# Patient Record
Sex: MALE | Race: BLACK OR AFRICAN AMERICAN | NOT HISPANIC OR LATINO | Employment: OTHER | URBAN - METROPOLITAN AREA
[De-identification: names, ages, dates, MRNs, and addresses within clinical notes are randomized per-mention and may not be internally consistent; named-entity substitution may affect disease eponyms.]

---

## 2017-02-11 ENCOUNTER — HOSPITAL ENCOUNTER (INPATIENT)
Facility: HOSPITAL | Age: 42
LOS: 5 days | DRG: 698 | End: 2017-02-16
Attending: EMERGENCY MEDICINE | Admitting: HOSPITALIST
Payer: MEDICAID

## 2017-02-11 DIAGNOSIS — N39.0 SEPSIS DUE TO URINARY TRACT INFECTION: Primary | ICD-10-CM

## 2017-02-11 DIAGNOSIS — A41.9 SEPSIS, DUE TO UNSPECIFIED ORGANISM: ICD-10-CM

## 2017-02-11 DIAGNOSIS — A41.9 SEPSIS DUE TO URINARY TRACT INFECTION: Primary | ICD-10-CM

## 2017-02-11 DIAGNOSIS — N39.0 URINARY TRACT INFECTION WITHOUT HEMATURIA, SITE UNSPECIFIED: ICD-10-CM

## 2017-02-11 DIAGNOSIS — G82.20 PARAPLEGIA: ICD-10-CM

## 2017-02-11 PROBLEM — Z78.9 NURSING HOME RESIDENT: Chronic | Status: ACTIVE | Noted: 2017-02-11

## 2017-02-11 PROBLEM — Z89.511 HX OF RIGHT BKA: Chronic | Status: ACTIVE | Noted: 2017-02-11

## 2017-02-11 PROBLEM — I10 ESSENTIAL HYPERTENSION: Chronic | Status: ACTIVE | Noted: 2017-02-11

## 2017-02-11 PROBLEM — D72.829 LEUKOCYTOSIS: Status: ACTIVE | Noted: 2017-02-11

## 2017-02-11 PROBLEM — S81.801A WOUND OF RIGHT LOWER EXTREMITY: Status: ACTIVE | Noted: 2017-02-11

## 2017-02-11 PROBLEM — Z97.8 CHRONIC INDWELLING FOLEY CATHETER: Chronic | Status: ACTIVE | Noted: 2017-02-11

## 2017-02-11 PROBLEM — Z86.718 HISTORY OF DVT OF LOWER EXTREMITY: Status: ACTIVE | Noted: 2017-02-11

## 2017-02-11 PROBLEM — D50.9 IRON DEFICIENCY ANEMIA: Chronic | Status: ACTIVE | Noted: 2017-02-11

## 2017-02-11 PROBLEM — T83.511A URINARY TRACT INFECTION ASSOCIATED WITH INDWELLING URETHRAL CATHETER: Status: ACTIVE | Noted: 2017-02-11

## 2017-02-11 PROBLEM — F33.40 RECURRENT MAJOR DEPRESSIVE DISORDER, IN REMISSION: Chronic | Status: ACTIVE | Noted: 2017-02-11

## 2017-02-11 PROBLEM — N17.9 AKI (ACUTE KIDNEY INJURY): Status: ACTIVE | Noted: 2017-02-11

## 2017-02-11 PROBLEM — E55.9 VITAMIN D DEFICIENCY: Chronic | Status: ACTIVE | Noted: 2017-02-11

## 2017-02-11 LAB
ALBUMIN SERPL BCP-MCNC: 2.1 G/DL
ALP SERPL-CCNC: 123 U/L
ALT SERPL W/O P-5'-P-CCNC: 38 U/L
ANION GAP SERPL CALC-SCNC: 15 MMOL/L
APTT BLDCRRT: 46 SEC
AST SERPL-CCNC: 54 U/L
BACTERIA #/AREA URNS HPF: ABNORMAL /HPF
BASOPHILS # BLD AUTO: 0.01 K/UL
BASOPHILS NFR BLD: 0 %
BILIRUB SERPL-MCNC: 0.4 MG/DL
BILIRUB UR QL STRIP: NEGATIVE
BUN SERPL-MCNC: 36 MG/DL
CALCIUM SERPL-MCNC: 8.8 MG/DL
CHLORIDE SERPL-SCNC: 99 MMOL/L
CLARITY UR: ABNORMAL
CO2 SERPL-SCNC: 21 MMOL/L
COLOR UR: ABNORMAL
CREAT SERPL-MCNC: 2.7 MG/DL
DIFFERENTIAL METHOD: ABNORMAL
EOSINOPHIL # BLD AUTO: 0 K/UL
EOSINOPHIL NFR BLD: 0 %
ERYTHROCYTE [DISTWIDTH] IN BLOOD BY AUTOMATED COUNT: 16.3 %
EST. GFR  (AFRICAN AMERICAN): 33 ML/MIN/1.73 M^2
EST. GFR  (NON AFRICAN AMERICAN): 28 ML/MIN/1.73 M^2
FLUAV AG SPEC QL IA: NEGATIVE
FLUBV AG SPEC QL IA: NEGATIVE
GLUCOSE SERPL-MCNC: 141 MG/DL
GLUCOSE UR QL STRIP: NEGATIVE
GRAM STN SPEC: NORMAL
HCT VFR BLD AUTO: 27.6 %
HGB BLD-MCNC: 9.2 G/DL
HGB UR QL STRIP: ABNORMAL
HYALINE CASTS #/AREA URNS LPF: 0 /LPF
INR PPP: 1.7
KETONES UR QL STRIP: ABNORMAL
LACTATE SERPL-SCNC: 1.8 MMOL/L
LACTATE SERPL-SCNC: 1.8 MMOL/L
LEUKOCYTE ESTERASE UR QL STRIP: ABNORMAL
LYMPHOCYTES # BLD AUTO: 1.2 K/UL
LYMPHOCYTES NFR BLD: 5.8 %
MAGNESIUM SERPL-MCNC: 1.4 MG/DL
MCH RBC QN AUTO: 26.7 PG
MCHC RBC AUTO-ENTMCNC: 33.3 %
MCV RBC AUTO: 80 FL
MICROSCOPIC COMMENT: ABNORMAL
MONOCYTES # BLD AUTO: 1 K/UL
MONOCYTES NFR BLD: 4.9 %
NEUTROPHILS # BLD AUTO: 18.3 K/UL
NEUTROPHILS NFR BLD: 88.4 %
NITRITE UR QL STRIP: NEGATIVE
PH UR STRIP: 8 [PH] (ref 5–8)
PHOSPHATE SERPL-MCNC: 5.5 MG/DL
PLATELET # BLD AUTO: 436 K/UL
PMV BLD AUTO: 10.1 FL
POTASSIUM SERPL-SCNC: 4.8 MMOL/L
PROCALCITONIN SERPL IA-MCNC: 52.57 NG/ML
PROT SERPL-MCNC: 7.7 G/DL
PROT UR QL STRIP: ABNORMAL
PROTHROMBIN TIME: 17.5 SEC
RBC # BLD AUTO: 3.44 M/UL
RBC #/AREA URNS HPF: >100 /HPF (ref 0–4)
SODIUM SERPL-SCNC: 135 MMOL/L
SP GR UR STRIP: 1.02 (ref 1–1.03)
SPECIMEN SOURCE: NORMAL
SQUAMOUS #/AREA URNS HPF: 2 /HPF
TROPONIN I SERPL DL<=0.01 NG/ML-MCNC: 0.02 NG/ML
URN SPEC COLLECT METH UR: ABNORMAL
UROBILINOGEN UR STRIP-ACNC: 1 EU/DL
WBC # BLD AUTO: 20.73 K/UL
WBC #/AREA URNS HPF: >100 /HPF (ref 0–5)
WBC CLUMPS URNS QL MICRO: ABNORMAL

## 2017-02-11 PROCEDURE — 99285 EMERGENCY DEPT VISIT HI MDM: CPT | Mod: 25

## 2017-02-11 PROCEDURE — 84100 ASSAY OF PHOSPHORUS: CPT

## 2017-02-11 PROCEDURE — 87205 SMEAR GRAM STAIN: CPT

## 2017-02-11 PROCEDURE — 85025 COMPLETE CBC W/AUTO DIFF WBC: CPT

## 2017-02-11 PROCEDURE — 83605 ASSAY OF LACTIC ACID: CPT

## 2017-02-11 PROCEDURE — 96365 THER/PROPH/DIAG IV INF INIT: CPT

## 2017-02-11 PROCEDURE — 84145 PROCALCITONIN (PCT): CPT

## 2017-02-11 PROCEDURE — 81000 URINALYSIS NONAUTO W/SCOPE: CPT

## 2017-02-11 PROCEDURE — 84484 ASSAY OF TROPONIN QUANT: CPT

## 2017-02-11 PROCEDURE — 96360 HYDRATION IV INFUSION INIT: CPT | Mod: XS

## 2017-02-11 PROCEDURE — 87400 INFLUENZA A/B EACH AG IA: CPT | Mod: 59

## 2017-02-11 PROCEDURE — 87184 SC STD DISK METHOD PER PLATE: CPT

## 2017-02-11 PROCEDURE — 87088 URINE BACTERIA CULTURE: CPT

## 2017-02-11 PROCEDURE — 85610 PROTHROMBIN TIME: CPT

## 2017-02-11 PROCEDURE — 25000003 PHARM REV CODE 250: Performed by: EMERGENCY MEDICINE

## 2017-02-11 PROCEDURE — 63600175 PHARM REV CODE 636 W HCPCS: Performed by: EMERGENCY MEDICINE

## 2017-02-11 PROCEDURE — 83735 ASSAY OF MAGNESIUM: CPT

## 2017-02-11 PROCEDURE — 96367 TX/PROPH/DG ADDL SEQ IV INF: CPT

## 2017-02-11 PROCEDURE — 87040 BLOOD CULTURE FOR BACTERIA: CPT | Mod: 59

## 2017-02-11 PROCEDURE — 93005 ELECTROCARDIOGRAM TRACING: CPT

## 2017-02-11 PROCEDURE — 87086 URINE CULTURE/COLONY COUNT: CPT

## 2017-02-11 PROCEDURE — 87077 CULTURE AEROBIC IDENTIFY: CPT

## 2017-02-11 PROCEDURE — 11000001 HC ACUTE MED/SURG PRIVATE ROOM

## 2017-02-11 PROCEDURE — 80053 COMPREHEN METABOLIC PANEL: CPT

## 2017-02-11 PROCEDURE — 87186 SC STD MICRODIL/AGAR DIL: CPT | Mod: 59

## 2017-02-11 PROCEDURE — 93010 ELECTROCARDIOGRAM REPORT: CPT | Mod: ,,, | Performed by: INTERNAL MEDICINE

## 2017-02-11 PROCEDURE — 51702 INSERT TEMP BLADDER CATH: CPT

## 2017-02-11 PROCEDURE — 85730 THROMBOPLASTIN TIME PARTIAL: CPT

## 2017-02-11 RX ORDER — ONDANSETRON 8 MG/1
8 TABLET, ORALLY DISINTEGRATING ORAL EVERY 8 HOURS PRN
Status: DISCONTINUED | OUTPATIENT
Start: 2017-02-11 | End: 2017-02-17 | Stop reason: HOSPADM

## 2017-02-11 RX ORDER — FERROUS SULFATE 325(65) MG
325 TABLET, DELAYED RELEASE (ENTERIC COATED) ORAL 2 TIMES DAILY
Status: ON HOLD | COMMUNITY
End: 2019-01-04 | Stop reason: HOSPADM

## 2017-02-11 RX ORDER — BACITRACIN 500 [USP'U]/G
OINTMENT TOPICAL 2 TIMES DAILY
COMMUNITY
End: 2017-12-10

## 2017-02-11 RX ORDER — SODIUM CHLORIDE 9 MG/ML
1000 INJECTION, SOLUTION INTRAVENOUS
Status: COMPLETED | OUTPATIENT
Start: 2017-02-11 | End: 2017-02-11

## 2017-02-11 RX ORDER — CHOLECALCIFEROL (VITAMIN D3) 10 MCG
1200 TABLET ORAL DAILY
Status: DISCONTINUED | OUTPATIENT
Start: 2017-02-12 | End: 2017-02-17 | Stop reason: HOSPADM

## 2017-02-11 RX ORDER — CEFEPIME HYDROCHLORIDE 1 G/50ML
1 INJECTION, SOLUTION INTRAVENOUS
Status: DISCONTINUED | OUTPATIENT
Start: 2017-02-12 | End: 2017-02-12

## 2017-02-11 RX ORDER — ASPIRIN 325 MG
325 TABLET, DELAYED RELEASE (ENTERIC COATED) ORAL DAILY
Status: ON HOLD | COMMUNITY
End: 2019-01-04 | Stop reason: HOSPADM

## 2017-02-11 RX ORDER — IBUPROFEN 400 MG/1
800 TABLET ORAL
Status: COMPLETED | OUTPATIENT
Start: 2017-02-11 | End: 2017-02-11

## 2017-02-11 RX ORDER — OXYCODONE HYDROCHLORIDE 5 MG/1
10 TABLET ORAL EVERY 6 HOURS PRN
Status: DISCONTINUED | OUTPATIENT
Start: 2017-02-12 | End: 2017-02-17 | Stop reason: HOSPADM

## 2017-02-11 RX ORDER — ERGOCALCIFEROL 1.25 MG/1
50000 CAPSULE ORAL
COMMUNITY
End: 2017-02-11 | Stop reason: CLARIF

## 2017-02-11 RX ORDER — MIRTAZAPINE 30 MG/1
30 TABLET, FILM COATED ORAL NIGHTLY
COMMUNITY
End: 2017-12-10

## 2017-02-11 RX ORDER — OXYCODONE HYDROCHLORIDE 5 MG/1
5 TABLET ORAL EVERY 6 HOURS PRN
Status: DISCONTINUED | OUTPATIENT
Start: 2017-02-12 | End: 2017-02-17 | Stop reason: HOSPADM

## 2017-02-11 RX ORDER — OXYCODONE AND ACETAMINOPHEN 5; 325 MG/1; MG/1
1 TABLET ORAL EVERY 4 HOURS PRN
COMMUNITY
End: 2018-08-29

## 2017-02-11 RX ORDER — ASCORBIC ACID 500 MG
500 TABLET ORAL 2 TIMES DAILY
Status: ON HOLD | COMMUNITY
End: 2019-01-04 | Stop reason: HOSPADM

## 2017-02-11 RX ORDER — ASCORBIC ACID 500 MG
500 TABLET ORAL 2 TIMES DAILY
Status: DISCONTINUED | OUTPATIENT
Start: 2017-02-12 | End: 2017-02-17 | Stop reason: HOSPADM

## 2017-02-11 RX ORDER — SODIUM CHLORIDE 9 MG/ML
INJECTION, SOLUTION INTRAVENOUS CONTINUOUS
Status: DISCONTINUED | OUTPATIENT
Start: 2017-02-11 | End: 2017-02-13

## 2017-02-11 RX ORDER — POLYETHYLENE GLYCOL 3350 17 G/17G
17 POWDER, FOR SOLUTION ORAL 2 TIMES DAILY PRN
Status: DISCONTINUED | OUTPATIENT
Start: 2017-02-12 | End: 2017-02-17 | Stop reason: HOSPADM

## 2017-02-11 RX ORDER — CHOLECALCIFEROL (VITAMIN D3) 125 MCG
50000 CAPSULE ORAL
Status: ON HOLD | COMMUNITY
End: 2019-01-04 | Stop reason: HOSPADM

## 2017-02-11 RX ORDER — PROPRANOLOL HYDROCHLORIDE 10 MG/1
10 TABLET ORAL 3 TIMES DAILY
Status: DISCONTINUED | OUTPATIENT
Start: 2017-02-12 | End: 2017-02-17 | Stop reason: HOSPADM

## 2017-02-11 RX ORDER — AMOXICILLIN 250 MG
1 CAPSULE ORAL 2 TIMES DAILY
Status: ON HOLD | COMMUNITY
End: 2019-01-04 | Stop reason: HOSPADM

## 2017-02-11 RX ORDER — ASPIRIN 325 MG
325 TABLET, DELAYED RELEASE (ENTERIC COATED) ORAL DAILY
Status: DISCONTINUED | OUTPATIENT
Start: 2017-02-12 | End: 2017-02-17 | Stop reason: HOSPADM

## 2017-02-11 RX ORDER — AMOXICILLIN 250 MG
1 CAPSULE ORAL 2 TIMES DAILY
Status: DISCONTINUED | OUTPATIENT
Start: 2017-02-12 | End: 2017-02-17 | Stop reason: HOSPADM

## 2017-02-11 RX ORDER — CIPROFLOXACIN 2 MG/ML
400 INJECTION, SOLUTION INTRAVENOUS
Status: COMPLETED | OUTPATIENT
Start: 2017-02-11 | End: 2017-02-11

## 2017-02-11 RX ORDER — POLYETHYLENE GLYCOL 3350 17 G/17G
17 POWDER, FOR SOLUTION ORAL DAILY PRN
Status: ON HOLD | COMMUNITY
End: 2019-01-04 | Stop reason: HOSPADM

## 2017-02-11 RX ORDER — FERROUS SULFATE 325(65) MG
325 TABLET, DELAYED RELEASE (ENTERIC COATED) ORAL 2 TIMES DAILY
Status: DISCONTINUED | OUTPATIENT
Start: 2017-02-12 | End: 2017-02-17 | Stop reason: HOSPADM

## 2017-02-11 RX ORDER — ACETAMINOPHEN 325 MG/1
650 TABLET ORAL EVERY 6 HOURS PRN
Status: DISCONTINUED | OUTPATIENT
Start: 2017-02-12 | End: 2017-02-17 | Stop reason: HOSPADM

## 2017-02-11 RX ORDER — BACITRACIN 500 [USP'U]/G
OINTMENT TOPICAL 2 TIMES DAILY
Status: DISCONTINUED | OUTPATIENT
Start: 2017-02-12 | End: 2017-02-17 | Stop reason: HOSPADM

## 2017-02-11 RX ORDER — MIRTAZAPINE 15 MG/1
30 TABLET, FILM COATED ORAL NIGHTLY
Status: DISCONTINUED | OUTPATIENT
Start: 2017-02-12 | End: 2017-02-17 | Stop reason: HOSPADM

## 2017-02-11 RX ORDER — ACETAMINOPHEN 650 MG/20.3ML
650 LIQUID ORAL EVERY 4 HOURS PRN
COMMUNITY
End: 2018-08-29

## 2017-02-11 RX ORDER — PROPRANOLOL HYDROCHLORIDE 10 MG/1
10 TABLET ORAL 3 TIMES DAILY
COMMUNITY
End: 2017-12-10

## 2017-02-11 RX ADMIN — SODIUM CHLORIDE 1000 ML: 0.9 INJECTION, SOLUTION INTRAVENOUS at 09:02

## 2017-02-11 RX ADMIN — SODIUM CHLORIDE 1000 ML: 0.9 INJECTION, SOLUTION INTRAVENOUS at 08:02

## 2017-02-11 RX ADMIN — CEFEPIME HYDROCHLORIDE 2 G: 2 INJECTION, POWDER, FOR SOLUTION INTRAVENOUS at 09:02

## 2017-02-11 RX ADMIN — IBUPROFEN 800 MG: 400 TABLET, FILM COATED ORAL at 08:02

## 2017-02-11 RX ADMIN — CIPROFLOXACIN 400 MG: 2 INJECTION, SOLUTION INTRAVENOUS at 10:02

## 2017-02-11 NOTE — IP AVS SNAPSHOT
[facility]  [address]  [place] [address]  Phone: [phone]           Patient Discharge Instructions     Our goal is to set you up for success. This packet includes information on your condition, medications, and your home care. It will help you to care for yourself so you don't get sicker and need to go back to the hospital.     Please ask your nurse if you have any questions.        There are many details to remember when preparing to leave the hospital. Here is what you will need to do:    1. Take your medicine. If you are prescribed medications, review your Medication List in the following pages. You may have new medications to  at the pharmacy and others that you'll need to stop taking. Review the instructions for how and when to take your medications. Talk with your doctor or nurses if you are unsure of what to do.     2. Go to your follow-up appointments. Specific follow-up information is listed in the following pages. Your may be contacted by a transition nurse or clinical provider about future appointments. Be sure we have all of the phone numbers to reach you, if needed. Please contact your provider's office if you are unable to make an appointment.     3. Watch for warning signs. Your doctor or nurse will give you detailed warning signs to watch for and when to call for assistance. These instructions may also include educational information about your condition. If you experience any of warning signs to your health, call your doctor.               [facility]  Unless otherwise directed by your provider, please contact [facility], our nurse care line that is available for 24/7 assistance.     [phone] (toll-free)    Registered nurses in the [facility] provide clinical advisement, health education, appointment booking, and other advisory services.                    ** Verify the list of medication(s) below is accurate and up to date. Carry this  with you in case of emergency. If your medications have changed, please notify your healthcare provider.             Medication List      START taking these medications        Additional Info                      ERTAPENEM (INVANZ) 1 G/100 ML NS (READY TO MIX)   Refills:  0   Dose:  1 g   Indications:  Bacteremia, Urinary Tract Infection    Last time this was given:  1 g on 2/16/2017  9:16 AM   Instructions:  Inject 100 mLs (1 g total) into the vein once daily.     Begin Date    AM    Noon    PM    Bedtime         CONTINUE taking these medications        Additional Info                      acetaminophen 650 mg/20.3 mL Soln   Commonly known as:  TYLENOL   Refills:  0   Dose:  650 mg    Instructions:  Take 650 mg by mouth every 4 (four) hours as needed for Temperature greater than.     Begin Date    AM    Noon    PM    Bedtime       aspirin 325 MG EC tablet   Commonly known as:  ECOTRIN   Refills:  0   Dose:  325 mg    Last time this was given:  325 mg on 2/16/2017  9:17 AM   Instructions:  Take 325 mg by mouth once daily.     Begin Date    AM    Noon    PM    Bedtime       bacitracin 500 unit/gram Oint   Refills:  0    Last time this was given:  2/15/2017 10:00 PM   Instructions:  Apply topically 2 (two) times daily. To right stump     Begin Date    AM    Noon    PM    Bedtime       bacitracin 500 unit/gram ointment   Refills:  0    Last time this was given:  2/15/2017 10:00 PM   Instructions:  Apply topically 2 (two) times daily. Apply to right stump     Begin Date    AM    Noon    PM    Bedtime       cholecalciferol (vitamin D3) 5,000 unit capsule   Refills:  0   Dose:  5000 Units    Last time this was given:  1,200 Units on 2/16/2017  9:19 AM   Instructions:  Take 5,000 Units by mouth once daily.     Begin Date    AM    Noon    PM    Bedtime       collagenase ointment   Refills:  0    Last time this was given:  2/15/2017  9:56 AM   Instructions:  Apply topically once daily. Cleanse right posterior stump wound  with normal saline.  Pat Dry. Apply skin prep to periwound.  Apply Santyl to wound bed & cover with bordered gauze dressing everyday until resolved.     Begin Date    AM    Noon    PM    Bedtime       ferrous sulfate 325 (65 FE) MG EC tablet   Refills:  0   Dose:  325 mg    Last time this was given:  325 mg on 2/16/2017  9:14 AM   Instructions:  Take 325 mg by mouth 2 (two) times daily.     Begin Date    AM    Noon    PM    Bedtime       LUBRIDERM DAILY MOISTURE TOP   Refills:  0    Instructions:  Apply topically once daily. Apply to left leg and foot     Begin Date    AM    Noon    PM    Bedtime       mirtazapine 30 MG tablet   Commonly known as:  REMERON   Refills:  0   Dose:  30 mg    Last time this was given:  30 mg on 2/15/2017 10:00 PM   Instructions:  Take 30 mg by mouth every evening.     Begin Date    AM    Noon    PM    Bedtime       MISC. DEVICES MISC   Refills:  0    Instructions:  by Misc.(Non-Drug; Combo Route) route. Change sage catheter q. Month on the 20th     Begin Date    AM    Noon    PM    Bedtime       oxycodone-acetaminophen 5-325 mg per tablet   Commonly known as:  PERCOCET   Refills:  0   Dose:  1 tablet    Instructions:  Take 1 tablet by mouth every 4 (four) hours as needed for Pain.     Begin Date    AM    Noon    PM    Bedtime       polyethylene glycol 17 gram Pwpk   Commonly known as:  GLYCOLAX   Refills:  0   Dose:  17 g   Indications:  Constipation    Last time this was given:  17 g on 2/16/2017  1:35 PM   Instructions:  Take 17 g by mouth daily as needed.     Begin Date    AM    Noon    PM    Bedtime       propranolol 10 MG tablet   Commonly known as:  INDERAL   Refills:  0   Dose:  10 mg    Last time this was given:  10 mg on 2/16/2017  2:55 PM   Instructions:  Take 10 mg by mouth 3 (three) times daily.     Begin Date    AM    Noon    PM    Bedtime       protein supplement Liqd   Refills:  0   Dose:  30 mL    Instructions:  Take 30 mLs by mouth 2 (two) times daily.     Begin Date     AM    Noon    PM    Bedtime       senna-docusate 8.6-50 mg 8.6-50 mg per tablet   Commonly known as:  PERICOLACE   Refills:  0   Dose:  1 tablet    Last time this was given:  1 tablet on 2/16/2017  9:13 AM   Instructions:  Take 1 tablet by mouth 2 (two) times daily.     Begin Date    AM    Noon    PM    Bedtime       VITAMIN C 500 MG tablet   Refills:  0   Dose:  500 mg   Generic drug:  ascorbic acid (vitamin C)    Last time this was given:  500 mg on 2/16/2017  9:14 AM   Instructions:  Take 500 mg by mouth 2 (two) times daily.     Begin Date    AM    Noon    PM    Bedtime       XARELTO 20 mg Tab   Refills:  0   Dose:  20 mg   Generic drug:  rivaroxaban    Last time this was given:  20 mg on 2/15/2017  5:15 PM   Instructions:  Take 20 mg by mouth daily with dinner or evening meal.     Begin Date    AM    Noon    PM    Bedtime            Where to Get Your Medications      Information about where to get these medications is not yet available     ! Ask your nurse or doctor about these medications     ERTAPENEM (INVANZ) 1 G/100 ML NS (READY TO MIX)                  Please bring to all follow up appointments:    1. A copy of your discharge instructions.  2. All medicines you are currently taking in their original bottles.  3. Identification and insurance card.    Please arrive 15 minutes ahead of scheduled appointment time.    Please call 24 hours in advance if you must reschedule your appointment and/or time.        Follow-up Information     Follow up with Ramu Breen MD.    Specialty:  Family Medicine    Why:  As needed    Contact information:    [address] [place] [address] [phone]          Discharge Instructions     Future Orders    Activity as tolerated     AIR MATTRESS if Olman Scale less than 15     Diet Adult Regular     Questions:    Total calories:      Fat restriction, if any:      Protein restriction, if any:      Na restriction, if any:      Fluid restriction:      Additional restrictions:       "Full code     Intake and output per facility protocol     Skin assessment every shift      Up in chair/ wheel chair     Vital signs per facility protocol       Discharge References/Attachments     ERTAPENEM INJECTION (ENGLISH)    SEPSIS (ENGLISH)    URINARY TRACT INFECTIONS (UTIS), UNDERSTANDING (ENGLISH)    URINARY TRACT INFECTIONS IN MEN  (ENGLISH)    PICC LINE CARE (ENGLISH)    PICC, PERIPHERALLY INSERTED CENTRAL CATHETER (ENGLISH)    WOUND CHECK (INFECTION) (ENGLISH)        Primary Diagnosis     Your primary diagnosis was:  Sepsis Due To Urinary Tract Infection      Admission Information     Date & Time Provider Department CSN    2/11/2017  7:19 PM Maurilio Alfaro MD [facility] [id number]       Admisson Diagnosis: Paraplegia, Sepsis, due to unspecified organism, Urinary tract infection without hematuria, site unspecified      Care Providers     Provider Role Specialty Primary office phone    Maurilio Alfaro MD Attending Provider Hospitalist [phone]      Your Vitals Were     BP Pulse Temp Resp Height Weight    116/72 (BP Location: Left arm, Patient Position: Lying, BP Method: Automatic) 112 99 °F (37.2 °C) (Axillary) 20 5' 10" (1.778 m) 86.2 kg (190 lb)    SpO2 BMI             98% 27.26 kg/m2         Recent Lab Values     No lab values to display.      Pending Labs     Order Current Status    Blood culture Preliminary result    Blood culture Preliminary result    Blood culture Preliminary result      Allergies as of 2/16/2017     No Known Allergies      Advance Directives     An advance directive is a document which, in the event you are no longer able to make decisions for yourself, tells your healthcare team what kind of treatment you do or do not want to receive, or who you would like to make those decisions for you.  If you do not currently have an advance directive, [facility] encourages you to create one.  For more information call:  [phone] ([phone]), [phone] " ([phone]), or log on to [email or web address].        Smoking Cessation     If you would like to quit smoking:   You may be eligible for free services if you are a [place] resident and started smoking cigarettes before September 1, 1988.  Call the [facility] toll free at (765) 776-1874 or (490) 509-1968.      Call 1-800-QUIT-NOW if you do not meet the above criteria.        Translation Services Information     ATTENTION: Language assistance services are available, free of charge. Please call [phone].    ATENCIÓN: Si habla espmindi, tiene a villela disposición servicios gratuitos de asistencia lingüística. Llame al [phone].     CHÚ Ý: N?u b?n nói Ti?ng Vi?t, có các d?ch v? h? tr? ngôn ng? mi?n phí dành cho b?n. G?i s? [phone].        [facility] Information           [facility] Sign-Up     Activating your [facility] account is as easy as 1-2-3!     1) Visit [email or web address], select Sign Up Now, enter this activation code and your date of birth, then select Next.  [email or web address]  Expires: 4/2/2017  9:14 AM      2) Create a username and password to use when you visit [facility] in the future and select a security question in case you lose your password and select Next.    3) Enter your e-mail address and click Sign Up!    Additional Information  If you have questions, please e-mail [email or web address] or call [phone] to talk to our [facility] staff. Remember, [facility] is NOT to be used for urgent needs. For medical emergencies, dial 911.          [facility] complies with applicable Federal civil rights laws and does not discriminate on the basis of race, color, national origin, age, disability, or sex.

## 2017-02-11 NOTE — IP AVS SNAPSHOT
[facility]  [address]  [place] [address]  Phone: [phone]           Patient Discharge Instructions     Our goal is to set you up for success. This packet includes information on your condition, medications, and your home care. It will help you to care for yourself so you don't get sicker and need to go back to the hospital.     Please ask your nurse if you have any questions.        There are many details to remember when preparing to leave the hospital. Here is what you will need to do:    1. Take your medicine. If you are prescribed medications, review your Medication List in the following pages. You may have new medications to  at the pharmacy and others that you'll need to stop taking. Review the instructions for how and when to take your medications. Talk with your doctor or nurses if you are unsure of what to do.     2. Go to your follow-up appointments. Specific follow-up information is listed in the following pages. Your may be contacted by a transition nurse or clinical provider about future appointments. Be sure we have all of the phone numbers to reach you, if needed. Please contact your provider's office if you are unable to make an appointment.     3. Watch for warning signs. Your doctor or nurse will give you detailed warning signs to watch for and when to call for assistance. These instructions may also include educational information about your condition. If you experience any of warning signs to your health, call your doctor.               [facility]  Unless otherwise directed by your provider, please contact [facility], our nurse care line that is available for 24/7 assistance.     [phone] (toll-free)    Registered nurses in the [facility] provide clinical advisement, health education, appointment booking, and other advisory services.                    ** Verify the list of medication(s) below is accurate and up to date. Carry this  with you in case of emergency. If your medications have changed, please notify your healthcare provider.             Medication List      START taking these medications        Additional Info                      ERTAPENEM (INVANZ) 1 G/100 ML NS (READY TO MIX)   Refills:  0   Dose:  1 g   Indications:  Bacteremia, Urinary Tract Infection    Last time this was given:  1 g on 2/16/2017  9:16 AM   Instructions:  Inject 100 mLs (1 g total) into the vein once daily.     Begin Date    AM    Noon    PM    Bedtime         CONTINUE taking these medications        Additional Info                      acetaminophen 650 mg/20.3 mL Soln   Commonly known as:  TYLENOL   Refills:  0   Dose:  650 mg    Instructions:  Take 650 mg by mouth every 4 (four) hours as needed for Temperature greater than.     Begin Date    AM    Noon    PM    Bedtime       aspirin 325 MG EC tablet   Commonly known as:  ECOTRIN   Refills:  0   Dose:  325 mg    Last time this was given:  325 mg on 2/16/2017  9:17 AM   Instructions:  Take 325 mg by mouth once daily.     Begin Date    AM    Noon    PM    Bedtime       bacitracin 500 unit/gram Oint   Refills:  0    Last time this was given:  2/15/2017 10:00 PM   Instructions:  Apply topically 2 (two) times daily. To right stump     Begin Date    AM    Noon    PM    Bedtime       bacitracin 500 unit/gram ointment   Refills:  0    Last time this was given:  2/15/2017 10:00 PM   Instructions:  Apply topically 2 (two) times daily. Apply to right stump     Begin Date    AM    Noon    PM    Bedtime       cholecalciferol (vitamin D3) 5,000 unit capsule   Refills:  0   Dose:  5000 Units    Last time this was given:  1,200 Units on 2/16/2017  9:19 AM   Instructions:  Take 5,000 Units by mouth once daily.     Begin Date    AM    Noon    PM    Bedtime       collagenase ointment   Refills:  0    Last time this was given:  2/15/2017  9:56 AM   Instructions:  Apply topically once daily. Cleanse right posterior stump wound  with normal saline.  Pat Dry. Apply skin prep to periwound.  Apply Santyl to wound bed & cover with bordered gauze dressing everyday until resolved.     Begin Date    AM    Noon    PM    Bedtime       ferrous sulfate 325 (65 FE) MG EC tablet   Refills:  0   Dose:  325 mg    Last time this was given:  325 mg on 2/16/2017  9:14 AM   Instructions:  Take 325 mg by mouth 2 (two) times daily.     Begin Date    AM    Noon    PM    Bedtime       LUBRIDERM DAILY MOISTURE TOP   Refills:  0    Instructions:  Apply topically once daily. Apply to left leg and foot     Begin Date    AM    Noon    PM    Bedtime       mirtazapine 30 MG tablet   Commonly known as:  REMERON   Refills:  0   Dose:  30 mg    Last time this was given:  30 mg on 2/15/2017 10:00 PM   Instructions:  Take 30 mg by mouth every evening.     Begin Date    AM    Noon    PM    Bedtime       MISC. DEVICES MISC   Refills:  0    Instructions:  by Misc.(Non-Drug; Combo Route) route. Change sage catheter q. Month on the 20th     Begin Date    AM    Noon    PM    Bedtime       oxycodone-acetaminophen 5-325 mg per tablet   Commonly known as:  PERCOCET   Refills:  0   Dose:  1 tablet    Instructions:  Take 1 tablet by mouth every 4 (four) hours as needed for Pain.     Begin Date    AM    Noon    PM    Bedtime       polyethylene glycol 17 gram Pwpk   Commonly known as:  GLYCOLAX   Refills:  0   Dose:  17 g   Indications:  Constipation    Last time this was given:  17 g on 2/16/2017  1:35 PM   Instructions:  Take 17 g by mouth daily as needed.     Begin Date    AM    Noon    PM    Bedtime       propranolol 10 MG tablet   Commonly known as:  INDERAL   Refills:  0   Dose:  10 mg    Last time this was given:  10 mg on 2/16/2017  2:55 PM   Instructions:  Take 10 mg by mouth 3 (three) times daily.     Begin Date    AM    Noon    PM    Bedtime       protein supplement Liqd   Refills:  0   Dose:  30 mL    Instructions:  Take 30 mLs by mouth 2 (two) times daily.     Begin Date     AM    Noon    PM    Bedtime       senna-docusate 8.6-50 mg 8.6-50 mg per tablet   Commonly known as:  PERICOLACE   Refills:  0   Dose:  1 tablet    Last time this was given:  1 tablet on 2/16/2017  9:13 AM   Instructions:  Take 1 tablet by mouth 2 (two) times daily.     Begin Date    AM    Noon    PM    Bedtime       VITAMIN C 500 MG tablet   Refills:  0   Dose:  500 mg   Generic drug:  ascorbic acid (vitamin C)    Last time this was given:  500 mg on 2/16/2017  9:14 AM   Instructions:  Take 500 mg by mouth 2 (two) times daily.     Begin Date    AM    Noon    PM    Bedtime       XARELTO 20 mg Tab   Refills:  0   Dose:  20 mg   Generic drug:  rivaroxaban    Last time this was given:  20 mg on 2/15/2017  5:15 PM   Instructions:  Take 20 mg by mouth daily with dinner or evening meal.     Begin Date    AM    Noon    PM    Bedtime            Where to Get Your Medications      Information about where to get these medications is not yet available     ! Ask your nurse or doctor about these medications     ERTAPENEM (INVANZ) 1 G/100 ML NS (READY TO MIX)                  Please bring to all follow up appointments:    1. A copy of your discharge instructions.  2. All medicines you are currently taking in their original bottles.  3. Identification and insurance card.    Please arrive 15 minutes ahead of scheduled appointment time.    Please call 24 hours in advance if you must reschedule your appointment and/or time.        Follow-up Information     Follow up with Ramu Breen MD.    Specialty:  Family Medicine    Why:  As needed    Contact information:    [address] [place] [address] [phone]          Discharge Instructions     Future Orders    Activity as tolerated     AIR MATTRESS if Olman Scale less than 15     Diet Adult Regular     Questions:    Total calories:      Fat restriction, if any:      Protein restriction, if any:      Na restriction, if any:      Fluid restriction:      Additional restrictions:       "Full code     Intake and output per facility protocol     Skin assessment every shift      Up in chair/ wheel chair     Vital signs per facility protocol       Discharge References/Attachments     ERTAPENEM INJECTION (ENGLISH)    SEPSIS (ENGLISH)    URINARY TRACT INFECTIONS (UTIS), UNDERSTANDING (ENGLISH)    URINARY TRACT INFECTIONS IN MEN  (ENGLISH)    PICC LINE CARE (ENGLISH)    PICC, PERIPHERALLY INSERTED CENTRAL CATHETER (ENGLISH)    WOUND CHECK (INFECTION) (ENGLISH)        Primary Diagnosis     Your primary diagnosis was:  Sepsis Due To Urinary Tract Infection      Admission Information     Date & Time Provider Department CSN    2/11/2017  7:19 PM Maurilio Alfaro MD [facility] [id number]      Care Providers     Provider Role Specialty Primary office phone    Maurilio Alfaro MD Attending Provider Hospitalist [phone]      Your Vitals Were     BP Pulse Temp Resp Height Weight    116/72 (BP Location: Left arm, Patient Position: Lying, BP Method: Automatic) 112 99 °F (37.2 °C) (Axillary) 20 5' 10" (1.778 m) 86.2 kg (190 lb)    SpO2 BMI             98% 27.26 kg/m2         Recent Lab Values     No lab values to display.      Pending Labs     Order Current Status    Blood culture Preliminary result    Blood culture Preliminary result    Blood culture Preliminary result      Allergies as of 2/16/2017     No Known Allergies      Advance Directives     An advance directive is a document which, in the event you are no longer able to make decisions for yourself, tells your healthcare team what kind of treatment you do or do not want to receive, or who you would like to make those decisions for you.  If you do not currently have an advance directive, [facility] encourages you to create one.  For more information call:  [phone]), [phone] ([phone]),  or log on to [email or web address].        Smoking Cessation     If you would like to quit smoking:   You may be " eligible for free services if you are a [place] resident and started smoking cigarettes before September 1, 1988.  Call the [facility] ([facility]) toll free at (699) 738-0474 or (796) 791-4707.   Call 1-800-QUIT-NOW if you do not meet the above criteria.            Language Assistance Services     ATTENTION: Language assistance services are available, free of charge. Please call [phone].      ATENCIÓN: Si habla español, tiene a villela disposición servicios gratuitos de asistencia lingüística. Llame al [phone].     [facility] Ý: N?u b?n nói Ti?ng Vi?t, có các d?ch v? h? tr? ngôn ng? mi?n phí dành cho b?n. G?i s? [phone].        [place] Information           [facility] Sign-Up     Activating your [facility] account is as easy as 1-2-3!     1) Visit [email or web address], select Sign Up Now, enter this activation code and your date of birth, then select Next.  [email or web address]  Expires: 4/2/2017  9:14 AM      2) Create a username and password to use when you visit [facility] in the future and select a security question in case you lose your password and select Next.    3) Enter your e-mail address and click Sign Up!    Additional Information  If you have questions, please e-mail [email or web address] or call [phone] to talk to our [facility] staff. Remember, [facility] is NOT to be used for urgent needs. For medical emergencies, dial 911.          [facility] complies with applicable Federal civil rights laws and does not discriminate on the basis of race, color, national origin, age, disability, or sex.

## 2017-02-12 PROBLEM — T14.90XS POST-TRAUMATIC PARAPLEGIA: Chronic | Status: ACTIVE | Noted: 2017-02-11

## 2017-02-12 PROBLEM — F43.12 CHRONIC POST-TRAUMATIC STRESS DISORDER: Chronic | Status: ACTIVE | Noted: 2017-02-12

## 2017-02-12 PROBLEM — Z93.1 GASTROSTOMY STATUS: Chronic | Status: ACTIVE | Noted: 2017-02-12

## 2017-02-12 PROBLEM — R78.81 GRAM-NEGATIVE BACTEREMIA: Status: ACTIVE | Noted: 2017-02-12

## 2017-02-12 PROBLEM — Z93.0 TRACHEOSTOMY STATUS: Chronic | Status: ACTIVE | Noted: 2017-02-12

## 2017-02-12 PROBLEM — E83.42 HYPOMAGNESEMIA: Status: ACTIVE | Noted: 2017-02-12

## 2017-02-12 PROBLEM — E83.39 HYPERPHOSPHATEMIA: Status: ACTIVE | Noted: 2017-02-12

## 2017-02-12 LAB
ABO + RH BLD: NORMAL
ANION GAP SERPL CALC-SCNC: 12 MMOL/L
BASOPHILS # BLD AUTO: 0.02 K/UL
BASOPHILS # BLD AUTO: 0.02 K/UL
BASOPHILS NFR BLD: 0.1 %
BASOPHILS NFR BLD: 0.1 %
BLD GP AB SCN CELLS X3 SERPL QL: NORMAL
BUN SERPL-MCNC: 33 MG/DL
CALCIUM SERPL-MCNC: 8.4 MG/DL
CHLORIDE SERPL-SCNC: 106 MMOL/L
CO2 SERPL-SCNC: 20 MMOL/L
CREAT SERPL-MCNC: 1.8 MG/DL
DIFFERENTIAL METHOD: ABNORMAL
DIFFERENTIAL METHOD: ABNORMAL
EOSINOPHIL # BLD AUTO: 0.1 K/UL
EOSINOPHIL # BLD AUTO: 0.4 K/UL
EOSINOPHIL NFR BLD: 0.4 %
EOSINOPHIL NFR BLD: 2.4 %
ERYTHROCYTE [DISTWIDTH] IN BLOOD BY AUTOMATED COUNT: 16.3 %
ERYTHROCYTE [DISTWIDTH] IN BLOOD BY AUTOMATED COUNT: 16.4 %
EST. GFR  (AFRICAN AMERICAN): 54 ML/MIN/1.73 M^2
EST. GFR  (NON AFRICAN AMERICAN): 46 ML/MIN/1.73 M^2
GLUCOSE SERPL-MCNC: 121 MG/DL
HCT VFR BLD AUTO: 24 %
HCT VFR BLD AUTO: 25.4 %
HGB BLD-MCNC: 7.8 G/DL
HGB BLD-MCNC: 8.2 G/DL
LACTATE SERPL-SCNC: 1.1 MMOL/L
LACTATE SERPL-SCNC: 1.8 MMOL/L
LYMPHOCYTES # BLD AUTO: 1.1 K/UL
LYMPHOCYTES # BLD AUTO: 1.4 K/UL
LYMPHOCYTES NFR BLD: 6.8 %
LYMPHOCYTES NFR BLD: 8.6 %
MAGNESIUM SERPL-MCNC: 1.4 MG/DL
MCH RBC QN AUTO: 26 PG
MCH RBC QN AUTO: 26.1 PG
MCHC RBC AUTO-ENTMCNC: 32.3 %
MCHC RBC AUTO-ENTMCNC: 32.5 %
MCV RBC AUTO: 80 FL
MCV RBC AUTO: 81 FL
MONOCYTES # BLD AUTO: 0.9 K/UL
MONOCYTES # BLD AUTO: 1.3 K/UL
MONOCYTES NFR BLD: 5.2 %
MONOCYTES NFR BLD: 7.6 %
NEUTROPHILS # BLD AUTO: 13.6 K/UL
NEUTROPHILS # BLD AUTO: 14.1 K/UL
NEUTROPHILS NFR BLD: 82.7 %
NEUTROPHILS NFR BLD: 85.5 %
PHOSPHATE SERPL-MCNC: 4.8 MG/DL
PLATELET # BLD AUTO: 341 K/UL
PLATELET # BLD AUTO: 374 K/UL
PMV BLD AUTO: 10 FL
PMV BLD AUTO: 9.5 FL
POTASSIUM SERPL-SCNC: 3.3 MMOL/L
RBC # BLD AUTO: 3 M/UL
RBC # BLD AUTO: 3.14 M/UL
SODIUM SERPL-SCNC: 138 MMOL/L
VANCOMYCIN SERPL-MCNC: 18.1 UG/ML
WBC # BLD AUTO: 16.48 K/UL
WBC # BLD AUTO: 16.57 K/UL

## 2017-02-12 PROCEDURE — 11000001 HC ACUTE MED/SURG PRIVATE ROOM

## 2017-02-12 PROCEDURE — 25000003 PHARM REV CODE 250: Performed by: EMERGENCY MEDICINE

## 2017-02-12 PROCEDURE — 99900035 HC TECH TIME PER 15 MIN (STAT)

## 2017-02-12 PROCEDURE — 83735 ASSAY OF MAGNESIUM: CPT

## 2017-02-12 PROCEDURE — 83605 ASSAY OF LACTIC ACID: CPT | Mod: 91

## 2017-02-12 PROCEDURE — 99900022

## 2017-02-12 PROCEDURE — 84100 ASSAY OF PHOSPHORUS: CPT

## 2017-02-12 PROCEDURE — 80048 BASIC METABOLIC PNL TOTAL CA: CPT

## 2017-02-12 PROCEDURE — 86900 BLOOD TYPING SEROLOGIC ABO: CPT

## 2017-02-12 PROCEDURE — 87040 BLOOD CULTURE FOR BACTERIA: CPT | Mod: 59

## 2017-02-12 PROCEDURE — 94761 N-INVAS EAR/PLS OXIMETRY MLT: CPT

## 2017-02-12 PROCEDURE — 80202 ASSAY OF VANCOMYCIN: CPT

## 2017-02-12 PROCEDURE — 25000003 PHARM REV CODE 250: Performed by: NURSE PRACTITIONER

## 2017-02-12 PROCEDURE — 36415 COLL VENOUS BLD VENIPUNCTURE: CPT

## 2017-02-12 PROCEDURE — 63600175 PHARM REV CODE 636 W HCPCS: Performed by: EMERGENCY MEDICINE

## 2017-02-12 PROCEDURE — 85025 COMPLETE CBC W/AUTO DIFF WBC: CPT

## 2017-02-12 PROCEDURE — 86901 BLOOD TYPING SEROLOGIC RH(D): CPT

## 2017-02-12 RX ORDER — POTASSIUM CHLORIDE 20 MEQ/1
20 TABLET, EXTENDED RELEASE ORAL ONCE
Status: COMPLETED | OUTPATIENT
Start: 2017-02-12 | End: 2017-02-12

## 2017-02-12 RX ORDER — CHLORPROMAZINE HYDROCHLORIDE 25 MG/1
50 TABLET, FILM COATED ORAL 3 TIMES DAILY PRN
Status: DISCONTINUED | OUTPATIENT
Start: 2017-02-12 | End: 2017-02-17 | Stop reason: HOSPADM

## 2017-02-12 RX ADMIN — PROPRANOLOL HYDROCHLORIDE 10 MG: 10 TABLET ORAL at 09:02

## 2017-02-12 RX ADMIN — OXYCODONE HYDROCHLORIDE AND ACETAMINOPHEN 500 MG: 500 TABLET ORAL at 09:02

## 2017-02-12 RX ADMIN — OXYCODONE HYDROCHLORIDE 10 MG: 5 TABLET ORAL at 02:02

## 2017-02-12 RX ADMIN — OXYCODONE HYDROCHLORIDE 10 MG: 5 TABLET ORAL at 09:02

## 2017-02-12 RX ADMIN — POTASSIUM CHLORIDE 20 MEQ: 20 TABLET, EXTENDED RELEASE ORAL at 10:02

## 2017-02-12 RX ADMIN — OXYCODONE HYDROCHLORIDE AND ACETAMINOPHEN 500 MG: 500 TABLET ORAL at 10:02

## 2017-02-12 RX ADMIN — FERROUS SULFATE TAB EC 325 MG (65 MG FE EQUIVALENT) 325 MG: 325 (65 FE) TABLET DELAYED RESPONSE at 09:02

## 2017-02-12 RX ADMIN — RIVAROXABAN 20 MG: 20 TABLET, FILM COATED ORAL at 07:02

## 2017-02-12 RX ADMIN — MIRTAZAPINE 30 MG: 15 TABLET, FILM COATED ORAL at 09:02

## 2017-02-12 RX ADMIN — ACETAMINOPHEN 650 MG: 325 TABLET ORAL at 09:02

## 2017-02-12 RX ADMIN — SODIUM CHLORIDE: 0.9 INJECTION, SOLUTION INTRAVENOUS at 12:02

## 2017-02-12 RX ADMIN — CEFEPIME 2 G: 2 INJECTION, POWDER, FOR SOLUTION INTRAVENOUS at 09:02

## 2017-02-12 RX ADMIN — BACITRACIN: 500 OINTMENT TOPICAL at 02:02

## 2017-02-12 RX ADMIN — PROPRANOLOL HYDROCHLORIDE 10 MG: 10 TABLET ORAL at 02:02

## 2017-02-12 RX ADMIN — SODIUM CHLORIDE: 0.9 INJECTION, SOLUTION INTRAVENOUS at 07:02

## 2017-02-12 RX ADMIN — BACITRACIN: 500 OINTMENT TOPICAL at 10:02

## 2017-02-12 RX ADMIN — STANDARDIZED SENNA CONCENTRATE AND DOCUSATE SODIUM 1 TABLET: 8.6; 5 TABLET, FILM COATED ORAL at 09:02

## 2017-02-12 RX ADMIN — STANDARDIZED SENNA CONCENTRATE AND DOCUSATE SODIUM 1 TABLET: 8.6; 5 TABLET, FILM COATED ORAL at 10:02

## 2017-02-12 RX ADMIN — CHOLECALCIFEROL TAB 10 MCG (400 UNIT) 1200 UNITS: 10 TAB at 02:02

## 2017-02-12 RX ADMIN — FERROUS SULFATE TAB EC 325 MG (65 MG FE EQUIVALENT) 325 MG: 325 (65 FE) TABLET DELAYED RESPONSE at 10:02

## 2017-02-12 RX ADMIN — SODIUM CHLORIDE: 0.9 INJECTION, SOLUTION INTRAVENOUS at 09:02

## 2017-02-12 RX ADMIN — ASPIRIN 325 MG: 325 TABLET, DELAYED RELEASE ORAL at 10:02

## 2017-02-12 RX ADMIN — VANCOMYCIN HYDROCHLORIDE 1000 MG: 1 INJECTION, POWDER, LYOPHILIZED, FOR SOLUTION INTRAVENOUS at 12:02

## 2017-02-12 RX ADMIN — CHLORPROMAZINE HYDROCHLORIDE 50 MG: 25 TABLET, SUGAR COATED ORAL at 09:02

## 2017-02-12 NOTE — PLAN OF CARE
Problem: Patient Care Overview  Goal: Plan of Care Review  Patient on room air with saturations of 97%.  Patient has in tact trache collar and did not appear to require suctioning at this time.  Will continue to monitor.

## 2017-02-12 NOTE — PLAN OF CARE
No future appointments.        02/12/17 1750   Discharge Assessment   Assessment Type Discharge Planning Assessment   Confirmed/corrected address and phone number on facesheet? Yes   Assessment information obtained from? Patient   Prior to hospitilization cognitive status: Alert/Oriented   Prior to hospitalization functional status: Completely Dependent   Current Functional Status: Completely Dependent   Arrived From nursing home;skilled nursing facility  (unsure, pt states he lives with cousin, but was at [facility] prior to admit, plan for pt to return to [facility] for a few weeks before going back to home with cousin. [place] to follow up in am with [facility] as to status at facility. )   Lives With other relative(s)   Is patient able to care for self after discharge? Unable to determine at this time (comments)   Patient's perception of discharge disposition skilled nursing facility   Readmission Within The Last 30 Days no previous admission in last 30 days   Patient currently being followed by outpatient case management? No   Patient currently receives home health services? No   Does the patient currently use HME? Yes   Do you have any problems affording any of your prescribed medications? No   Is the patient taking medications as prescribed? yes   Do you have any financial concerns preventing you from receiving the healthcare you need? No   Does the patient have transportation to healthcare appointments? No   On Dialysis? No   Are there any open cases? No   Discharge Plan A Return to nursing home;Skilled Nursing Facility   Discharge Plan B Home with family   Patient/Family In Agreement With Plan yes

## 2017-02-12 NOTE — ASSESSMENT & PLAN NOTE
Urinary tract infection associated with indwelling urethral catheter  Chronic indwelling sage catheter  Leukocytosis  Meets sepsis criteria due to temp 102.3F, tachycardia, WBC 20/73K, VAUGHN, and urine as source of infection.  Blood and urine cultures collected in ED.  Given cefepime, vancomycin, and cipro in ED.  Continue IVFs; renally dosed cefepime; daily random vanc levels.  Daily CBC, f/u cultures.
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
## 2017-02-12 NOTE — ED PROVIDER NOTES
Encounter Date: 2/11/2017       History     Chief Complaint   Patient presents with    Fever     Patient presents to the ED via EMS who reports patient is from [facility]. States staff reporting patient with fever and given 1 gram of tylenol approximately x 30 minutes prior to arrival.      Review of patient's allergies indicates:  No Known Allergies  HPI Comments: 40 Y/O AAM WITH PMHX OF PARAPLEGIA S/P THORACIC SPINE INJURY IN 2016 WITH RIGHT BKA, TRACHEOSTOMY, INDWELLING CATHETER, AND PEG TUBE PRESENTS FROM NURSING HOME WITH FEVER.  PT REPORTS HE BEGAN FEELING POORLY 2 DAYS AGO WITH NAUSEA AND SOME COUGHING.  NO SPUTUM PRODUCTION.  NO ABD PAIN OR CHEST PAIN.  NO SOB.  NO HA OR NECK PAIN.  PT REPORTS BODY ACHES YESTERDAY. PT HAS A WOUND RIGHT BKA.   PT REPORTS DECREASED APPETITE.     PER NH REPORT, PT HAD FEVER, CHILLS, SHAKING AND VOMITING TODAY.  HE WAS TREATED WITH TYLENOL 1 GRAM PTA.  NO FURTHER HISTORY GIVEN BY NH FACILITY.     The history is provided by the patient and medical records. No  was used.     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  Past Surgical History   Procedure Laterality Date    Amputation, lower limb Right     Tracheostomy tube placement      Gastrostomy tube placement       History reviewed. No pertinent family history.  Social History   Substance Use Topics    Smoking status: Current Every Day Smoker     Packs/day: 1.00    Smokeless tobacco: None    Alcohol use Yes      Comment: occ     Review of  Systems   Constitutional: Positive for appetite change, chills and fever.   HENT: Negative for congestion, sinus pressure and sore throat.    Eyes: Negative for discharge and redness.   Respiratory: Positive for cough. Negative for chest tightness, shortness of breath and wheezing.    Cardiovascular: Negative for chest pain and palpitations.   Gastrointestinal: Positive for nausea and vomiting. Negative for abdominal distention, abdominal pain and diarrhea.   Endocrine: Negative for polydipsia and polyphagia.   Genitourinary:        INDWELLING LEES   Musculoskeletal: Positive for myalgias. Negative for neck pain and neck stiffness.   Skin: Positive for wound. Negative for pallor and rash.   Neurological: Negative for dizziness and headaches.   Hematological: Does not bruise/bleed easily.   Psychiatric/Behavioral: Negative for agitation and confusion.   All other systems reviewed and are negative.      Physical Exam   Initial Vitals   BP Pulse Resp Temp SpO2   02/11/17 1914 02/11/17 1914 02/11/17 1914 02/11/17 1914 02/11/17 1914   130/80 136 22 102.3 °F (39.1 °C) 95 %     Physical Exam    Nursing note reviewed.  Constitutional: He appears well-developed and well-nourished.   FEBRILE, TACHYCARDIC, AWAKE, ALERT AND TALKING   HENT:   Head: Normocephalic and atraumatic.   Nose: Nose normal.   DRY MM   Eyes: Conjunctivae and EOM are normal. Pupils are equal, round, and reactive to light.   Neck: Normal range of motion. Neck supple.   Cardiovascular: Normal heart sounds and intact distal pulses.   SINUS TACHYCARDIA   Pulmonary/Chest: Breath sounds normal. No respiratory distress. He has no wheezes. He exhibits no tenderness.   Abdominal: Soft. Bowel sounds are normal. He exhibits no distension. There is no tenderness.   Musculoskeletal: He exhibits no edema.   Neurological: He is alert and oriented to person, place, and time.   PARAPLEGIA   Skin: Skin is warm. No rash noted. No erythema.   WOUND RIGHT BKA, NO DRAINAGE OR  ERYTHEMA NOTED.     Psychiatric: He has a normal mood and affect. His behavior is normal. Judgment and thought content normal.         ED Course   Critical Care  Date/Time: 2/11/2017 10:26 PM  Performed by: ZEHRA WINTER  Authorized by: ZEHRA WINTER   Direct patient critical care time: 10 minutes  Additional history critical care time: 5 minutes  Ordering / reviewing critical care time: 10 minutes  Documentation critical care time: 5 minutes  Consulting other physicians critical care time: 5 minutes  Total critical care time (exclusive of procedural time) : 35 minutes  Critical care time was exclusive of separately billable procedures and treating other patients and teaching time.  Critical care was necessary to treat or prevent imminent or life-threatening deterioration of the following conditions: sepsis.  Critical care was time spent personally by me on the following activities: evaluation of patient's response to treatment, ordering and performing treatments and interventions, pulse oximetry, ordering and review of laboratory studies, examination of patient, development of treatment plan with patient or surrogate, re-evaluation of patient's condition, review of old charts, ordering and review of radiographic studies, obtaining history from patient or surrogate and discussions with consultants.        Labs Reviewed   CULTURE, BLOOD    Narrative:     Aerobic and anaerobic   CULTURE, BLOOD   CULTURE, URINE   APTT   CBC W/ AUTO DIFFERENTIAL   COMPREHENSIVE METABOLIC PANEL   LACTIC ACID, PLASMA   LACTIC ACID, PLASMA   MAGNESIUM   PHOSPHORUS   PROTIME-INR   TROPONIN I   URINALYSIS   INFLUENZA A AND B ANTIGEN   PROCALCITONIN     EKG Readings: (Independently Interpreted)   Initial Reading: No STEMI. Rhythm: Sinus Tachycardia. Heart Rate: 120. Ectopy: No Ectopy. Clinical Impression: Sinus Tachycardia       X-Rays:   Independently Interpreted Readings:   Chest X-Ray: Normal heart size.  No infiltrates.   No acute abnormalities.     Medical Decision Making:   Initial Assessment:   40 Y/O AAM WITH PMHX OF PARAPLEGIA S/P THORACIC SPINE INJURY IN 2016 WITH RIGHT BKA, TRACHEOSTOMY, INDWELLING CATHETER, AND PEG TUBE PRESENTS FROM NURSING HOME WITH FEVER.  PT REPORTS HE BEGAN FEELING POORLY 2 DAYS AGO WITH NAUSEA AND SOME COUGHING.  NO SPUTUM PRODUCTION.  NO ABD PAIN OR CHEST PAIN.  NO SOB.  NO HA OR NECK PAIN.  PT REPORTS BODY ACHES YESTERDAY. PT HAS A WOUND RIGHT BKA.   PT REPORTS DECREASED APPETITE.     Differential Diagnosis:   DDX:  SEPSIS, SEPTIC SHOCK, PNEUMONIA, UTI, INFLUENZA  Independently Interpreted Test(s):   I have ordered and independently interpreted X-rays - see prior notes.  I have ordered and independently interpreted EKG Reading(s) - see prior notes  Clinical Tests:   Lab Tests: Ordered and Reviewed       <> Summary of Lab: ELEVATED WBC  + UTI WITH WBC CLUMPS  VAUGHN (CR 2.7)  LACTIC ACID WNL  Radiological Study: Ordered and Reviewed  Medical Tests: Ordered and Reviewed  ED Management:  SEPTIC WORK UP, MOTRIN 800  PT WBC IS ELEVATED AND U/A IS POSITIVE FOR INFECTION.  URINE CULTURE SENT. PT HYDRATED WITH 2L IVF.  CXR IS NEG FOR PNEUMONIA.  ABX ORDERED FOR SEPSIS:  CEFEPIME, VANCOMYCIN, CIPRO.      LEES DRAINED 1000 CC CLOUDY, FOUL SMELLING URINE.      I HAVE CONSULTED KYLER HORTON AND SHE WILL ADMIT THE PATIENT FOR FURTHER TREATMENT OF SEPSIS  Other:   I have discussed this case with another health care provider.       <> Summary of the Discussion: KYLER HORTON                   ED Course     Clinical Impression:   The primary encounter diagnosis was Sepsis, due to unspecified organism. Diagnoses of Urinary tract infection without hematuria, site unspecified and Paraplegia were also pertinent to this visit.    Disposition:   Disposition: Admitted  Condition: Stable       Aracely Sinha MD  02/11/17 2409

## 2017-02-12 NOTE — SUBJECTIVE & OBJECTIVE
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      Current Outpatient Prescriptions on File Prior to Encounter   Medication Sig    [DISCONTINUED] ibuprofen (ADVIL,MOTRIN) 400 MG tablet Take 1 tablet (400 mg total) by mouth every 6 (six) hours as needed.     Family History     None        Social History Main Topics    Smoking status: Current Every Day Smoker     Packs/day: 1.00    Smokeless tobacco: Not on file    Alcohol use Yes      Comment: occ    Drug use: No    Sexual activity: Not on file     Review of Systems   Constitutional: Positive for chills, fatigue and fever.   HENT: Negative for congestion and sore throat.    Eyes: Negative for photophobia and visual disturbance.   Respiratory: Positive for cough. Negative for chest tightness, shortness of breath and wheezing.    Cardiovascular: Negative for chest pain and palpitations.   Gastrointestinal: Positive for nausea and vomiting. Negative for abdominal pain.   Endocrine: Negative for cold intolerance and heat intolerance.   Genitourinary:        Chronic indwelling sage catheter   Musculoskeletal: Negative for arthralgias and myalgias.   Skin: Negative for color change and pallor.   Allergic/Immunologic: Negative for immunocompromised state.   Neurological:  Positive for tremors and weakness. Negative for dizziness and headaches.   Hematological: Does not bruise/bleed easily.   Psychiatric/Behavioral: Negative for agitation and confusion. The patient is not nervous/anxious.      Objective:     Vital Signs (Most Recent):  Temp: 98.7 °F (37.1 °C) (02/11/17 2344)  Pulse: (!) 114 (02/11/17 2344)  Resp: 19 (02/11/17 2344)  BP: (!) 103/59 (02/11/17 2344)  SpO2: 100 % (02/11/17 2250) Vital Signs (24h Range):  Temp:  [98.7 °F (37.1 °C)-102.3 °F (39.1 °C)] 98.7 °F (37.1 °C)  Pulse:  [114-136] 114  Resp:  [19-24] 19  SpO2:  [95 %-100 %] 100 %  BP: ()/(51-80) 103/59     Weight: 86.2 kg (190 lb)  Body mass index is 27.26 kg/(m^2).    Physical Exam   Constitutional: He is oriented to person, place, and time. He appears well-developed. He is sleeping. He is easily aroused. He appears ill. No distress.   HENT:   Head: Normocephalic and atraumatic.   Mouth/Throat: Oropharynx is clear and moist.   Eyes: Conjunctivae are normal. Pupils are equal, round, and reactive to light. No scleral icterus.   Neck: Normal range of motion. Neck supple.       Cardiovascular: Normal rate, regular rhythm and normal heart sounds.    Pulmonary/Chest: Effort normal and breath sounds normal. No respiratory distress. He has no wheezes.   Abdominal: Soft. Bowel sounds are normal. He exhibits no distension. There is no tenderness.       Genitourinary:   Genitourinary Comments: Huff catheter with jam, blood-tinged cloudy urine in collection chamber.   Musculoskeletal: He exhibits edema. He exhibits no tenderness.   Right BKA.  2-3+ edema to left lower extremity.    Neurological: He is oriented to person, place, and time and easily aroused. GCS eye subscore is 4. GCS verbal subscore is 5. GCS motor subscore is 6.   Follows commands with bilateral upper extremities.  Flaccid right stump and left lower extremity.   Skin: Lesion noted. He is diaphoretic. No cyanosis. Nails show no clubbing.         Psychiatric: He has a normal mood and affect. His speech is normal and behavior is normal.   Nursing note and vitals reviewed.  Right stump      Left heel           Significant Labs:   CBC:   Recent Labs  Lab 02/11/17  0800   WBC 20.73*   HGB 9.2*   HCT 27.6*   *     CMP:   Recent Labs  Lab 02/11/17  0800   *   K 4.8   CL 99   CO2 21*   *   BUN 36*   CREATININE 2.7*   CALCIUM 8.8   PROT 7.7   ALBUMIN 2.1*   BILITOT 0.4   ALKPHOS 123   AST 54*   ALT 38   ANIONGAP 15   EGFRNONAA 28*     Coagulation:   Recent Labs  Lab 02/11/17  0800   INR 1.7*   APTT 46.0*     Lactic Acid:   Recent Labs  Lab 02/11/17  0800   LACTATE 1.8  1.8     Urine Studies:   Recent Labs  Lab 02/11/17 2120   COLORU Straw   APPEARANCEUA Cloudy*   PHUR 8.0   SPECGRAV 1.020   PROTEINUA 2+*   GLUCUA Negative   KETONESU Trace*   BILIRUBINUA Negative   OCCULTUA 3+*   NITRITE Negative   UROBILINOGEN 1.0   LEUKOCYTESUR 3+*   RBCUA >100*   WBCUA >100*   BACTERIA Many*   SQUAMEPITHEL 2   HYALINECASTS 0     Influenza A&B antigen: negative  Procalcitonin 52.57    All pertinent labs within the past 24 hours have been reviewed.    Significant Imaging: I have reviewed all pertinent imaging results/findings within the past 24 hours.     X-Ray Chest AP Portable: No radiographic acute intrathoracic process seen on this single view. Specifically, no focal consolidation.

## 2017-02-12 NOTE — ASSESSMENT & PLAN NOTE
Hypomagnesemia  Hyperphosphatemia  BUN/creatinine 36/2.7, up from 10/0.4 on outpatient labs collected 2/3/17.  Magnesium 1.4; phosphorus 5.5.  Given 2L NS in ED; renally dose medications.  Daily BMP, Magnesium, phosphorus.  Renal diet.  Will not replace magnesium due to VAUGHN, continue to monitor. Avoid nephrotoxins.  Hydrate with IVFs.

## 2017-02-12 NOTE — ASSESSMENT & PLAN NOTE
Urinary tract infection associated with indwelling urethral catheter  Chronic indwelling sage catheter  Leukocytosis  Continue cefepime and ciprofloxacin and follow up culture results.

## 2017-02-12 NOTE — SUBJECTIVE & OBJECTIVE
Interval History: No complaints except did not like multiple peripheral IV sticks.    Review of Systems   Respiratory: Negative for cough and shortness of breath.    Gastrointestinal: Negative for nausea and vomiting.     Objective:     Vital Signs (Most Recent):  Temp: 97.6 °F (36.4 °C) (02/12/17 0800)  Pulse: 95 (02/12/17 0840)  Resp: 17 (02/12/17 0840)  BP: 100/62 (02/12/17 0800)  SpO2: (!) 94 % (02/12/17 0840) Vital Signs (24h Range):  Temp:  [96.3 °F (35.7 °C)-102.3 °F (39.1 °C)] 97.6 °F (36.4 °C)  Pulse:  [] 95  Resp:  [17-24] 17  SpO2:  [94 %-100 %] 94 %  BP: ()/(51-80) 100/62     Weight: 86.2 kg (190 lb)  Body mass index is 27.26 kg/(m^2).    Intake/Output Summary (Last 24 hours) at 02/12/17 1004  Last data filed at 02/12/17 0800   Gross per 24 hour   Intake             1200 ml   Output             2750 ml   Net            -1550 ml      Physical Exam   Constitutional: He is oriented to person, place, and time. He appears well-developed. No distress.   Neck:       Cardiovascular: Normal rate, regular rhythm and normal heart sounds.    Pulmonary/Chest: Effort normal and breath sounds normal. No respiratory distress.   Abdominal: Soft. Bowel sounds are normal. He exhibits no distension. There is no tenderness.       Genitourinary:   Genitourinary Comments: Uhff catheter   Musculoskeletal: He exhibits edema. He exhibits no tenderness.   Sp/ right BKA   Neurological: He is oriented to person, place, and time.   Skin: Lesion noted. No cyanosis. Nails show no clubbing.        Psychiatric: He has a normal mood and affect. His speech is normal and behavior is normal.   Nursing note and vitals reviewed.      Significant Labs: All pertinent labs within the past 24 hours have been reviewed.    Significant Imaging: I have reviewed all pertinent imaging results/findings within the past 24 hours.   X-Ray Chest AP Portable 2/11/17: AP portable semiupright view of the chest. Patient is somewhat rotated.  Tracheostomy tube in place with tip at the level of the clavicular heads. Mid to lower thoracic spinal fixation hardware noted. Cardiomediastinal silhouette is within normal limits. Right basilar platelike scarring versus atelectasis. The lungs are otherwise symmetrically normally inflated without large consolidation, pleural effusion or pneumothorax. No acute osseous process seen.

## 2017-02-12 NOTE — ED TRIAGE NOTES
42 Y/O M with CC of fever. Pt sent from [facility]. Pt is a paraplegic with R BKA, pt also has a tracheostomy. Pt states he has been having N/V over the last 2 days and a fever today. Pt was given 1g tylenol PTA. Upon arrival temp 102.3. Pt tachycardic on monitor. Denies pain. No other complaints verbalized. Pt provided with gown and instructed to change into it and remove all metal. Pt verbalized understanding. Patient on cardiac monitor, automatic blood pressure cuff and pulse oximeter. Will continue to monitor.

## 2017-02-12 NOTE — ASSESSMENT & PLAN NOTE
Takes propranolol 10 mg TID. Blood pressures controlled and tachycardia improved with treatment of sepsis.

## 2017-02-12 NOTE — PLAN OF CARE
Problem: Patient Care Overview  Goal: Plan of Care Review  Outcome: Ongoing (interventions implemented as appropriate)  Pt with a #4 fadialey trach with disposable inner cannula with  in place.  Trach miguel a performed.  Pt. On room air in no apparent distress.  Will cont. To monitor.
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
## 2017-02-12 NOTE — ASSESSMENT & PLAN NOTE
Chronic; stable.  Hgb/Hct 9.2/27.6 on admission; improved from outpatient labs on 2/3/17 with Hgb/Hct 8.7/27.3.  Continue to monitor.  Resume iron supplement.  Daily CBC.

## 2017-02-13 PROBLEM — B96.1 BACTEREMIA DUE TO KLEBSIELLA PNEUMONIAE: Status: ACTIVE | Noted: 2017-02-12

## 2017-02-13 LAB
ANION GAP SERPL CALC-SCNC: 7 MMOL/L
BASOPHILS # BLD AUTO: 0.01 K/UL
BASOPHILS NFR BLD: 0.1 %
BUN SERPL-MCNC: 14 MG/DL
CALCIUM SERPL-MCNC: 8.7 MG/DL
CHLORIDE SERPL-SCNC: 109 MMOL/L
CO2 SERPL-SCNC: 20 MMOL/L
CREAT SERPL-MCNC: 0.7 MG/DL
DIFFERENTIAL METHOD: ABNORMAL
EOSINOPHIL # BLD AUTO: 0.8 K/UL
EOSINOPHIL NFR BLD: 5.9 %
ERYTHROCYTE [DISTWIDTH] IN BLOOD BY AUTOMATED COUNT: 16 %
EST. GFR  (AFRICAN AMERICAN): >60 ML/MIN/1.73 M^2
EST. GFR  (NON AFRICAN AMERICAN): >60 ML/MIN/1.73 M^2
GLUCOSE SERPL-MCNC: 107 MG/DL
HCT VFR BLD AUTO: 21 %
HGB BLD-MCNC: 6.9 G/DL
LYMPHOCYTES # BLD AUTO: 1.2 K/UL
LYMPHOCYTES NFR BLD: 8.8 %
MCH RBC QN AUTO: 26.1 PG
MCHC RBC AUTO-ENTMCNC: 32.9 %
MCV RBC AUTO: 80 FL
MONOCYTES # BLD AUTO: 0.8 K/UL
MONOCYTES NFR BLD: 6 %
NEUTROPHILS # BLD AUTO: 10.4 K/UL
NEUTROPHILS NFR BLD: 79.2 %
PLATELET # BLD AUTO: 352 K/UL
PMV BLD AUTO: 11.2 FL
POTASSIUM SERPL-SCNC: 3.3 MMOL/L
RBC # BLD AUTO: 2.64 M/UL
SODIUM SERPL-SCNC: 136 MMOL/L
VANCOMYCIN SERPL-MCNC: 1.9 UG/ML
WBC # BLD AUTO: 13.07 K/UL

## 2017-02-13 PROCEDURE — 63600175 PHARM REV CODE 636 W HCPCS: Performed by: HOSPITALIST

## 2017-02-13 PROCEDURE — 80202 ASSAY OF VANCOMYCIN: CPT

## 2017-02-13 PROCEDURE — 80048 BASIC METABOLIC PNL TOTAL CA: CPT

## 2017-02-13 PROCEDURE — 87040 BLOOD CULTURE FOR BACTERIA: CPT

## 2017-02-13 PROCEDURE — 85025 COMPLETE CBC W/AUTO DIFF WBC: CPT

## 2017-02-13 PROCEDURE — 97802 MEDICAL NUTRITION INDIV IN: CPT

## 2017-02-13 PROCEDURE — 25000003 PHARM REV CODE 250: Performed by: NURSE PRACTITIONER

## 2017-02-13 PROCEDURE — 25000003 PHARM REV CODE 250: Performed by: HOSPITALIST

## 2017-02-13 PROCEDURE — 36415 COLL VENOUS BLD VENIPUNCTURE: CPT

## 2017-02-13 PROCEDURE — 94761 N-INVAS EAR/PLS OXIMETRY MLT: CPT

## 2017-02-13 PROCEDURE — 11000001 HC ACUTE MED/SURG PRIVATE ROOM

## 2017-02-13 RX ORDER — CIPROFLOXACIN 2 MG/ML
400 INJECTION, SOLUTION INTRAVENOUS
Status: DISCONTINUED | OUTPATIENT
Start: 2017-02-13 | End: 2017-02-14

## 2017-02-13 RX ADMIN — OXYCODONE HYDROCHLORIDE AND ACETAMINOPHEN 500 MG: 500 TABLET ORAL at 10:02

## 2017-02-13 RX ADMIN — BACITRACIN: 500 OINTMENT TOPICAL at 09:02

## 2017-02-13 RX ADMIN — CEFEPIME 2 G: 2 INJECTION, POWDER, FOR SOLUTION INTRAVENOUS at 11:02

## 2017-02-13 RX ADMIN — BACITRACIN: 500 OINTMENT TOPICAL at 10:02

## 2017-02-13 RX ADMIN — PROPRANOLOL HYDROCHLORIDE 10 MG: 10 TABLET ORAL at 01:02

## 2017-02-13 RX ADMIN — PROPRANOLOL HYDROCHLORIDE 10 MG: 10 TABLET ORAL at 05:02

## 2017-02-13 RX ADMIN — OXYCODONE HYDROCHLORIDE 10 MG: 5 TABLET ORAL at 12:02

## 2017-02-13 RX ADMIN — ACETAMINOPHEN 650 MG: 325 TABLET ORAL at 06:02

## 2017-02-13 RX ADMIN — STANDARDIZED SENNA CONCENTRATE AND DOCUSATE SODIUM 1 TABLET: 8.6; 5 TABLET, FILM COATED ORAL at 09:02

## 2017-02-13 RX ADMIN — PROPRANOLOL HYDROCHLORIDE 10 MG: 10 TABLET ORAL at 09:02

## 2017-02-13 RX ADMIN — CHLORPROMAZINE HYDROCHLORIDE 50 MG: 25 TABLET, SUGAR COATED ORAL at 06:02

## 2017-02-13 RX ADMIN — ASPIRIN 325 MG: 325 TABLET, DELAYED RELEASE ORAL at 10:02

## 2017-02-13 RX ADMIN — FERROUS SULFATE TAB EC 325 MG (65 MG FE EQUIVALENT) 325 MG: 325 (65 FE) TABLET DELAYED RESPONSE at 10:02

## 2017-02-13 RX ADMIN — RIVAROXABAN 20 MG: 20 TABLET, FILM COATED ORAL at 04:02

## 2017-02-13 RX ADMIN — OXYCODONE HYDROCHLORIDE AND ACETAMINOPHEN 500 MG: 500 TABLET ORAL at 09:02

## 2017-02-13 RX ADMIN — MIRTAZAPINE 30 MG: 15 TABLET, FILM COATED ORAL at 09:02

## 2017-02-13 RX ADMIN — CEFEPIME 2 G: 2 INJECTION, POWDER, FOR SOLUTION INTRAVENOUS at 05:02

## 2017-02-13 RX ADMIN — FERROUS SULFATE TAB EC 325 MG (65 MG FE EQUIVALENT) 325 MG: 325 (65 FE) TABLET DELAYED RESPONSE at 09:02

## 2017-02-13 RX ADMIN — CHLORPROMAZINE HYDROCHLORIDE 50 MG: 25 TABLET, SUGAR COATED ORAL at 10:02

## 2017-02-13 RX ADMIN — STANDARDIZED SENNA CONCENTRATE AND DOCUSATE SODIUM 1 TABLET: 8.6; 5 TABLET, FILM COATED ORAL at 10:02

## 2017-02-13 RX ADMIN — OXYCODONE HYDROCHLORIDE 10 MG: 5 TABLET ORAL at 05:02

## 2017-02-13 RX ADMIN — CHOLECALCIFEROL TAB 10 MCG (400 UNIT) 1200 UNITS: 10 TAB at 12:02

## 2017-02-13 RX ADMIN — OXYCODONE HYDROCHLORIDE 10 MG: 5 TABLET ORAL at 07:02

## 2017-02-13 RX ADMIN — CIPROFLOXACIN 400 MG: 2 INJECTION, SOLUTION INTRAVENOUS at 04:02

## 2017-02-13 NOTE — PLAN OF CARE
Problem: Skin Integrity Impairment, Risk/Actual (Adult)  Goal: Identify Related Risk Factors and Signs and Symptoms  Related risk factors and signs and symptoms are identified upon initiation of Human Response Clinical Practice Guideline (CPG)  Outcome: Ongoing (interventions implemented as appropriate)  Recommendation/Intervention:   1. Continue Renal diet & encourage good intake at meals  2. Monitor need for supplements.     Goals:   Pt will consume at least 50% intake at meals  Nutrition Goal Status: new  Communication of RD Recs: reviewed with RN (Tameka)

## 2017-02-13 NOTE — PLAN OF CARE
Problem: Patient Care Overview  Goal: Plan of Care Review  Outcome: Ongoing (interventions implemented as appropriate)  Reviewed plan of care with patient. Patient verbalized understanding.A AAOx3. Repositioned Q2H. Dressing to right BKA changed. Pt tolerated well. PRN pain med given x1. US kidneys done today. Patient on continuous tele monitoring; ST - HR low 100s. No true red alarms or ectopy noted. Pt on air mattress. Bed alarm set, bed in lowest position, call bell in reach. Will continue to monitor.
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
## 2017-02-13 NOTE — PLAN OF CARE
Critical Lab communication from [facility]. Gram (-) negative rods in one of the blood cultures. Notified NP Deb.

## 2017-02-13 NOTE — ASSESSMENT & PLAN NOTE
Takes propranolol 10 mg TID. Blood pressures controlled. Tachycardia improved with treatment of sepsis.

## 2017-02-13 NOTE — NURSING
Notified Dr. Cardoso of pt being diaphoretic with shivers and chills noted but not having elevated temperature. Will continue to monitor.

## 2017-02-13 NOTE — PLAN OF CARE
Dressing to R. BKA changed. Wound care, ALFONSO Padron at bedside. Pt tolerated well.  Will continue to monitor.

## 2017-02-13 NOTE — PROGRESS NOTES
Rounded on patient. Patient states he DOES want to go back to [place]   Faxed updated clinical notes to [place].    Michelle Fontana RN, CCM, CMSRN  RN Transition Navigator  [phone]

## 2017-02-13 NOTE — SUBJECTIVE & OBJECTIVE
Interval History: No complaints. Watching TV.    Review of Systems   Respiratory: Negative for cough and shortness of breath.    Gastrointestinal: Negative for nausea and vomiting.     Objective:     Vital Signs (Most Recent):  Temp: 99.8 °F (37.7 °C) (02/13/17 0800)  Pulse: (!) 112 (02/13/17 0800)  Resp: 20 (02/13/17 0800)  BP: (!) 106/58 (02/13/17 0800)  SpO2: 97 % (02/13/17 0625) Vital Signs (24h Range):  Temp:  [97.6 °F (36.4 °C)-100.5 °F (38.1 °C)] 99.8 °F (37.7 °C)  Pulse:  [104-130] 112  Resp:  [18-20] 20  SpO2:  [95 %-98 %] 97 %  BP: (103-140)/(58-94) 106/58     Weight: 86.2 kg (190 lb)  Body mass index is 27.26 kg/(m^2).    Intake/Output Summary (Last 24 hours) at 02/13/17 1003  Last data filed at 02/13/17 0818   Gross per 24 hour   Intake             4240 ml   Output             3550 ml   Net              690 ml      Physical Exam   Constitutional: He is oriented to person, place, and time. He appears well-developed. No distress.   Neck:       Cardiovascular: Normal rate, regular rhythm and normal heart sounds.    Pulmonary/Chest: Effort normal and breath sounds normal. No respiratory distress.   Abdominal: Soft. Bowel sounds are normal. He exhibits no distension. There is no tenderness.       Genitourinary:   Genitourinary Comments: Huff catheter   Musculoskeletal: He exhibits edema. He exhibits no tenderness.   Sp/ right BKA   Neurological: He is oriented to person, place, and time.   Skin: Lesion noted. No cyanosis. Nails show no clubbing.        Psychiatric: He has a normal mood and affect. His speech is normal and behavior is normal.   Nursing note and vitals reviewed.      Significant Labs: All pertinent labs within the past 24 hours have been reviewed.    Significant Imaging: none new

## 2017-02-13 NOTE — NURSING
Plan of care reviewed with the patient. Verbalized clear understanding. Bed alarm set. Bed in lowest position. Call light within reach. Pt is diaphoretic. Denies any pain or discomfort but shivers and chills noted. Tachycardic on telemetry monitor.  Turned pt q2h. IV fluids infusing @ 125ml/hr. No report of SOB or lightheadedness. Will continue to monitor.
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
## 2017-02-14 LAB
ANION GAP SERPL CALC-SCNC: 10 MMOL/L
BASOPHILS # BLD AUTO: 0.02 K/UL
BASOPHILS NFR BLD: 0.2 %
BUN SERPL-MCNC: 10 MG/DL
CALCIUM SERPL-MCNC: 9 MG/DL
CHLORIDE SERPL-SCNC: 109 MMOL/L
CO2 SERPL-SCNC: 19 MMOL/L
CREAT SERPL-MCNC: 0.6 MG/DL
DIFFERENTIAL METHOD: ABNORMAL
EOSINOPHIL # BLD AUTO: 0.9 K/UL
EOSINOPHIL NFR BLD: 7.5 %
ERYTHROCYTE [DISTWIDTH] IN BLOOD BY AUTOMATED COUNT: 16.4 %
EST. GFR  (AFRICAN AMERICAN): >60 ML/MIN/1.73 M^2
EST. GFR  (NON AFRICAN AMERICAN): >60 ML/MIN/1.73 M^2
GLUCOSE SERPL-MCNC: 103 MG/DL
HCT VFR BLD AUTO: 23.3 %
HGB BLD-MCNC: 7.5 G/DL
LYMPHOCYTES # BLD AUTO: 2.1 K/UL
LYMPHOCYTES NFR BLD: 17.4 %
MAGNESIUM SERPL-MCNC: 1.2 MG/DL
MCH RBC QN AUTO: 25.4 PG
MCHC RBC AUTO-ENTMCNC: 32.2 %
MCV RBC AUTO: 79 FL
MONOCYTES # BLD AUTO: 1 K/UL
MONOCYTES NFR BLD: 8.7 %
NEUTROPHILS # BLD AUTO: 7.8 K/UL
NEUTROPHILS NFR BLD: 65.9 %
PHOSPHATE SERPL-MCNC: 3.4 MG/DL
PLATELET # BLD AUTO: 422 K/UL
PMV BLD AUTO: 10.8 FL
POCT GLUCOSE: 114 MG/DL (ref 70–110)
POTASSIUM SERPL-SCNC: 3.5 MMOL/L
RBC # BLD AUTO: 2.95 M/UL
SODIUM SERPL-SCNC: 138 MMOL/L
WBC # BLD AUTO: 11.81 K/UL

## 2017-02-14 PROCEDURE — 11000001 HC ACUTE MED/SURG PRIVATE ROOM

## 2017-02-14 PROCEDURE — 85025 COMPLETE CBC W/AUTO DIFF WBC: CPT

## 2017-02-14 PROCEDURE — 84100 ASSAY OF PHOSPHORUS: CPT

## 2017-02-14 PROCEDURE — 94761 N-INVAS EAR/PLS OXIMETRY MLT: CPT

## 2017-02-14 PROCEDURE — 63600175 PHARM REV CODE 636 W HCPCS: Performed by: HOSPITALIST

## 2017-02-14 PROCEDURE — 25000003 PHARM REV CODE 250: Performed by: NURSE PRACTITIONER

## 2017-02-14 PROCEDURE — 36415 COLL VENOUS BLD VENIPUNCTURE: CPT

## 2017-02-14 PROCEDURE — 80048 BASIC METABOLIC PNL TOTAL CA: CPT

## 2017-02-14 PROCEDURE — 83735 ASSAY OF MAGNESIUM: CPT

## 2017-02-14 PROCEDURE — 25000003 PHARM REV CODE 250: Performed by: HOSPITALIST

## 2017-02-14 RX ORDER — BACITRACIN ZINC 500 UNIT/G
OINTMENT (GRAM) TOPICAL 2 TIMES DAILY
COMMUNITY
End: 2018-08-29

## 2017-02-14 RX ADMIN — PROPRANOLOL HYDROCHLORIDE 10 MG: 10 TABLET ORAL at 05:02

## 2017-02-14 RX ADMIN — OXYCODONE HYDROCHLORIDE AND ACETAMINOPHEN 500 MG: 500 TABLET ORAL at 09:02

## 2017-02-14 RX ADMIN — MIRTAZAPINE 30 MG: 15 TABLET, FILM COATED ORAL at 09:02

## 2017-02-14 RX ADMIN — CHOLECALCIFEROL TAB 10 MCG (400 UNIT) 1200 UNITS: 10 TAB at 09:02

## 2017-02-14 RX ADMIN — OXYCODONE HYDROCHLORIDE 10 MG: 5 TABLET ORAL at 09:02

## 2017-02-14 RX ADMIN — STANDARDIZED SENNA CONCENTRATE AND DOCUSATE SODIUM 1 TABLET: 8.6; 5 TABLET, FILM COATED ORAL at 10:02

## 2017-02-14 RX ADMIN — FERROUS SULFATE TAB EC 325 MG (65 MG FE EQUIVALENT) 325 MG: 325 (65 FE) TABLET DELAYED RESPONSE at 09:02

## 2017-02-14 RX ADMIN — CEFEPIME 2 G: 2 INJECTION, POWDER, FOR SOLUTION INTRAVENOUS at 08:02

## 2017-02-14 RX ADMIN — PROPRANOLOL HYDROCHLORIDE 10 MG: 10 TABLET ORAL at 02:02

## 2017-02-14 RX ADMIN — RIVAROXABAN 20 MG: 20 TABLET, FILM COATED ORAL at 06:02

## 2017-02-14 RX ADMIN — ASPIRIN 325 MG: 325 TABLET, DELAYED RELEASE ORAL at 10:02

## 2017-02-14 RX ADMIN — OXYCODONE HYDROCHLORIDE 10 MG: 5 TABLET ORAL at 10:02

## 2017-02-14 RX ADMIN — BACITRACIN: 500 OINTMENT TOPICAL at 09:02

## 2017-02-14 RX ADMIN — CEFEPIME 2 G: 2 INJECTION, POWDER, FOR SOLUTION INTRAVENOUS at 05:02

## 2017-02-14 RX ADMIN — ACETAMINOPHEN 650 MG: 325 TABLET ORAL at 12:02

## 2017-02-14 RX ADMIN — STANDARDIZED SENNA CONCENTRATE AND DOCUSATE SODIUM 1 TABLET: 8.6; 5 TABLET, FILM COATED ORAL at 09:02

## 2017-02-14 RX ADMIN — COLLAGENASE SANTYL: 250 OINTMENT TOPICAL at 09:02

## 2017-02-14 RX ADMIN — FERROUS SULFATE TAB EC 325 MG (65 MG FE EQUIVALENT) 325 MG: 325 (65 FE) TABLET DELAYED RESPONSE at 10:02

## 2017-02-14 RX ADMIN — CIPROFLOXACIN 400 MG: 2 INJECTION, SOLUTION INTRAVENOUS at 02:02

## 2017-02-14 RX ADMIN — POLYETHYLENE GLYCOL 3350 17 G: 17 POWDER, FOR SOLUTION ORAL at 06:02

## 2017-02-14 RX ADMIN — PROPRANOLOL HYDROCHLORIDE 10 MG: 10 TABLET ORAL at 09:02

## 2017-02-14 RX ADMIN — BACITRACIN: 500 OINTMENT TOPICAL at 10:02

## 2017-02-14 RX ADMIN — OXYCODONE HYDROCHLORIDE AND ACETAMINOPHEN 500 MG: 500 TABLET ORAL at 10:02

## 2017-02-14 NOTE — PLAN OF CARE
Problem: Patient Care Overview  Goal: Plan of Care Review  Patient on RA, no respiratory distress noted. Will continue to monitor.   Safety measures in progress, bed in lowest position, wheels locked x4, side rails up x2, call light within reach.  Instructed patient to utilize call light for any assistance or concerns.  Patient verbalized understanding.

## 2017-02-14 NOTE — PLAN OF CARE
Problem: Patient Care Overview  Goal: Plan of Care Review  Patient on RA, no respiratory distress noted. Will continue to monitor.   Outcome: Ongoing (interventions implemented as appropriate)  Reviewed plan of care with pt. Will continue to monitor pain. No true red alarms noted on tele. Safety measures are in place, bed low and in lock position, call light in reach, bed alarm is on, and family remains at the bedside. Pt verbalizes full understanding of their plan of care.

## 2017-02-14 NOTE — PLAN OF CARE
Problem: Patient Care Overview  Goal: Plan of Care Review  Patient on RA, no respiratory distress noted. Will continue to monitor.   Outcome: Ongoing (interventions implemented as appropriate)  Pt's SpO2 99% on RA. Pt have a capped trach. No respiratory distress noted. Will continue to monitor SpO2.

## 2017-02-15 PROBLEM — N17.9 AKI (ACUTE KIDNEY INJURY): Status: RESOLVED | Noted: 2017-02-11 | Resolved: 2017-02-15

## 2017-02-15 LAB
ANION GAP SERPL CALC-SCNC: 8 MMOL/L
ANISOCYTOSIS BLD QL SMEAR: SLIGHT
BACTERIA BLD CULT: NORMAL
BACTERIA UR CULT: NORMAL
BASOPHILS # BLD AUTO: 0.03 K/UL
BASOPHILS NFR BLD: 0.3 %
BUN SERPL-MCNC: 8 MG/DL
CALCIUM SERPL-MCNC: 8.7 MG/DL
CHLORIDE SERPL-SCNC: 108 MMOL/L
CO2 SERPL-SCNC: 22 MMOL/L
CREAT SERPL-MCNC: 0.6 MG/DL
DACRYOCYTES BLD QL SMEAR: ABNORMAL
DIFFERENTIAL METHOD: ABNORMAL
EOSINOPHIL # BLD AUTO: 0.7 K/UL
EOSINOPHIL NFR BLD: 6.9 %
ERYTHROCYTE [DISTWIDTH] IN BLOOD BY AUTOMATED COUNT: 16.7 %
EST. GFR  (AFRICAN AMERICAN): >60 ML/MIN/1.73 M^2
EST. GFR  (NON AFRICAN AMERICAN): >60 ML/MIN/1.73 M^2
GLUCOSE SERPL-MCNC: 107 MG/DL
HCT VFR BLD AUTO: 23.4 %
HGB BLD-MCNC: 7.6 G/DL
HYPOCHROMIA BLD QL SMEAR: ABNORMAL
LYMPHOCYTES # BLD AUTO: 1.6 K/UL
LYMPHOCYTES NFR BLD: 15.4 %
MCH RBC QN AUTO: 25.7 PG
MCHC RBC AUTO-ENTMCNC: 32.5 %
MCV RBC AUTO: 79 FL
MONOCYTES # BLD AUTO: 1.2 K/UL
MONOCYTES NFR BLD: 11.8 %
NEUTROPHILS # BLD AUTO: 6.7 K/UL
NEUTROPHILS NFR BLD: 65.6 %
OVALOCYTES BLD QL SMEAR: ABNORMAL
PLATELET # BLD AUTO: 463 K/UL
PLATELET BLD QL SMEAR: ABNORMAL
PMV BLD AUTO: 10.2 FL
POIKILOCYTOSIS BLD QL SMEAR: ABNORMAL
POTASSIUM SERPL-SCNC: 3.8 MMOL/L
RBC # BLD AUTO: 2.96 M/UL
SODIUM SERPL-SCNC: 138 MMOL/L
TARGETS BLD QL SMEAR: ABNORMAL
WBC # BLD AUTO: 10.32 K/UL

## 2017-02-15 PROCEDURE — 63600175 PHARM REV CODE 636 W HCPCS: Performed by: HOSPITALIST

## 2017-02-15 PROCEDURE — 25000003 PHARM REV CODE 250: Performed by: HOSPITALIST

## 2017-02-15 PROCEDURE — 36415 COLL VENOUS BLD VENIPUNCTURE: CPT

## 2017-02-15 PROCEDURE — 85025 COMPLETE CBC W/AUTO DIFF WBC: CPT

## 2017-02-15 PROCEDURE — 02HV33Z INSERTION OF INFUSION DEVICE INTO SUPERIOR VENA CAVA, PERCUTANEOUS APPROACH: ICD-10-PCS | Performed by: HOSPITALIST

## 2017-02-15 PROCEDURE — 25000003 PHARM REV CODE 250: Performed by: NURSE PRACTITIONER

## 2017-02-15 PROCEDURE — 80048 BASIC METABOLIC PNL TOTAL CA: CPT

## 2017-02-15 PROCEDURE — 94761 N-INVAS EAR/PLS OXIMETRY MLT: CPT

## 2017-02-15 PROCEDURE — 11000001 HC ACUTE MED/SURG PRIVATE ROOM

## 2017-02-15 RX ORDER — SODIUM CHLORIDE 0.9 % (FLUSH) 0.9 %
10 SYRINGE (ML) INJECTION EVERY 6 HOURS
Status: DISCONTINUED | OUTPATIENT
Start: 2017-02-15 | End: 2017-02-17 | Stop reason: HOSPADM

## 2017-02-15 RX ORDER — SODIUM CHLORIDE 0.9 % (FLUSH) 0.9 %
10 SYRINGE (ML) INJECTION
Status: DISCONTINUED | OUTPATIENT
Start: 2017-02-15 | End: 2017-02-17 | Stop reason: HOSPADM

## 2017-02-15 RX ADMIN — FERROUS SULFATE TAB EC 325 MG (65 MG FE EQUIVALENT) 325 MG: 325 (65 FE) TABLET DELAYED RESPONSE at 09:02

## 2017-02-15 RX ADMIN — OXYCODONE HYDROCHLORIDE 10 MG: 5 TABLET ORAL at 12:02

## 2017-02-15 RX ADMIN — ACETAMINOPHEN 650 MG: 325 TABLET ORAL at 11:02

## 2017-02-15 RX ADMIN — ERTAPENEM SODIUM 1 G: 1 INJECTION, POWDER, LYOPHILIZED, FOR SOLUTION INTRAMUSCULAR; INTRAVENOUS at 09:02

## 2017-02-15 RX ADMIN — ASPIRIN 325 MG: 325 TABLET, DELAYED RELEASE ORAL at 09:02

## 2017-02-15 RX ADMIN — COLLAGENASE SANTYL: 250 OINTMENT TOPICAL at 09:02

## 2017-02-15 RX ADMIN — CEFEPIME 2 G: 2 INJECTION, POWDER, FOR SOLUTION INTRAVENOUS at 12:02

## 2017-02-15 RX ADMIN — PROPRANOLOL HYDROCHLORIDE 10 MG: 10 TABLET ORAL at 05:02

## 2017-02-15 RX ADMIN — SODIUM CHLORIDE, PRESERVATIVE FREE 10 ML: 5 INJECTION INTRAVENOUS at 11:02

## 2017-02-15 RX ADMIN — STANDARDIZED SENNA CONCENTRATE AND DOCUSATE SODIUM 1 TABLET: 8.6; 5 TABLET, FILM COATED ORAL at 10:02

## 2017-02-15 RX ADMIN — OXYCODONE HYDROCHLORIDE AND ACETAMINOPHEN 500 MG: 500 TABLET ORAL at 10:02

## 2017-02-15 RX ADMIN — OXYCODONE HYDROCHLORIDE AND ACETAMINOPHEN 500 MG: 500 TABLET ORAL at 09:02

## 2017-02-15 RX ADMIN — FERROUS SULFATE TAB EC 325 MG (65 MG FE EQUIVALENT) 325 MG: 325 (65 FE) TABLET DELAYED RESPONSE at 10:02

## 2017-02-15 RX ADMIN — RIVAROXABAN 20 MG: 20 TABLET, FILM COATED ORAL at 05:02

## 2017-02-15 RX ADMIN — CHOLECALCIFEROL TAB 10 MCG (400 UNIT) 1200 UNITS: 10 TAB at 09:02

## 2017-02-15 RX ADMIN — BACITRACIN: 500 OINTMENT TOPICAL at 10:02

## 2017-02-15 RX ADMIN — MIRTAZAPINE 30 MG: 15 TABLET, FILM COATED ORAL at 10:02

## 2017-02-15 RX ADMIN — SODIUM CHLORIDE, PRESERVATIVE FREE 10 ML: 5 INJECTION INTRAVENOUS at 06:02

## 2017-02-15 RX ADMIN — PROPRANOLOL HYDROCHLORIDE 10 MG: 10 TABLET ORAL at 02:02

## 2017-02-15 RX ADMIN — STANDARDIZED SENNA CONCENTRATE AND DOCUSATE SODIUM 1 TABLET: 8.6; 5 TABLET, FILM COATED ORAL at 09:02

## 2017-02-15 RX ADMIN — PROPRANOLOL HYDROCHLORIDE 10 MG: 10 TABLET ORAL at 10:02

## 2017-02-15 NOTE — ASSESSMENT & PLAN NOTE
Urinary tract infection associated with indwelling urethral catheter  Chronic indwelling sage catheter  Leukocytosis  Gram-negative bacteremia  Continue cefepime. Repeat blood cultures are negative thus far. Renal ultrasound did not show any hydronephrosis. If continues to have fever, will need to get CT scan.

## 2017-02-15 NOTE — SUBJECTIVE & OBJECTIVE
Interval History: Says that he is feeling better today. Reports some constipation. Had fever overnight.     Review of Systems   Constitutional: Positive for fever. Negative for chills.   Respiratory: Negative for cough and shortness of breath.    Cardiovascular: Negative for chest pain and palpitations.   Gastrointestinal: Positive for constipation. Negative for nausea and vomiting.     Objective:     Vital Signs (Most Recent):  Temp: 100 °F (37.8 °C) (02/14/17 2155)  Pulse: (!) 113 (02/14/17 2155)  Resp: 20 (02/14/17 2155)  BP: 122/74 (02/14/17 2155)  SpO2: 99 % (02/14/17 2000) Vital Signs (24h Range):  Temp:  [97.3 °F (36.3 °C)-101.6 °F (38.7 °C)] 100 °F (37.8 °C)  Pulse:  [] 113  Resp:  [16-20] 20  SpO2:  [98 %-99 %] 99 %  BP: ()/(54-77) 122/74     Weight: 86.2 kg (190 lb)  Body mass index is 27.26 kg/(m^2).    Intake/Output Summary (Last 24 hours) at 02/14/17 2250  Last data filed at 02/14/17 1655   Gross per 24 hour   Intake             2210 ml   Output             3250 ml   Net            -1040 ml      Physical Exam   Constitutional: He is oriented to person, place, and time. He appears well-developed and well-nourished. No distress.   Cardiovascular: Normal rate and regular rhythm.    No murmur heard.  Pulmonary/Chest: Effort normal and breath sounds normal. No respiratory distress.   Abdominal: Soft. Bowel sounds are normal. He exhibits no distension. There is no tenderness.   Musculoskeletal: He exhibits no edema.   Neurological: He is alert and oriented to person, place, and time.   Skin: Skin is warm and dry.   Psychiatric: He has a normal mood and affect. His behavior is normal.   Nursing note and vitals reviewed.      Significant Labs:   Blood Culture:   Recent Labs  Lab 02/13/17 2128 02/13/17 2129   LABBLOO No Growth to date No Growth to date     BMP:   Recent Labs  Lab 02/14/17  0414         K 3.5      CO2 19*   BUN 10   CREATININE 0.6   CALCIUM 9.0   MG 1.2*     CBC:    Recent Labs  Lab 02/13/17  0941 02/14/17  0414   WBC 13.07* 11.81   HGB 6.9* 7.5*   HCT 21.0* 23.3*   * 422*       Significant Imaging: I have reviewed all pertinent imaging results/findings within the past 24 hours.

## 2017-02-15 NOTE — PLAN OF CARE
Problem: Patient Care Overview  Goal: Plan of Care Review  Patient on RA, no respiratory distress noted. Will continue to monitor.   Outcome: Ongoing (interventions implemented as appropriate)  Pt on RA sats 96%.

## 2017-02-15 NOTE — PLAN OF CARE
Patient remains in hospital.  DC to [facility] when medically stable.       02/15/17 1138   Discharge Reassessment   Assessment Type Discharge Planning Reassessment   Can the patient answer the patient profile reliably? Yes, cognitively intact   Describe the patient's ability to walk at the present time. Does not walk or unable to take any steps at all   During the past month, has the patient often been bothered by feeling down, depressed or hopeless? No   During the past month, has the patient often been bothered by little interest or pleasure in doing things? No   Discharge plan remains the same: Yes   Provided patient/caregiver education on the expected discharge date and the discharge plan Yes   Discharge Plan A Return to nursing home   Change in patient condition or support system No   Patient choice form signed by patient/caregiver No   Explained to the the patient/caregiver why the discharge planned changed: No   Involved the patient/caregiver in establishing a new discharge plan: Yes     Michelle Fontana RN, CCM, CMSRN  RN Transition Navigator  [phone]

## 2017-02-15 NOTE — PLAN OF CARE
Problem: Patient Care Overview  Goal: Plan of Care Review  Outcome: Ongoing (interventions implemented as appropriate)  Plan of care reviewed with patient. Patient verbalized understanding. Patient weight shifted frequently to prevent skin breakdown. Wound care to right leg stump performed per orders. Patient NSR-sinus tachycardia, HR 90's-110's, with no ectopy noted. Bed is low and locked, call light is within reach. Patient has been instructed to call if in need of assistance. Patient verbalized understanding.

## 2017-02-15 NOTE — PROCEDURES
"Hermann Aguilar is a 41 y.o. male patient.    Temp: 99.4 °F (37.4 °C) (02/15/17 1254)  Pulse: (!) 113 (02/15/17 1254)  Resp: 19 (02/15/17 1254)  BP: 126/82 (02/15/17 1254)  SpO2: 100 % (02/15/17 1212)  Weight: 86.2 kg (190 lb) (02/11/17 1914)  Height: 5' 10" (177.8 cm) (02/11/17 1914)    PICC  Date/Time: 2/15/2017 2:31 PM  Consent Done: Yes  Time out: Immediately prior to procedure a time out was called to verify the correct patient, procedure, equipment, support staff and site/side marked as required  Indications: med administration and vascular access  Anesthesia: local infiltration  Local anesthetic: lidocaine 1% without epinephrine  Anesthetic Total (mL): 3  Preparation: skin prepped with ChloraPrep  Skin prep agent dried: skin prep agent completely dried prior to procedure  Sterile barriers: all five maximum sterile barriers used - cap, mask, sterile gown, sterile gloves, and large sterile sheet  Hand hygiene: hand hygiene performed prior to central venous catheter insertion  Location details: right brachial  Catheter type: double lumen  Catheter size: 5 Fr  Catheter Length: 37cm    Ultrasound guidance: yes  Vessel Caliber: medium and patent, compressibility normal  Needle advanced into vessel with real time Ultrasound guidance.  Guidewire confirmed in vessel.  Sterile sheath used.  Manometry: esophageal manometry  Number of attempts: 1  Post-procedure: blood return through all ports and sterile dressing applied  Estimated blood loss (mL): 2          Tanner Goff  2/15/2017  "

## 2017-02-16 VITALS
TEMPERATURE: 99 F | DIASTOLIC BLOOD PRESSURE: 72 MMHG | WEIGHT: 190 LBS | HEART RATE: 112 BPM | HEIGHT: 70 IN | SYSTOLIC BLOOD PRESSURE: 116 MMHG | RESPIRATION RATE: 20 BRPM | OXYGEN SATURATION: 98 % | BODY MASS INDEX: 27.2 KG/M2

## 2017-02-16 PROBLEM — A41.9 SEPSIS DUE TO URINARY TRACT INFECTION: Status: RESOLVED | Noted: 2017-02-11 | Resolved: 2017-02-16

## 2017-02-16 PROBLEM — E87.6 HYPOKALEMIA: Status: ACTIVE | Noted: 2017-02-16

## 2017-02-16 PROBLEM — E83.39 HYPERPHOSPHATEMIA: Status: RESOLVED | Noted: 2017-02-12 | Resolved: 2017-02-16

## 2017-02-16 PROBLEM — N39.0 SEPSIS DUE TO URINARY TRACT INFECTION: Status: RESOLVED | Noted: 2017-02-11 | Resolved: 2017-02-16

## 2017-02-16 PROBLEM — B96.1 BACTEREMIA DUE TO KLEBSIELLA PNEUMONIAE: Status: RESOLVED | Noted: 2017-02-12 | Resolved: 2017-02-16

## 2017-02-16 PROBLEM — D72.829 LEUKOCYTOSIS: Status: RESOLVED | Noted: 2017-02-11 | Resolved: 2017-02-16

## 2017-02-16 PROBLEM — R78.81 BACTEREMIA DUE TO KLEBSIELLA PNEUMONIAE: Status: RESOLVED | Noted: 2017-02-12 | Resolved: 2017-02-16

## 2017-02-16 LAB
ANION GAP SERPL CALC-SCNC: 9 MMOL/L
BASOPHILS # BLD AUTO: 0.05 K/UL
BASOPHILS NFR BLD: 0.4 %
BUN SERPL-MCNC: 7 MG/DL
CALCIUM SERPL-MCNC: 9.2 MG/DL
CHLORIDE SERPL-SCNC: 109 MMOL/L
CO2 SERPL-SCNC: 23 MMOL/L
CREAT SERPL-MCNC: 0.5 MG/DL
DIFFERENTIAL METHOD: ABNORMAL
EOSINOPHIL # BLD AUTO: 0.8 K/UL
EOSINOPHIL NFR BLD: 6.9 %
ERYTHROCYTE [DISTWIDTH] IN BLOOD BY AUTOMATED COUNT: 16.7 %
EST. GFR  (AFRICAN AMERICAN): >60 ML/MIN/1.73 M^2
EST. GFR  (NON AFRICAN AMERICAN): >60 ML/MIN/1.73 M^2
GLUCOSE SERPL-MCNC: 92 MG/DL
HCT VFR BLD AUTO: 23.8 %
HGB BLD-MCNC: 7.8 G/DL
LYMPHOCYTES # BLD AUTO: 2.4 K/UL
LYMPHOCYTES NFR BLD: 20.7 %
MAGNESIUM SERPL-MCNC: 1.2 MG/DL
MCH RBC QN AUTO: 26.1 PG
MCHC RBC AUTO-ENTMCNC: 32.8 %
MCV RBC AUTO: 80 FL
MONOCYTES # BLD AUTO: 1.2 K/UL
MONOCYTES NFR BLD: 10.3 %
NEUTROPHILS # BLD AUTO: 7 K/UL
NEUTROPHILS NFR BLD: 60.3 %
PLATELET # BLD AUTO: 486 K/UL
PMV BLD AUTO: 10.7 FL
POTASSIUM SERPL-SCNC: 3.4 MMOL/L
RBC # BLD AUTO: 2.99 M/UL
SODIUM SERPL-SCNC: 141 MMOL/L
WBC # BLD AUTO: 11.54 K/UL

## 2017-02-16 PROCEDURE — 80048 BASIC METABOLIC PNL TOTAL CA: CPT

## 2017-02-16 PROCEDURE — 63600175 PHARM REV CODE 636 W HCPCS: Performed by: HOSPITALIST

## 2017-02-16 PROCEDURE — 3E0234Z INTRODUCTION OF SERUM, TOXOID AND VACCINE INTO MUSCLE, PERCUTANEOUS APPROACH: ICD-10-PCS | Performed by: HOSPITALIST

## 2017-02-16 PROCEDURE — 94761 N-INVAS EAR/PLS OXIMETRY MLT: CPT

## 2017-02-16 PROCEDURE — 85025 COMPLETE CBC W/AUTO DIFF WBC: CPT

## 2017-02-16 PROCEDURE — 90670 PCV13 VACCINE IM: CPT | Performed by: HOSPITALIST

## 2017-02-16 PROCEDURE — 90686 IIV4 VACC NO PRSV 0.5 ML IM: CPT | Performed by: HOSPITALIST

## 2017-02-16 PROCEDURE — 83735 ASSAY OF MAGNESIUM: CPT

## 2017-02-16 PROCEDURE — 25000003 PHARM REV CODE 250: Performed by: NURSE PRACTITIONER

## 2017-02-16 PROCEDURE — 90472 IMMUNIZATION ADMIN EACH ADD: CPT | Performed by: HOSPITALIST

## 2017-02-16 PROCEDURE — 90471 IMMUNIZATION ADMIN: CPT | Performed by: HOSPITALIST

## 2017-02-16 PROCEDURE — 25000003 PHARM REV CODE 250: Performed by: HOSPITALIST

## 2017-02-16 PROCEDURE — 36415 COLL VENOUS BLD VENIPUNCTURE: CPT

## 2017-02-16 RX ORDER — POTASSIUM CHLORIDE 20 MEQ/1
40 TABLET, EXTENDED RELEASE ORAL EVERY 4 HOURS
Status: DISCONTINUED | OUTPATIENT
Start: 2017-02-16 | End: 2017-02-16

## 2017-02-16 RX ORDER — LORAZEPAM/0.9% SODIUM CHLORIDE 100MG/0.1L
2 PLASTIC BAG, INJECTION (ML) INTRAVENOUS
Status: COMPLETED | OUTPATIENT
Start: 2017-02-16 | End: 2017-02-16

## 2017-02-16 RX ORDER — POTASSIUM CHLORIDE 20 MEQ/1
40 TABLET, EXTENDED RELEASE ORAL EVERY 4 HOURS
Status: DISPENSED | OUTPATIENT
Start: 2017-02-16 | End: 2017-02-16

## 2017-02-16 RX ORDER — BISACODYL 10 MG
10 SUPPOSITORY, RECTAL RECTAL ONCE
Status: COMPLETED | OUTPATIENT
Start: 2017-02-16 | End: 2017-02-16

## 2017-02-16 RX ADMIN — PNEUMOCOCCAL 13-VALENT CONJUGATE VACCINE 0.5 ML: 2.2; 2.2; 2.2; 2.2; 2.2; 4.4; 2.2; 2.2; 2.2; 2.2; 2.2; 2.2; 2.2 INJECTION, SUSPENSION INTRAMUSCULAR at 10:02

## 2017-02-16 RX ADMIN — MAGNESIUM SULFATE IN WATER 2 G: 40 INJECTION, SOLUTION INTRAVENOUS at 04:02

## 2017-02-16 RX ADMIN — INFLUENZA A VIRUS A/CALIFORNIA/7/2009 X-179A (H1N1) ANTIGEN (FORMALDEHYDE INACTIVATED), INFLUENZA A VIRUS A/HONG KONG/4801/2014 X-263B (H3N2) ANTIGEN (FORMALDEHYDE INACTIVATED), INFLUENZA B VIRUS B/PHUKET/3073/2013 ANTIGEN (FORMALDEHYDE INACTIVATED), AND INFLUENZA B VIRUS B/BRISBANE/60/2008 ANTIGEN (FORMALDEHYDE INACTIVATED) 0.5 ML: 15; 15; 15; 15 INJECTION, SUSPENSION INTRAMUSCULAR at 09:02

## 2017-02-16 RX ADMIN — FERROUS SULFATE TAB EC 325 MG (65 MG FE EQUIVALENT) 325 MG: 325 (65 FE) TABLET DELAYED RESPONSE at 09:02

## 2017-02-16 RX ADMIN — PROPRANOLOL HYDROCHLORIDE 10 MG: 10 TABLET ORAL at 05:02

## 2017-02-16 RX ADMIN — PROPRANOLOL HYDROCHLORIDE 10 MG: 10 TABLET ORAL at 02:02

## 2017-02-16 RX ADMIN — ERTAPENEM SODIUM 1 G: 1 INJECTION, POWDER, LYOPHILIZED, FOR SOLUTION INTRAMUSCULAR; INTRAVENOUS at 09:02

## 2017-02-16 RX ADMIN — SODIUM CHLORIDE, PRESERVATIVE FREE 10 ML: 5 INJECTION INTRAVENOUS at 02:02

## 2017-02-16 RX ADMIN — CHOLECALCIFEROL TAB 10 MCG (400 UNIT) 1200 UNITS: 10 TAB at 09:02

## 2017-02-16 RX ADMIN — OXYCODONE HYDROCHLORIDE 10 MG: 5 TABLET ORAL at 09:02

## 2017-02-16 RX ADMIN — ASPIRIN 325 MG: 325 TABLET, DELAYED RELEASE ORAL at 09:02

## 2017-02-16 RX ADMIN — OXYCODONE HYDROCHLORIDE AND ACETAMINOPHEN 500 MG: 500 TABLET ORAL at 09:02

## 2017-02-16 RX ADMIN — POLYETHYLENE GLYCOL 3350 17 G: 17 POWDER, FOR SOLUTION ORAL at 01:02

## 2017-02-16 RX ADMIN — POTASSIUM CHLORIDE 40 MEQ: 20 TABLET, EXTENDED RELEASE ORAL at 09:02

## 2017-02-16 RX ADMIN — STANDARDIZED SENNA CONCENTRATE AND DOCUSATE SODIUM 1 TABLET: 8.6; 5 TABLET, FILM COATED ORAL at 09:02

## 2017-02-16 RX ADMIN — BISACODYL 10 MG: 10 SUPPOSITORY RECTAL at 02:02

## 2017-02-16 RX ADMIN — SODIUM CHLORIDE, PRESERVATIVE FREE 10 ML: 5 INJECTION INTRAVENOUS at 06:02

## 2017-02-16 RX ADMIN — MAGNESIUM SULFATE IN WATER 2 G: 40 INJECTION, SOLUTION INTRAVENOUS at 01:02
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
## 2017-02-16 NOTE — PLAN OF CARE
[facility]  Maurilio Alfaro MD, [facility]     MD Mali Umana FNP Renee Melancon, PA-C Rosanne Zeringue, NP  [address] [place] [id number]  Office: [phone]  Fax: [phone]      NURSING HOME ORDERS    02/16/2017    Ad[facility] to Nursing Home:  [facility]       Diagnoses:  Active Hospital Problems    Diagnosis  POA    Hypokalemia [E87.6]  No    Hypomagnesemia [E83.42]  Yes    Gastrostomy status [Z93.1]  Not Applicable     Chronic     No longer being used      Tracheostomy status [Z93.0]  Not Applicable     Chronic    Chronic post-traumatic stress disorder [F43.12]  Yes     Chronic    Iron deficiency anemia [D50.9]  Yes     Chronic    Recurrent major depressive disorder, in remission [F33.40]  Yes     Chronic    Urinary tract infection associated with indwelling urethral catheter [T83.511A, N39.0]  Yes    Chronic indwelling Huff catheter [Z92.89]  Not Applicable     Chronic    Essential hypertension [I10]  Yes     Chronic    Vitamin D deficiency [E55.9]  Yes     Chronic    Paraplegia at T9 level [G83.9]  Yes     Chronic    History of DVT of lower extremity [Z86.718]  Not Applicable    Hx of right BKA [Z89.511]  Not Applicable     Chronic    Wound of right lower extremity [S81.801A]  Yes    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 [S25.00XA]  Yes      Resolved Hospital Problems    Diagnosis Date Resolved POA    *Sepsis due to urinary tract infection [A41.9, N39.0] 02/16/2017 Yes    Hyperphosphatemia [E83.39] 02/16/2017 Yes    Bacteremia due to Klebsiella pneumoniae [R78.81] 02/16/2017 Yes    Leukocytosis [D72.829] 02/16/2017 Yes    VAUGHN (acute kidney injury) [N17.9] 02/15/2017 Yes       Allergies:Review of patient's allergies indicates:  No Known Allergies      Discharge Procedure Orders  AIR MATTRESS if Olman Scale less than 15     Diet Adult Regular     Vital signs per facility protocol     Skin  assessment every shift      Up in chair/ wheel chair     Activity as tolerated     Intake and output per facility protocol     Turn patient every 2 hours to prevent pressure ulcer formation     Full code         LABS:  Per facility protocol    Nursing Precautions:     - Fall precautions per nursing home protocol   - Decubitus precautions:        -  for positioning   - Pressure reducing foam mattress   - Turn patient every two hours. Use wedge pillows to anchor patient      MISCELLANEOUS CARE:     PEG Care:  Clean site every 24 hours     Huff Care: Empty Huff bag every shift.  Change Huff every month     Routine Skin for Bedridden Patients:  Apply moisture barrier cream to all    skin folds and wet areas in perineal area daily and after baths and                           all bowel movements.    Infusion Therapy:     SN to perform Infusion Therapy/Central Line Care.  Review Central Line Care & Central Line Flush with patient.    Scrub the Hub: Prior to accessing the line, always perform a 30 second alcohol scrub  Each lumen of the central line is to be flushed at least daily with 10 mL Normal Saline and 3 mL Heparin flush (100 units/mL)  Skilled Nurse (SN) may draw blood from IV access  Blood Draw Procedure:   - Aspirate at least 5 mL of blood   - Discard   - Obtain specimen   - Change posiflow cap   - Flush with 20 mL Normal Saline followed by a                 3-5 mL Heparin flush (100 units/mL)  Central :   - Sterile dressing changes are done weekly and as needed.   - Use chlor-hexadine scrub to cleanse site, apply Biopatch to insertion site,       apply securement device dressing   - Posi-flow caps are changed weekly and after EVERY lab draw.   - If sterile gauze is under dressing to control oozing,                 dressing change must be performed every 24 hours until gauze is not needed.    Please remove the PICC line on 3/1/2017 after completion of antibiotic therapy.          Medications: Discontinue all previous medication orders, if any. See new list below.     Hermann Aguilar   Home Medication Instructions ODALYS:[id number]    Printed on:02/16/17 0831   Medication Information                      acetaminophen (TYLENOL) 650 mg/20.3 mL Soln  Take 650 mg by mouth every 4 (four) hours as needed for Temperature greater than.             ascorbic acid, vitamin C, (VITAMIN C) 500 MG tablet  Take 500 mg by mouth 2 (two) times daily.             aspirin (ECOTRIN) 325 MG EC tablet  Take 325 mg by mouth once daily.             bacitracin 500 unit/gram Oint  Apply topically 2 (two) times daily. To right stump             bacitracin 500 unit/gram ointment  Apply topically 2 (two) times daily. Apply to right stump             cholecalciferol, vitamin D3, 5,000 unit capsule  Take 5,000 Units by mouth once daily.             collagenase ointment  Apply topically once daily. Cleanse right posterior stump wound with normal saline.  Pat Dry. Apply skin prep to periwound.  Apply Santyl to wound bed & cover with bordered gauze dressing everyday until resolved.             EMOLLIENT COMBINATION NO.92 (LUBRIDERM DAILY MOISTURE TOP)  Apply topically once daily. Apply to left leg and foot             ERTAPENEM SODIUM (ERTAPENEM, INVANZ, 1 G/100 ML NS, READY TO MIX,)  Inject 100 mLs (1 g total) into the vein once daily.  Start 2/17/17. Stop 2/28/17.              ferrous sulfate 325 (65 FE) MG EC tablet  Take 325 mg by mouth 2 (two) times daily.             INCONTINENCE ALARMS (MISC. DEVICES MISC)  by Misc.(Non-Drug; Combo Route) route. Change sage catheter q. Month on the 20th             mirtazapine (REMERON) 30 MG tablet  Take 30 mg by mouth every evening.             oxycodone-acetaminophen (PERCOCET) 5-325 mg per tablet  Take 1 tablet by mouth every 4 (four) hours as needed for Pain.             polyethylene glycol (GLYCOLAX) 17 gram PwPk  Take 17 g by mouth daily as needed.               propranolol (INDERAL) 10 MG tablet  Take 10 mg by mouth 3 (three) times daily.             protein supplement Liqd  Take 30 mLs by mouth 2 (two) times daily.             rivaroxaban (XARELTO) 20 mg Tab  Take 20 mg by mouth daily with dinner or evening meal.             senna-docusate 8.6-50 mg (PERICOLACE) 8.6-50 mg per tablet  Take 1 tablet by mouth 2 (two) times daily.                  Maurilio Alfaro MD  02/16/2017

## 2017-02-16 NOTE — PROGRESS NOTES
Called Rosario at [facility] to let her know patient will be returning and faxed discharge orders to [facility]. Awaiting call back after review of orders.    Michelle Fontana RN, CCM, CMSRN  RN Transition Navigator  [phone]

## 2017-02-16 NOTE — PLAN OF CARE
Patient d/c to [facility]. d/c instructions given to the patient and the family member, verbalized understanding. Education given, refer to clinical reference for education. PICC and sage left in place. Telemetry d/c. Report called to Shivani. Waiting for transportation to come and  the patient.

## 2017-02-16 NOTE — PLAN OF CARE
Problem: Patient Care Overview  Goal: Plan of Care Review  Patient on RA, no respiratory distress noted. Will continue to monitor.   Outcome: Ongoing (interventions implemented as appropriate)  Reviewed plan of care with patient. Patient verbalized understanding. Pt on air mattress, repositioned Q2H. Dressing to R. BKA changed this AM. PICC line placement to R. Upper arm. Dressing CDI, biopatch in place. Huff catheter care performed. PRN pain med given x1 today. Patient on continuous tele monitoring -ST; HR low 100s. No true red alarms or ectopy noted. Bed alarm set, call bell in reach. Will continue to monitor.

## 2017-02-16 NOTE — PLAN OF CARE
Spoke to Rosario and [facility] accepted patient. Spoke to patient and patient on phone with his family and he told family he was returning to [facility].  Gave packet to nurse to call report.  Ordered ambulance with julieta [phone].       02/16/17 1242   Final Note   Assessment Type Final Discharge Note   Discharge Disposition [facility]   Discharge planning education complete? Yes   Hospital Follow Up  Appt(s) scheduled? No   Discharge plans and expectations educations in teach back method with documentation complete? Yes   Offered [facility] -- Bedside Delivery? n/a   Discharge/Hospital Encounter Summary to ([facility]) PCP No   Referral to Outpatient Case Management complete? No   Referral to / orders for Home Health Complete? No   30 day supply of medicines given at discharge, if documented non-compliance / non-adherence? No   Any social issues identified prior to discharge? No   Did you assess the readiness or willingness of the family or caregiver to support self management of care? No   Right Care Referral Info   Post Acute Recommendation Other   Referral Type snf nursing home   Facility Name [facility]      Michelle Fontana, RN, CCM, CMSRN  RN Transition Navigator  [phone]

## 2017-02-16 NOTE — PLAN OF CARE
Problem: Patient Care Overview  Goal: Plan of Care Review  Patient on RA, no respiratory distress noted. Will continue to monitor.   Outcome: Ongoing (interventions implemented as appropriate)  Patient on RA, no repiratory distress noted.  Will continue to monitor.

## 2017-02-16 NOTE — SUBJECTIVE & OBJECTIVE
Interval History: Says that he is feeling better. Thinks he had a BM. Eating well.     Review of Systems   Constitutional: Negative for chills and fever.   Respiratory: Negative for cough and shortness of breath.    Gastrointestinal: Negative for nausea and vomiting.     Objective:     Vital Signs (Most Recent):  Temp: 98.9 °F (37.2 °C) (02/15/17 1630)  Pulse: (!) 111 (02/15/17 1630)  Resp: 19 (02/15/17 1630)  BP: 124/81 (02/15/17 1630)  SpO2: 97 % (02/15/17 1900) Vital Signs (24h Range):  Temp:  [98.6 °F (37 °C)-99.4 °F (37.4 °C)] 98.9 °F (37.2 °C)  Pulse:  [101-113] 111  Resp:  [19-20] 19  SpO2:  [96 %-100 %] 97 %  BP: (114-126)/(75-82) 124/81     Weight: 86.2 kg (190 lb)  Body mass index is 27.26 kg/(m^2).    Intake/Output Summary (Last 24 hours) at 02/15/17 2223  Last data filed at 02/15/17 1255   Gross per 24 hour   Intake              150 ml   Output             3700 ml   Net            -3550 ml      Physical Exam   Constitutional: He is oriented to person, place, and time. He appears well-developed and well-nourished. No distress.   Cardiovascular: Normal rate and regular rhythm.    No murmur heard.  Pulmonary/Chest: Effort normal and breath sounds normal. No respiratory distress.   Abdominal: Soft. Bowel sounds are normal. He exhibits no distension. There is no tenderness.   Musculoskeletal: He exhibits no edema.   Neurological: He is alert and oriented to person, place, and time.   Skin: Skin is warm and dry.   Psychiatric: He has a normal mood and affect. His behavior is normal.   Nursing note and vitals reviewed.      Significant Labs:   CBC:   Recent Labs  Lab 02/14/17  0414 02/15/17  1527   WBC 11.81 10.32   HGB 7.5* 7.6*   HCT 23.3* 23.4*   * 463*     CMP:   Recent Labs  Lab 02/14/17  0414 02/15/17  1527    138   K 3.5 3.8    108   CO2 19* 22*    107   BUN 10 8   CREATININE 0.6 0.6   CALCIUM 9.0 8.7   ANIONGAP 10 8   EGFRNONAA >60 >60       Significant Imaging: I have reviewed  all pertinent imaging results/findings within the past 24 hours.

## 2017-02-16 NOTE — ASSESSMENT & PLAN NOTE
Urinary tract infection associated with indwelling urethral catheter and septicemia due to Klebsiella pneumoniae  Chronic indwelling sage catheter  Continue ertapenem 1 gram IV daily. Repeat blood cultures are negative thus far. Renal ultrasound did not show any hydronephrosis. PICC placed 2/15/17. Will need to continue the antibiotics through 2/28/17.

## 2017-02-16 NOTE — PLAN OF CARE
02/16/17 1200   Transport Information   Transport Diagnosis (sepsis, hx: paraplegis)   Transportation Date 02/16/17   Transported From ([facility])   Transported To ([facility])   Supporting Clinical Information   Unable to sit or travel in a seated position because Bed Confined (unable to ambulate/sit);Postural Instability   Suffers from Paralysis other (see comments)   Comment (paraplegic )

## 2017-02-16 NOTE — PLAN OF CARE
Problem: Patient Care Overview  Goal: Plan of Care Review  Patient on RA, no respiratory distress noted. Will continue to monitor.   Safety measures in progress, bed in lowest position, wheels locked x4, side rails up x2, bed alarm set and audible, call light within reach.  Instructed patient to utilize call light for any assistance or concerns.  Patient verbalized understanding.

## 2017-02-16 NOTE — SUBJECTIVE & OBJECTIVE
Interval History: Feeling fine today. No complaints this AM. Had PICC placed yesterday.     Review of Systems   Constitutional: Negative for fever.   Respiratory: Negative for cough and shortness of breath.    Gastrointestinal: Negative for nausea and vomiting.     Objective:     Vital Signs (Most Recent):  Temp: 96.9 °F (36.1 °C) (02/16/17 0529)  Pulse: 97 (02/16/17 0529)  Resp: 20 (02/16/17 0529)  BP: 130/85 (02/16/17 0529)  SpO2: 98 % (02/16/17 0200) Vital Signs (24h Range):  Temp:  [96.9 °F (36.1 °C)-100.3 °F (37.9 °C)] 96.9 °F (36.1 °C)  Pulse:  [] 97  Resp:  [19-20] 20  SpO2:  [96 %-100 %] 98 %  BP: (115-130)/(80-85) 130/85     Weight: 86.2 kg (190 lb)  Body mass index is 27.26 kg/(m^2).    Intake/Output Summary (Last 24 hours) at 02/16/17 0825  Last data filed at 02/16/17 0530   Gross per 24 hour   Intake              240 ml   Output             4550 ml   Net            -4310 ml      Physical Exam   Constitutional: He is oriented to person, place, and time. He appears well-developed and well-nourished. No distress.   Neurological: He is alert and oriented to person, place, and time.   Psychiatric: He has a normal mood and affect. His behavior is normal.   Nursing note and vitals reviewed.      Significant Labs:   BMP:   Recent Labs  Lab 02/16/17  0500   GLU 92      K 3.4*      CO2 23   BUN 7   CREATININE 0.5   CALCIUM 9.2     CBC:   Recent Labs  Lab 02/15/17  1527 02/16/17  0500   WBC 10.32 11.54   HGB 7.6* 7.8*   HCT 23.4* 23.8*   * 486*       Significant Imaging: I have reviewed all pertinent imaging results/findings within the past 24 hours.
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
## 2017-02-16 NOTE — PROGRESS NOTES
Had put ambulance on hold due to patient needing Mg IV riders and patient needing to have BM.    Per nursing, patient had BM and Mg rider will be finished by 6:15pm.    [facility] for 6:30pm [phone].    Michelle Fontana, RN, CCM, CMSRN  RN Transition Navigator  [phone]

## 2017-02-17 LAB — BACTERIA BLD CULT: NORMAL
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
## 2017-02-19 LAB
BACTERIA BLD CULT: NORMAL
BACTERIA BLD CULT: NORMAL

## 2017-02-20 NOTE — PHARMACY MED REC
"MedMined Medication Reconciliation  Template    Patient was admitted on 2/11/2017 for Sepsis due to urinary tract infection.      Patient's prior to admission medication regimen was as follows:  No prescriptions prior to admission.         Please add appropriate    SmartPhrase below:    Admission Medication Reconciliation - Pharmacy Consult Note    The home medication history was taken by Tamela Su CPHT.  Based on information gathered and subsequent review by the clinical pharmacist, the items below may need attention.    You may go to "Admission" then "Reconcile Home Medications" tabs to review and/or act upon these items.        No issues noted with the medication reconciliation.        Potential issues to be addressed PRIOR TO DISCHARGE  None  Please address this information as you see fit.  Feel free to contact us if you have any questions or require assistance.    Cecilio Mariscal  [phone]      "

## 2017-07-11 ENCOUNTER — OUTSIDE PLACE OF SERVICE (OUTPATIENT)
Dept: ADMINISTRATIVE | Facility: OTHER | Age: 42
End: 2017-07-11
Payer: MEDICAID

## 2017-07-11 PROCEDURE — 99308 SBSQ NF CARE LOW MDM 20: CPT | Mod: ,,, | Performed by: FAMILY MEDICINE

## 2017-08-29 ENCOUNTER — OUTSIDE PLACE OF SERVICE (OUTPATIENT)
Dept: ADMINISTRATIVE | Facility: OTHER | Age: 42
End: 2017-08-29

## 2017-09-19 ENCOUNTER — OUTSIDE PLACE OF SERVICE (OUTPATIENT)
Dept: ADMINISTRATIVE | Facility: OTHER | Age: 42
End: 2017-09-19

## 2017-10-10 ENCOUNTER — CLINICAL SUPPORT (OUTPATIENT)
Dept: FAMILY MEDICINE | Facility: CLINIC | Age: 42
End: 2017-10-10
Payer: MEDICAID

## 2017-10-10 PROCEDURE — 90471 IMMUNIZATION ADMIN: CPT | Mod: S$GLB,,, | Performed by: FAMILY MEDICINE

## 2017-10-10 PROCEDURE — 90686 IIV4 VACC NO PRSV 0.5 ML IM: CPT | Mod: S$GLB,,, | Performed by: FAMILY MEDICINE

## 2017-12-05 ENCOUNTER — OUTSIDE PLACE OF SERVICE (OUTPATIENT)
Dept: ADMINISTRATIVE | Facility: OTHER | Age: 42
End: 2017-12-05
Payer: MEDICAID

## 2017-12-05 PROCEDURE — 99308 SBSQ NF CARE LOW MDM 20: CPT | Mod: ,,, | Performed by: FAMILY MEDICINE

## 2017-12-10 ENCOUNTER — HOSPITAL ENCOUNTER (EMERGENCY)
Facility: HOSPITAL | Age: 42
Discharge: HOME | End: 2017-12-10
Attending: EMERGENCY MEDICINE
Payer: MEDICAID

## 2017-12-10 VITALS
HEIGHT: 71 IN | OXYGEN SATURATION: 98 % | BODY MASS INDEX: 35 KG/M2 | HEART RATE: 70 BPM | TEMPERATURE: 99 F | SYSTOLIC BLOOD PRESSURE: 130 MMHG | DIASTOLIC BLOOD PRESSURE: 70 MMHG | WEIGHT: 250 LBS | RESPIRATION RATE: 20 BRPM

## 2017-12-10 DIAGNOSIS — K62.5 RECTAL BLEEDING: Primary | ICD-10-CM

## 2017-12-10 LAB
ALBUMIN SERPL BCP-MCNC: 3.2 G/DL
ALP SERPL-CCNC: 144 U/L
ALT SERPL W/O P-5'-P-CCNC: 19 U/L
ANION GAP SERPL CALC-SCNC: 10 MMOL/L
AST SERPL-CCNC: 22 U/L
BASOPHILS # BLD AUTO: 0.02 K/UL
BASOPHILS NFR BLD: 0.3 %
BILIRUB SERPL-MCNC: 0.3 MG/DL
BUN SERPL-MCNC: 9 MG/DL
CALCIUM SERPL-MCNC: 9.8 MG/DL
CHLORIDE SERPL-SCNC: 105 MMOL/L
CO2 SERPL-SCNC: 24 MMOL/L
CREAT SERPL-MCNC: 0.8 MG/DL
DIFFERENTIAL METHOD: ABNORMAL
EOSINOPHIL # BLD AUTO: 0.5 K/UL
EOSINOPHIL NFR BLD: 7 %
ERYTHROCYTE [DISTWIDTH] IN BLOOD BY AUTOMATED COUNT: 16.5 %
EST. GFR  (AFRICAN AMERICAN): >60 ML/MIN/1.73 M^2
EST. GFR  (NON AFRICAN AMERICAN): >60 ML/MIN/1.73 M^2
GIANT PLATELETS BLD QL SMEAR: PRESENT
GLUCOSE SERPL-MCNC: 112 MG/DL
HCT VFR BLD AUTO: 39.6 %
HGB BLD-MCNC: 13.2 G/DL
INR PPP: 1
LYMPHOCYTES # BLD AUTO: 2.2 K/UL
LYMPHOCYTES NFR BLD: 29 %
MCH RBC QN AUTO: 27.1 PG
MCHC RBC AUTO-ENTMCNC: 33.3 G/DL
MCV RBC AUTO: 81 FL
MONOCYTES # BLD AUTO: 0.6 K/UL
MONOCYTES NFR BLD: 7.9 %
NEUTROPHILS # BLD AUTO: 4.3 K/UL
NEUTROPHILS NFR BLD: 55.8 %
OB PNL STL: POSITIVE
PLATELET # BLD AUTO: 254 K/UL
PMV BLD AUTO: 10.4 FL
POTASSIUM SERPL-SCNC: 4.2 MMOL/L
PROT SERPL-MCNC: 8.5 G/DL
PROTHROMBIN TIME: 10.7 SEC
RBC # BLD AUTO: 4.87 M/UL
SODIUM SERPL-SCNC: 139 MMOL/L
WBC # BLD AUTO: 7.73 K/UL

## 2017-12-10 PROCEDURE — 80053 COMPREHEN METABOLIC PANEL: CPT

## 2017-12-10 PROCEDURE — 93005 ELECTROCARDIOGRAM TRACING: CPT

## 2017-12-10 PROCEDURE — 85610 PROTHROMBIN TIME: CPT

## 2017-12-10 PROCEDURE — 82272 OCCULT BLD FECES 1-3 TESTS: CPT

## 2017-12-10 PROCEDURE — 93010 ELECTROCARDIOGRAM REPORT: CPT | Mod: ,,, | Performed by: INTERNAL MEDICINE

## 2017-12-10 PROCEDURE — 85025 COMPLETE CBC W/AUTO DIFF WBC: CPT

## 2017-12-10 PROCEDURE — 99284 EMERGENCY DEPT VISIT MOD MDM: CPT

## 2017-12-10 RX ORDER — VENLAFAXINE HYDROCHLORIDE 37.5 MG/1
75 CAPSULE, EXTENDED RELEASE ORAL DAILY
COMMUNITY
End: 2019-01-07 | Stop reason: SDUPTHER

## 2017-12-10 RX ORDER — METOPROLOL SUCCINATE 25 MG/1
25 TABLET, EXTENDED RELEASE ORAL DAILY
COMMUNITY
End: 2018-08-29

## 2017-12-10 RX ORDER — FAMOTIDINE 20 MG/1
20 TABLET, FILM COATED ORAL 2 TIMES DAILY
COMMUNITY
End: 2019-01-07 | Stop reason: SDUPTHER

## 2017-12-10 RX ORDER — GABAPENTIN 300 MG/1
300 CAPSULE ORAL 2 TIMES DAILY
COMMUNITY
End: 2019-01-07 | Stop reason: SDUPTHER

## 2017-12-10 RX ORDER — DOCUSATE SODIUM 100 MG/1
100 CAPSULE, LIQUID FILLED ORAL 2 TIMES DAILY
COMMUNITY
End: 2019-02-14 | Stop reason: SDUPTHER

## 2017-12-10 RX ORDER — HYDROCORTISONE ACETATE 25 MG/1
25 SUPPOSITORY RECTAL 2 TIMES DAILY
Qty: 20 SUPPOSITORY | Refills: 0 | Status: SHIPPED | OUTPATIENT
Start: 2017-12-10 | End: 2017-12-20

## 2017-12-10 RX ORDER — KETOCONAZOLE 20 MG/ML
SHAMPOO, SUSPENSION TOPICAL
COMMUNITY
End: 2019-02-14 | Stop reason: SDUPTHER

## 2017-12-10 NOTE — ED NOTES
Spoke with kwesi garcia at [place] notified her that [facility]
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
## 2017-12-12 DIAGNOSIS — R53.1 WEAKNESS: Primary | ICD-10-CM

## 2018-03-13 ENCOUNTER — TELEPHONE (OUTPATIENT)
Dept: FAMILY MEDICINE | Facility: CLINIC | Age: 43
End: 2018-03-13

## 2018-03-13 ENCOUNTER — OUTSIDE PLACE OF SERVICE (OUTPATIENT)
Dept: FAMILY MEDICINE | Facility: CLINIC | Age: 43
End: 2018-03-13
Payer: MEDICAID

## 2018-03-13 DIAGNOSIS — S91.002S WOUND OF LEFT ANKLE, SEQUELA: Primary | ICD-10-CM

## 2018-03-13 PROCEDURE — 99308 SBSQ NF CARE LOW MDM 20: CPT | Mod: ,,, | Performed by: FAMILY MEDICINE

## 2018-03-13 NOTE — TELEPHONE ENCOUNTER
I called [facility] - jhon was not available   I spoke with isabella - she will give info to john to complete

## 2018-03-14 ENCOUNTER — HOSPITAL ENCOUNTER (OUTPATIENT)
Dept: RADIOLOGY | Facility: HOSPITAL | Age: 43
Discharge: HOME | End: 2018-03-14
Attending: FAMILY MEDICINE
Payer: MEDICAID

## 2018-03-14 DIAGNOSIS — S91.002S WOUND OF LEFT ANKLE, SEQUELA: ICD-10-CM

## 2018-03-14 PROCEDURE — 93926 LOWER EXTREMITY STUDY: CPT | Mod: TC,PO

## 2018-03-20 ENCOUNTER — TELEPHONE (OUTPATIENT)
Dept: FAMILY MEDICINE | Facility: CLINIC | Age: 43
End: 2018-03-20

## 2018-03-20 NOTE — TELEPHONE ENCOUNTER
U/S of lower Extremity  was faxed to [facility]    ----- Message from Ramu Breen MD sent at 3/18/2018  3:20 PM CDT -----  Send to [facility]

## 2018-03-27 ENCOUNTER — OFFICE VISIT (OUTPATIENT)
Dept: CARDIOLOGY | Facility: CLINIC | Age: 43
End: 2018-03-27
Payer: MEDICAID

## 2018-03-27 VITALS
HEIGHT: 71 IN | BODY MASS INDEX: 34.72 KG/M2 | HEART RATE: 105 BPM | OXYGEN SATURATION: 96 % | SYSTOLIC BLOOD PRESSURE: 120 MMHG | WEIGHT: 248 LBS | DIASTOLIC BLOOD PRESSURE: 90 MMHG

## 2018-03-27 DIAGNOSIS — Z89.511 HX OF RIGHT BKA: Chronic | ICD-10-CM

## 2018-03-27 DIAGNOSIS — I10 ESSENTIAL HYPERTENSION: Chronic | ICD-10-CM

## 2018-03-27 DIAGNOSIS — F43.12 CHRONIC POST-TRAUMATIC STRESS DISORDER: Primary | Chronic | ICD-10-CM

## 2018-03-27 DIAGNOSIS — I73.9 PAD (PERIPHERAL ARTERY DISEASE): ICD-10-CM

## 2018-03-27 DIAGNOSIS — Z86.718 HISTORY OF DVT OF LOWER EXTREMITY: ICD-10-CM

## 2018-03-27 PROBLEM — S81.809A NON-HEALING WOUND OF LOWER EXTREMITY, INITIAL ENCOUNTER: Status: ACTIVE | Noted: 2017-02-11

## 2018-03-27 PROCEDURE — 99205 OFFICE O/P NEW HI 60 MIN: CPT | Mod: S$PBB,,, | Performed by: INTERNAL MEDICINE

## 2018-03-27 PROCEDURE — 99999 PR PBB SHADOW E&M-EST. PATIENT-LVL III: CPT | Mod: PBBFAC,,, | Performed by: INTERNAL MEDICINE

## 2018-03-27 PROCEDURE — 99213 OFFICE O/P EST LOW 20 MIN: CPT | Mod: PBBFAC,PN | Performed by: INTERNAL MEDICINE

## 2018-03-27 RX ORDER — MELATONIN 10 MG
CAPSULE ORAL NIGHTLY
COMMUNITY
End: 2019-02-14 | Stop reason: SDUPTHER
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
## 2018-03-27 NOTE — PATIENT INSTRUCTIONS
Check CTA abd/pelvis with iliofemoral runoff  Continue wound care to left foot wound  Follow up in 2 weeks

## 2018-04-03 ENCOUNTER — LAB VISIT (OUTPATIENT)
Dept: LAB | Facility: HOSPITAL | Age: 43
End: 2018-04-03
Attending: INTERNAL MEDICINE
Payer: MEDICAID

## 2018-04-03 DIAGNOSIS — I73.9 PAD (PERIPHERAL ARTERY DISEASE): ICD-10-CM

## 2018-04-03 DIAGNOSIS — F43.12 CHRONIC POST-TRAUMATIC STRESS DISORDER: Chronic | ICD-10-CM

## 2018-04-03 LAB
ALBUMIN SERPL BCP-MCNC: 4.1 G/DL
ALP SERPL-CCNC: 149 U/L
ALT SERPL W/O P-5'-P-CCNC: 25 U/L
ANION GAP SERPL CALC-SCNC: 12 MMOL/L
AST SERPL-CCNC: 29 U/L
BILIRUB SERPL-MCNC: 0.2 MG/DL
BUN SERPL-MCNC: 10 MG/DL
CALCIUM SERPL-MCNC: 9.8 MG/DL
CHLORIDE SERPL-SCNC: 104 MMOL/L
CO2 SERPL-SCNC: 30 MMOL/L
CREAT SERPL-MCNC: 0.73 MG/DL
EST. GFR  (AFRICAN AMERICAN): >60 ML/MIN/1.73 M^2
EST. GFR  (NON AFRICAN AMERICAN): >60 ML/MIN/1.73 M^2
GLUCOSE SERPL-MCNC: 103 MG/DL
POTASSIUM SERPL-SCNC: 4.3 MMOL/L
PROT SERPL-MCNC: 8.4 G/DL
SODIUM SERPL-SCNC: 146 MMOL/L

## 2018-04-03 PROCEDURE — 36415 COLL VENOUS BLD VENIPUNCTURE: CPT | Mod: PO

## 2018-04-03 PROCEDURE — 80053 COMPREHEN METABOLIC PANEL: CPT | Mod: PO

## 2018-04-04 ENCOUNTER — HOSPITAL ENCOUNTER (OUTPATIENT)
Dept: RADIOLOGY | Facility: HOSPITAL | Age: 43
Discharge: HOME | End: 2018-04-04
Attending: INTERNAL MEDICINE
Payer: MEDICAID

## 2018-04-04 DIAGNOSIS — F43.12 CHRONIC POST-TRAUMATIC STRESS DISORDER: Chronic | ICD-10-CM

## 2018-04-04 DIAGNOSIS — I73.9 PAD (PERIPHERAL ARTERY DISEASE): ICD-10-CM

## 2018-04-10 ENCOUNTER — OFFICE VISIT (OUTPATIENT)
Dept: CARDIOLOGY | Facility: CLINIC | Age: 43
End: 2018-04-10
Payer: MEDICAID

## 2018-04-10 VITALS
SYSTOLIC BLOOD PRESSURE: 124 MMHG | DIASTOLIC BLOOD PRESSURE: 100 MMHG | HEART RATE: 110 BPM | OXYGEN SATURATION: 98 % | WEIGHT: 248 LBS | BODY MASS INDEX: 34.72 KG/M2 | HEIGHT: 71 IN

## 2018-04-10 DIAGNOSIS — Z89.511 HX OF RIGHT BKA: Chronic | ICD-10-CM

## 2018-04-10 DIAGNOSIS — I73.9 PAD (PERIPHERAL ARTERY DISEASE): ICD-10-CM

## 2018-04-10 DIAGNOSIS — S81.809A NON-HEALING WOUND OF LOWER EXTREMITY, INITIAL ENCOUNTER: ICD-10-CM

## 2018-04-10 DIAGNOSIS — G82.20 PARAPLEGIA AT T9 LEVEL: Primary | Chronic | ICD-10-CM

## 2018-04-10 PROCEDURE — 99999 PR PBB SHADOW E&M-EST. PATIENT-LVL V: CPT | Mod: PBBFAC,,, | Performed by: INTERNAL MEDICINE

## 2018-04-10 PROCEDURE — 99215 OFFICE O/P EST HI 40 MIN: CPT | Mod: S$PBB,,, | Performed by: INTERNAL MEDICINE

## 2018-04-10 PROCEDURE — 99215 OFFICE O/P EST HI 40 MIN: CPT | Mod: PBBFAC,PN | Performed by: INTERNAL MEDICINE
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
## 2018-04-19 ENCOUNTER — HOSPITAL ENCOUNTER (OUTPATIENT)
Dept: RADIOLOGY | Facility: HOSPITAL | Age: 43
Discharge: HOME | End: 2018-04-19
Attending: INTERNAL MEDICINE
Payer: MEDICAID

## 2018-04-19 DIAGNOSIS — S81.809A NON-HEALING WOUND OF LOWER EXTREMITY, INITIAL ENCOUNTER: ICD-10-CM

## 2018-04-19 DIAGNOSIS — I73.9 PAD (PERIPHERAL ARTERY DISEASE): ICD-10-CM

## 2018-04-19 DIAGNOSIS — G82.20 PARAPLEGIA AT T9 LEVEL: Chronic | ICD-10-CM

## 2018-04-19 PROCEDURE — 75635 CT ANGIO ABDOMINAL ARTERIES: CPT | Mod: 26,,, | Performed by: RADIOLOGY

## 2018-04-19 PROCEDURE — 75635 CT ANGIO ABDOMINAL ARTERIES: CPT | Mod: TC

## 2018-04-19 PROCEDURE — 25500020 PHARM REV CODE 255: Performed by: INTERNAL MEDICINE

## 2018-04-19 RX ADMIN — IOHEXOL 125 ML: 350 INJECTION, SOLUTION INTRAVENOUS at 07:04

## 2018-04-24 ENCOUNTER — OFFICE VISIT (OUTPATIENT)
Dept: CARDIOLOGY | Facility: CLINIC | Age: 43
End: 2018-04-24
Payer: MEDICAID

## 2018-04-24 VITALS
OXYGEN SATURATION: 98 % | BODY MASS INDEX: 34.72 KG/M2 | WEIGHT: 248 LBS | DIASTOLIC BLOOD PRESSURE: 92 MMHG | HEIGHT: 71 IN | SYSTOLIC BLOOD PRESSURE: 140 MMHG | HEART RATE: 105 BPM

## 2018-04-24 DIAGNOSIS — G82.20 PARAPLEGIA AT T9 LEVEL: Primary | Chronic | ICD-10-CM

## 2018-04-24 DIAGNOSIS — I10 ESSENTIAL HYPERTENSION: Chronic | ICD-10-CM

## 2018-04-24 DIAGNOSIS — L97.521 NEUROPATHIC ULCER OF LEFT FOOT, LIMITED TO BREAKDOWN OF SKIN: ICD-10-CM

## 2018-04-24 DIAGNOSIS — Z89.511 HX OF RIGHT BKA: Chronic | ICD-10-CM

## 2018-04-24 DIAGNOSIS — S81.809A NON-HEALING WOUND OF LOWER EXTREMITY, INITIAL ENCOUNTER: ICD-10-CM

## 2018-04-24 PROBLEM — L97.509: Status: ACTIVE | Noted: 2018-04-24

## 2018-04-24 PROCEDURE — 99215 OFFICE O/P EST HI 40 MIN: CPT | Mod: S$PBB,,, | Performed by: INTERNAL MEDICINE

## 2018-04-24 PROCEDURE — 99213 OFFICE O/P EST LOW 20 MIN: CPT | Mod: PBBFAC,PN | Performed by: INTERNAL MEDICINE

## 2018-04-24 PROCEDURE — 99999 PR PBB SHADOW E&M-EST. PATIENT-LVL III: CPT | Mod: PBBFAC,,, | Performed by: INTERNAL MEDICINE

## 2018-04-24 RX ORDER — CYCLOBENZAPRINE HCL 10 MG
10 TABLET ORAL 3 TIMES DAILY
COMMUNITY
End: 2019-01-07 | Stop reason: SDUPTHER

## 2018-04-24 RX ORDER — DIPHENHYDRAMINE HCL 25 MG
4 CAPSULE ORAL
COMMUNITY
End: 2018-05-10 | Stop reason: DRUGHIGH

## 2018-04-24 RX ORDER — BUPROPION HYDROCHLORIDE 150 MG/1
75 TABLET ORAL DAILY
Status: ON HOLD | COMMUNITY
End: 2019-01-04 | Stop reason: HOSPADM

## 2018-04-24 RX ORDER — MELOXICAM 7.5 MG/1
7.5 TABLET ORAL 2 TIMES DAILY PRN
COMMUNITY
End: 2019-02-14 | Stop reason: SDUPTHER
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
## 2018-05-02 DIAGNOSIS — L89.323 STAGE III PRESSURE ULCER OF LEFT BUTTOCK: ICD-10-CM

## 2018-05-02 DIAGNOSIS — L03.317 CELLULITIS AND ABSCESS OF BUTTOCK: Primary | ICD-10-CM

## 2018-05-02 DIAGNOSIS — L02.31 CELLULITIS AND ABSCESS OF BUTTOCK: Primary | ICD-10-CM

## 2018-05-07 ENCOUNTER — HOSPITAL ENCOUNTER (OUTPATIENT)
Dept: RADIOLOGY | Facility: HOSPITAL | Age: 43
Discharge: HOME | End: 2018-05-07
Attending: FAMILY MEDICINE
Payer: MEDICAID

## 2018-05-07 DIAGNOSIS — L89.323 STAGE III PRESSURE ULCER OF LEFT BUTTOCK: ICD-10-CM

## 2018-05-07 DIAGNOSIS — L03.317 CELLULITIS AND ABSCESS OF BUTTOCK: ICD-10-CM

## 2018-05-07 DIAGNOSIS — L02.31 CELLULITIS AND ABSCESS OF BUTTOCK: ICD-10-CM

## 2018-05-10 ENCOUNTER — CLINICAL SUPPORT (OUTPATIENT)
Dept: SMOKING CESSATION | Facility: CLINIC | Age: 43
End: 2018-05-10
Payer: COMMERCIAL

## 2018-05-10 DIAGNOSIS — F17.200 NICOTINE DEPENDENCE: Primary | ICD-10-CM

## 2018-05-10 PROCEDURE — 99404 PREV MED CNSL INDIV APPRX 60: CPT | Mod: S$GLB,,, | Performed by: GENERAL PRACTICE

## 2018-05-10 RX ORDER — NICOTINE 7MG/24HR
1 PATCH, TRANSDERMAL 24 HOURS TRANSDERMAL DAILY
Qty: 28 PATCH | Refills: 0 | Status: SHIPPED | OUTPATIENT
Start: 2018-05-10 | End: 2018-06-25 | Stop reason: SDUPTHER

## 2018-05-10 RX ORDER — DM/P-EPHED/ACETAMINOPH/DOXYLAM 30-7.5/3
2 LIQUID (ML) ORAL
Qty: 72 LOZENGE | Refills: 0 | Status: SHIPPED | OUTPATIENT
Start: 2018-05-10 | End: 2018-06-25 | Stop reason: SDUPTHER

## 2018-05-10 NOTE — Clinical Note
Patient seen today as a new intake for tobacco cessation. Patient states that he smokes 3-5 cpd. Patient started on 7 mg nicotine patch and 2 mg nicotine gum as needed in place of cigarettes.

## 2018-05-21 ENCOUNTER — INITIAL CONSULT (OUTPATIENT)
Dept: PODIATRY | Facility: CLINIC | Age: 43
End: 2018-05-21
Payer: MEDICAID

## 2018-05-21 VITALS
WEIGHT: 250 LBS | BODY MASS INDEX: 35 KG/M2 | RESPIRATION RATE: 16 BRPM | SYSTOLIC BLOOD PRESSURE: 121 MMHG | HEART RATE: 105 BPM | DIASTOLIC BLOOD PRESSURE: 80 MMHG | HEIGHT: 71 IN

## 2018-05-21 DIAGNOSIS — L97.322 ANKLE ULCER, LEFT, WITH FAT LAYER EXPOSED: ICD-10-CM

## 2018-05-21 DIAGNOSIS — G83.9 PARALYSIS: ICD-10-CM

## 2018-05-21 DIAGNOSIS — E11.49 TYPE II DIABETES MELLITUS WITH NEUROLOGICAL MANIFESTATIONS: Primary | ICD-10-CM

## 2018-05-21 DIAGNOSIS — R26.9 GAIT ABNORMALITY: ICD-10-CM

## 2018-05-21 PROCEDURE — 11042 DBRDMT SUBQ TIS 1ST 20SQCM/<: CPT | Mod: S$GLB,,, | Performed by: PODIATRIST

## 2018-05-21 PROCEDURE — 87186 SC STD MICRODIL/AGAR DIL: CPT

## 2018-05-21 PROCEDURE — 87075 CULTR BACTERIA EXCEPT BLOOD: CPT

## 2018-05-21 PROCEDURE — 87070 CULTURE OTHR SPECIMN AEROBIC: CPT

## 2018-05-21 PROCEDURE — 87077 CULTURE AEROBIC IDENTIFY: CPT

## 2018-05-21 PROCEDURE — 99203 OFFICE O/P NEW LOW 30 MIN: CPT | Mod: 25,S$GLB,, | Performed by: PODIATRIST

## 2018-05-21 NOTE — LETTER
May 21, 2018      Nadir Hyde MD PhD  [address]  [address]  [facility] [facility] [address]           [facility]  [address], [address]   [facility] [address]  Phone: [phone]  Fax: [phone]          Patient: Hermann Aguilar   MR Number: [id number]   YOB: 1975   Date of Visit: 5/21/2018       Dear Dr. Nadir Hyde:    Thank you for referring Hermann Aguilar to me for evaluation. Attached you will find relevant portions of my assessment and plan of care.    If you have questions, please do not hesitate to call me. I look forward to following Hermann Aguilar along with you.    Sincerely,    Vishal Carbajal DPM    Enclosure  CC:  No Recipients    If you would like to receive this communication electronically, please contact [email or web address] or [phone] to request more information on [facility] Link access.    For providers and/or their staff who would like to refer a patient to [facility], please contact us through our one-stop-shop provider referral line, [facility], at [phone].    If you feel you have received this communication in error or would no longer like to receive these types of communications, please e-mail [email or web address]

## 2018-05-21 NOTE — PROGRESS NOTES
"Subjective:      Patient ID: Hermann Aguilar is a 42 y.o. male.    Chief Complaint: Foot Ulcer    Hermann is a 42 y.o. male who presents to the clinic for evaluation and treatment of high risk feet. 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 The patient's chief complaint is diabetic ulcer, left ankle that has been present for about 1 year. Patient is paralyzed and bound to wheelchair. Has not had treatment for left ankle wound in the past other than some form of routine dressing changes at the nursing home. He is unaware of what they were putting on the wounds. Relates no pain or discomfort. Has not seen a podiatrist for this in the past. This patient has documented high risk feet requiring routine maintenance secondary to diabetes mellitis and those secondary complications of diabetes, as mentioned.    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 Yadiel Haley    CHEST TUBE INSERTION Right     GASTROSTOMY TUBE PLACEMENT  12/15/2016    ORIF HUMERUS FRACTURE Left 12/15/2016    TRACHEOSTOMY TUBE PLACEMENT         No family history on file.    Social History     Social History    Marital status: Single     Spouse name: N/A    Number of children: N/A    Years of education: N/A     Social History Main Topics    Smoking status: Current Every Day Smoker     Packs/day: 1.00    Smokeless tobacco: None    Alcohol use Yes      Comment: occ    Drug use: No    Sexual activity: Not Asked     Other Topics Concern    None     Social History Narrative    None       Current Outpatient Prescriptions   Medication Sig Dispense Refill    acetaminophen (TYLENOL) 650 mg/20.3 mL Soln Take 650 mg by mouth every 4 (four) hours as needed for Temperature greater than.      ascorbic acid, vitamin C, (VITAMIN C) 500 MG tablet Take 500 mg by mouth 2 (two) times daily.      aspirin (ECOTRIN) 325 MG EC tablet Take 325 mg by mouth once daily.      bacitracin 500 unit/gram Oint Apply topically 2 (two) times daily. To right stump      baclofen (LIORESAL) 5 MG tablet Take 10 mg by mouth 3 (three) times daily.       buPROPion (WELLBUTRIN XL) 150 MG TB24 tablet Take 150 mg by mouth 2 (two) times daily.      cadexomer iodine (IODOSORB) 0.9 % gel Apply topically daily as needed for Wound Care.      cholecalciferol, vitamin D3, 5,000 unit capsule Take 5,000 Units by mouth once daily.      collagenase ointment Apply topically once daily. Cleanse right posterior stump wound with normal saline.  Pat Dry. Apply skin prep to periwound.  Apply  Va to wound bed & cover with bordered gauze dressing everyday until resolved.      cyclobenzaprine (FLEXERIL) 10 MG tablet Take 10 mg by mouth 2 (two) times daily.      docusate sodium (COLACE) 100 MG capsule Take 100 mg by mouth 2 (two) times daily.      EMOLLIENT COMBINATION NO.92 (LUBRIDERM DAILY MOISTURE TOP) Apply topically once daily. Apply to left leg and foot      famotidine (PEPCID) 20 MG tablet Take 20 mg by mouth 2 (two) times daily.      ferrous sulfate 325 (65 FE) MG EC tablet Take 325 mg by mouth 2 (two) times daily.      gabapentin (NEURONTIN) 300 MG capsule Take 300 mg by mouth 3 (three) times daily.      INCONTINENCE ALARMS (MISC. DEVICES MISC) by Misc.(Non-Drug; Combo Route) route. Change sage catheter q. Month on the 20th      IPRATROPIUM/ALBUTEROL SULFATE (DUONEB INHL) Inhale into the lungs.      ketoconazole (NIZORAL) 2 % shampoo Apply topically twice a week.      melatonin 10 mg Cap Take by mouth every evening.      meloxicam (MOBIC) 7.5 MG tablet Take 7.5 mg by mouth 2 (two) times daily as needed for Pain.      metoprolol succinate (TOPROL-XL) 25 MG 24 hr tablet Take 25 mg by mouth once daily.      multivitamin with minerals tablet Take 1 tablet by mouth once daily.      polyethylene glycol (GLYCOLAX) 17 gram PwPk Take 17 g by mouth daily as needed.       PROMETHAZINE HCL (PHENERGAN ORAL) Take by mouth as needed.      protein supplement Liqd Take 30 mLs by mouth 2 (two) times daily.      senna-docusate 8.6-50 mg (PERICOLACE) 8.6-50 mg per tablet Take 1 tablet by mouth 2 (two) times daily.       nicotine (NICODERM CQ) 7 mg/24 hr Place 1 patch onto the skin once daily. Please mail to patient. Lives in nursing home. [facility] #[id number] 21 patch 0    nicotine polacrilex 2 MG Lozg Take 1 lozenge (2 mg total) by mouth as needed. 108 lozenge 0    oxycodone-acetaminophen (PERCOCET) 5-325 mg per tablet Take 1 tablet by mouth every 4 (four) hours as needed for Pain.      venlafaxine  (EFFEXOR-XR) 37.5 MG 24 hr capsule Take 37.5 mg by mouth once daily.       No current facility-administered medications for this visit.        Review of patient's allergies indicates:  No Known Allergies    Review of Systems   Constitution: Negative for chills and fever.   Cardiovascular: Positive for leg swelling. Negative for chest pain and claudication.   Respiratory: Negative for cough and shortness of breath.    Skin: Positive for poor wound healing and suspicious lesions (wound, left ankle).   Musculoskeletal:        Paralysis b/l LE, wheelchair bound   Gastrointestinal: Negative for nausea and vomiting.   Neurological: Positive for focal weakness, numbness and sensory change. Negative for paresthesias.   Psychiatric/Behavioral: Negative for altered mental status.           Objective:      Physical Exam   Constitutional: He is oriented to person, place, and time. He appears well-developed and well-nourished.   HENT:   Head: Normocephalic.   Cardiovascular: Intact distal pulses.    Pulses:       Dorsalis pedis pulses are 2+ on the right side, and 2+ on the left side.        Posterior tibial pulses are 1+ on the right side, and 1+ on the left side.   CRT < 3 sec to tips of toes. 2+ edema noted to b/l LE.      Pulmonary/Chest: No respiratory distress.   Musculoskeletal:   MMT 0/5 b/l LE, wheelchair bound. No pain with palaption or manipulation of left ankle wound.    Neurological: He is alert and oriented to person, place, and time. He has normal strength. A sensory deficit is present.   Light touch, proprioception, and sharp/dull sensation are all intact bilaterally. Protective threshold with the Lyndon-Wienstein monofilament is diminished completely bilaterally.     Skin: Skin is warm, dry and intact. No erythema.   No open lesions, lacerations or wounds noted. Nails are dystrophic but well trimmed to R 1-5 and L 1-5. Interdigital spaces clean, dry and intact b/l. No erythema noted to b/l foot. Skin texture  thin, atrophic, xerotic, hyperpigmented. Pedal hair diminished. Skin temperature normal b/l foot.     Full thickness ulceration noted to lateral left ankle  Measurement: 3.0 x 2.5 x 0.2cm   Base: 20/80 mix fibrogranular with overlying biofilm.   Periphery: hyperkeratotic, viable  Erythema: none  Drainage: serosanguinous  Undermining/tunneling: none  Odor: none  Purulence: none   Psychiatric: He has a normal mood and affect. His behavior is normal. Judgment and thought content normal.   Vitals reviewed.                  Assessment:       Encounter Diagnoses   Name Primary?    Type II diabetes mellitus with neurological manifestations Yes    Gait abnormality     Paralysis     Ankle ulcer, left, with fat layer exposed          Plan:       Hermann was seen today for foot ulcer.    Diagnoses and all orders for this visit:    Type II diabetes mellitus with neurological manifestations    Gait abnormality    Paralysis    Ankle ulcer, left, with fat layer exposed  -     Aerobic culture (Specify Source)  -     CULTURE, ANAEROBE      I counseled the patient on his conditions, their implications and medical management.        - Shoe inspection. Diabetic Foot Education. Patient reminded of the importance of good nutrition and blood sugar control to help prevent podiatric complications of diabetes. Patient instructed on proper foot hygeine.     - Wound examined and treated today. Excisional debridement preformed. No signs of infection at this time however due to chronic nature of wound (Open for ~1 year) I obtained aerobic and anaerobic cultures. Will initiate abx if cultures are positive. No SOI currently.     See separate excisional debridement procedure note.     Cleansed wound with saline, dried well. Applied mallory to wound bed. Covered with double wound foam, 2 rolls of cast padding to create a fluffy offloading type dressing around ankle. Secured with Coban.     darco shoe for further protection.     Adequate bleeding  noted from wound, no concern for PAD at this time.     Elevate and float left ankle at all times while in bed. Ok to use 2-3 pillows if necessary.     RTC 1-2 weeks for reassessment, sooner PRN

## 2018-05-21 NOTE — PROCEDURES
"Wound Debridement  Date/Time: 5/21/2018 10:54 AM  Performed by: GREG LLOYD  Authorized by: GREG LLOYD     Time out: Immediately prior to procedure a "time out" was called to verify the correct patient, procedure, equipment, support staff and site/side marked as required.    Consent Done?:  Yes (Verbal)    Preparation: Patient was prepped and draped in usual sterile fashion    Local anesthesia used?: No      Wound Details:    Location:  Left foot    Location:  Left Ankle (Lateral malleolus)    Type of Debridement:  Excisional       Length (cm):  3       Area (sq cm):  7.5       Width (cm):  2.5       Percent Debrided (%):  100       Depth (cm):  0.2       Total Area Debrided (sq cm):  7.5    Depth of debridement:  Subcutaneous tissue    Tissue debrided:  Subcutaneous, Dermis and Epidermis    Devitalized tissue debrided:  Biofilm, Callus and Fibrin    Instruments:  Curette    Bleeding:  Moderate  Hemostasis Achieved: Yes    Method Used:  Pressure  Patient tolerance:  Patient tolerated the procedure well with no immediate complications      "

## 2018-05-24 ENCOUNTER — TELEPHONE (OUTPATIENT)
Dept: PODIATRY | Facility: CLINIC | Age: 43
End: 2018-05-24

## 2018-05-24 ENCOUNTER — CLINICAL SUPPORT (OUTPATIENT)
Dept: SMOKING CESSATION | Facility: CLINIC | Age: 43
End: 2018-05-24
Payer: COMMERCIAL

## 2018-05-24 DIAGNOSIS — F17.200 NICOTINE DEPENDENCE: Primary | ICD-10-CM

## 2018-05-24 LAB — BACTERIA SPEC AEROBE CULT: NORMAL

## 2018-05-24 PROCEDURE — 90853 GROUP PSYCHOTHERAPY: CPT | Mod: S$GLB,,, | Performed by: GENERAL PRACTICE

## 2018-05-24 RX ORDER — DOXYCYCLINE HYCLATE 100 MG
100 TABLET ORAL 2 TIMES DAILY WITH MEALS
Qty: 28 TABLET | Refills: 0 | Status: SHIPPED | OUTPATIENT
Start: 2018-05-24 | End: 2018-06-07

## 2018-05-24 NOTE — TELEPHONE ENCOUNTER
----- Message from Vishal Carbajal DPM sent at 5/24/2018 12:05 PM CDT -----  Just called in antibiotics for patient who grew out bacteria from his ankle wound. I called it into his [facility]. Can we call him and let him know that his antibiotics are in.

## 2018-05-24 NOTE — PROGRESS NOTES
Site: [facility]  Date:  5/24/2018  Clinical Status of Patient: Outpatient   Length of Service and Code: 60 minutes - 24374   Number in Attendance: 2  Group Activities/Focus of Group: Completion of TCRS (Tobacco Cessation Rating Scale) learned addiction model, cues/triggers, personal reasons for quitting, medications, goals, quit date.    Target symptoms:  withdrawal and medication side effects             The following were rated moderate (3) to severe (4) on TCRS:       Moderate 3: None     Severe 4:   Angry, irritable, frustrated; increased appetite, insomnia  Patient's Response to Intervention: Patient states that he has not smoked a cigarette in 3 days, but only because he ran out and did not have a way to get any. Patient lives in a nursing home and expresses this is the reason for his frustration. Patient states that he still has not received ordered NRT from pharmacy. Pharmacy stated that they were unable to reach patient when called. Patient was able to speak with pharmacy during this visit and confirmed address to have medication delivered. CO= 8 ppm. Patient states that he was just around people smoking before he came to his appointment.   Progress Toward Goals and Other Mental Status Changes: Patient denies any behavioral or mental status changes.       Diagnosis: Z72.0  Plan: The patient will continue with group therapy sessions and medication regimen prescribed with management by physician or Cessation Clinic Provider. Patient will inform Smoking Clinic Cessation Counselor of symptoms as rated high on TCRS.    Return to Clinic: 2 weeks

## 2018-05-24 NOTE — Clinical Note
Patient states that he has not smoked a cigarette in 3 days, but only because he ran out and did not have a way to get any. Patient lives in a nursing home and expresses this is the reason for his frustration. Patient states that he still has not received ordered NRT from pharmacy. Pharmacy stated that they were unable to reach patient when called. Patient was able to speak with pharmacy during this visit and confirmed address to have medication delivered.

## 2018-05-25 LAB — BACTERIA SPEC ANAEROBE CULT: NORMAL

## 2018-05-28 ENCOUNTER — TELEPHONE (OUTPATIENT)
Dept: PODIATRY | Facility: CLINIC | Age: 43
End: 2018-05-28

## 2018-05-28 NOTE — TELEPHONE ENCOUNTER
----- Message from Vishal Carbajal DPM sent at 5/28/2018  8:15 AM CDT -----  Contact: [facility]   Can we find out what is going on? There is no order for medication just prescription so I am not sure what they are talking about. Thanks.       ----- Message -----  From: Pat Fofana MA  Sent: 5/25/2018   2:37 PM  To: Vishal Carbajal DPM        ----- Message -----  From: Essie Breen  Sent: 5/25/2018  12:30 PM  To: [facility] Staff    Called stating that they have received the prescription for doxycycline (VIBRA-TABS) 100 MG tablet for this patient from their pharmacy but did not receive and order for it. Please Contact [facility] at [phone]

## 2018-06-07 ENCOUNTER — CLINICAL SUPPORT (OUTPATIENT)
Dept: SMOKING CESSATION | Facility: CLINIC | Age: 43
End: 2018-06-07
Payer: COMMERCIAL

## 2018-06-07 DIAGNOSIS — F17.200 NICOTINE DEPENDENCE: Primary | ICD-10-CM

## 2018-06-07 PROCEDURE — 99404 PREV MED CNSL INDIV APPRX 60: CPT | Mod: S$GLB,,, | Performed by: GENERAL PRACTICE

## 2018-06-07 NOTE — Clinical Note
Completion of TCRS (Tobacco Cessation Rating Scale) reviewed strategies, cues, and triggers. Introduced the negative impact of tobacco on health, the health advantages of discontinuing the use of tobacco, time line improved health changes after a quit, withdrawal issues to expect from nicotine and habit, and ways to achieve the goal of a quit. Patient states that he is smoking 1-2 cpd. Patient states that he still has not been given medication yet. Patient ordered on 14 mg nicotine patch and 2 mg nicotine lozenge as needed in place of cigarettes. CO= 7 ppm with having had last cigarette 30 minutes ago.

## 2018-06-09 NOTE — PROGRESS NOTES
Individual Follow-Up Form    6/7/2018    Quit Date: N/A    Clinical Status of Patient: Outpatient    Length of Service: 60 minutes    Continuing Medication: yes  Nicotine Lozenges    Other Medications: Patches     Target Symptoms: Withdrawal and medication side effects. The following were  rated moderate (3) to severe (4) on TCRS:  · Moderate (3): Desire or crave tobacco  · Severe (4): None    Comments: Completion of TCRS (Tobacco Cessation Rating Scale) reviewed strategies, cues, and triggers. Introduced the negative impact of tobacco on health, the health advantages of discontinuing the use of tobacco, time line improved health changes after a quit, withdrawal issues to expect from nicotine and habit, and ways to achieve the goal of a quit. Patient states that he is smoking 1-2 cpd. Patient states that he still has not been given medication yet. Patient ordered on 14 mg nicotine patch and 2 mg nicotine lozenge as needed in place of cigarettes. CO= 7 ppm with having had last cigarette 30 minutes ago.     Diagnosis: F17.210    Next Visit: 2 weeks

## 2018-06-25 RX ORDER — NICOTINE 7MG/24HR
1 PATCH, TRANSDERMAL 24 HOURS TRANSDERMAL DAILY
Qty: 28 PATCH | Refills: 0 | Status: ON HOLD | OUTPATIENT
Start: 2018-06-25 | End: 2019-01-04 | Stop reason: HOSPADM

## 2018-06-25 RX ORDER — DM/P-EPHED/ACETAMINOPH/DOXYLAM 30-7.5/3
2 LIQUID (ML) ORAL
Qty: 72 LOZENGE | Refills: 0 | Status: ON HOLD | OUTPATIENT
Start: 2018-06-25 | End: 2019-01-04 | Stop reason: HOSPADM

## 2018-07-03 ENCOUNTER — OUTSIDE PLACE OF SERVICE (OUTPATIENT)
Dept: ADMINISTRATIVE | Facility: OTHER | Age: 43
End: 2018-07-03
Payer: MEDICAID

## 2018-07-03 PROCEDURE — 99308 SBSQ NF CARE LOW MDM 20: CPT | Mod: ,,, | Performed by: FAMILY MEDICINE

## 2018-07-05 ENCOUNTER — CLINICAL SUPPORT (OUTPATIENT)
Dept: SMOKING CESSATION | Facility: CLINIC | Age: 43
End: 2018-07-05
Payer: COMMERCIAL

## 2018-07-05 DIAGNOSIS — F17.200 NICOTINE DEPENDENCE: Primary | ICD-10-CM

## 2018-07-05 PROCEDURE — 99404 PREV MED CNSL INDIV APPRX 60: CPT | Mod: S$GLB,,, | Performed by: GENERAL PRACTICE

## 2018-07-05 NOTE — Clinical Note
Patient states that he is smoking 1-2 cpd. Patient currently on 7 mg nicotine patch QD and 2 mg nicotine lozenge as needed in place of cigarettes.

## 2018-07-06 NOTE — PROGRESS NOTES
Individual Follow-Up Form    7/5/2018    Quit Date: TBD    Clinical Status of Patient: Outpatient    Length of Service: 60 minutes    Continuing Medication: yes  Nicotine Lozenges    Other Medications: Patches     Target Symptoms: Withdrawal and medication side effects. The following were rated moderate (3) to severe (4) on TCRS:  · Moderate (3): None  · Severe (4): None    Comments: Completion of TCRS (Tobacco Cessation Rating Scale) reviewed strategies, controlling environment, cues, triggers, new goals set. Introduced high risk situations with preparation interventions, caffeine similarities with withdrawal issues of habit and nicotine, Alcohol, Understanding urges, cravings, stress and relaxation. Patient states that he is smoking 1-2 cpd. Patient currently using 7 mg nicotine patch QD and 2 mg nicotine lozenge as needed in place of cigarettes.     Diagnosis: F17.210    Next Visit: 2 weeks

## 2018-07-10 ENCOUNTER — TELEPHONE (OUTPATIENT)
Dept: FAMILY MEDICINE | Facility: CLINIC | Age: 43
End: 2018-07-10

## 2018-07-19 ENCOUNTER — CLINICAL SUPPORT (OUTPATIENT)
Dept: SMOKING CESSATION | Facility: CLINIC | Age: 43
End: 2018-07-19
Payer: COMMERCIAL

## 2018-07-19 DIAGNOSIS — F17.200 NICOTINE DEPENDENCE: Primary | ICD-10-CM

## 2018-07-19 PROCEDURE — 99404 PREV MED CNSL INDIV APPRX 60: CPT | Mod: S$GLB,,, | Performed by: GENERAL PRACTICE

## 2018-07-19 NOTE — Clinical Note
Completion of TCRS (Tobacco Cessation Rating Scale) reviewed strategies, controlling environment, cues, triggers, new goals set. Introduced high risk situations with preparation interventions, caffeine similarities with withdrawal issues of habit and nicotine, Alcohol, Understanding urges, cravings, stress and relaxation. Patient states that he is smoking 1-3 cpd. Patient currently ordered on 7 mg nicotine patch QD and 2 mg nicotine gum as needed. CO= 5 ppm with having had last cigarette yesterday morning.

## 2018-07-24 NOTE — PROGRESS NOTES
Individual Follow-Up Form    7/19/2018    Quit Date:TBD    Clinical Status of Patient: Outpatient    Length of Service: 60 minutes    Continuing Medication: yes  Nicotine Lozenges    Other Medications: Patches     Target Symptoms: Withdrawal and medication side effects. The following were  rated moderate (3) to severe (4) on TCRS:  · Moderate (3): Desire or crave tobacco  · Severe (4): Anxious, Nervous; Depressed mood, Sad (Patient states ready to go home)    Comments: Completion of TCRS (Tobacco Cessation Rating Scale) reviewed strategies, controlling environment, cues, triggers, new goals set. Introduced high risk situations with preparation interventions, caffeine similarities with withdrawal issues of habit and nicotine, Alcohol, Understanding urges, cravings, stress and relaxation. Patient states that he is smoking 1-3 cpd. Patient currently ordered on 7 mg nicotine patch QD and 2 mg nicotine gum as needed. CO= 5 ppm with having had last cigarette yesterday morning.     Diagnosis: F17.210    Next Visit: 2 weeks

## 2018-07-30 ENCOUNTER — CLINICAL SUPPORT (OUTPATIENT)
Dept: SMOKING CESSATION | Facility: CLINIC | Age: 43
End: 2018-07-30
Payer: COMMERCIAL

## 2018-07-30 DIAGNOSIS — F17.200 NICOTINE DEPENDENCE: Primary | ICD-10-CM

## 2018-07-30 PROCEDURE — 99407 BEHAV CHNG SMOKING > 10 MIN: CPT | Mod: S$GLB,,,

## 2018-07-30 NOTE — PROGRESS NOTES
Spoke with patient today in regards to smoking cessation progress for 3 month follow up,  states not tobacco free at this time, but currently in program working on his quit. Informed patient of benefit period, future phone follow ups, and contact information. Will complete 3 month  smart form for Quit attempt #1.

## 2018-08-02 ENCOUNTER — CLINICAL SUPPORT (OUTPATIENT)
Dept: SMOKING CESSATION | Facility: CLINIC | Age: 43
End: 2018-08-02
Payer: COMMERCIAL

## 2018-08-02 DIAGNOSIS — F17.200 NICOTINE DEPENDENCE: Primary | ICD-10-CM

## 2018-08-02 PROCEDURE — 99404 PREV MED CNSL INDIV APPRX 60: CPT | Mod: S$GLB,,, | Performed by: GENERAL PRACTICE

## 2018-08-02 NOTE — PROGRESS NOTES
Individual Follow-Up Form    8/2/2018    Quit Date: TBD    Clinical Status of Patient: Outpatient    Length of Service: 60 minutes    Continuing Medication: yes  Patches    Other Medications: Nicotine gum     Target Symptoms: Withdrawal and medication side effects. The following were  rated moderate (3) to severe (4) on TCRS:  · Moderate (3): None  · Severe (4): Angry, Irritable, Frustrated; Increased appetite; Depressed Mood, Sad; Agitated or Worked up (Pt. states that he is ready to leave nursing home)    Comments: Completion of TCRS (Tobacco Cessation Rating Scale) reviewed strategies, habitual behavior, stress, and high risk situations. Introduced stress with addition interventions, SOLVE, relaxation with interventions, nutrition, exercise, weight gain, and the importance of rewarding oneself for accomplishments toward becoming tobacco free. Open discussion of all items with interventions. Patient states that he had not smoke a cigarette in two weeks, but lapsed yesterday with one cigarette. Patient is currently using 7 mg nicotine patch QD and 2 mg nicotine gum as needed in place of cigarettes. CO = 5 ppm.     Diagnosis: F17.210    Next Visit: 2 weeks

## 2018-08-02 NOTE — Clinical Note
Patient states that he had not smoke a cigarette in two weeks, but lapsed yesterday with one cigarette. Patient is currently using 7 mg nicotine patch QD and 2 mg nicotine gum as needed in place of cigarettes. CO = 5 ppm.

## 2018-08-16 ENCOUNTER — TELEPHONE (OUTPATIENT)
Dept: SMOKING CESSATION | Facility: CLINIC | Age: 43
End: 2018-08-16

## 2018-08-16 ENCOUNTER — CLINICAL SUPPORT (OUTPATIENT)
Dept: SMOKING CESSATION | Facility: CLINIC | Age: 43
End: 2018-08-16
Payer: COMMERCIAL

## 2018-08-16 DIAGNOSIS — F17.200 NICOTINE DEPENDENCE: Primary | ICD-10-CM

## 2018-08-16 PROCEDURE — 90853 GROUP PSYCHOTHERAPY: CPT | Mod: S$GLB,,, | Performed by: GENERAL PRACTICE

## 2018-08-16 NOTE — Clinical Note
Patient states that he smoked one cigarette yesterday, but had not had a cigarette since his last visit 2 weeks ago. Patient advised to keep it going and not to  another cigarette to smoke. Advised patient to focus his efforts on new things and not to go into smoke area. CO= 3 ppm with having had last cigarette 1 day ago.

## 2018-08-16 NOTE — PROGRESS NOTES
Site: [facility]  Date:  8/16/2018  Clinical Status of Patient: Outpatient   Length of Service and Code: 60 minutes - 82746   Number in Attendance: 2  Group Activities/Focus of Group: Completion of TCRS (Tobacco Cessation Rating Scale) learned addiction model, cues/triggers, personal reasons for quitting, medications, goals, quit date.    Target symptoms:  withdrawal and medication side effects             The following were rated moderate (3) to severe (4) on TCRS:       Moderate 3: None     Severe 4:  Angry, Irritable, Frustrated; Increased Appetite/Hunger; Depressed Mood, Sad; Insomnia; Restless (Patient states that he is just ready to leave nursing home)    Patient's Response to Intervention: Patient states that he smoked one cigarette yesterday, but had not had a cigarette since his last visit 2 weeks ago. Patient advised to keep it going and not to  another cigarette to smoke. Advised patient to focus his efforts on new things and not to go into smoke area. CO= 3 ppm with having had last cigarette 1 day ago.     Progress Toward Goals and Other Mental Status Changes: Patient denies any behavioral or mental status changes.      Diagnosis: Z72.0  Plan: The patient will continue with group therapy sessions and medication regimen prescribed with management by physician or Cessation Clinic Provider. Patient will inform Smoking Clinic Cessation Counselor of symptoms as rated high on TCRS.    Return to Clinic: 2 weeks

## 2018-08-28 ENCOUNTER — HOSPITAL ENCOUNTER (INPATIENT)
Facility: HOSPITAL | Age: 43
LOS: 3 days | Discharge: NURSING, REHAB OR LONG-TERM CARE | DRG: 669 | End: 2018-08-31
Attending: EMERGENCY MEDICINE | Admitting: HOSPITALIST
Payer: MEDICAID

## 2018-08-28 DIAGNOSIS — Z93.59 CHRONIC SUPRAPUBIC CATHETER: Chronic | ICD-10-CM

## 2018-08-28 DIAGNOSIS — R31.9 HEMATURIA, UNSPECIFIED TYPE: Primary | ICD-10-CM

## 2018-08-28 DIAGNOSIS — T83.091A OBSTRUCTION OF FOLEY CATHETER, INITIAL ENCOUNTER: ICD-10-CM

## 2018-08-28 DIAGNOSIS — R31.0 GROSS HEMATURIA: ICD-10-CM

## 2018-08-28 PROBLEM — N39.0 URINARY TRACT INFECTION ASSOCIATED WITH INDWELLING URETHRAL CATHETER: Status: RESOLVED | Noted: 2017-02-11 | Resolved: 2018-08-28

## 2018-08-28 PROBLEM — T83.511A URINARY TRACT INFECTION ASSOCIATED WITH INDWELLING URETHRAL CATHETER: Status: RESOLVED | Noted: 2017-02-11 | Resolved: 2018-08-28

## 2018-08-28 PROBLEM — I73.9 PAD (PERIPHERAL ARTERY DISEASE): Chronic | Status: ACTIVE | Noted: 2018-03-27

## 2018-08-28 LAB
ALBUMIN SERPL BCP-MCNC: 3.2 G/DL
ALP SERPL-CCNC: 173 U/L
ALT SERPL W/O P-5'-P-CCNC: 12 U/L
ANION GAP SERPL CALC-SCNC: 10 MMOL/L
AST SERPL-CCNC: 14 U/L
BACTERIA #/AREA URNS HPF: ABNORMAL /HPF
BASOPHILS # BLD AUTO: 0.03 K/UL
BASOPHILS NFR BLD: 0.2 %
BILIRUB SERPL-MCNC: 0.3 MG/DL
BILIRUB UR QL STRIP: NEGATIVE
BUN SERPL-MCNC: 14 MG/DL
CALCIUM SERPL-MCNC: 9.9 MG/DL
CHLORIDE SERPL-SCNC: 107 MMOL/L
CLARITY UR: CLEAR
CO2 SERPL-SCNC: 23 MMOL/L
COLOR UR: YELLOW
CREAT SERPL-MCNC: 1.2 MG/DL
DIFFERENTIAL METHOD: ABNORMAL
EOSINOPHIL # BLD AUTO: 0.2 K/UL
EOSINOPHIL NFR BLD: 1.1 %
ERYTHROCYTE [DISTWIDTH] IN BLOOD BY AUTOMATED COUNT: 16.2 %
EST. GFR  (AFRICAN AMERICAN): >60 ML/MIN/1.73 M^2
EST. GFR  (NON AFRICAN AMERICAN): >60 ML/MIN/1.73 M^2
GLUCOSE SERPL-MCNC: 129 MG/DL
GLUCOSE UR QL STRIP: NEGATIVE
HCT VFR BLD AUTO: 34.9 %
HGB BLD-MCNC: 11.4 G/DL
HGB UR QL STRIP: ABNORMAL
HYALINE CASTS #/AREA URNS LPF: 0 /LPF
KETONES UR QL STRIP: NEGATIVE
LEUKOCYTE ESTERASE UR QL STRIP: NEGATIVE
LYMPHOCYTES # BLD AUTO: 1 K/UL
LYMPHOCYTES NFR BLD: 5.6 %
MCH RBC QN AUTO: 27.7 PG
MCHC RBC AUTO-ENTMCNC: 32.7 G/DL
MCV RBC AUTO: 85 FL
MICROSCOPIC COMMENT: ABNORMAL
MONOCYTES # BLD AUTO: 1.5 K/UL
MONOCYTES NFR BLD: 8.6 %
NEUTROPHILS # BLD AUTO: 14.3 K/UL
NEUTROPHILS NFR BLD: 84.1 %
NITRITE UR QL STRIP: NEGATIVE
PH UR STRIP: 7 [PH] (ref 5–8)
PLATELET # BLD AUTO: 383 K/UL
PMV BLD AUTO: 10 FL
POTASSIUM SERPL-SCNC: 4.6 MMOL/L
PROT SERPL-MCNC: 8.2 G/DL
PROT UR QL STRIP: ABNORMAL
RBC # BLD AUTO: 4.11 M/UL
RBC #/AREA URNS HPF: >100 /HPF (ref 0–4)
SODIUM SERPL-SCNC: 140 MMOL/L
SP GR UR STRIP: 1.02 (ref 1–1.03)
URN SPEC COLLECT METH UR: ABNORMAL
UROBILINOGEN UR STRIP-ACNC: NEGATIVE EU/DL
WBC # BLD AUTO: 17.01 K/UL
WBC #/AREA URNS HPF: 2 /HPF (ref 0–5)

## 2018-08-28 PROCEDURE — 99284 EMERGENCY DEPT VISIT MOD MDM: CPT | Mod: 25

## 2018-08-28 PROCEDURE — 25000003 PHARM REV CODE 250: Performed by: EMERGENCY MEDICINE

## 2018-08-28 PROCEDURE — 96365 THER/PROPH/DIAG IV INF INIT: CPT

## 2018-08-28 PROCEDURE — 81000 URINALYSIS NONAUTO W/SCOPE: CPT

## 2018-08-28 PROCEDURE — 96361 HYDRATE IV INFUSION ADD-ON: CPT

## 2018-08-28 PROCEDURE — 12000002 HC ACUTE/MED SURGE SEMI-PRIVATE ROOM

## 2018-08-28 PROCEDURE — 85025 COMPLETE CBC W/AUTO DIFF WBC: CPT

## 2018-08-28 PROCEDURE — 80053 COMPREHEN METABOLIC PANEL: CPT

## 2018-08-28 RX ADMIN — SODIUM CHLORIDE 1000 ML: 0.9 INJECTION, SOLUTION INTRAVENOUS at 10:08

## 2018-08-29 PROBLEM — E87.6 HYPOKALEMIA: Status: RESOLVED | Noted: 2017-02-16 | Resolved: 2018-08-29

## 2018-08-29 PROBLEM — E78.1 PURE HYPERGLYCERIDEMIA: Status: ACTIVE | Noted: 2018-08-29

## 2018-08-29 PROBLEM — K59.00 CONSTIPATION: Status: ACTIVE | Noted: 2018-08-29

## 2018-08-29 PROBLEM — G54.7 PHANTOM LIMB SYNDROME: Status: ACTIVE | Noted: 2018-08-29

## 2018-08-29 PROBLEM — E83.42 HYPOMAGNESEMIA: Status: RESOLVED | Noted: 2017-02-12 | Resolved: 2018-08-29

## 2018-08-29 PROBLEM — T83.090A: Status: ACTIVE | Noted: 2018-08-29

## 2018-08-29 PROBLEM — R31.0 GROSS HEMATURIA: Status: ACTIVE | Noted: 2018-08-29

## 2018-08-29 PROBLEM — E11.9 TYPE 2 DIABETES MELLITUS: Status: ACTIVE | Noted: 2018-08-29

## 2018-08-29 PROBLEM — Z72.0 TOBACCO ABUSE DISORDER: Status: ACTIVE | Noted: 2018-08-29

## 2018-08-29 PROBLEM — Z93.59 CHRONIC SUPRAPUBIC CATHETER: Chronic | Status: ACTIVE | Noted: 2017-02-11

## 2018-08-29 PROBLEM — K21.9 GASTROESOPHAGEAL REFLUX DISEASE WITHOUT ESOPHAGITIS: Status: ACTIVE | Noted: 2018-08-29

## 2018-08-29 PROBLEM — L97.929: Status: ACTIVE | Noted: 2018-08-29

## 2018-08-29 PROBLEM — F32.9 MAJOR DEPRESSIVE DISORDER: Status: ACTIVE | Noted: 2018-08-29

## 2018-08-29 LAB
BASOPHILS # BLD AUTO: 0.03 K/UL
BASOPHILS NFR BLD: 0.2 %
DIFFERENTIAL METHOD: ABNORMAL
EOSINOPHIL # BLD AUTO: 0.3 K/UL
EOSINOPHIL NFR BLD: 1.8 %
ERYTHROCYTE [DISTWIDTH] IN BLOOD BY AUTOMATED COUNT: 15.9 %
ESTIMATED AVG GLUCOSE: 197 MG/DL
HBA1C MFR BLD HPLC: 8.5 %
HCT VFR BLD AUTO: 31.2 %
HGB BLD-MCNC: 10.1 G/DL
LYMPHOCYTES # BLD AUTO: 2.2 K/UL
LYMPHOCYTES NFR BLD: 13.6 %
MCH RBC QN AUTO: 27.7 PG
MCHC RBC AUTO-ENTMCNC: 32.4 G/DL
MCV RBC AUTO: 86 FL
MONOCYTES # BLD AUTO: 1.7 K/UL
MONOCYTES NFR BLD: 10.6 %
NEUTROPHILS # BLD AUTO: 11.7 K/UL
NEUTROPHILS NFR BLD: 73.5 %
PLATELET # BLD AUTO: 424 K/UL
PMV BLD AUTO: 10.8 FL
POCT GLUCOSE: 127 MG/DL (ref 70–110)
POCT GLUCOSE: 159 MG/DL (ref 70–110)
POCT GLUCOSE: 176 MG/DL (ref 70–110)
POCT GLUCOSE: 186 MG/DL (ref 70–110)
RBC # BLD AUTO: 3.65 M/UL
WBC # BLD AUTO: 15.85 K/UL

## 2018-08-29 PROCEDURE — 87086 URINE CULTURE/COLONY COUNT: CPT

## 2018-08-29 PROCEDURE — G0378 HOSPITAL OBSERVATION PER HR: HCPCS

## 2018-08-29 PROCEDURE — 85025 COMPLETE CBC W/AUTO DIFF WBC: CPT

## 2018-08-29 PROCEDURE — 51705 CHANGE OF BLADDER TUBE: CPT | Mod: ,,, | Performed by: STUDENT IN AN ORGANIZED HEALTH CARE EDUCATION/TRAINING PROGRAM

## 2018-08-29 PROCEDURE — 36415 COLL VENOUS BLD VENIPUNCTURE: CPT

## 2018-08-29 PROCEDURE — 25000003 PHARM REV CODE 250: Performed by: STUDENT IN AN ORGANIZED HEALTH CARE EDUCATION/TRAINING PROGRAM

## 2018-08-29 PROCEDURE — 25000003 PHARM REV CODE 250: Performed by: EMERGENCY MEDICINE

## 2018-08-29 PROCEDURE — 11000001 HC ACUTE MED/SURG PRIVATE ROOM

## 2018-08-29 PROCEDURE — 25000003 PHARM REV CODE 250: Performed by: PHYSICIAN ASSISTANT

## 2018-08-29 PROCEDURE — 63600175 PHARM REV CODE 636 W HCPCS: Performed by: EMERGENCY MEDICINE

## 2018-08-29 PROCEDURE — 83036 HEMOGLOBIN GLYCOSYLATED A1C: CPT

## 2018-08-29 PROCEDURE — 94761 N-INVAS EAR/PLS OXIMETRY MLT: CPT

## 2018-08-29 PROCEDURE — 63600175 PHARM REV CODE 636 W HCPCS: Performed by: PHYSICIAN ASSISTANT

## 2018-08-29 PROCEDURE — 99232 SBSQ HOSP IP/OBS MODERATE 35: CPT | Mod: 25,,, | Performed by: STUDENT IN AN ORGANIZED HEALTH CARE EDUCATION/TRAINING PROGRAM

## 2018-08-29 RX ORDER — SODIUM CHLORIDE 9 MG/ML
INJECTION, SOLUTION INTRAVENOUS CONTINUOUS
Status: DISCONTINUED | OUTPATIENT
Start: 2018-08-29 | End: 2018-08-31 | Stop reason: HOSPADM

## 2018-08-29 RX ORDER — SODIUM CHLORIDE 0.9 % (FLUSH) 0.9 %
5 SYRINGE (ML) INJECTION
Status: DISCONTINUED | OUTPATIENT
Start: 2018-08-29 | End: 2018-08-31 | Stop reason: HOSPADM

## 2018-08-29 RX ORDER — POLYETHYLENE GLYCOL 3350 17 G/17G
17 POWDER, FOR SOLUTION ORAL DAILY PRN
Status: DISCONTINUED | OUTPATIENT
Start: 2018-08-29 | End: 2018-08-31 | Stop reason: HOSPADM

## 2018-08-29 RX ORDER — METOPROLOL TARTRATE 25 MG/1
12.5 TABLET ORAL 2 TIMES DAILY
COMMUNITY
End: 2019-01-07

## 2018-08-29 RX ORDER — PHENTERMINE HYDROCHLORIDE 37.5 MG/1
37.5 TABLET ORAL
COMMUNITY
Start: 2018-08-08 | End: 2018-11-08

## 2018-08-29 RX ORDER — GEMFIBROZIL 600 MG/1
600 TABLET, FILM COATED ORAL
Status: DISCONTINUED | OUTPATIENT
Start: 2018-08-29 | End: 2018-08-31 | Stop reason: HOSPADM

## 2018-08-29 RX ORDER — FERROUS SULFATE 325(65) MG
325 TABLET, DELAYED RELEASE (ENTERIC COATED) ORAL 2 TIMES DAILY
Status: DISCONTINUED | OUTPATIENT
Start: 2018-08-29 | End: 2018-08-31 | Stop reason: HOSPADM

## 2018-08-29 RX ORDER — CIPROFLOXACIN 500 MG/1
500 TABLET ORAL
Status: ON HOLD | COMMUNITY
Start: 2018-08-28 | End: 2018-08-31 | Stop reason: HOSPADM

## 2018-08-29 RX ORDER — INSULIN ASPART 100 [IU]/ML
1-10 INJECTION, SOLUTION INTRAVENOUS; SUBCUTANEOUS
Status: DISCONTINUED | OUTPATIENT
Start: 2018-08-29 | End: 2018-08-31 | Stop reason: HOSPADM

## 2018-08-29 RX ORDER — METFORMIN HYDROCHLORIDE 500 MG/1
500 TABLET ORAL 2 TIMES DAILY WITH MEALS
COMMUNITY
End: 2019-01-07 | Stop reason: SDUPTHER

## 2018-08-29 RX ORDER — VENLAFAXINE HYDROCHLORIDE 75 MG/1
75 CAPSULE, EXTENDED RELEASE ORAL DAILY
Status: DISCONTINUED | OUTPATIENT
Start: 2018-08-29 | End: 2018-08-31 | Stop reason: HOSPADM

## 2018-08-29 RX ORDER — FAMOTIDINE 20 MG/1
20 TABLET, FILM COATED ORAL 2 TIMES DAILY
Status: DISCONTINUED | OUTPATIENT
Start: 2018-08-29 | End: 2018-08-31 | Stop reason: HOSPADM

## 2018-08-29 RX ORDER — ACETAMINOPHEN 325 MG/1
650 TABLET ORAL EVERY 6 HOURS PRN
Status: DISCONTINUED | OUTPATIENT
Start: 2018-08-29 | End: 2018-08-31 | Stop reason: HOSPADM

## 2018-08-29 RX ORDER — ONDANSETRON 8 MG/1
8 TABLET, ORALLY DISINTEGRATING ORAL EVERY 8 HOURS PRN
Status: DISCONTINUED | OUTPATIENT
Start: 2018-08-29 | End: 2018-08-31 | Stop reason: HOSPADM

## 2018-08-29 RX ORDER — IBUPROFEN 200 MG
16 TABLET ORAL
Status: DISCONTINUED | OUTPATIENT
Start: 2018-08-29 | End: 2018-08-31 | Stop reason: HOSPADM

## 2018-08-29 RX ORDER — BACLOFEN 10 MG/1
20 TABLET ORAL 3 TIMES DAILY
Status: DISCONTINUED | OUTPATIENT
Start: 2018-08-29 | End: 2018-08-31 | Stop reason: HOSPADM

## 2018-08-29 RX ORDER — DOCUSATE SODIUM 100 MG/1
100 CAPSULE, LIQUID FILLED ORAL 2 TIMES DAILY
Status: DISCONTINUED | OUTPATIENT
Start: 2018-08-29 | End: 2018-08-31 | Stop reason: HOSPADM

## 2018-08-29 RX ORDER — GABAPENTIN 300 MG/1
300 CAPSULE ORAL 2 TIMES DAILY
Status: DISCONTINUED | OUTPATIENT
Start: 2018-08-29 | End: 2018-08-31 | Stop reason: HOSPADM

## 2018-08-29 RX ORDER — GLUCAGON 1 MG
1 KIT INJECTION
Status: DISCONTINUED | OUTPATIENT
Start: 2018-08-29 | End: 2018-08-31 | Stop reason: HOSPADM

## 2018-08-29 RX ORDER — METOPROLOL TARTRATE 25 MG/1
12.5 TABLET ORAL 2 TIMES DAILY
Status: DISCONTINUED | OUTPATIENT
Start: 2018-08-29 | End: 2018-08-31 | Stop reason: HOSPADM

## 2018-08-29 RX ORDER — BUPROPION HYDROCHLORIDE 150 MG/1
150 TABLET ORAL 2 TIMES DAILY
Status: DISCONTINUED | OUTPATIENT
Start: 2018-08-29 | End: 2018-08-31 | Stop reason: HOSPADM

## 2018-08-29 RX ORDER — ASCORBIC ACID 500 MG
500 TABLET ORAL 2 TIMES DAILY
Status: DISCONTINUED | OUTPATIENT
Start: 2018-08-29 | End: 2018-08-31 | Stop reason: HOSPADM

## 2018-08-29 RX ORDER — AMOXICILLIN 250 MG
1 CAPSULE ORAL 2 TIMES DAILY
Status: DISCONTINUED | OUTPATIENT
Start: 2018-08-29 | End: 2018-08-31 | Stop reason: HOSPADM

## 2018-08-29 RX ORDER — IBUPROFEN 200 MG
24 TABLET ORAL
Status: DISCONTINUED | OUTPATIENT
Start: 2018-08-29 | End: 2018-08-31 | Stop reason: HOSPADM

## 2018-08-29 RX ORDER — CYCLOBENZAPRINE HCL 10 MG
10 TABLET ORAL 3 TIMES DAILY
Status: DISCONTINUED | OUTPATIENT
Start: 2018-08-29 | End: 2018-08-31 | Stop reason: HOSPADM

## 2018-08-29 RX ORDER — GEMFIBROZIL 600 MG/1
600 TABLET, FILM COATED ORAL
COMMUNITY
End: 2019-01-07 | Stop reason: SDUPTHER

## 2018-08-29 RX ADMIN — VENLAFAXINE HYDROCHLORIDE 75 MG: 75 CAPSULE, EXTENDED RELEASE ORAL at 02:08

## 2018-08-29 RX ADMIN — GEMFIBROZIL 600 MG: 600 TABLET ORAL at 05:08

## 2018-08-29 RX ADMIN — INSULIN ASPART 1 UNITS: 100 INJECTION, SOLUTION INTRAVENOUS; SUBCUTANEOUS at 10:08

## 2018-08-29 RX ADMIN — DOCUSATE SODIUM 100 MG: 100 CAPSULE, LIQUID FILLED ORAL at 09:08

## 2018-08-29 RX ADMIN — Medication: at 05:08

## 2018-08-29 RX ADMIN — OXYCODONE HYDROCHLORIDE AND ACETAMINOPHEN 500 MG: 500 TABLET ORAL at 09:08

## 2018-08-29 RX ADMIN — BUPROPION HYDROCHLORIDE 150 MG: 150 TABLET, FILM COATED, EXTENDED RELEASE ORAL at 09:08

## 2018-08-29 RX ADMIN — SODIUM CHLORIDE 1000 ML: 0.9 INJECTION, SOLUTION INTRAVENOUS at 12:08

## 2018-08-29 RX ADMIN — SENNOSIDES AND DOCUSATE SODIUM 1 TABLET: 8.6; 5 TABLET ORAL at 09:08

## 2018-08-29 RX ADMIN — GABAPENTIN 300 MG: 300 CAPSULE ORAL at 09:08

## 2018-08-29 RX ADMIN — CYCLOBENZAPRINE HYDROCHLORIDE 10 MG: 10 TABLET, FILM COATED ORAL at 09:08

## 2018-08-29 RX ADMIN — CEFTRIAXONE 1 G: 1 INJECTION, SOLUTION INTRAVENOUS at 02:08

## 2018-08-29 RX ADMIN — CYCLOBENZAPRINE HYDROCHLORIDE 10 MG: 10 TABLET, FILM COATED ORAL at 02:08

## 2018-08-29 RX ADMIN — BACLOFEN 20 MG: 10 TABLET ORAL at 09:08

## 2018-08-29 RX ADMIN — SODIUM CHLORIDE: 0.9 INJECTION, SOLUTION INTRAVENOUS at 03:08

## 2018-08-29 RX ADMIN — FAMOTIDINE 20 MG: 20 TABLET ORAL at 09:08

## 2018-08-29 RX ADMIN — BACLOFEN 20 MG: 10 TABLET ORAL at 02:08

## 2018-08-29 RX ADMIN — FERROUS SULFATE TAB EC 325 MG (65 MG FE EQUIVALENT) 325 MG: 325 (65 FE) TABLET DELAYED RESPONSE at 09:08

## 2018-08-29 RX ADMIN — METOPROLOL TARTRATE 12.5 MG: 25 TABLET, FILM COATED ORAL at 09:08
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
## 2018-08-29 NOTE — HOSPITAL COURSE
Mr. Aguilar is a 41 y/o male with paraplegia and neurogenic bladder managed with suprapubic tube who presented with gross hematuria with clots and abdominal pain. Hemoglobin was stable at 10.1. He was given ceftriaxone 1g IV daily. Urology was consulted and Dr. Navarrete exchanged his 16 Equatorial Guinean suprapubic tube for a 22 Equ[place] Guinean and performed manual irrigation with evacuation of clots.     8/30/18: Patient continued to have gross hematuria and required multiple manual irrigations with evacuation of well formed blood clots each time. Urine culture showed no significant growth. Hgb decreased to 8.9. WBC count at 8.68. He remained afebrile. He underwent Cystoscopy with clot evacuation and fulguration of bladder. The 22 Equatorial Guinean was exchanged for a 3 way sage catheter. The catheter remained free of clots and was draining yellow urine. Hgb stable at 8.6 on discharge. The rest of his hospital course was uneventful. He was discharged back to [facility] in good condition. He will follow up with Dr. Navarrete in 1 month for SPT exchange.
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
## 2018-08-29 NOTE — ASSESSMENT & PLAN NOTE
Pressure ulcer of left buttock  Pressure ulcer of left ankle  Routine ulcer care. Cleanse with NS. Rotate every 2 hours.

## 2018-08-29 NOTE — PLAN OF CARE
Admission orders signed and held.  Could not do this through the admission tab or reconcile home medications because ALFONSO Ospina is using the patient record.
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
## 2018-08-29 NOTE — PLAN OF CARE
Problem: Fall Risk (Adult)  Intervention: Safety Precautions  Admitted with gross hematuria.  Hx. Of paraplegia.  Suprapubic catheter irrigated without difficulty.  Pressure ulcers healing to left buttock and left outer ankle.   Repositioned every two hours with use of wedge.  Fall precautions explained.  Bed alarm on. For urology consult today.
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
## 2018-08-29 NOTE — PLAN OF CARE
TN faxed updated clinicals via [facility] to [facility]. [place] with [place] admissions did see patient today.     1445--TN met with patient, nurse Jacobs at bedside. No family present. Will continue to follow.    Arina Harding RN  Transition Navigator  [phone]

## 2018-08-29 NOTE — ASSESSMENT & PLAN NOTE
Patient states he is smoking about 2 cigarettes per week, down from 1 pack per week. Continue home wellbutrin for smoking cessation. He denies need for nicotine patch currently.
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
## 2018-08-29 NOTE — ASSESSMENT & PLAN NOTE
42 y.o. male with:  Paraplegia  Neurogenic bladder managed with suprapubic tube  Gross hematuria with clots    Plan:  1. Gross hematuria and clots occurred 1 day after suprapubic tube change at nursing home facility. Most likely the exchange may have caused minor bleeding which created clots, and clogged the suprapubic tube.  2. 16 Chinese suprapubic tube exchanged for a 22 Chinese at the bedside using sterile technique. The new suprapubic tube was irrigated with sterile water at the bedside until the suprapubic tube was draining clear urine and there were no more clots being removed from the bladder. This was completed around noon today.   3. Encouraged PO hydration when the patient is here and at the nursing home facility.  4. I will come by and check on the patient's suprapubic tube output later this afternoon.
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
## 2018-08-29 NOTE — ASSESSMENT & PLAN NOTE
Obstructed suprapubic catheter  Patient with gross hematuria s/p suprapubic catheter change 4 days ago. CT abdomen/pelvis showed no acute intra-abdominal abnormalities identified; suprapubic catheter in place with hyperdense urine or blood seen within the urinary bladder. Hemoglobin at 11.4 > 10.1. Will trend hgb. WBC count at 17.01 > 15.85, afebrile. Patient started on ciprofloxacin 500 mg BID at [place]. Received ceftriaxone 1 g in the ED. Will continue ceftriaxone. Urology consulted, appreciate input.

## 2018-08-29 NOTE — ED NOTES
UNABLE TO ESTABLISH PIV ACCESS AT THIS TIME AFTER 1 ATTEMPT PER MYSELF. DR. PAIGE AWARE. VERBAL ORDER TO HOLD PIV ACCESS PER DR. PAIGE AT THIS TIME NOTED. BLOOD SPECIMEN COLLECTED AND SENT TO LAB.

## 2018-08-29 NOTE — ASSESSMENT & PLAN NOTE
Dorsalgia  Takes baclofen 20 mg TID and meloxicam 7.5 mg BID PRN at home. Continue baclofen. Turn every 2 hours. Pressure reducing mattress.

## 2018-08-29 NOTE — PLAN OF CARE
08/29/18 1336   Discharge Assessment   Assessment Type Discharge Planning Assessment   Confirmed/corrected address and phone number on facesheet? Yes   Assessment information obtained from? Patient   Prior to hospitilization cognitive status: Alert/Oriented   Prior to hospitalization functional status: Wheelchair Bound   Current cognitive status: Alert/Oriented   Current Functional Status: Wheelchair Bound   Lives With facility resident   Able to Return to Prior Arrangements yes   Is patient able to care for self after discharge? No   Who are your caregiver(s) and their phone number(s)? Edwin Jones [phone]   Patient's perception of discharge disposition nursing home   Readmission Within The Last 30 Days no previous admission in last 30 days   Patient currently being followed by outpatient case management? No   Patient currently receives any other outside agency services? No   Equipment Currently Used at Home wheelchair   Do you have any problems affording any of your prescribed medications? No   Is the patient taking medications as prescribed? yes   Does the patient have transportation home? Yes   Transportation Available van, wheelchair accessible   Does the patient receive services at the [facility]? No   Discharge Plan A Return to nursing home   Discharge Plan B Other   Patient/Family In Agreement With Plan yes   Does the patient have transportation to healthcare appointments? Yes

## 2018-08-29 NOTE — PLAN OF CARE
Problem: Patient Care Overview  Goal: Plan of Care Review  Outcome: Ongoing (interventions implemented as appropriate)   08/29/18 2956   Coping/Psychosocial   Plan Of Care Reviewed With patient   Patient awake, alert and oriented. Patient's blood glucose monitored ac/hs and covered per sliding scale. Urologist place new super pubic cath today and irrigated. Patient refuses being turned in the bed. Bed alarm remains on , call light in reach, patient not able to walk, confined to a chair.VSS.

## 2018-08-29 NOTE — ED NOTES
SINUS TACHYCARDIA NOTED ON CARDIAC MONITOR. PT DENIES CHEST PAIN AT THIS TIME. DR. PAIGE AWARE OF PT'S HR. VERBAL ORDER FOR PIV ACCESS AND 1 L NS BOLUS PER DR. PAIGE NOTED.

## 2018-08-29 NOTE — ASSESSMENT & PLAN NOTE
Takes metformin 500 mg BID, insulin detemir 15 units BID, and humalog 75-25 20 units TID at home. Will hold metformin during hospital admission. Check A1c. Monitor BG and titrate with SSI as needed. Diabetic diet.

## 2018-08-29 NOTE — ED NOTES
[facility], 4TH FLOOR CHARGE NURSE REQUESTED PT BE HELD IN ED UNTIL PRIMARY NURSE RETURNS FROM [facility]UN[facility]--AWAITING PHONE CALL FLOOR IS READY.

## 2018-08-29 NOTE — ED PROVIDER NOTES
NAME:  Hermann Aguilar  CSN:     [id number]  MRN:    [id number]  ADMIT DATE: 8/28/2018        eMERGENCY dEPARTMENT eNCOUnter    CHIEF COMPLAINT    Chief Complaint   Patient presents with    Hematuria     pt had suprapubic catheter replaced Saturday. States this morning noticed blood with urine. Pt AAO at this time, pt is paraplegic and lives at [facility].        HPI      Hermann Aguilar is a 42 y.o. male who presents to the ED for evaluation of hematuria.  Patient is paraplegic and has suprapubic catheter in place.  Complains of grossly bloody urine that started today.  He reports that his catheter had been replaced on Saturday.  He denies any fevers or vomiting or flank pain.  The patient is not on any blood thinners.          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 Yadiel Haley    CHEST TUBE INSERTION Right     GASTROSTOMY TUBE PLACEMENT  12/15/2016    ORIF HUMERUS FRACTURE Left 12/15/2016    TRACHEOSTOMY TUBE PLACEMENT         SOCIAL HISTORY    Social History     Socioeconomic History    Marital status: Single     Spouse name: Not on file    Number of children: Not on file    Years of education: Not on file     "Highest education level: Not on file   Social Needs    Financial resource strain: Not on file    Food insecurity - worry: Not on file    Food insecurity - inability: Not on file    Transportation needs - medical: Not on file    Transportation needs - non-medical: Not on file   Occupational History    Not on file   Tobacco Use    Smoking status: Current Every Day Smoker     Packs/day: 1.00   Substance and Sexual Activity    Alcohol use: Yes     Comment: occ    Drug use: No    Sexual activity: Not on file   Other Topics Concern    Not on file   Social History Narrative    Not on file       FAMILY HISTORY    No family history on file.    REVIEW OF SYSTEMS   ROS  All Systems otherwise negative except as noted in the History of Present Illness.        PHYSICAL EXAM    Reviewed Triage Note  VITAL SIGNS:   ED Triage Vitals   Enc Vitals Group      BP 08/28/18 2105 121/78      Pulse 08/28/18 2105 (!) 136      Resp 08/28/18 2211 18      Temp 08/28/18 2105 99.5 °F (37.5 °C)      Temp src 08/28/18 2105 Oral      SpO2 08/28/18 2105 99 %      Weight 08/28/18 2105 250 lb      Height 08/28/18 2105 5' 10"      Head Circumference --       Peak Flow --       Pain Score --       Pain Loc --       Pain Edu? --       Excl. in GC? --        Patient Vitals for the past 24 hrs:   BP Temp Temp src Pulse Resp SpO2 Height Weight   08/29/18 0106 (!) 101/57 -- -- (!) 117 19 -- -- --   08/29/18 0000 121/78 -- -- 104 18 -- -- --   08/28/18 2330 111/74 -- -- 105 18 -- -- --   08/28/18 2300 115/74 -- -- 107 15 -- -- --   08/28/18 2231 105/69 -- -- (!) 121 14 -- -- --   08/28/18 2211 -- 98.5 °F (36.9 °C) Oral (!) 126 18 96 % -- --   08/28/18 2105 121/78 99.5 °F (37.5 °C) Oral (!) 136 -- 99 % 5' 10" (1.778 m) 113.4 kg (250 lb)           Physical Exam    Constitutional:  Well-developed, well-nourished. No acute distress  HENT:  Normocephalic, atraumatic.  Eyes:  EOMI. Conjunctiva normal without discharge.   Neck: Normal range of motion.No " stridor. No meningismus.   Respiratory:  Normal breath sounds bilaterally.  No respiratory distress, retractions, or conversational dyspnea. No wheezing. No rhonchi. No rales.   Cardiovascular:  Tachycardic. Normal rhythm. No pitting lower extremity edema.   GI:  Abdomen soft, moderately distended, non-tender. Normal bowel sounds. No guarding, rigidity or rebound.    Musculoskeletal:  No gross deformity or limited range of motion of all major joints. No palpable bony deformity. No tenderness to palpation.  Integument:  Warm and dry. No rash.  Neurologic:  Normal motor function. Normal sensory function. No focal deficits noted. Alert and Interactive.  Psychiatric:  Affect normal. Mood normal.         LABS  Pertinent labs reviewed. (See chart for details)   Labs Reviewed   CBC W/ AUTO DIFFERENTIAL - Abnormal; Notable for the following components:       Result Value    WBC 17.01 (*)     RBC 4.11 (*)     Hemoglobin 11.4 (*)     Hematocrit 34.9 (*)     RDW 16.2 (*)     Platelets 383 (*)     Gran # (ANC) 14.3 (*)     Mono # 1.5 (*)     Gran% 84.1 (*)     Lymph% 5.6 (*)     All other components within normal limits   COMPREHENSIVE METABOLIC PANEL - Abnormal; Notable for the following components:    Glucose 129 (*)     Albumin 3.2 (*)     Alkaline Phosphatase 173 (*)     All other components within normal limits   URINALYSIS, REFLEX TO URINE CULTURE - Abnormal; Notable for the following components:    Protein, UA 3+ (*)     Occult Blood UA 2+ (*)     All other components within normal limits    Narrative:     Preferred Collection Type->Urine, Clean Catch   URINALYSIS MICROSCOPIC - Abnormal; Notable for the following components:    RBC, UA >100 (*)     All other components within normal limits    Narrative:     Preferred Collection Type->Urine, Clean Catch   CULTURE, URINE         RADIOLOGY    Imaging Results          CT Abdomen Pelvis  Without Contrast (Final result)  Result time 08/29/18 01:18:14    Final result by Cedric  LEILANI Ledezma MD (08/29/18 01:18:14)                 Impression:      1. No acute intra-abdominal abnormalities identified.  2. Suprapubic catheter in place with hyperdense urine or blood seen within the urinary bladder.  Correlate with clinical symptoms and urinalysis.  3. Multiple additional findings as detailed above.      Electronically signed by: Cedric Ledezma MD  Date:    08/29/2018  Time:    01:18             Narrative:    EXAMINATION:  CT ABDOMEN PELVIS WITHOUT CONTRAST    CLINICAL HISTORY:  Abdominal pain, unspecified;    TECHNIQUE:  Low dose axial images, sagittal and coronal reformations were obtained from the lung bases to the pubic symphysis.  Oral contrast was not administered.    COMPARISON:  CTA abdomen and pelvis with runoff from 04/19/2018.    FINDINGS:  The visualized portion of the heart is unremarkable.  Mild atelectatic changes are seen.    No significant hepatic abnormality seen on this noncontrast exam.  There is no intra-or extrahepatic biliary ductal dilatation.  The gallbladder is unremarkable.  Stomach, spleen, and pancreas are unremarkable.  Small bilateral adrenal nodules are seen which measure negative Hounsfield units consistent with small adrenal adenomas.  These measure 1.6 cm on the right and 1.7 cm on the left.    Kidneys show no evidence of stones or hydronephrosis. Ureters are unremarkable along their courses.  Suprapubic catheter extends into the urinary bladder.  Hyperdense urine versus blood products seen within the bladder.    Moderate stool seen within the distal colon and rectum.  Scattered retained liquid stool is seen within the right colon.  The visualized loops of small and large bowel show no evidence of obstruction or inflammation.  No free air or free fluid.    Aorta tapers normally.  Scattered normal size retroperitoneal lymph nodes are seen.    No acute osseous abnormality identified.  Prominent degenerative changes are seen at the L5-S1 level.  Lower thoracic  fusion hardware is partially visualized with remote appearing fractures seen involving the T9 vertebral body.    Nonspecific persistent soft tissue stranding with induration seen involving the subcutaneous soft tissues within the left gluteal region.  Previous findings of air and fluid in this location have resolved.                                PROCEDURES    Procedures      EKG     Interpreted by ERP:          ED COURSE & MEDICAL DECISION MAKING    Pertinent & Imaging studies reviewed. (See chart for details and specific orders.)        Medications   cefTRIAXone (ROCEPHIN) 1 g in dextrose 5 % 50 mL IVPB (not administered)   sodium chloride 0.9% bolus 1,000 mL (0 mLs Intravenous Stopped 8/28/18 3766)   sodium chloride 0.9% bolus 1,000 mL (1,000 mLs Intravenous New Bag 8/29/18 0025)          The patient's catheter was obstructed so it was replaced.  He had approximately 500 cc of urine in his bladder.  He continues to drink grossly bloody urine.  Urine is clotting in the catheter tubing.  I believe if the patient were to be discharged she would have to come back with another obstruction.  Therefore, I will place the patient in observation for urology evaluation       DISPOSITION  Patient placed in observation in stable condition at No discharge date for patient encounter.        FINAL IMPRESSION    1. Hematuria, unspecified type    2. Obstruction of Huff catheter, initial encounter            Critical care time spent with this patient (not including separately billable items) was  0 minutes.     DISCLAIMER: This note was prepared with Dragon NaturallySpeaking voice recognition transcription software. Garbled syntax, mangled pronouns, and other bizarre constructions may be attributed to that software system.      Nadir Mclean MD  08/29/2018  1:40 AM          Nadir Mclean MD  08/29/18 0143

## 2018-08-30 ENCOUNTER — ANESTHESIA (OUTPATIENT)
Dept: SURGERY | Facility: HOSPITAL | Age: 43
DRG: 669 | End: 2018-08-30
Payer: MEDICAID

## 2018-08-30 ENCOUNTER — ANESTHESIA EVENT (OUTPATIENT)
Dept: SURGERY | Facility: HOSPITAL | Age: 43
DRG: 669 | End: 2018-08-30
Payer: MEDICAID

## 2018-08-30 PROBLEM — R31.9 HEMATURIA: Status: ACTIVE | Noted: 2018-08-30

## 2018-08-30 LAB
ANION GAP SERPL CALC-SCNC: 8 MMOL/L
BACTERIA UR CULT: NORMAL
BASOPHILS # BLD AUTO: 0.03 K/UL
BASOPHILS NFR BLD: 0.3 %
BUN SERPL-MCNC: 10 MG/DL
CALCIUM SERPL-MCNC: 9.1 MG/DL
CHLORIDE SERPL-SCNC: 112 MMOL/L
CO2 SERPL-SCNC: 21 MMOL/L
CREAT SERPL-MCNC: 0.9 MG/DL
DIFFERENTIAL METHOD: ABNORMAL
EOSINOPHIL # BLD AUTO: 0.4 K/UL
EOSINOPHIL NFR BLD: 4.4 %
ERYTHROCYTE [DISTWIDTH] IN BLOOD BY AUTOMATED COUNT: 15.8 %
EST. GFR  (AFRICAN AMERICAN): >60 ML/MIN/1.73 M^2
EST. GFR  (NON AFRICAN AMERICAN): >60 ML/MIN/1.73 M^2
GLUCOSE SERPL-MCNC: 130 MG/DL
HCT VFR BLD AUTO: 27.6 %
HGB BLD-MCNC: 8.9 G/DL
LYMPHOCYTES # BLD AUTO: 1.7 K/UL
LYMPHOCYTES NFR BLD: 19.9 %
MAGNESIUM SERPL-MCNC: 1.8 MG/DL
MCH RBC QN AUTO: 27.6 PG
MCHC RBC AUTO-ENTMCNC: 32.2 G/DL
MCV RBC AUTO: 85 FL
MONOCYTES # BLD AUTO: 1 K/UL
MONOCYTES NFR BLD: 11.9 %
NEUTROPHILS # BLD AUTO: 5.5 K/UL
NEUTROPHILS NFR BLD: 63.5 %
PHOSPHATE SERPL-MCNC: 2.8 MG/DL
PLATELET # BLD AUTO: 368 K/UL
PMV BLD AUTO: 10.9 FL
POCT GLUCOSE: 104 MG/DL (ref 70–110)
POCT GLUCOSE: 130 MG/DL (ref 70–110)
POCT GLUCOSE: 211 MG/DL (ref 70–110)
POCT GLUCOSE: 92 MG/DL (ref 70–110)
POTASSIUM SERPL-SCNC: 4.4 MMOL/L
RBC # BLD AUTO: 3.23 M/UL
SODIUM SERPL-SCNC: 141 MMOL/L
WBC # BLD AUTO: 8.68 K/UL

## 2018-08-30 PROCEDURE — 94761 N-INVAS EAR/PLS OXIMETRY MLT: CPT

## 2018-08-30 PROCEDURE — 0TCB8ZZ EXTIRPATION OF MATTER FROM BLADDER, VIA NATURAL OR ARTIFICIAL OPENING ENDOSCOPIC: ICD-10-PCS | Performed by: STUDENT IN AN ORGANIZED HEALTH CARE EDUCATION/TRAINING PROGRAM

## 2018-08-30 PROCEDURE — 37000009 HC ANESTHESIA EA ADD 15 MINS: Performed by: STUDENT IN AN ORGANIZED HEALTH CARE EDUCATION/TRAINING PROGRAM

## 2018-08-30 PROCEDURE — 71000033 HC RECOVERY, INTIAL HOUR: Performed by: STUDENT IN AN ORGANIZED HEALTH CARE EDUCATION/TRAINING PROGRAM

## 2018-08-30 PROCEDURE — 83735 ASSAY OF MAGNESIUM: CPT

## 2018-08-30 PROCEDURE — 85025 COMPLETE CBC W/AUTO DIFF WBC: CPT

## 2018-08-30 PROCEDURE — 25000003 PHARM REV CODE 250: Performed by: STUDENT IN AN ORGANIZED HEALTH CARE EDUCATION/TRAINING PROGRAM

## 2018-08-30 PROCEDURE — 11000001 HC ACUTE MED/SURG PRIVATE ROOM

## 2018-08-30 PROCEDURE — 63600175 PHARM REV CODE 636 W HCPCS: Performed by: PHYSICIAN ASSISTANT

## 2018-08-30 PROCEDURE — 52001 CYSTO W/IRRG&EVAC MLT CLOTS: CPT | Mod: 59,,, | Performed by: STUDENT IN AN ORGANIZED HEALTH CARE EDUCATION/TRAINING PROGRAM

## 2018-08-30 PROCEDURE — 36000707: Performed by: STUDENT IN AN ORGANIZED HEALTH CARE EDUCATION/TRAINING PROGRAM

## 2018-08-30 PROCEDURE — 63600175 PHARM REV CODE 636 W HCPCS: Performed by: STUDENT IN AN ORGANIZED HEALTH CARE EDUCATION/TRAINING PROGRAM

## 2018-08-30 PROCEDURE — 84100 ASSAY OF PHOSPHORUS: CPT

## 2018-08-30 PROCEDURE — 36000706: Performed by: STUDENT IN AN ORGANIZED HEALTH CARE EDUCATION/TRAINING PROGRAM

## 2018-08-30 PROCEDURE — 27201423 OPTIME MED/SURG SUP & DEVICES STERILE SUPPLY: Performed by: STUDENT IN AN ORGANIZED HEALTH CARE EDUCATION/TRAINING PROGRAM

## 2018-08-30 PROCEDURE — 63600175 PHARM REV CODE 636 W HCPCS: Performed by: NURSE ANESTHETIST, CERTIFIED REGISTERED

## 2018-08-30 PROCEDURE — 0T5B8ZZ DESTRUCTION OF BLADDER, VIA NATURAL OR ARTIFICIAL OPENING ENDOSCOPIC: ICD-10-PCS | Performed by: STUDENT IN AN ORGANIZED HEALTH CARE EDUCATION/TRAINING PROGRAM

## 2018-08-30 PROCEDURE — 37000008 HC ANESTHESIA 1ST 15 MINUTES: Performed by: STUDENT IN AN ORGANIZED HEALTH CARE EDUCATION/TRAINING PROGRAM

## 2018-08-30 PROCEDURE — 52224 CYSTOSCOPY AND TREATMENT: CPT | Mod: 51,,, | Performed by: STUDENT IN AN ORGANIZED HEALTH CARE EDUCATION/TRAINING PROGRAM

## 2018-08-30 PROCEDURE — 80048 BASIC METABOLIC PNL TOTAL CA: CPT

## 2018-08-30 PROCEDURE — 36415 COLL VENOUS BLD VENIPUNCTURE: CPT

## 2018-08-30 PROCEDURE — 25000003 PHARM REV CODE 250: Performed by: PHYSICIAN ASSISTANT

## 2018-08-30 PROCEDURE — 25000003 PHARM REV CODE 250: Performed by: NURSE ANESTHETIST, CERTIFIED REGISTERED

## 2018-08-30 PROCEDURE — 0T2BX0Z CHANGE DRAINAGE DEVICE IN BLADDER, EXTERNAL APPROACH: ICD-10-PCS | Performed by: STUDENT IN AN ORGANIZED HEALTH CARE EDUCATION/TRAINING PROGRAM

## 2018-08-30 PROCEDURE — 99233 SBSQ HOSP IP/OBS HIGH 50: CPT | Mod: 25,,, | Performed by: STUDENT IN AN ORGANIZED HEALTH CARE EDUCATION/TRAINING PROGRAM

## 2018-08-30 PROCEDURE — 51705 CHANGE OF BLADDER TUBE: CPT | Mod: 51,,, | Performed by: STUDENT IN AN ORGANIZED HEALTH CARE EDUCATION/TRAINING PROGRAM

## 2018-08-30 RX ORDER — MIDAZOLAM HYDROCHLORIDE 1 MG/ML
INJECTION, SOLUTION INTRAMUSCULAR; INTRAVENOUS
Status: DISCONTINUED | OUTPATIENT
Start: 2018-08-30 | End: 2018-08-30

## 2018-08-30 RX ORDER — CIPROFLOXACIN 2 MG/ML
400 INJECTION, SOLUTION INTRAVENOUS
Status: DISCONTINUED | OUTPATIENT
Start: 2018-08-30 | End: 2018-08-31 | Stop reason: HOSPADM

## 2018-08-30 RX ORDER — ONDANSETRON 2 MG/ML
INJECTION INTRAMUSCULAR; INTRAVENOUS
Status: DISCONTINUED | OUTPATIENT
Start: 2018-08-30 | End: 2018-08-30

## 2018-08-30 RX ORDER — HYDROMORPHONE HYDROCHLORIDE 2 MG/ML
0.5 INJECTION, SOLUTION INTRAMUSCULAR; INTRAVENOUS; SUBCUTANEOUS EVERY 5 MIN PRN
Status: ACTIVE | OUTPATIENT
Start: 2018-08-30 | End: 2018-08-30

## 2018-08-30 RX ORDER — SODIUM CHLORIDE 9 MG/ML
INJECTION, SOLUTION INTRAVENOUS CONTINUOUS PRN
Status: DISCONTINUED | OUTPATIENT
Start: 2018-08-30 | End: 2018-08-30

## 2018-08-30 RX ORDER — PROPOFOL 10 MG/ML
VIAL (ML) INTRAVENOUS
Status: DISCONTINUED | OUTPATIENT
Start: 2018-08-30 | End: 2018-08-30

## 2018-08-30 RX ORDER — ONDANSETRON 2 MG/ML
4 INJECTION INTRAMUSCULAR; INTRAVENOUS DAILY PRN
Status: DISCONTINUED | OUTPATIENT
Start: 2018-08-30 | End: 2018-08-31 | Stop reason: HOSPADM

## 2018-08-30 RX ORDER — LIDOCAINE HCL/PF 100 MG/5ML
SYRINGE (ML) INTRAVENOUS
Status: DISCONTINUED | OUTPATIENT
Start: 2018-08-30 | End: 2018-08-30

## 2018-08-30 RX ORDER — SODIUM CHLORIDE 0.9 % (FLUSH) 0.9 %
3 SYRINGE (ML) INJECTION
Status: DISCONTINUED | OUTPATIENT
Start: 2018-08-30 | End: 2018-08-31 | Stop reason: HOSPADM

## 2018-08-30 RX ORDER — PROPOFOL 10 MG/ML
VIAL (ML) INTRAVENOUS CONTINUOUS PRN
Status: DISCONTINUED | OUTPATIENT
Start: 2018-08-30 | End: 2018-08-30

## 2018-08-30 RX ORDER — FENTANYL CITRATE 50 UG/ML
INJECTION, SOLUTION INTRAMUSCULAR; INTRAVENOUS
Status: DISCONTINUED | OUTPATIENT
Start: 2018-08-30 | End: 2018-08-30

## 2018-08-30 RX ORDER — DIPHENHYDRAMINE HYDROCHLORIDE 50 MG/ML
12.5 INJECTION INTRAMUSCULAR; INTRAVENOUS EVERY 6 HOURS PRN
Status: DISCONTINUED | OUTPATIENT
Start: 2018-08-30 | End: 2018-08-31 | Stop reason: HOSPADM

## 2018-08-30 RX ADMIN — FERROUS SULFATE TAB EC 325 MG (65 MG FE EQUIVALENT) 325 MG: 325 (65 FE) TABLET DELAYED RESPONSE at 09:08

## 2018-08-30 RX ADMIN — SENNOSIDES AND DOCUSATE SODIUM 1 TABLET: 8.6; 5 TABLET ORAL at 08:08

## 2018-08-30 RX ADMIN — ONDANSETRON 4 MG: 2 INJECTION, SOLUTION INTRAMUSCULAR; INTRAVENOUS at 04:08

## 2018-08-30 RX ADMIN — GEMFIBROZIL 600 MG: 600 TABLET ORAL at 06:08

## 2018-08-30 RX ADMIN — GABAPENTIN 300 MG: 300 CAPSULE ORAL at 09:08

## 2018-08-30 RX ADMIN — PROPOFOL 100 MCG/KG/MIN: 10 INJECTION, EMULSION INTRAVENOUS at 02:08

## 2018-08-30 RX ADMIN — OXYCODONE HYDROCHLORIDE AND ACETAMINOPHEN 500 MG: 500 TABLET ORAL at 09:08

## 2018-08-30 RX ADMIN — DOCUSATE SODIUM 100 MG: 100 CAPSULE, LIQUID FILLED ORAL at 08:08

## 2018-08-30 RX ADMIN — DOCUSATE SODIUM 100 MG: 100 CAPSULE, LIQUID FILLED ORAL at 09:08

## 2018-08-30 RX ADMIN — FERROUS SULFATE TAB EC 325 MG (65 MG FE EQUIVALENT) 325 MG: 325 (65 FE) TABLET DELAYED RESPONSE at 08:08

## 2018-08-30 RX ADMIN — Medication: at 08:08

## 2018-08-30 RX ADMIN — LIDOCAINE HYDROCHLORIDE 100 MG: 20 INJECTION, SOLUTION INTRAVENOUS at 02:08

## 2018-08-30 RX ADMIN — MIDAZOLAM 2 MG: 1 INJECTION INTRAMUSCULAR; INTRAVENOUS at 02:08

## 2018-08-30 RX ADMIN — PROPOFOL 50 MG: 10 INJECTION, EMULSION INTRAVENOUS at 02:08

## 2018-08-30 RX ADMIN — BACLOFEN 20 MG: 10 TABLET ORAL at 06:08

## 2018-08-30 RX ADMIN — CYCLOBENZAPRINE HYDROCHLORIDE 10 MG: 10 TABLET, FILM COATED ORAL at 08:08

## 2018-08-30 RX ADMIN — FAMOTIDINE 20 MG: 20 TABLET ORAL at 09:08

## 2018-08-30 RX ADMIN — SENNOSIDES AND DOCUSATE SODIUM 1 TABLET: 8.6; 5 TABLET ORAL at 09:08

## 2018-08-30 RX ADMIN — CYCLOBENZAPRINE HYDROCHLORIDE 10 MG: 10 TABLET, FILM COATED ORAL at 09:08

## 2018-08-30 RX ADMIN — METOPROLOL TARTRATE 12.5 MG: 25 TABLET, FILM COATED ORAL at 09:08

## 2018-08-30 RX ADMIN — INSULIN ASPART 2 UNITS: 100 INJECTION, SOLUTION INTRAVENOUS; SUBCUTANEOUS at 09:08

## 2018-08-30 RX ADMIN — BUPROPION HYDROCHLORIDE 150 MG: 150 TABLET, FILM COATED, EXTENDED RELEASE ORAL at 09:08

## 2018-08-30 RX ADMIN — CIPROFLOXACIN 400 MG: 2 INJECTION, SOLUTION INTRAVENOUS at 02:08

## 2018-08-30 RX ADMIN — FAMOTIDINE 20 MG: 20 TABLET ORAL at 08:08

## 2018-08-30 RX ADMIN — BACLOFEN 20 MG: 10 TABLET ORAL at 08:08

## 2018-08-30 RX ADMIN — SODIUM CHLORIDE: 0.9 INJECTION, SOLUTION INTRAVENOUS at 02:08

## 2018-08-30 RX ADMIN — OXYCODONE HYDROCHLORIDE AND ACETAMINOPHEN 500 MG: 500 TABLET ORAL at 08:08

## 2018-08-30 RX ADMIN — BUPROPION HYDROCHLORIDE 150 MG: 150 TABLET, FILM COATED, EXTENDED RELEASE ORAL at 08:08

## 2018-08-30 RX ADMIN — CYCLOBENZAPRINE HYDROCHLORIDE 10 MG: 10 TABLET, FILM COATED ORAL at 06:08

## 2018-08-30 RX ADMIN — SODIUM CHLORIDE: 0.9 INJECTION, SOLUTION INTRAVENOUS at 12:08

## 2018-08-30 RX ADMIN — BACLOFEN 20 MG: 10 TABLET ORAL at 09:08

## 2018-08-30 RX ADMIN — FENTANYL CITRATE 25 MCG: 50 INJECTION, SOLUTION INTRAMUSCULAR; INTRAVENOUS at 02:08

## 2018-08-30 RX ADMIN — VENLAFAXINE HYDROCHLORIDE 75 MG: 75 CAPSULE, EXTENDED RELEASE ORAL at 08:08

## 2018-08-30 RX ADMIN — CEFTRIAXONE 1 G: 1 INJECTION, SOLUTION INTRAVENOUS at 02:08

## 2018-08-30 RX ADMIN — GABAPENTIN 300 MG: 300 CAPSULE ORAL at 08:08

## 2018-08-30 NOTE — PROGRESS NOTES
[facility]  Urology Progress Note    Problems:   Patient Active Problem List   Diagnosis    Iron deficiency anemia    Recurrent major depressive disorder, in remission    Chronic suprapubic catheter    Essential hypertension    Vitamin D deficiency    Paraplegia at T9 level    History of DVT of lower extremity    Hx of right BKA    Non-healing wound of lower extremity, initial encounter    Gastrostomy status    Tracheostomy status    Chronic post-traumatic stress disorder    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    PAD (peripheral artery disease)    Neuropathic foot ulcer    Obstructed suprapubic catheter, initial encounter    Gross hematuria    Constipation    Type 2 diabetes mellitus    Chronic ulcer of left lower extremity    Major depressive disorder    Phantom limb syndrome    Tobacco abuse disorder    Pure hyperglyceridemia    Gastroesophageal reflux disease without esophagitis       Subjective   Afebrile overnight. Required 2 more events of manual irrigation by the nurse who irrigated with me yesterday. Each time removed a large amount of clots. Pt has no complaints    Meds:   ascorbic acid (vitamin C)  500 mg Oral BID    baclofen  20 mg Oral TID    buPROPion  150 mg Oral BID    cefTRIAXone (ROCEPHIN) IVPB  1 g Intravenous Q24H    cyclobenzaprine  10 mg Oral TID    docusate sodium  100 mg Oral BID    famotidine  20 mg Oral BID    ferrous sulfate  325 mg Oral BID    gabapentin  300 mg Oral BID    gemfibrozil  600 mg Oral BID AC    metoprolol tartrate  12.5 mg Oral BID    senna-docusate 8.6-50 mg  1 tablet Oral BID    venlafaxine  75 mg Oral Daily    white petrolatum-mineral oil   Topical (Top) Daily      sodium chloride 0.9% 100 mL/hr at 08/30/18 0043     acetaminophen, dextrose 50%, dextrose 50%, glucagon (human recombinant), glucose, glucose, insulin aspart U-100, ondansetron, polyethylene glycol, sodium chloride 0.9%    Temp:  [96.4 °F (35.8 °C)-98.4 °F  (36.9 °C)] 96.4 °F (35.8 °C)  Pulse:  [] 84  Resp:  [14-20] 20  SpO2:  [99 %-100 %] 100 %  BP: ()/(54-74) 114/70    I/O last 3 completed shifts:  In: 1550 [P.O.:550; I.V.:1000]  Out: 4620 [Urine:4620]    General:  Alert, cooperative, no distress, appears stated age   Head:  Normocephalic, without obvious abnormality, atraumatic   Eyes:  PERRL, conjunctiva/corneas clear   Lungs:   Respirations unlabored    Heart:  Warm and well perfused   Abdomen:   abdomen is soft without significant tenderness, masses, organomegaly or guarding; SPT 22 [place] draining grossly bloody urine with clots; irrigated again manually at the bedside this morning and expelled more clots   : No urethral sage   Drains: Suprapubic tube   Extremities: Extremities normal, atraumatic, no cyanosis or edema   Skin: Skin color, texture, turgor normal, no rashes or lesions   Psych: Appropriate   Neurologic: Non-focal     Recent Results (from the past 24 hour(s))   CBC auto differential    Collection Time: 08/29/18  8:13 AM   Result Value Ref Range    WBC 15.85 (H) 3.90 - 12.70 K/uL    RBC 3.65 (L) 4.60 - 6.20 M/uL    Hemoglobin 10.1 (L) 14.0 - 18.0 g/dL    Hematocrit 31.2 (L) 40.0 - 54.0 %    MCV 86 82 - 98 fL    MCH 27.7 27.0 - 31.0 pg    MCHC 32.4 32.0 - 36.0 g/dL    RDW 15.9 (H) 11.5 - 14.5 %    Platelets 424 (H) 150 - 350 K/uL    MPV 10.8 9.2 - 12.9 fL    Gran # (ANC) 11.7 (H) 1.8 - 7.7 K/uL    Lymph # 2.2 1.0 - 4.8 K/uL    Mono # 1.7 (H) 0.3 - 1.0 K/uL    Eos # 0.3 0.0 - 0.5 K/uL    Baso # 0.03 0.00 - 0.20 K/uL    Gran% 73.5 (H) 38.0 - 73.0 %    Lymph% 13.6 (L) 18.0 - 48.0 %    Mono% 10.6 4.0 - 15.0 %    Eosinophil% 1.8 0.0 - 8.0 %    Basophil% 0.2 0.0 - 1.9 %    Differential Method Automated    Hemoglobin A1c    Collection Time: 08/29/18  8:13 AM   Result Value Ref Range    Hemoglobin A1C 8.5 (H) 4.0 - 5.6 %    Estimated Avg Glucose 197 (H) 68 - 131 mg/dL   POCT glucose    Collection Time: 08/29/18 11:37 AM   Result Value Ref Range     POCT Glucose 159 (H) 70 - 110 mg/dL   POCT glucose    Collection Time: 08/29/18  4:51 PM   Result Value Ref Range    POCT Glucose 176 (H) 70 - 110 mg/dL   POCT glucose    Collection Time: 08/29/18  8:37 PM   Result Value Ref Range    POCT Glucose 186 (H) 70 - 110 mg/dL   CBC with Automated Differential    Collection Time: 08/30/18  3:56 AM   Result Value Ref Range    WBC 8.68 3.90 - 12.70 K/uL    RBC 3.23 (L) 4.60 - 6.20 M/uL    Hemoglobin 8.9 (L) 14.0 - 18.0 g/dL    Hematocrit 27.6 (L) 40.0 - 54.0 %    MCV 85 82 - 98 fL    MCH 27.6 27.0 - 31.0 pg    MCHC 32.2 32.0 - 36.0 g/dL    RDW 15.8 (H) 11.5 - 14.5 %    Platelets 368 (H) 150 - 350 K/uL    MPV 10.9 9.2 - 12.9 fL    Gran # (ANC) 5.5 1.8 - 7.7 K/uL    Lymph # 1.7 1.0 - 4.8 K/uL    Mono # 1.0 0.3 - 1.0 K/uL    Eos # 0.4 0.0 - 0.5 K/uL    Baso # 0.03 0.00 - 0.20 K/uL    Gran% 63.5 38.0 - 73.0 %    Lymph% 19.9 18.0 - 48.0 %    Mono% 11.9 4.0 - 15.0 %    Eosinophil% 4.4 0.0 - 8.0 %    Basophil% 0.3 0.0 - 1.9 %    Differential Method Automated    Phosphorus    Collection Time: 08/30/18  3:56 AM   Result Value Ref Range    Phosphorus 2.8 2.7 - 4.5 mg/dL   Magnesium    Collection Time: 08/30/18  3:56 AM   Result Value Ref Range    Magnesium 1.8 1.6 - 2.6 mg/dL   Basic metabolic panel    Collection Time: 08/30/18  3:56 AM   Result Value Ref Range    Sodium 141 136 - 145 mmol/L    Potassium 4.4 3.5 - 5.1 mmol/L    Chloride 112 (H) 95 - 110 mmol/L    CO2 21 (L) 23 - 29 mmol/L    Glucose 130 (H) 70 - 110 mg/dL    BUN, Bld 10 6 - 20 mg/dL    Creatinine 0.9 0.5 - 1.4 mg/dL    Calcium 9.1 8.7 - 10.5 mg/dL    Anion Gap 8 8 - 16 mmol/L    eGFR if African American >60 >60 mL/min/1.73 m^2    eGFR if non African American >60 >60 mL/min/1.73 m^2   POCT glucose    Collection Time: 08/30/18  5:44 AM   Result Value Ref Range    POCT Glucose 130 (H) 70 - 110 mg/dL        Assessment: 42 y.o. AA male with:  Paraplegia  Neurogenic bladder managed with suprapubic tube  Gross hematuria  with clots    Plan:  1. I manually irrigated again and continued to evacuate more well formed clots.  2. His Hgb decreased by 2 points since yesterday's labs.  3. The patient continued to require manual irrigation yesterday evening and this morning. I counseled the patient that I am beginning to worry that if we do not intervene, that we are only chasing the after effects of a possible bleeding process in the bladder.    4. I have explained the indication(s) for and benefits of a cystoscopy, clot evacuation, possible fulguration of any active bleeding areas, possible biopsy of any abnormal bladder mucosa, possible suprapubic tube exchange as an urgent add on case today 8/30/18. Our goal is to remove the clots, and assess for any bleeding areas and if present, fulgurate/coagulate those areas to stop the bleeding.     The risks discussed included but were not limited to pain, infection (urinary tract infection), bleeding (hematuria), injury to urethra, bladder, ureters, and rectum, possible bladder neck contracture, possible urethral stricture, persistent gross hematuria and clot development, need for additional procedures, need for indwelling sage catheter, need for additional procedures.     Surgical consents signed.

## 2018-08-30 NOTE — SUBJECTIVE & OBJECTIVE
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 Yadiel Haley    CHEST TUBE INSERTION Right     GASTROSTOMY TUBE PLACEMENT  12/15/2016    ORIF HUMERUS FRACTURE Left 12/15/2016    TRACHEOSTOMY TUBE PLACEMENT         Review of patient's allergies indicates:  No Known Allergies    No current facility-administered medications on file prior to encounter.      Current Outpatient Medications on File Prior to Encounter   Medication Sig    rwcmh-qjbr-AzSHC-collag-mv-min (ROXANA, WITH COLLAGEN,) 7-7-1.5 gram PwPk Take by mouth.    ascorbic acid, vitamin C, (VITAMIN C) 500 MG tablet Take 500 mg by mouth 2 (two) times daily.    aspirin (ECOTRIN) 325 MG EC tablet Take 325 mg by mouth once daily.    baclofen (LIORESAL) 5 MG tablet Take 20 mg by mouth 3 (three) times daily.     buPROPion (WELLBUTRIN XL) 150 MG TB24 tablet Take 150 mg by mouth 2 (two) times daily.    cholecalciferol, vitamin D3, 5,000 unit capsule Take 50,000 Units by mouth every 30 days.     ciprofloxacin HCl (CIPRO) 500 MG tablet Take 500 mg by mouth every 12 (twelve) hours.    cyclobenzaprine (FLEXERIL) 10 MG tablet Take 10 mg by mouth 3 (three) times daily.     docusate sodium (COLACE) 100 MG capsule Take 100 mg by mouth 2 (two) times daily.    ferrous sulfate 325 (65  FE) MG EC tablet Take 325 mg by mouth 2 (two) times daily.    gabapentin (NEURONTIN) 300 MG capsule Take 300 mg by mouth 2 (two) times daily.     gemfibrozil (LOPID) 600 MG tablet Take 600 mg by mouth 2 (two) times daily before meals.    insulin detemir U-100 (LEVEMIR) 100 unit/mL injection Inject 15 Units into the skin 2 (two) times daily.    insulin lispro protamin/lispro (HUMALOG MIX 75-25,U-100,INSULN SUBQ) Inject 20 Units into the skin 3 (three) times daily with meals.    melatonin 10 mg Cap Take by mouth every evening.    meloxicam (MOBIC) 7.5 MG tablet Take 7.5 mg by mouth 2 (two) times daily as needed for Pain.    metFORMIN (GLUCOPHAGE) 500 MG tablet Take 500 mg by mouth 2 (two) times daily with meals.    metoprolol tartrate (LOPRESSOR) 12.5 mg tablet Take 12.5 mg by mouth 2 (two) times daily.    multivitamin with minerals tablet Take 1 tablet by mouth once daily.    nicotine (NICODERM CQ) 7 mg/24 hr Place 1 patch onto the skin once daily.    nicotine polacrilex 2 MG Lozg Take 1 lozenge (2 mg total) by mouth as needed.    phentermine (ADIPEX-P) 37.5 mg tablet Take 37.5 mg by mouth before breakfast.    venlafaxine (EFFEXOR-XR) 37.5 MG 24 hr capsule Take 75 mg by mouth once daily.     EMOLLIENT COMBINATION NO.92 (LUBRIDERM DAILY MOISTURE TOP) Apply topically once daily. Apply to left leg and foot    famotidine (PEPCID) 20 MG tablet Take 20 mg by mouth 2 (two) times daily.    IPRATROPIUM/ALBUTEROL SULFATE (DUONEB INHL) Inhale 0.5-3 mg into the lungs every 6 (six) hours as needed.     ketoconazole (NIZORAL) 2 % shampoo Apply topically twice a week.    polyethylene glycol (GLYCOLAX) 17 gram PwPk Take 17 g by mouth daily as needed.     PROMETHAZINE HCL (PHENERGAN ORAL) Take 25 mg by mouth every 6 (six) hours as needed.     senna-docusate 8.6-50 mg (PERICOLACE) 8.6-50 mg per tablet Take 1 tablet by mouth 2 (two) times daily.     shark liver oil-cocoa butter 0.25-3% (PREPARATION H) 0.25-3 %  suppository Place 1 suppository rectally as needed for Hemorrhoids.     Family History     None        Tobacco Use    Smoking status: Current Some Day Smoker     Packs/day: 1.00    Smokeless tobacco: Never Used   Substance and Sexual Activity    Alcohol use: No     Frequency: Never     Comment: occ    Drug use: No    Sexual activity: Not on file     Review of Systems   Constitutional: Negative for appetite change, chills, fatigue and fever.   HENT: Negative for rhinorrhea, sore throat and trouble swallowing.    Respiratory: Negative for cough, chest tightness, shortness of breath and wheezing.    Cardiovascular: Negative for chest pain, palpitations and leg swelling.   Gastrointestinal: Negative for abdominal distention, abdominal pain, constipation, diarrhea, nausea and vomiting.   Genitourinary: Positive for hematuria (Gross, per suprapubic catheter.). Negative for discharge, flank pain and penile pain.   Musculoskeletal: Positive for back pain and gait problem. Negative for arthralgias and myalgias.   Skin: Positive for wound (Left buttock ulcer. Left ankle ulcer.). Negative for color change and pallor.   Neurological: Negative for dizziness, syncope, weakness, light-headedness and headaches.   Psychiatric/Behavioral: Negative for agitation. The patient is not nervous/anxious.    All other systems reviewed and are negative.    Objective:     Vital Signs (Most Recent):  Temp: 96.4 °F (35.8 °C) (08/30/18 0730)  Pulse: 84 (08/30/18 0730)  Resp: 20 (08/30/18 0730)  BP: 114/70 (08/30/18 0730)  SpO2: 99 % (08/30/18 0826) Vital Signs (24h Range):  Temp:  [96.4 °F (35.8 °C)-98.4 °F (36.9 °C)] 96.4 °F (35.8 °C)  Pulse:  [] 84  Resp:  [14-20] 20  SpO2:  [99 %-100 %] 99 %  BP: ()/(54-74) 114/70     Weight: 116 kg (255 lb 11.7 oz)  Body mass index is 36.69 kg/m².    Physical Exam   Constitutional: He is oriented to person, place, and time. He appears well-developed and well-nourished. No distress.   HENT:    Head: Normocephalic and atraumatic.   Eyes: Conjunctivae and EOM are normal. Pupils are equal, round, and reactive to light.   Cardiovascular: Normal rate, regular rhythm and normal heart sounds.   No murmur heard.  Pulmonary/Chest: Effort normal and breath sounds normal. No respiratory distress.   Abdominal: Soft. Bowel sounds are normal. He exhibits no distension.   Suprapubic catheter in place and draining dark red urine.   Musculoskeletal: He exhibits no edema.   Right BKA. Lower thoracic spinal fusion. Limited ROM.   Neurological: He is alert and oriented to person, place, and time. A sensory deficit (Left foot.) is present. GCS eye subscore is 4. GCS verbal subscore is 5. GCS motor subscore is 6.   Skin: Skin is warm and dry. He is not diaphoretic.   Pressure ulcer left buttock. Pressure ulcer left heel.   Psychiatric: He has a normal mood and affect. His behavior is normal.   Nursing note and vitals reviewed.        CRANIAL NERVES     CN III, IV, VI   Pupils are equal, round, and reactive to light.  Extraocular motions are normal.        Significant Labs:   CBC:   Recent Labs   Lab  08/28/18   2139  08/29/18   0813  08/30/18   0356   WBC  17.01*  15.85*  8.68   HGB  11.4*  10.1*  8.9*   HCT  34.9*  31.2*  27.6*   PLT  383*  424*  368*     Urine Culture:   Recent Labs   Lab  08/29/18   0308   LABURIN  No significant growth     All pertinent labs within the past 24 hours have been reviewed.    Significant Imaging: I have reviewed all pertinent imaging results/findings within the past 24 hours.

## 2018-08-30 NOTE — ANESTHESIA POSTPROCEDURE EVALUATION
"Anesthesia Post Evaluation    Patient: Hermann Aguilar    Procedure(s) Performed: Procedure(s) (LRB):  CYSTOSCOPY, clot evacuation, suprapubic tube exchange (N/A)    Final Anesthesia Type: MAC  Patient location during evaluation: PACU  Patient participation: Yes- Able to Participate  Level of consciousness: awake and alert, oriented and awake  Post-procedure vital signs: reviewed and stable  Pain management: adequate  Airway patency: patent  PONV status at discharge: No PONV  Anesthetic complications: no      Cardiovascular status: blood pressure returned to baseline  Respiratory status: unassisted and room air  Hydration status: euvolemic  Follow-up not needed.        Visit Vitals  /67   Pulse 80   Temp 36.6 °C (97.8 °F) (Skin)   Resp 16   Ht 5' 10" (1.778 m)   Wt 116 kg (255 lb 11.7 oz)   SpO2 100%   BMI 36.69 kg/m²       Pain/Harrison Score: Pain Assessment Performed: Yes (8/30/2018  1:00 PM)  Presence of Pain: denies (8/30/2018  1:00 PM)        "

## 2018-08-30 NOTE — INTERVAL H&P NOTE
The patient has been examined and the H&P has been reviewed:    I concur with the findings and no changes have occurred since H&P was written. see progress note from today 8/30/18.     Anesthesia/Surgery risks, benefits and alternative options discussed and understood by patient/family.          Active Hospital Problems    Diagnosis  POA    *Gross hematuria [R31.0]  Yes    Obstructed suprapubic catheter, initial encounter [T83.090A]  Yes    Constipation [K59.00]  Yes    Type 2 diabetes mellitus [E11.9]  Yes    Chronic ulcer of left lower extremity [L97.929]  Yes    Major depressive disorder [F32.9]  Yes    Phantom limb syndrome [G54.7]  Yes    Tobacco abuse disorder [Z72.0]  Yes    Pure hyperglyceridemia [E78.1]  Yes    Gastroesophageal reflux disease without esophagitis [K21.9]  Yes    Chronic post-traumatic stress disorder [F43.12]  Yes     Chronic    Vitamin D deficiency [E55.9]  Yes     Chronic    Paraplegia at T9 level [G82.20]  Yes     Chronic    Hx of right BKA [Z89.511]  Not Applicable     Chronic    Essential hypertension [I10]  Yes     Chronic    Chronic suprapubic catheter [Z93.59]  Not Applicable     Chronic    Iron deficiency anemia [D50.9]  Yes     Chronic    History of DVT of lower extremity [Z86.718]  Not Applicable      Resolved Hospital Problems   No resolved problems to display.

## 2018-08-30 NOTE — ANESTHESIA PREPROCEDURE EVALUATION
08/30/2018  Hermann Aguilar is a 42 y.o., male admitted 2 days ago for hematuria.  Eval today via cystoscopy.  No previous anesthesia.  NPO.  NKDA.  Chest - clear.  Iron deficiency anemia   Recurrent major depressive disorder, in remission   Chronic suprapubic catheter   Essential hypertension   Vitamin D deficiency   Paraplegia at T9 level   History of DVT of lower extremity   Hx of right BKA   Non-healing wound of lower extremity, initial encounter   Gastrostomy status   Tracheostomy status   Chronic post-traumatic stress disorder   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   PAD (peripheral artery disease)   Neuropathic foot ulcer   Obstructed suprapubic catheter, initial encounter   Gross hematuria   Constipation   Type 2 diabetes mellitus   Chronic ulcer of left lower extremity   Major depressive disorder   Phantom limb syndrome   Tobacco abuse disorder   Pure hyperglyceridemia   Gastroesophageal reflux disease without esophagitis   Hematuria       Anesthesia Evaluation    I have reviewed the Patient Summary Reports.    I have reviewed the Nursing Notes.   I have reviewed the Medications.     Review of Systems  Anesthesia Hx:  Neg history of prior surgery. Denies Family Hx of Anesthesia complications.    Cardiovascular:   Hypertension    Hepatic/GI:   PUD, GERD    Neurological:   Neuromuscular Disease,    Endocrine:   Diabetes    Psych:   Psychiatric History          Physical Exam  General:  Obesity    Airway/Jaw/Neck:  Airway Findings: Tongue: Normal General Airway Assessment: Adult  Mallampati: III  Improves to II with phonation.  TM Distance: Normal, at least 6 cm  Jaw/Neck Findings:  Neck ROM: Extension Decreased, Mod.       Chest/Lungs:  Chest/Lungs Findings: Clear to auscultation, Normal Respiratory Rate     Heart/Vascular:  Heart Findings: Rate: Normal  Rhythm: Regular Rhythm  Sounds: Normal        Mental  Status:  Mental Status Findings:  Alert and Oriented         Anesthesia Plan  Type of Anesthesia, risks & benefits discussed:  Anesthesia Type:  general, MAC  Patient's Preference:   Intra-op Monitoring Plan:   Intra-op Monitoring Plan Comments:   Post Op Pain Control Plan:   Post Op Pain Control Plan Comments:   Induction:   IV  Beta Blocker:         Informed Consent: Patient understands risks and agrees with Anesthesia plan.  Questions answered. Anesthesia consent signed with patient.  ASA Score: 3     Day of Surgery Review of History & Physical: I have interviewed and examined the patient. I have reviewed the patient's H&P dated:            Ready For Surgery From Anesthesia Perspective.   Results for TRACI CAMACHO (MRN [id number]) as of 8/30/2018 08:52   Ref. Range 8/30/2018 03:56   Hemoglobin Latest Ref Range: 14.0 - 18.0 g/dL 8.9 (L)   Hematocrit Latest Ref Range: 40.0 - 54.0 % 27.6 (L)     Results for TRACI CAMACHO (MRN [id number]) as of 8/30/2018 08:52   Ref. Range 8/30/2018 03:56   Sodium Latest Ref Range: 136 - 145 mmol/L 141   Potassium Latest Ref Range: 3.5 - 5.1 mmol/L 4.4   Chloride Latest Ref Range: 95 - 110 mmol/L 112 (H)   CO2 Latest Ref Range: 23 - 29 mmol/L 21 (L)   Anion Gap Latest Ref Range: 8 - 16 mmol/L 8   BUN, Bld Latest Ref Range: 6 - 20 mg/dL 10   Creatinine Latest Ref Range: 0.5 - 1.4 mg/dL 0.9   eGFR if non African American Latest Ref Range: >60 mL/min/1.73 m^2 >60   eGFR if  Latest Ref Range: >60 mL/min/1.73 m^2 >60   Glucose Latest Ref Range: 70 - 110 mg/dL 130 (H)   Calcium Latest Ref Range: 8.7 - 10.5 mg/dL 9.1

## 2018-08-30 NOTE — PLAN OF CARE
Patient back on the floor from surgery. Bladder irrigation in place on slow drip. Restarted IV fluid. Gave patient a diet.

## 2018-08-30 NOTE — ASSESSMENT & PLAN NOTE
Takes metformin 500 mg BID, insulin detemir 15 units BID, and humalog 75-25 20 units TID at home. Will hold metformin during hospital admission. A1c at 8.5. Monitor BG and titrate with SSI as needed. Diabetic diet.
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
## 2018-08-30 NOTE — PLAN OF CARE
TN faxed updated clinicals via [facility] to [facility].     Arina Harding RN  Transition Navigator  [phone]

## 2018-08-30 NOTE — PLAN OF CARE
Assessed and changed patients left ankle and heel dressing. Applied lotion to leg and added a protective heel boot. Patient tolerated well.

## 2018-08-30 NOTE — PLAN OF CARE
Problem: Patient Care Overview  Goal: Plan of Care Review  Outcome: Ongoing (interventions implemented as appropriate)  The pt has slept off and on during the night.  He has refused to turn even after explaining to him that staying in one place all the time will cause breakdowns. He continues to put out bright red urine with multiple clots. His blood glucose levels have been monitored and did require coverage. He has had no falls this shift.

## 2018-08-30 NOTE — ASSESSMENT & PLAN NOTE
Obstructed suprapubic catheter  Patient with gross hematuria s/p suprapubic catheter change 5 days ago. CT abdomen/pelvis showed no acute intra-abdominal abnormalities identified; suprapubic catheter in place with hyperdense urine or blood seen within the urinary bladder.   - Hemoglobin at 11.4 > 10.1 > 8.9. Continue to monitor closely. IVF.  - WBC count at 17.01 > 15.85 > 8.68. Afebrile. Urine culture negative. Continue ceftriaxone 1 g IV daily.   - Pt continues with gross hematuria requiring manual irrigation with evacuation of clots. Urology consulted, appreciate recommendations. NPO for cystoscopy today.

## 2018-08-30 NOTE — ASSESSMENT & PLAN NOTE
Dorsalgia  Takes baclofen 20 mg TID and meloxicam 7.5 mg BID PRN at home. Continue baclofen. Orders to turn every 2 hours, although patient is refusing. Pressure reducing mattress.

## 2018-08-30 NOTE — PLAN OF CARE
Did assessment on patient. Went over plan of care. Bed in low position, call light in reach.Saftey measure taken.Patient refused being turned.

## 2018-08-30 NOTE — TRANSFER OF CARE
"Anesthesia Transfer of Care Note    Patient: Hermann Aguilar    Procedure(s) Performed: Procedure(s) (LRB):  CYSTOSCOPY, clot evacuation, suprapubic tube exchange (N/A)    Patient location: PACU    Anesthesia Type: general    Transport from OR: Transported from OR on 6-10 L/min O2 by face mask with adequate spontaneous ventilation    Post pain: adequate analgesia    Post assessment: no apparent anesthetic complications and tolerated procedure well    Post vital signs: stable    Level of consciousness: awake, alert and sedated    Nausea/Vomiting: no nausea/vomiting    Complications: none    Transfer of care protocol was followed      Last vitals:   Visit Vitals  /62 (BP Location: Left arm, Patient Position: Lying)   Pulse 82   Temp 36.3 °C (97.3 °F) (Oral)   Resp 16   Ht 5' 10" (1.778 m)   Wt 116 kg (255 lb 11.7 oz)   SpO2 99%   BMI 36.69 kg/m²     "
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
## 2018-08-31 VITALS
TEMPERATURE: 98 F | HEART RATE: 85 BPM | HEIGHT: 70 IN | OXYGEN SATURATION: 99 % | WEIGHT: 255.75 LBS | SYSTOLIC BLOOD PRESSURE: 145 MMHG | RESPIRATION RATE: 20 BRPM | BODY MASS INDEX: 36.61 KG/M2 | DIASTOLIC BLOOD PRESSURE: 79 MMHG

## 2018-08-31 PROBLEM — R31.0 GROSS HEMATURIA: Status: RESOLVED | Noted: 2018-08-29 | Resolved: 2018-08-31

## 2018-08-31 LAB
ANION GAP SERPL CALC-SCNC: 6 MMOL/L
BASOPHILS # BLD AUTO: 0.03 K/UL
BASOPHILS NFR BLD: 0.5 %
BUN SERPL-MCNC: 8 MG/DL
CALCIUM SERPL-MCNC: 9.5 MG/DL
CHLORIDE SERPL-SCNC: 112 MMOL/L
CO2 SERPL-SCNC: 24 MMOL/L
CREAT SERPL-MCNC: 0.9 MG/DL
DIFFERENTIAL METHOD: ABNORMAL
EOSINOPHIL # BLD AUTO: 0.3 K/UL
EOSINOPHIL NFR BLD: 5.6 %
ERYTHROCYTE [DISTWIDTH] IN BLOOD BY AUTOMATED COUNT: 16.2 %
EST. GFR  (AFRICAN AMERICAN): >60 ML/MIN/1.73 M^2
EST. GFR  (NON AFRICAN AMERICAN): >60 ML/MIN/1.73 M^2
GLUCOSE SERPL-MCNC: 135 MG/DL
HCT VFR BLD AUTO: 26.9 %
HGB BLD-MCNC: 8.6 G/DL
LYMPHOCYTES # BLD AUTO: 2 K/UL
LYMPHOCYTES NFR BLD: 33 %
MAGNESIUM SERPL-MCNC: 2.1 MG/DL
MCH RBC QN AUTO: 27.4 PG
MCHC RBC AUTO-ENTMCNC: 32 G/DL
MCV RBC AUTO: 86 FL
MONOCYTES # BLD AUTO: 0.6 K/UL
MONOCYTES NFR BLD: 10.3 %
NEUTROPHILS # BLD AUTO: 3 K/UL
NEUTROPHILS NFR BLD: 50.1 %
PHOSPHATE SERPL-MCNC: 3.6 MG/DL
PLATELET # BLD AUTO: 355 K/UL
PMV BLD AUTO: 9.9 FL
POCT GLUCOSE: 110 MG/DL (ref 70–110)
POCT GLUCOSE: 145 MG/DL (ref 70–110)
POTASSIUM SERPL-SCNC: 4.9 MMOL/L
RBC # BLD AUTO: 3.14 M/UL
SODIUM SERPL-SCNC: 142 MMOL/L
WBC # BLD AUTO: 6.03 K/UL

## 2018-08-31 PROCEDURE — 99232 SBSQ HOSP IP/OBS MODERATE 35: CPT | Mod: ,,, | Performed by: STUDENT IN AN ORGANIZED HEALTH CARE EDUCATION/TRAINING PROGRAM

## 2018-08-31 PROCEDURE — 80048 BASIC METABOLIC PNL TOTAL CA: CPT

## 2018-08-31 PROCEDURE — 25000003 PHARM REV CODE 250: Performed by: PHYSICIAN ASSISTANT

## 2018-08-31 PROCEDURE — 94761 N-INVAS EAR/PLS OXIMETRY MLT: CPT

## 2018-08-31 PROCEDURE — 85025 COMPLETE CBC W/AUTO DIFF WBC: CPT

## 2018-08-31 PROCEDURE — 84100 ASSAY OF PHOSPHORUS: CPT

## 2018-08-31 PROCEDURE — 25000003 PHARM REV CODE 250: Performed by: STUDENT IN AN ORGANIZED HEALTH CARE EDUCATION/TRAINING PROGRAM

## 2018-08-31 PROCEDURE — 83735 ASSAY OF MAGNESIUM: CPT

## 2018-08-31 PROCEDURE — 36415 COLL VENOUS BLD VENIPUNCTURE: CPT

## 2018-08-31 PROCEDURE — 63600175 PHARM REV CODE 636 W HCPCS: Performed by: PHYSICIAN ASSISTANT

## 2018-08-31 RX ADMIN — BACLOFEN 20 MG: 10 TABLET ORAL at 10:08

## 2018-08-31 RX ADMIN — SODIUM CHLORIDE: 0.9 INJECTION, SOLUTION INTRAVENOUS at 01:08

## 2018-08-31 RX ADMIN — VENLAFAXINE HYDROCHLORIDE 75 MG: 75 CAPSULE, EXTENDED RELEASE ORAL at 10:08

## 2018-08-31 RX ADMIN — OXYCODONE HYDROCHLORIDE AND ACETAMINOPHEN 500 MG: 500 TABLET ORAL at 10:08

## 2018-08-31 RX ADMIN — FAMOTIDINE 20 MG: 20 TABLET ORAL at 10:08

## 2018-08-31 RX ADMIN — CYCLOBENZAPRINE HYDROCHLORIDE 10 MG: 10 TABLET, FILM COATED ORAL at 10:08

## 2018-08-31 RX ADMIN — Medication: at 09:08

## 2018-08-31 RX ADMIN — CEFTRIAXONE 1 G: 1 INJECTION, SOLUTION INTRAVENOUS at 01:08

## 2018-08-31 RX ADMIN — DOCUSATE SODIUM 100 MG: 100 CAPSULE, LIQUID FILLED ORAL at 10:08

## 2018-08-31 RX ADMIN — FERROUS SULFATE TAB EC 325 MG (65 MG FE EQUIVALENT) 325 MG: 325 (65 FE) TABLET DELAYED RESPONSE at 10:08

## 2018-08-31 RX ADMIN — GABAPENTIN 300 MG: 300 CAPSULE ORAL at 10:08

## 2018-08-31 RX ADMIN — SENNOSIDES AND DOCUSATE SODIUM 1 TABLET: 8.6; 5 TABLET ORAL at 10:08

## 2018-08-31 RX ADMIN — GEMFIBROZIL 600 MG: 600 TABLET ORAL at 06:08

## 2018-08-31 RX ADMIN — METOPROLOL TARTRATE 12.5 MG: 25 TABLET, FILM COATED ORAL at 10:08

## 2018-08-31 RX ADMIN — BUPROPION HYDROCHLORIDE 150 MG: 150 TABLET, FILM COATED, EXTENDED RELEASE ORAL at 10:08

## 2018-08-31 NOTE — PLAN OF CARE
TN spoke with Dr. Alfaro. He will review Dr. Navarrete's note and write the transfer orders. Updated clinicals faxed to [place].     Future Appointments   Date Time Provider Department Center   9/26/2018  2:30 PM Nadir Hyde MD PhD [facility] CARDIO [place]   10/1/2018 10:00 AM Ruchi Navarrete MD [facility] UROLOGY [facility]     Follow-up With  Details  Why  Contact Info   [facility]  Go to  Nursing Home, Primary Care Physician  [address] [facility] [address]  [phone]   Ruhci Navarrete MD  On 10/1/2018  at 10:00 am--Urology Follow-Up  [address]  [address]  [facility] [address]  [phone]      08/31/18 1100   Final Note   Assessment Type Final Discharge Note   Discharge Disposition [facility]   What phone number can be called within the next 1-3 days to see how you are doing after discharge? [phone]   Hospital Follow Up  Appt(s) scheduled? Yes   Right Care Referral Info   Post Acute Recommendation Other   Referral Type Nursing Home   Facility Name Return to [facility]     Arina Harding RN  Transition Navigator  [phone]

## 2018-08-31 NOTE — PLAN OF CARE
Problem: Patient Care Overview  Goal: Plan of Care Review  Outcome: Ongoing (interventions implemented as appropriate)  The pt has slept off and on during the night.  He continues to be on the bladder irrigation and is putting out clear yellow urine. He continues to receive IV antibiotics.  He refuses to let us turn him and understands that this can lead to additional pressure ulcers.  His blood glucose levels have been monitored and did require coverage.  He has had not falls this shift.
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
## 2018-08-31 NOTE — PLAN OF CARE
[facility]  Maurilio Alfaro MD, [facility]   MD Claire Umana PA-C Renee Melancon, PA-C Kristin Stein, PA-C Arekeva Selmon, NP-C  [address] [place] [address]  Office Phone: [phone]  Office Fax: [phone]         NURSING HOME ORDERS    08/31/2018    Admit to Nursing Home:  Regular Bed                                             Diagnoses:  Active Hospital Problems    Diagnosis  POA    Hematuria [R31.9]  Yes    Obstructed suprapubic catheter, initial encounter [T83.090A]  Yes    Constipation [K59.00]  Yes    Type 2 diabetes mellitus [E11.9]  Yes    Chronic ulcer of left lower extremity [L97.929]  Yes    Major depressive disorder [F32.9]  Yes    Phantom limb syndrome [G54.7]  Yes    Tobacco abuse disorder [Z72.0]  Yes    Pure hyperglyceridemia [E78.1]  Yes    Gastroesophageal reflux disease without esophagitis [K21.9]  Yes    Chronic post-traumatic stress disorder [F43.12]  Yes     Chronic    Vitamin D deficiency [E55.9]  Yes     Chronic    Paraplegia at T9 level [G82.20]  Yes     Chronic    Hx of right BKA [Z89.511]  Not Applicable     Chronic    Essential hypertension [I10]  Yes     Chronic    Chronic suprapubic catheter [Z93.59]  Not Applicable     Chronic    Iron deficiency anemia [D50.9]  Yes     Chronic    History of DVT of lower extremity [Z86.718]  Not Applicable      Resolved Hospital Problems    Diagnosis Date Resolved POA    *Gross hematuria [R31.0] 08/31/2018 Yes       Allergies:Review of patient's allergies indicates:  No Known Allergies    Vitals: Once weekly    Diet: Diabetic diet     Acitivities:   - Up in a chair each morning as tolerated   - Wheelchair    LABS:  Per facility protocol    Nursing Precautions:       - Fall precautions per nursing home protocol   - Decubitus precautions:        -  for positioning   - Pressure reducing foam mattress   - Turn patient every two hours. Use  wedge pillows to anchor patient    CONSULTS:  N/A    MISCELLANEOUS CARE:     Huff Care: Empty Huff bag every shift.  Huff will be changed by Urology.     Routine Skin for Bedridden Patients:  Apply moisture barrier cream to all    skin folds and wet areas in perineal area daily and after baths and                           all bowel movements.    WOUND CARE:  Wound spray or saline for wound cleaning with all dressing changes.    All wounds to be measured with first dressing changes and every week.    Pressure Ulcer(s) Stage II   Location: Left buttocks and left heel    Resume prior wound care orders.       DIABETES CARE:        Check blood sugar:      Fingerstick blood sugar AC and HS   Report CBG < 60 or > 400 to physician.      Medications: Discontinue all previous medication orders, if any. See new list below.     Hermann Aguilar   Home Medication Instructions ODALYS:[id number]    Printed on:08/31/18 1226   Medication Information                      kbmim-lfkk-ThBUX-collag-mv-min (ROXANA, WITH COLLAGEN,) 7-7-1.5 gram PwPk  Take by mouth.             ascorbic acid, vitamin C, (VITAMIN C) 500 MG tablet  Take 500 mg by mouth 2 (two) times daily.             aspirin (ECOTRIN) 325 MG EC tablet  Take 325 mg by mouth once daily.             baclofen (LIORESAL) 5 MG tablet  Take 20 mg by mouth 3 (three) times daily.              buPROPion (WELLBUTRIN XL) 150 MG TB24 tablet  Take 150 mg by mouth 2 (two) times daily.             cholecalciferol, vitamin D3, 5,000 unit capsule  Take 50,000 Units by mouth every 30 days.              cyclobenzaprine (FLEXERIL) 10 MG tablet  Take 10 mg by mouth 3 (three) times daily.              docusate sodium (COLACE) 100 MG capsule  Take 100 mg by mouth 2 (two) times daily.             EMOLLIENT COMBINATION NO.92 (LUBRIDERM DAILY MOISTURE TOP)  Apply topically once daily. Apply to left leg and foot             famotidine (PEPCID) 20 MG tablet  Take 20 mg by mouth 2 (two) times daily.              ferrous sulfate 325 (65 FE) MG EC tablet  Take 325 mg by mouth 2 (two) times daily.             gabapentin (NEURONTIN) 300 MG capsule  Take 300 mg by mouth 2 (two) times daily.              gemfibrozil (LOPID) 600 MG tablet  Take 600 mg by mouth 2 (two) times daily before meals.             insulin detemir U-100 (LEVEMIR) 100 unit/mL injection  Inject 15 Units into the skin 2 (two) times daily.             insulin lispro protamin/lispro (HUMALOG MIX 75-25,U-100,INSULN SUBQ)  Inject 20 Units into the skin 3 (three) times daily with meals.             IPRATROPIUM/ALBUTEROL SULFATE (DUONEB INHL)  Inhale 0.5-3 mg into the lungs every 6 (six) hours as needed.              ketoconazole (NIZORAL) 2 % shampoo  Apply topically twice a week.             melatonin 10 mg Cap  Take by mouth every evening.             meloxicam (MOBIC) 7.5 MG tablet  Take 7.5 mg by mouth 2 (two) times daily as needed for Pain.             metFORMIN (GLUCOPHAGE) 500 MG tablet  Take 500 mg by mouth 2 (two) times daily with meals.             metoprolol tartrate (LOPRESSOR) 12.5 mg tablet  Take 12.5 mg by mouth 2 (two) times daily.             multivitamin with minerals tablet  Take 1 tablet by mouth once daily.             nicotine (NICODERM CQ) 7 mg/24 hr  Place 1 patch onto the skin once daily.             nicotine polacrilex 2 MG Lozg  Take 1 lozenge (2 mg total) by mouth as needed.             phentermine (ADIPEX-P) 37.5 mg tablet  Take 37.5 mg by mouth before breakfast.             polyethylene glycol (GLYCOLAX) 17 gram PwPk  Take 17 g by mouth daily as needed.              PROMETHAZINE HCL (PHENERGAN ORAL)  Take 25 mg by mouth every 6 (six) hours as needed.              senna-docusate 8.6-50 mg (PERICOLACE) 8.6-50 mg per tablet  Take 1 tablet by mouth 2 (two) times daily.              shark liver oil-cocoa butter 0.25-3% (PREPARATION H) 0.25-3 % suppository  Place 1 suppository rectally as needed for Hemorrhoids.              venlafaxine (EFFEXOR-XR) 37.5 MG 24 hr capsule  Take 75 mg by mouth once daily.                    RAMÍREZ Strickland MD  [facility]  Maurilio Alfaro MD, [facility]   MD Claire Umana PA-C Renee Melancon, PA-C Kristin Stein, PA-C Arekeva Selmon, NP-C  [address] [place] [address]  Office Phone: [phone]  Office Fax: [phone]    08/31/2018

## 2018-08-31 NOTE — PLAN OF CARE
received call from [place] at [facility] reporting the facility orders were reviewed and the patient is clear to return today. Patient is skilled nursing resident.      notified bedside nurse Alize to call report to [phone]- room #24. [facility] reported their wheelchair van transportation will leave at 1:30pm to come and  the patient.    Patient notified of discharge.

## 2018-08-31 NOTE — NURSING
Patient refused to allow nursing staff to reposition him off of his back every visit with hourly rounding. Patient states does not want to move on either side. Thoroughly discussed with patient importance of repositioning to prevent any further skin breakdown. Patient verbalized clear understanding of all discussed but still refused intervention.

## 2018-08-31 NOTE — NURSING
Discharge/transfer report phoned to [facility], spoke with Emmy Juarez LPN. Nurse verbalized clear understanding of all discussed. Patient to be transported to facility via [facility] w/c transportation van with . At present no distress noted. Supra pubic catheter to bedside drainage bag intact and secured. Clear yellow urine output noted. Personal belongings to be transported with patient. Will cont to monitor pt and intervene prn.

## 2018-08-31 NOTE — PROGRESS NOTES
[facility]  Urology Progress Note    Problems:   Patient Active Problem List   Diagnosis    Iron deficiency anemia    Recurrent major depressive disorder, in remission    Chronic suprapubic catheter    Essential hypertension    Vitamin D deficiency    Paraplegia at T9 level    History of DVT of lower extremity    Hx of right BKA    Non-healing wound of lower extremity, initial encounter    Gastrostomy status    Tracheostomy status    Chronic post-traumatic stress disorder    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    PAD (peripheral artery disease)    Neuropathic foot ulcer    Obstructed suprapubic catheter, initial encounter    Gross hematuria    Constipation    Type 2 diabetes mellitus    Chronic ulcer of left lower extremity    Major depressive disorder    Phantom limb syndrome    Tobacco abuse disorder    Pure hyperglyceridemia    Gastroesophageal reflux disease without esophagitis    Hematuria       Subjective   NAEO. Pt reports no pain. No manual irrigation overnight. The CBI was on a very slow rate drip. This morning during rounds the CBI was clamped and the urine remained clear overnight.     Meds:   ascorbic acid (vitamin C)  500 mg Oral BID    baclofen  20 mg Oral TID    buPROPion  150 mg Oral BID    cefTRIAXone (ROCEPHIN) IVPB  1 g Intravenous Q24H    cyclobenzaprine  10 mg Oral TID    docusate sodium  100 mg Oral BID    famotidine  20 mg Oral BID    ferrous sulfate  325 mg Oral BID    gabapentin  300 mg Oral BID    gemfibrozil  600 mg Oral BID AC    metoprolol tartrate  12.5 mg Oral BID    senna-docusate 8.6-50 mg  1 tablet Oral BID    venlafaxine  75 mg Oral Daily    white petrolatum-mineral oil   Topical (Top) Daily      sodium chloride 0.9% 100 mL/hr at 08/31/18 0156     acetaminophen, ciprofloxacin, dextrose 50%, dextrose 50%, diphenhydrAMINE, glucagon (human recombinant), glucose, glucose, insulin aspart U-100, ondansetron, ondansetron,  polyethylene glycol, sodium chloride 0.9%, sodium chloride 0.9%    Temp:  [96.1 °F (35.6 °C)-98.7 °F (37.1 °C)] 98.7 °F (37.1 °C)  Pulse:  [] 79  Resp:  [10-20] 18  SpO2:  [99 %-100 %] 100 %  BP: (103-128)/(56-77) 121/73    I/O last 3 completed shifts:  In: 8070 [P.O.:370; I.V.:4150; Other:3250; IV Piggyback:300]  Out: 5250 [Urine:5250]    General:  Alert, cooperative, no distress, appears stated age   Head:  Normocephalic, without obvious abnormality, atraumatic   Eyes:  PERRL, conjunctiva/corneas clear   Lungs:   Respirations unlabored    Heart:  Warm and well perfused   Abdomen:   abdomen is soft without significant tenderness, masses, organomegaly or guarding; 22 [address] 3 way suprapubic tube draining light yellow urine. CBI clamped this morning. I disconnected the tubing from the 3rd port and capped it   : No urethral sage   Drains: SPT exchange   Extremities: Extremities normal, atraumatic, no cyanosis or edema   Skin: Skin color, texture, turgor normal, no rashes or lesions   Psych: Appropriate   Neurologic: Non-focal     Recent Results (from the past 24 hour(s))   POCT glucose    Collection Time: 08/30/18 11:22 AM   Result Value Ref Range    POCT Glucose 104 70 - 110 mg/dL   POCT glucose    Collection Time: 08/30/18  5:37 PM   Result Value Ref Range    POCT Glucose 92 70 - 110 mg/dL   POCT glucose    Collection Time: 08/30/18  8:49 PM   Result Value Ref Range    POCT Glucose 211 (H) 70 - 110 mg/dL   POCT glucose    Collection Time: 08/31/18  5:43 AM   Result Value Ref Range    POCT Glucose 145 (H) 70 - 110 mg/dL   CBC with Automated Differential    Collection Time: 08/31/18  6:04 AM   Result Value Ref Range    WBC 6.03 3.90 - 12.70 K/uL    RBC 3.14 (L) 4.60 - 6.20 M/uL    Hemoglobin 8.6 (L) 14.0 - 18.0 g/dL    Hematocrit 26.9 (L) 40.0 - 54.0 %    MCV 86 82 - 98 fL    MCH 27.4 27.0 - 31.0 pg    MCHC 32.0 32.0 - 36.0 g/dL    RDW 16.2 (H) 11.5 - 14.5 %    Platelets 355 (H) 150 - 350 K/uL    MPV 9.9 9.2  - 12.9 fL    Gran # (ANC) 3.0 1.8 - 7.7 K/uL    Lymph # 2.0 1.0 - 4.8 K/uL    Mono # 0.6 0.3 - 1.0 K/uL    Eos # 0.3 0.0 - 0.5 K/uL    Baso # 0.03 0.00 - 0.20 K/uL    Gran% 50.1 38.0 - 73.0 %    Lymph% 33.0 18.0 - 48.0 %    Mono% 10.3 4.0 - 15.0 %    Eosinophil% 5.6 0.0 - 8.0 %    Basophil% 0.5 0.0 - 1.9 %    Differential Method Automated    Phosphorus    Collection Time: 08/31/18  6:04 AM   Result Value Ref Range    Phosphorus 3.6 2.7 - 4.5 mg/dL   Magnesium    Collection Time: 08/31/18  6:04 AM   Result Value Ref Range    Magnesium 2.1 1.6 - 2.6 mg/dL   Basic metabolic panel    Collection Time: 08/31/18  6:04 AM   Result Value Ref Range    Sodium 142 136 - 145 mmol/L    Potassium 4.9 3.5 - 5.1 mmol/L    Chloride 112 (H) 95 - 110 mmol/L    CO2 24 23 - 29 mmol/L    Glucose 135 (H) 70 - 110 mg/dL    BUN, Bld 8 6 - 20 mg/dL    Creatinine 0.9 0.5 - 1.4 mg/dL    Calcium 9.5 8.7 - 10.5 mg/dL    Anion Gap 6 (L) 8 - 16 mmol/L    eGFR if African American >60 >60 mL/min/1.73 m^2    eGFR if non African American >60 >60 mL/min/1.73 m^2     Assessment: 42 y.o. AA male with:  Paraplegia  Neurogenic bladder managed with suprapubic tube  Gross hematuria with clots  S/p cystoscopy, clot evacuation, bladder fulguration, and SPT exchange 8/30/18 POD 1    Plan:  1. Patient's urine remained clear overnight. When I rounded this morning the CBI was clamped.  2. I removed the CBI tubing from the third port of the SPT and capped the third port. This is how the sage should remain when he is discharged. The sage is now acting as a regular 2 way sage catheter.  3. The patient desire SPT exchanges with me in the clinic from now on. Please schedule a followup with me in 1 month for the next SPT exchange.
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
## 2018-09-04 ENCOUNTER — OUTSIDE PLACE OF SERVICE (OUTPATIENT)
Dept: ADMINISTRATIVE | Facility: OTHER | Age: 43
End: 2018-09-04
Payer: MEDICAID

## 2018-09-04 PROCEDURE — 99308 SBSQ NF CARE LOW MDM 20: CPT | Mod: ,,, | Performed by: FAMILY MEDICINE

## 2018-09-25 ENCOUNTER — TELEPHONE (OUTPATIENT)
Dept: FAMILY MEDICINE | Facility: CLINIC | Age: 43
End: 2018-09-25

## 2018-09-25 NOTE — TELEPHONE ENCOUNTER
Caller: [phone]//Ranjeet Musa (Today,  1:37 PM)             He needs a fu visit on Monday.

## 2018-09-26 ENCOUNTER — OFFICE VISIT (OUTPATIENT)
Dept: CARDIOLOGY | Facility: CLINIC | Age: 43
End: 2018-09-26
Payer: MEDICAID

## 2018-09-26 VITALS
DIASTOLIC BLOOD PRESSURE: 74 MMHG | WEIGHT: 254 LBS | OXYGEN SATURATION: 98 % | HEART RATE: 92 BPM | SYSTOLIC BLOOD PRESSURE: 100 MMHG | BODY MASS INDEX: 36.36 KG/M2 | HEIGHT: 70 IN

## 2018-09-26 DIAGNOSIS — Z86.718 HISTORY OF DVT OF LOWER EXTREMITY: ICD-10-CM

## 2018-09-26 DIAGNOSIS — I73.9 PAD (PERIPHERAL ARTERY DISEASE): Chronic | ICD-10-CM

## 2018-09-26 DIAGNOSIS — Z89.511 HX OF RIGHT BKA: Chronic | ICD-10-CM

## 2018-09-26 DIAGNOSIS — S81.809A NON-HEALING WOUND OF LOWER EXTREMITY, INITIAL ENCOUNTER: ICD-10-CM

## 2018-09-26 DIAGNOSIS — G82.20 PARAPLEGIA AT T9 LEVEL: Primary | Chronic | ICD-10-CM

## 2018-09-26 DIAGNOSIS — I10 ESSENTIAL HYPERTENSION: Chronic | ICD-10-CM

## 2018-09-26 PROCEDURE — 99999 PR PBB SHADOW E&M-EST. PATIENT-LVL III: CPT | Mod: PBBFAC,,, | Performed by: INTERNAL MEDICINE

## 2018-09-26 PROCEDURE — 99213 OFFICE O/P EST LOW 20 MIN: CPT | Mod: PBBFAC,PN | Performed by: INTERNAL MEDICINE

## 2018-09-26 PROCEDURE — 99215 OFFICE O/P EST HI 40 MIN: CPT | Mod: S$PBB,,, | Performed by: INTERNAL MEDICINE
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
## 2018-10-01 ENCOUNTER — TELEPHONE (OUTPATIENT)
Dept: UROLOGY | Facility: CLINIC | Age: 43
End: 2018-10-01

## 2018-10-01 ENCOUNTER — OFFICE VISIT (OUTPATIENT)
Dept: UROLOGY | Facility: CLINIC | Age: 43
End: 2018-10-01
Payer: MEDICAID

## 2018-10-01 VITALS
HEIGHT: 70 IN | HEART RATE: 91 BPM | WEIGHT: 254 LBS | DIASTOLIC BLOOD PRESSURE: 83 MMHG | BODY MASS INDEX: 36.36 KG/M2 | SYSTOLIC BLOOD PRESSURE: 124 MMHG | TEMPERATURE: 98 F

## 2018-10-01 DIAGNOSIS — N31.9 NEUROGENIC BLADDER: Primary | ICD-10-CM

## 2018-10-01 DIAGNOSIS — G82.20 PARAPLEGIA AT T9 LEVEL: ICD-10-CM

## 2018-10-01 DIAGNOSIS — Z93.59 CHRONIC SUPRAPUBIC CATHETER: ICD-10-CM

## 2018-10-01 PROCEDURE — 51705 CHANGE OF BLADDER TUBE: CPT | Mod: PBBFAC,PO | Performed by: STUDENT IN AN ORGANIZED HEALTH CARE EDUCATION/TRAINING PROGRAM

## 2018-10-01 PROCEDURE — 99213 OFFICE O/P EST LOW 20 MIN: CPT | Mod: PBBFAC,PO | Performed by: STUDENT IN AN ORGANIZED HEALTH CARE EDUCATION/TRAINING PROGRAM

## 2018-10-01 PROCEDURE — 51705 CHANGE OF BLADDER TUBE: CPT | Mod: S$PBB,,, | Performed by: STUDENT IN AN ORGANIZED HEALTH CARE EDUCATION/TRAINING PROGRAM

## 2018-10-01 PROCEDURE — 99999 PR PBB SHADOW E&M-EST. PATIENT-LVL III: CPT | Mod: PBBFAC,,, | Performed by: STUDENT IN AN ORGANIZED HEALTH CARE EDUCATION/TRAINING PROGRAM

## 2018-10-01 PROCEDURE — 99214 OFFICE O/P EST MOD 30 MIN: CPT | Mod: S$PBB,25,, | Performed by: STUDENT IN AN ORGANIZED HEALTH CARE EDUCATION/TRAINING PROGRAM

## 2018-10-01 NOTE — TELEPHONE ENCOUNTER
----- Message from Alyssa Duarte sent at 10/1/2018 10:21 AM CDT -----  Contact:  ([facility]) [phone]   would like to speak with you about the patient running late. Please advise.
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
## 2018-10-09 ENCOUNTER — CLINICAL SUPPORT (OUTPATIENT)
Dept: FAMILY MEDICINE | Facility: CLINIC | Age: 43
End: 2018-10-09

## 2018-10-30 PROCEDURE — 90471 FLU VACCINE (QUAD) GREATER THAN OR EQUAL TO 3YO PRESERVATIVE FREE IM: ICD-10-PCS | Mod: S$GLB,,, | Performed by: FAMILY MEDICINE

## 2018-10-30 PROCEDURE — 90686 IIV4 VACC NO PRSV 0.5 ML IM: CPT | Mod: S$GLB,,, | Performed by: FAMILY MEDICINE

## 2018-10-30 PROCEDURE — 90471 IMMUNIZATION ADMIN: CPT | Mod: S$GLB,,, | Performed by: FAMILY MEDICINE

## 2018-10-30 PROCEDURE — 90686 FLU VACCINE (QUAD) GREATER THAN OR EQUAL TO 3YO PRESERVATIVE FREE IM: ICD-10-PCS | Mod: S$GLB,,, | Performed by: FAMILY MEDICINE

## 2018-11-07 ENCOUNTER — TELEPHONE (OUTPATIENT)
Dept: UROLOGY | Facility: CLINIC | Age: 43
End: 2018-11-07

## 2018-11-07 ENCOUNTER — TELEPHONE (OUTPATIENT)
Dept: SMOKING CESSATION | Facility: CLINIC | Age: 43
End: 2018-11-07

## 2018-11-07 NOTE — TELEPHONE ENCOUNTER
Returned call, follow up recommended from SPT tube exchange in hospital.  Appointment scheduled for 11/12/18 at 1040 hours.  Scheduled with moreno clerk at [facility].

## 2018-11-07 NOTE — TELEPHONE ENCOUNTER
----- Message from Nabil Kennedy sent at 11/7/2018  2:02 PM CST -----  Contact: [facility] [phone]  Patient would like to be seen sooner than the next available appointment. He needs an appointment within the next month. Please call and advise.

## 2018-11-12 ENCOUNTER — OFFICE VISIT (OUTPATIENT)
Dept: UROLOGY | Facility: CLINIC | Age: 43
End: 2018-11-12
Payer: MEDICAID

## 2018-11-12 VITALS
BODY MASS INDEX: 36.36 KG/M2 | HEART RATE: 120 BPM | SYSTOLIC BLOOD PRESSURE: 114 MMHG | TEMPERATURE: 98 F | WEIGHT: 254 LBS | DIASTOLIC BLOOD PRESSURE: 80 MMHG | HEIGHT: 70 IN

## 2018-11-12 DIAGNOSIS — N31.9 NEUROGENIC BLADDER: ICD-10-CM

## 2018-11-12 DIAGNOSIS — Z93.59 CHRONIC SUPRAPUBIC CATHETER: Primary | ICD-10-CM

## 2018-11-12 PROCEDURE — 99213 OFFICE O/P EST LOW 20 MIN: CPT | Mod: S$PBB,25,, | Performed by: STUDENT IN AN ORGANIZED HEALTH CARE EDUCATION/TRAINING PROGRAM

## 2018-11-12 PROCEDURE — 99999 PR PBB SHADOW E&M-EST. PATIENT-LVL IV: CPT | Mod: PBBFAC,,, | Performed by: STUDENT IN AN ORGANIZED HEALTH CARE EDUCATION/TRAINING PROGRAM

## 2018-11-12 PROCEDURE — 51705 CHANGE OF BLADDER TUBE: CPT | Mod: PBBFAC,PO | Performed by: STUDENT IN AN ORGANIZED HEALTH CARE EDUCATION/TRAINING PROGRAM

## 2018-11-12 PROCEDURE — 99214 OFFICE O/P EST MOD 30 MIN: CPT | Mod: PBBFAC,PO | Performed by: STUDENT IN AN ORGANIZED HEALTH CARE EDUCATION/TRAINING PROGRAM

## 2018-11-12 PROCEDURE — 51705 CHANGE OF BLADDER TUBE: CPT | Mod: S$PBB,,, | Performed by: STUDENT IN AN ORGANIZED HEALTH CARE EDUCATION/TRAINING PROGRAM
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
## 2018-11-21 ENCOUNTER — TELEPHONE (OUTPATIENT)
Dept: SMOKING CESSATION | Facility: CLINIC | Age: 43
End: 2018-11-21

## 2018-11-29 ENCOUNTER — HOSPITAL ENCOUNTER (EMERGENCY)
Facility: HOSPITAL | Age: 43
Discharge: HOME | End: 2018-11-29
Attending: EMERGENCY MEDICINE
Payer: MEDICAID

## 2018-11-29 VITALS
HEART RATE: 102 BPM | DIASTOLIC BLOOD PRESSURE: 60 MMHG | HEIGHT: 67 IN | TEMPERATURE: 98 F | WEIGHT: 260 LBS | OXYGEN SATURATION: 98 % | RESPIRATION RATE: 23 BRPM | SYSTOLIC BLOOD PRESSURE: 115 MMHG | BODY MASS INDEX: 40.81 KG/M2

## 2018-11-29 DIAGNOSIS — T83.9XXA PROBLEM WITH URINARY CATHETER: ICD-10-CM

## 2018-11-29 DIAGNOSIS — R00.0 TACHYCARDIA: ICD-10-CM

## 2018-11-29 DIAGNOSIS — R31.9 HEMATURIA, UNSPECIFIED TYPE: Primary | ICD-10-CM

## 2018-11-29 LAB
ALBUMIN SERPL BCP-MCNC: 3.5 G/DL
ALP SERPL-CCNC: 192 U/L
ALT SERPL W/O P-5'-P-CCNC: 19 U/L
ANION GAP SERPL CALC-SCNC: 8 MMOL/L
AST SERPL-CCNC: 21 U/L
BACTERIA #/AREA URNS HPF: ABNORMAL /HPF
BASOPHILS # BLD AUTO: 0.05 K/UL
BASOPHILS NFR BLD: 0.4 %
BILIRUB SERPL-MCNC: 0.2 MG/DL
BILIRUB UR QL STRIP: ABNORMAL
BUN SERPL-MCNC: 14 MG/DL
CALCIUM SERPL-MCNC: 10.7 MG/DL
CHLORIDE SERPL-SCNC: 100 MMOL/L
CLARITY UR: ABNORMAL
CO2 SERPL-SCNC: 28 MMOL/L
COLOR UR: ABNORMAL
CREAT SERPL-MCNC: 1.1 MG/DL
DIFFERENTIAL METHOD: ABNORMAL
EOSINOPHIL # BLD AUTO: 0.5 K/UL
EOSINOPHIL NFR BLD: 4 %
ERYTHROCYTE [DISTWIDTH] IN BLOOD BY AUTOMATED COUNT: 14.6 %
EST. GFR  (AFRICAN AMERICAN): >60 ML/MIN/1.73 M^2
EST. GFR  (NON AFRICAN AMERICAN): >60 ML/MIN/1.73 M^2
GLUCOSE SERPL-MCNC: 343 MG/DL
GLUCOSE UR QL STRIP: ABNORMAL
HCT VFR BLD AUTO: 37.6 %
HGB BLD-MCNC: 12.3 G/DL
HGB UR QL STRIP: ABNORMAL
HYALINE CASTS #/AREA URNS LPF: ABNORMAL /LPF
KETONES UR QL STRIP: ABNORMAL
LEUKOCYTE ESTERASE UR QL STRIP: ABNORMAL
LYMPHOCYTES # BLD AUTO: 3.1 K/UL
LYMPHOCYTES NFR BLD: 26.4 %
MCH RBC QN AUTO: 28.2 PG
MCHC RBC AUTO-ENTMCNC: 32.7 G/DL
MCV RBC AUTO: 86 FL
MICROSCOPIC COMMENT: ABNORMAL
MONOCYTES # BLD AUTO: 0.9 K/UL
MONOCYTES NFR BLD: 7.3 %
NEUTROPHILS # BLD AUTO: 7.3 K/UL
NEUTROPHILS NFR BLD: 61.3 %
NITRITE UR QL STRIP: ABNORMAL
PH UR STRIP: ABNORMAL [PH] (ref 5–8)
PLATELET # BLD AUTO: 661 K/UL
PMV BLD AUTO: 10.8 FL
POCT GLUCOSE: 282 MG/DL (ref 70–110)
POTASSIUM SERPL-SCNC: 4.6 MMOL/L
PROT SERPL-MCNC: 9.8 G/DL
PROT UR QL STRIP: ABNORMAL
RBC # BLD AUTO: 4.36 M/UL
RBC #/AREA URNS HPF: >100 /HPF (ref 0–4)
SODIUM SERPL-SCNC: 136 MMOL/L
SP GR UR STRIP: ABNORMAL (ref 1–1.03)
URN SPEC COLLECT METH UR: ABNORMAL
UROBILINOGEN UR STRIP-ACNC: ABNORMAL EU/DL
WBC # BLD AUTO: 11.89 K/UL
WBC #/AREA URNS HPF: ABNORMAL /HPF (ref 0–5)

## 2018-11-29 PROCEDURE — 82962 GLUCOSE BLOOD TEST: CPT

## 2018-11-29 PROCEDURE — 93005 ELECTROCARDIOGRAM TRACING: CPT

## 2018-11-29 PROCEDURE — 85025 COMPLETE CBC W/AUTO DIFF WBC: CPT

## 2018-11-29 PROCEDURE — 96374 THER/PROPH/DIAG INJ IV PUSH: CPT

## 2018-11-29 PROCEDURE — 25000003 PHARM REV CODE 250: Performed by: NURSE PRACTITIONER

## 2018-11-29 PROCEDURE — 99284 EMERGENCY DEPT VISIT MOD MDM: CPT | Mod: 25

## 2018-11-29 PROCEDURE — 81000 URINALYSIS NONAUTO W/SCOPE: CPT

## 2018-11-29 PROCEDURE — 80053 COMPREHEN METABOLIC PANEL: CPT

## 2018-11-29 RX ORDER — BACITRACIN ZINC 500 UNIT/G
OINTMENT (GRAM) TOPICAL 2 TIMES DAILY
Qty: 30 G | Refills: 0 | Status: ON HOLD | OUTPATIENT
Start: 2018-11-29 | End: 2019-01-04 | Stop reason: HOSPADM

## 2018-11-29 RX ORDER — METOPROLOL TARTRATE 1 MG/ML
5 INJECTION, SOLUTION INTRAVENOUS
Status: COMPLETED | OUTPATIENT
Start: 2018-11-29 | End: 2018-11-29

## 2018-11-29 RX ORDER — METOPROLOL TARTRATE 25 MG/1
12.5 TABLET ORAL
Status: COMPLETED | OUTPATIENT
Start: 2018-11-29 | End: 2018-11-29

## 2018-11-29 RX ADMIN — METOPROLOL TARTRATE 12.5 MG: 25 TABLET, FILM COATED ORAL at 02:11

## 2018-11-29 RX ADMIN — BACITRACIN ZINC NEOMYCIN SULFATE POLYMYXIN B SULFATE: 400; 3.5; 5 OINTMENT TOPICAL at 02:11

## 2018-11-29 RX ADMIN — METOPROLOL TARTRATE 5 MG: 1 INJECTION, SOLUTION INTRAVENOUS at 04:11

## 2018-11-29 NOTE — DISCHARGE INSTRUCTIONS
There seems to be a small skin tear which is causing blood to drain in to the bladder.   Return to the ED if your condition changes, progresses, or if you have any concerns.  Keep the skin tear clean and dry.  Wash with soap and water daily. Ensure that the catheter keeps draining.  If the catheter stops draining, return to the ED.    Your heart rate was elevated in the ED today, likely because you had not taken your metoprolol.  Make sure you take your medications as directed.
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
## 2018-11-29 NOTE — ED NOTES
Catheter flushed at this time due to blood in catheter. Flushing well at this time. Flowing pink urine into sage bag.

## 2018-11-29 NOTE — ED NOTES
Irrigated sage catheter due to blood in tubing. Flushes well. Outer Sage tubing and bag changed at this time. Pt tolerated well.

## 2018-11-29 NOTE — ED NOTES
APPEARANCE: Alert, oriented and in no acute distress.  CARDIAC: Tachycardic rate and rhythm, no murmur heard.   PERIPHERAL VASCULAR: peripheral pulses present. Normal cap refill. No edema. Warm to touch. R BKA. Paraplegia  RESPIRATORY:Normal rate and effort, breath sounds clear bilaterally throughout chest. Respirations are equal and unlabored no obvious signs of distress.  GASTRO: soft, bowel sounds normal, no tenderness, no abdominal distention. Obese. Suprapubic catheter in place.   MUSC: Limited ROM to lower extremities due to amputation and paralyzation.   SKIN: Skin is warm and dry, normal skin turgor, mucous membranes moist. Blood noted in diaper.   NEURO: 5/5 strength major flexors/extensors bilaterally. Sensory intact to light touch bilaterally. [place] coma scale: eyes open spontaneously-4, oriented & converses-5, obeys commands-6. No neurological abnormalities.   MENTAL STATUS: awake, alert and aware of environment.  EYE: PERRL, both eyes: pupils brisk and reactive to light. Normal size.  ENT: EARS: no obvious drainage. NOSE: no active bleeding.   Pt has suprapubic catheter. Blood noted in diaper. Pt does not know why he is bleeding.

## 2018-11-30 ENCOUNTER — TELEPHONE (OUTPATIENT)
Dept: FAMILY MEDICINE | Facility: CLINIC | Age: 43
End: 2018-11-30

## 2018-11-30 NOTE — TELEPHONE ENCOUNTER
----- Message from Paige Robles sent at 11/30/2018  1:29 PM CST -----  Contact: [phone]/self   Patient is requesting to have a refill of      insulin lispro protamin/lispro (HUMALOG MIX 75-25,U-100,INSULN SUBQ).Inject 20 Units into the skin 3 (three) times daily with meals. - Subcutaneous   Pin needles also  insulin detemir U-100 (LEVEMIR) 100 unit/mL injection. Inject 15 Units into the skin 2 (two) times daily. - Subcutaneous      sent to [facility] . Please advise.

## 2018-12-04 ENCOUNTER — TELEPHONE (OUTPATIENT)
Dept: FAMILY MEDICINE | Facility: CLINIC | Age: 43
End: 2018-12-04

## 2018-12-04 NOTE — TELEPHONE ENCOUNTER
Called terrance back and it looks as if Dr Breen sent in his insulin on 11/30 she said she will check and if they dont have it she will call back

## 2018-12-04 NOTE — TELEPHONE ENCOUNTER
----- Message from Alyssa Duarte sent at 12/4/2018  2:57 PM CST -----  Contact: Veronica [phone]  Veronica would like to speak with you about the patient needing all of his diabetic medication [facility] [address] [place] [address] [address] [facility]. Veronica would like a call when the medication is sent.

## 2018-12-11 ENCOUNTER — TELEPHONE (OUTPATIENT)
Dept: FAMILY MEDICINE | Facility: CLINIC | Age: 43
End: 2018-12-11

## 2018-12-11 NOTE — TELEPHONE ENCOUNTER
----- Message from Jackeline Aguirre sent at 12/11/2018 12:04 PM CST -----  Contact: [phone]/ Nurse Fountain  Called in requesting to speak with nurse regarding orders for urine sample.      Called neida back to see what she needed a urine sample for no answer left message on VM to call office back

## 2018-12-12 ENCOUNTER — TELEPHONE (OUTPATIENT)
Dept: UROLOGY | Facility: CLINIC | Age: 43
End: 2018-12-12

## 2018-12-12 NOTE — TELEPHONE ENCOUNTER
----- Message from Benedict Hernandez sent at 12/11/2018  5:09 PM CST -----  Contact: Patient   Patient needs to know if he can come in a little later on 12/12/2018. He may have transportation issues       [phone]

## 2018-12-14 ENCOUNTER — TELEPHONE (OUTPATIENT)
Dept: FAMILY MEDICINE | Facility: CLINIC | Age: 43
End: 2018-12-14

## 2018-12-30 ENCOUNTER — HOSPITAL ENCOUNTER (INPATIENT)
Facility: HOSPITAL | Age: 43
LOS: 5 days | Discharge: HOME | DRG: 698 | End: 2019-01-04
Attending: SURGERY | Admitting: HOSPITALIST
Payer: MEDICAID

## 2018-12-30 DIAGNOSIS — A41.9 SEPSIS SECONDARY TO UTI: Primary | ICD-10-CM

## 2018-12-30 DIAGNOSIS — L89.523 PRESSURE INJURY OF LEFT ANKLE, STAGE 3: ICD-10-CM

## 2018-12-30 DIAGNOSIS — N39.0 SEPSIS SECONDARY TO UTI: Primary | ICD-10-CM

## 2018-12-30 DIAGNOSIS — G82.20 PARAPLEGIA AT T9 LEVEL: Chronic | ICD-10-CM

## 2018-12-30 DIAGNOSIS — Z89.511 HX OF RIGHT BKA: Chronic | ICD-10-CM

## 2018-12-30 DIAGNOSIS — R07.9 CHEST PAIN: ICD-10-CM

## 2018-12-30 DIAGNOSIS — R82.71 BACTERIURIA WITH PYURIA: ICD-10-CM

## 2018-12-30 DIAGNOSIS — R82.81 BACTERIURIA WITH PYURIA: ICD-10-CM

## 2018-12-30 DIAGNOSIS — N30.80 PYOCYSTIS: ICD-10-CM

## 2018-12-30 DIAGNOSIS — A41.9 SEPSIS: ICD-10-CM

## 2018-12-30 DIAGNOSIS — T83.510A URINARY TRACT INFECTION ASSOCIATED WITH CYSTOSTOMY CATHETER, INITIAL ENCOUNTER: ICD-10-CM

## 2018-12-30 DIAGNOSIS — R50.9 FEVER, UNSPECIFIED FEVER CAUSE: ICD-10-CM

## 2018-12-30 DIAGNOSIS — N39.0 URINARY TRACT INFECTION ASSOCIATED WITH CYSTOSTOMY CATHETER, INITIAL ENCOUNTER: ICD-10-CM

## 2018-12-30 DIAGNOSIS — D72.829 LEUKOCYTOSIS, UNSPECIFIED TYPE: ICD-10-CM

## 2018-12-30 LAB
ALBUMIN SERPL BCP-MCNC: 4.4 G/DL
ALP SERPL-CCNC: 135 U/L
ALT SERPL W/O P-5'-P-CCNC: 28 U/L
ANION GAP SERPL CALC-SCNC: 9 MMOL/L
AST SERPL-CCNC: 41 U/L
BACTERIA #/AREA URNS AUTO: ABNORMAL /HPF
BASOPHILS # BLD AUTO: 0.04 K/UL
BASOPHILS NFR BLD: 0.2 %
BILIRUB SERPL-MCNC: 0.6 MG/DL
BILIRUB UR QL STRIP: NEGATIVE
BUN SERPL-MCNC: 10 MG/DL
CALCIUM SERPL-MCNC: 10 MG/DL
CHLORIDE SERPL-SCNC: 102 MMOL/L
CLARITY UR REFRACT.AUTO: ABNORMAL
CO2 SERPL-SCNC: 23 MMOL/L
COLOR UR AUTO: ABNORMAL
CREAT SERPL-MCNC: 0.94 MG/DL
DIFFERENTIAL METHOD: ABNORMAL
EOSINOPHIL # BLD AUTO: 0.3 K/UL
EOSINOPHIL NFR BLD: 1.5 %
ERYTHROCYTE [DISTWIDTH] IN BLOOD BY AUTOMATED COUNT: 15 %
EST. GFR  (AFRICAN AMERICAN): >60 ML/MIN/1.73 M^2
EST. GFR  (NON AFRICAN AMERICAN): >60 ML/MIN/1.73 M^2
GLUCOSE SERPL-MCNC: 223 MG/DL
GLUCOSE UR QL STRIP: NEGATIVE
HCT VFR BLD AUTO: 35.9 %
HGB BLD-MCNC: 12 G/DL
HGB UR QL STRIP: ABNORMAL
HYALINE CASTS UR QL AUTO: 0 /LPF
KETONES UR QL STRIP: NEGATIVE
LACTATE SERPL-SCNC: 2.1 MMOL/L
LEUKOCYTE ESTERASE UR QL STRIP: ABNORMAL
LYMPHOCYTES # BLD AUTO: 1.5 K/UL
LYMPHOCYTES NFR BLD: 6.7 %
MCH RBC QN AUTO: 26.9 PG
MCHC RBC AUTO-ENTMCNC: 33.4 G/DL
MCV RBC AUTO: 81 FL
MICROSCOPIC COMMENT: ABNORMAL
MONOCYTES # BLD AUTO: 1.5 K/UL
MONOCYTES NFR BLD: 6.6 %
NEUTROPHILS # BLD AUTO: 18.9 K/UL
NEUTROPHILS NFR BLD: 85 %
NITRITE UR QL STRIP: NEGATIVE
PH UR STRIP: 8 [PH] (ref 5–8)
PLATELET # BLD AUTO: 411 K/UL
PMV BLD AUTO: 10.7 FL
POTASSIUM SERPL-SCNC: 4.6 MMOL/L
PROT SERPL-MCNC: 9.7 G/DL
PROT UR QL STRIP: ABNORMAL
RBC # BLD AUTO: 4.46 M/UL
RBC #/AREA URNS AUTO: 2 /HPF (ref 0–4)
SODIUM SERPL-SCNC: 134 MMOL/L
SP GR UR STRIP: 1.01 (ref 1–1.03)
TRI-PHOS CRY UR QL COMP ASSIST: ABNORMAL
TROPONIN I SERPL DL<=0.01 NG/ML-MCNC: <0.012 NG/ML
URN SPEC COLLECT METH UR: ABNORMAL
UROBILINOGEN UR STRIP-ACNC: NEGATIVE EU/DL
WBC # BLD AUTO: 22.36 K/UL
WBC #/AREA URNS AUTO: 25 /HPF (ref 0–5)

## 2018-12-30 PROCEDURE — 93010 ELECTROCARDIOGRAM REPORT: CPT | Mod: ,,, | Performed by: INTERNAL MEDICINE

## 2018-12-30 PROCEDURE — 96365 THER/PROPH/DIAG IV INF INIT: CPT

## 2018-12-30 PROCEDURE — 87186 SC STD MICRODIL/AGAR DIL: CPT | Mod: 59

## 2018-12-30 PROCEDURE — 63600175 PHARM REV CODE 636 W HCPCS: Performed by: SURGERY

## 2018-12-30 PROCEDURE — 93010 EKG 12-LEAD: ICD-10-PCS | Mod: ,,, | Performed by: INTERNAL MEDICINE

## 2018-12-30 PROCEDURE — 85025 COMPLETE CBC W/AUTO DIFF WBC: CPT

## 2018-12-30 PROCEDURE — 80053 COMPREHEN METABOLIC PANEL: CPT

## 2018-12-30 PROCEDURE — 87086 URINE CULTURE/COLONY COUNT: CPT

## 2018-12-30 PROCEDURE — 87077 CULTURE AEROBIC IDENTIFY: CPT | Mod: 59

## 2018-12-30 PROCEDURE — 96360 HYDRATION IV INFUSION INIT: CPT | Mod: 59

## 2018-12-30 PROCEDURE — 81000 URINALYSIS NONAUTO W/SCOPE: CPT

## 2018-12-30 PROCEDURE — 87040 BLOOD CULTURE FOR BACTERIA: CPT | Mod: 59

## 2018-12-30 PROCEDURE — 93005 ELECTROCARDIOGRAM TRACING: CPT

## 2018-12-30 PROCEDURE — 25000003 PHARM REV CODE 250: Performed by: SURGERY

## 2018-12-30 PROCEDURE — 99291 CRITICAL CARE FIRST HOUR: CPT | Mod: 25

## 2018-12-30 PROCEDURE — 87088 URINE BACTERIA CULTURE: CPT

## 2018-12-30 PROCEDURE — 11000001 HC ACUTE MED/SURG PRIVATE ROOM

## 2018-12-30 PROCEDURE — 84484 ASSAY OF TROPONIN QUANT: CPT

## 2018-12-30 PROCEDURE — 83605 ASSAY OF LACTIC ACID: CPT

## 2018-12-30 RX ORDER — SODIUM CHLORIDE 9 MG/ML
INJECTION, SOLUTION INTRAVENOUS CONTINUOUS
Status: DISCONTINUED | OUTPATIENT
Start: 2018-12-31 | End: 2019-01-02

## 2018-12-30 RX ORDER — OXYCODONE AND ACETAMINOPHEN 5; 325 MG/1; MG/1
1 TABLET ORAL
Status: COMPLETED | OUTPATIENT
Start: 2018-12-30 | End: 2018-12-30

## 2018-12-30 RX ADMIN — SODIUM CHLORIDE 1000 ML: 0.9 INJECTION, SOLUTION INTRAVENOUS at 10:12

## 2018-12-30 RX ADMIN — PIPERACILLIN AND TAZOBACTAM 4.5 G: 4; .5 INJECTION, POWDER, LYOPHILIZED, FOR SOLUTION INTRAVENOUS; PARENTERAL at 11:12

## 2018-12-30 RX ADMIN — OXYCODONE HYDROCHLORIDE AND ACETAMINOPHEN 1 TABLET: 5; 325 TABLET ORAL at 10:12

## 2018-12-31 ENCOUNTER — CLINICAL SUPPORT (OUTPATIENT)
Dept: SMOKING CESSATION | Facility: CLINIC | Age: 43
End: 2018-12-31
Payer: COMMERCIAL

## 2018-12-31 ENCOUNTER — TELEPHONE (OUTPATIENT)
Dept: UROLOGY | Facility: CLINIC | Age: 43
End: 2018-12-31

## 2018-12-31 DIAGNOSIS — F17.210 CIGARETTE SMOKER: Primary | ICD-10-CM

## 2018-12-31 PROBLEM — N31.9 NEUROGENIC BLADDER: Chronic | Status: ACTIVE | Noted: 2018-12-31

## 2018-12-31 PROBLEM — Z79.4 TYPE 2 DIABETES MELLITUS WITH HYPERGLYCEMIA, WITH LONG-TERM CURRENT USE OF INSULIN: Chronic | Status: ACTIVE | Noted: 2018-08-29

## 2018-12-31 PROBLEM — L89.524: Status: ACTIVE | Noted: 2018-12-31

## 2018-12-31 PROBLEM — N39.0 SEPSIS SECONDARY TO UTI: Status: ACTIVE | Noted: 2018-12-30

## 2018-12-31 PROBLEM — E11.65 TYPE 2 DIABETES MELLITUS WITH HYPERGLYCEMIA, WITH LONG-TERM CURRENT USE OF INSULIN: Chronic | Status: ACTIVE | Noted: 2018-08-29

## 2018-12-31 PROBLEM — N31.9 NEUROGENIC BLADDER: Status: ACTIVE | Noted: 2018-12-31

## 2018-12-31 LAB
ANION GAP SERPL CALC-SCNC: 11 MMOL/L
BASOPHILS # BLD AUTO: 0.03 K/UL
BASOPHILS NFR BLD: 0.2 %
BUN SERPL-MCNC: 8 MG/DL
CALCIUM SERPL-MCNC: 9.8 MG/DL
CHLORIDE SERPL-SCNC: 104 MMOL/L
CO2 SERPL-SCNC: 19 MMOL/L
CREAT SERPL-MCNC: 1 MG/DL
D DIMER PPP IA.FEU-MCNC: 1.75 MG/L FEU
DIFFERENTIAL METHOD: ABNORMAL
EOSINOPHIL # BLD AUTO: 0.1 K/UL
EOSINOPHIL NFR BLD: 0.4 %
ERYTHROCYTE [DISTWIDTH] IN BLOOD BY AUTOMATED COUNT: 15.3 %
EST. GFR  (AFRICAN AMERICAN): >60 ML/MIN/1.73 M^2
EST. GFR  (NON AFRICAN AMERICAN): >60 ML/MIN/1.73 M^2
ESTIMATED AVG GLUCOSE: 212 MG/DL
GLUCOSE SERPL-MCNC: 203 MG/DL
GRAM STN SPEC: NORMAL
HBA1C MFR BLD HPLC: 9 %
HCT VFR BLD AUTO: 36.1 %
HGB BLD-MCNC: 11.7 G/DL
LYMPHOCYTES # BLD AUTO: 1.3 K/UL
LYMPHOCYTES NFR BLD: 7.3 %
MCH RBC QN AUTO: 27 PG
MCHC RBC AUTO-ENTMCNC: 32.4 G/DL
MCV RBC AUTO: 83 FL
MONOCYTES # BLD AUTO: 1.6 K/UL
MONOCYTES NFR BLD: 8.9 %
NEUTROPHILS # BLD AUTO: 14.8 K/UL
NEUTROPHILS NFR BLD: 83.2 %
PLATELET # BLD AUTO: 460 K/UL
PMV BLD AUTO: 10.2 FL
POCT GLUCOSE: 171 MG/DL (ref 70–110)
POCT GLUCOSE: 204 MG/DL (ref 70–110)
POCT GLUCOSE: 212 MG/DL (ref 70–110)
POCT GLUCOSE: 330 MG/DL (ref 70–110)
POTASSIUM SERPL-SCNC: 3.8 MMOL/L
RBC # BLD AUTO: 4.33 M/UL
SODIUM SERPL-SCNC: 134 MMOL/L
WBC # BLD AUTO: 17.84 K/UL

## 2018-12-31 PROCEDURE — 25000003 PHARM REV CODE 250: Performed by: NURSE PRACTITIONER

## 2018-12-31 PROCEDURE — 85379 FIBRIN DEGRADATION QUANT: CPT

## 2018-12-31 PROCEDURE — 25000003 PHARM REV CODE 250: Performed by: HOSPITALIST

## 2018-12-31 PROCEDURE — 94761 N-INVAS EAR/PLS OXIMETRY MLT: CPT

## 2018-12-31 PROCEDURE — 87086 URINE CULTURE/COLONY COUNT: CPT

## 2018-12-31 PROCEDURE — 87077 CULTURE AEROBIC IDENTIFY: CPT

## 2018-12-31 PROCEDURE — 87088 URINE BACTERIA CULTURE: CPT

## 2018-12-31 PROCEDURE — S5571 INSULIN DISPOS PEN 3 ML: HCPCS | Performed by: HOSPITALIST

## 2018-12-31 PROCEDURE — 36415 COLL VENOUS BLD VENIPUNCTURE: CPT

## 2018-12-31 PROCEDURE — 99407 BEHAV CHNG SMOKING > 10 MIN: CPT | Mod: S$GLB,,,

## 2018-12-31 PROCEDURE — 51705 CHANGE OF BLADDER TUBE: CPT | Mod: ,,, | Performed by: UROLOGY

## 2018-12-31 PROCEDURE — 87205 SMEAR GRAM STAIN: CPT

## 2018-12-31 PROCEDURE — 83036 HEMOGLOBIN GLYCOSYLATED A1C: CPT

## 2018-12-31 PROCEDURE — 99232 PR SUBSEQUENT HOSPITAL CARE,LEVL II: ICD-10-PCS | Mod: 25,,, | Performed by: UROLOGY

## 2018-12-31 PROCEDURE — 11000001 HC ACUTE MED/SURG PRIVATE ROOM

## 2018-12-31 PROCEDURE — 87186 SC STD MICRODIL/AGAR DIL: CPT | Mod: 59

## 2018-12-31 PROCEDURE — 85025 COMPLETE CBC W/AUTO DIFF WBC: CPT

## 2018-12-31 PROCEDURE — 63600175 PHARM REV CODE 636 W HCPCS: Performed by: NURSE PRACTITIONER

## 2018-12-31 PROCEDURE — 80048 BASIC METABOLIC PNL TOTAL CA: CPT

## 2018-12-31 PROCEDURE — 51705 PR CHANGE OF BLADDER TUBE,SIMPLE: ICD-10-PCS | Mod: ,,, | Performed by: UROLOGY

## 2018-12-31 PROCEDURE — 63600175 PHARM REV CODE 636 W HCPCS: Performed by: HOSPITALIST

## 2018-12-31 PROCEDURE — 99232 SBSQ HOSP IP/OBS MODERATE 35: CPT | Mod: 25,,, | Performed by: UROLOGY

## 2018-12-31 RX ORDER — BACLOFEN 10 MG/1
20 TABLET ORAL 3 TIMES DAILY
Status: DISCONTINUED | OUTPATIENT
Start: 2018-12-31 | End: 2019-01-04 | Stop reason: HOSPADM

## 2018-12-31 RX ORDER — ACETAMINOPHEN 650 MG/1
650 SUPPOSITORY RECTAL EVERY 4 HOURS PRN
Status: DISCONTINUED | OUTPATIENT
Start: 2018-12-31 | End: 2018-12-31

## 2018-12-31 RX ORDER — DOCUSATE SODIUM 100 MG/1
100 CAPSULE, LIQUID FILLED ORAL 2 TIMES DAILY
Status: DISCONTINUED | OUTPATIENT
Start: 2018-12-31 | End: 2019-01-04 | Stop reason: HOSPADM

## 2018-12-31 RX ORDER — VENLAFAXINE HYDROCHLORIDE 75 MG/1
75 CAPSULE, EXTENDED RELEASE ORAL DAILY
Status: DISCONTINUED | OUTPATIENT
Start: 2018-12-31 | End: 2019-01-04 | Stop reason: HOSPADM

## 2018-12-31 RX ORDER — ACETAMINOPHEN 325 MG/1
650 TABLET ORAL EVERY 6 HOURS PRN
Status: DISCONTINUED | OUTPATIENT
Start: 2018-12-31 | End: 2019-01-04 | Stop reason: HOSPADM

## 2018-12-31 RX ORDER — GLUCAGON 1 MG
1 KIT INJECTION
Status: DISCONTINUED | OUTPATIENT
Start: 2018-12-31 | End: 2019-01-04 | Stop reason: HOSPADM

## 2018-12-31 RX ORDER — METOPROLOL TARTRATE 25 MG/1
12.5 TABLET ORAL 2 TIMES DAILY
Status: DISCONTINUED | OUTPATIENT
Start: 2018-12-31 | End: 2019-01-04 | Stop reason: HOSPADM

## 2018-12-31 RX ORDER — INSULIN ASPART 100 [IU]/ML
0-5 INJECTION, SOLUTION INTRAVENOUS; SUBCUTANEOUS
Status: DISCONTINUED | OUTPATIENT
Start: 2018-12-31 | End: 2019-01-04 | Stop reason: HOSPADM

## 2018-12-31 RX ORDER — ONDANSETRON 2 MG/ML
4 INJECTION INTRAMUSCULAR; INTRAVENOUS EVERY 8 HOURS PRN
Status: DISCONTINUED | OUTPATIENT
Start: 2018-12-31 | End: 2018-12-31

## 2018-12-31 RX ORDER — ENOXAPARIN SODIUM 100 MG/ML
40 INJECTION SUBCUTANEOUS EVERY 24 HOURS
Status: DISCONTINUED | OUTPATIENT
Start: 2018-12-31 | End: 2019-01-04 | Stop reason: HOSPADM

## 2018-12-31 RX ORDER — METOPROLOL TARTRATE 1 MG/ML
5 INJECTION, SOLUTION INTRAVENOUS ONCE
Status: COMPLETED | OUTPATIENT
Start: 2018-12-31 | End: 2018-12-31

## 2018-12-31 RX ORDER — IBUPROFEN 200 MG
16 TABLET ORAL
Status: DISCONTINUED | OUTPATIENT
Start: 2018-12-31 | End: 2019-01-04 | Stop reason: HOSPADM

## 2018-12-31 RX ORDER — CYCLOBENZAPRINE HCL 10 MG
10 TABLET ORAL 3 TIMES DAILY
Status: DISCONTINUED | OUTPATIENT
Start: 2018-12-31 | End: 2019-01-04 | Stop reason: HOSPADM

## 2018-12-31 RX ORDER — IBUPROFEN 200 MG
24 TABLET ORAL
Status: DISCONTINUED | OUTPATIENT
Start: 2018-12-31 | End: 2019-01-04 | Stop reason: HOSPADM

## 2018-12-31 RX ORDER — FAMOTIDINE 20 MG/1
20 TABLET, FILM COATED ORAL 2 TIMES DAILY
Status: DISCONTINUED | OUTPATIENT
Start: 2018-12-31 | End: 2019-01-04 | Stop reason: HOSPADM

## 2018-12-31 RX ORDER — GABAPENTIN 300 MG/1
300 CAPSULE ORAL 2 TIMES DAILY
Status: DISCONTINUED | OUTPATIENT
Start: 2018-12-31 | End: 2019-01-04 | Stop reason: HOSPADM

## 2018-12-31 RX ORDER — IBUPROFEN 600 MG/1
600 TABLET ORAL EVERY 6 HOURS PRN
Status: DISCONTINUED | OUTPATIENT
Start: 2018-12-31 | End: 2018-12-31

## 2018-12-31 RX ORDER — SODIUM CHLORIDE 0.9 % (FLUSH) 0.9 %
5 SYRINGE (ML) INJECTION
Status: DISCONTINUED | OUTPATIENT
Start: 2018-12-31 | End: 2019-01-04 | Stop reason: HOSPADM

## 2018-12-31 RX ORDER — ONDANSETRON 8 MG/1
8 TABLET, ORALLY DISINTEGRATING ORAL EVERY 8 HOURS PRN
Status: DISCONTINUED | OUTPATIENT
Start: 2018-12-31 | End: 2019-01-04 | Stop reason: HOSPADM

## 2018-12-31 RX ORDER — IBUPROFEN 600 MG/1
600 TABLET ORAL EVERY 6 HOURS PRN
Status: DISCONTINUED | OUTPATIENT
Start: 2018-12-31 | End: 2019-01-01

## 2018-12-31 RX ADMIN — PIPERACILLIN AND TAZOBACTAM 4.5 G: 4; .5 INJECTION, POWDER, LYOPHILIZED, FOR SOLUTION INTRAVENOUS; PARENTERAL at 06:12

## 2018-12-31 RX ADMIN — VENLAFAXINE HYDROCHLORIDE 75 MG: 75 CAPSULE, EXTENDED RELEASE ORAL at 12:12

## 2018-12-31 RX ADMIN — INSULIN ASPART 4 UNITS: 100 INJECTION, SOLUTION INTRAVENOUS; SUBCUTANEOUS at 12:12

## 2018-12-31 RX ADMIN — CYCLOBENZAPRINE HYDROCHLORIDE 10 MG: 10 TABLET, FILM COATED ORAL at 08:12

## 2018-12-31 RX ADMIN — BACLOFEN 20 MG: 10 TABLET ORAL at 03:12

## 2018-12-31 RX ADMIN — SODIUM CHLORIDE: 0.9 INJECTION, SOLUTION INTRAVENOUS at 12:12

## 2018-12-31 RX ADMIN — FAMOTIDINE 20 MG: 20 TABLET ORAL at 08:12

## 2018-12-31 RX ADMIN — PIPERACILLIN AND TAZOBACTAM 4.5 G: 4; .5 INJECTION, POWDER, LYOPHILIZED, FOR SOLUTION INTRAVENOUS; PARENTERAL at 03:12

## 2018-12-31 RX ADMIN — BACLOFEN 20 MG: 10 TABLET ORAL at 08:12

## 2018-12-31 RX ADMIN — METOPROLOL TARTRATE 5 MG: 1 INJECTION, SOLUTION INTRAVENOUS at 02:12

## 2018-12-31 RX ADMIN — INSULIN DETEMIR 10 UNITS: 100 INJECTION, SOLUTION SUBCUTANEOUS at 09:12

## 2018-12-31 RX ADMIN — CYCLOBENZAPRINE HYDROCHLORIDE 10 MG: 10 TABLET, FILM COATED ORAL at 03:12

## 2018-12-31 RX ADMIN — GABAPENTIN 300 MG: 300 CAPSULE ORAL at 08:12

## 2018-12-31 RX ADMIN — INSULIN DETEMIR 10 UNITS: 100 INJECTION, SOLUTION SUBCUTANEOUS at 12:12

## 2018-12-31 RX ADMIN — DOCUSATE SODIUM 100 MG: 100 CAPSULE, LIQUID FILLED ORAL at 08:12

## 2018-12-31 RX ADMIN — ENOXAPARIN SODIUM 40 MG: 100 INJECTION SUBCUTANEOUS at 06:12

## 2018-12-31 RX ADMIN — PIPERACILLIN AND TAZOBACTAM 4.5 G: 4; .5 INJECTION, POWDER, LYOPHILIZED, FOR SOLUTION INTRAVENOUS; PARENTERAL at 10:12

## 2018-12-31 RX ADMIN — GABAPENTIN 300 MG: 300 CAPSULE ORAL at 12:12

## 2018-12-31 RX ADMIN — DOCUSATE SODIUM 100 MG: 100 CAPSULE, LIQUID FILLED ORAL at 12:12

## 2018-12-31 RX ADMIN — METOPROLOL TARTRATE 12.5 MG: 25 TABLET, FILM COATED ORAL at 08:12

## 2018-12-31 RX ADMIN — INSULIN ASPART 2 UNITS: 100 INJECTION, SOLUTION INTRAVENOUS; SUBCUTANEOUS at 06:12
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
## 2018-12-31 NOTE — NURSING
VN completed admission questions with patient. Plan of care was discussed including blood clot prevention using lovenox.  All questions answered.  Darshana Wyatt NP at bedside assessing. Education given on safety measures and using call light before getting out of bed by himself. Patient is hemiplegic and cannot move his legs. Patient verbalized understanding. Bed locked and in lowest position. Alarm on.

## 2018-12-31 NOTE — PROGRESS NOTES
Individual Follow-Up Form    12/31/2018    Quit Date: To be determined    Clinical Status of Patient: Inpatient    Length of Service: 30 minutes    Comments: Smoking cessation education and materials provided.  Pt states that he would like to quit smoking for good, and he was enrolled in the Ambulatory Smoking Cessation program during this encounter.     Diagnosis: F17.210    Next Visit: Ambulatory referral to Smoking Cessation program following hospital discharge.

## 2018-12-31 NOTE — SUBJECTIVE & OBJECTIVE
Interval History: Still feels ill.    Review of Systems   Constitutional: Positive for chills, fatigue and fever.   Respiratory: Negative for cough and shortness of breath.    Skin: Positive for wound.     Objective:     Vital Signs (Most Recent):  Temp: 99.3 °F (37.4 °C) (12/31/18 0510)  Pulse: (!) 129 (12/31/18 0510)  Resp: 18 (12/31/18 0510)  BP: (!) 136/90 (12/31/18 0510)  SpO2: 100 % (12/31/18 0135) Vital Signs (24h Range):  Temp:  [99.3 °F (37.4 °C)-103.2 °F (39.6 °C)] 99.3 °F (37.4 °C)  Pulse:  [113-147] 129  Resp:  [18-22] 18  SpO2:  [97 %-100 %] 100 %  BP: (111-139)/(72-90) 136/90     Weight: 117.2 kg (258 lb 6.1 oz)  Body mass index is 35.04 kg/m².    Physical Exam   Constitutional: He is oriented to person, place, and time. He appears well-developed and well-nourished. No distress.   Pulmonary/Chest: Effort normal. No respiratory distress.   Abdominal: He exhibits no distension. There is no tenderness.   Musculoskeletal:   R BKA    Neurological: He is alert and oriented to person, place, and time. A sensory deficit is present.   Paraplegia (T9 level)    Skin: Skin is dry.   Psychiatric: He has a normal mood and affect. His behavior is normal.           Significant Labs:   BMP:   Recent Labs   Lab 12/30/18 2202   *   *   K 4.6      CO2 23   BUN 10   CREATININE 0.94   CALCIUM 10.0     CBC:   Recent Labs   Lab 12/30/18 2202   WBC 22.36*   HGB 12.0*   HCT 35.9*   *     Troponin:   Recent Labs   Lab 12/30/18 2202   TROPONINI <0.012       Significant Imaging: I have reviewed all pertinent imaging results/findings within the past 24 hours.   X-Ray Chest AP Portable 12/31/18:  COMPARISON: 02/15/2017  FINDINGS: Endotracheal tube and right upper extremity PICC has been removed.  No consolidation, pleural effusion or pneumothorax.  Cardiomediastinal silhouette is unremarkable.  Thoracic fusion hardware noted.

## 2018-12-31 NOTE — ASSESSMENT & PLAN NOTE
Continue piperacillin-tazobactam. Urology to change catheter. Can get repeat culture from new catheter. Follow up culture results.
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
## 2018-12-31 NOTE — ASSESSMENT & PLAN NOTE
The patient's indwelling 22 Bengali suprapubic tube was removed.    The suprapubic tube site is then prepped and draped in usual sterile fashion and a 22 Bengali Huff catheter is passed easily into the bladder. Prompt return of urine.  10 cc inflated into the balloon.  Huff catheter is irrigated confirming correct positioning of the suprapubic tube.  Tube was placed to gravity drainage.    My office will contact the nursing station to arrange follow-up with Dr. Navarrete in 1 month for change of suprapubic tube in the office

## 2018-12-31 NOTE — ASSESSMENT & PLAN NOTE
Takes insulin detemir 15 units BID, insulin lispro protamine-insulin lispro 75-25 20 units TID with meals. Give insulin detemir 10 units BID, insulin aspart sliding scale. Monitor serum glucose.
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
## 2018-12-31 NOTE — PLAN OF CARE
Problem: Adult Inpatient Plan of Care  Goal: Plan of Care Review  Outcome: Ongoing (interventions implemented as appropriate)  Plan of care reviewed with patient, understanding verbalized. Pt remains ST on tele. No complaints of pain. Upon arrival pt HR sustained in 140s-150s. MD notified. Ordered 5 mg metoprolol administered. HR now 115. Continuous NaCL infusing at 100 ml/hr. L ankle dressing CDI. Huff in place, patent.  Bed alarm on, call light in reach, fall precautions in place. Will continue to monitor.

## 2018-12-31 NOTE — ASSESSMENT & PLAN NOTE
PAD (peripheral artery disease)  -BP stable, per chart review taking metoprolol 12.5 mg BID-continue
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
## 2018-12-31 NOTE — NURSING
VN cued into patients room for rounding. VN informed pt that VN will be working alongside bedside nurse and PCT throughout the day shift. Pt verbalized understanding that VN is available for any questions and education, and nurse and PCT will continue hourly rounding at bedside.     Charge nurse and bedside in room repositioning pt. Pt denies any pain at this time. Allotted time given for questions - all questions answered. Will continue to cue in to patients room and monitor.

## 2018-12-31 NOTE — TELEPHONE ENCOUNTER
Patient currently in-hospital in room 453.    Please contact the nursing station and schedule a follow-up appointment with Dr. Navarrete in 1 month for change of suprapubic tube

## 2018-12-31 NOTE — CONSULTS
Dr. Cardoso notified of assessment, orders given.   Pt with left lateral Stage 4 ankle wound, wound with slough, scant creamy tan drainage. Cleaned wound with NS, iodosorb gel ordered from pharmacy. Pt with Stage 2 lower left buttocks wound, pink wound bed, no drainage noted, no odor, no yahaira wound redness, standard orders in place for this wound.

## 2018-12-31 NOTE — ASSESSMENT & PLAN NOTE
Phantom limb syndrome    Per chart review patient taking Baclofen, flexeril, gabapentin, meloxicam-sister to bring med list this morning
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
## 2018-12-31 NOTE — ASSESSMENT & PLAN NOTE
-Hold po meds   -SSI, kyler AC&HS, Diabetic diet   -clarify home dose insulin with sister or patient pharmacy in am, pt unsure of dose
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
## 2018-12-31 NOTE — HOSPITAL COURSE
Piperacillin-tazobactam was continued. He was put on normal saline at 100 mL/hr. Urology was consulted for catheter replacement and replaced his 22 Spanish tube with another 22 Spanish tube. Blood cultures grew Gram negative rods and these ultimately speciated to Serratia marcescens. Urine culture grew Serratia marcescens and Providencia stuartii. He was transitioned to cefepime and ID was consulted. They recommended that he be sent home on Bactrim DS BID to complete a 14 day course of antibiotics. His repeat cultures remained negative.     A Hermila lift was ordered for home use.
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
## 2019-01-01 PROBLEM — R78.81 GRAM-NEGATIVE BACTEREMIA: Status: ACTIVE | Noted: 2019-01-01

## 2019-01-01 LAB
BACTERIA #/AREA URNS HPF: ABNORMAL /HPF
BILIRUB UR QL STRIP: NEGATIVE
CLARITY UR: CLEAR
COLOR UR: YELLOW
GLUCOSE UR QL STRIP: ABNORMAL
HGB UR QL STRIP: ABNORMAL
HYALINE CASTS #/AREA URNS LPF: 0 /LPF
KETONES UR QL STRIP: NEGATIVE
LEUKOCYTE ESTERASE UR QL STRIP: ABNORMAL
MICROSCOPIC COMMENT: ABNORMAL
NITRITE UR QL STRIP: NEGATIVE
PH UR STRIP: 7 [PH] (ref 5–8)
POCT GLUCOSE: 163 MG/DL (ref 70–110)
POCT GLUCOSE: 220 MG/DL (ref 70–110)
POCT GLUCOSE: 251 MG/DL (ref 70–110)
POCT GLUCOSE: 278 MG/DL (ref 70–110)
PROT UR QL STRIP: ABNORMAL
RBC #/AREA URNS HPF: >100 /HPF (ref 0–4)
SP GR UR STRIP: <=1.005 (ref 1–1.03)
SQUAMOUS #/AREA URNS HPF: 1 /HPF
URN SPEC COLLECT METH UR: ABNORMAL
UROBILINOGEN UR STRIP-ACNC: NEGATIVE EU/DL
WBC #/AREA URNS HPF: >100 /HPF (ref 0–5)

## 2019-01-01 PROCEDURE — 81000 URINALYSIS NONAUTO W/SCOPE: CPT

## 2019-01-01 PROCEDURE — 36415 COLL VENOUS BLD VENIPUNCTURE: CPT

## 2019-01-01 PROCEDURE — 11000001 HC ACUTE MED/SURG PRIVATE ROOM

## 2019-01-01 PROCEDURE — 25000003 PHARM REV CODE 250: Performed by: HOSPITALIST

## 2019-01-01 PROCEDURE — 63600175 PHARM REV CODE 636 W HCPCS: Performed by: NURSE PRACTITIONER

## 2019-01-01 PROCEDURE — 25000003 PHARM REV CODE 250: Performed by: NURSE PRACTITIONER

## 2019-01-01 PROCEDURE — 87040 BLOOD CULTURE FOR BACTERIA: CPT

## 2019-01-01 RX ORDER — IBUPROFEN 600 MG/1
600 TABLET ORAL EVERY 6 HOURS PRN
Status: DISCONTINUED | OUTPATIENT
Start: 2019-01-01 | End: 2019-01-04 | Stop reason: HOSPADM

## 2019-01-01 RX ADMIN — INSULIN ASPART 2 UNITS: 100 INJECTION, SOLUTION INTRAVENOUS; SUBCUTANEOUS at 06:01

## 2019-01-01 RX ADMIN — GABAPENTIN 300 MG: 300 CAPSULE ORAL at 08:01

## 2019-01-01 RX ADMIN — ENOXAPARIN SODIUM 40 MG: 100 INJECTION SUBCUTANEOUS at 05:01

## 2019-01-01 RX ADMIN — PIPERACILLIN AND TAZOBACTAM 4.5 G: 4; .5 INJECTION, POWDER, LYOPHILIZED, FOR SOLUTION INTRAVENOUS; PARENTERAL at 03:01

## 2019-01-01 RX ADMIN — Medication: at 08:01

## 2019-01-01 RX ADMIN — VENLAFAXINE HYDROCHLORIDE 75 MG: 75 CAPSULE, EXTENDED RELEASE ORAL at 09:01

## 2019-01-01 RX ADMIN — DOCUSATE SODIUM 100 MG: 100 CAPSULE, LIQUID FILLED ORAL at 08:01

## 2019-01-01 RX ADMIN — BACLOFEN 20 MG: 10 TABLET ORAL at 09:01

## 2019-01-01 RX ADMIN — FAMOTIDINE 20 MG: 20 TABLET ORAL at 09:01

## 2019-01-01 RX ADMIN — INSULIN DETEMIR 10 UNITS: 100 INJECTION, SOLUTION SUBCUTANEOUS at 08:01

## 2019-01-01 RX ADMIN — CYCLOBENZAPRINE HYDROCHLORIDE 10 MG: 10 TABLET, FILM COATED ORAL at 03:01

## 2019-01-01 RX ADMIN — METOPROLOL TARTRATE 12.5 MG: 25 TABLET, FILM COATED ORAL at 08:01

## 2019-01-01 RX ADMIN — INSULIN DETEMIR 10 UNITS: 100 INJECTION, SOLUTION SUBCUTANEOUS at 09:01

## 2019-01-01 RX ADMIN — CYCLOBENZAPRINE HYDROCHLORIDE 10 MG: 10 TABLET, FILM COATED ORAL at 09:01

## 2019-01-01 RX ADMIN — PIPERACILLIN AND TAZOBACTAM 4.5 G: 4; .5 INJECTION, POWDER, LYOPHILIZED, FOR SOLUTION INTRAVENOUS; PARENTERAL at 06:01

## 2019-01-01 RX ADMIN — GABAPENTIN 300 MG: 300 CAPSULE ORAL at 09:01

## 2019-01-01 RX ADMIN — FAMOTIDINE 20 MG: 20 TABLET ORAL at 08:01

## 2019-01-01 RX ADMIN — CYCLOBENZAPRINE HYDROCHLORIDE 10 MG: 10 TABLET, FILM COATED ORAL at 08:01

## 2019-01-01 RX ADMIN — BACLOFEN 20 MG: 10 TABLET ORAL at 03:01

## 2019-01-01 RX ADMIN — BACLOFEN 20 MG: 10 TABLET ORAL at 08:01

## 2019-01-01 RX ADMIN — METOPROLOL TARTRATE 12.5 MG: 25 TABLET, FILM COATED ORAL at 09:01

## 2019-01-01 RX ADMIN — DOCUSATE SODIUM 100 MG: 100 CAPSULE, LIQUID FILLED ORAL at 09:01

## 2019-01-01 RX ADMIN — PIPERACILLIN AND TAZOBACTAM 4.5 G: 4; .5 INJECTION, POWDER, LYOPHILIZED, FOR SOLUTION INTRAVENOUS; PARENTERAL at 10:01

## 2019-01-01 NOTE — PLAN OF CARE
Order for Hermila lift [facility]ed [facility]- [facility] closed today for holiday       01/01/19 1056   Post-Acute Status   Post-Acute Authorization Placement   Post-Acute Placement Status Referrals Sent

## 2019-01-01 NOTE — ASSESSMENT & PLAN NOTE
Gram-negative bacteremia  Continue piperacillin-tazobactam. Urology to change catheter. Repeat blood culture and repeat culture from new catheter. Follow up culture results.

## 2019-01-01 NOTE — PLAN OF CARE
Problem: Adult Inpatient Plan of Care  Goal: Plan of Care Review  Outcome: Ongoing (interventions implemented as appropriate)  Plan of care reviewed with patient, understanding verbalized. Pt remains ST on tele. No complaints of pain. Continuous NaCL infusing at 100 ml/hr. IV abx administered per orders. Suprapubic cath patent.  Bed alarm on, call light in reach, fall precautions in place. Will continue to monitor.

## 2019-01-01 NOTE — NURSING
"VN cued into pt's room for introduction. VN informed pt that VN would be working along side bedside nurse and PCT throughout shift. At present no distress noted except, patient states that he is having discomfort in neck and around shoulder blade area of back. Pain at level "7" at this time. Contacted Dr Cardoso for further order. Will cont to be available to patient and intervene prn.        "
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
## 2019-01-02 ENCOUNTER — TELEPHONE (OUTPATIENT)
Dept: FAMILY MEDICINE | Facility: CLINIC | Age: 44
End: 2019-01-02

## 2019-01-02 PROBLEM — A41.53 SERRATIA SEPTICEMIA: Status: ACTIVE | Noted: 2019-01-01

## 2019-01-02 LAB
ANION GAP SERPL CALC-SCNC: 8 MMOL/L
BACTERIA UR CULT: NORMAL
BACTERIA UR CULT: NORMAL
BASOPHILS # BLD AUTO: 0.02 K/UL
BASOPHILS NFR BLD: 0.2 %
BUN SERPL-MCNC: 9 MG/DL
CALCIUM SERPL-MCNC: 9.4 MG/DL
CHLORIDE SERPL-SCNC: 105 MMOL/L
CO2 SERPL-SCNC: 22 MMOL/L
CREAT SERPL-MCNC: 1 MG/DL
DIFFERENTIAL METHOD: ABNORMAL
EOSINOPHIL # BLD AUTO: 0.5 K/UL
EOSINOPHIL NFR BLD: 4.7 %
ERYTHROCYTE [DISTWIDTH] IN BLOOD BY AUTOMATED COUNT: 15 %
EST. GFR  (AFRICAN AMERICAN): >60 ML/MIN/1.73 M^2
EST. GFR  (NON AFRICAN AMERICAN): >60 ML/MIN/1.73 M^2
GLUCOSE SERPL-MCNC: 239 MG/DL
HCT VFR BLD AUTO: 29.4 %
HGB BLD-MCNC: 9.7 G/DL
LYMPHOCYTES # BLD AUTO: 1.8 K/UL
LYMPHOCYTES NFR BLD: 16.6 %
MAGNESIUM SERPL-MCNC: 2 MG/DL
MCH RBC QN AUTO: 26.5 PG
MCHC RBC AUTO-ENTMCNC: 33 G/DL
MCV RBC AUTO: 80 FL
MONOCYTES # BLD AUTO: 0.9 K/UL
MONOCYTES NFR BLD: 8.2 %
NEUTROPHILS # BLD AUTO: 7.5 K/UL
NEUTROPHILS NFR BLD: 70 %
PHOSPHATE SERPL-MCNC: 2.1 MG/DL
PLATELET # BLD AUTO: 429 K/UL
PMV BLD AUTO: 10 FL
POCT GLUCOSE: 180 MG/DL (ref 70–110)
POCT GLUCOSE: 184 MG/DL (ref 70–110)
POCT GLUCOSE: 210 MG/DL (ref 70–110)
POCT GLUCOSE: 302 MG/DL (ref 70–110)
POTASSIUM SERPL-SCNC: 4 MMOL/L
RBC # BLD AUTO: 3.66 M/UL
SODIUM SERPL-SCNC: 135 MMOL/L
WBC # BLD AUTO: 10.67 K/UL

## 2019-01-02 PROCEDURE — 25000003 PHARM REV CODE 250: Performed by: NURSE PRACTITIONER

## 2019-01-02 PROCEDURE — 63600175 PHARM REV CODE 636 W HCPCS: Performed by: HOSPITALIST

## 2019-01-02 PROCEDURE — 84100 ASSAY OF PHOSPHORUS: CPT

## 2019-01-02 PROCEDURE — 36415 COLL VENOUS BLD VENIPUNCTURE: CPT

## 2019-01-02 PROCEDURE — 25000003 PHARM REV CODE 250: Performed by: HOSPITALIST

## 2019-01-02 PROCEDURE — 83735 ASSAY OF MAGNESIUM: CPT

## 2019-01-02 PROCEDURE — 11000001 HC ACUTE MED/SURG PRIVATE ROOM

## 2019-01-02 PROCEDURE — 85025 COMPLETE CBC W/AUTO DIFF WBC: CPT

## 2019-01-02 PROCEDURE — 80048 BASIC METABOLIC PNL TOTAL CA: CPT

## 2019-01-02 PROCEDURE — 63600175 PHARM REV CODE 636 W HCPCS: Performed by: NURSE PRACTITIONER

## 2019-01-02 RX ORDER — CEFEPIME HYDROCHLORIDE 2 G/50ML
2 INJECTION, SOLUTION INTRAVENOUS
Status: DISCONTINUED | OUTPATIENT
Start: 2019-01-02 | End: 2019-01-02 | Stop reason: SDUPTHER

## 2019-01-02 RX ADMIN — GABAPENTIN 300 MG: 300 CAPSULE ORAL at 08:01

## 2019-01-02 RX ADMIN — FAMOTIDINE 20 MG: 20 TABLET ORAL at 08:01

## 2019-01-02 RX ADMIN — INSULIN DETEMIR 10 UNITS: 100 INJECTION, SOLUTION SUBCUTANEOUS at 08:01

## 2019-01-02 RX ADMIN — CYCLOBENZAPRINE HYDROCHLORIDE 10 MG: 10 TABLET, FILM COATED ORAL at 04:01

## 2019-01-02 RX ADMIN — METOPROLOL TARTRATE 12.5 MG: 25 TABLET, FILM COATED ORAL at 09:01

## 2019-01-02 RX ADMIN — CEFEPIME 2 G: 2 INJECTION, POWDER, FOR SOLUTION INTRAVENOUS at 11:01

## 2019-01-02 RX ADMIN — VENLAFAXINE HYDROCHLORIDE 75 MG: 75 CAPSULE, EXTENDED RELEASE ORAL at 08:01

## 2019-01-02 RX ADMIN — BACLOFEN 20 MG: 10 TABLET ORAL at 08:01

## 2019-01-02 RX ADMIN — METOPROLOL TARTRATE 12.5 MG: 25 TABLET, FILM COATED ORAL at 08:01

## 2019-01-02 RX ADMIN — DOCUSATE SODIUM 100 MG: 100 CAPSULE, LIQUID FILLED ORAL at 08:01

## 2019-01-02 RX ADMIN — INSULIN ASPART 2 UNITS: 100 INJECTION, SOLUTION INTRAVENOUS; SUBCUTANEOUS at 01:01

## 2019-01-02 RX ADMIN — BACLOFEN 20 MG: 10 TABLET ORAL at 04:01

## 2019-01-02 RX ADMIN — CEFEPIME 2 G: 2 INJECTION, POWDER, FOR SOLUTION INTRAVENOUS at 10:01

## 2019-01-02 RX ADMIN — CYCLOBENZAPRINE HYDROCHLORIDE 10 MG: 10 TABLET, FILM COATED ORAL at 08:01

## 2019-01-02 RX ADMIN — PIPERACILLIN AND TAZOBACTAM 4.5 G: 4; .5 INJECTION, POWDER, LYOPHILIZED, FOR SOLUTION INTRAVENOUS; PARENTERAL at 07:01

## 2019-01-02 RX ADMIN — ENOXAPARIN SODIUM 40 MG: 100 INJECTION SUBCUTANEOUS at 04:01

## 2019-01-02 NOTE — NURSING
VN cued into patients room for rounding. VN informed pt that VN will be working alongside bedside nurse and PCT throughout the day shift. Pt verbalized understanding that VN is available for any questions and education, and nurse and PCT will continue hourly rounding at bedside.     Pt resting in bed, finished eating breakfast. Denies pain at this time. Allotted time given for questions - all questions answered. Will continue to cue in to patients room and monitor.

## 2019-01-02 NOTE — TELEPHONE ENCOUNTER
----- Message from Madeline Gordon sent at 1/2/2019  3:32 PM CST -----  Contact: [phone]/ self  Pt its requesting a refill on all of his prescriptions sent to [facility] pharmacy . Please advise

## 2019-01-02 NOTE — PLAN OF CARE
Problem: Adult Inpatient Plan of Care  Goal: Plan of Care Review  Pt lying in bed in NAD. IV infusing and telemetry monitor on. Bed in low and locked position. Bed alarm on. Side rails x 2. No complaints of pain.   Wound care will be done today after bath. Appetite good. Will continue to monitor.

## 2019-01-02 NOTE — PLAN OF CARE
Problem: Adult Inpatient Plan of Care  Goal: Plan of Care Review  Outcome: Ongoing (interventions implemented as appropriate)  Plan of care reviewed patient verbalized understanding. Blood glucose monitoring per sliding scale coverage administered. IV NS infusing at 100 ml/hr. Medications administered. Suprapubic catheter clean, dry, and intact. Turned q2hrs. Safety maintained bed in lowest position, call bell within reach, bed alarm on. Will continue to monitor.

## 2019-01-02 NOTE — PROGRESS NOTES
The Sw was asked to speak with the pt in regards to his Medicare benefits by Julio([place]). The Sw saw the pt was at [facility] in the past. The Sw contacted [place] at [place] and she stated the pt was in [facility] as a resident from 1-25-17 until last month. She states the pt's sister took him to live with her at her home. [place] states the pt was just approved for his Social Security Disability and hasn't received his first check yet. The Sw met with the pt in his room and he states he received his first disability check Friday due to it being a holiday. He states he used to get a Social Security check when he was in [place] and they used to give him $8 out of it. The pt states he has PCA(Personal Care Attendants)paid for through Medicaid that offer him assistance 7 hours a day. The pt states his sister and his PCA's assist him at home as needed.  The pt states he received his back pay in November. The pt applied for his disability while he was in [facility]. He states he can call them again to check on the length of the amount of time before he can receive his Medicare. The Sw informed him normally the pt will have to be on Social Security disability for 24 months before they can qualify for Medicare but the Sw encouraged him to call Social Security b/c hospital staff can't quote entitlement benefits. The Sw reminded him Medicare is an entitlement and he has to have worked enough quaters to receive it.  The pt states he worked his first job in 1197- 2016 for his last job and he states he had a 2 1/2 year lay off during that time.

## 2019-01-03 PROBLEM — E83.39 HYPOPHOSPHATEMIA: Status: ACTIVE | Noted: 2019-01-03

## 2019-01-03 LAB
BACTERIA UR CULT: NORMAL
BACTERIA UR CULT: NORMAL
POCT GLUCOSE: 187 MG/DL (ref 70–110)
POCT GLUCOSE: 253 MG/DL (ref 70–110)
POCT GLUCOSE: 258 MG/DL (ref 70–110)
POCT GLUCOSE: 383 MG/DL (ref 70–110)

## 2019-01-03 PROCEDURE — 11000001 HC ACUTE MED/SURG PRIVATE ROOM

## 2019-01-03 PROCEDURE — 25000003 PHARM REV CODE 250: Performed by: NURSE PRACTITIONER

## 2019-01-03 PROCEDURE — 25000003 PHARM REV CODE 250: Performed by: HOSPITALIST

## 2019-01-03 PROCEDURE — 63600175 PHARM REV CODE 636 W HCPCS: Performed by: NURSE PRACTITIONER

## 2019-01-03 RX ORDER — CEFEPIME HYDROCHLORIDE 2 G/50ML
2 INJECTION, SOLUTION INTRAVENOUS
Status: DISCONTINUED | OUTPATIENT
Start: 2019-01-03 | End: 2019-01-04 | Stop reason: HOSPADM

## 2019-01-03 RX ORDER — SODIUM,POTASSIUM PHOSPHATES 280-250MG
2 POWDER IN PACKET (EA) ORAL
Status: DISPENSED | OUTPATIENT
Start: 2019-01-03 | End: 2019-01-04

## 2019-01-03 RX ADMIN — POTASSIUM & SODIUM PHOSPHATES POWDER PACK 280-160-250 MG 2 PACKET: 280-160-250 PACK at 09:01

## 2019-01-03 RX ADMIN — INSULIN ASPART 1 UNITS: 100 INJECTION, SOLUTION INTRAVENOUS; SUBCUTANEOUS at 10:01

## 2019-01-03 RX ADMIN — FAMOTIDINE 20 MG: 20 TABLET ORAL at 08:01

## 2019-01-03 RX ADMIN — GABAPENTIN 300 MG: 300 CAPSULE ORAL at 08:01

## 2019-01-03 RX ADMIN — INSULIN DETEMIR 10 UNITS: 100 INJECTION, SOLUTION SUBCUTANEOUS at 08:01

## 2019-01-03 RX ADMIN — METOPROLOL TARTRATE 12.5 MG: 25 TABLET, FILM COATED ORAL at 08:01

## 2019-01-03 RX ADMIN — CYCLOBENZAPRINE HYDROCHLORIDE 10 MG: 10 TABLET, FILM COATED ORAL at 08:01

## 2019-01-03 RX ADMIN — BACLOFEN 20 MG: 10 TABLET ORAL at 02:01

## 2019-01-03 RX ADMIN — DOCUSATE SODIUM 100 MG: 100 CAPSULE, LIQUID FILLED ORAL at 08:01

## 2019-01-03 RX ADMIN — ENOXAPARIN SODIUM 40 MG: 100 INJECTION SUBCUTANEOUS at 05:01

## 2019-01-03 RX ADMIN — CYCLOBENZAPRINE HYDROCHLORIDE 10 MG: 10 TABLET, FILM COATED ORAL at 02:01

## 2019-01-03 RX ADMIN — BACLOFEN 20 MG: 10 TABLET ORAL at 08:01

## 2019-01-03 RX ADMIN — CEFEPIME HYDROCHLORIDE 2 G: 2 INJECTION, SOLUTION INTRAVENOUS at 06:01

## 2019-01-03 RX ADMIN — VENLAFAXINE HYDROCHLORIDE 75 MG: 75 CAPSULE, EXTENDED RELEASE ORAL at 08:01

## 2019-01-03 RX ADMIN — INSULIN ASPART 5 UNITS: 100 INJECTION, SOLUTION INTRAVENOUS; SUBCUTANEOUS at 05:01

## 2019-01-03 RX ADMIN — POTASSIUM & SODIUM PHOSPHATES POWDER PACK 280-160-250 MG 2 PACKET: 280-160-250 PACK at 08:01

## 2019-01-03 RX ADMIN — CEFEPIME HYDROCHLORIDE 2 G: 2 INJECTION, SOLUTION INTRAVENOUS at 12:01

## 2019-01-03 NOTE — PROGRESS NOTES
Pharmacist Renal Dose Adjustment Note    Hermann Aguilar is a 43 y.o. male being treated with the medication Cefepime 2gm IV q12h    Patient Data:    Vital Signs (Most Recent):  Temp: 96.8 °F (36 °C) (01/03/19 0807)  Pulse: 85 (01/03/19 0807)  Resp: 19 (01/03/19 0807)  BP: 119/78 (01/03/19 0807)  SpO2: 99 % (12/31/18 0834) Vital Signs (72h Range):  Temp:  [96.6 °F (35.9 °C)-100.9 °F (38.3 °C)]   Pulse:  []   Resp:  [18-20]   BP: (106-161)/(61-90)      Recent Labs   Lab 12/30/18  2202 12/31/18  0517 01/02/19  0954   CREATININE 0.94 1.0 1.0     Serum creatinine: 1 mg/dL 01/02/19 0954  Estimated creatinine clearance: 126.9 mL/min    Medication: Cefepime 2gm IV q12h will be changed to Cefepime 2gm IV q8h. Next dose due 1/3/18 1100    Pharmacist's Name: Kandi Medina  Pharmacist's Extension: [id number]

## 2019-01-03 NOTE — PROGRESS NOTES
.Pharmacy New Medication Education    Patient accepted medication education.    Pharmacy educated patient on name and purpose of medications and possible side effects, using the teach-back method.     Current Inpatient Medication Orders   acetaminophen tablet 650 mg   baclofen tablet 20 mg   cadexomer iodine 0.9 % gel   cadexomer iodine 0.9 % gel   ceFEPIme (MAXIPIME) 2 g in dextrose 5 % 50 mL IVPB   cyclobenzaprine tablet 10 mg   dextrose 50% injection 12.5 g   dextrose 50% injection 25 g   docusate sodium capsule 100 mg   enoxaparin injection 40 mg   famotidine tablet 20 mg   gabapentin capsule 300 mg   glucagon (human recombinant) injection 1 mg   glucose chewable tablet 16 g   glucose chewable tablet 24 g   ibuprofen tablet 600 mg   insulin aspart U-100 pen 0-5 Units   insulin detemir U-100 pen 10 Units   metoprolol tartrate (LOPRESSOR) split tablet 12.5 mg   ondansetron disintegrating tablet 8 mg   potassium, sodium phosphates 280-160-250 mg packet 2 packet   sodium chloride 0.9% flush 5 mL   venlafaxine 24 hr capsule 75 mg       Learners of pharmacy medication education included:  Patient    Patient +/- learner response:  Verbalized Understanding, Teachback

## 2019-01-03 NOTE — ASSESSMENT & PLAN NOTE
Due to Serratia marcescens  Septicemia due to Serratia marcescens  Catheter associated UTI  Changed to cefepime today based on prior sensitivities and the sensitivities of the Providencia stuartii. Will consult ID to assist with antibiotic selection and duration. Had CT scan (8/2018) that did not show any structural abnormalities. Urology changed suprapubic catheter. Repeat blood culture are negative.

## 2019-01-03 NOTE — TELEPHONE ENCOUNTER
i'm not his doctor since he left the facility; I see he has my name as his pCP, but he has never been to the office
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
## 2019-01-03 NOTE — PLAN OF CARE
Problem: Adult Inpatient Plan of Care  Goal: Plan of Care Review  Outcome: Ongoing (interventions implemented as appropriate)  POC discussed with pt. Pt is awake and alert,oriented X4. No distress noted. Denies any pain. Continues to be NSR on tele with HR in 80-90's. Suprapubic catheter in place draining yellow urine.Dressings to sacrum and ankle dry and intact.Bed in lowest position. Safety/Fall precautions maintained. Call bell in reach. All questions answered. Verbalized understanding.Will continue to monitor.

## 2019-01-03 NOTE — PLAN OF CARE
Problem: Adult Inpatient Plan of Care  Goal: Plan of Care Review  Outcome: Ongoing (interventions implemented as appropriate)  Plan of care reviewed patient verbalized understanding. Blood glucose monitoring per sliding scale insulin administered. IV antibiotics administered. Frequent turning q 2 hrs. Suprapubic catheter clean, dry, and intact. Safety maintained, bed in lowest position, call bell within reach, bed alarm on. Will continue to monitor.

## 2019-01-03 NOTE — NURSING
VN note: VN cued into pt's room for introduction. VN informed pt that VN would be working closely along side bedside nurse. Pt verbalized understanding. Allowed time for questions. VN will continue to be available to patient and intervene prn.

## 2019-01-03 NOTE — ASSESSMENT & PLAN NOTE
Takes insulin detemir 15 units BID, insulin lispro protamine-insulin lispro 75-25 20 units TID with meals. Give insulin detemir 10 units BID, insulin aspart sliding scale. Monitor serum glucose. BS ranged 180 to 278.

## 2019-01-03 NOTE — SUBJECTIVE & OBJECTIVE
Interval History: Reports feeling better. Is diaphoretic on rounds.     Review of Systems   Constitutional: Positive for diaphoresis. Negative for chills and fever.   Respiratory: Negative for cough and shortness of breath.    Cardiovascular: Negative for chest pain and palpitations.   Gastrointestinal: Negative for nausea and vomiting.     Objective:     Vital Signs (Most Recent):  Temp: 96.8 °F (36 °C) (01/02/19 1627)  Pulse: 79 (01/02/19 1627)  Resp: 18 (01/02/19 1627)  BP: 135/83 (01/02/19 1627)  SpO2: 99 % (12/31/18 0834) Vital Signs (24h Range):  Temp:  [96.8 °F (36 °C)-99.7 °F (37.6 °C)] 96.8 °F (36 °C)  Pulse:  [] 79  Resp:  [18-19] 18  BP: (115-150)/(61-83) 135/83     Weight: 114.3 kg (251 lb 15.8 oz)  Body mass index is 34.18 kg/m².    Intake/Output Summary (Last 24 hours) at 1/2/2019 1835  Last data filed at 1/2/2019 1731  Gross per 24 hour   Intake 2988.33 ml   Output 4951 ml   Net -1962.67 ml      Physical Exam   Constitutional: He is oriented to person, place, and time. He appears well-developed and well-nourished. No distress.   Cardiovascular: Normal rate and regular rhythm.   Pulmonary/Chest: Effort normal and breath sounds normal. No respiratory distress.   Abdominal: Soft. Bowel sounds are normal. He exhibits no distension. There is no tenderness.   Musculoskeletal: He exhibits no edema or tenderness.   Neurological: He is alert and oriented to person, place, and time.   Skin: Skin is warm and dry.   Psychiatric: He has a normal mood and affect. His behavior is normal.   Nursing note and vitals reviewed.      Significant Labs:   CBC:   Recent Labs   Lab 01/02/19  0954   WBC 10.67   HGB 9.7*   HCT 29.4*   *     CMP:   Recent Labs   Lab 01/02/19  0954   *   K 4.0      CO2 22*   *   BUN 9   CREATININE 1.0   CALCIUM 9.4   ANIONGAP 8   EGFRNONAA >60     Magnesium:   Recent Labs   Lab 01/02/19  0954   MG 2.0     POCT Glucose:   Recent Labs   Lab 01/02/19  0618  01/02/19  1106 01/02/19  1629   POCTGLUCOSE 180* 210* 184*       Significant Imaging: I have reviewed all pertinent imaging results/findings within the past 24 hours.

## 2019-01-04 VITALS
BODY MASS INDEX: 34.13 KG/M2 | DIASTOLIC BLOOD PRESSURE: 82 MMHG | TEMPERATURE: 97 F | SYSTOLIC BLOOD PRESSURE: 145 MMHG | HEIGHT: 72 IN | OXYGEN SATURATION: 99 % | WEIGHT: 252 LBS | HEART RATE: 83 BPM | RESPIRATION RATE: 19 BRPM

## 2019-01-04 PROBLEM — A41.53 SERRATIA SEPTICEMIA: Status: RESOLVED | Noted: 2019-01-01 | Resolved: 2019-01-04

## 2019-01-04 PROBLEM — A41.9 SEPSIS SECONDARY TO UTI: Status: RESOLVED | Noted: 2018-12-30 | Resolved: 2019-01-04

## 2019-01-04 PROBLEM — N39.0 SEPSIS SECONDARY TO UTI: Status: RESOLVED | Noted: 2018-12-30 | Resolved: 2019-01-04

## 2019-01-04 PROBLEM — N30.80 PYOCYSTIS: Status: RESOLVED | Noted: 2018-12-30 | Resolved: 2019-01-04

## 2019-01-04 PROBLEM — E83.39 HYPOPHOSPHATEMIA: Status: RESOLVED | Noted: 2019-01-03 | Resolved: 2019-01-04

## 2019-01-04 LAB
BACTERIA BLD CULT: NORMAL
POCT GLUCOSE: 184 MG/DL (ref 70–110)
POCT GLUCOSE: 269 MG/DL (ref 70–110)

## 2019-01-04 PROCEDURE — 25000003 PHARM REV CODE 250: Performed by: HOSPITALIST

## 2019-01-04 PROCEDURE — 25000003 PHARM REV CODE 250: Performed by: NURSE PRACTITIONER

## 2019-01-04 RX ORDER — SULFAMETHOXAZOLE AND TRIMETHOPRIM 800; 160 MG/1; MG/1
1 TABLET ORAL 2 TIMES DAILY
Qty: 22 TABLET | Refills: 0 | Status: SHIPPED | OUTPATIENT
Start: 2019-01-04 | End: 2019-01-15

## 2019-01-04 RX ADMIN — CYCLOBENZAPRINE HYDROCHLORIDE 10 MG: 10 TABLET, FILM COATED ORAL at 10:01

## 2019-01-04 RX ADMIN — BACLOFEN 20 MG: 10 TABLET ORAL at 04:01

## 2019-01-04 RX ADMIN — METOPROLOL TARTRATE 12.5 MG: 25 TABLET, FILM COATED ORAL at 10:01

## 2019-01-04 RX ADMIN — INSULIN DETEMIR 10 UNITS: 100 INJECTION, SOLUTION SUBCUTANEOUS at 10:01

## 2019-01-04 RX ADMIN — CEFEPIME HYDROCHLORIDE 2 G: 2 INJECTION, SOLUTION INTRAVENOUS at 03:01

## 2019-01-04 RX ADMIN — POTASSIUM & SODIUM PHOSPHATES POWDER PACK 280-160-250 MG 2 PACKET: 280-160-250 PACK at 05:01

## 2019-01-04 RX ADMIN — GABAPENTIN 300 MG: 300 CAPSULE ORAL at 10:01

## 2019-01-04 RX ADMIN — DOCUSATE SODIUM 100 MG: 100 CAPSULE, LIQUID FILLED ORAL at 10:01

## 2019-01-04 RX ADMIN — CEFEPIME HYDROCHLORIDE 2 G: 2 INJECTION, SOLUTION INTRAVENOUS at 10:01

## 2019-01-04 RX ADMIN — INSULIN ASPART 3 UNITS: 100 INJECTION, SOLUTION INTRAVENOUS; SUBCUTANEOUS at 12:01

## 2019-01-04 RX ADMIN — FAMOTIDINE 20 MG: 20 TABLET ORAL at 10:01

## 2019-01-04 RX ADMIN — BACLOFEN 20 MG: 10 TABLET ORAL at 10:01

## 2019-01-04 RX ADMIN — CYCLOBENZAPRINE HYDROCHLORIDE 10 MG: 10 TABLET, FILM COATED ORAL at 04:01

## 2019-01-04 RX ADMIN — VENLAFAXINE HYDROCHLORIDE 75 MG: 75 CAPSULE, EXTENDED RELEASE ORAL at 10:01

## 2019-01-04 NOTE — PLAN OF CARE
[facility] HOME HEALTH ORDERS  FACE TO FACE ENCOUNTER    Patient Name: Hermann Aguilar  YOB: 1975    PCP: Ramu Breen MD   PCP Address: [address] [place], [place] [address] [address]  PCP Phone Number: [phone]  PCP Fax: [phone]    Encounter Date: 01/04/2019    Admit to Home Health    Diagnoses:  Active Hospital Problems    Diagnosis  POA    Neurogenic bladder [N31.9]  Yes     Chronic    Pressure injury of left ankle, stage 4 [L89.524]  Yes    Type 2 diabetes mellitus with hyperglycemia, with long-term current use of insulin [E11.65, Z79.4]  Not Applicable     Chronic    Gastroesophageal reflux disease without esophagitis [K21.9]  Yes    Tobacco abuse disorder [Z72.0]  Yes    Chronic ulcer of left lower extremity [L97.929]  Yes    PAD (peripheral artery disease) [I73.9]  Yes     Chronic    Chronic post-traumatic stress disorder [F43.12]  Yes     Chronic    Iron deficiency anemia [D50.9]  Yes     Chronic    Chronic suprapubic catheter [Z93.59]  Not Applicable     Chronic    Essential hypertension [I10]  Yes     Chronic    Paraplegia at T9 level [G82.20]  Yes     Chronic    Hx of right BKA [Z89.511]  Not Applicable     Chronic      Resolved Hospital Problems    Diagnosis Date Resolved POA    *Sepsis secondary to UTI [A41.9, N39.0] 01/04/2019 Yes     Priority: 1 - High    Hypophosphatemia [E83.39] 01/04/2019 No    Serratia septicemia [A41.53] 01/04/2019 Yes    Pyocystis [N30.80] 01/04/2019 Yes       Future Appointments   Date Time Provider Department Center   1/9/2019  4:00 PM Nadir Hyde MD PhD [facility] CARDIO [place]   1/31/2019 10:20 AM Ramu Breen MD [facility] [place] [facility]   2/1/2019 11:00 AM Ruchi Navarrete MD [facility] UROLOGY [facility]     Follow-up Information     Ramu Breen MD. Go on 1/31/2019.    Specialty:  Family Medicine  Why:  10:20am  Contact information:  [address] [place] [address]  [phone]             Nadir Hyde MD PhD  On 1/9/2019.    Specialty:  Cardiology  Why:  4:00pm  Contact information:  [address]   [address]   [place] [address]  [phone]             Ruchi Navarrete MD. Go on 2/1/2019.    Specialty:  Urology  Why:  11:00am  Contact information:  [address]  [address]  [place] [address]  [phone]                     I have seen and examined this patient face to face today. My clinical findings that support the need for the home health skilled services and home bound status are the following:  Weakness/numbness causing balance and gait disturbance due to Weakness/Debility and Paraplegia making it taxing to leave home.  Medical restrictions requiring assistance of another human to leave home due to  Decreased range of motions in extremities and Wound care needs.    Allergies:Review of patient's allergies indicates:  No Known Allergies    Diet: diabetic diet: 2000 calorie    Activities: activity as tolerated    Nursing:   SN to complete comprehensive assessment including routine vital signs. Instruct on disease process and s/s of complications to report to MD. Review/verify medication list sent home with the patient at time of discharge  and instruct patient/caregiver as needed. Frequency may be adjusted depending on start of care date.    Notify MD if SBP > 160 or < 90; DBP > 90 or < 50; HR > 120 or < 50; Temp > 101; Other:       WOUND CARE ORDERS  Pressure Ulcer(s) Stage II :   Location: Sacrum  Clean with saline and apply Mepilex every Monday and Friday, and as needed       Pressure Ulcer(s) Stage IV:  Location: Left ankle  Clean left lateral Stage 4 ankle wound with wound cleanser. Apply Iodosorb gel to wound bed, cover with dry clean dressing, every Monday, Wednesday and Friday and as needed.    Medications: Review discharge medications with patient and family and provide education.      Current Discharge Medication List      START taking these medications    Details   sulfamethoxazole-trimethoprim  800-160mg (BACTRIM DS) 800-160 mg Tab Take 1 tablet by mouth 2 (two) times daily. for 11 days  Qty: 22 tablet, Refills: 0         CONTINUE these medications which have NOT CHANGED    Details   baclofen (LIORESAL) 5 MG tablet Take 20 mg by mouth 3 (three) times daily.       cadexomer iodine (IODOSORB) 0.9 % gel Apply topically daily as needed for Wound Care.      cyclobenzaprine (FLEXERIL) 10 MG tablet Take 10 mg by mouth 3 (three) times daily.       docusate sodium (COLACE) 100 MG capsule Take 100 mg by mouth 2 (two) times daily.      famotidine (PEPCID) 20 MG tablet Take 20 mg by mouth 2 (two) times daily.      gabapentin (NEURONTIN) 300 MG capsule Take 300 mg by mouth 2 (two) times daily.       gemfibrozil (LOPID) 600 MG tablet Take 600 mg by mouth 2 (two) times daily before meals.      insulin detemir U-100 (LEVEMIR) 100 unit/mL injection Inject 15 Units into the skin 2 (two) times daily.  Qty: 10 mL, Refills: 1      insulin lispro protamine-insulin lispro (HUMALOG MIX 75-25,U-100,INSULN) 100 unit/mL (75-25) Susp Inject 20 Units into the skin 3 (three) times daily with meals.  Qty: 18 mL, Refills: 2      IPRATROPIUM/ALBUTEROL SULFATE (DUONEB INHL) Inhale 0.5-3 mg into the lungs every 6 (six) hours as needed.       ketoconazole (NIZORAL) 2 % shampoo Apply topically twice a week.      melatonin 10 mg Cap Take by mouth every evening.      meloxicam (MOBIC) 7.5 MG tablet Take 7.5 mg by mouth 2 (two) times daily as needed for Pain.      metFORMIN (GLUCOPHAGE) 500 MG tablet Take 500 mg by mouth 2 (two) times daily with meals.      metoprolol tartrate (LOPRESSOR) 12.5 mg tablet Take 12.5 mg by mouth 2 (two) times daily.      venlafaxine (EFFEXOR-XR) 37.5 MG 24 hr capsule Take 75 mg by mouth once daily.          STOP taking these medications       bacitracin 500 unit/gram Oint Comments:   Reason for Stopping:         PROMETHAZINE HCL (PHENERGAN ORAL) Comments:   Reason for Stopping:         vsnxy-iaqg-TlREY-collag-mv-min  (ROAXNA, WITH COLLAGEN,) 7-7-1.5 gram PwPk Comments:   Reason for Stopping:         ascorbic acid, vitamin C, (VITAMIN C) 500 MG tablet Comments:   Reason for Stopping:         aspirin (ECOTRIN) 325 MG EC tablet Comments:   Reason for Stopping:         buPROPion (WELLBUTRIN XL) 150 MG TB24 tablet Comments:   Reason for Stopping:         cholecalciferol, vitamin D3, 5,000 unit capsule Comments:   Reason for Stopping:         emollient combination no.71 (LUBRIDERM ADVANCED THERAPY) Lotn Comments:   Reason for Stopping:         EMOLLIENT COMBINATION NO.92 (LUBRIDERM DAILY MOISTURE TOP) Comments:   Reason for Stopping:         ferrous sulfate 325 (65 FE) MG EC tablet Comments:   Reason for Stopping:         multivitamin with minerals tablet Comments:   Reason for Stopping:         nicotine (NICODERM CQ) 7 mg/24 hr Comments:   Reason for Stopping:         nicotine polacrilex 2 MG Lozg Comments:   Reason for Stopping:         polyethylene glycol (GLYCOLAX) 17 gram PwPk Comments:   Reason for Stopping:         senna-docusate 8.6-50 mg (PERICOLACE) 8.6-50 mg per tablet Comments:   Reason for Stopping:         shark liver oil-cocoa butter 0.25-3% (PREPARATION H) 0.25-3 % suppository Comments:   Reason for Stopping:         white petrolatum-mineral oil (EUCERIN) Crea Comments:   Reason for Stopping:               I certify that this patient is confined to his home and needs intermittent skilled nursing care.    Maurilio Alfaro MD, [facility] [[email or web address]]  Staff Hospitalist  Pager: [phone]  Spectralink: [phone]
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
## 2019-01-04 NOTE — PLAN OF CARE
Problem: Adult Inpatient Plan of Care  Goal: Plan of Care Review  Outcome: Ongoing (interventions implemented as appropriate)  Pt AAOx4. NAD. Plan of care discussed with pt. Pt with no complaints of pain or discomfort. Wieght shift assistance provided throughout shift. Suprapubic catheter remains intact draining clear yellow urine. Glucose monitoring continued. Cardiac monitoring continued. IV antibiotics given per MD orders. Bed locked in lowest position, bed alarm set and call bell within reach. Pt verbalized understanding to call for any needs or assistance. Will continue to monitor.

## 2019-01-04 NOTE — PLAN OF CARE
Pt transported home by ambulance.  Multiple referrals were sent to multiple home health agencies and all were denied.  Pt refused to go back to [place].  He has PCA 7 hours a day, 7 days a week at home.  He lives with his God sister.     01/04/19 1718   Final Note   Assessment Type Final Discharge Note   Anticipated Discharge Disposition Home   Hospital Follow Up  Appt(s) scheduled? Yes   Discharge plans and expectations educations in teach back method with documentation complete? Yes   Right Care Referral Info   Post Acute Recommendation No Care

## 2019-01-04 NOTE — NURSING
VN note: VN cued into patient's room, no family at bedside. VN reviewed discharge information with patient. New medication reviewed with side effects. Teach-back method also utilized. VN also educated patient on sepsis, UTI, and when to seek medical attention. VN also educated patient on signs and symptoms and how to prevent infection. Follow-up information reviewed. Patient verbalized understanding and all questions answered. VN advised patient to review his medications with pill bottles at home, since many medications were listed on his stop list. He verbalized understanding. Refer to clinical references for further education.Transport set up for 4:00 pm.    Case Management to facilitate setting up home health.    Prescriptions sent for bedside delivery.

## 2019-01-04 NOTE — PROGRESS NOTES
The Sw was asked to arrange Medicaid transportation for the pt. The Sw spoke to the pt's God sister Veronica [phone] who states the pt lives with her and not his sister Edwin. She states the pt has a very nasty attitude and she was going to send him back to [facility]. She then told the Sw he has a PCA but he transports via ambulance b/c Medicaid transportation didn't come at the time they were supposed to come. She asked the Sw to arrange an ambulance transport for the pt b/c that's how he transports. The Sw called [facility] [phone] and spoke to Essie in customer service to arrange Medicaid transportation for the pt but the pt doesn't have his w/c here at the hospital and they don't provide a w/c. The Sw spoke to the pt and he states he doesn't have his w/c here and he needs a xiang lift b/c his PCA neither Veronica can get him into his w/c. The pt states he has been in the bed but they turn him and prop his feet up. The pt states he can't transport via w/c van b/c once he gets home he has no on that can list him from the w/c into his w/c b/c he has no xiang lift. The Sw spoke to Desiree who went into the room to speak with the pt about this issue as well.  Veronica states she doesn't get home until 2:30pm.

## 2019-01-04 NOTE — ASSESSMENT & PLAN NOTE
Due to Serratia marcescens  Septicemia due to Serratia marcescens  Catheter associated UTI  Continue cefepime today based on prior sensitivities and the sensitivities of the Providencia stuartii in the urine. Appreciate ID assistance. Will transition to bactrim DS BID to complete 14 day course (end date 1/15/19) on discharge.  Urology changed suprapubic catheter. Repeat blood culture are negative.

## 2019-01-04 NOTE — PROGRESS NOTES
Called [facility] and updated pt cell phone with them.  Hermila lift still pending.  Should have an answer today and [facility] will call mbr with results.

## 2019-01-04 NOTE — SUBJECTIVE & OBJECTIVE
Interval History: No fever or sweats over night. Feeling better this AM.     Review of Systems   Constitutional: Negative for chills and fever.   Respiratory: Negative for cough and shortness of breath.    Cardiovascular: Negative for chest pain and palpitations.   Gastrointestinal: Negative for nausea and vomiting.     Objective:     Vital Signs (Most Recent):  Temp: 96.8 °F (36 °C) (01/03/19 1744)  Pulse: 84 (01/03/19 1744)  Resp: 18 (01/03/19 1744)  BP: 132/78 (01/03/19 1744)  SpO2: 99 % (12/31/18 0834) Vital Signs (24h Range):  Temp:  [96.6 °F (35.9 °C)-99.1 °F (37.3 °C)] 96.8 °F (36 °C)  Pulse:  [] 84  Resp:  [18-19] 18  BP: (119-161)/(71-90) 132/78     Weight: 114.3 kg (251 lb 15.8 oz)  Body mass index is 34.18 kg/m².    Intake/Output Summary (Last 24 hours) at 1/3/2019 1830  Last data filed at 1/3/2019 1800  Gross per 24 hour   Intake 1710 ml   Output 3650 ml   Net -1940 ml      Physical Exam   Constitutional: He is oriented to person, place, and time. He appears well-developed and well-nourished. No distress.   Cardiovascular: Normal rate and regular rhythm.   Pulmonary/Chest: Effort normal and breath sounds normal. No respiratory distress.   Abdominal: Soft. Bowel sounds are normal. He exhibits no distension. There is no tenderness.   Musculoskeletal: He exhibits no edema or tenderness.   Neurological: He is alert and oriented to person, place, and time.   Skin: Skin is warm and dry.   Psychiatric: He has a normal mood and affect. His behavior is normal.   Nursing note and vitals reviewed.      Significant Labs:   CBC:   Recent Labs   Lab 01/02/19  0954   WBC 10.67   HGB 9.7*   HCT 29.4*   *     CMP:   Recent Labs   Lab 01/02/19  0954   *   K 4.0      CO2 22*   *   BUN 9   CREATININE 1.0   CALCIUM 9.4   ANIONGAP 8   EGFRNONAA >60     Magnesium:   Recent Labs   Lab 01/02/19  0954   MG 2.0     POCT Glucose:   Recent Labs   Lab 01/03/19  0537 01/03/19  1113 01/03/19  1622    POCTGLUCOSE 187* 253* 383*       Significant Imaging: I have reviewed all pertinent imaging results/findings within the past 24 hours.

## 2019-01-04 NOTE — PLAN OF CARE
Pt handed education and discharge instructions. VN notified. IV was removed;cath tip intact. Tele removed without adverse reaction.

## 2019-01-04 NOTE — ASSESSMENT & PLAN NOTE
Takes insulin detemir 15 units BID, insulin lispro protamine-insulin lispro 75-25 20 units TID with meals. Give insulin detemir 10 units BID, insulin aspart sliding scale. Monitor serum glucose. BS ranged 184 to 302.

## 2019-01-04 NOTE — PROGRESS NOTES
The Sw received a call from the pt's God-sister Veronica and the Sw informed her ambulance transport has been arranged for 4pm for the pt and she's in agreement with the transport time stating she will be home to receive the pt.

## 2019-01-04 NOTE — PROGRESS NOTES
The Sw placed a 4 pm ambulance transport for the pt in the [facility] via Epic per Julio/'s request.

## 2019-01-05 LAB
BACTERIA BLD CULT: NORMAL
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
## 2019-01-06 LAB — BACTERIA BLD CULT: NORMAL

## 2019-01-07 ENCOUNTER — TELEPHONE (OUTPATIENT)
Dept: FAMILY MEDICINE | Facility: CLINIC | Age: 44
End: 2019-01-07

## 2019-01-07 ENCOUNTER — PATIENT OUTREACH (OUTPATIENT)
Dept: ADMINISTRATIVE | Facility: CLINIC | Age: 44
End: 2019-01-07

## 2019-01-07 RX ORDER — GEMFIBROZIL 600 MG/1
600 TABLET, FILM COATED ORAL
Qty: 10 TABLET | Refills: 0 | Status: SHIPPED | OUTPATIENT
Start: 2019-01-07 | End: 2019-01-17 | Stop reason: SDUPTHER

## 2019-01-07 RX ORDER — FAMOTIDINE 20 MG/1
20 TABLET, FILM COATED ORAL 2 TIMES DAILY
Qty: 10 TABLET | Refills: 0 | Status: SHIPPED | OUTPATIENT
Start: 2019-01-07 | End: 2019-01-17 | Stop reason: SDUPTHER

## 2019-01-07 RX ORDER — GABAPENTIN 300 MG/1
300 CAPSULE ORAL 2 TIMES DAILY
Qty: 10 CAPSULE | Refills: 0 | Status: SHIPPED | OUTPATIENT
Start: 2019-01-07 | End: 2019-01-17 | Stop reason: SDUPTHER

## 2019-01-07 RX ORDER — CALCIUM CARB/VITAMIN D3/VIT K1 500-100-40
TABLET,CHEWABLE ORAL
Qty: 100 EACH | Refills: 0 | Status: SHIPPED | OUTPATIENT
Start: 2019-01-07 | End: 2019-01-11

## 2019-01-07 RX ORDER — CYCLOBENZAPRINE HCL 10 MG
10 TABLET ORAL 3 TIMES DAILY
Qty: 15 TABLET | Refills: 0 | Status: SHIPPED | OUTPATIENT
Start: 2019-01-07 | End: 2019-01-12

## 2019-01-07 RX ORDER — VENLAFAXINE HYDROCHLORIDE 75 MG/1
75 CAPSULE, EXTENDED RELEASE ORAL DAILY
Qty: 5 CAPSULE | Refills: 0 | Status: SHIPPED | OUTPATIENT
Start: 2019-01-07 | End: 2019-01-17 | Stop reason: SDUPTHER

## 2019-01-07 RX ORDER — METOPROLOL TARTRATE 25 MG/1
12.5 TABLET, FILM COATED ORAL 2 TIMES DAILY
Qty: 5 TABLET | Refills: 0 | Status: SHIPPED | OUTPATIENT
Start: 2019-01-07 | End: 2019-01-17 | Stop reason: SDUPTHER

## 2019-01-07 RX ORDER — METFORMIN HYDROCHLORIDE 500 MG/1
500 TABLET ORAL 2 TIMES DAILY WITH MEALS
Qty: 10 TABLET | Refills: 0 | Status: SHIPPED | OUTPATIENT
Start: 2019-01-07 | End: 2019-01-17 | Stop reason: SDUPTHER

## 2019-01-07 RX ORDER — BACLOFEN 10 MG/1
10 TABLET ORAL 3 TIMES DAILY
Qty: 15 TABLET | Refills: 0 | Status: SHIPPED | OUTPATIENT
Start: 2019-01-07 | End: 2019-01-17 | Stop reason: SDUPTHER

## 2019-01-07 NOTE — PROGRESS NOTES
"Pt difficult for caretakers to move. Caretaker stating Medicaid hasn't approved lift yet. Instructed to follow up with them.    She also states that he is out of all meds except antibiotic and Insulins. Pt does not have needles for Insulin though. Message routed to  Dr. FANNIE Alfaro to see if he could call in a few days worth of meds until pcp sees pt on Friday. Was going to try to see if priority care clinic could see him sooner, but pt uses Ambulance or Medicaid transport which has to be set up 3 days in advance per Veronica. Received message from Dr. Alfaro that he called in "most important meds."  Called Veronica and notified.  "

## 2019-01-07 NOTE — TELEPHONE ENCOUNTER
Veronica notified that pt needs to be seen first.      Apt made for Friday.     ----- Message from Paige Robles sent at 1/7/2019 10:06 AM CST -----  Contact: [phone]/Veronica  Patient is requesting to have a refill of     9 different rx and add Needles and lasix.  He is not in the nursing home for a month and doesn't have any of his meds.     sent to [facility] [address] [place] [address], [place] - [address] [facility] . Please call Veronica to discuss

## 2019-01-07 NOTE — PATIENT INSTRUCTIONS
Catheter-Associated Urinary Tract Infections     A small balloon keeps the catheter in place inside the bladder.   A catheter-associated urinary tract infection (CAUTI) is an infection of the urinary system. CAUTI is caused by bacteria that enter the urinary tract when a urinary catheter is used. This is a tube thats placed into the bladder to drain urine.  The urinary system  This system includes the kidneys, ureters, bladder, and urethra. The kidneys filter blood and make urine. The ureters carry urine from the kidneys to the bladder. The bladder stores urine. The urethra carries urine from the bladder to the outside of the body.  What is a urinary catheter?  A urinary catheter is a thin, flexible tube. It is placed in the bladder to drain urine. Urine flows through the tube into a collecting bag outside of the body. There are different types of urinary catheters. The most common type is an indwelling catheter. This is also known as a urethral catheter. This is because its placed into the bladder through the urethra. This catheter is also called a Huff catheter.  Why is a urinary catheter needed?  A urinary catheter is needed for any of the following:  · You cant get up to use the toilet because your mobility is limited. This may be due to surgery, an injury, or illness.  · You have a blockage in your urinary system.  · Your healthcare provider needs to measure the amount of urine you pass.  · The function of your kidneys and bladder is being tested.  · Youre not able to control your bladder (incontinence).  In most cases, the urinary catheter is temporary. You'll need it only until the problem that requires it is resolved.   How does a CAUTI develop?  Bacteria can enter the urinary tract as the catheter is put into the urethra. Bacteria can also get into the urinary tract while the catheter is in place. The common bacteria that cause a CAUTI are ones that live in the intestine. These bacteria dont  normally cause problems in the intestine. But when they get into the urinary tract, a CAUTI can result.  Why is a CAUTI of concern?  Left untreated, a CAUTI can lead to health problems. These problems may include bladder infection, prostate infection, and kidney infection. A CAUTI can prolong your hospital stay. If the infection is not treated in time, serious health complications may occur.  What are the symptoms of a CAUTI?  · A burning feeling, pressure, or pain in your lower abdomen  · Fever or chills  · Urine in the collecting bag is cloudy or bloody (pink or red)  · Burning feeling in the urethra or genital area  · Aching in the back (kidney area)  · Nausea and vomiting  · Person is confused, or is not alert, or has a change in behavior (mainly affects older patients)  · Note that sometimes a person wont have any symptoms but may still have a CAUTI.  Tell a healthcare provider right away if you or your loved one has any of these symptoms.  How is CAUTI diagnosed?  If you have symptoms of CAUTI your healthcare provider will order tests. These include a urine test, blood tests, and other tests as needed.  How is CAUTI treated?   Treatment may involve any of the following:  · Antibiotics. Your healthcare provider will likely prescribe antibiotics if you have symptoms. Be aware that if you dont have symptoms, you may not be given antibiotics. This is to prevent an increase in bacteria that resist (cant be killed by) certain antibiotics.  · Removing the catheter. The catheter will be removed when your healthcare provider decides its no longer needed. This usually helps stop the infection.  · Changing the catheter. If you still need a catheter, the old one will be removed. A new one will be put in. This may help stop the infection.  How do hospital and long-term facility staff prevent CAUTI?  To keep patients from getting a CAUTI, the staff follow certain procedures:  · Prescribe a catheter only when its  needed. It is removed as soon as its no longer needed.  · Use sterile (clean) technique when placing the catheter into the urinary tract. This means before putting the catheter in, the caregiver washes his or her hands with soap and water. He or she then puts on sterile gloves. A sterile catheter kit that has cleansers is used to cleanse the patients genital area.  · Before performing catheter care, caregivers also wash their hands or use an alcohol-based hand cleanser.  · Hang the bag lower than your bladder. This prevents urine from flowing back into your bladder.  · Ensure that the bag is emptied regularly.  What you can do as a patient to prevent CAUTI  You can help prevent yourself from getting a CAUTI by doing the following:  · Every day ask your healthcare provider how long you need to have the catheter. The longer you have a catheter, the higher your chance of getting a CAUTI.  · If a caregiver doesnt clean his or her hands and put on gloves before touching your catheter, ask them to do so.  · If youve been taught how to care for your catheter, be sure to wash your hands before and after each session.  · Make sure your bag is lower than your bladder. If its not, tell your caregiver.  · Dont disconnect the catheter and drain tube. Doing so allows germs to get into the catheter.  · Cleansing of the genital and perineal areas is very important to help decrease bacteria in areas surrounding the catheter. Ask your doctor what you should use and how often to clean these areas.  If you are discharged with an indwelling catheter  · Before you leave the hospital, make sure you understand the instructions on how to care for your catheter at home.  · Ask your healthcare provider how long you need the catheter. Also ask if you need to make a follow-up appointment to have the catheter removed.  · Always use sterile (clean) technique when caring for your catheter. Wash your hands before and after doing any catheter  care.  · Call your healthcare provider right away if you develop symptoms of a CAUTI (see above).   Date Last Reviewed: 1/1/2017 © 2000-2017 [employer]. [address] [place] [address], [place] [address]. All rights reserved. This information is not intended as a substitute for professional medical care. Always follow your healthcare professional's instructions.        Bladder Infection (Cystitis), Male (Child)  A bladder infection is when bacteria cause the bladder to be inflamed. The bladder holds urine. A tube called the urethra takes urine from the bladder out of the body. Sometimes bacteria can travel up the urethra. This causes the infection.     The most common cause of bladder infections in children is bacteria from the bowels. The bacteria can get onto the skin around the urethra, and then into the urine. From there it can travel up to the bladder. This can happen because of:  · Poor cleaning after using the toilet or during a diaper change  · Poor cleaning of the foreskin  · Not completely emptying the bladder  · Constipation that prevents the bladder from emptying completely  · Not drinking enough fluids to urinate often  · Irritation of the urethra from soaps or tight clothes  Symptoms of a bladder infection include the need to urinate often and urgently. It may be painful. The urine may have a strong smell. It may be dark, tinted with blood, or cloudy. Your child may not be able to hold urine and may wet the bed or clothes. Your child may also have a fever and belly pain. Some children dont have symptoms. A baby may be fussy and not able to be soothed. He or she may cry when urinating. Your baby may also feed less or be less active.  A bladder infection is treated with antibiotics. Your child's healthcare provider may also prescribe a medicine to treat pain. Children get better from a bladder infection quickly.  In many cases a bladder infection will come back. Its important to take steps to  prevent it (see below).  Home care  The healthcare provider may prescribe medicine to treat the infection. Follow all instructions for giving this medicine to your child. Use the medicine as instructed every day until it is gone. Dont stop giving it to your child if he feels better. Dont give your child aspirin unless you are told to by the healthcare provider.  For children ages 2 and up: If your child's healthcare provider says it's OK, you can give acetaminophen or ibuprofen for pain, fever, fussiness, or discomfort. If your child has chronic liver or kidney disease, talk with the healthcare provider before giving these medicines. Also talk with your provider if your child has ever had a stomach ulcer or GI bleeding, or is taking blood thinners.  General care  · Keep track of how often your child urinates. Note the urine color and amount.  · Tell your child to urinate often. Tell him to completely empty the bladder each time. This will help flush out bacteria.  · Have your child wear loose clothes and cotton underwear.  · Make sure that your child drinks enough fluids. Give your child cranberry juice if advised by the healthcare provider.  Prevention  · Clean your childs penis every day. If he is uncircumcised, retract the foreskin when cleaning.  · Make sure diapers arent tight. If you use cloth diapers, use cotton or wool protectors rather than nylon or rubber pants.   · Change soiled diapers right away.  · Make sure your child drinks plenty of fluids. Or, make sure your baby feeds often. This is to prevent dehydration.  · Make sure your child urinates when needed, and does not hold it in.  · Dont give your child bubble baths. They can irritate the urethra.  Follow-up care  Follow up with your childs healthcare provider, or as advised. If a culture was done, you will be told of any new findings that may affect your child's care.  Call 911  Call 911 if any of these occur:  · Trouble breathing  · Difficulty  arousing  · Fainting or loss of consciousness  · Rapid heart rate  · Seizure  When to seek medical advice  Call your child's healthcare provider right away if any of these occur:  · Fever of 100.4°F (38°C) or higher, or as advised  · Symptoms dont get better after 24 hours of treatment  · Vomiting or inability to keep down medicine  · Pain gets worse  · Pain in the low back, belly, or side  · Foul-smelling urine  · Yellow tint to the skin or eyes (jaundice)  Date Last Reviewed: 10/1/2016  © 1281-0386 [employer]. [address] [place] [address]. All rights reserved. This information is not intended as a substitute for professional medical care. Always follow your healthcare professional's instructions.

## 2019-01-11 ENCOUNTER — OFFICE VISIT (OUTPATIENT)
Dept: FAMILY MEDICINE | Facility: CLINIC | Age: 44
End: 2019-01-11
Payer: MEDICAID

## 2019-01-11 VITALS
SYSTOLIC BLOOD PRESSURE: 100 MMHG | WEIGHT: 240 LBS | HEART RATE: 75 BPM | TEMPERATURE: 98 F | BODY MASS INDEX: 33.6 KG/M2 | HEIGHT: 71 IN | DIASTOLIC BLOOD PRESSURE: 68 MMHG

## 2019-01-11 DIAGNOSIS — S25.00XS: ICD-10-CM

## 2019-01-11 DIAGNOSIS — D50.9 IRON DEFICIENCY ANEMIA, UNSPECIFIED IRON DEFICIENCY ANEMIA TYPE: Chronic | ICD-10-CM

## 2019-01-11 DIAGNOSIS — Z93.59 CHRONIC SUPRAPUBIC CATHETER: Chronic | ICD-10-CM

## 2019-01-11 DIAGNOSIS — S81.809A NON-HEALING WOUND OF LOWER EXTREMITY, INITIAL ENCOUNTER: ICD-10-CM

## 2019-01-11 DIAGNOSIS — I10 ESSENTIAL HYPERTENSION: Chronic | ICD-10-CM

## 2019-01-11 DIAGNOSIS — L97.929 CHRONIC ULCER OF LOWER EXTREMITY, LEFT, WITH UNSPECIFIED SEVERITY: ICD-10-CM

## 2019-01-11 DIAGNOSIS — Z86.718 HISTORY OF DVT OF LOWER EXTREMITY: ICD-10-CM

## 2019-01-11 DIAGNOSIS — N31.9 NEUROGENIC BLADDER: Chronic | ICD-10-CM

## 2019-01-11 DIAGNOSIS — K59.00 CONSTIPATION, UNSPECIFIED CONSTIPATION TYPE: ICD-10-CM

## 2019-01-11 DIAGNOSIS — I73.9 PAD (PERIPHERAL ARTERY DISEASE): Chronic | ICD-10-CM

## 2019-01-11 DIAGNOSIS — Z89.511 HX OF RIGHT BKA: Chronic | ICD-10-CM

## 2019-01-11 DIAGNOSIS — F43.12 CHRONIC POST-TRAUMATIC STRESS DISORDER: Chronic | ICD-10-CM

## 2019-01-11 DIAGNOSIS — E11.65 TYPE 2 DIABETES MELLITUS WITH HYPERGLYCEMIA, WITH LONG-TERM CURRENT USE OF INSULIN: Chronic | ICD-10-CM

## 2019-01-11 DIAGNOSIS — L89.524 PRESSURE INJURY OF LEFT ANKLE, STAGE 4: ICD-10-CM

## 2019-01-11 DIAGNOSIS — E55.9 VITAMIN D DEFICIENCY: Chronic | ICD-10-CM

## 2019-01-11 DIAGNOSIS — L97.521 NEUROPATHIC ULCER OF LEFT FOOT, LIMITED TO BREAKDOWN OF SKIN: ICD-10-CM

## 2019-01-11 DIAGNOSIS — Z79.4 TYPE 2 DIABETES MELLITUS WITH HYPERGLYCEMIA, WITH LONG-TERM CURRENT USE OF INSULIN: Chronic | ICD-10-CM

## 2019-01-11 DIAGNOSIS — F33.2 SEVERE EPISODE OF RECURRENT MAJOR DEPRESSIVE DISORDER, WITHOUT PSYCHOTIC FEATURES: ICD-10-CM

## 2019-01-11 DIAGNOSIS — F33.40 RECURRENT MAJOR DEPRESSIVE DISORDER, IN REMISSION: Chronic | ICD-10-CM

## 2019-01-11 DIAGNOSIS — G82.20 PARAPLEGIA AT T9 LEVEL: Chronic | ICD-10-CM

## 2019-01-11 DIAGNOSIS — G54.7 PHANTOM LIMB SYNDROME: Primary | ICD-10-CM

## 2019-01-11 DIAGNOSIS — Z72.0 TOBACCO ABUSE DISORDER: ICD-10-CM

## 2019-01-11 DIAGNOSIS — E78.1 PURE HYPERGLYCERIDEMIA: ICD-10-CM

## 2019-01-11 PROBLEM — Z93.0 TRACHEOSTOMY STATUS: Chronic | Status: RESOLVED | Noted: 2017-02-12 | Resolved: 2019-01-11

## 2019-01-11 PROBLEM — T83.090A: Status: RESOLVED | Noted: 2018-08-29 | Resolved: 2019-01-11

## 2019-01-11 PROBLEM — K21.9 GASTROESOPHAGEAL REFLUX DISEASE WITHOUT ESOPHAGITIS: Status: RESOLVED | Noted: 2018-08-29 | Resolved: 2019-01-11

## 2019-01-11 PROBLEM — Z93.1 GASTROSTOMY STATUS: Chronic | Status: RESOLVED | Noted: 2017-02-12 | Resolved: 2019-01-11

## 2019-01-11 PROCEDURE — 99214 OFFICE O/P EST MOD 30 MIN: CPT | Mod: S$GLB,,, | Performed by: FAMILY MEDICINE

## 2019-01-11 PROCEDURE — 99214 PR OFFICE/OUTPT VISIT, EST, LEVL IV, 30-39 MIN: ICD-10-PCS | Mod: S$GLB,,, | Performed by: FAMILY MEDICINE

## 2019-01-11 RX ORDER — PEN NEEDLE, DIABETIC 29 G X1/2"
1 NEEDLE, DISPOSABLE MISCELLANEOUS 3 TIMES DAILY
Qty: 400 EACH | Refills: 11 | Status: SHIPPED | OUTPATIENT
Start: 2019-01-11 | End: 2019-05-15 | Stop reason: SDUPTHER

## 2019-01-11 RX ORDER — INSULIN LISPRO 100 [IU]/ML
20 INJECTION, SUSPENSION SUBCUTANEOUS
Qty: 3 BOX | Refills: 11 | Status: SHIPPED | OUTPATIENT
Start: 2019-01-11 | End: 2019-02-14 | Stop reason: SDUPTHER
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
## 2019-01-17 NOTE — TELEPHONE ENCOUNTER
----- Message from Nabil Kennedy sent at 1/17/2019  3:07 PM CST -----  Contact: self [phone]  Patient would like refill of    baclofen (LIORESAL) 10 MG tablet  famotidine (PEPCID) 20 MG tablet  gabapentin (NEURONTIN) 300 MG capsule  gemfibrozil (LOPID) 600 MG tablet  metFORMIN (GLUCOPHAGE) 500 MG tablet  metoprolol tartrate (LOPRESSOR) 25 MG tablet  venlafaxine (EFFEXOR-XR) 75 MG 24 hr capsule    sent to [facility] [address] [place] [address] [address] [facility]. Please advise.

## 2019-01-18 DIAGNOSIS — E11.9 TYPE 2 DIABETES MELLITUS WITHOUT COMPLICATION, UNSPECIFIED WHETHER LONG TERM INSULIN USE: ICD-10-CM

## 2019-01-18 DIAGNOSIS — E11.9 TYPE 2 DIABETES MELLITUS WITHOUT COMPLICATION: ICD-10-CM

## 2019-01-18 RX ORDER — METOPROLOL TARTRATE 25 MG/1
12.5 TABLET, FILM COATED ORAL 2 TIMES DAILY
Qty: 5 TABLET | Refills: 0 | Status: SHIPPED | OUTPATIENT
Start: 2019-01-18 | End: 2019-01-29 | Stop reason: SDUPTHER

## 2019-01-18 RX ORDER — GEMFIBROZIL 600 MG/1
600 TABLET, FILM COATED ORAL
Qty: 10 TABLET | Refills: 0 | Status: SHIPPED | OUTPATIENT
Start: 2019-01-18 | End: 2019-01-29 | Stop reason: SDUPTHER

## 2019-01-18 RX ORDER — GABAPENTIN 300 MG/1
300 CAPSULE ORAL 2 TIMES DAILY
Qty: 10 CAPSULE | Refills: 0 | Status: SHIPPED | OUTPATIENT
Start: 2019-01-18 | End: 2019-02-14 | Stop reason: SDUPTHER

## 2019-01-18 RX ORDER — METFORMIN HYDROCHLORIDE 500 MG/1
500 TABLET ORAL 2 TIMES DAILY WITH MEALS
Qty: 10 TABLET | Refills: 0 | Status: SHIPPED | OUTPATIENT
Start: 2019-01-18 | End: 2019-01-28 | Stop reason: SDUPTHER

## 2019-01-18 RX ORDER — BACLOFEN 10 MG/1
10 TABLET ORAL 3 TIMES DAILY
Qty: 15 TABLET | Refills: 0 | Status: SHIPPED | OUTPATIENT
Start: 2019-01-18 | End: 2019-01-29 | Stop reason: SDUPTHER

## 2019-01-18 RX ORDER — FAMOTIDINE 20 MG/1
20 TABLET, FILM COATED ORAL 2 TIMES DAILY
Qty: 10 TABLET | Refills: 0 | Status: SHIPPED | OUTPATIENT
Start: 2019-01-18 | End: 2019-01-29 | Stop reason: SDUPTHER

## 2019-01-18 RX ORDER — VENLAFAXINE HYDROCHLORIDE 75 MG/1
75 CAPSULE, EXTENDED RELEASE ORAL DAILY
Qty: 5 CAPSULE | Refills: 0 | Status: SHIPPED | OUTPATIENT
Start: 2019-01-18 | End: 2019-01-29 | Stop reason: SDUPTHER

## 2019-01-23 ENCOUNTER — TELEPHONE (OUTPATIENT)
Dept: FAMILY MEDICINE | Facility: CLINIC | Age: 44
End: 2019-01-23

## 2019-01-23 NOTE — TELEPHONE ENCOUNTER
----- Message from Madeline Gordon sent at 1/23/2019 10:21 AM CST -----  Contact: [phone]/ self   Pt its requesting a refill on all of his medications [facility] in [place] . Please advise

## 2019-01-25 ENCOUNTER — OFFICE VISIT (OUTPATIENT)
Dept: CARDIOLOGY | Facility: CLINIC | Age: 44
End: 2019-01-25
Payer: MEDICAID

## 2019-01-25 VITALS
WEIGHT: 240 LBS | OXYGEN SATURATION: 98 % | BODY MASS INDEX: 33.47 KG/M2 | DIASTOLIC BLOOD PRESSURE: 80 MMHG | SYSTOLIC BLOOD PRESSURE: 118 MMHG | HEART RATE: 78 BPM

## 2019-01-25 DIAGNOSIS — E11.65 TYPE 2 DIABETES MELLITUS WITH HYPERGLYCEMIA, WITH LONG-TERM CURRENT USE OF INSULIN: Chronic | ICD-10-CM

## 2019-01-25 DIAGNOSIS — I10 ESSENTIAL HYPERTENSION: Chronic | ICD-10-CM

## 2019-01-25 DIAGNOSIS — G82.20 PARAPLEGIA AT T9 LEVEL: Chronic | ICD-10-CM

## 2019-01-25 DIAGNOSIS — Z72.0 TOBACCO ABUSE DISORDER: ICD-10-CM

## 2019-01-25 DIAGNOSIS — L97.921 CHRONIC ULCER OF LEFT LEG, LIMITED TO BREAKDOWN OF SKIN: Primary | ICD-10-CM

## 2019-01-25 DIAGNOSIS — S81.809A NON-HEALING WOUND OF LOWER EXTREMITY, INITIAL ENCOUNTER: ICD-10-CM

## 2019-01-25 DIAGNOSIS — Z79.4 TYPE 2 DIABETES MELLITUS WITH HYPERGLYCEMIA, WITH LONG-TERM CURRENT USE OF INSULIN: Chronic | ICD-10-CM

## 2019-01-25 PROCEDURE — 99215 OFFICE O/P EST HI 40 MIN: CPT | Mod: S$PBB,,, | Performed by: INTERNAL MEDICINE

## 2019-01-25 PROCEDURE — 99999 PR PBB SHADOW E&M-EST. PATIENT-LVL V: ICD-10-PCS | Mod: PBBFAC,,, | Performed by: INTERNAL MEDICINE

## 2019-01-25 PROCEDURE — 99215 OFFICE O/P EST HI 40 MIN: CPT | Mod: PBBFAC,PN | Performed by: INTERNAL MEDICINE

## 2019-01-25 PROCEDURE — 99999 PR PBB SHADOW E&M-EST. PATIENT-LVL V: CPT | Mod: PBBFAC,,, | Performed by: INTERNAL MEDICINE

## 2019-01-25 PROCEDURE — 99215 PR OFFICE/OUTPT VISIT, EST, LEVL V, 40-54 MIN: ICD-10-PCS | Mod: S$PBB,,, | Performed by: INTERNAL MEDICINE
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
## 2019-01-28 RX ORDER — METFORMIN HYDROCHLORIDE 500 MG/1
500 TABLET ORAL 2 TIMES DAILY WITH MEALS
Qty: 180 TABLET | Refills: 3 | Status: SHIPPED | OUTPATIENT
Start: 2019-01-28 | End: 2019-02-14 | Stop reason: SDUPTHER

## 2019-01-28 RX ORDER — CYCLOBENZAPRINE HCL 10 MG
10 TABLET ORAL 3 TIMES DAILY PRN
Qty: 90 TABLET | Refills: 3 | Status: SHIPPED | OUTPATIENT
Start: 2019-01-28 | End: 2019-02-07

## 2019-01-28 NOTE — TELEPHONE ENCOUNTER
----- Message from Bridgett Pena sent at 1/28/2019  4:32 PM CST -----  Contact: [phone]  Patient advised all seven of his medications that was refilled on 01/18 need to be refilled for a full 30 day supply. Please call.

## 2019-01-28 NOTE — TELEPHONE ENCOUNTER
----- Message from Paige Robles sent at 1/28/2019 10:13 AM [facility] -----  Contact: [phone] /self  Patient is requesting to have a refill of   cyclobenzaprine (FLEXERIL) 10 MG tablet.  Take 1 tablet (10 mg total) by mouth 3 (three) times daily. for 5 days - Oral  metFORMIN (GLUCOPHAGE) 500 MG tablet. Take 1 tablet (500 mg total) by mouth 2 (two) times daily with meals. for 5 days - Oral    He only has insulin lispro protamin-lispro (HUMALOG MIX 75-25 KWIKPEN) 100 unit/mL (75-25) InPn  All the other medications need to be refilled.    sent [facility] [address] [place] [address] [address] [facility]  . Please advise.

## 2019-01-29 NOTE — TELEPHONE ENCOUNTER
----- Message from Bridgett Pena sent at 1/29/2019  3:19 PM CST -----  Contact: [phone]  Patient would like a refill of   baclofen (LIORESAL) 10 MG tablet   famotidine (PEPCID) 20 MG tablet   PROMETHAZINE 25 MG   metoprolol tartrate (LOPRESSOR) 25 MG tablet  venlafaxine (EFFEXOR-XR) 75 MG 24 hr capsule  gemfibrozil (LOPID) 600 MG tablet  DUONEB 25 MG   sent to [facility] [id number].     Patient would also like to speak with the nurse about medication for depression. Please call.

## 2019-01-30 RX ORDER — VENLAFAXINE HYDROCHLORIDE 75 MG/1
75 CAPSULE, EXTENDED RELEASE ORAL DAILY
Qty: 30 CAPSULE | Refills: 0 | Status: SHIPPED | OUTPATIENT
Start: 2019-01-30 | End: 2019-02-14 | Stop reason: SDUPTHER

## 2019-01-30 RX ORDER — BACLOFEN 10 MG/1
10 TABLET ORAL 3 TIMES DAILY
Qty: 90 TABLET | Refills: 0 | Status: SHIPPED | OUTPATIENT
Start: 2019-01-30 | End: 2019-02-14 | Stop reason: SDUPTHER

## 2019-01-30 RX ORDER — METOPROLOL TARTRATE 25 MG/1
12.5 TABLET, FILM COATED ORAL 2 TIMES DAILY
Qty: 15 TABLET | Refills: 0 | Status: SHIPPED | OUTPATIENT
Start: 2019-01-30 | End: 2019-02-14 | Stop reason: SDUPTHER

## 2019-01-30 RX ORDER — FAMOTIDINE 20 MG/1
20 TABLET, FILM COATED ORAL 2 TIMES DAILY
Qty: 60 TABLET | Refills: 0 | Status: SHIPPED | OUTPATIENT
Start: 2019-01-30 | End: 2019-02-14 | Stop reason: SDUPTHER

## 2019-01-30 RX ORDER — PROMETHAZINE HYDROCHLORIDE 25 MG/1
25 TABLET ORAL EVERY 6 HOURS PRN
Qty: 30 TABLET | Refills: 0 | Status: SHIPPED | OUTPATIENT
Start: 2019-01-30 | End: 2019-02-14 | Stop reason: SDUPTHER

## 2019-01-30 RX ORDER — GEMFIBROZIL 600 MG/1
600 TABLET, FILM COATED ORAL
Qty: 60 TABLET | Refills: 0 | Status: SHIPPED | OUTPATIENT
Start: 2019-01-30 | End: 2019-02-14 | Stop reason: SDUPTHER

## 2019-02-08 ENCOUNTER — TELEPHONE (OUTPATIENT)
Dept: UROLOGY | Facility: CLINIC | Age: 44
End: 2019-02-08

## 2019-02-08 ENCOUNTER — HOSPITAL ENCOUNTER (EMERGENCY)
Facility: HOSPITAL | Age: 44
Discharge: HOME | End: 2019-02-08
Attending: EMERGENCY MEDICINE
Payer: MEDICAID

## 2019-02-08 VITALS
DIASTOLIC BLOOD PRESSURE: 89 MMHG | TEMPERATURE: 98 F | OXYGEN SATURATION: 99 % | BODY MASS INDEX: 33.6 KG/M2 | RESPIRATION RATE: 17 BRPM | SYSTOLIC BLOOD PRESSURE: 146 MMHG | HEIGHT: 71 IN | HEART RATE: 83 BPM | WEIGHT: 240 LBS

## 2019-02-08 DIAGNOSIS — Z93.59 PRESENCE OF SUPRAPUBIC CATHETER: ICD-10-CM

## 2019-02-08 DIAGNOSIS — N39.0 COMPLICATED UTI (URINARY TRACT INFECTION): Primary | ICD-10-CM

## 2019-02-08 DIAGNOSIS — Z93.59 CHRONIC SUPRAPUBIC CATHETER: Chronic | ICD-10-CM

## 2019-02-08 PROBLEM — G54.7 PHANTOM LIMB SYNDROME: Chronic | Status: ACTIVE | Noted: 2018-08-29

## 2019-02-08 LAB
BACTERIA #/AREA URNS HPF: ABNORMAL /HPF
BILIRUB UR QL STRIP: NEGATIVE
CLARITY UR: ABNORMAL
COLOR UR: YELLOW
GLUCOSE UR QL STRIP: NEGATIVE
HGB UR QL STRIP: ABNORMAL
HYALINE CASTS #/AREA URNS LPF: 0 /LPF
KETONES UR QL STRIP: NEGATIVE
LEUKOCYTE ESTERASE UR QL STRIP: ABNORMAL
MICROSCOPIC COMMENT: ABNORMAL
NITRITE UR QL STRIP: NEGATIVE
NON-SQ EPI CELLS #/AREA URNS HPF: 1 /HPF
PH UR STRIP: 7 [PH] (ref 5–8)
PROT UR QL STRIP: ABNORMAL
RBC #/AREA URNS HPF: >100 /HPF (ref 0–4)
SP GR UR STRIP: 1.01 (ref 1–1.03)
SQUAMOUS #/AREA URNS HPF: 0 /HPF
URN SPEC COLLECT METH UR: ABNORMAL
UROBILINOGEN UR STRIP-ACNC: NEGATIVE EU/DL
WBC #/AREA URNS HPF: 15 /HPF (ref 0–5)

## 2019-02-08 PROCEDURE — 87077 CULTURE AEROBIC IDENTIFY: CPT

## 2019-02-08 PROCEDURE — 96372 THER/PROPH/DIAG INJ SC/IM: CPT

## 2019-02-08 PROCEDURE — 87086 URINE CULTURE/COLONY COUNT: CPT

## 2019-02-08 PROCEDURE — 87186 SC STD MICRODIL/AGAR DIL: CPT

## 2019-02-08 PROCEDURE — 51702 INSERT TEMP BLADDER CATH: CPT

## 2019-02-08 PROCEDURE — 81000 URINALYSIS NONAUTO W/SCOPE: CPT

## 2019-02-08 PROCEDURE — 99283 PR EMERGENCY DEPT VISIT,LEVEL III: ICD-10-PCS | Mod: 25,,, | Performed by: STUDENT IN AN ORGANIZED HEALTH CARE EDUCATION/TRAINING PROGRAM

## 2019-02-08 PROCEDURE — 87088 URINE BACTERIA CULTURE: CPT

## 2019-02-08 PROCEDURE — 99283 EMERGENCY DEPT VISIT LOW MDM: CPT | Mod: 25,,, | Performed by: STUDENT IN AN ORGANIZED HEALTH CARE EDUCATION/TRAINING PROGRAM

## 2019-02-08 PROCEDURE — 99284 EMERGENCY DEPT VISIT MOD MDM: CPT | Mod: 25

## 2019-02-08 PROCEDURE — 63600175 PHARM REV CODE 636 W HCPCS: Performed by: EMERGENCY MEDICINE

## 2019-02-08 PROCEDURE — 51705 PR CHANGE OF BLADDER TUBE,SIMPLE: ICD-10-PCS | Mod: ,,, | Performed by: STUDENT IN AN ORGANIZED HEALTH CARE EDUCATION/TRAINING PROGRAM

## 2019-02-08 PROCEDURE — 51705 CHANGE OF BLADDER TUBE: CPT | Mod: ,,, | Performed by: STUDENT IN AN ORGANIZED HEALTH CARE EDUCATION/TRAINING PROGRAM

## 2019-02-08 RX ORDER — SULFAMETHOXAZOLE AND TRIMETHOPRIM 800; 160 MG/1; MG/1
1 TABLET ORAL 2 TIMES DAILY
Qty: 14 TABLET | Refills: 0 | Status: SHIPPED | OUTPATIENT
Start: 2019-02-08 | End: 2019-02-14 | Stop reason: SDUPTHER

## 2019-02-08 RX ORDER — CEFTRIAXONE 1 G/1
1 INJECTION, POWDER, FOR SOLUTION INTRAMUSCULAR; INTRAVENOUS
Status: COMPLETED | OUTPATIENT
Start: 2019-02-08 | End: 2019-02-08

## 2019-02-08 RX ORDER — CEFEPIME HYDROCHLORIDE 1 G/50ML
1 INJECTION, SOLUTION INTRAVENOUS
Status: DISCONTINUED | OUTPATIENT
Start: 2019-02-08 | End: 2019-02-08

## 2019-02-08 RX ADMIN — CEFTRIAXONE SODIUM 1 G: 1 INJECTION, POWDER, FOR SOLUTION INTRAMUSCULAR; INTRAVENOUS at 01:02

## 2019-02-08 NOTE — ASSESSMENT & PLAN NOTE
43 y.o. male with paraplegia, multiple comorbidities as above with neurogenic bladder maintained with an indwelling suprapubic tube catheter    Plan:  1. 22 Chinese suprapubic tube exchanged today using sterile technique.  2. Would recommend urine from new SPT catheter to be sent for urinalysis and culture.  3. Also discussed with Dr. Valle who will give him IV antibiotics as well as 10 days of oral bactrim as he was overdue for this suprapubic catheter exchange.  4. followup with me in 1 month - will discuss trying to set up monthly home health catheter changes and if he would like that done as he has incurred many transportation issues.
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
## 2019-02-08 NOTE — SUBJECTIVE & OBJECTIVE
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 Yadiel Haley    CHEST TUBE INSERTION Right     CYSTOSCOPY, clot evacuation, suprapubic tube exchange N/A 8/30/2018    [facility]    GASTROSTOMY TUBE PLACEMENT  12/15/2016    INSERTION,SUPRAPUBIC CATHETER  8/30/2018    [facility] OR    ORIF HUMERUS FRACTURE Left 12/15/2016    REMOVAL, BLOOD CLOT  8/30/2018    [facility] OR    TRACHEOSTOMY TUBE PLACEMENT         Review of patient's allergies indicates:  No Known Allergies    Family History     None          Tobacco Use    Smoking status: Current Some Day Smoker     Packs/day: 0.50    Smokeless tobacco: Never Used   Substance and Sexual Activity    Alcohol use: No     Frequency: Never     Comment: occ    Drug use: No    Sexual activity: Not on file       Review of Systems   Constitutional: Negative for activity change.   HENT: Negative for facial swelling.    Eyes: Negative for visual disturbance.   Respiratory: Negative for chest tightness.    Cardiovascular: Negative for chest pain.   Gastrointestinal: Negative for abdominal distention.   Musculoskeletal: Negative for gait problem.   Skin: Negative for color change.   Neurological: Negative for  dizziness.   Hematological: Negative for adenopathy.   Psychiatric/Behavioral: Negative for agitation.       Objective:     Temp:  [97.7 °F (36.5 °C)] 97.7 °F (36.5 °C)  Pulse:  [83] 83  Resp:  [17] 17  SpO2:  [99 %] 99 %  BP: (146)/(89) 146/89     Body mass index is 33.47 kg/m².            Drains     Drain                 Suprapubic Catheter 12/30/18 1400 20 Fr. 39 days         Suprapubic Catheter 02/08/19 1202 22 Fr. less than 1 day                Physical Exam   Vitals reviewed.  Constitutional: He is oriented to person, place, and time. He appears well-developed and well-nourished.   HENT:   Head: Normocephalic and atraumatic.   Eyes: Conjunctivae are normal. Pupils are equal, round, and reactive to light.   Neck: Normal range of motion. Neck supple.   Cardiovascular: Intact distal pulses.    Pulmonary/Chest: Effort normal. No respiratory distress.   Abdominal: Soft. He exhibits no distension. There is no tenderness.   Indwelling 22 [place] suprapubic tube draining purulent appearing urine, removed by deflating balloon.  The suprapubic tube site was prepped with betadyne and a new 22 [place]ese sage catheter was inserted and a flash of urine was noted and the sage balloon was inflated with 10cc of sterile saline. This was connected to a large  drainage bag   Musculoskeletal: Normal range of motion. He exhibits no edema.   Neurological: He is alert and oriented to person, place, and time.   Skin: Skin is warm and dry.     Psychiatric: He has a normal mood and affect. His behavior is normal.       Significant Labs:    BMP:  No results for input(s): NA, K, CL, CO2, BUN, CREATININE, LABGLOM, GLUCOSE, CALCIUM in the last 168 hours.    CBC:  No results for input(s): WBC, HGB, HCT, PLT in the last 168 hours.    All pertinent labs results from the past 24 hours have been reviewed.  Recent Lab Results     None          Significant Imaging:  none

## 2019-02-08 NOTE — DISCHARGE INSTRUCTIONS
Thank you for choosing [facility]! We appreciate you coming to us for your medical care. We hope you feel better soon! Please come back to [facility] for all of your future medical needs.    Our goal in the emergency department is to always give you outstanding care and exceptional service. You may receive a survey by mail or e-mail in the next week regarding your experience in our ED. We would greatly appreciate your completing and returning the survey. Your feedback provides us with a way to recognize our staff who give very good care and it helps us learn how to improve when your experience was below our aspiration of excellence.       Sincerely,    Karthik Valle MD  Medical Director  Emergency Department  [facility] [place]
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
## 2019-02-08 NOTE — TELEPHONE ENCOUNTER
----- Message from Madeline Gordon sent at 2/8/2019 10:58 AM CST -----  Contact: [phone]  Pt had an appointment today but he cancel because he was going to the ER instead . Pt its now saying he is going to Dr Navarrete clinic. Pt was told she doesn't have anything available today , but he states he needs to be seen today . Please advise
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
## 2019-02-11 ENCOUNTER — TELEPHONE (OUTPATIENT)
Dept: UROLOGY | Facility: CLINIC | Age: 44
End: 2019-02-11

## 2019-02-11 LAB — BACTERIA UR CULT: NORMAL

## 2019-02-11 NOTE — TELEPHONE ENCOUNTER
----- Message from Dee Galdamez sent at 2/11/2019  8:18 AM CST -----  Contact: [phone]'s caregiver Carlyn   Patient's caregiver requesting to speak with you regarding scheduling pt's appointment. Please advise.

## 2019-02-11 NOTE — TELEPHONE ENCOUNTER
Patient's care giver JOSE ANTONIO has agreed to bring Mr Mccrary for a SPT change on March 11th at 10 am

## 2019-02-13 ENCOUNTER — OFFICE VISIT (OUTPATIENT)
Dept: FAMILY MEDICINE | Facility: CLINIC | Age: 44
End: 2019-02-13
Payer: MEDICAID

## 2019-02-13 VITALS
HEART RATE: 95 BPM | SYSTOLIC BLOOD PRESSURE: 128 MMHG | WEIGHT: 240 LBS | HEIGHT: 71 IN | DIASTOLIC BLOOD PRESSURE: 80 MMHG | TEMPERATURE: 98 F | BODY MASS INDEX: 33.6 KG/M2 | OXYGEN SATURATION: 100 %

## 2019-02-13 DIAGNOSIS — Z93.59 CHRONIC SUPRAPUBIC CATHETER: Chronic | ICD-10-CM

## 2019-02-13 DIAGNOSIS — L97.521 NEUROPATHIC ULCER OF LEFT FOOT, LIMITED TO BREAKDOWN OF SKIN: ICD-10-CM

## 2019-02-13 DIAGNOSIS — G82.20 PARAPLEGIA AT T9 LEVEL: Primary | Chronic | ICD-10-CM

## 2019-02-13 DIAGNOSIS — Z72.0 TOBACCO ABUSE DISORDER: ICD-10-CM

## 2019-02-13 DIAGNOSIS — E78.1 PURE HYPERGLYCERIDEMIA: ICD-10-CM

## 2019-02-13 DIAGNOSIS — L97.921 CHRONIC ULCER OF LEFT LEG, LIMITED TO BREAKDOWN OF SKIN: ICD-10-CM

## 2019-02-13 DIAGNOSIS — F43.12 CHRONIC POST-TRAUMATIC STRESS DISORDER: Chronic | ICD-10-CM

## 2019-02-13 DIAGNOSIS — L89.524 PRESSURE INJURY OF LEFT ANKLE, STAGE 4: ICD-10-CM

## 2019-02-13 DIAGNOSIS — Z79.4 TYPE 2 DIABETES MELLITUS WITH HYPERGLYCEMIA, WITH LONG-TERM CURRENT USE OF INSULIN: Chronic | ICD-10-CM

## 2019-02-13 DIAGNOSIS — S81.809A NON-HEALING WOUND OF LOWER EXTREMITY, INITIAL ENCOUNTER: ICD-10-CM

## 2019-02-13 DIAGNOSIS — I10 ESSENTIAL HYPERTENSION: Chronic | ICD-10-CM

## 2019-02-13 DIAGNOSIS — F33.40 RECURRENT MAJOR DEPRESSIVE DISORDER, IN REMISSION: Chronic | ICD-10-CM

## 2019-02-13 DIAGNOSIS — F33.2 SEVERE EPISODE OF RECURRENT MAJOR DEPRESSIVE DISORDER, WITHOUT PSYCHOTIC FEATURES: ICD-10-CM

## 2019-02-13 DIAGNOSIS — I73.9 PAD (PERIPHERAL ARTERY DISEASE): Chronic | ICD-10-CM

## 2019-02-13 DIAGNOSIS — D50.9 IRON DEFICIENCY ANEMIA, UNSPECIFIED IRON DEFICIENCY ANEMIA TYPE: Chronic | ICD-10-CM

## 2019-02-13 DIAGNOSIS — E55.9 VITAMIN D DEFICIENCY: Chronic | ICD-10-CM

## 2019-02-13 DIAGNOSIS — N31.9 NEUROGENIC BLADDER: Chronic | ICD-10-CM

## 2019-02-13 DIAGNOSIS — E11.65 TYPE 2 DIABETES MELLITUS WITH HYPERGLYCEMIA, WITH LONG-TERM CURRENT USE OF INSULIN: Chronic | ICD-10-CM

## 2019-02-13 DIAGNOSIS — G54.7 PHANTOM LIMB SYNDROME: Chronic | ICD-10-CM

## 2019-02-13 PROCEDURE — 99213 OFFICE O/P EST LOW 20 MIN: CPT | Mod: S$GLB,,, | Performed by: FAMILY MEDICINE

## 2019-02-13 PROCEDURE — 99213 PR OFFICE/OUTPT VISIT, EST, LEVL III, 20-29 MIN: ICD-10-PCS | Mod: S$GLB,,, | Performed by: FAMILY MEDICINE

## 2019-02-13 NOTE — PROGRESS NOTES
Subjective:      Patient ID: Hermann Aguilar is a 43 y.o. male.    Chief Complaint: Follow-up      HPI   Follow up from one month ago for ER visit last week to change bag, catheter  I saw pt Jan 11  Needs to be done in office or the house in [facility]  Goes to [facility] wound care for left foot  Review of Systems   Genitourinary:        Suprapubic cthter     Musculoskeletal: Positive for gait problem.        Bed bound or WC bound  No prosthesis   Skin: Positive for wound.     Objective:     Physical Exam   Constitutional: He is oriented to person, place, and time. He appears well-developed and well-nourished. No distress.   HENT:   Head: Normocephalic and atraumatic.   Right Ear: External ear normal.   Left Ear: External ear normal.   Mouth/Throat: Oropharynx is clear and moist. No oropharyngeal exudate.   Eyes: Conjunctivae and EOM are normal. Pupils are equal, round, and reactive to light. Right eye exhibits no discharge. Left eye exhibits no discharge. No scleral icterus.   Neck: Normal range of motion. Neck supple. No JVD present. No tracheal deviation present. No thyromegaly present.   Cardiovascular: Normal rate and regular rhythm. Exam reveals no gallop and no friction rub.   No murmur heard.  Pulmonary/Chest: Effort normal and breath sounds normal. No stridor. No respiratory distress. He has no wheezes. He has no rales. He exhibits no tenderness.   Abdominal: Soft. He exhibits no distension and no mass. There is no tenderness. There is no rebound and no guarding.   Genitourinary:   Genitourinary Comments: suprapubic   Musculoskeletal: He exhibits deformity. He exhibits no edema or tenderness.   Lymphadenopathy:     He has no cervical adenopathy.   Neurological: He is alert and oriented to person, place, and time. He has normal reflexes. He displays normal reflexes. No cranial nerve deficit. He exhibits normal muscle tone. Coordination normal.   Skin: Skin is warm and dry. No rash noted. He is not diaphoretic.  No erythema. No pallor.   Psychiatric: He has a normal mood and affect. His behavior is normal. Judgment and thought content normal.   Nursing note and vitals reviewed.    Assessment:     1. Paraplegia at T9 level    2. Phantom limb syndrome    3. Recurrent major depressive disorder, in remission    4. Chronic post-traumatic stress disorder    5. Severe episode of recurrent major depressive disorder, without psychotic features    6. Neuropathic ulcer of left foot, limited to breakdown of skin    7. Chronic ulcer of left leg, limited to breakdown of skin    8. Essential hypertension    9. PAD (peripheral artery disease)    10. Pure hyperglyceridemia    11. Chronic suprapubic catheter    12. Neurogenic bladder    13. Iron deficiency anemia, unspecified iron deficiency anemia type    14. Vitamin D deficiency    15. Type 2 diabetes mellitus with hyperglycemia, with long-term current use of insulin    16. Non-healing wound of lower extremity, initial encounter    17. Tobacco abuse disorder    18. Pressure injury of left ankle, stage 4      Plan:        Medication List           Accurate as of 2/13/19 11:10 AM. If you have any questions, ask your nurse or doctor.               CONTINUE taking these medications    baclofen 10 MG tablet  Commonly known as:  LIORESAL  Take 1 tablet (10 mg total) by mouth 3 (three) times daily.     cadexomer iodine 0.9 % gel  Commonly known as:  IODOSORB     docusate sodium 100 MG capsule  Commonly known as:  COLACE     DUONEB INHL     famotidine 20 MG tablet  Commonly known as:  PEPCID  Take 1 tablet (20 mg total) by mouth 2 (two) times daily.     gabapentin 300 MG capsule  Commonly known as:  NEURONTIN  Take 1 capsule (300 mg total) by mouth 2 (two) times daily. for 5 days     gemfibrozil 600 MG tablet  Commonly known as:  LOPID  Take 1 tablet (600 mg total) by mouth 2 (two) times daily before meals.     insulin detemir U-100 100 unit/mL injection  Commonly known as:  LEVEMIR  Inject 15  "Units into the skin 2 (two) times daily.     insulin lispro protamin-lispro 100 unit/mL (75-25) Inpn  Commonly known as:  HumaLOG Mix 75-25 KwikPen  Inject 20 Units into the skin 3 (three) times daily with meals.     ketoconazole 2 % shampoo  Commonly known as:  NIZORAL     melatonin 10 mg Cap     meloxicam 7.5 MG tablet  Commonly known as:  MOBIC     metFORMIN 500 MG tablet  Commonly known as:  GLUCOPHAGE  Take 1 tablet (500 mg total) by mouth 2 (two) times daily with meals. for 5 days     metoprolol tartrate 25 MG tablet  Commonly known as:  LOPRESSOR  Take 0.5 tablets (12.5 mg total) by mouth 2 (two) times daily. for 15 days     pen needle, diabetic 29 gauge x 1/2" Ndle  Commonly known as:  COMFORT EZ PEN NEEDLES  1 Units by Misc.(Non-Drug; Combo Route) route 3 (three) times daily.     promethazine 25 MG tablet  Commonly known as:  PHENERGAN  Take 1 tablet (25 mg total) by mouth every 6 (six) hours as needed for Nausea.     sulfamethoxazole-trimethoprim 800-160mg 800-160 mg Tab  Commonly known as:  BACTRIM DS  Take 1 tablet by mouth 2 (two) times daily. for 7 days     venlafaxine 75 MG 24 hr capsule  Commonly known as:  EFFEXOR-XR  Take 1 capsule (75 mg total) by mouth once daily.          Paraplegia at T9 level    Phantom limb syndrome    Recurrent major depressive disorder, in remission    Chronic post-traumatic stress disorder    Severe episode of recurrent major depressive disorder, without psychotic features    Neuropathic ulcer of left foot, limited to breakdown of skin    Chronic ulcer of left leg, limited to breakdown of skin    Essential hypertension    PAD (peripheral artery disease)    Pure hyperglyceridemia    Chronic suprapubic catheter    Neurogenic bladder    Iron deficiency anemia, unspecified iron deficiency anemia type    Vitamin D deficiency    Type 2 diabetes mellitus with hyperglycemia, with long-term current use of insulin    Non-healing wound of lower extremity, initial encounter    Tobacco " abuse disorder    Pressure injury of left ankle, stage 4    get labs; see whre diabetes is; kimberly keenan with Dr orozco

## 2019-02-14 RX ORDER — METOPROLOL TARTRATE 25 MG/1
12.5 TABLET, FILM COATED ORAL 2 TIMES DAILY
Qty: 180 TABLET | Refills: 3 | Status: SHIPPED | OUTPATIENT
Start: 2019-02-14 | End: 2019-04-05 | Stop reason: SDUPTHER

## 2019-02-14 RX ORDER — KETOCONAZOLE 20 MG/ML
SHAMPOO, SUSPENSION TOPICAL
Qty: 120 ML | Refills: 11 | Status: ON HOLD | OUTPATIENT
Start: 2019-02-14 | End: 2021-03-18 | Stop reason: HOSPADM

## 2019-02-14 RX ORDER — PROMETHAZINE HYDROCHLORIDE 25 MG/1
25 TABLET ORAL EVERY 6 HOURS PRN
Qty: 30 TABLET | Refills: 0 | Status: SHIPPED | OUTPATIENT
Start: 2019-02-14 | End: 2019-04-05 | Stop reason: SDUPTHER

## 2019-02-14 RX ORDER — BACLOFEN 10 MG/1
10 TABLET ORAL 3 TIMES DAILY
Qty: 270 TABLET | Refills: 3 | Status: SHIPPED | OUTPATIENT
Start: 2019-02-14 | End: 2019-04-05 | Stop reason: SDUPTHER

## 2019-02-14 RX ORDER — FAMOTIDINE 20 MG/1
20 TABLET, FILM COATED ORAL 2 TIMES DAILY
Qty: 180 TABLET | Refills: 3 | Status: SHIPPED | OUTPATIENT
Start: 2019-02-14 | End: 2021-01-28 | Stop reason: SDUPTHER

## 2019-02-14 RX ORDER — MELOXICAM 7.5 MG/1
7.5 TABLET ORAL 2 TIMES DAILY PRN
Qty: 180 TABLET | Refills: 1 | Status: SHIPPED | OUTPATIENT
Start: 2019-02-14 | End: 2019-04-05 | Stop reason: SDUPTHER

## 2019-02-14 RX ORDER — MELATONIN 10 MG
1 CAPSULE ORAL NIGHTLY
Qty: 90 CAPSULE | Refills: 3 | COMMUNITY
Start: 2019-02-14

## 2019-02-14 RX ORDER — INSULIN LISPRO 100 [IU]/ML
20 INJECTION, SUSPENSION SUBCUTANEOUS
Qty: 3 BOX | Refills: 11 | Status: SHIPPED | OUTPATIENT
Start: 2019-02-14 | End: 2019-05-15 | Stop reason: SDUPTHER

## 2019-02-14 RX ORDER — SULFAMETHOXAZOLE AND TRIMETHOPRIM 800; 160 MG/1; MG/1
1 TABLET ORAL 2 TIMES DAILY
Qty: 14 TABLET | Refills: 0 | Status: SHIPPED | OUTPATIENT
Start: 2019-02-14 | End: 2019-02-21

## 2019-02-14 RX ORDER — DOCUSATE SODIUM 100 MG/1
100 CAPSULE, LIQUID FILLED ORAL 2 TIMES DAILY
Qty: 180 CAPSULE | Refills: 3 | COMMUNITY
Start: 2019-02-14 | End: 2020-11-20

## 2019-02-14 RX ORDER — GEMFIBROZIL 600 MG/1
600 TABLET, FILM COATED ORAL
Qty: 180 TABLET | Refills: 1 | Status: SHIPPED | OUTPATIENT
Start: 2019-02-14 | End: 2019-04-05 | Stop reason: SDUPTHER

## 2019-02-14 RX ORDER — METFORMIN HYDROCHLORIDE 500 MG/1
500 TABLET ORAL 2 TIMES DAILY WITH MEALS
Qty: 180 TABLET | Refills: 3 | Status: SHIPPED | OUTPATIENT
Start: 2019-02-14 | End: 2019-04-05 | Stop reason: SDUPTHER

## 2019-02-14 RX ORDER — GABAPENTIN 300 MG/1
300 CAPSULE ORAL 2 TIMES DAILY
Qty: 180 CAPSULE | Refills: 3 | Status: SHIPPED | OUTPATIENT
Start: 2019-02-14 | End: 2019-04-05 | Stop reason: SDUPTHER

## 2019-02-14 RX ORDER — VENLAFAXINE HYDROCHLORIDE 75 MG/1
75 CAPSULE, EXTENDED RELEASE ORAL DAILY
Qty: 90 CAPSULE | Refills: 1 | Status: SHIPPED | OUTPATIENT
Start: 2019-02-14 | End: 2019-04-05 | Stop reason: SDUPTHER

## 2019-02-14 NOTE — TELEPHONE ENCOUNTER
"----- Message from Paige Robles sent at 2/14/2019 10:41 AM CST -----  Contact: [phone]/self  Patient is requesting to have a refill of   baclofen (LIORESAL) 10 MG tablet. Take 1 tablet (10 mg total) by mouth 3 (three) times daily. - Oral  docusate sodium (COLACE) 100 MG capsule. Take 100 mg by mouth 2 (two) times daily. - Oral  famotidine (PEPCID) 20 MG tablet. Take 1 tablet (20 mg total) by mouth 2 (two) times daily. - Oral  gabapentin (NEURONTIN) 300 MG capsule. Take 1 capsule (300 mg total) by mouth 2 (two) times daily. for 5 days - Oral  gemfibrozil (LOPID) 600 MG tablet. Take 1 tablet (600 mg total) by mouth 2 (two) times daily before meals. - Oral  insulin detemir U-100 (LEVEMIR) 100 unit/mL injection. Inject 15 Units into the skin 2 (two) times daily. - Subcutaneous  insulin lispro protamin-lispro (HUMALOG MIX 75-25 KWIKPEN) 100 unit/mL (75-25) InPn. Inject 20 Units into the skin 3 (three) times daily with meals. - Subcutaneous  melatonin 10 mg Cap. Take by mouth every evening. - Oral  ketoconazole (NIZORAL) 2 % shampoo. Apply topically twice a week. - Topical (Top  meloxicam (MOBIC) 7.5 MG tablet. Take 7.5 mg by mouth 2 (two) times daily as needed for Pain. - Oral  metFORMIN (GLUCOPHAGE) 500 MG tablet. Take 1 tablet (500 mg total) by mouth 2 (two) times daily with meals. for 5 days - Oral  metoprolol tartrate (LOPRESSOR) 25 MG tablet. Take 0.5 tablets (12.5 mg total) by mouth 2 (two) times daily. for 15 days - Oral  promethazine (PHENERGAN) 25 MG tablet. Take 1 tablet (25 mg total) by mouth every 6 (six) hours as needed for Nausea. - Oral  sulfamethoxazole-trimethoprim 800-160mg (BACTRIM DS) 800-160 mg Tab. Take 1 tablet by mouth 2 (two) times daily. for 7 days - Oral  venlafaxine (EFFEXOR-XR) 75 MG 24 hr capsule.  Take 1 capsule (75 mg total) by mouth once daily. - Oral  pen needle, diabetic (COMFORT EZ PEN NEEDLES) 29 gauge x 1/2" Ndle. 1 Units by Misc.(Non-Drug; Combo Route) route 3 (three) times " daily. - Misc.(Non-Drug; Combo Route)    sent to [id number] - [place] - [address] AT [facility]

## 2019-02-16 NOTE — PROVIDER PROGRESS NOTES - EMERGENCY DEPT.
Encounter Date: 2/8/2019    ED Physician Progress Notes         2/10/19 @ 0916- pt discharged on Bactrim- awaiting sensitivities- SKYLA Goff NP  2/12/19 Culture resistant. Discussed with  who states that he will discuss with . MYLES    02/14/2019 12:26 PM called the prefer number listed and Veronica answered and states that he was at home however could not give me number as she was at work.  She states that she will relay the message in have patient call back the ED.  She states that the number listed as patient's home number is incorrect. DILLAN    02/15/2019 7:56 PM certified letter sent. DILLAN

## 2019-02-19 ENCOUNTER — TELEPHONE (OUTPATIENT)
Dept: FAMILY MEDICINE | Facility: CLINIC | Age: 44
End: 2019-02-19

## 2019-02-19 DIAGNOSIS — G82.20 PARAPLEGIA AT T9 LEVEL: Primary | Chronic | ICD-10-CM

## 2019-02-19 NOTE — TELEPHONE ENCOUNTER
----- Message from Delmis Godfrey sent at 2/19/2019 11:04 AM CST -----  Patient's caregiver, Reshma, called.   No. [phone]    [facility]  [facility].   Please call.

## 2019-02-19 NOTE — TELEPHONE ENCOUNTER
Reshma states they need a xiang lift because the one they had broke and are asking if it can be bigger than the other one because that is why it broke

## 2019-03-11 ENCOUNTER — OFFICE VISIT (OUTPATIENT)
Dept: UROLOGY | Facility: CLINIC | Age: 44
End: 2019-03-11
Payer: MEDICAID

## 2019-03-11 DIAGNOSIS — N31.9 NEUROGENIC BLADDER: Primary | ICD-10-CM

## 2019-03-11 DIAGNOSIS — N39.0 RECURRENT UTI: ICD-10-CM

## 2019-03-11 DIAGNOSIS — Z93.59 CHRONIC SUPRAPUBIC CATHETER: ICD-10-CM

## 2019-03-11 LAB
BACTERIA #/AREA URNS AUTO: ABNORMAL /HPF
BILIRUB UR QL STRIP: NEGATIVE
CLARITY UR REFRACT.AUTO: ABNORMAL
COLOR UR AUTO: YELLOW
GLUCOSE UR QL STRIP: NEGATIVE
HGB UR QL STRIP: ABNORMAL
HYALINE CASTS UR QL AUTO: 0 /LPF
KETONES UR QL STRIP: NEGATIVE
LEUKOCYTE ESTERASE UR QL STRIP: ABNORMAL
MICROSCOPIC COMMENT: ABNORMAL
NITRITE UR QL STRIP: POSITIVE
PH UR STRIP: 8 [PH] (ref 5–8)
PROT UR QL STRIP: ABNORMAL
RBC #/AREA URNS AUTO: 86 /HPF (ref 0–4)
SP GR UR STRIP: 1.01 (ref 1–1.03)
URN SPEC COLLECT METH UR: ABNORMAL
WBC #/AREA URNS AUTO: >100 /HPF (ref 0–5)
WBC CLUMPS UR QL AUTO: ABNORMAL

## 2019-03-11 PROCEDURE — 87186 SC STD MICRODIL/AGAR DIL: CPT

## 2019-03-11 PROCEDURE — 81001 URINALYSIS AUTO W/SCOPE: CPT

## 2019-03-11 PROCEDURE — 51705 CHANGE OF BLADDER TUBE: CPT | Mod: S$PBB,,, | Performed by: STUDENT IN AN ORGANIZED HEALTH CARE EDUCATION/TRAINING PROGRAM

## 2019-03-11 PROCEDURE — 87088 URINE BACTERIA CULTURE: CPT

## 2019-03-11 PROCEDURE — 87077 CULTURE AEROBIC IDENTIFY: CPT | Mod: 59

## 2019-03-11 PROCEDURE — 51705 CHANGE OF BLADDER TUBE: CPT | Mod: PBBFAC,PO | Performed by: STUDENT IN AN ORGANIZED HEALTH CARE EDUCATION/TRAINING PROGRAM

## 2019-03-11 PROCEDURE — 99214 PR OFFICE/OUTPT VISIT, EST, LEVL IV, 30-39 MIN: ICD-10-PCS | Mod: S$PBB,25,, | Performed by: STUDENT IN AN ORGANIZED HEALTH CARE EDUCATION/TRAINING PROGRAM

## 2019-03-11 PROCEDURE — 99214 OFFICE O/P EST MOD 30 MIN: CPT | Mod: S$PBB,25,, | Performed by: STUDENT IN AN ORGANIZED HEALTH CARE EDUCATION/TRAINING PROGRAM

## 2019-03-11 PROCEDURE — 87086 URINE CULTURE/COLONY COUNT: CPT

## 2019-03-11 PROCEDURE — 51705 PR CHANGE OF BLADDER TUBE,SIMPLE: ICD-10-PCS | Mod: S$PBB,,, | Performed by: STUDENT IN AN ORGANIZED HEALTH CARE EDUCATION/TRAINING PROGRAM
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
## 2019-03-14 ENCOUNTER — TELEPHONE (OUTPATIENT)
Dept: PODIATRY | Facility: CLINIC | Age: 44
End: 2019-03-14

## 2019-03-14 ENCOUNTER — TELEPHONE (OUTPATIENT)
Dept: UROLOGY | Facility: CLINIC | Age: 44
End: 2019-03-14

## 2019-03-14 DIAGNOSIS — N39.0 RECURRENT URINARY TRACT INFECTION: ICD-10-CM

## 2019-03-14 DIAGNOSIS — A49.8 SERRATIA INFECTION: Primary | ICD-10-CM

## 2019-03-14 RX ORDER — CEFPODOXIME PROXETIL 100 MG/1
100 TABLET, FILM COATED ORAL EVERY 12 HOURS
Qty: 14 TABLET | Refills: 0 | Status: SHIPPED | OUTPATIENT
Start: 2019-03-14 | End: 2019-03-21

## 2019-03-14 NOTE — TELEPHONE ENCOUNTER
Spoke with patient and care giver.  Informed of oral antibiotics and the importance of ID visit.  Referral coordinator will contact them with an appointment.  Voiced understanding.

## 2019-03-14 NOTE — TELEPHONE ENCOUNTER
----- Message from Ruchi Navarrete MD sent at 3/14/2019  2:09 PM CDT -----  Please call patient and notify of positive culture and antibiotics. The urine culture grew out bacteria concerning for a urinary tract infection.   -I will call him in cefpodoxime antibiotic to start him on something, but there is not a good oral antibiotic choice based off of the sensitivity results from the urine culture. Because of that, I would like him to see infectious disease. I will put in a referral.

## 2019-03-14 NOTE — PROGRESS NOTES
Patient with history of neurogenic bladder, paralysis, and maintained with suprapubic tube. He has been noncompliant in the past and late with suprapubic tube exchanges and has developed Serratia UTI resistant to oral agents.   -I will call him in cefpodoxime antibiotic to start him on something, but there is not a good oral antibiotic choice based off of the sensitivity results from the urine culture. Because of that, I would like him to see infectious disease. I will put in a referral.
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
## 2019-03-15 ENCOUNTER — TELEPHONE (OUTPATIENT)
Dept: UROLOGY | Facility: CLINIC | Age: 44
End: 2019-03-15

## 2019-03-15 LAB
BACTERIA UR CULT: NORMAL
BACTERIA UR CULT: NORMAL

## 2019-03-15 NOTE — TELEPHONE ENCOUNTER
Spoke with caregiver, informed prior authorization is needed from medicaid.  Forms obtained, filled out, and faxed to medicaid.  Awaiting approval.  Voiced understanding.

## 2019-03-15 NOTE — TELEPHONE ENCOUNTER
----- Message from Queenie Aguirre sent at 3/15/2019  1:35 PM CDT -----  Contact: Reshma (caregiver)/ [phone]  Reshma called about his medication for current urinary infection.    Please call today.

## 2019-03-18 ENCOUNTER — TELEPHONE (OUTPATIENT)
Dept: FAMILY MEDICINE | Facility: CLINIC | Age: 44
End: 2019-03-18

## 2019-03-18 NOTE — TELEPHONE ENCOUNTER
Pt scheduled.    ----- Message from Paige Robles sent at 3/18/2019  2:18 PM CDT -----  Contact: [phone]/self  Patient would like to be seen this week. He has a bad chest cold. Please call him to schedule. Thanks

## 2019-03-19 ENCOUNTER — TELEPHONE (OUTPATIENT)
Dept: FAMILY MEDICINE | Facility: CLINIC | Age: 44
End: 2019-03-19

## 2019-03-19 NOTE — TELEPHONE ENCOUNTER
Pt has cold symptoms and would like something sent to pharmacy. He was scheduled for an appt, but is unable to come in due to transportation problems.

## 2019-03-19 NOTE — TELEPHONE ENCOUNTER
----- Message from Paige Robles sent at 3/19/2019  1:07 PM CDT -----  Contact: [phone]/Reshma  Patient is requesting to have a prescription to treat his bad cold. He doesn't have transportation to the office.     Please send to [facility] [id number] [place] [address], [place] [id number] [facility] AT [facility]

## 2019-03-20 RX ORDER — AMOXICILLIN AND CLAVULANATE POTASSIUM 875; 125 MG/1; MG/1
1 TABLET, FILM COATED ORAL 2 TIMES DAILY
Qty: 20 TABLET | Refills: 0 | Status: SHIPPED | OUTPATIENT
Start: 2019-03-20 | End: 2019-03-30

## 2019-03-20 RX ORDER — CODEINE PHOSPHATE AND GUAIFENESIN 10; 100 MG/5ML; MG/5ML
10 SOLUTION ORAL EVERY 4 HOURS PRN
Qty: 240 ML | Refills: 0 | Status: SHIPPED | OUTPATIENT
Start: 2019-03-20 | End: 2019-03-30

## 2019-03-20 NOTE — TELEPHONE ENCOUNTER
Called pt caregiver back and informed that the cough meds and the antibiotic was sent to the pharmacy

## 2019-04-05 RX ORDER — CYCLOBENZAPRINE HCL 10 MG
10 TABLET ORAL 3 TIMES DAILY PRN
COMMUNITY
End: 2019-04-05 | Stop reason: SDUPTHER

## 2019-04-05 NOTE — TELEPHONE ENCOUNTER
----- Message from Paige Robles sent at 4/5/2019  2:01 PM CDT -----  Contact: [phone]/Reshma/caregiver  Type:  RX Refill Request    Who Called: Reshma  Refill or New Rx: Refill  RX Name and Strength: baclofen (LIORESAL) 10 MG tablet  How is the patient currently taking it? (ex. 1XDay):Take 1 tablet (10 mg total) by mouth 3 (three) times daily. - Oral  Is this a 30 day or 90 day RX: 270 tablet 90 day    Refill or New Rx:  RX Name and Strength: gemfibrozil (LOPID) 600 MG tablet  How is the patient currently taking it? (ex. 1XDay):Take 1 tablet (600 mg total) by mouth 2 (two) times daily before meals. - Oral  Is this a 30 day or 90 day RX:MG tablet 180 tablet  30    Refill or New Rx: Refill  RX Name and Strength: metoprolol tartrate (LOPRESSOR) 25 MG tablet  How is the patient currently taking it? (ex. 1XDay):Take 0.5 tablets (12.5 mg total) by mouth 2 (two) times daily. for 15 days - Oral  Is this a 30 day or 90 day RX: 90    gabapentin (NEURONTIN) 300 MG capsule. Take 1 capsule (300 mg total) by mouth 2 (two) times daily. - Oral 90    venlafaxine (EFFEXOR-XR) 75 MG 24 hr capsule    meloxicam (MOBIC) 7.5 MG tablet. Take 1 tablet (7.5 mg total) by mouth 2 (two) times daily as needed for Pain. - Oral    metFORMIN (GLUCOPHAGE) 500 MG tablet. Take 1 tablet (500 mg total) by mouth 2 (two) times daily with meals. - Oral    promethazine (PHENERGAN) 25 MG tablet. Take 1 tablet (25 mg total) by mouth every 6 (six) hours as needed for Nausea. - Oral    cyclobenzaprine (FLEXERIL) 10 MG tablet. Take 1 tablet (10 mg total) by mouth 3 (three) times daily as needed for Muscle spasms. - Oral    Preferred Pharmacy with phone number:[facility] [id number] [facility] [address]  Local or Mail Order: local  Ordering Provider: Dr. Breen  Would the patient rather a call back or a response via [facility]? call  Best Call Back Number: [phone]/Reshma/caregiver  Additional  Information:

## 2019-04-06 RX ORDER — BACLOFEN 10 MG/1
10 TABLET ORAL 3 TIMES DAILY
Qty: 270 TABLET | Refills: 3 | Status: SHIPPED | OUTPATIENT
Start: 2019-04-06 | End: 2020-01-02

## 2019-04-06 RX ORDER — CYCLOBENZAPRINE HCL 10 MG
10 TABLET ORAL 3 TIMES DAILY PRN
Qty: 90 TABLET | Refills: 3 | Status: ON HOLD | OUTPATIENT
Start: 2019-04-06 | End: 2021-03-18 | Stop reason: HOSPADM

## 2019-04-06 RX ORDER — GABAPENTIN 300 MG/1
300 CAPSULE ORAL 2 TIMES DAILY
Qty: 180 CAPSULE | Refills: 3 | Status: ON HOLD | OUTPATIENT
Start: 2019-04-06 | End: 2021-04-23 | Stop reason: HOSPADM

## 2019-04-06 RX ORDER — PROMETHAZINE HYDROCHLORIDE 25 MG/1
25 TABLET ORAL EVERY 6 HOURS PRN
Qty: 30 TABLET | Refills: 0 | Status: SHIPPED | OUTPATIENT
Start: 2019-04-06 | End: 2020-07-20

## 2019-04-06 RX ORDER — GEMFIBROZIL 600 MG/1
600 TABLET, FILM COATED ORAL
Qty: 180 TABLET | Refills: 1 | Status: ON HOLD | OUTPATIENT
Start: 2019-04-06 | End: 2021-04-23 | Stop reason: HOSPADM

## 2019-04-06 RX ORDER — METFORMIN HYDROCHLORIDE 500 MG/1
500 TABLET ORAL 2 TIMES DAILY WITH MEALS
Qty: 180 TABLET | Refills: 3 | Status: ON HOLD | OUTPATIENT
Start: 2019-04-06 | End: 2021-04-23 | Stop reason: HOSPADM

## 2019-04-06 RX ORDER — VENLAFAXINE HYDROCHLORIDE 75 MG/1
75 CAPSULE, EXTENDED RELEASE ORAL DAILY
Qty: 90 CAPSULE | Refills: 1 | Status: SHIPPED | OUTPATIENT
Start: 2019-04-06 | End: 2020-01-02

## 2019-04-06 RX ORDER — MELOXICAM 7.5 MG/1
7.5 TABLET ORAL 2 TIMES DAILY PRN
Qty: 180 TABLET | Refills: 1 | Status: SHIPPED | OUTPATIENT
Start: 2019-04-06 | End: 2020-11-20

## 2019-04-06 RX ORDER — METOPROLOL TARTRATE 25 MG/1
12.5 TABLET, FILM COATED ORAL 2 TIMES DAILY
Qty: 180 TABLET | Refills: 3 | Status: SHIPPED | OUTPATIENT
Start: 2019-04-06 | End: 2020-01-02

## 2019-04-10 ENCOUNTER — TELEPHONE (OUTPATIENT)
Dept: UROLOGY | Facility: CLINIC | Age: 44
End: 2019-04-10

## 2019-04-10 NOTE — TELEPHONE ENCOUNTER
----- Message from Simi Ireland sent at 4/10/2019 12:25 PM CDT -----  Contact: Care Giver - thor [phone]  Patient care giver is requesting a call back regarding, patient needs to come for 10 tomorrow for his appointment. Please advise

## 2019-04-10 NOTE — TELEPHONE ENCOUNTER
Returned call, left message for Reshma to return call if needed.  Noted patient needs to come in at 1000 hours instead of scheduled time 1100 hours.  Will do our best to accommodate patient.

## 2019-04-11 ENCOUNTER — TELEPHONE (OUTPATIENT)
Dept: FAMILY MEDICINE | Facility: CLINIC | Age: 44
End: 2019-04-11

## 2019-04-11 DIAGNOSIS — E11.9 TYPE 2 DIABETES MELLITUS WITHOUT COMPLICATION: ICD-10-CM

## 2019-04-11 NOTE — TELEPHONE ENCOUNTER
----- Message from Elida Jonse sent at 4/11/2019  2:40 PM CDT -----  Contact: [facility]/[place]/[phone]  They need his medication list faxed over asap to [phone]

## 2019-04-12 ENCOUNTER — TELEPHONE (OUTPATIENT)
Dept: UROLOGY | Facility: CLINIC | Age: 44
End: 2019-04-12

## 2019-04-12 NOTE — TELEPHONE ENCOUNTER
Spoke with Mr Hermann care giver and she stated that he would not be in for his appointment today due to him being admitted in the hospital. She also stated that she would ask the hospital to change his SPT and let us know If they do

## 2019-04-18 ENCOUNTER — TELEPHONE (OUTPATIENT)
Dept: FAMILY MEDICINE | Facility: CLINIC | Age: 44
End: 2019-04-18

## 2019-04-18 NOTE — TELEPHONE ENCOUNTER
----- Message from Rebecca Scott sent at 4/18/2019  9:19 AM CDT -----  Contact: Montserrat aguilera/[facility]/  [phone]  Hospital would like patient to be seen sooner than the next available appointment for a hospital f/u.    Please call patient at [phone] or [phone] (sister).

## 2019-04-22 NOTE — TELEPHONE ENCOUNTER
Pt requests that you send in a prescription for a glucometer to [facility]).     Follow up appt scheduled.

## 2019-04-23 ENCOUNTER — HOSPITAL ENCOUNTER (EMERGENCY)
Facility: HOSPITAL | Age: 44
Discharge: HOME | End: 2019-04-24
Payer: MEDICAID

## 2019-04-23 DIAGNOSIS — T83.9XXA PROBLEM WITH FOLEY CATHETER, INITIAL ENCOUNTER: ICD-10-CM

## 2019-04-23 DIAGNOSIS — R33.9 URINARY RETENTION: Primary | ICD-10-CM

## 2019-04-23 PROCEDURE — 99282 EMERGENCY DEPT VISIT SF MDM: CPT

## 2019-04-23 RX ORDER — INSULIN PUMP SYRINGE, 3 ML
EACH MISCELLANEOUS
Qty: 1 EACH | Refills: 0 | Status: SHIPPED | OUTPATIENT
Start: 2019-04-23 | End: 2019-04-24 | Stop reason: SDUPTHER

## 2019-04-24 VITALS
OXYGEN SATURATION: 98 % | BODY MASS INDEX: 30.8 KG/M2 | TEMPERATURE: 98 F | DIASTOLIC BLOOD PRESSURE: 75 MMHG | WEIGHT: 220 LBS | RESPIRATION RATE: 20 BRPM | HEIGHT: 71 IN | HEART RATE: 89 BPM | SYSTOLIC BLOOD PRESSURE: 119 MMHG

## 2019-04-24 NOTE — ED TRIAGE NOTES
Patient presents to the ED with reports of having a suprapubic catheter. States noticing a decreased flow of urine and now having lower abdominal pressure. States catheter was last changed approximately x 2 weeks ago at [facility].     Review of patient's allergies indicates:  No Known Allergies     Patient has verified the spelling of their name and  on armband.   APPEARANCE: Patient is alert, calm, oriented x 4, and does not appear distressed.  SKIN: Skin is normal for race, warm, and dry. Normal skin turgor and mucous membranes moist.  CARDIAC: Normal rate and no murmur heard.   RESPIRATORY:Normal rate and effort. Breath sounds clear bilaterally throughout chest. Respirations are equal and unlabored.    GASTRO: Bowel sounds normal, abdomen is soft, no tenderness, and no abdominal distention. Suprapubic catheter noted with cloudy urine in collection bag.

## 2019-04-24 NOTE — ED PROVIDER NOTES
Encounter Date: 4/23/2019    SCRIBE #1 NOTE: I, Nadir Gregory, am scribing for, and in the presence of,  Dr. Pabon. I have scribed the entire note.       History     Chief Complaint   Patient presents with    Catheter Problem     Patient presents to the ED with reports of having a suprapubic catheter. States noticing a decreased flow of urine and now having lower abdominal pressure. States catheter was last changed approximately x 2 weeks ago at [facility].     Abdominal Pain     Time seen by provider: 10:45 PM    This is a 43 y.o. male who presents with catheter problem. The patient reports decreased flow of urine over the last couple of hours associated with lower abdominal pain. Patient had catheter changed last 2 weeks ago at [place]. Patient is paraplegic. No other palliative or provocative factors except as noted.    The history is provided by the patient.     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 Yadiel Haley    CHEST TUBE INSERTION Right     CYSTOSCOPY, clot evacuation, suprapubic tube exchange N/A 8/30/2018    Performed by [facility] at [facility] OR    GASTROSTOMY TUBE PLACEMENT  12/15/2016    INSERTION,SUPRAPUBIC CATHETER  8/30/2018     [facility] OR    ORIF HUMERUS FRACTURE Left 12/15/2016    REMOVAL, BLOOD CLOT  8/30/2018    [facility] at [facility] OR    TRACHEOSTOMY TUBE PLACEMENT       History reviewed. No pertinent family history.  Social History     Tobacco Use    Smoking status: Current Some Day Smoker     Packs/day: 0.50    Smokeless tobacco: Never Used   Substance Use Topics    Alcohol use: No     Frequency: Never     Comment: occ    Drug use: No     Review of Systems   All other systems reviewed and are negative.      Physical Exam     Initial Vitals [04/23/19 2218]   BP Pulse Resp Temp SpO2   119/75 95 18 98.1 °F (36.7 °C) 98 %      MAP       --         Physical Exam    Nursing note and vitals reviewed.  Constitutional: He appears well-developed.   HENT:   Head: Normocephalic and atraumatic.   Eyes: Pupils are equal, round, and reactive to light.   Neck: Neck supple.   Pulmonary/Chest: No respiratory distress.   Abdominal: Soft. He exhibits mass. He exhibits no distension. There is no tenderness. There is no rigidity, no rebound, no guarding, no CVA tenderness, no tenderness at McBurney's point and negative Marlow's sign.   Suprapubic mass consistent with dilated urinary bladder.  Suprapubic catheter in place.  Stoma appears intact.   Musculoskeletal:   no acute deformity   Neurological: He is alert.   Paraplegic at approximately T10 level, consistent with baseline for patient     Skin: Skin is warm and dry.   Psychiatric: He has a normal mood and affect.         ED Course   Procedures  Labs Reviewed - No data to display       Imaging Results    None          Medical Decision Making:   Initial Assessment:   Patient with new onset of urinary retention for a couple hours. Will flush catheter. Consider changing catheter if that is unsuccessful    ED Management:  11:06 PM - Patient has put out 700 cc's of urine since having catheter flushed. Patient much more comfortable. Exam shows decreased  size of bladder.    Patient is nontoxic appearing in the ED.  No persistent emergent issues detected.  Exam benign. Patient will return to the ED as needed for any deterioration or any other concerns.  Verbal discharge instructions and return precautions given.  We will discharge home to follow up with primary care.                      Clinical Impression:       ICD-10-CM ICD-9-CM   1. Urinary retention R33.9 788.20   2. Problem with Huff catheter, initial encounter T83.9XXA 996.76       Disposition:   Disposition: Discharged  Condition: Stable        I personally performed the services described in this documentation. All medical record entries made by the scribe were at my direction and in my presence.  I have reviewed the chart and agree that the record reflects my personal performance and is accurate and complete within the limitations of emergency medical charting.   --Adriano Pabon M.D. 11:23 PM 04/23/2019               Adriano Pabon MD  04/23/19 1854

## 2019-04-24 NOTE — ED NOTES
Suprapubic catheter insertion site has been cleaned with normal saline and gauze. Patient tolerated irrigation and site cleaning.

## 2019-04-24 NOTE — ED NOTES
Patient abdomen is soft and non-tender. Reports having complete relief of abdominal pressure at this time.

## 2019-04-24 NOTE — TELEPHONE ENCOUNTER
----- Message from Keira Mota sent at 4/24/2019  4:22 PM CDT -----  Contact: Reshma, Caregiver, [phone]  [facility] pharmacy in [place] does not have glucose meter order. States they also need needles added to order please.

## 2019-04-25 ENCOUNTER — TELEPHONE (OUTPATIENT)
Dept: FAMILY MEDICINE | Facility: CLINIC | Age: 44
End: 2019-04-25

## 2019-04-25 RX ORDER — INSULIN PUMP SYRINGE, 3 ML
EACH MISCELLANEOUS
Qty: 1 EACH | Refills: 0 | Status: SHIPPED | OUTPATIENT
Start: 2019-04-25 | End: 2019-05-15

## 2019-04-25 NOTE — TELEPHONE ENCOUNTER
----- Message from Paige Robles sent at 4/25/2019 11:53 AM CDT -----  Contact: [phone]/Reshma/care giver  Please send to pharmacy   Disp glucose meter with strips and lancets   Pt testing blood sugar QID prn disp # 100 each strips and lancets with 3 refills   Dx insulin dept diabetes  Class: Print  Notes to Pharmacy: Use tid .  Fill lancets and test strips for tid usage for 3 months with 3 refills        [facility] [employer] [facility] [address] [place] [address] [address] AT [facility]

## 2019-04-26 ENCOUNTER — TELEPHONE (OUTPATIENT)
Dept: FAMILY MEDICINE | Facility: CLINIC | Age: 44
End: 2019-04-26

## 2019-04-26 NOTE — TELEPHONE ENCOUNTER
----- Message from Alyssa Duarte sent at 4/26/2019  1:10 PM CDT -----  Contact: Darian (caregiver) [phone]  Patient would like a refill for [facility]ringes [facility] [id number] [place] [address] [address] AT [facility].

## 2019-04-26 NOTE — TELEPHONE ENCOUNTER
I don't know why you send me thinks i've done. But, I really do like clicking buttons, so there you go.

## 2019-04-26 NOTE — TELEPHONE ENCOUNTER
Called pt care giver back and explained that I called I the diabetic testing supplies and that she can pick them up later this evening

## 2019-05-01 ENCOUNTER — TELEPHONE (OUTPATIENT)
Dept: FAMILY MEDICINE | Facility: CLINIC | Age: 44
End: 2019-05-01

## 2019-05-01 NOTE — TELEPHONE ENCOUNTER
----- Message from Deborah Oleary sent at 5/1/2019  1:42 PM CDT -----  Contact: [phone] caregiver Reshma  Patient's caregiver is requesting to speak with you regarding changing patient's appt because he has wound care tomorrow.

## 2019-05-06 ENCOUNTER — TELEPHONE (OUTPATIENT)
Dept: UROLOGY | Facility: CLINIC | Age: 44
End: 2019-05-06

## 2019-05-06 ENCOUNTER — TELEPHONE (OUTPATIENT)
Dept: FAMILY MEDICINE | Facility: CLINIC | Age: 44
End: 2019-05-06

## 2019-05-06 NOTE — TELEPHONE ENCOUNTER
----- Message from Paige Robles sent at 5/6/2019  2:53 PM CDT -----  Contact: Reshma/[phone]  Patient called in returning your call. Please advise.

## 2019-05-15 ENCOUNTER — OFFICE VISIT (OUTPATIENT)
Dept: FAMILY MEDICINE | Facility: CLINIC | Age: 44
End: 2019-05-15
Payer: MEDICAID

## 2019-05-15 VITALS
OXYGEN SATURATION: 99 % | SYSTOLIC BLOOD PRESSURE: 118 MMHG | HEIGHT: 71 IN | BODY MASS INDEX: 30.68 KG/M2 | DIASTOLIC BLOOD PRESSURE: 82 MMHG | HEART RATE: 124 BPM

## 2019-05-15 DIAGNOSIS — Z79.4 TYPE 2 DIABETES MELLITUS WITH OTHER CIRCULATORY COMPLICATION, WITH LONG-TERM CURRENT USE OF INSULIN: ICD-10-CM

## 2019-05-15 DIAGNOSIS — T83.512D URINARY TRACT INFECTION ASSOCIATED WITH NEPHROSTOMY CATHETER, SUBSEQUENT ENCOUNTER: ICD-10-CM

## 2019-05-15 DIAGNOSIS — N39.0 URINARY TRACT INFECTION ASSOCIATED WITH NEPHROSTOMY CATHETER, SUBSEQUENT ENCOUNTER: ICD-10-CM

## 2019-05-15 DIAGNOSIS — E11.59 TYPE 2 DIABETES MELLITUS WITH OTHER CIRCULATORY COMPLICATION, WITH LONG-TERM CURRENT USE OF INSULIN: ICD-10-CM

## 2019-05-15 DIAGNOSIS — G82.20 PARAPLEGIA AT T9 LEVEL: Primary | Chronic | ICD-10-CM

## 2019-05-15 PROCEDURE — 87086 URINE CULTURE/COLONY COUNT: CPT

## 2019-05-15 PROCEDURE — 99214 OFFICE O/P EST MOD 30 MIN: CPT | Mod: S$GLB,,, | Performed by: FAMILY MEDICINE

## 2019-05-15 PROCEDURE — 99214 PR OFFICE/OUTPT VISIT, EST, LEVL IV, 30-39 MIN: ICD-10-PCS | Mod: S$GLB,,, | Performed by: FAMILY MEDICINE

## 2019-05-15 PROCEDURE — 87186 SC STD MICRODIL/AGAR DIL: CPT | Mod: 59

## 2019-05-15 PROCEDURE — 87088 URINE BACTERIA CULTURE: CPT

## 2019-05-15 PROCEDURE — 87077 CULTURE AEROBIC IDENTIFY: CPT | Mod: 59

## 2019-05-15 PROCEDURE — 81001 URINALYSIS AUTO W/SCOPE: CPT

## 2019-05-15 PROCEDURE — 82043 UR ALBUMIN QUANTITATIVE: CPT

## 2019-05-15 RX ORDER — BLOOD-GLUCOSE METER
EACH MISCELLANEOUS
Refills: 0 | Status: ON HOLD | COMMUNITY
Start: 2019-04-26 | End: 2021-03-18 | Stop reason: HOSPADM

## 2019-05-15 RX ORDER — AMMONIUM LACTATE 12 G/100G
CREAM TOPICAL
Refills: 3 | Status: ON HOLD | COMMUNITY
Start: 2019-04-11 | End: 2021-03-18 | Stop reason: HOSPADM

## 2019-05-15 RX ORDER — MIRTAZAPINE 30 MG/1
30 TABLET, FILM COATED ORAL
COMMUNITY
End: 2020-07-20

## 2019-05-15 RX ORDER — INSULIN LISPRO 100 [IU]/ML
20 INJECTION, SUSPENSION SUBCUTANEOUS
Qty: 3 BOX | Refills: 11 | Status: SHIPPED | OUTPATIENT
Start: 2019-05-15 | End: 2020-07-20

## 2019-05-15 RX ORDER — CALCIUM CITRATE/VITAMIN D3 200MG-6.25
TABLET ORAL
Refills: 3 | COMMUNITY
Start: 2019-04-26 | End: 2019-06-05 | Stop reason: SDUPTHER

## 2019-05-15 RX ORDER — GLUCOSAM/CHON-MSM1/C/MANG/BOSW 500-416.6
TABLET ORAL
Refills: 3 | Status: ON HOLD | COMMUNITY
Start: 2019-04-26 | End: 2021-03-18 | Stop reason: HOSPADM

## 2019-05-15 RX ORDER — ISOPROPYL ALCOHOL 70 ML/100ML
1 SWAB TOPICAL DAILY
Qty: 400 EACH | Refills: 11 | Status: ON HOLD | OUTPATIENT
Start: 2019-05-15 | End: 2021-03-18 | Stop reason: HOSPADM

## 2019-05-15 RX ORDER — IPRATROPIUM BROMIDE AND ALBUTEROL SULFATE 2.5; .5 MG/3ML; MG/3ML
3 SOLUTION RESPIRATORY (INHALATION)
COMMUNITY
End: 2020-07-20

## 2019-05-15 RX ORDER — PEN NEEDLE, DIABETIC 29 G X1/2"
1 NEEDLE, DISPOSABLE MISCELLANEOUS 3 TIMES DAILY
Qty: 400 EACH | Refills: 11 | Status: ON HOLD | OUTPATIENT
Start: 2019-05-15 | End: 2021-03-18 | Stop reason: HOSPADM

## 2019-05-15 NOTE — PROGRESS NOTES
Subjective:      Patient ID: Hermann Aguilar is a 43 y.o. male.    Chief Complaint: Hospital Follow Up      HPI here on stretcher brought by ambulance transport  Form for [facility] for spinal cord injury program comleted  Wants large urine bag  Wants urine checked, odor, no fever  Goes to wound care weekly at [facility] for left foot  And buttock  Has DM, need supplies  Needs labs    Review of Systems   Constitutional: Positive for activity change. Negative for appetite change, fatigue, fever and unexpected weight change.   HENT: Negative for congestion, ear pain, sinus pressure and sore throat.    Eyes: Negative for pain and visual disturbance.   Respiratory: Negative for shortness of breath.    Cardiovascular: Negative for chest pain.   Gastrointestinal: Negative for abdominal pain, constipation and diarrhea.   Endocrine: Negative for polyuria.   Genitourinary: Negative for difficulty urinating and frequency.   Musculoskeletal: Negative for arthralgias, back pain and myalgias.   Skin: Negative for color change.   Allergic/Immunologic: Negative.    Neurological: Negative for syncope, weakness and headaches.   Hematological: Does not bruise/bleed easily.   Psychiatric/Behavioral: Negative for dysphoric mood and suicidal ideas. The patient is not nervous/anxious.    All other systems reviewed and are negative.    Objective:     Physical Exam   Constitutional: He is oriented to person, place, and time. He appears well-developed and well-nourished. No distress.   HENT:   Head: Normocephalic and atraumatic.   Right Ear: External ear normal.   Left Ear: External ear normal.   Mouth/Throat: Oropharynx is clear and moist. No oropharyngeal exudate.   Eyes: Pupils are equal, round, and reactive to light. Conjunctivae and EOM are normal. Right eye exhibits no discharge. Left eye exhibits no discharge. No scleral icterus.   Neck: Normal range of motion. Neck supple. No JVD present. No tracheal deviation present. No thyromegaly  present.   Cardiovascular: Normal rate and regular rhythm. Exam reveals no gallop and no friction rub.   No murmur heard.  Pulmonary/Chest: Effort normal and breath sounds normal. No stridor. No respiratory distress. He has no wheezes. He has no rales. He exhibits no tenderness.   Abdominal: Soft. He exhibits no distension and no mass. There is no tenderness. There is no rebound and no guarding.   Suprapubic catheter   Genitourinary:   Genitourinary Comments: Suprapubic catheter   Musculoskeletal: Normal range of motion. He exhibits no edema or tenderness.   Right knee amputation     Lymphadenopathy:     He has no cervical adenopathy.   Neurological: He is alert and oriented to person, place, and time. He displays abnormal reflex. A sensory deficit is present. No cranial nerve deficit. He exhibits abnormal muscle tone. Coordination abnormal.   Skin: Skin is warm and dry. No rash noted. He is not diaphoretic. No erythema. No pallor.   Psychiatric: He has a normal mood and affect. His behavior is normal. Judgment and thought content normal.   Nursing note and vitals reviewed.    Assessment:     1. Paraplegia at T9 level    2. Type 2 diabetes mellitus with other circulatory complication, with long-term current use of insulin    3. Urinary tract infection associated with nephrostomy catheter, subsequent encounter      Plan:        Medication List           Accurate as of 5/15/19 11:59 PM. If you have any questions, ask your nurse or doctor.               START taking these medications    alcohol swabs Padm  Commonly known as:  ALCOHOL PADS  Apply 1 each topically once daily.  Started by:  Ramu Breen MD     drainage bag Misc  1 Units by Misc.(Non-Drug; Combo Route) route every 30 days.  Started by:  Ramu Breen MD        CHANGE how you take these medications    albuterol-ipratropium 2.5 mg-0.5 mg/3 mL nebulizer solution  Commonly known as:  DUO-NEB  What changed:  Another medication with the same name was  removed. Continue taking this medication, and follow the directions you see here.  Changed by:  Ramu Breen MD     TRUE METRIX GLUCOSE METER Misc  Generic drug:  blood-glucose meter  What changed:  Another medication with the same name was removed. Continue taking this medication, and follow the directions you see here.  Changed by:  Ramu Breen MD        CONTINUE taking these medications    ammonium lactate 12 % Crea     baclofen 10 MG tablet  Commonly known as:  LIORESAL  Take 1 tablet (10 mg total) by mouth 3 (three) times daily.     cadexomer iodine 0.9 % gel  Commonly known as:  IODOSORB     cyclobenzaprine 10 MG tablet  Commonly known as:  FLEXERIL  Take 1 tablet (10 mg total) by mouth 3 (three) times daily as needed.     dextran 70-hypromellose ophthalmic solution  Commonly known as:  TEARS     docusate sodium 100 MG capsule  Commonly known as:  COLACE  Take 1 capsule (100 mg total) by mouth 2 (two) times daily.     famotidine 20 MG tablet  Commonly known as:  PEPCID  Take 1 tablet (20 mg total) by mouth 2 (two) times daily.     gabapentin 300 MG capsule  Commonly known as:  NEURONTIN  Take 1 capsule (300 mg total) by mouth 2 (two) times daily.     gemfibrozil 600 MG tablet  Commonly known as:  LOPID  Take 1 tablet (600 mg total) by mouth 2 (two) times daily before meals.     insulin detemir U-100 100 unit/mL injection  Commonly known as:  LEVEMIR  Inject 15 Units into the skin 2 (two) times daily.     insulin lispro protamin-lispro 100 unit/mL (75-25) Inpn  Commonly known as:  HumaLOG Mix 75-25 KwikPen  Inject 20 Units into the skin 3 (three) times daily with meals.     ketoconazole 2 % shampoo  Commonly known as:  NIZORAL  Apply topically twice a week.     melatonin 10 mg Cap  Take 1 tablet by mouth every evening.     meloxicam 7.5 MG tablet  Commonly known as:  MOBIC  Take 1 tablet (7.5 mg total) by mouth 2 (two) times daily as needed for Pain.     metFORMIN 500 MG tablet  Commonly known as:   "GLUCOPHAGE  Take 1 tablet (500 mg total) by mouth 2 (two) times daily with meals.     metoprolol tartrate 25 MG tablet  Commonly known as:  LOPRESSOR  Take 0.5 tablets (12.5 mg total) by mouth 2 (two) times daily. for 15 days     mirtazapine 30 MG tablet  Commonly known as:  REMERON     pen needle, diabetic 29 gauge x 1/2" Ndle  Commonly known as:  COMFORT EZ PEN NEEDLES  1 Units by Misc.(Non-Drug; Combo Route) route 3 (three) times daily.     promethazine 25 MG tablet  Commonly known as:  PHENERGAN  Take 1 tablet (25 mg total) by mouth every 6 (six) hours as needed for Nausea.     TRUE METRIX GLUCOSE TEST STRIP Strp  Generic drug:  blood sugar diagnostic     TRUEPLUS LANCETS 30 gauge Misc  Generic drug:  lancets     venlafaxine 75 MG 24 hr capsule  Commonly known as:  EFFEXOR-XR  Take 1 capsule (75 mg total) by mouth once daily.           Where to Get Your Medications      These medications were sent to [id number] [address] [facility] AT [facility] & [address]  [address] [facility] [address]    Hours:  24-hours Phone:  [phone]   · alcohol swabs Padm  · drainage bag Misc  · insulin lispro protamin-lispro 100 unit/mL (75-25) Inpn  · pen needle, diabetic 29 gauge x 1/2" Ndle       Paraplegia at T9 level    Type 2 diabetes mellitus with other circulatory complication, with long-term current use of insulin  -     Cancel: CBC auto differential; Future; Expected date: 05/15/2019  -     Cancel: Comprehensive metabolic panel; Future; Expected date: 05/15/2019  -     Cancel: Hemoglobin A1c; Future  -     Cancel: Lipid panel; Future  -     Microalbumin/creatinine urine ratio; Future  -     Cancel: TSH; Future  -     Cancel: Vitamin D; Future  -     Vitamin D; Future  -     TSH; Future  -     Lipid panel; Future  -     Hemoglobin A1c; Future  -     Comprehensive metabolic panel; Future; Expected date: 05/15/2019  -     CBC auto differential; Future; Expected date: " "05/15/2019    Urinary tract infection associated with nephrostomy catheter, subsequent encounter  -     Urinalysis; Future  -     Urine culture; Future  -     Cancel: CBC auto differential; Future; Expected date: 05/15/2019  -     Cancel: Comprehensive metabolic panel; Future; Expected date: 05/15/2019  -     Cancel: Hemoglobin A1c; Future  -     Cancel: Lipid panel; Future  -     Microalbumin/creatinine urine ratio; Future  -     Cancel: TSH; Future  -     Cancel: Vitamin D; Future  -     Vitamin D; Future  -     TSH; Future  -     Lipid panel; Future  -     Hemoglobin A1c; Future  -     Comprehensive metabolic panel; Future; Expected date: 05/15/2019  -     CBC auto differential; Future; Expected date: 05/15/2019    Other orders  -     alcohol swabs (ALCOHOL PADS) PadM; Apply 1 each topically once daily.  Dispense: 400 each; Refill: 11  -     insulin lispro protamin-lispro (HUMALOG MIX 75-25 KWIKPEN) 100 unit/mL (75-25) InPn; Inject 20 Units into the skin 3 (three) times daily with meals.  Dispense: 3 Box; Refill: 11  -     pen needle, diabetic (COMFORT EZ PEN NEEDLES) 29 gauge x 1/2" Ndle; 1 Units by Misc.(Non-Drug; Combo Route) route 3 (three) times daily.  Dispense: 400 each; Refill: 11  -     drainage bag Misc; 1 Units by Misc.(Non-Drug; Combo Route) route every 30 days.  Dispense: 3 each; Refill: 3  -     Urinalysis Microscopic        "

## 2019-05-16 LAB
ALBUMIN/CREAT UR: 238.4 UG/MG (ref 0–30)
BACTERIA #/AREA URNS AUTO: ABNORMAL /HPF
BILIRUB UR QL STRIP: NEGATIVE
CLARITY UR REFRACT.AUTO: ABNORMAL
COLOR UR AUTO: ABNORMAL
CREAT UR-MCNC: 99 MG/DL (ref 23–375)
GLUCOSE UR QL STRIP: NEGATIVE
HGB UR QL STRIP: ABNORMAL
HYALINE CASTS UR QL AUTO: 0 /LPF
KETONES UR QL STRIP: NEGATIVE
LEUKOCYTE ESTERASE UR QL STRIP: ABNORMAL
MICROALBUMIN UR DL<=1MG/L-MCNC: 236 UG/ML
MICROSCOPIC COMMENT: ABNORMAL
NITRITE UR QL STRIP: NEGATIVE
PH UR STRIP: >8 [PH] (ref 5–8)
PROT UR QL STRIP: ABNORMAL
RBC #/AREA URNS AUTO: 3 /HPF (ref 0–4)
SP GR UR STRIP: 1.01 (ref 1–1.03)
URN SPEC COLLECT METH UR: ABNORMAL
WBC #/AREA URNS AUTO: 0 /HPF (ref 0–5)

## 2019-05-19 ENCOUNTER — TELEPHONE (OUTPATIENT)
Dept: FAMILY MEDICINE | Facility: CLINIC | Age: 44
End: 2019-05-19

## 2019-05-19 DIAGNOSIS — T83.510D URINARY TRACT INFECTION ASSOCIATED WITH CYSTOSTOMY CATHETER, SUBSEQUENT ENCOUNTER: Primary | ICD-10-CM

## 2019-05-19 DIAGNOSIS — N39.0 URINARY TRACT INFECTION ASSOCIATED WITH CYSTOSTOMY CATHETER, SUBSEQUENT ENCOUNTER: Primary | ICD-10-CM

## 2019-05-19 LAB
BACTERIA UR CULT: NORMAL
BACTERIA UR CULT: NORMAL

## 2019-05-21 ENCOUNTER — TELEPHONE (OUTPATIENT)
Dept: FAMILY MEDICINE | Facility: CLINIC | Age: 44
End: 2019-05-21

## 2019-05-21 NOTE — TELEPHONE ENCOUNTER
----- Message from Ramu Breen MD sent at 5/19/2019  5:54 PM CDT -----  2 bacteria in urine, resistant to all antibiotic pills; needs to see urologist to see if these need to be treated. Referral placed

## 2019-05-24 ENCOUNTER — TELEPHONE (OUTPATIENT)
Dept: UROLOGY | Facility: CLINIC | Age: 44
End: 2019-05-24

## 2019-05-24 ENCOUNTER — HOSPITAL ENCOUNTER (EMERGENCY)
Facility: HOSPITAL | Age: 44
Discharge: HOME | End: 2019-05-24
Attending: EMERGENCY MEDICINE
Payer: MEDICAID

## 2019-05-24 VITALS
WEIGHT: 220 LBS | OXYGEN SATURATION: 99 % | TEMPERATURE: 98 F | DIASTOLIC BLOOD PRESSURE: 78 MMHG | RESPIRATION RATE: 16 BRPM | BODY MASS INDEX: 30.8 KG/M2 | SYSTOLIC BLOOD PRESSURE: 122 MMHG | HEART RATE: 86 BPM | HEIGHT: 71 IN

## 2019-05-24 DIAGNOSIS — T83.010A SUPRAPUBIC CATHETER DYSFUNCTION, INITIAL ENCOUNTER: Primary | ICD-10-CM

## 2019-05-24 PROCEDURE — 51705 CHANGE OF BLADDER TUBE: CPT | Mod: ,,, | Performed by: STUDENT IN AN ORGANIZED HEALTH CARE EDUCATION/TRAINING PROGRAM

## 2019-05-24 PROCEDURE — 51705 PR CHANGE OF BLADDER TUBE,SIMPLE: ICD-10-PCS | Mod: ,,, | Performed by: STUDENT IN AN ORGANIZED HEALTH CARE EDUCATION/TRAINING PROGRAM

## 2019-05-24 PROCEDURE — 99214 PR OFFICE/OUTPT VISIT, EST, LEVL IV, 30-39 MIN: ICD-10-PCS | Mod: 25,,, | Performed by: STUDENT IN AN ORGANIZED HEALTH CARE EDUCATION/TRAINING PROGRAM

## 2019-05-24 PROCEDURE — 99283 EMERGENCY DEPT VISIT LOW MDM: CPT

## 2019-05-24 PROCEDURE — 99214 OFFICE O/P EST MOD 30 MIN: CPT | Mod: 25,,, | Performed by: STUDENT IN AN ORGANIZED HEALTH CARE EDUCATION/TRAINING PROGRAM

## 2019-05-24 NOTE — ED NOTES
Bed: EXAM 08  Expected date:   Expected time:   Means of arrival:   Comments:  EMS - suprapubic catheter

## 2019-05-24 NOTE — ED NOTES
Patient presents to ER with c/o suprapubic catheter leaking x2 days and allegedly came out, no pain noted

## 2019-05-24 NOTE — CONSULTS
"[facility]  Urology  Consult Note    Patient Name: Hermann Aguilar  MRN: [id number]  Admission Date: 5/24/2019  Hospital Length of Stay: 0   Code Status: Prior   Attending Provider: Jovany David MD   Consulting Provider: Ruchi Navarrete MD  Primary Care Physician: Ramu Breen MD  Principal Problem:<principal problem not specified>    Consults    Subjective:     HPI:  43 y.o. male paraplegic with neurogenic bladder maintained with SPT changes presented to the ER today 5/24/19 instead of his clinic visit with me which was scheduled for the same day. When asked why he went to the ER instead of the clinic, he states his "ride brought him to the ER" because they "were late."    He is maintained with an indwelling 22 Armenian SPT which he states has been clogging and last changed when he went to [facility] which we do not have any record of as it was a facility outside of [facility].     He also has developed MDR UTI's for which I placed a referral to infectious diseases in 3/2019. Due to insurance reasons I was informed that he cannot see an ID physician at [facility], therefore we will seek ID at [facility].     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 Yadiel Haley    CHEST TUBE INSERTION Right     CYSTOSCOPY, clot " evacuation, suprapubic tube exchange N/A 8/30/2018    Per[facility] at [facility] OR    GASTROSTOMY TUBE PLACEMENT  12/15/2016    INSERTION,SUPRAPUBIC CATHETER  8/30/2018    Per[facility] at [facility] OR    ORIF HUMERUS FRACTURE Left 12/15/2016    REMOVAL, BLOOD CLOT  8/30/2018    Per[facility] at [facility] OR    TRACHEOSTOMY TUBE PLACEMENT         Review of patient's allergies indicates:  No Known Allergies    Family History     None          Tobacco Use    Smoking status: Current Some Day Smoker     Packs/day: 0.50    Smokeless tobacco: Never Used   Substance and Sexual Activity    Alcohol use: No     Frequency: Never     Comment: occ    Drug use: No    Sexual activity: Not on file       Review of Systems   Constitutional: Negative for activity change.   HENT: Negative for facial swelling.    Eyes: Negative for visual disturbance.   Respiratory: Negative for chest tightness.    Cardiovascular: Negative for chest pain.   Gastrointestinal: Negative for abdominal distention.   Musculoskeletal: Negative for gait problem.   Skin: Negative for color change.   Neurological: Negative for dizziness.   Hematological: Negative for adenopathy.   Psychiatric/Behavioral: Negative for agitation.       Objective:     Temp:  [98.3 °F (36.8 °C)] 98.3 °F (36.8 °C)  Pulse:  [91] 91  Resp:  [16] 16  SpO2:  [100 %] 100 %  BP: (124)/(83) 124/83     Body mass index is 30.68 kg/m².           Drains     Drain                 Suprapubic Catheter 12/30/18 1400 20 Fr. 144 days         Suprapubic Catheter 02/08/19 1202 22 Fr. 104 days                Physical Exam   Vitals reviewed.  Constitutional: He is oriented to person, place, and time. He appears well-developed and well-nourished.   HENT:   Head: Normocephalic and atraumatic.   Eyes: Conjunctivae are normal. Pupils are equal, round, and reactive to light.   Neck: Normal range of motion. Neck supple.   Cardiovascular: Intact distal pulses.     Pulmonary/Chest: Effort normal. No respiratory distress.   Abdominal: Soft. He exhibits no distension. There is no tenderness.   22 Uruguayan indwelling suprapubic tube was removed after deflating 10cc of sterile saline from the sage balloon.    The Spt site was prepped with betadyne and a new 22 Uruguayan suprapubic tube was inserted and inflated with 10cc of sterile saline. The urine drained contained some purulence but the tube was draining well.    Musculoskeletal: Normal range of motion. He exhibits no edema.   Neurological: He is alert and oriented to person, place, and time.   Skin: Skin is warm and dry.     Psychiatric: He has a normal mood and affect. His behavior is normal.       Significant Labs:    BMP:  No results for input(s): NA, K, CL, CO2, BUN, CREATININE, LABGLOM, GLUCOSE, CALCIUM in the last 168 hours.    CBC:  No results for input(s): WBC, HGB, HCT, PLT in the last 168 hours.    All pertinent labs results from the past 24 hours have been reviewed.    Significant Imaging:  All pertinent imaging results/findings from the past 24 hours have been reviewed.                    Assessment and Plan:     Chronic suprapubic catheter  43 y.o. male paraplegic with neurogenic bladder maintained with SPT changes presented to the ER today 5/24/19 instead of his clinic visit with me which was scheduled for the same day.    Plan:  1. 22 Uruguayan SPT exchanged today in the ER without any difficulty.   2. He also has developed MDR UTI's for which I placed a referral to infectious diseases in 3/2019. Due to insurance reasons I was informed that he cannot see an ID physician at [facility], therefore we will seek ID at [facility].   3. followup with me in 1 month in the clinic.        VTE Risk Mitigation (From admission, onward)    None          Thank you for your consult.      Ruchi Navarrete MD  Urology  [facility]
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
## 2019-05-24 NOTE — TELEPHONE ENCOUNTER
----- Message from Ruchi Navarrete MD sent at 5/24/2019 12:35 PM CDT -----  Suprapubic tube exchanged in the ER today, please schedule followup with me in 1 month for exchange.

## 2019-05-24 NOTE — HPI
"43 y.o. male paraplegic with neurogenic bladder maintained with SPT changes presented to the ER today 5/24/19 instead of his clinic visit with me which was scheduled for the same day. When asked why he went to the ER instead of the clinic, he states his "ride brought him to the ER" because they "were late."    He is maintained with an indwelling 22 Djiboutian SPT which he states has been clogging and last changed when he went to [facility] which we do not have any record of as it was a facility outside of [facility].     He also has developed MDR UTI's for which I placed a referral to infectious diseases in 3/2019. Due to insurance reasons I was informed that he cannot see an ID physician at [facility], therefore we will seek ID at [facility].   "

## 2019-05-24 NOTE — ED NOTES
APPEARANCE: Alert, oriented and in no acute distress.  CARDIAC: Normal rate and rhythm, no murmur heard.   PERIPHERAL VASCULAR: peripheral pulses present. Normal cap refill. No edema. Warm to touch.    RESPIRATORY:Normal rate and effort, breath sounds clear bilaterally throughout chest. Respirations are equal and unlabored no obvious signs of distress.  GASTRO: soft, bowel sounds normal, no tenderness, no abdominal distention.  MUSC: Right AKA .  SKIN: wound to left hip, dressing intact, no drainage, but odor noted  NEURO: 5/5 strength major flexors/extensors bilaterally. Sensory intact to light touch bilaterally. Sharon coma scale: eyes open spontaneously-4, oriented & converses-5, obeys commands-6. No neurological abnormalities.   MENTAL STATUS: awake, alert and aware of environment.  EYE: PERRL, both eyes: pupils brisk and reactive to light. Normal size.  ENT: EARS: no obvious drainage. NOSE: no active bleeding.   .

## 2019-05-24 NOTE — ASSESSMENT & PLAN NOTE
43 y.o. male paraplegic with neurogenic bladder maintained with SPT changes presented to the ER today 5/24/19 instead of his clinic visit with me which was scheduled for the same day.    Plan:  1. 22 Mongolian SPT exchanged today in the ER without any difficulty.   2. He also has developed MDR UTI's for which I placed a referral to infectious diseases in 3/2019. Due to insurance reasons I was informed that he cannot see an ID physician at [facility], therefore we will seek ID at [facility].   3. followup with me in 1 month in the clinic.

## 2019-05-24 NOTE — ED NOTES
Patient is awaiting [facility] for transport to home in stable condition

## 2019-06-05 NOTE — TELEPHONE ENCOUNTER
----- Message from Bridgett Pena sent at 6/5/2019 12:35 PM CDT -----  Contact: [phone]  Type:  RX Refill Request    Who Called: Pt    Refill or New Rx: Refill    RX Name and Strength: TRUE METRIX GLUCOSE TEST STRIP Strp    How is the patient currently taking it? (ex. 1XDay):    Is this a 30 day or 90 day RX:    [facility] Pharm[facility] with phone number: [facility] [id number] [place] [address]  [facility]    Local or Mail Order: Local    Ordering Provider: Dr. Breen    Would the patient rather a call back or a response via [facility]? Call back    Best Call Back Number: [phone]    Additional Information:

## 2019-06-06 RX ORDER — CALCIUM CITRATE/VITAMIN D3 200MG-6.25
TABLET ORAL
Qty: 200 STRIP | Refills: 3 | Status: ON HOLD | OUTPATIENT
Start: 2019-06-06 | End: 2021-03-18 | Stop reason: HOSPADM

## 2019-06-12 ENCOUNTER — TELEPHONE (OUTPATIENT)
Dept: SMOKING CESSATION | Facility: CLINIC | Age: 44
End: 2019-06-12

## 2019-06-24 ENCOUNTER — TELEPHONE (OUTPATIENT)
Dept: FAMILY MEDICINE | Facility: CLINIC | Age: 44
End: 2019-06-24

## 2019-06-24 DIAGNOSIS — G82.20 PARAPLEGIA AT T9 LEVEL: Chronic | ICD-10-CM

## 2019-06-24 DIAGNOSIS — Z89.511 HX OF RIGHT BKA: Chronic | ICD-10-CM

## 2019-06-24 DIAGNOSIS — G54.7 PHANTOM LIMB SYNDROME: Primary | Chronic | ICD-10-CM

## 2019-06-24 NOTE — TELEPHONE ENCOUNTER
----- Message from Queenie Aguirre sent at 6/24/2019 12:27 PM CDT -----  Contact: Patient  Patient called to check the status of his appointment request for a physical therapy assessment.    Please call [phone] to discuss this issue immediately per the patient.

## 2019-06-26 ENCOUNTER — TELEPHONE (OUTPATIENT)
Dept: FAMILY MEDICINE | Facility: CLINIC | Age: 44
End: 2019-06-26

## 2019-06-26 NOTE — TELEPHONE ENCOUNTER
PT/OT scheduling staff will call pt to arrange appt. I communicated with her via IM and was informed that she'll call pt to set up visit.

## 2019-06-26 NOTE — TELEPHONE ENCOUNTER
----- Message from Paige Robles sent at 6/26/2019  1:52 PM CDT -----  Contact: [phone] /self  Type:  Needs Medical Advice    Who Called: self  Symptoms (please be specific):  Needs a physical therapy assessment for a wheel chair  How long has patient had these symptoms:    Pharmacy name and phone #:    Would the patient rather a call back or a response via [facility]? call  Best Call Back Number:   Additional Information: Call to discuss

## 2019-06-28 ENCOUNTER — TELEPHONE (OUTPATIENT)
Dept: SMOKING CESSATION | Facility: CLINIC | Age: 44
End: 2019-06-28

## 2019-06-28 ENCOUNTER — CLINICAL SUPPORT (OUTPATIENT)
Dept: SMOKING CESSATION | Facility: CLINIC | Age: 44
End: 2019-06-28
Payer: COMMERCIAL

## 2019-06-28 ENCOUNTER — OFFICE VISIT (OUTPATIENT)
Dept: UROLOGY | Facility: CLINIC | Age: 44
End: 2019-06-28
Payer: MEDICAID

## 2019-06-28 DIAGNOSIS — N31.9 NEUROGENIC BLADDER: Primary | ICD-10-CM

## 2019-06-28 DIAGNOSIS — N39.0 RECURRENT UTI: ICD-10-CM

## 2019-06-28 DIAGNOSIS — Z93.59 CHRONIC SUPRAPUBIC CATHETER: ICD-10-CM

## 2019-06-28 DIAGNOSIS — F17.200 NICOTINE DEPENDENCE: Primary | ICD-10-CM

## 2019-06-28 PROCEDURE — 99214 PR OFFICE/OUTPT VISIT, EST, LEVL IV, 30-39 MIN: ICD-10-PCS | Mod: S$PBB,25,, | Performed by: STUDENT IN AN ORGANIZED HEALTH CARE EDUCATION/TRAINING PROGRAM

## 2019-06-28 PROCEDURE — 99407 PR TOBACCO USE CESSATION INTENSIVE >10 MINUTES: ICD-10-PCS | Mod: S$GLB,,,

## 2019-06-28 PROCEDURE — 87086 URINE CULTURE/COLONY COUNT: CPT

## 2019-06-28 PROCEDURE — 51702 INSERT TEMP BLADDER CATH: CPT | Mod: PBBFAC,PO | Performed by: STUDENT IN AN ORGANIZED HEALTH CARE EDUCATION/TRAINING PROGRAM

## 2019-06-28 PROCEDURE — 51700 IRRIGATION OF BLADDER: CPT | Mod: PBBFAC,PO | Performed by: STUDENT IN AN ORGANIZED HEALTH CARE EDUCATION/TRAINING PROGRAM

## 2019-06-28 PROCEDURE — 51700 IRRIGATION OF BLADDER: CPT | Mod: S$PBB,,, | Performed by: STUDENT IN AN ORGANIZED HEALTH CARE EDUCATION/TRAINING PROGRAM

## 2019-06-28 PROCEDURE — 87077 CULTURE AEROBIC IDENTIFY: CPT

## 2019-06-28 PROCEDURE — 99407 BEHAV CHNG SMOKING > 10 MIN: CPT | Mod: S$GLB,,,

## 2019-06-28 PROCEDURE — 87088 URINE BACTERIA CULTURE: CPT

## 2019-06-28 PROCEDURE — 51700 PR IRRIGATION, BLADDER: ICD-10-PCS | Mod: S$PBB,,, | Performed by: STUDENT IN AN ORGANIZED HEALTH CARE EDUCATION/TRAINING PROGRAM

## 2019-06-28 PROCEDURE — 99214 OFFICE O/P EST MOD 30 MIN: CPT | Mod: S$PBB,25,, | Performed by: STUDENT IN AN ORGANIZED HEALTH CARE EDUCATION/TRAINING PROGRAM

## 2019-06-28 PROCEDURE — 99999 PR PBB SHADOW E&M-EST. PATIENT-LVL I: ICD-10-PCS | Mod: PBBFAC,,, | Performed by: STUDENT IN AN ORGANIZED HEALTH CARE EDUCATION/TRAINING PROGRAM

## 2019-06-28 PROCEDURE — 87186 SC STD MICRODIL/AGAR DIL: CPT

## 2019-06-28 PROCEDURE — 99211 OFF/OP EST MAY X REQ PHY/QHP: CPT | Mod: PBBFAC,PO | Performed by: STUDENT IN AN ORGANIZED HEALTH CARE EDUCATION/TRAINING PROGRAM

## 2019-06-28 PROCEDURE — 99999 PR PBB SHADOW E&M-EST. PATIENT-LVL I: CPT | Mod: PBBFAC,,, | Performed by: STUDENT IN AN ORGANIZED HEALTH CARE EDUCATION/TRAINING PROGRAM

## 2019-06-28 NOTE — PROGRESS NOTES
Successful contact with patient regarding tobacco cessation  quit #1. Pt states, he's currently tobacco free and no further assistance is needed at this time. Pt commended for the accomplishment thus far. Pt informed of his benefit status and contact information to schedule an appointment if he need support. Will update the tobacco cessation smart form for 6 and 12 months on quit #1 and resolve the episode.
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
## 2019-06-28 NOTE — LETTER
June 28, 2019      Ramu Breen MD  [address] [place] [id number]           [facility]  [address] [place] [id number]  Phone: [phone]          Patient: Hermann Aguilar   MR Number: [id number]   YOB: 1975   Date of Visit: 6/28/2019       Dear Dr. Ramu Breen:    Thank you for referring Hermann Aguilar to me for evaluation. Attached you will find relevant portions of my assessment and plan of care.    If you have questions, please do not hesitate to call me. I look forward to following Hermann Aguilar along with you.    Sincerely,    Ruchi Navarrete MD    Enclosure  CC:  No Recipients    If you would like to receive this communication electronically, please contact [email or web address] or [phone] to request more information on [facility] access.    For providers and/or their staff who would like to refer a patient to [facility], please contact us through our one-stop-shop provider referral line, [facility], at [phone].    If you feel you have received this communication in error or would no longer like to receive these types of communications, please e-mail [email or web address]

## 2019-06-30 LAB — BACTERIA UR CULT: ABNORMAL

## 2019-07-01 ENCOUNTER — TELEPHONE (OUTPATIENT)
Dept: UROLOGY | Facility: CLINIC | Age: 44
End: 2019-07-01

## 2019-07-01 ENCOUNTER — TELEPHONE (OUTPATIENT)
Dept: FAMILY MEDICINE | Facility: CLINIC | Age: 44
End: 2019-07-01

## 2019-07-01 DIAGNOSIS — R30.0 DYSURIA: Primary | ICD-10-CM

## 2019-07-01 RX ORDER — CEFUROXIME AXETIL 500 MG/1
500 TABLET ORAL EVERY 12 HOURS
Qty: 28 TABLET | Refills: 0 | Status: SHIPPED | OUTPATIENT
Start: 2019-07-01 | End: 2019-07-15

## 2019-07-01 NOTE — TELEPHONE ENCOUNTER
----- Message from Elida Jones sent at 7/1/2019 11:42 AM CDT -----  Contact: [phone]/Joanna/[facility]  Is he coming in for therapy or wheel chair evaluation?

## 2019-07-01 NOTE — TELEPHONE ENCOUNTER
----- Message from Ruchi Navarrete MD sent at 7/1/2019  8:49 AM CDT -----  Please call patient and notify of positive culture and antibiotics. The urine culture grew out bacteria concerning for a urinary tract infection. We can try ceftin twice a day for 14 days while he is waiting to see infectious diseases at [facility].

## 2019-07-01 NOTE — TELEPHONE ENCOUNTER
----- Message from Ruchi Navarrete MD sent at 7/1/2019  8:49 AM CDT -----  Please call patient and notify of positive culture and antibiotics. The urine culture grew out bacteria concerning for a urinary tract infection. We can try ceftin twice a day for 14 days while he is waiting to see infectious diseases at [facility].

## 2019-07-01 NOTE — TELEPHONE ENCOUNTER
Spoke with patient gave test result and informed patient his script was sent to his pharmacy. Patient voice his understanding.

## 2019-07-07 ENCOUNTER — HOSPITAL ENCOUNTER (EMERGENCY)
Facility: HOSPITAL | Age: 44
Discharge: HOME | End: 2019-07-08
Attending: EMERGENCY MEDICINE
Payer: MEDICARE

## 2019-07-07 DIAGNOSIS — T83.511A URINARY TRACT INFECTION ASSOCIATED WITH INDWELLING URETHRAL CATHETER, INITIAL ENCOUNTER: Primary | ICD-10-CM

## 2019-07-07 DIAGNOSIS — K59.00 CONSTIPATION: ICD-10-CM

## 2019-07-07 DIAGNOSIS — N39.0 URINARY TRACT INFECTION ASSOCIATED WITH INDWELLING URETHRAL CATHETER, INITIAL ENCOUNTER: Primary | ICD-10-CM

## 2019-07-07 LAB
AMORPH CRY UR QL COMP ASSIST: ABNORMAL
BACTERIA #/AREA URNS AUTO: ABNORMAL /HPF
BILIRUB UR QL STRIP: NEGATIVE
CLARITY UR REFRACT.AUTO: ABNORMAL
COLOR UR AUTO: ABNORMAL
GLUCOSE UR QL STRIP: NEGATIVE
HGB UR QL STRIP: ABNORMAL
HYALINE CASTS UR QL AUTO: 1 /LPF
KETONES UR QL STRIP: NEGATIVE
LEUKOCYTE ESTERASE UR QL STRIP: ABNORMAL
MICROSCOPIC COMMENT: ABNORMAL
NITRITE UR QL STRIP: NEGATIVE
NON-SQ EPI CELLS #/AREA URNS AUTO: ABNORMAL /HPF
PH UR STRIP: 7 [PH] (ref 5–8)
PROT UR QL STRIP: ABNORMAL
RBC #/AREA URNS AUTO: 32 /HPF (ref 0–4)
SP GR UR STRIP: 1.01 (ref 1–1.03)
SQUAMOUS #/AREA URNS AUTO: ABNORMAL /HPF
URN SPEC COLLECT METH UR: ABNORMAL
UROBILINOGEN UR STRIP-ACNC: NEGATIVE EU/DL
WBC #/AREA URNS AUTO: 88 /HPF (ref 0–5)

## 2019-07-07 PROCEDURE — 99284 EMERGENCY DEPT VISIT MOD MDM: CPT | Mod: 25,ER

## 2019-07-07 PROCEDURE — 87086 URINE CULTURE/COLONY COUNT: CPT | Mod: ER

## 2019-07-07 PROCEDURE — 87077 CULTURE AEROBIC IDENTIFY: CPT | Mod: 59,ER

## 2019-07-07 PROCEDURE — 80053 COMPREHEN METABOLIC PANEL: CPT | Mod: ER

## 2019-07-07 PROCEDURE — 96374 THER/PROPH/DIAG INJ IV PUSH: CPT | Mod: ER,59

## 2019-07-07 PROCEDURE — 81000 URINALYSIS NONAUTO W/SCOPE: CPT | Mod: ER

## 2019-07-07 PROCEDURE — 85025 COMPLETE CBC W/AUTO DIFF WBC: CPT | Mod: ER

## 2019-07-07 PROCEDURE — 87088 URINE BACTERIA CULTURE: CPT | Mod: ER

## 2019-07-07 PROCEDURE — 51705 CHANGE OF BLADDER TUBE: CPT | Mod: ER

## 2019-07-07 PROCEDURE — 87186 SC STD MICRODIL/AGAR DIL: CPT | Mod: 59,ER

## 2019-07-07 PROCEDURE — 25000003 PHARM REV CODE 250: Mod: ER | Performed by: EMERGENCY MEDICINE

## 2019-07-07 RX ORDER — SYRING-NEEDL,DISP,INSUL,0.3 ML 29 G X1/2"
296 SYRINGE, EMPTY DISPOSABLE MISCELLANEOUS
Status: COMPLETED | OUTPATIENT
Start: 2019-07-07 | End: 2019-07-07

## 2019-07-07 RX ADMIN — MAGNESIUM CITRATE 296 ML: 1.75 LIQUID ORAL at 11:07

## 2019-07-08 VITALS
BODY MASS INDEX: 33.18 KG/M2 | SYSTOLIC BLOOD PRESSURE: 176 MMHG | OXYGEN SATURATION: 99 % | DIASTOLIC BLOOD PRESSURE: 95 MMHG | WEIGHT: 237 LBS | RESPIRATION RATE: 18 BRPM | HEIGHT: 71 IN | HEART RATE: 105 BPM | TEMPERATURE: 99 F

## 2019-07-08 LAB
ALBUMIN SERPL BCP-MCNC: 4.1 G/DL (ref 3.5–5.2)
ALP SERPL-CCNC: 138 U/L (ref 38–126)
ALT SERPL W/O P-5'-P-CCNC: 20 U/L (ref 10–44)
ANION GAP SERPL CALC-SCNC: 8 MMOL/L (ref 8–16)
AST SERPL-CCNC: 24 U/L (ref 15–46)
BASOPHILS # BLD AUTO: 0.06 K/UL (ref 0–0.2)
BASOPHILS NFR BLD: 0.7 % (ref 0–1.9)
BILIRUB SERPL-MCNC: 0.3 MG/DL (ref 0.1–1)
BUN SERPL-MCNC: 8 MG/DL (ref 2–20)
CALCIUM SERPL-MCNC: 9.5 MG/DL (ref 8.7–10.5)
CHLORIDE SERPL-SCNC: 108 MMOL/L (ref 95–110)
CO2 SERPL-SCNC: 26 MMOL/L (ref 23–29)
CREAT SERPL-MCNC: 0.72 MG/DL (ref 0.5–1.4)
DIFFERENTIAL METHOD: ABNORMAL
EOSINOPHIL # BLD AUTO: 0.8 K/UL (ref 0–0.5)
EOSINOPHIL NFR BLD: 9.1 % (ref 0–8)
ERYTHROCYTE [DISTWIDTH] IN BLOOD BY AUTOMATED COUNT: 16.5 % (ref 11.5–14.5)
EST. GFR  (AFRICAN AMERICAN): >60 ML/MIN/1.73 M^2
EST. GFR  (NON AFRICAN AMERICAN): >60 ML/MIN/1.73 M^2
GLUCOSE SERPL-MCNC: 146 MG/DL (ref 70–110)
HCT VFR BLD AUTO: 36.4 % (ref 40–54)
HGB BLD-MCNC: 12.2 G/DL (ref 14–18)
LYMPHOCYTES # BLD AUTO: 3.5 K/UL (ref 1–4.8)
LYMPHOCYTES NFR BLD: 39.6 % (ref 18–48)
MCH RBC QN AUTO: 26.8 PG (ref 27–31)
MCHC RBC AUTO-ENTMCNC: 33.5 G/DL (ref 32–36)
MCV RBC AUTO: 80 FL (ref 82–98)
MONOCYTES # BLD AUTO: 0.9 K/UL (ref 0.3–1)
MONOCYTES NFR BLD: 10.1 % (ref 4–15)
NEUTROPHILS # BLD AUTO: 3.5 K/UL (ref 1.8–7.7)
NEUTROPHILS NFR BLD: 40.5 % (ref 38–73)
PLATELET # BLD AUTO: 400 K/UL (ref 150–350)
PMV BLD AUTO: 11.8 FL (ref 9.2–12.9)
POTASSIUM SERPL-SCNC: 3.7 MMOL/L (ref 3.5–5.1)
PROT SERPL-MCNC: 8.8 G/DL (ref 6–8.4)
RBC # BLD AUTO: 4.56 M/UL (ref 4.6–6.2)
SODIUM SERPL-SCNC: 142 MMOL/L (ref 136–145)
WBC # BLD AUTO: 8.71 K/UL (ref 3.9–12.7)

## 2019-07-08 PROCEDURE — 63600175 PHARM REV CODE 636 W HCPCS: Mod: ER | Performed by: EMERGENCY MEDICINE

## 2019-07-08 PROCEDURE — 25000003 PHARM REV CODE 250: Mod: ER | Performed by: EMERGENCY MEDICINE

## 2019-07-08 RX ORDER — CEFTRIAXONE 1 G/1
2 INJECTION, POWDER, FOR SOLUTION INTRAMUSCULAR; INTRAVENOUS
Status: DISCONTINUED | OUTPATIENT
Start: 2019-07-08 | End: 2019-07-08

## 2019-07-08 RX ORDER — CIPROFLOXACIN 500 MG/1
500 TABLET ORAL
Status: COMPLETED | OUTPATIENT
Start: 2019-07-08 | End: 2019-07-08

## 2019-07-08 RX ORDER — CEFTRIAXONE 1 G/1
2 INJECTION, POWDER, FOR SOLUTION INTRAMUSCULAR; INTRAVENOUS
Status: COMPLETED | OUTPATIENT
Start: 2019-07-08 | End: 2019-07-08

## 2019-07-08 RX ADMIN — CIPROFLOXACIN HYDROCHLORIDE 500 MG: 500 TABLET, FILM COATED ORAL at 12:07

## 2019-07-08 RX ADMIN — CEFTRIAXONE SODIUM 2 G: 1 INJECTION, POWDER, FOR SOLUTION INTRAMUSCULAR; INTRAVENOUS at 12:07

## 2019-07-08 NOTE — ED NOTES
SP catheter assessed on arrival pt states he has not urinated since last night and feels pressure in the lower abd region. No return from the SP catheter when irrigated. 22 fr SP catheter changed to a 20 fr but little urine returned. No resistance met with irrigation initially, but minimal urine return. MD notified.

## 2019-07-08 NOTE — ED NOTES
No s/s of distress noted. Pt visualized, respirations even and unlabored, side rails up x 2, bed locked and in low position. Call bell with in reach. Pt up to date of plan of care and all needs currently addressed and met.

## 2019-07-08 NOTE — ED NOTES
Pt c/o pain and pressure to the lower abd. On consult with MD an order for a urethral cath was given to try to relieve the bladder. Catheter inserted but again little urine returned. Will notify MD of possible need for x-ray to verify placement and extent of urine in bladder vs possible bowel involvement. No s/s of distress noted except pain to the lower abd. Pt visualized, respirations even and unlabored, side rails up x 2, bed locked and in low position. Call bell with in reach. Pt up to date of plan of care and all needs currently addressed and met.

## 2019-07-08 NOTE — ED NOTES
Huff catheter inserted without difficulty, very minimal red tinged urine returned.  Suprapubic catheter remains in place, also with minimal output.  Suprapubic catheter clamped, still no return from penile catheter.  Pt states he had a very small BM this am and had to take a laxative to produce that, last BM was 4-5 days ago.  Dr. Gomez notified, KUB ordered to r/o constipation, will leave both catheters in place for the moment.
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
## 2019-07-09 ENCOUNTER — TELEPHONE (OUTPATIENT)
Dept: FAMILY MEDICINE | Facility: CLINIC | Age: 44
End: 2019-07-09

## 2019-07-09 ENCOUNTER — DOCUMENTATION ONLY (OUTPATIENT)
Dept: UROLOGY | Facility: CLINIC | Age: 44
End: 2019-07-09

## 2019-07-09 NOTE — TELEPHONE ENCOUNTER
kade called and states that the pt thinks he is coming in for electric wheel chair evaluation and PT not just PT alone so they need to know if its for an electric wheel chair so they can have a company there to fit him

## 2019-07-09 NOTE — PROGRESS NOTES
Contacted an Infectious Disease MD: Accepts Medicaid Insurance    DR ARLETH HENDERSON  [address] [address]   [phone]   Set up an appointment as consultation for patient on 7/31/19 at 130 pm.    Spoke with patient, Advised this appointment is very important due to recurrent UTI's/resistance to oral antibiotics.  Demographics/clinical notes faxed to .

## 2019-07-09 NOTE — TELEPHONE ENCOUNTER
[7/9/2019 1:35 PM] Deborah Rogel:   [phone] <mrn can you ask Dr Breen if this pt is supposed to have PT only or PT for an electric wheelchair because thats what he thinks hes going to PT for but i thought he was just getting pt

## 2019-07-10 LAB
BACTERIA UR CULT: ABNORMAL
BACTERIA UR CULT: ABNORMAL

## 2019-07-11 ENCOUNTER — TELEPHONE (OUTPATIENT)
Dept: FAMILY MEDICINE | Facility: CLINIC | Age: 44
End: 2019-07-11

## 2019-07-11 NOTE — TELEPHONE ENCOUNTER
I spoke with Joanna/ [facility]. Physical therapy order is incorrect. Patient has a appointment with physical therapy today but they can't do a wheel chair evaluation today because there is a process. I left a message advising patient that his appointment will be cancelled today, contact office to discuss.     This is what happened......    Patient call the office on 06/26/2019 requesting a referral for physical therapy assessment possibly due to someone advising him that he needs to go to physical therapy first in order to get a electric wheel chair.     Joanna explained the process regarding a electric wheel chair evaluation.    1. Orders for a electric wheel chair, office notes, insurance info & patient's demographics need to be sent to a [employer] to determined insurance coverage.  (Mr. Wheel Chair because this company also takes medicaid as well)     2. Then after coverage is determined the DME company sends a wheel chair evaluation request to physical therapy. Then Dr. Breen would have to send in orders to [facility] stating patient needs a will chair evaluation.                    ----- Message from Delmis Godfrey sent at 7/11/2019  1:06 PM CDT -----  Joanna with [facility] in [place] called.    No. [phone]    Joanna called earlier in the week.  Patient has an evaluation appointment today.    Please call soon.

## 2019-07-11 NOTE — TELEPHONE ENCOUNTER
A request for an electric wheelchair has been sent to Dr. Breen for completion. See previous encounter regarding specifics.

## 2019-07-11 NOTE — TELEPHONE ENCOUNTER
Patient needs orders for electric wheel chair sent to Mr. Nichol doshi. Mr. Nichol Doshi Tel: [phone]

## 2019-07-11 NOTE — TELEPHONE ENCOUNTER
I am trying to follow this message.      I am not certain who Joanna is...    I called the pt - he states that Joanna is a , maybe.     He gave me her number, but when I called the number it was a male's voicemail.   I left him a VM asking him to call us and let us know what is needed in order to make sure he gets his electric wheelchair.      The number I called was [phone].

## 2019-07-12 ENCOUNTER — TELEPHONE (OUTPATIENT)
Dept: FAMILY MEDICINE | Facility: CLINIC | Age: 44
End: 2019-07-12

## 2019-07-12 NOTE — TELEPHONE ENCOUNTER
----- Message from Paige Robles sent at 7/12/2019 10:15 AM CDT -----  Contact: [phone] /self  Type:  Needs Medical Advice    Who Called: self  Symptoms (please be specific):  Very Bad cough and medication prescribed is not helping.   How long has patient had these symptoms:   A while  Pharmacy name and phone #:  [facility] [id number] [place] [address] [address] [facility]  Would the patient rather a call back or a response via [facility]? call  Best Call Back Number:   Additional Information:

## 2019-07-12 NOTE — TELEPHONE ENCOUNTER
Irrigate sinuses with Neti pot, distilled water and salt packs that come with neti pot daily; if fever or SOB, go to ER

## 2019-07-12 NOTE — TELEPHONE ENCOUNTER
Pt stated that he's been having a cold x several weeks. He has been taking an old prescription of Virtussin without relief. Additional symptoms include sore throat. He said that he feels as though he had fever, but isn't certain due to not checking temperature. Pt is requesting that you send a prescription for something else to his pharmacy.

## 2019-07-15 ENCOUNTER — TELEPHONE (OUTPATIENT)
Dept: FAMILY MEDICINE | Facility: CLINIC | Age: 44
End: 2019-07-15

## 2019-07-15 NOTE — TELEPHONE ENCOUNTER
----- Message from Keira Mota sent at 7/15/2019 11:25 AM CDT -----  Contact: self, [phone]  Patient states he is calling back regarding medication requested for his chest cold and fever.

## 2019-07-16 NOTE — TELEPHONE ENCOUNTER
----- Message from Deborah Oleary sent at 7/16/2019  2:48 PM CDT -----  Contact: [phone]/self  Patient is requesting to speak with Deborah regarding medication refills.

## 2019-07-31 ENCOUNTER — TELEPHONE (OUTPATIENT)
Dept: UROLOGY | Facility: CLINIC | Age: 44
End: 2019-07-31

## 2019-07-31 ENCOUNTER — PATIENT OUTREACH (OUTPATIENT)
Dept: ADMINISTRATIVE | Facility: OTHER | Age: 44
End: 2019-07-31

## 2019-07-31 NOTE — TELEPHONE ENCOUNTER
----- Message from Keira Mota sent at 7/31/2019  2:09 PM CDT -----  Contact: self, [phone]  Dr. Navarrete patient requests to schedule appointment, states he needs to have suprapubic changed.

## 2019-08-01 ENCOUNTER — OFFICE VISIT (OUTPATIENT)
Dept: UROLOGY | Facility: CLINIC | Age: 44
End: 2019-08-01
Payer: MEDICARE

## 2019-08-01 VITALS
SYSTOLIC BLOOD PRESSURE: 128 MMHG | HEART RATE: 84 BPM | DIASTOLIC BLOOD PRESSURE: 80 MMHG | WEIGHT: 237 LBS | HEIGHT: 71 IN | BODY MASS INDEX: 33.18 KG/M2

## 2019-08-01 DIAGNOSIS — Z93.59 CHRONIC SUPRAPUBIC CATHETER: Chronic | ICD-10-CM

## 2019-08-01 DIAGNOSIS — G82.20 PARAPLEGIA AT T9 LEVEL: Chronic | ICD-10-CM

## 2019-08-01 DIAGNOSIS — N31.9 NEUROGENIC BLADDER: Primary | Chronic | ICD-10-CM

## 2019-08-01 DIAGNOSIS — Z89.511 HX OF RIGHT BKA: Chronic | ICD-10-CM

## 2019-08-01 PROCEDURE — 51700 IRRIGATION OF BLADDER: CPT | Mod: PBBFAC,PO | Performed by: UROLOGY

## 2019-08-01 PROCEDURE — 51705 CHANGE OF BLADDER TUBE: CPT | Mod: S$PBB,,, | Performed by: UROLOGY

## 2019-08-01 PROCEDURE — 99213 OFFICE O/P EST LOW 20 MIN: CPT | Mod: PBBFAC,PO,25 | Performed by: UROLOGY

## 2019-08-01 PROCEDURE — 51705 PR CHANGE OF BLADDER TUBE,SIMPLE: ICD-10-PCS | Mod: S$PBB,,, | Performed by: UROLOGY

## 2019-08-01 PROCEDURE — 99999 PR PBB SHADOW E&M-EST. PATIENT-LVL III: CPT | Mod: PBBFAC,,, | Performed by: UROLOGY

## 2019-08-01 PROCEDURE — 99999 PR PBB SHADOW E&M-EST. PATIENT-LVL III: ICD-10-PCS | Mod: PBBFAC,,, | Performed by: UROLOGY

## 2019-08-01 PROCEDURE — 99214 PR OFFICE/OUTPT VISIT, EST, LEVL IV, 30-39 MIN: ICD-10-PCS | Mod: S$PBB,25,, | Performed by: UROLOGY

## 2019-08-01 PROCEDURE — 99214 OFFICE O/P EST MOD 30 MIN: CPT | Mod: S$PBB,25,, | Performed by: UROLOGY

## 2019-08-01 PROCEDURE — 51705 CHANGE OF BLADDER TUBE: CPT | Mod: PBBFAC,PO | Performed by: UROLOGY

## 2019-08-01 RX ORDER — CIPROFLOXACIN 500 MG/1
500 TABLET ORAL 2 TIMES DAILY
Qty: 10 TABLET | Refills: 0 | Status: SHIPPED | OUTPATIENT
Start: 2019-08-01 | End: 2019-08-06
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
## 2019-08-01 NOTE — PROCEDURES
Bladder Catheterization  Date/Time: 8/1/2019 2:15 PM  Location procedure was performed: [facility]  Performed by: Eddy Hummel MD  Authorized by: Eddy Hummel MD   Assisting provider: Eddy Hummel MD  Pre-operative diagnosis: Neurogenic bladder  Post-operative diagnosis: Same  Consent Done: Not Needed  Indications: catheter change and neurogenic bladder  Local anesthesia used: no    Anesthesia:  Local anesthesia used: no  Patient sedated: no  Preparation: Patient was prepped and draped in the usual sterile fashion.  Description of findings: supra pubic tube irrigated , replaced and sage removed   Catheter insertion: indwelling  Catheter type: Sage  Catheter size: 20 Fr  Complicated insertion: no  Altered anatomy: no  Bladder irrigation: yes  Number of attempts: 1  Urine volume: 5 ml  Urine characteristics: blood-tinged, yellow and mildly cloudy  Technical procedures used: sterile  Significant surgical tasks conducted by the assistant(s): none  Complications: No  Estimated blood loss (mL): 0  Specimens: No  Implants: No  Patient tolerance: Patient tolerated the procedure well with no immediate complications

## 2019-08-24 ENCOUNTER — ANESTHESIA (OUTPATIENT)
Dept: EMERGENCY MEDICINE | Facility: HOSPITAL | Age: 44
DRG: 698 | End: 2019-08-24
Payer: MEDICARE

## 2019-08-24 ENCOUNTER — HOSPITAL ENCOUNTER (INPATIENT)
Facility: HOSPITAL | Age: 44
LOS: 4 days | Discharge: HOME HEALTH | DRG: 698 | End: 2019-08-28
Attending: EMERGENCY MEDICINE | Admitting: HOSPITALIST
Payer: MEDICARE

## 2019-08-24 ENCOUNTER — ANESTHESIA EVENT (OUTPATIENT)
Dept: EMERGENCY MEDICINE | Facility: HOSPITAL | Age: 44
DRG: 698 | End: 2019-08-24
Payer: MEDICARE

## 2019-08-24 DIAGNOSIS — N39.0 SEPSIS DUE TO URINARY TRACT INFECTION: ICD-10-CM

## 2019-08-24 DIAGNOSIS — R78.81 BACTEREMIA DUE TO GRAM-NEGATIVE BACTERIA: ICD-10-CM

## 2019-08-24 DIAGNOSIS — F43.12 CHRONIC POST-TRAUMATIC STRESS DISORDER: Chronic | ICD-10-CM

## 2019-08-24 DIAGNOSIS — N31.9 NEUROGENIC BLADDER: Chronic | ICD-10-CM

## 2019-08-24 DIAGNOSIS — Z93.59 CHRONIC SUPRAPUBIC CATHETER: Chronic | ICD-10-CM

## 2019-08-24 DIAGNOSIS — N39.0 URINARY TRACT INFECTION ASSOCIATED WITH CYSTOSTOMY CATHETER, INITIAL ENCOUNTER: Primary | ICD-10-CM

## 2019-08-24 DIAGNOSIS — R00.0 TACHYCARDIA: ICD-10-CM

## 2019-08-24 DIAGNOSIS — L97.529: ICD-10-CM

## 2019-08-24 DIAGNOSIS — A41.9 SEPSIS DUE TO URINARY TRACT INFECTION: ICD-10-CM

## 2019-08-24 DIAGNOSIS — R07.9 CHEST PAIN: ICD-10-CM

## 2019-08-24 DIAGNOSIS — I10 ESSENTIAL HYPERTENSION: Chronic | ICD-10-CM

## 2019-08-24 DIAGNOSIS — Z79.4 TYPE 2 DIABETES MELLITUS WITH HYPERGLYCEMIA, WITH LONG-TERM CURRENT USE OF INSULIN: Chronic | ICD-10-CM

## 2019-08-24 DIAGNOSIS — T83.510A URINARY TRACT INFECTION ASSOCIATED WITH CYSTOSTOMY CATHETER, INITIAL ENCOUNTER: Primary | ICD-10-CM

## 2019-08-24 DIAGNOSIS — G82.20 PARAPLEGIA AT T9 LEVEL: Chronic | ICD-10-CM

## 2019-08-24 DIAGNOSIS — E11.65 TYPE 2 DIABETES MELLITUS WITH HYPERGLYCEMIA, WITH LONG-TERM CURRENT USE OF INSULIN: Chronic | ICD-10-CM

## 2019-08-24 DIAGNOSIS — Z89.511 HX OF RIGHT BKA: Chronic | ICD-10-CM

## 2019-08-24 PROBLEM — Z72.0 TOBACCO ABUSE DISORDER: Chronic | Status: ACTIVE | Noted: 2018-08-29

## 2019-08-24 PROBLEM — F17.210 CIGARETTE SMOKER: Chronic | Status: ACTIVE | Noted: 2018-08-29

## 2019-08-24 PROBLEM — L89.303 PRESSURE INJURY OF BUTTOCK, STAGE 3: Status: ACTIVE | Noted: 2019-08-24

## 2019-08-24 LAB
ALBUMIN SERPL BCP-MCNC: 3.9 G/DL (ref 3.5–5.2)
ALP SERPL-CCNC: 155 U/L (ref 55–135)
ALT SERPL W/O P-5'-P-CCNC: 47 U/L (ref 10–44)
ANION GAP SERPL CALC-SCNC: 17 MMOL/L (ref 8–16)
AST SERPL-CCNC: 30 U/L (ref 10–40)
BACTERIA #/AREA URNS HPF: ABNORMAL /HPF
BASOPHILS # BLD AUTO: 0.02 K/UL (ref 0–0.2)
BASOPHILS NFR BLD: 0.1 % (ref 0–1.9)
BILIRUB SERPL-MCNC: 0.4 MG/DL (ref 0.1–1)
BILIRUB UR QL STRIP: NEGATIVE
BNP SERPL-MCNC: <10 PG/ML (ref 0–99)
BUN SERPL-MCNC: 11 MG/DL (ref 6–20)
CALCIUM SERPL-MCNC: 10.5 MG/DL (ref 8.7–10.5)
CHLORIDE SERPL-SCNC: 105 MMOL/L (ref 95–110)
CLARITY UR: CLEAR
CO2 SERPL-SCNC: 19 MMOL/L (ref 23–29)
COLOR UR: YELLOW
CREAT SERPL-MCNC: 1 MG/DL (ref 0.5–1.4)
CRP SERPL-MCNC: 77.4 MG/L (ref 0–8.2)
DIFFERENTIAL METHOD: ABNORMAL
EOSINOPHIL # BLD AUTO: 0.1 K/UL (ref 0–0.5)
EOSINOPHIL NFR BLD: 0.6 % (ref 0–8)
ERYTHROCYTE [DISTWIDTH] IN BLOOD BY AUTOMATED COUNT: 17.2 % (ref 11.5–14.5)
ERYTHROCYTE [SEDIMENTATION RATE] IN BLOOD BY WESTERGREN METHOD: 49 MM/HR (ref 0–10)
EST. GFR  (AFRICAN AMERICAN): >60 ML/MIN/1.73 M^2
EST. GFR  (NON AFRICAN AMERICAN): >60 ML/MIN/1.73 M^2
GLUCOSE SERPL-MCNC: 140 MG/DL (ref 70–110)
GLUCOSE UR QL STRIP: NEGATIVE
HCT VFR BLD AUTO: 43.7 % (ref 40–54)
HGB BLD-MCNC: 14.5 G/DL (ref 14–18)
HGB UR QL STRIP: ABNORMAL
HYALINE CASTS #/AREA URNS LPF: 0 /LPF
INFLUENZA A, MOLECULAR: NEGATIVE
INFLUENZA B, MOLECULAR: NEGATIVE
INR PPP: 1 (ref 0.8–1.2)
KETONES UR QL STRIP: NEGATIVE
LACTATE SERPL-SCNC: 1.5 MMOL/L (ref 0.5–2.2)
LACTATE SERPL-SCNC: 5.4 MMOL/L (ref 0.5–2.2)
LEUKOCYTE ESTERASE UR QL STRIP: ABNORMAL
LYMPHOCYTES # BLD AUTO: 1.6 K/UL (ref 1–4.8)
LYMPHOCYTES NFR BLD: 9.6 % (ref 18–48)
MCH RBC QN AUTO: 26.4 PG (ref 27–31)
MCHC RBC AUTO-ENTMCNC: 33.2 G/DL (ref 32–36)
MCV RBC AUTO: 80 FL (ref 82–98)
MICROSCOPIC COMMENT: ABNORMAL
MONOCYTES # BLD AUTO: 1.3 K/UL (ref 0.3–1)
MONOCYTES NFR BLD: 7.6 % (ref 4–15)
NEUTROPHILS # BLD AUTO: 13.8 K/UL (ref 1.8–7.7)
NEUTROPHILS NFR BLD: 82.1 % (ref 38–73)
NITRITE UR QL STRIP: POSITIVE
PH UR STRIP: 8 [PH] (ref 5–8)
PLATELET # BLD AUTO: 403 K/UL (ref 150–350)
PLATELET BLD QL SMEAR: ABNORMAL
PMV BLD AUTO: 11.2 FL (ref 9.2–12.9)
POCT GLUCOSE: 107 MG/DL (ref 70–110)
POTASSIUM SERPL-SCNC: 4.1 MMOL/L (ref 3.5–5.1)
PROCALCITONIN SERPL IA-MCNC: 0.22 NG/ML
PROT SERPL-MCNC: 9.3 G/DL (ref 6–8.4)
PROT UR QL STRIP: ABNORMAL
PROTHROMBIN TIME: 10.6 SEC (ref 9–12.5)
RBC # BLD AUTO: 5.5 M/UL (ref 4.6–6.2)
RBC #/AREA URNS HPF: 20 /HPF (ref 0–4)
SODIUM SERPL-SCNC: 141 MMOL/L (ref 136–145)
SP GR UR STRIP: 1.01 (ref 1–1.03)
SPECIMEN SOURCE: NORMAL
TROPONIN I SERPL DL<=0.01 NG/ML-MCNC: 0.01 NG/ML (ref 0–0.03)
URN SPEC COLLECT METH UR: ABNORMAL
UROBILINOGEN UR STRIP-ACNC: NEGATIVE EU/DL
WBC # BLD AUTO: 16.94 K/UL (ref 3.9–12.7)
WBC #/AREA URNS HPF: >100 /HPF (ref 0–5)

## 2019-08-24 PROCEDURE — 87086 URINE CULTURE/COLONY COUNT: CPT | Mod: 59

## 2019-08-24 PROCEDURE — 83605 ASSAY OF LACTIC ACID: CPT | Mod: 91

## 2019-08-24 PROCEDURE — 84145 PROCALCITONIN (PCT): CPT

## 2019-08-24 PROCEDURE — 63600175 PHARM REV CODE 636 W HCPCS: Performed by: EMERGENCY MEDICINE

## 2019-08-24 PROCEDURE — 11000001 HC ACUTE MED/SURG PRIVATE ROOM

## 2019-08-24 PROCEDURE — 87077 CULTURE AEROBIC IDENTIFY: CPT

## 2019-08-24 PROCEDURE — 87088 URINE BACTERIA CULTURE: CPT

## 2019-08-24 PROCEDURE — 81000 URINALYSIS NONAUTO W/SCOPE: CPT

## 2019-08-24 PROCEDURE — 93005 ELECTROCARDIOGRAM TRACING: CPT

## 2019-08-24 PROCEDURE — 86140 C-REACTIVE PROTEIN: CPT

## 2019-08-24 PROCEDURE — 84484 ASSAY OF TROPONIN QUANT: CPT

## 2019-08-24 PROCEDURE — 99285 EMERGENCY DEPT VISIT HI MDM: CPT | Mod: 25

## 2019-08-24 PROCEDURE — 80053 COMPREHEN METABOLIC PANEL: CPT

## 2019-08-24 PROCEDURE — 96366 THER/PROPH/DIAG IV INF ADDON: CPT

## 2019-08-24 PROCEDURE — 96368 THER/DIAG CONCURRENT INF: CPT

## 2019-08-24 PROCEDURE — 83605 ASSAY OF LACTIC ACID: CPT

## 2019-08-24 PROCEDURE — C1751 CATH, INF, PER/CENT/MIDLINE: HCPCS | Performed by: ANESTHESIOLOGY

## 2019-08-24 PROCEDURE — 85610 PROTHROMBIN TIME: CPT

## 2019-08-24 PROCEDURE — 87040 BLOOD CULTURE FOR BACTERIA: CPT

## 2019-08-24 PROCEDURE — 96365 THER/PROPH/DIAG IV INF INIT: CPT

## 2019-08-24 PROCEDURE — 25500020 PHARM REV CODE 255: Performed by: PHYSICIAN ASSISTANT

## 2019-08-24 PROCEDURE — 36000 PLACE NEEDLE IN VEIN: CPT | Performed by: ANESTHESIOLOGY

## 2019-08-24 PROCEDURE — 63600175 PHARM REV CODE 636 W HCPCS: Performed by: PHYSICIAN ASSISTANT

## 2019-08-24 PROCEDURE — 83880 ASSAY OF NATRIURETIC PEPTIDE: CPT

## 2019-08-24 PROCEDURE — 87086 URINE CULTURE/COLONY COUNT: CPT

## 2019-08-24 PROCEDURE — 25000003 PHARM REV CODE 250: Performed by: PHYSICIAN ASSISTANT

## 2019-08-24 PROCEDURE — 36410 VNPNXR 3YR/> PHY/QHP DX/THER: CPT

## 2019-08-24 PROCEDURE — 87186 SC STD MICRODIL/AGAR DIL: CPT

## 2019-08-24 PROCEDURE — 85652 RBC SED RATE AUTOMATED: CPT

## 2019-08-24 PROCEDURE — 87502 INFLUENZA DNA AMP PROBE: CPT

## 2019-08-24 PROCEDURE — 25000003 PHARM REV CODE 250: Performed by: HOSPITALIST

## 2019-08-24 PROCEDURE — 85025 COMPLETE CBC W/AUTO DIFF WBC: CPT

## 2019-08-24 RX ORDER — ONDANSETRON 2 MG/ML
4 INJECTION INTRAMUSCULAR; INTRAVENOUS EVERY 8 HOURS PRN
Status: DISCONTINUED | OUTPATIENT
Start: 2019-08-24 | End: 2019-08-29 | Stop reason: HOSPADM

## 2019-08-24 RX ORDER — SODIUM CHLORIDE 0.9 % (FLUSH) 0.9 %
10 SYRINGE (ML) INJECTION
Status: DISCONTINUED | OUTPATIENT
Start: 2019-08-24 | End: 2019-08-29 | Stop reason: HOSPADM

## 2019-08-24 RX ORDER — IBUPROFEN 200 MG
24 TABLET ORAL
Status: DISCONTINUED | OUTPATIENT
Start: 2019-08-24 | End: 2019-08-29 | Stop reason: HOSPADM

## 2019-08-24 RX ORDER — DOCUSATE SODIUM 100 MG/1
100 CAPSULE, LIQUID FILLED ORAL 2 TIMES DAILY
Status: DISCONTINUED | OUTPATIENT
Start: 2019-08-24 | End: 2019-08-29 | Stop reason: HOSPADM

## 2019-08-24 RX ORDER — BACLOFEN 10 MG/1
10 TABLET ORAL 3 TIMES DAILY
Status: DISCONTINUED | OUTPATIENT
Start: 2019-08-24 | End: 2019-08-29 | Stop reason: HOSPADM

## 2019-08-24 RX ORDER — ENOXAPARIN SODIUM 100 MG/ML
40 INJECTION SUBCUTANEOUS EVERY 24 HOURS
Status: DISCONTINUED | OUTPATIENT
Start: 2019-08-24 | End: 2019-08-29 | Stop reason: HOSPADM

## 2019-08-24 RX ORDER — SODIUM CHLORIDE 9 MG/ML
INJECTION, SOLUTION INTRAVENOUS CONTINUOUS
Status: ACTIVE | OUTPATIENT
Start: 2019-08-24 | End: 2019-08-25

## 2019-08-24 RX ORDER — GLUCAGON 1 MG
1 KIT INJECTION
Status: DISCONTINUED | OUTPATIENT
Start: 2019-08-24 | End: 2019-08-29 | Stop reason: HOSPADM

## 2019-08-24 RX ORDER — INSULIN ASPART 100 [IU]/ML
0-5 INJECTION, SOLUTION INTRAVENOUS; SUBCUTANEOUS
Status: DISCONTINUED | OUTPATIENT
Start: 2019-08-24 | End: 2019-08-29 | Stop reason: HOSPADM

## 2019-08-24 RX ORDER — KETOROLAC TROMETHAMINE 30 MG/ML
15 INJECTION, SOLUTION INTRAMUSCULAR; INTRAVENOUS EVERY 6 HOURS PRN
Status: ACTIVE | OUTPATIENT
Start: 2019-08-24 | End: 2019-08-27

## 2019-08-24 RX ORDER — ACETAMINOPHEN 325 MG/1
650 TABLET ORAL EVERY 4 HOURS PRN
Status: DISCONTINUED | OUTPATIENT
Start: 2019-08-24 | End: 2019-08-29 | Stop reason: HOSPADM

## 2019-08-24 RX ORDER — IBUPROFEN 200 MG
16 TABLET ORAL
Status: DISCONTINUED | OUTPATIENT
Start: 2019-08-24 | End: 2019-08-29 | Stop reason: HOSPADM

## 2019-08-24 RX ORDER — METOPROLOL TARTRATE 25 MG/1
25 TABLET, FILM COATED ORAL 2 TIMES DAILY
Status: DISCONTINUED | OUTPATIENT
Start: 2019-08-24 | End: 2019-08-29 | Stop reason: HOSPADM

## 2019-08-24 RX ORDER — IPRATROPIUM BROMIDE AND ALBUTEROL SULFATE 2.5; .5 MG/3ML; MG/3ML
3 SOLUTION RESPIRATORY (INHALATION) EVERY 4 HOURS PRN
Status: DISCONTINUED | OUTPATIENT
Start: 2019-08-24 | End: 2019-08-29 | Stop reason: HOSPADM

## 2019-08-24 RX ORDER — GEMFIBROZIL 600 MG/1
600 TABLET, FILM COATED ORAL
Status: DISCONTINUED | OUTPATIENT
Start: 2019-08-24 | End: 2019-08-29 | Stop reason: HOSPADM

## 2019-08-24 RX ORDER — GABAPENTIN 300 MG/1
300 CAPSULE ORAL 2 TIMES DAILY
Status: DISCONTINUED | OUTPATIENT
Start: 2019-08-24 | End: 2019-08-29 | Stop reason: HOSPADM

## 2019-08-24 RX ORDER — FAMOTIDINE 20 MG/1
20 TABLET, FILM COATED ORAL 2 TIMES DAILY
Status: DISCONTINUED | OUTPATIENT
Start: 2019-08-24 | End: 2019-08-29 | Stop reason: HOSPADM

## 2019-08-24 RX ADMIN — PIPERACILLIN AND TAZOBACTAM 4.5 G: 4; .5 INJECTION, POWDER, LYOPHILIZED, FOR SOLUTION INTRAVENOUS; PARENTERAL at 09:08

## 2019-08-24 RX ADMIN — IOHEXOL 100 ML: 350 INJECTION, SOLUTION INTRAVENOUS at 06:08

## 2019-08-24 RX ADMIN — GEMFIBROZIL 600 MG: 600 TABLET, FILM COATED ORAL at 08:08

## 2019-08-24 RX ADMIN — DOCUSATE SODIUM 100 MG: 100 CAPSULE, LIQUID FILLED ORAL at 08:08

## 2019-08-24 RX ADMIN — ENOXAPARIN SODIUM 40 MG: 100 INJECTION SUBCUTANEOUS at 08:08

## 2019-08-24 RX ADMIN — ACETAMINOPHEN 650 MG: 325 TABLET ORAL at 11:08

## 2019-08-24 RX ADMIN — PIPERACILLIN AND TAZOBACTAM 4.5 G: 4; .5 INJECTION, POWDER, LYOPHILIZED, FOR SOLUTION INTRAVENOUS; PARENTERAL at 02:08

## 2019-08-24 RX ADMIN — SODIUM CHLORIDE 2994 ML: 0.9 INJECTION, SOLUTION INTRAVENOUS at 01:08

## 2019-08-24 RX ADMIN — SODIUM CHLORIDE: 0.9 INJECTION, SOLUTION INTRAVENOUS at 07:08

## 2019-08-24 RX ADMIN — FAMOTIDINE 20 MG: 20 TABLET, FILM COATED ORAL at 08:08

## 2019-08-24 RX ADMIN — BACLOFEN 10 MG: 10 TABLET ORAL at 08:08

## 2019-08-24 RX ADMIN — METOPROLOL TARTRATE 25 MG: 25 TABLET, FILM COATED ORAL at 08:08

## 2019-08-24 RX ADMIN — GABAPENTIN 300 MG: 300 CAPSULE ORAL at 08:08

## 2019-08-24 RX ADMIN — VANCOMYCIN HYDROCHLORIDE 2000 MG: 1 INJECTION, POWDER, LYOPHILIZED, FOR SOLUTION INTRAVENOUS at 02:08

## 2019-08-24 NOTE — ASSESSMENT & PLAN NOTE
Pressure injuries (ankle, buttocks)  H/O right BKA    Turn q2. Routine wound care. Consulted Wound Care RN.  Continue home Baclofen and Gabapentin.

## 2019-08-24 NOTE — ED NOTES
Paged infectious disease for consult at pager #[phone] regarding recurrent catheter associated UTI's that are multidrug resistant, awaiting return call.

## 2019-08-24 NOTE — ASSESSMENT & PLAN NOTE
"Last A1c 8.5% on 8/29/2018.  Pt states that he "sometimes" checks his BG at home and "takes insulin if it is high."  Check A1c with AM labs.  Check BG AC and HS and use SSI.  Diabetic diet.      "

## 2019-08-24 NOTE — ED TRIAGE NOTES
"Pt presented to the ED withsevere lower abdominal pain and sage catheter that is not draining.  Pt reported that he noitced there was an issue with it draining last night.  Pt has as suprapubic 20 fr catheter.  Pt reports nausea and "almost" vomiting.  Pt states that he usually gets the catheter changed about every month and his last change was about a month ago.  Pt has a history of paraplegia and has a right BKA.  Pt states that he was taking Ax for UTI but stopped taking them because he ran out  "

## 2019-08-24 NOTE — ASSESSMENT & PLAN NOTE
Sinus tachycardia    Pt has metoprolol tartrate 12.5 mg BID on home med list but is unsure if he is taking it.  Noted to have Start Date of 4/6/2019 and Stop Date of 5/15/19.  Starting 25 mg dose here with HOLD parameters.  Monitor on Telemetry.  CTA chest ordered to r/o PE in this patient with limited mobility 2/2 obesity and persistent tachycardia.

## 2019-08-24 NOTE — CONSULTS
Full note to follow     ID consulted for possible MDR UTI - patient with UA c/w UTI and cultures are pending.  Low grade fevers and increased WBC on admit  Patient has low colony count UTI with serratia and Pseudomonas - nether of which at high enough colonies to be called a UTI -     Overall feel that many of the bacteria may be colonizers but for now agree with Santos     Will see patient in AM

## 2019-08-24 NOTE — ED NOTES
Pt repositioned to left lateral side for comfort.  Pillow support for problematic pressure areas to the left foot and ankle.  VSS.  Will continue to monitor
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
## 2019-08-24 NOTE — ED NOTES
Dr. Frey at bedside, removed and replaced suprapubic sage catheter. Replaced with a 20 Persian 10cc balloon Purulent drainage noted.

## 2019-08-24 NOTE — ASSESSMENT & PLAN NOTE
H&H higher than normal for this patient likely 2/2 dehydration. No visible, active bleeding.  Monitor.
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
## 2019-08-24 NOTE — ASSESSMENT & PLAN NOTE
PTSD  Phantom limb syndrome    Pt has Venlafaxine and Mirtazapine on his home med list but they are both listed as  and pt unsure whether he is taking them.  HOLD both for now as he is lethargic.  Monitor.

## 2019-08-24 NOTE — ANESTHESIA PROCEDURE NOTES
Peripheral IV Insertion    Diagnosis: I99.8 Other disorder of circulatory system    Patient location during procedure: ED  Procedure start time: 8/24/2019 12:51 PM  Timeout: 8/24/2019 12:51 PM  Procedure end time: 8/24/2019 1:00 PM    Staffing  Authorizing Provider: Moises Short MD  Performing Provider: Moises Short MD    Anesthesiologist was present at the time of the procedure.    Preanesthetic Checklist  Completed: patient identified, site marked, surgical consent, pre-op evaluation, timeout performed, IV checked, risks and benefits discussed, monitors and equipment checked and anesthesia consent givenPeripheral IV Insertion  Skin Prep: chlorhexidine gluconate  Local Infiltration: lidocaine  Orientation: left  Location: antecubital  Catheter Size: 20 G  Catheter placement by Ultrasound guidance. Heme positive aspiration all ports.  Vessel Caliber: small, patent, compressibility normalInsertion Attempts: 2  Assessment  Dressing: secured with tape and tegaderm  Patient: Tolerated well  Line flushed easily.
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
## 2019-08-24 NOTE — ASSESSMENT & PLAN NOTE
"Pt reports that he is still smoking 1/2 ppd.  Also admits to use of marijuana and alcohol "when he has money."  Will  patient when he is more alert.        "

## 2019-08-24 NOTE — ASSESSMENT & PLAN NOTE
Sepsis secondary to UTI  Neurogenic bladder with suprapubic catheter   Pt received resuscitative IVF for sepsis in ED. Blood cultures collected before pt given IV vancomycin and zosyn. Will continue IVF, repeat lactic acid and continue zosyn.  Catheter exchanged in ED and urine culture re-collected.  Consulting Infectious Disease for this patient with recurrent catheter-associated, MDR UTIs.  PRN pain medication and anti-pyretic.
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
## 2019-08-25 PROBLEM — R78.81 BACTEREMIA DUE TO GRAM-NEGATIVE BACTERIA: Status: ACTIVE | Noted: 2019-08-25

## 2019-08-25 LAB
BASOPHILS # BLD AUTO: 0.03 K/UL (ref 0–0.2)
BASOPHILS NFR BLD: 0.2 % (ref 0–1.9)
DIFFERENTIAL METHOD: ABNORMAL
EOSINOPHIL # BLD AUTO: 0.3 K/UL (ref 0–0.5)
EOSINOPHIL NFR BLD: 1.7 % (ref 0–8)
ERYTHROCYTE [DISTWIDTH] IN BLOOD BY AUTOMATED COUNT: 16.9 % (ref 11.5–14.5)
ESTIMATED AVG GLUCOSE: 126 MG/DL (ref 68–131)
HBA1C MFR BLD HPLC: 6 % (ref 4–5.6)
HCT VFR BLD AUTO: 36.8 % (ref 40–54)
HGB BLD-MCNC: 12.1 G/DL (ref 14–18)
LYMPHOCYTES # BLD AUTO: 2 K/UL (ref 1–4.8)
LYMPHOCYTES NFR BLD: 11.9 % (ref 18–48)
MCH RBC QN AUTO: 26 PG (ref 27–31)
MCHC RBC AUTO-ENTMCNC: 32.9 G/DL (ref 32–36)
MCV RBC AUTO: 79 FL (ref 82–98)
MONOCYTES # BLD AUTO: 1.7 K/UL (ref 0.3–1)
MONOCYTES NFR BLD: 10.3 % (ref 4–15)
NEUTROPHILS # BLD AUTO: 12.6 K/UL (ref 1.8–7.7)
NEUTROPHILS NFR BLD: 75.9 % (ref 38–73)
PLATELET # BLD AUTO: 305 K/UL (ref 150–350)
PLATELET BLD QL SMEAR: ABNORMAL
PMV BLD AUTO: 11.8 FL (ref 9.2–12.9)
POCT GLUCOSE: 77 MG/DL (ref 70–110)
POCT GLUCOSE: 82 MG/DL (ref 70–110)
POCT GLUCOSE: 87 MG/DL (ref 70–110)
POCT GLUCOSE: 95 MG/DL (ref 70–110)
RBC # BLD AUTO: 4.65 M/UL (ref 4.6–6.2)
WBC # BLD AUTO: 16.62 K/UL (ref 3.9–12.7)

## 2019-08-25 PROCEDURE — 63600175 PHARM REV CODE 636 W HCPCS: Performed by: HOSPITALIST

## 2019-08-25 PROCEDURE — 25000003 PHARM REV CODE 250: Performed by: HOSPITALIST

## 2019-08-25 PROCEDURE — 94761 N-INVAS EAR/PLS OXIMETRY MLT: CPT

## 2019-08-25 PROCEDURE — 36415 COLL VENOUS BLD VENIPUNCTURE: CPT

## 2019-08-25 PROCEDURE — 63600175 PHARM REV CODE 636 W HCPCS: Performed by: PHYSICIAN ASSISTANT

## 2019-08-25 PROCEDURE — 83036 HEMOGLOBIN GLYCOSYLATED A1C: CPT

## 2019-08-25 PROCEDURE — 85025 COMPLETE CBC W/AUTO DIFF WBC: CPT

## 2019-08-25 PROCEDURE — 25000003 PHARM REV CODE 250: Performed by: PHYSICIAN ASSISTANT

## 2019-08-25 PROCEDURE — 11000001 HC ACUTE MED/SURG PRIVATE ROOM

## 2019-08-25 RX ORDER — SODIUM CHLORIDE 9 MG/ML
INJECTION, SOLUTION INTRAVENOUS CONTINUOUS
Status: ACTIVE | OUTPATIENT
Start: 2019-08-25 | End: 2019-08-25

## 2019-08-25 RX ADMIN — ACETAMINOPHEN 650 MG: 325 TABLET ORAL at 05:08

## 2019-08-25 RX ADMIN — GEMFIBROZIL 600 MG: 600 TABLET, FILM COATED ORAL at 06:08

## 2019-08-25 RX ADMIN — DOCUSATE SODIUM 100 MG: 100 CAPSULE, LIQUID FILLED ORAL at 08:08

## 2019-08-25 RX ADMIN — BACLOFEN 10 MG: 10 TABLET ORAL at 05:08

## 2019-08-25 RX ADMIN — GABAPENTIN 300 MG: 300 CAPSULE ORAL at 08:08

## 2019-08-25 RX ADMIN — SODIUM CHLORIDE: 0.9 INJECTION, SOLUTION INTRAVENOUS at 10:08

## 2019-08-25 RX ADMIN — SODIUM CHLORIDE: 0.9 INJECTION, SOLUTION INTRAVENOUS at 05:08

## 2019-08-25 RX ADMIN — BACLOFEN 10 MG: 10 TABLET ORAL at 10:08

## 2019-08-25 RX ADMIN — CEFTRIAXONE 1 G: 1 INJECTION, SOLUTION INTRAVENOUS at 10:08

## 2019-08-25 RX ADMIN — PIPERACILLIN AND TAZOBACTAM 4.5 G: 4; .5 INJECTION, POWDER, LYOPHILIZED, FOR SOLUTION INTRAVENOUS; PARENTERAL at 06:08

## 2019-08-25 RX ADMIN — BACLOFEN 10 MG: 10 TABLET ORAL at 11:08

## 2019-08-25 RX ADMIN — FAMOTIDINE 20 MG: 20 TABLET, FILM COATED ORAL at 08:08

## 2019-08-25 RX ADMIN — FAMOTIDINE 20 MG: 20 TABLET, FILM COATED ORAL at 10:08

## 2019-08-25 RX ADMIN — ENOXAPARIN SODIUM 40 MG: 100 INJECTION SUBCUTANEOUS at 08:08

## 2019-08-25 RX ADMIN — PIPERACILLIN AND TAZOBACTAM 4.5 G: 4; .5 INJECTION, POWDER, LYOPHILIZED, FOR SOLUTION INTRAVENOUS; PARENTERAL at 05:08

## 2019-08-25 RX ADMIN — GABAPENTIN 300 MG: 300 CAPSULE ORAL at 10:08

## 2019-08-25 RX ADMIN — METOPROLOL TARTRATE 25 MG: 25 TABLET, FILM COATED ORAL at 08:08

## 2019-08-25 RX ADMIN — DOCUSATE SODIUM 100 MG: 100 CAPSULE, LIQUID FILLED ORAL at 10:08

## 2019-08-25 RX ADMIN — GEMFIBROZIL 600 MG: 600 TABLET, FILM COATED ORAL at 05:08

## 2019-08-25 RX ADMIN — METOPROLOL TARTRATE 25 MG: 25 TABLET, FILM COATED ORAL at 10:08

## 2019-08-25 NOTE — SUBJECTIVE & OBJECTIVE
Interval History: Pt awake, alert and oriented today. Reports that he lives alone with CNA help daily. States that he also has an NP who makes home visits (see Sticky Note).  Pt states that someone is trying to get him an electric wheelchair. He has his grandmother's manual wheelchair now. He has a card for [employer] which is likely the [employer] company (see Sticky Note).      Review of Systems   Constitutional: Negative for chills and fever.   Respiratory: Negative for cough and shortness of breath.    Cardiovascular: Negative for chest pain and palpitations.   Gastrointestinal: Negative for abdominal pain, nausea and vomiting.   Neurological: Positive for weakness. Negative for headaches.        Paraplegia from waist down per patient.    Psychiatric/Behavioral: Negative for confusion.     Objective:     Vital Signs (Most Recent):  Temp: 99.9 °F (37.7 °C) (08/25/19 0758)  Pulse: 100 (08/25/19 1139)  Resp: 18 (08/25/19 0758)  BP: 122/68 (08/25/19 0758)  SpO2: 99 % (08/25/19 1115) Vital Signs (24h Range):  Temp:  [99 °F (37.2 °C)-101.7 °F (38.7 °C)] 99.9 °F (37.7 °C)  Pulse:  [100-160] 100  Resp:  [18-27] 18  SpO2:  [94 %-100 %] 99 %  BP: (101-174)/(63-94) 122/68     Weight: 99.8 kg (220 lb)  Body mass index is 30.68 kg/m².    Intake/Output Summary (Last 24 hours) at 8/25/2019 1148  Last data filed at 8/25/2019 0613  Gross per 24 hour   Intake 4425 ml   Output 2750 ml   Net 1675 ml      Physical Exam   Constitutional: No distress.   Cardiovascular: Normal rate and regular rhythm.   Pulmonary/Chest: Effort normal and breath sounds normal. No respiratory distress.   Abdominal: Soft. Bowel sounds are normal. He exhibits no distension. There is no tenderness.   Skin: Skin is warm and dry.   Suprapubic catheter site is clean and dry at present.    Psychiatric: He has a normal mood and affect. His behavior is normal. Judgment and thought content normal.   Nursing note and vitals reviewed.      Significant Labs:   BMP:   Recent  Labs   Lab 08/24/19  1340   *      K 4.1      CO2 19*   BUN 11   CREATININE 1.0   CALCIUM 10.5     CBC:   Recent Labs   Lab 08/24/19  1303 08/25/19  0532   WBC 16.94* 16.62*   HGB 14.5 12.1*   HCT 43.7 36.8*   * 305     Lactic acid:  5.4 >> 1.5   POCT Glucose:   Recent Labs   Lab 08/24/19  1931 08/25/19  0533   POCTGLUCOSE 107 87     Microbiology Results (last 7 days)     Procedure Component Value Units Date/Time    Blood culture x two cultures. Draw prior to antibiotics. [[id number]] Collected:  08/24/19 1305    Order Status:  Completed Specimen:  Blood from Peripheral, Forearm, Left Updated:  08/25/19 0627     Blood Culture, Routine Gram stain carson bottle: Gram negative rods       Results called to and read back by:Bob Claire RN 08/25/2019  06:27    Narrative:       Aerobic and anaerobic    Urine culture [[id number]] Collected:  08/24/19 2140    Order Status:  Sent Specimen:  Urine, Catheterized Updated:  08/25/19 0122    Blood culture x two cultures. Draw prior to antibiotics. [[id number]] Collected:  08/24/19 1247    Order Status:  Completed Specimen:  Blood from Peripheral, Hand, Left Updated:  08/25/19 0115     Blood Culture, Routine No Growth to date    Narrative:       Aerobic and anaerobic    Influenza A & B by Molecular [[id number]] Collected:  08/24/19 1247    Order Status:  Completed Specimen:  Nasopharyngeal Swab Updated:  08/24/19 1324     Influenza A, Molecular Negative     Influenza B, Molecular Negative     Flu A & B Source Nasal swab    Urine culture [[id number]] Collected:  08/24/19 1221    Order Status:  No result Specimen:  Urine Updated:  08/24/19 1256          Significant Imaging: no new

## 2019-08-25 NOTE — PLAN OF CARE
Plan of care reviewed with patient, understanding verbalized.  Pt remains ST on  tele, HR currently in low 100s.  Bed alarm on, call light in reach, fall precautions in place.
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
## 2019-08-25 NOTE — HOSPITAL COURSE
Gram negative rods in blood culture on 8/25/2019. Ceftriaxone added to Zosyn for double coverage. ID Dr. Bella consulted, recommend continued treatment until sensitivities result and BCx negative. Patient clinically improving, WBC 16->11; fevers downtrending. 8/27: Leukocytosis resolved, remains afebrile. Urine cx and blood cx showed serratia marcescens (MDR). Blood culture showed resistance to ceftriaxone, both urine and blood cxs susceptible to Zosyn. ID recommended zosyn IV for 2 weeks from last negative blood culture date of 8/26/19 (end date of 9/9/19). A PICC line was placed and patient was discharged to home in good condition with home health for IV infusion. He will follow-up with ID on 9/18/19.

## 2019-08-25 NOTE — ASSESSMENT & PLAN NOTE
Pressure injuries (ankle, buttocks)  H/O right BKA    Turn q2. Routine wound care. Consulted Wound Care RN.  Pt states he has weekly Wound Care at [facility] on Thursdays.  Continue home Baclofen and Gabapentin. Care Management Consult for assistance with wheelchair, appointments and transportation.
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
## 2019-08-25 NOTE — ASSESSMENT & PLAN NOTE
PTSD  Phantom limb syndrome    Pt has Venlafaxine and Mirtazapine on his home med list but they are both listed as  and pt unsure whether he is taking them.  HOLD both for now.  Monitor.

## 2019-08-25 NOTE — PLAN OF CARE
"TODD Wyatt returned call back. Notified NP of patient HR sustaining in the 140s, /70 P 141 T 99.4. Patient asymptomatic. NP stated "Continue the fluids as ordered. Call back within a hour with updated VSS and patient status." No new orders at this time. Will continue to monitor patient.   "

## 2019-08-25 NOTE — ASSESSMENT & PLAN NOTE
A1c 6.0% on 8/25/2019.  Check BG AC and HS and use SSI.  May not need as much insulin at home.  Diabetic diet.

## 2019-08-25 NOTE — ASSESSMENT & PLAN NOTE
Bacteremia due to gram negative rods   Sepsis secondary to UTI  Neurogenic bladder with suprapubic catheter   1 blood culture anaerobic bottle on 8/25: gram negative rods.  Ceftriaxone added to Zosyn to broaden coverage.  Urine culture pending.  TMax 101.7.  Lactic acid 5.4 > 1.5. Pt alert and interactive today. Infectious Disease consulted for this patient with recurrent catheter-associated, MDR UTIs.  PRN pain medication and anti-pyretic.
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
## 2019-08-25 NOTE — ASSESSMENT & PLAN NOTE
Sinus tachycardia    -174.  HR .  Continue metoprolol tartrate 25 mg BID with HOLD parameters.  Monitor on Telemetry.  CTA chest negative for PE.

## 2019-08-25 NOTE — ASSESSMENT & PLAN NOTE
Patient with recent history of possible UTIs with serratia and PsA and the most recent serratia was very MDR.  At this point he is bacteremic presumably from the urine but the ID is not yet known.  Patient appears very stable.     Plan:   1. Change catheter which has been done already   2. Keep on vanc and zosyn for now - if he clinically fails would empirically add amikacin  3. Await for results of urine and blood cultures   4.  ID will order repeat blood cultures
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
## 2019-08-26 ENCOUNTER — CLINICAL SUPPORT (OUTPATIENT)
Dept: SMOKING CESSATION | Facility: CLINIC | Age: 44
End: 2019-08-26
Payer: COMMERCIAL

## 2019-08-26 DIAGNOSIS — F17.210 CIGARETTE SMOKER: Primary | ICD-10-CM

## 2019-08-26 LAB
ANION GAP SERPL CALC-SCNC: 8 MMOL/L (ref 8–16)
BACTERIA UR CULT: NORMAL
BACTERIA UR CULT: NORMAL
BASOPHILS # BLD AUTO: 0.01 K/UL (ref 0–0.2)
BASOPHILS NFR BLD: 0.1 % (ref 0–1.9)
BUN SERPL-MCNC: 8 MG/DL (ref 6–20)
CALCIUM SERPL-MCNC: 9.4 MG/DL (ref 8.7–10.5)
CHLORIDE SERPL-SCNC: 110 MMOL/L (ref 95–110)
CO2 SERPL-SCNC: 22 MMOL/L (ref 23–29)
CREAT SERPL-MCNC: 0.9 MG/DL (ref 0.5–1.4)
DIFFERENTIAL METHOD: ABNORMAL
EOSINOPHIL # BLD AUTO: 0.4 K/UL (ref 0–0.5)
EOSINOPHIL NFR BLD: 3.7 % (ref 0–8)
ERYTHROCYTE [DISTWIDTH] IN BLOOD BY AUTOMATED COUNT: 16.9 % (ref 11.5–14.5)
EST. GFR  (AFRICAN AMERICAN): >60 ML/MIN/1.73 M^2
EST. GFR  (NON AFRICAN AMERICAN): >60 ML/MIN/1.73 M^2
GLUCOSE SERPL-MCNC: 90 MG/DL (ref 70–110)
HCT VFR BLD AUTO: 33.5 % (ref 40–54)
HGB BLD-MCNC: 11 G/DL (ref 14–18)
LYMPHOCYTES # BLD AUTO: 1.4 K/UL (ref 1–4.8)
LYMPHOCYTES NFR BLD: 13.1 % (ref 18–48)
MCH RBC QN AUTO: 25.6 PG (ref 27–31)
MCHC RBC AUTO-ENTMCNC: 32.8 G/DL (ref 32–36)
MCV RBC AUTO: 78 FL (ref 82–98)
MONOCYTES # BLD AUTO: 1.1 K/UL (ref 0.3–1)
MONOCYTES NFR BLD: 10.8 % (ref 4–15)
NEUTROPHILS # BLD AUTO: 7.7 K/UL (ref 1.8–7.7)
NEUTROPHILS NFR BLD: 72.3 % (ref 38–73)
PLATELET # BLD AUTO: 335 K/UL (ref 150–350)
PMV BLD AUTO: 12 FL (ref 9.2–12.9)
POCT GLUCOSE: 121 MG/DL (ref 70–110)
POCT GLUCOSE: 122 MG/DL (ref 70–110)
POCT GLUCOSE: 126 MG/DL (ref 70–110)
POCT GLUCOSE: 89 MG/DL (ref 70–110)
POTASSIUM SERPL-SCNC: 3.6 MMOL/L (ref 3.5–5.1)
RBC # BLD AUTO: 4.3 M/UL (ref 4.6–6.2)
SODIUM SERPL-SCNC: 140 MMOL/L (ref 136–145)
WBC # BLD AUTO: 10.6 K/UL (ref 3.9–12.7)

## 2019-08-26 PROCEDURE — 94761 N-INVAS EAR/PLS OXIMETRY MLT: CPT

## 2019-08-26 PROCEDURE — 99999 PR PBB SHADOW E&M-EST. PATIENT-LVL I: CPT | Mod: PBBFAC,,,

## 2019-08-26 PROCEDURE — 99999 PR PBB SHADOW E&M-EST. PATIENT-LVL I: ICD-10-PCS | Mod: PBBFAC,,,

## 2019-08-26 PROCEDURE — 85025 COMPLETE CBC W/AUTO DIFF WBC: CPT

## 2019-08-26 PROCEDURE — 99233 SBSQ HOSP IP/OBS HIGH 50: CPT | Mod: ,,, | Performed by: PHYSICIAN ASSISTANT

## 2019-08-26 PROCEDURE — 87040 BLOOD CULTURE FOR BACTERIA: CPT

## 2019-08-26 PROCEDURE — 99407 PR TOBACCO USE CESSATION INTENSIVE >10 MINUTES: ICD-10-PCS | Mod: S$GLB,,,

## 2019-08-26 PROCEDURE — 25000003 PHARM REV CODE 250: Performed by: PHYSICIAN ASSISTANT

## 2019-08-26 PROCEDURE — 99407 BEHAV CHNG SMOKING > 10 MIN: CPT | Mod: S$GLB,,,

## 2019-08-26 PROCEDURE — 63600175 PHARM REV CODE 636 W HCPCS: Performed by: PHYSICIAN ASSISTANT

## 2019-08-26 PROCEDURE — 63600175 PHARM REV CODE 636 W HCPCS: Performed by: HOSPITALIST

## 2019-08-26 PROCEDURE — 99233 PR SUBSEQUENT HOSPITAL CARE,LEVL III: ICD-10-PCS | Mod: ,,, | Performed by: PHYSICIAN ASSISTANT

## 2019-08-26 PROCEDURE — 80048 BASIC METABOLIC PNL TOTAL CA: CPT

## 2019-08-26 PROCEDURE — 36415 COLL VENOUS BLD VENIPUNCTURE: CPT

## 2019-08-26 PROCEDURE — 11000001 HC ACUTE MED/SURG PRIVATE ROOM

## 2019-08-26 RX ADMIN — METOPROLOL TARTRATE 25 MG: 25 TABLET, FILM COATED ORAL at 08:08

## 2019-08-26 RX ADMIN — FAMOTIDINE 20 MG: 20 TABLET, FILM COATED ORAL at 08:08

## 2019-08-26 RX ADMIN — DOCUSATE SODIUM 100 MG: 100 CAPSULE, LIQUID FILLED ORAL at 08:08

## 2019-08-26 RX ADMIN — BACLOFEN 10 MG: 10 TABLET ORAL at 08:08

## 2019-08-26 RX ADMIN — GEMFIBROZIL 600 MG: 600 TABLET, FILM COATED ORAL at 03:08

## 2019-08-26 RX ADMIN — PIPERACILLIN AND TAZOBACTAM 4.5 G: 4; .5 INJECTION, POWDER, LYOPHILIZED, FOR SOLUTION INTRAVENOUS; PARENTERAL at 09:08

## 2019-08-26 RX ADMIN — ENOXAPARIN SODIUM 40 MG: 100 INJECTION SUBCUTANEOUS at 08:08

## 2019-08-26 RX ADMIN — CEFTRIAXONE 1 G: 1 INJECTION, SOLUTION INTRAVENOUS at 08:08

## 2019-08-26 RX ADMIN — GABAPENTIN 300 MG: 300 CAPSULE ORAL at 08:08

## 2019-08-26 RX ADMIN — BACLOFEN 10 MG: 10 TABLET ORAL at 03:08

## 2019-08-26 RX ADMIN — GEMFIBROZIL 600 MG: 600 TABLET, FILM COATED ORAL at 06:08

## 2019-08-26 RX ADMIN — PIPERACILLIN AND TAZOBACTAM 4.5 G: 4; .5 INJECTION, POWDER, LYOPHILIZED, FOR SOLUTION INTRAVENOUS; PARENTERAL at 12:08

## 2019-08-26 RX ADMIN — PIPERACILLIN AND TAZOBACTAM 4.5 G: 4; .5 INJECTION, POWDER, LYOPHILIZED, FOR SOLUTION INTRAVENOUS; PARENTERAL at 06:08

## 2019-08-26 NOTE — ASSESSMENT & PLAN NOTE
Pressure injuries (ankle, buttocks)  H/O right BKA    Turn q2. Routine wound care. Consulted Wound Care RN.  Pt states he has weekly Wound Care at [facility] on Thursdays.  Continue home Baclofen and Gabapentin. Care Management Consult for assistance with wheelchair, appointments and transportation.

## 2019-08-26 NOTE — PROGRESS NOTES
Pharmacy New Medication Education    Patient and/or Caregiver ACCEPTED medication education.    Pharmacy has provided education on the name, indication, and possible side effects of the medication(s) prescribed, using teach-back method.     Learners of pharmacy medication education includes:  patient    Medication Indication Side Effects   Ceftriaxone,zosyn uti headache, rash and diarrhea            The following medications have also been discussed, during this admission.     Current Facility-Administered Medications   Medication Frequency    acetaminophen tablet 650 mg Q4H PRN    albuterol-ipratropium 2.5 mg-0.5 mg/3 mL nebulizer solution 3 mL Q4H PRN    baclofen tablet 10 mg TID    cadexomer iodine 0.9 % gel Daily PRN    cefTRIAXone (ROCEPHIN) 1 g in dextrose 5 % 50 mL IVPB Daily    dextrose 10% (D10W) Bolus PRN    dextrose 10% (D10W) Bolus PRN    docusate sodium capsule 100 mg BID    enoxaparin injection 40 mg Daily    famotidine tablet 20 mg BID    gabapentin capsule 300 mg BID    gemfibrozil tablet 600 mg BID AC    glucagon (human recombinant) injection 1 mg PRN    glucose chewable tablet 16 g PRN    glucose chewable tablet 24 g PRN    influenza (QUADRIVALENT PF) vaccine 0.5 mL vaccine x 1 dose    insulin aspart U-100 pen 0-5 Units QID (AC + HS) PRN    ketorolac injection 15 mg Q6H PRN    metoprolol tartrate (LOPRESSOR) tablet 25 mg BID    ondansetron injection 4 mg Q8H PRN    piperacillin-tazobactam 4.5 g in dextrose 5 % 100 mL IVPB (ready to mix system) Q8H    sodium chloride 0.9% flush 10 mL PRN          Thank you  Cecilio Mariscal, PharmD
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
## 2019-08-26 NOTE — ASSESSMENT & PLAN NOTE
Bacteremia due to gram negative rods   Sepsis secondary to UTI  Neurogenic bladder with suprapubic catheter   1 blood culture anaerobic bottle on 8/25: gram negative rods.  Ceftriaxone added to Zosyn to broaden coverage.  Urine culture pending.  TMax 101.7.  Lactic acid 5.4 > 1.5. Pt alert and interactive today. Infectious Disease consulted for this patient with recurrent catheter-associated, MDR UTIs.  PRN pain medication and anti-pyretic.  - WBC improving 16->10  - fevers down trending max 100.9

## 2019-08-26 NOTE — SUBJECTIVE & OBJECTIVE
Interval History: No complaints at this time. Fevers resolving. Patient updated on plan of care.     Review of Systems   Constitutional: Negative for chills and fever.   Respiratory: Negative for cough and shortness of breath.    Cardiovascular: Negative for chest pain and palpitations.   Gastrointestinal: Negative for abdominal pain, nausea and vomiting.   Neurological: Positive for weakness. Negative for headaches.        Paraplegia from waist down per patient.    Psychiatric/Behavioral: Negative for confusion.     Objective:     Vital Signs (Most Recent):  Temp: 99.4 °F (37.4 °C) (08/26/19 0733)  Pulse: 92 (08/26/19 0827)  Resp: 16 (08/26/19 0827)  BP: 121/77 (08/26/19 0733)  SpO2: 100 % (08/26/19 0827) Vital Signs (24h Range):  Temp:  [97.7 °F (36.5 °C)-100.9 °F (38.3 °C)] 99.4 °F (37.4 °C)  Pulse:  [] 92  Resp:  [16-20] 16  SpO2:  [99 %-100 %] 100 %  BP: (111-128)/(57-77) 121/77     Weight: 102.5 kg (225 lb 15.5 oz)  Body mass index is 31.52 kg/m².    Intake/Output Summary (Last 24 hours) at 8/26/2019 0927  Last data filed at 8/26/2019 0620  Gross per 24 hour   Intake 1350 ml   Output 3600 ml   Net -2250 ml      Physical Exam   Constitutional: No distress.   Cardiovascular: Normal rate and regular rhythm.   Pulmonary/Chest: Effort normal and breath sounds normal. No respiratory distress.   Abdominal: Soft. Bowel sounds are normal. He exhibits no distension. There is no tenderness.   Skin: Skin is warm and dry.   Suprapubic catheter site is clean and dry at present.    Psychiatric: He has a normal mood and affect. His behavior is normal. Judgment and thought content normal.   Nursing note and vitals reviewed.      Significant Labs:   BMP:   Recent Labs   Lab 08/26/19  0551   GLU 90      K 3.6      CO2 22*   BUN 8   CREATININE 0.9   CALCIUM 9.4     CBC:   Recent Labs   Lab 08/24/19  1303 08/25/19  0532 08/26/19  0551   WBC 16.94* 16.62* 10.60   HGB 14.5 12.1* 11.0*   HCT 43.7 36.8* 33.5*   PLT  403* 305 335     Lactic acid:  5.4 >> 1.5   POCT Glucose:   Recent Labs   Lab 08/25/19  1807 08/25/19  2137 08/26/19  0614   POCTGLUCOSE 82 77 89     Microbiology Results (last 7 days)     Procedure Component Value Units Date/Time    Blood culture x two cultures. Draw prior to antibiotics. [[id number]]  (Abnormal) Collected:  08/24/19 1305    Order Status:  Completed Specimen:  Blood from Peripheral, Forearm, Left Updated:  08/26/19 0831     Blood Culture, Routine Gram stain carson bottle: Gram negative rods       Results called to and read back by:Bob Claire RN 08/25/2019  06:27      GRAM NEGATIVE MALCOLM  Identification and susceptibility pending      Narrative:       Aerobic and anaerobic    Urine culture [[id number]] Collected:  08/24/19 2140    Order Status:  Completed Specimen:  Urine, Catheterized Updated:  08/26/19 0736     Urine Culture, Routine Multiple organisms isolated. None in predominance.  Repeat if      clinically necessary.    Narrative:       Indicated criteria for high risk culture:->Other  Other (specify):->post-catheter exchange    Blood culture [[id number]] Collected:  08/26/19 0027    Order Status:  Sent Specimen:  Blood Updated:  08/26/19 0424    Narrative:       Collection has been rescheduled by AC2 at 08/25/2019 20:24 Reason:   Unable to collect. nurse notified  Collection has been rescheduled by AC2 at 08/25/2019 20:24 Reason:   Unable to collect. nurse notified    Blood culture [[id number]] Collected:  08/26/19 0027    Order Status:  Sent Specimen:  Blood Updated:  08/26/19 0424    Narrative:       Collection has been rescheduled by AC2 at 08/25/2019 20:24 Reason:   Unable to collect. nurse notified  Collection has been rescheduled by AC2 at 08/25/2019 20:24 Reason:   Unable to collect. nurse notified    Urine culture [[id number]]  (Abnormal) Collected:  08/24/19 1221    Order Status:  Completed Specimen:  Urine Updated:  08/26/19 0116     Urine Culture, Routine GRAM NEGATIVE MALCOLM  >100,000  cfu/ml  Identification and susceptibility pending      Narrative:       Preferred Collection Type->Urine, Clean Catch    Blood culture x two cultures. Draw prior to antibiotics. [[id number]] Collected:  08/24/19 1247    Order Status:  Completed Specimen:  Blood from Peripheral, Hand, Left Updated:  08/25/19 2012     Blood Culture, Routine No Growth to date      No Growth to date    Narrative:       Aerobic and anaerobic    Influenza A & B by Molecular [[id number]] Collected:  08/24/19 1247    Order Status:  Completed Specimen:  Nasopharyngeal Swab Updated:  08/24/19 1324     Influenza A, Molecular Negative     Influenza B, Molecular Negative     Flu A & B Source Nasal swab          Significant Imaging: no new

## 2019-08-26 NOTE — PROGRESS NOTES
Individual Follow-Up Form    8/26/2019    Quit Date: To be determined    Clinical Status of Patient: Inpatient    Length of Service: 30 minutes    Comments: Smoking cessation education provided.  Pt denies nicotine withdrawal symptoms and states that he does not wish to use the nicotine patch during this hospital admission.  Pt is currently enrolled in the [employer].    Diagnosis: F17.210    Next Visit:    Ambulatory referral to Smoking Cessation program following hospital discharge.

## 2019-08-26 NOTE — PLAN OF CARE
Problem: Adult Inpatient Plan of Care  Goal: Plan of Care Review  Outcome: Ongoing (interventions implemented as appropriate)  Plan of care reviewed with patient.Pt AA0x4. Verbalizes to staff understanding. NSR on monitor with 0 red alarms noted.Dressings to wounds clean dry and intact. Pt continues on IV ABT with 0 adverse side effects noted. Denies pain when asked. 0 falls today. Suprapubic catheter intact with noted urine in bag at this time. Denies painful urination.  No acute distress observed at this time. Side rails x2, bed in low position, call bell within reach. Bed alarm in place for patient safety. Will relay to oncoming nurse to monitor for changes in condition.

## 2019-08-26 NOTE — PLAN OF CARE
Problem: Adult Inpatient Plan of Care  Goal: Plan of Care Review  Outcome: Ongoing (interventions implemented as appropriate)  Patient AAOX4. Plan of care reviewed and patient verbalized understanding. Sinus rhythm on tele. 100% O2 sats on room air. No c/o pain. Bed in lowest position. Call light within reach. Fall precautions maintained. Will continue to monitor.

## 2019-08-26 NOTE — PLAN OF CARE
Problem: Adult Inpatient Plan of Care  Goal: Plan of Care Review  Patient is AAO x4. Patient is on tele, NSR 90s, no ectopy noted. Monitoring patient glucose ac/hs and covered per sliding scale. Repositioning offered every 2 hours. Patient is on Piperacillin IV for infection. Patient has Rocephin IV for UTI. Patient denies pain or discomfort. Patient has wounds on left foot. Buttocks, and abdomen, CD. Patient received PRN tylenol. Emotional support provided.  Patient bed alarm is on, bed in the lowest position, and call light within reach.

## 2019-08-27 PROBLEM — L89.324 PRESSURE INJURY OF LEFT ISCHIUM, STAGE 4: Status: ACTIVE | Noted: 2019-08-27

## 2019-08-27 LAB
BACTERIA UR CULT: ABNORMAL
BASOPHILS # BLD AUTO: 0.05 K/UL (ref 0–0.2)
BASOPHILS NFR BLD: 0.7 % (ref 0–1.9)
DIFFERENTIAL METHOD: ABNORMAL
EOSINOPHIL # BLD AUTO: 0.9 K/UL (ref 0–0.5)
EOSINOPHIL NFR BLD: 12.7 % (ref 0–8)
ERYTHROCYTE [DISTWIDTH] IN BLOOD BY AUTOMATED COUNT: 16.7 % (ref 11.5–14.5)
HCT VFR BLD AUTO: 34.5 % (ref 40–54)
HGB BLD-MCNC: 11.7 G/DL (ref 14–18)
LYMPHOCYTES # BLD AUTO: 1.3 K/UL (ref 1–4.8)
LYMPHOCYTES NFR BLD: 17.1 % (ref 18–48)
MCH RBC QN AUTO: 26.2 PG (ref 27–31)
MCHC RBC AUTO-ENTMCNC: 33.9 G/DL (ref 32–36)
MCV RBC AUTO: 77 FL (ref 82–98)
MONOCYTES # BLD AUTO: 0.7 K/UL (ref 0.3–1)
MONOCYTES NFR BLD: 9.2 % (ref 4–15)
NEUTROPHILS # BLD AUTO: 4.4 K/UL (ref 1.8–7.7)
NEUTROPHILS NFR BLD: 60.3 % (ref 38–73)
PLATELET # BLD AUTO: ABNORMAL K/UL (ref 150–350)
PLATELET BLD QL SMEAR: ABNORMAL
PMV BLD AUTO: 12.1 FL (ref 9.2–12.9)
POCT GLUCOSE: 106 MG/DL (ref 70–110)
POCT GLUCOSE: 117 MG/DL (ref 70–110)
POCT GLUCOSE: 120 MG/DL (ref 70–110)
POCT GLUCOSE: 173 MG/DL (ref 70–110)
RBC # BLD AUTO: 4.46 M/UL (ref 4.6–6.2)
WBC # BLD AUTO: 7.3 K/UL (ref 3.9–12.7)

## 2019-08-27 PROCEDURE — 11000001 HC ACUTE MED/SURG PRIVATE ROOM

## 2019-08-27 PROCEDURE — 85025 COMPLETE CBC W/AUTO DIFF WBC: CPT

## 2019-08-27 PROCEDURE — 94761 N-INVAS EAR/PLS OXIMETRY MLT: CPT

## 2019-08-27 PROCEDURE — 25000003 PHARM REV CODE 250: Performed by: PHYSICIAN ASSISTANT

## 2019-08-27 PROCEDURE — 63600175 PHARM REV CODE 636 W HCPCS: Performed by: HOSPITALIST

## 2019-08-27 PROCEDURE — 36415 COLL VENOUS BLD VENIPUNCTURE: CPT

## 2019-08-27 PROCEDURE — 63600175 PHARM REV CODE 636 W HCPCS: Performed by: PHYSICIAN ASSISTANT

## 2019-08-27 RX ADMIN — BACLOFEN 10 MG: 10 TABLET ORAL at 02:08

## 2019-08-27 RX ADMIN — CEFTRIAXONE 1 G: 1 INJECTION, SOLUTION INTRAVENOUS at 09:08

## 2019-08-27 RX ADMIN — PIPERACILLIN AND TAZOBACTAM 4.5 G: 4; .5 INJECTION, POWDER, LYOPHILIZED, FOR SOLUTION INTRAVENOUS; PARENTERAL at 12:08

## 2019-08-27 RX ADMIN — GEMFIBROZIL 600 MG: 600 TABLET, FILM COATED ORAL at 04:08

## 2019-08-27 RX ADMIN — BACLOFEN 10 MG: 10 TABLET ORAL at 11:08

## 2019-08-27 RX ADMIN — PIPERACILLIN AND TAZOBACTAM 4.5 G: 4; .5 INJECTION, POWDER, LYOPHILIZED, FOR SOLUTION INTRAVENOUS; PARENTERAL at 04:08

## 2019-08-27 RX ADMIN — BACLOFEN 10 MG: 10 TABLET ORAL at 09:08

## 2019-08-27 RX ADMIN — METOPROLOL TARTRATE 25 MG: 25 TABLET, FILM COATED ORAL at 11:08

## 2019-08-27 RX ADMIN — FAMOTIDINE 20 MG: 20 TABLET, FILM COATED ORAL at 11:08

## 2019-08-27 RX ADMIN — DOCUSATE SODIUM 100 MG: 100 CAPSULE, LIQUID FILLED ORAL at 09:08

## 2019-08-27 RX ADMIN — ENOXAPARIN SODIUM 40 MG: 100 INJECTION SUBCUTANEOUS at 11:08

## 2019-08-27 RX ADMIN — GEMFIBROZIL 600 MG: 600 TABLET, FILM COATED ORAL at 06:08

## 2019-08-27 RX ADMIN — DOCUSATE SODIUM 100 MG: 100 CAPSULE, LIQUID FILLED ORAL at 11:08

## 2019-08-27 RX ADMIN — FAMOTIDINE 20 MG: 20 TABLET, FILM COATED ORAL at 09:08

## 2019-08-27 RX ADMIN — PIPERACILLIN AND TAZOBACTAM 4.5 G: 4; .5 INJECTION, POWDER, LYOPHILIZED, FOR SOLUTION INTRAVENOUS; PARENTERAL at 09:08

## 2019-08-27 RX ADMIN — METOPROLOL TARTRATE 25 MG: 25 TABLET, FILM COATED ORAL at 09:08

## 2019-08-27 RX ADMIN — GABAPENTIN 300 MG: 300 CAPSULE ORAL at 11:08

## 2019-08-27 RX ADMIN — GABAPENTIN 300 MG: 300 CAPSULE ORAL at 09:08

## 2019-08-27 NOTE — ASSESSMENT & PLAN NOTE
Sinus tachycardia (resolved)    -143. HR stable. Continue metoprolol tartrate 25 mg BID with HOLD parameters.

## 2019-08-27 NOTE — PLAN OF CARE
Problem: Adult Inpatient Plan of Care  Goal: Plan of Care Review  Outcome: Ongoing (interventions implemented as appropriate)  Patient resting comfortably on RA with no distress noted. Will continue to monitor.

## 2019-08-27 NOTE — ASSESSMENT & PLAN NOTE
Bacteremia due to gram negative rods   Sepsis secondary to UTI  Neurogenic bladder with suprapubic catheter     -8/24 Urine cx and blood cx positive for serratia marcescens (MDR). 8/26 blood cx NGTD.  -Clinically improving, afebrile, leukocytosis resolved  -Continue Ceftriaxone and Zosyn IV  -Appreciate ID assistance  -Awaiting final cx sensitivities

## 2019-08-27 NOTE — SUBJECTIVE & OBJECTIVE
Interval History: Patient stable  - no new issues today  - awaiting culture results     Review of Systems     Constitutional: Positive for activity change, fatigue and fever.   HENT: Negative.    Eyes: Negative.    Respiratory: Negative.    Cardiovascular: Negative.    Gastrointestinal: Negative.    Endocrine: Negative.    Genitourinary:        Foul smell noted as per HPI   Musculoskeletal: Negative.    Skin: Negative.    Neurological: Negative.    Hematological: Negative.    Psychiatric/Behavioral: Negative.        Objective:     Vital Signs (Most Recent):  Temp: 97.8 °F (36.6 °C) (08/26/19 2331)  Pulse: 71 (08/27/19 0006)  Resp: 19 (08/26/19 2331)  BP: 121/76 (08/26/19 2331)  SpO2: 99 % (08/27/19 0009) Vital Signs (24h Range):  Temp:  [97.7 °F (36.5 °C)-99.4 °F (37.4 °C)] 97.8 °F (36.6 °C)  Pulse:  [] 71  Resp:  [16-19] 19  SpO2:  [99 %-100 %] 99 %  BP: (107-142)/(67-89) 121/76     Weight: 98.3 kg (216 lb 11.4 oz)  Body mass index is 30.23 kg/m².    Estimated Creatinine Clearance: 129.1 mL/min (based on SCr of 0.9 mg/dL).    Physical Exam    Physical Exam   Constitutional: He is oriented to person, place, and time. He appears well-developed and well-nourished.   HENT:   Head: Normocephalic and atraumatic.   Eyes: EOM are normal.   Neck: Neck supple.   Cardiovascular: Normal rate and regular rhythm.   Pulmonary/Chest: Effort normal and breath sounds normal.   Abdominal: Bowel sounds are normal.   Neurological: He is alert and oriented to person, place, and time.   T9 para    Skin: Skin is warm.   Left leg with scaly skin, IV sites OK      Significant Labs: All pertinent labs within the past 24 hours have been reviewed.    Significant Imaging: I have reviewed all pertinent imaging results/findings within the past 24 hours.

## 2019-08-27 NOTE — ASSESSMENT & PLAN NOTE
Patient with recent history of possible UTIs with serratia and PsA and the most recent serratia was very MDR.  At this point he is bacteremic presumably from the urine but the ID is not yet known.  Patient appears very stable.     Plan:   1. Change catheter which has been done already   2. Keep on vanc and zosyn for now - if he clinically fails would empirically add amikacin  3. Await for results of urine and blood cultures  - no update yet   4.  ID will order repeat blood cultures  - done     Hope that micro data is out in AM so that abx can be trimmed

## 2019-08-27 NOTE — PLAN OF CARE
Problem: Adult Inpatient Plan of Care  Goal: Plan of Care Review  Outcome: Ongoing (interventions implemented as appropriate)  Plan of care reviewed with patient. AA0x4. In bed today reposition off bottom several times today. Dressing in place to all wounds with 0 drainage noted. Continues on IV ABT with 0 adverse side effects noted. Afebrile today. Denies discomfort at this time. Verbalizes to staff understanding. NSR on monitor with 0 red alarms noted.  No acute distress observed at this time. Side rails x2, bed in low position, call bell within reach. Bed alarm in place for patient safety. Will relay to oncoming nurse to monitor for changes in condition.

## 2019-08-27 NOTE — SUBJECTIVE & OBJECTIVE
Interval History: Pt examined at bedside, no family present. No acute complaints or overnight events. Afebrile. Updated that we are awaiting final cx sensitivities.    Review of Systems   Constitutional: Negative for chills and fever.   Respiratory: Negative for cough and shortness of breath.    Cardiovascular: Negative for chest pain and palpitations.   Gastrointestinal: Negative for abdominal pain, nausea and vomiting.   Neurological: Positive for weakness. Negative for headaches.        Paraplegia from waist down per patient.    Psychiatric/Behavioral: Negative for confusion. The patient is not nervous/anxious.      Objective:     Vital Signs (Most Recent):  Temp: 97.2 °F (36.2 °C) (08/27/19 0747)  Pulse: 68 (08/27/19 0747)  Resp: 19 (08/27/19 0747)  BP: 114/70 (08/27/19 0747)  SpO2: 99 % (08/27/19 0402) Vital Signs (24h Range):  Temp:  [96.3 °F (35.7 °C)-98.6 °F (37 °C)] 97.2 °F (36.2 °C)  Pulse:  [60-84] 68  Resp:  [16-20] 19  SpO2:  [99 %-100 %] 99 %  BP: (107-142)/(69-89) 114/70     Weight: 98.3 kg (216 lb 11.4 oz)  Body mass index is 30.23 kg/m².    Intake/Output Summary (Last 24 hours) at 8/27/2019 1112  Last data filed at 8/27/2019 0700  Gross per 24 hour   Intake 100 ml   Output 5075 ml   Net -4975 ml      Physical Exam   Constitutional: He is oriented to person, place, and time. No distress.   Eyes: Conjunctivae are normal.   Neck: Neck supple.   Cardiovascular: Normal rate and regular rhythm.   Pulmonary/Chest: Effort normal and breath sounds normal. No respiratory distress.   Abdominal: Soft. Bowel sounds are normal. He exhibits no distension. There is no tenderness.   Musculoskeletal: He exhibits no edema.   Neurological: He is alert and oriented to person, place, and time.   Skin: Skin is warm and dry. Capillary refill takes less than 2 seconds.   Psychiatric: He has a normal mood and affect. His behavior is normal. Judgment and thought content normal.   Nursing note and vitals  reviewed.      Significant Labs:   Blood Culture:   Recent Labs   Lab 08/26/19  0027   LABBLOO No Growth to date  No Growth to date  No Growth to date  No Growth to date     CBC:   Recent Labs   Lab 08/26/19  0551 08/27/19  0935   WBC 10.60 7.30   HGB 11.0* 11.7*   HCT 33.5* 34.5*    SEE COMMENT     Urine Culture: No results for input(s): LABURIN in the last 48 hours.  All pertinent labs within the past 24 hours have been reviewed.    Significant Imaging: I have reviewed all pertinent imaging results/findings within the past 24 hours.

## 2019-08-27 NOTE — ASSESSMENT & PLAN NOTE
Pressure injuries (ankle, buttocks)  H/O right BKA    Turn q2. Routine wound care. Consulted Wound Care RN.  Pt states he has weekly Wound Care at [facility] on Thursdays.  Continue home Baclofen and Gabapentin. Care Management Consult for assistance with wheelchair, appointments and transportation.

## 2019-08-27 NOTE — CONSULTS
Wound consult for multiple wounds    Pt with multiple wound sites that have recently healed except for the left Ischial tuberosity Stage 4 pressure injury, and a Stage 2 left posterior upper thigh wound. All wounds present on admission.    Spoke with Dr. Cardoso, notified of wound assessment, verbal orders given for wound care.  Both wounds, Stage 4 Ischial tub, and a Stage 2 left posterior upper thigh both cleaned with wound cleanser. Xeroform applied to wound beds and covered with dry clean dressing as directed, pt tolerated well.     Stage 4 Ischial tuberosity wound with no drainage noted on dressing, yahaira wound indurated no redness, measures  2.5 cm x 1.5 cm x 0.5 cm.     Stage 2 left posterior upper thigh red  Moist wound bed, no slough, no odor, no noted drainage. Yahaira wound skin intact, no redness. Measures 3 cm x 1 cm x 0.0 cm.    Pt goes routinely to wound care center near his home. Recommend continuing to see a wound care with clinic until both wounds have healed.

## 2019-08-27 NOTE — PLAN OF CARE
Problem: Adult Inpatient Plan of Care  Goal: Plan of Care Review  Patient's VS are stable, pt on tele > NSR, PIV is patent, pt had no complaints of pain.

## 2019-08-27 NOTE — PROGRESS NOTES
Pharmacy New Medication Education    Patient and/or Caregiver ACCEPTED medication education.    Pharmacy has provided education on the name, indication, and possible side effects of the medication(s) prescribed, using teach-back method.     Learners of pharmacy medication education includes:  patient    Medication Indication Side Effects   Ceftriaxone,zosyn UTI rash and diarrhea            The following medications have also been discussed, during this admission.     Current Facility-Administered Medications   Medication Frequency    acetaminophen tablet 650 mg Q4H PRN    albuterol-ipratropium 2.5 mg-0.5 mg/3 mL nebulizer solution 3 mL Q4H PRN    baclofen tablet 10 mg TID    cadexomer iodine 0.9 % gel Daily PRN    cefTRIAXone (ROCEPHIN) 1 g in dextrose 5 % 50 mL IVPB Daily    dextrose 10% (D10W) Bolus PRN    dextrose 10% (D10W) Bolus PRN    docusate sodium capsule 100 mg BID    enoxaparin injection 40 mg Daily    famotidine tablet 20 mg BID    gabapentin capsule 300 mg BID    gemfibrozil tablet 600 mg BID AC    glucagon (human recombinant) injection 1 mg PRN    glucose chewable tablet 16 g PRN    glucose chewable tablet 24 g PRN    influenza (QUADRIVALENT PF) vaccine 0.5 mL vaccine x 1 dose    insulin aspart U-100 pen 0-5 Units QID (AC + HS) PRN    ketorolac injection 15 mg Q6H PRN    metoprolol tartrate (LOPRESSOR) tablet 25 mg BID    ondansetron injection 4 mg Q8H PRN    piperacillin-tazobactam 4.5 g in dextrose 5 % 100 mL IVPB (ready to mix system) Q8H    sodium chloride 0.9% flush 10 mL PRN          Thank you  Cecilio Mariscal, PharmD
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
## 2019-08-27 NOTE — ASSESSMENT & PLAN NOTE
Pt reports that he is still smoking 1/2 ppd. States no need for Nicotine Patch, counseled on cessation.
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
## 2019-08-28 VITALS
RESPIRATION RATE: 19 BRPM | WEIGHT: 214.94 LBS | DIASTOLIC BLOOD PRESSURE: 91 MMHG | HEIGHT: 71 IN | BODY MASS INDEX: 30.09 KG/M2 | OXYGEN SATURATION: 100 % | SYSTOLIC BLOOD PRESSURE: 152 MMHG | HEART RATE: 78 BPM | TEMPERATURE: 98 F

## 2019-08-28 PROBLEM — A41.9 SEPSIS SECONDARY TO UTI: Status: RESOLVED | Noted: 2018-12-30 | Resolved: 2019-08-28

## 2019-08-28 PROBLEM — A41.9 SEPSIS DUE TO URINARY TRACT INFECTION: Status: RESOLVED | Noted: 2019-08-24 | Resolved: 2019-08-28

## 2019-08-28 PROBLEM — N39.0 SEPSIS DUE TO URINARY TRACT INFECTION: Status: RESOLVED | Noted: 2019-08-24 | Resolved: 2019-08-28

## 2019-08-28 PROBLEM — N39.0 SEPSIS SECONDARY TO UTI: Status: RESOLVED | Noted: 2018-12-30 | Resolved: 2019-08-28

## 2019-08-28 LAB
ANION GAP SERPL CALC-SCNC: 9 MMOL/L (ref 8–16)
BACTERIA BLD CULT: ABNORMAL
BASOPHILS # BLD AUTO: 0.08 K/UL (ref 0–0.2)
BASOPHILS NFR BLD: 1.1 % (ref 0–1.9)
BUN SERPL-MCNC: 7 MG/DL (ref 6–20)
CALCIUM SERPL-MCNC: 9.7 MG/DL (ref 8.7–10.5)
CHLORIDE SERPL-SCNC: 112 MMOL/L (ref 95–110)
CO2 SERPL-SCNC: 22 MMOL/L (ref 23–29)
CREAT SERPL-MCNC: 0.8 MG/DL (ref 0.5–1.4)
DIFFERENTIAL METHOD: ABNORMAL
EOSINOPHIL # BLD AUTO: 0.7 K/UL (ref 0–0.5)
EOSINOPHIL NFR BLD: 9.1 % (ref 0–8)
ERYTHROCYTE [DISTWIDTH] IN BLOOD BY AUTOMATED COUNT: 16.7 % (ref 11.5–14.5)
EST. GFR  (AFRICAN AMERICAN): >60 ML/MIN/1.73 M^2
EST. GFR  (NON AFRICAN AMERICAN): >60 ML/MIN/1.73 M^2
GLUCOSE SERPL-MCNC: 105 MG/DL (ref 70–110)
HCT VFR BLD AUTO: 34.4 % (ref 40–54)
HGB BLD-MCNC: 11.6 G/DL (ref 14–18)
LYMPHOCYTES # BLD AUTO: 2.6 K/UL (ref 1–4.8)
LYMPHOCYTES NFR BLD: 34.1 % (ref 18–48)
MCH RBC QN AUTO: 26.1 PG (ref 27–31)
MCHC RBC AUTO-ENTMCNC: 33.7 G/DL (ref 32–36)
MCV RBC AUTO: 78 FL (ref 82–98)
MONOCYTES # BLD AUTO: 1.1 K/UL (ref 0.3–1)
MONOCYTES NFR BLD: 14.8 % (ref 4–15)
NEUTROPHILS # BLD AUTO: 3 K/UL (ref 1.8–7.7)
NEUTROPHILS NFR BLD: 40.9 % (ref 38–73)
PLATELET # BLD AUTO: 372 K/UL (ref 150–350)
PMV BLD AUTO: 11.1 FL (ref 9.2–12.9)
POCT GLUCOSE: 103 MG/DL (ref 70–110)
POCT GLUCOSE: 108 MG/DL (ref 70–110)
POCT GLUCOSE: 181 MG/DL (ref 70–110)
POTASSIUM SERPL-SCNC: 4 MMOL/L (ref 3.5–5.1)
RBC # BLD AUTO: 4.44 M/UL (ref 4.6–6.2)
SODIUM SERPL-SCNC: 143 MMOL/L (ref 136–145)
WBC # BLD AUTO: 7.57 K/UL (ref 3.9–12.7)

## 2019-08-28 PROCEDURE — 90670 PCV13 VACCINE IM: CPT | Performed by: FAMILY MEDICINE

## 2019-08-28 PROCEDURE — 90686 IIV4 VACC NO PRSV 0.5 ML IM: CPT | Performed by: HOSPITALIST

## 2019-08-28 PROCEDURE — A4216 STERILE WATER/SALINE, 10 ML: HCPCS | Performed by: PHYSICIAN ASSISTANT

## 2019-08-28 PROCEDURE — 80048 BASIC METABOLIC PNL TOTAL CA: CPT

## 2019-08-28 PROCEDURE — 63600175 PHARM REV CODE 636 W HCPCS: Performed by: FAMILY MEDICINE

## 2019-08-28 PROCEDURE — 85025 COMPLETE CBC W/AUTO DIFF WBC: CPT

## 2019-08-28 PROCEDURE — 90471 IMMUNIZATION ADMIN: CPT | Performed by: FAMILY MEDICINE

## 2019-08-28 PROCEDURE — 94760 N-INVAS EAR/PLS OXIMETRY 1: CPT

## 2019-08-28 PROCEDURE — G0009 ADMIN PNEUMOCOCCAL VACCINE: HCPCS | Performed by: FAMILY MEDICINE

## 2019-08-28 PROCEDURE — 25000003 PHARM REV CODE 250: Performed by: PHYSICIAN ASSISTANT

## 2019-08-28 PROCEDURE — 63600175 PHARM REV CODE 636 W HCPCS: Performed by: HOSPITALIST

## 2019-08-28 PROCEDURE — 63600175 PHARM REV CODE 636 W HCPCS: Performed by: PHYSICIAN ASSISTANT

## 2019-08-28 PROCEDURE — 36415 COLL VENOUS BLD VENIPUNCTURE: CPT

## 2019-08-28 PROCEDURE — G0008 ADMIN INFLUENZA VIRUS VAC: HCPCS | Performed by: HOSPITALIST

## 2019-08-28 PROCEDURE — 90472 IMMUNIZATION ADMIN EACH ADD: CPT | Performed by: HOSPITALIST

## 2019-08-28 RX ORDER — SODIUM CHLORIDE 0.9 % (FLUSH) 0.9 %
10 SYRINGE (ML) INJECTION
Status: DISCONTINUED | OUTPATIENT
Start: 2019-08-28 | End: 2019-08-29 | Stop reason: HOSPADM

## 2019-08-28 RX ORDER — SODIUM CHLORIDE 0.9 % (FLUSH) 0.9 %
10 SYRINGE (ML) INJECTION EVERY 6 HOURS
Status: DISCONTINUED | OUTPATIENT
Start: 2019-08-28 | End: 2019-08-29 | Stop reason: HOSPADM

## 2019-08-28 RX ADMIN — FAMOTIDINE 20 MG: 20 TABLET, FILM COATED ORAL at 09:08

## 2019-08-28 RX ADMIN — DOCUSATE SODIUM 100 MG: 100 CAPSULE, LIQUID FILLED ORAL at 09:08

## 2019-08-28 RX ADMIN — GEMFIBROZIL 600 MG: 600 TABLET, FILM COATED ORAL at 04:08

## 2019-08-28 RX ADMIN — CEFTRIAXONE 1 G: 1 INJECTION, SOLUTION INTRAVENOUS at 09:08

## 2019-08-28 RX ADMIN — INFLUENZA VIRUS VACCINE 0.5 ML: 15; 15; 15; 15 SUSPENSION INTRAMUSCULAR at 07:08

## 2019-08-28 RX ADMIN — PIPERACILLIN AND TAZOBACTAM 4.5 G: 4; .5 INJECTION, POWDER, LYOPHILIZED, FOR SOLUTION INTRAVENOUS; PARENTERAL at 12:08

## 2019-08-28 RX ADMIN — BACLOFEN 10 MG: 10 TABLET ORAL at 09:08

## 2019-08-28 RX ADMIN — METOPROLOL TARTRATE 25 MG: 25 TABLET, FILM COATED ORAL at 09:08

## 2019-08-28 RX ADMIN — GEMFIBROZIL 600 MG: 600 TABLET, FILM COATED ORAL at 05:08

## 2019-08-28 RX ADMIN — BACLOFEN 10 MG: 10 TABLET ORAL at 04:08

## 2019-08-28 RX ADMIN — GABAPENTIN 300 MG: 300 CAPSULE ORAL at 09:08

## 2019-08-28 RX ADMIN — Medication 10 ML: at 07:08

## 2019-08-28 RX ADMIN — PIPERACILLIN AND TAZOBACTAM 4.5 G: 4; .5 INJECTION, POWDER, LYOPHILIZED, FOR SOLUTION INTRAVENOUS; PARENTERAL at 09:08

## 2019-08-28 RX ADMIN — PNEUMOCOCCAL 13-VALENT CONJUGATE VACCINE 0.5 ML: 2.2; 2.2; 2.2; 2.2; 2.2; 4.4; 2.2; 2.2; 2.2; 2.2; 2.2; 2.2; 2.2 INJECTION, SUSPENSION INTRAMUSCULAR at 06:08
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
## 2019-08-28 NOTE — PLAN OF CARE
D/C rounds complete. All questions answered.  Nurse to discuss d/c medications.  Discussed need to keep f/u appts, adherence to medication regimen for health maintenance, verbalized understanding.    Pt has PICC line in place, awaiting final confirmation via CXR.  Pt caretaker will be waiting at home for pt.  Eddy in for teaching and will coordinate with [facility] dose time tomorrow morning.  [facility] will reinforce with caretaker.  [facility] transport arranged for stretcher for 6pm.  Medication will be delivered to room within 15min per Eddy.    Urology office will be contacting pt with appt sooner than 9/25 if needed.         08/28/19 1615   Final Note   Assessment Type Final Discharge Note   Anticipated Discharge Disposition Home-Health   Hospital Follow Up  Appt(s) scheduled? Yes   Discharge plans and expectations educations in teach back method with documentation complete? Yes       Future Appointments   Date Time Provider Department Center   9/18/2019  1:00 PM INFECTIOUS DISEASE, [facility] INFECT [place] [facility]   9/25/2019  2:30 PM Eddy Hummel MD [facility] [place]

## 2019-08-28 NOTE — PROCEDURES
"Hermann Aguilar is a 43 y.o. male patient.    Temp: 98.2 °F (36.8 °C) (08/28/19 1131)  Pulse: 68 (08/28/19 1200)  Resp: 19 (08/28/19 1131)  BP: 110/61 (08/28/19 1131)  SpO2: 100 % (08/28/19 1131)  Weight: 97.5 kg (214 lb 15.2 oz) (08/28/19 0530)  Height: 5' 11" (180.3 cm) (08/26/19 0049)    PICC  Date/Time: 8/28/2019 3:40 PM  Performed by: Bora Roy RN  Consent Done: Yes  Time out: Immediately prior to procedure a time out was called to verify the correct patient, procedure, equipment, support staff and site/side marked as required  Indications: med administration  Anesthesia: local infiltration  Local anesthetic: lidocaine 1% without epinephrine  Anesthetic Total (mL): 1  Preparation: skin prepped with ChloraPrep  Skin prep agent dried: skin prep agent completely dried prior to procedure  Sterile barriers: all five maximum sterile barriers used - cap, mask, sterile gown, sterile gloves, and large sterile sheet  Hand hygiene: hand hygiene performed prior to central venous catheter insertion  Location details: right brachial  Catheter type: double lumen  Catheter size: 5 Fr  Catheter Length: 37cm    Ultrasound guidance: yes  Vascular Doppler: not done  Needle advanced into vessel with real time Ultrasound guidance.  Guidewire confirmed in vessel.  Sterile sheath used.  Number of attempts: 1  Post-procedure: blood return through all ports, chlorhexidine patch and sterile dressing applied  Estimated blood loss (mL): 0            Bora Roy  8/28/2019    "

## 2019-08-28 NOTE — ASSESSMENT & PLAN NOTE
Patient with recent history of possible UTIs with serratia and PsA and the most recent serratia was very MDR.  At this point he is bacteremic presumably from the urine but the ID is not yet known.  Patient appears very stable.     Blood and urine with serratia but sensitivities on blood isolate not known yet - should be out tomorrow     Plan:   1. Keep on ceftriaxone and zosyn for now - if both sensitive would continue with ceftriaxone for 2 weeks IV  2. Need to also be sure the 8/26/19 are stay negative but hopefully can DC soon

## 2019-08-28 NOTE — PLAN OF CARE
Problem: Adult Inpatient Plan of Care  Goal: Plan of Care Review  Pt remains alert and rational. Moves upper body in bed. PICC line inserted and teaching done by team. Home antibiotics commenced by [facility]. Pt is for discharge home.Transport to  at 6pm.

## 2019-08-28 NOTE — PLAN OF CARE
Plan for d/c home today with IV antibiotics.  House supervisor Madeline notified of PICC orders.  Pt's cousin Brandy [phone] has agreed to be present for teaching for home assistance.  Eddy with [employer] notified and will contact Brandy to coordinate time once processing complete.      Robyn Mckeon RN    [phone]

## 2019-08-28 NOTE — PLAN OF CARE
VN requested to cue into pts room. Pt to receive PICC line prior to going home. PICC line nurse in room. Pt resting in bed. VN to assist with PICC line time out. Pt aware and agreeable. NAD noted. Allowed time for questions. Will cont to be available and intervene as needed.        08/28/19 1505   Type of Frequent Check   Type Patient Rounds;Other (see comments)  (VN rounds)   Safety/Activity   Patient Rounds visualized patient;call light in patient/parent reach   Safety Promotion/Fall Prevention assistive device/personal item within reach;instructed to call staff for mobility   Positioning   Body Position supine   Pain/Comfort/Sleep   Preferred Pain Scale word (verbal rating pain scale)   Pain Rating: Rest 0 - no pain   Sleep/Rest/Relaxation awake;no problem identified   Assessments (Pre/Post)   Level of Consciousness (AVPU) alert

## 2019-08-28 NOTE — SUBJECTIVE & OBJECTIVE
Interval History: Doing well - no new issues     Review of Systems   Constitutional: He is oriented to person, place, and time. He appears well-developed and well-nourished.   HENT:   Head: Normocephalic and atraumatic.   Eyes: EOM are normal.   Neck: Neck supple.   Cardiovascular: Normal rate and regular rhythm.   Pulmonary/Chest: Effort normal and breath sounds normal.   Abdominal: Bowel sounds are normal.   Neurological: He is alert and oriented to person, place, and time.   T9 para    Skin: Skin is warm.   Left leg with scaly skin, IV sites OK      Constitutional: Positive for activity change, fatigue and fever.   HENT: Negative.    Eyes: Negative.    Respiratory: Negative.    Cardiovascular: Negative.    Gastrointestinal: Negative.    Endocrine: Negative.    Genitourinary:        Foul smell noted as per HPI   Musculoskeletal: Negative.    Skin: Negative.    Objective:     Vital Signs (Most Recent):  Temp: 96.2 °F (35.7 °C) (08/27/19 2057)  Pulse: 79 (08/27/19 2057)  Resp: 19 (08/27/19 2057)  BP: (!) 147/88 (08/27/19 2057)  SpO2: 99 % (08/27/19 1957) Vital Signs (24h Range):  Temp:  [96.2 °F (35.7 °C)-97.8 °F (36.6 °C)] 96.2 °F (35.7 °C)  Pulse:  [60-81] 79  Resp:  [14-20] 19  SpO2:  [99 %] 99 %  BP: (114-152)/(70-88) 147/88     Weight: 98.3 kg (216 lb 11.4 oz)  Body mass index is 30.23 kg/m².    Estimated Creatinine Clearance: 129.1 mL/min (based on SCr of 0.9 mg/dL).    Physical Exam       Significant Labs: All pertinent labs within the past 24 hours have been reviewed.    Significant Imaging: I have reviewed all pertinent imaging results/findings within the past 24 hours.

## 2019-08-28 NOTE — PLAN OF CARE
Problem: Adult Inpatient Plan of Care  Goal: Plan of Care Review  Patient's VS are stable, on tele > SR, right PIV was removed to due leaking, ED placed a 20g to left FA. Left foot wound and sacral/buttocks wounds were cleaned and redressed by wound care 8/27/19 in the morning. Wound dressings are clean and dry. Pt did have complaints of feeling sad later on in the night wants to get in touch with PCP to discuss possible anitdepressant medications or therapist. Currently monitoring.

## 2019-08-28 NOTE — PLAN OF CARE
[facility] HOME HEALTH ORDERS  FACE TO FACE ENCOUNTER    Patient Name: Hermann Aguilar  YOB: 1975    PCP: Ramu Breen MD   PCP Address: [address] / [place] [address]  PCP Phone Number: [phone]  PCP Fax: [phone]    Encounter Date: 08/28/2019    Admit to Home Health    Diagnoses:  Active Hospital Problems    Diagnosis  POA    *Urinary tract infection associated with cystostomy catheter [T83.510A, N39.0]  Yes    Pressure injury of left ischium, stage 4 [L89.324]  Yes    Bacteremia due to Gram-negative bacteria [R78.81]  Yes    Pressure injury of buttock, stage 3 [L89.303]  Yes    Neurogenic bladder [N31.9]  Yes     Chronic    Pressure injury of left ankle, stage 4 [L89.524]  Yes    Phantom limb syndrome [G54.7]  Yes     Chronic    Type 2 diabetes mellitus with hyperglycemia, with long-term current use of insulin [E11.65, Z79.4]  Not Applicable     Chronic    Cigarette smoker [F17.210]  Yes     Chronic    Chronic post-traumatic stress disorder [F43.12]  Yes     Chronic    Chronic suprapubic catheter [Z93.59]  Not Applicable     Chronic     Exchanged in [facility] on 8/24/2019 by ED MD.       Essential hypertension [I10]  Yes     Chronic    Hx of right BKA [Z89.511]  Not Applicable     Chronic    Iron deficiency anemia [D50.9]  Yes     Chronic    Paraplegia at T9 level [G82.20]  Yes     Chronic    Recurrent major depressive disorder, in remission [F33.40]  Yes     Chronic    Vitamin D deficiency [E55.9]  Yes     Chronic      Resolved Hospital Problems    Diagnosis Date Resolved POA    Tachycardia [R00.0] 08/28/2019 Yes    Sepsis due to urinary tract infection [A41.9, N39.0] 08/28/2019 Yes    Sepsis secondary to UTI [A41.9, N39.0] 08/28/2019 Yes       Future Appointments   Date Time Provider Department Center   9/25/2019  2:30 PM Eddy Hummel MD [facility] [place]     Follow-up Information     Ramu Breen MD.    Specialty:  Family  Medicine  Why:  Hospital follow up  Contact information:  [address]   [place] [address]  [phone]             Eddy Hummel MD.    Specialty:  Urology  Why:  Hospital follow up  Contact information:  [address]  [address]  [place] [address]  [phone]             [facility] - INFECTIOUS DISEASES.    Specialty:  Infectious Diseases  Why:  Hospital follow up  Contact information:  [address] [place] [id number] [phone]                   I have seen and examined this patient face to face today. My clinical findings that support the need for the home health skilled services and home bound status are the following:  Weakness/numbness causing balance and gait disturbance due to Weakness/Debility making it taxing to leave home.  Requiring assistive device to leave home due to unsteady gait caused by  Weakness/Debility.  Medical restrictions requiring assistance of another human to leave home due to  IV infusion Needs and Wound care needs.    Allergies:Review of patient's allergies indicates:  No Known Allergies    Diet: diabetic diet: 2000 calorie    Activities: activity as tolerated    Nursing:   SN to complete comprehensive assessment including routine vital signs. Instruct on disease process and s/s of complications to report to MD. Review/verify medication list sent home with the patient at time of discharge  and instruct patient/caregiver as needed. Frequency may be adjusted depending on start of care date.    Notify MD if SBP > 160 or < 90; DBP > 90 or < 50; HR > 120 or < 50; Temp > 101    LABS: Weekly CBC and BMP    CONSULTS:    Skilled Nurse to perform Infusion Therapy/Central Line Care    MISCELLANEOUS CARE:  Home Infusion Therapy:   SN to perform Infusion Therapy/Central Line Care.  Review Central Line Care & Central Line Flush with patient.    Administer (drug and dose): Piperacillin sodium/tazobactam 4.5G/100ML D5W IVPB. Inject 100 mLs (4.5 g total) into the vein  every 8 (eight) hours. End date of 9/9/19.  Last dose given: 0947                        Home dose due: 1700    Scrub the Hub: Prior to accessing the line, always perform a 30 second alcohol scrub  Each lumen of the central line is to be flushed at least daily with 10 mL Normal Saline and 3 mL Heparin flush (10 units/mL)  Skilled Nurse (SN) may draw blood from IV access  Blood Draw Procedure:   - Aspirate at least 5 mL of blood   - Discard   - Obtain specimen   - Change injection cap   - Flush with 20 mL Normal Saline followed by a                 3-5 mL Heparin flush (10 units/mL)  Central :   - Sterile dressing changes are done weekly and as needed.   - Use chlor-hexadine scrub to cleanse site, apply Biopatch to insertion site,       apply securement device dressing   - Injection caps are changed weekly and after EVERY lab draw.   - If sterile gauze is under dressing to control oozing,                 dressing change must be performed every 24 hours until gauze is not needed.    WOUND CARE ORDERS  yes:  Pressure Ulcer(s) Stage II :   Location: Left posterior upper thigh             Pressure Ulcer(s) Stage IV:  Location: Ischial tuberosity    Continue routine care at wound care clinic. Turn q2h.      Medications: Review discharge medications with patient and family and provide education.      Current Discharge Medication List      START taking these medications    Details   piperacillin sodium/tazobactam (PIPERACILLIN-TAZOBACTAM 4.5G/100ML D5W IVPB, READY TO MIX,) Inject 100 mLs (4.5 g total) into the vein every 8 (eight) hours. for 12 days (end date of 9/9/19).  Qty: 3600 mL, Refills: 0         CONTINUE these medications which have NOT CHANGED    Details   albuterol-ipratropium (DUO-NEB) 2.5 mg-0.5 mg/3 mL nebulizer solution Inhale 3 mLs into the lungs.      alcohol swabs (ALCOHOL PADS) PadM Apply 1 each topically once daily.  Qty: 400 each, Refills: 11      ammonium lactate 12 % Crea MIHAELA EXT AA ON  SKIN BID  Refills: 3      baclofen (LIORESAL) 10 MG tablet Take 1 tablet (10 mg total) by mouth 3 (three) times daily.  Qty: 270 tablet, Refills: 3      cadexomer iodine (IODOSORB) 0.9 % gel Apply topically daily as needed for Wound Care.      cyclobenzaprine (FLEXERIL) 10 MG tablet Take 1 tablet (10 mg total) by mouth 3 (three) times daily as needed.  Qty: 90 tablet, Refills: 3      dextran 70-hypromellose (TEARS) ophthalmic solution Apply 1 drop to eye.      docusate sodium (COLACE) 100 MG capsule Take 1 capsule (100 mg total) by mouth 2 (two) times daily.  Qty: 180 capsule, Refills: 3      drainage bag Misc 1 Units by Misc.(Non-Drug; Combo Route) route every 30 days.  Qty: 3 each, Refills: 3    Comments: Large bedside urine drainage bag      famotidine (PEPCID) 20 MG tablet Take 1 tablet (20 mg total) by mouth 2 (two) times daily.  Qty: 180 tablet, Refills: 3      gabapentin (NEURONTIN) 300 MG capsule Take 1 capsule (300 mg total) by mouth 2 (two) times daily.  Qty: 180 capsule, Refills: 3      gemfibrozil (LOPID) 600 MG tablet Take 1 tablet (600 mg total) by mouth 2 (two) times daily before meals.  Qty: 180 tablet, Refills: 1      insulin detemir U-100 (LEVEMIR) 100 unit/mL injection Inject 15 Units into the skin 2 (two) times daily.  Qty: 10 mL, Refills: 11      insulin lispro protamin-lispro (HUMALOG MIX 75-25 KWIKPEN) 100 unit/mL (75-25) InPn Inject 20 Units into the skin 3 (three) times daily with meals.  Qty: 3 Box, Refills: 11      ketoconazole (NIZORAL) 2 % shampoo Apply topically twice a week.  Qty: 120 mL, Refills: 11      melatonin 10 mg Cap Take 1 tablet by mouth every evening.  Qty: 90 capsule, Refills: 3      meloxicam (MOBIC) 7.5 MG tablet Take 1 tablet (7.5 mg total) by mouth 2 (two) times daily as needed for Pain.  Qty: 180 tablet, Refills: 1      metFORMIN (GLUCOPHAGE) 500 MG tablet Take 1 tablet (500 mg total) by mouth 2 (two) times daily with meals.  Qty: 180 tablet, Refills: 3      metoprolol  "tartrate (LOPRESSOR) 25 MG tablet Take 0.5 tablets (12.5 mg total) by mouth 2 (two) times daily. for 15 days  Qty: 180 tablet, Refills: 3      mirtazapine (REMERON) 30 MG tablet Take 30 mg by mouth.      pen needle, diabetic (COMFORT EZ PEN NEEDLES) 29 gauge x 1/2" Ndle 1 Units by Misc.(Non-Drug; Combo Route) route 3 (three) times daily.  Qty: 400 each, Refills: 11      promethazine (PHENERGAN) 25 MG tablet Take 1 tablet (25 mg total) by mouth every 6 (six) hours as needed for Nausea.  Qty: 30 tablet, Refills: 0      TRUE METRIX GLUCOSE METER Misc AS DIRECTED  Refills: 0      TRUE METRIX GLUCOSE TEST STRIP Strp USE AS DIRECTED TID  Qty: 200 strip, Refills: 3      TRUEPLUS LANCETS 30 gauge Misc USE AS DIRECTED TID  Refills: 3      venlafaxine (EFFEXOR-XR) 75 MG 24 hr capsule Take 1 capsule (75 mg total) by mouth once daily.  Qty: 90 capsule, Refills: 1      wheelchair Korina 1 Units/oz/day by Misc.(Non-Drug; Combo Route) route once daily.  Qty: 1 each, Refills: 0    Comments: Electric wheelchair             I certify that this patient is confined to his home and needs intermittent skilled nursing care.      "

## 2019-08-29 LAB — BACTERIA BLD CULT: NORMAL

## 2019-08-29 NOTE — NURSING
Discharge instructions done by VN. PIV and telem box removed. V/S stable. Pt left unit on stretcher in stable condition accompanied by transport officer.

## 2019-08-29 NOTE — NURSING
Discharge instructions given to the patient.  Patient verbalized understanding and had no questions.  EMS was in the room getting him ready for transport.
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
## 2019-08-31 LAB
BACTERIA BLD CULT: NORMAL
BACTERIA BLD CULT: NORMAL

## 2019-09-16 NOTE — PLAN OF CARE
Problem: Adult Inpatient Plan of Care  Goal: Plan of Care Review  Care plan and education updated.  24 hour chart review complete.       [FreeTextEntry1] : Mr. HAYS is a 72 year year old  male who  returns today in follow up with regard to a history of type 2 diabetes mellitus.  The Diabetes has been present for 4-5 years.There is no known history of retinopathy, nephropathy. He  too denies any history of neuropathy. Current dm medication include   Metformin 500 mg bid  HGM of late has shown values to be running 120-130 range.There has been no significant hypoglycemia.  denies any chest pain, sob, neurologic or ophthalmologic complaints. He  too denies any new podiatric concerns. He  is up to date with his ophthalmologic visit.\par Additional medical history includes that of  htn, and hyperlipidemia. Is on losartan and Pravastatin\par Did visit with neurosurgeon Dr. Kam re [facility] legs. Did then see chiropractor and has been feeling better.\par A1c today down to 7/2% from 8.0% in May.\par \par \par \par \par

## 2019-09-18 ENCOUNTER — OFFICE VISIT (OUTPATIENT)
Dept: INFECTIOUS DISEASES | Facility: CLINIC | Age: 44
End: 2019-09-18
Payer: MEDICARE

## 2019-09-18 VITALS
WEIGHT: 214.94 LBS | BODY MASS INDEX: 30.09 KG/M2 | SYSTOLIC BLOOD PRESSURE: 110 MMHG | HEIGHT: 71 IN | DIASTOLIC BLOOD PRESSURE: 78 MMHG | HEART RATE: 100 BPM | TEMPERATURE: 98 F

## 2019-09-18 DIAGNOSIS — Z79.4 TYPE 2 DIABETES MELLITUS WITH HYPERGLYCEMIA, WITH LONG-TERM CURRENT USE OF INSULIN: Chronic | ICD-10-CM

## 2019-09-18 DIAGNOSIS — Z16.24 NEWLY DIAGNOSED INFECTION DUE TO MULTIDRUG RESISTANT ORGANISM: Primary | ICD-10-CM

## 2019-09-18 DIAGNOSIS — R78.81 BACTEREMIA DUE TO GRAM-NEGATIVE BACTERIA: ICD-10-CM

## 2019-09-18 DIAGNOSIS — N31.9 NEUROGENIC BLADDER: Chronic | ICD-10-CM

## 2019-09-18 DIAGNOSIS — Z93.59 CHRONIC SUPRAPUBIC CATHETER: Chronic | ICD-10-CM

## 2019-09-18 DIAGNOSIS — E11.65 TYPE 2 DIABETES MELLITUS WITH HYPERGLYCEMIA, WITH LONG-TERM CURRENT USE OF INSULIN: Chronic | ICD-10-CM

## 2019-09-18 DIAGNOSIS — G82.20 PARAPLEGIA AT T9 LEVEL: Chronic | ICD-10-CM

## 2019-09-18 PROBLEM — N39.0 URINARY TRACT INFECTION ASSOCIATED WITH CYSTOSTOMY CATHETER: Status: RESOLVED | Noted: 2019-08-24 | Resolved: 2019-09-18

## 2019-09-18 PROBLEM — T83.510A URINARY TRACT INFECTION ASSOCIATED WITH CYSTOSTOMY CATHETER: Status: RESOLVED | Noted: 2019-08-24 | Resolved: 2019-09-18

## 2019-09-18 PROCEDURE — 99203 OFFICE O/P NEW LOW 30 MIN: CPT | Mod: S$PBB,,, | Performed by: INTERNAL MEDICINE

## 2019-09-18 PROCEDURE — 99203 PR OFFICE/OUTPT VISIT, NEW, LEVL III, 30-44 MIN: ICD-10-PCS | Mod: S$PBB,,, | Performed by: INTERNAL MEDICINE

## 2019-09-18 PROCEDURE — 99215 OFFICE O/P EST HI 40 MIN: CPT | Mod: PBBFAC,PO

## 2019-09-18 PROCEDURE — 99999 PR PBB SHADOW E&M-EST. PATIENT-LVL V: ICD-10-PCS | Mod: PBBFAC,,,

## 2019-09-18 PROCEDURE — 99999 PR PBB SHADOW E&M-EST. PATIENT-LVL V: CPT | Mod: PBBFAC,,,

## 2019-09-18 RX ORDER — GRANULES FOR ORAL 3 G/1
3 POWDER ORAL ONCE
Qty: 3 G | Refills: 6 | Status: SHIPPED | OUTPATIENT
Start: 2019-09-18 | End: 2019-09-18

## 2019-09-18 NOTE — PROGRESS NOTES
No complaints. Arrived by stretcher. Does not have a wheelchair.   Hospital follow up for bacteremia and UTI with Serratia. Finished antibiotics, PICC removed.   States he has bladder spasms periodically - no worse than normal    Physical Exam   Constitutional: He appears well-developed.   HENT:   Head: Normocephalic and atraumatic.   Eyes: Pupils are equal, round, and reactive to light. Conjunctivae and EOM are normal.   Cardiovascular: Normal rate, regular rhythm and normal heart sounds.   Pulmonary/Chest: Effort normal and breath sounds normal.   Abdominal: Soft. Bowel sounds are normal. He exhibits no distension. There is no tenderness.           1. Bacteremia due to Gram-negative bacteria    2. Chronic suprapubic catheter    3. Type 2 diabetes mellitus with hyperglycemia, with long-term current use of insulin    4. Neurogenic bladder    5. Paraplegia at T9 level    6. Newly diagnosed infection due to multidrug resistant organism      Plan:  Off antibiotics - no indication at this time for additional antibiotics. Will give Rx for monurol for possible UTI. Urine culture today - taken from sage bag. This should be considered when interpreting results. Will ask for repeat urinalysis when suprapubic catheter changed. Will attempt to get wheelchair for patient.

## 2019-09-19 ENCOUNTER — LAB VISIT (OUTPATIENT)
Dept: LAB | Facility: HOSPITAL | Age: 44
End: 2019-09-19
Attending: INTERNAL MEDICINE
Payer: MEDICARE

## 2019-09-19 DIAGNOSIS — R78.81 BACTEREMIA DUE TO GRAM-NEGATIVE BACTERIA: ICD-10-CM

## 2019-09-19 DIAGNOSIS — Z16.24 NEWLY DIAGNOSED INFECTION DUE TO MULTIDRUG RESISTANT ORGANISM: ICD-10-CM

## 2019-09-19 DIAGNOSIS — Z93.59 CHRONIC SUPRAPUBIC CATHETER: Chronic | ICD-10-CM

## 2019-09-19 LAB
BACTERIA #/AREA URNS AUTO: ABNORMAL /HPF
BILIRUB UR QL STRIP: NEGATIVE
CLARITY UR REFRACT.AUTO: ABNORMAL
COLOR UR AUTO: YELLOW
GLUCOSE UR QL STRIP: NEGATIVE
HGB UR QL STRIP: ABNORMAL
HYALINE CASTS UR QL AUTO: 0 /LPF
KETONES UR QL STRIP: NEGATIVE
LEUKOCYTE ESTERASE UR QL STRIP: ABNORMAL
MICROSCOPIC COMMENT: ABNORMAL
NITRITE UR QL STRIP: NEGATIVE
PH UR STRIP: 6 [PH] (ref 5–8)
PROT UR QL STRIP: ABNORMAL
RBC #/AREA URNS AUTO: 13 /HPF (ref 0–4)
SP GR UR STRIP: 1.01 (ref 1–1.03)
URN SPEC COLLECT METH UR: ABNORMAL
WBC #/AREA URNS AUTO: >100 /HPF (ref 0–5)
WBC CLUMPS UR QL AUTO: ABNORMAL
YEAST UR QL AUTO: ABNORMAL

## 2019-09-19 PROCEDURE — 81001 URINALYSIS AUTO W/SCOPE: CPT

## 2019-09-19 PROCEDURE — 87086 URINE CULTURE/COLONY COUNT: CPT

## 2019-09-20 LAB
BACTERIA UR CULT: NORMAL
BACTERIA UR CULT: NORMAL

## 2019-09-23 ENCOUNTER — PATIENT OUTREACH (OUTPATIENT)
Dept: ADMINISTRATIVE | Facility: OTHER | Age: 44
End: 2019-09-23

## 2019-09-25 ENCOUNTER — OFFICE VISIT (OUTPATIENT)
Dept: UROLOGY | Facility: CLINIC | Age: 44
End: 2019-09-25
Payer: MEDICARE

## 2019-09-25 VITALS
SYSTOLIC BLOOD PRESSURE: 130 MMHG | HEART RATE: 78 BPM | OXYGEN SATURATION: 97 % | BODY MASS INDEX: 29.96 KG/M2 | DIASTOLIC BLOOD PRESSURE: 66 MMHG | TEMPERATURE: 99 F | HEIGHT: 71 IN | RESPIRATION RATE: 18 BRPM | WEIGHT: 214 LBS

## 2019-09-25 DIAGNOSIS — N31.9 NEUROGENIC BLADDER: Primary | ICD-10-CM

## 2019-09-25 PROCEDURE — 99213 OFFICE O/P EST LOW 20 MIN: CPT | Mod: PBBFAC,PO,25 | Performed by: UROLOGY

## 2019-09-25 PROCEDURE — 51705 CHANGE OF BLADDER TUBE: CPT | Mod: PBBFAC,PO | Performed by: UROLOGY

## 2019-09-25 PROCEDURE — 99499 UNLISTED E&M SERVICE: CPT | Mod: S$PBB,,, | Performed by: UROLOGY

## 2019-09-25 PROCEDURE — 99999 PR PBB SHADOW E&M-EST. PATIENT-LVL III: CPT | Mod: PBBFAC,,, | Performed by: UROLOGY

## 2019-09-25 PROCEDURE — 99999 PR PBB SHADOW E&M-EST. PATIENT-LVL III: ICD-10-PCS | Mod: PBBFAC,,, | Performed by: UROLOGY

## 2019-09-25 PROCEDURE — 51705 PR CHANGE OF BLADDER TUBE,SIMPLE: ICD-10-PCS | Mod: S$PBB,,, | Performed by: UROLOGY

## 2019-09-25 PROCEDURE — 51705 CHANGE OF BLADDER TUBE: CPT | Mod: S$PBB,,, | Performed by: UROLOGY

## 2019-09-25 PROCEDURE — 51702 INSERT TEMP BLADDER CATH: CPT | Mod: PBBFAC,PO | Performed by: UROLOGY

## 2019-09-25 PROCEDURE — 99499 NO LOS: ICD-10-PCS | Mod: S$PBB,,, | Performed by: UROLOGY

## 2019-09-25 RX ORDER — CIPROFLOXACIN 500 MG/1
500 TABLET ORAL 2 TIMES DAILY
Qty: 10 TABLET | Refills: 0 | Status: SHIPPED | OUTPATIENT
Start: 2019-09-25 | End: 2019-09-30

## 2019-09-25 NOTE — PROCEDURES
"Bladder Catheterization  Date/Time: 9/25/2019 2:30 PM  Location procedure was performed: [facility]  Performed by: Eddy Hummel MD  Authorized by: Eddy Hummel MD   Assisting provider: Eddy Hummel MD  Pre-operative diagnosis: NGB, SPT Change  Post-operative diagnosis: NGB, SPT Change  Consent Done: Not Needed  Time out: Immediately prior to procedure a "time out" was called to verify the correct patient, procedure, equipment, support staff and site/side marked as required.  Indications: inability to ambulate and neurogenic bladder  Local anesthesia used: no    Anesthesia:  Local anesthesia used: no  Anesthetic total: 0 mL  Patient sedated: no  Preparation: Patient was prepped and draped in the usual sterile fashion.  Catheter insertion: indwelling  Catheter type: Huff  Catheter size: 20 Fr  Complicated insertion: no  Altered anatomy: no  Bladder irrigation: no  Number of attempts: 1  Urine volume: 10 ml  Urine characteristics: clear  Technical procedures used: none  Significant surgical tasks conducted by the assistant(s): none  Complications: No  Estimated blood loss (mL): 0  Specimens: No  Implants: No        "

## 2019-10-21 ENCOUNTER — HOSPITAL ENCOUNTER (EMERGENCY)
Facility: HOSPITAL | Age: 44
Discharge: HOME | End: 2019-10-22
Attending: EMERGENCY MEDICINE
Payer: MEDICARE

## 2019-10-21 DIAGNOSIS — R07.9 CHEST PAIN, UNSPECIFIED TYPE: Primary | ICD-10-CM

## 2019-10-21 LAB — POCT GLUCOSE: 195 MG/DL (ref 70–110)

## 2019-10-21 PROCEDURE — 85025 COMPLETE CBC W/AUTO DIFF WBC: CPT

## 2019-10-21 PROCEDURE — 80053 COMPREHEN METABOLIC PANEL: CPT

## 2019-10-21 PROCEDURE — 93005 ELECTROCARDIOGRAM TRACING: CPT

## 2019-10-21 PROCEDURE — 93010 EKG 12-LEAD: ICD-10-PCS | Mod: ,,, | Performed by: INTERNAL MEDICINE

## 2019-10-21 PROCEDURE — 82962 GLUCOSE BLOOD TEST: CPT

## 2019-10-21 PROCEDURE — 93010 ELECTROCARDIOGRAM REPORT: CPT | Mod: ,,, | Performed by: INTERNAL MEDICINE

## 2019-10-21 PROCEDURE — 99285 EMERGENCY DEPT VISIT HI MDM: CPT | Mod: 25

## 2019-10-21 PROCEDURE — 84484 ASSAY OF TROPONIN QUANT: CPT

## 2019-10-22 VITALS
WEIGHT: 200 LBS | BODY MASS INDEX: 28 KG/M2 | OXYGEN SATURATION: 99 % | DIASTOLIC BLOOD PRESSURE: 74 MMHG | TEMPERATURE: 98 F | SYSTOLIC BLOOD PRESSURE: 107 MMHG | HEART RATE: 88 BPM | RESPIRATION RATE: 18 BRPM | HEIGHT: 71 IN

## 2019-10-22 LAB
ALBUMIN SERPL BCP-MCNC: 3.4 G/DL (ref 3.5–5.2)
ALP SERPL-CCNC: 156 U/L (ref 55–135)
ALT SERPL W/O P-5'-P-CCNC: 13 U/L (ref 10–44)
ANION GAP SERPL CALC-SCNC: 15 MMOL/L (ref 8–16)
AST SERPL-CCNC: 21 U/L (ref 10–40)
BASOPHILS # BLD AUTO: 0.05 K/UL (ref 0–0.2)
BASOPHILS NFR BLD: 0.5 % (ref 0–1.9)
BILIRUB SERPL-MCNC: 0.2 MG/DL (ref 0.1–1)
BUN SERPL-MCNC: 8 MG/DL (ref 6–20)
CALCIUM SERPL-MCNC: 10 MG/DL (ref 8.7–10.5)
CHLORIDE SERPL-SCNC: 103 MMOL/L (ref 95–110)
CO2 SERPL-SCNC: 19 MMOL/L (ref 23–29)
CREAT SERPL-MCNC: 0.9 MG/DL (ref 0.5–1.4)
DIFFERENTIAL METHOD: ABNORMAL
EOSINOPHIL # BLD AUTO: 0.7 K/UL (ref 0–0.5)
EOSINOPHIL NFR BLD: 7 % (ref 0–8)
ERYTHROCYTE [DISTWIDTH] IN BLOOD BY AUTOMATED COUNT: 17.5 % (ref 11.5–14.5)
EST. GFR  (AFRICAN AMERICAN): >60 ML/MIN/1.73 M^2
EST. GFR  (NON AFRICAN AMERICAN): >60 ML/MIN/1.73 M^2
GLUCOSE SERPL-MCNC: 183 MG/DL (ref 70–110)
HCT VFR BLD AUTO: 40.3 % (ref 40–54)
HGB BLD-MCNC: 13.5 G/DL (ref 14–18)
LYMPHOCYTES # BLD AUTO: 2.3 K/UL (ref 1–4.8)
LYMPHOCYTES NFR BLD: 23.4 % (ref 18–48)
MCH RBC QN AUTO: 27.2 PG (ref 27–31)
MCHC RBC AUTO-ENTMCNC: 33.5 G/DL (ref 32–36)
MCV RBC AUTO: 81 FL (ref 82–98)
MONOCYTES # BLD AUTO: 0.6 K/UL (ref 0.3–1)
MONOCYTES NFR BLD: 5.7 % (ref 4–15)
NEUTROPHILS # BLD AUTO: 6.2 K/UL (ref 1.8–7.7)
NEUTROPHILS NFR BLD: 63.4 % (ref 38–73)
PLATELET # BLD AUTO: 368 K/UL (ref 150–350)
PMV BLD AUTO: 10.9 FL (ref 9.2–12.9)
POTASSIUM SERPL-SCNC: 5.2 MMOL/L (ref 3.5–5.1)
PROT SERPL-MCNC: 8.8 G/DL (ref 6–8.4)
RBC # BLD AUTO: 4.96 M/UL (ref 4.6–6.2)
SODIUM SERPL-SCNC: 137 MMOL/L (ref 136–145)
TROPONIN I SERPL DL<=0.01 NG/ML-MCNC: <0.006 NG/ML (ref 0–0.03)
WBC # BLD AUTO: 9.72 K/UL (ref 3.9–12.7)

## 2019-10-22 RX ORDER — ORPHENADRINE CITRATE 100 MG/1
100 TABLET, EXTENDED RELEASE ORAL 2 TIMES DAILY PRN
Qty: 20 TABLET | Refills: 0 | Status: SHIPPED | OUTPATIENT
Start: 2019-10-22 | End: 2020-07-20
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
## 2019-10-22 NOTE — ED NOTES
Patient identifiers for Hermann Aguilar checked and correct.  LOC: The patient is awake, alert and aware of environment with an appropriate affect, the patient is oriented x 3 and speaking appropriately.  APPEARANCE: Patient resting comfortably and in no acute distress, patient is clean and well groomed, patient's clothing are properly fastened.  SKIN: The skin is warm, dry and flaky. Unstagable ulcer to right lower buttocks, stage 2 to left lower buttocks.  Left foot swollen, dry,scally. stage 2 to left heel, stage 2 to left ankle. Right BKA.  Suprapubic catheter in place.  MUSKULOSKELETAL: + left pedal pulse with doppler.  Right arm contracted at elbow.  Paraplegic.  RESPIRATORY: Airway is open and patent, respirations are spontaneous, patient has a normal effort and rate.  CARDIAC: Patient has a normal rate and rhythm.  Swelling to left foot.

## 2019-10-22 NOTE — ED NOTES
Pt complaining of chest pain that started last night after eating ice cream.  States chest also hurts when he raises his arms.

## 2019-10-28 ENCOUNTER — PATIENT OUTREACH (OUTPATIENT)
Dept: ADMINISTRATIVE | Facility: OTHER | Age: 44
End: 2019-10-28

## 2019-11-26 ENCOUNTER — PATIENT OUTREACH (OUTPATIENT)
Dept: ADMINISTRATIVE | Facility: OTHER | Age: 44
End: 2019-11-26

## 2019-12-03 ENCOUNTER — HOSPITAL ENCOUNTER (EMERGENCY)
Facility: HOSPITAL | Age: 44
Discharge: HOME | End: 2019-12-03
Attending: EMERGENCY MEDICINE
Payer: MEDICARE

## 2019-12-03 VITALS
WEIGHT: 200 LBS | HEIGHT: 71 IN | RESPIRATION RATE: 16 BRPM | TEMPERATURE: 98 F | OXYGEN SATURATION: 100 % | SYSTOLIC BLOOD PRESSURE: 134 MMHG | HEART RATE: 83 BPM | DIASTOLIC BLOOD PRESSURE: 93 MMHG | BODY MASS INDEX: 28 KG/M2

## 2019-12-03 DIAGNOSIS — T83.010A SUPRAPUBIC CATHETER DYSFUNCTION, INITIAL ENCOUNTER: Primary | ICD-10-CM

## 2019-12-03 PROCEDURE — 87186 SC STD MICRODIL/AGAR DIL: CPT

## 2019-12-03 PROCEDURE — 87086 URINE CULTURE/COLONY COUNT: CPT

## 2019-12-03 PROCEDURE — 87077 CULTURE AEROBIC IDENTIFY: CPT

## 2019-12-03 PROCEDURE — 51705 CHANGE OF BLADDER TUBE: CPT | Mod: ,,, | Performed by: STUDENT IN AN ORGANIZED HEALTH CARE EDUCATION/TRAINING PROGRAM

## 2019-12-03 PROCEDURE — 87088 URINE BACTERIA CULTURE: CPT

## 2019-12-03 PROCEDURE — 99283 EMERGENCY DEPT VISIT LOW MDM: CPT

## 2019-12-03 PROCEDURE — 99284 PR EMERGENCY DEPT VISIT,LEVEL IV: ICD-10-PCS | Mod: 25,,, | Performed by: STUDENT IN AN ORGANIZED HEALTH CARE EDUCATION/TRAINING PROGRAM

## 2019-12-03 PROCEDURE — 51705 PR CHANGE OF BLADDER TUBE,SIMPLE: ICD-10-PCS | Mod: ,,, | Performed by: STUDENT IN AN ORGANIZED HEALTH CARE EDUCATION/TRAINING PROGRAM

## 2019-12-03 PROCEDURE — 99284 EMERGENCY DEPT VISIT MOD MDM: CPT | Mod: 25,,, | Performed by: STUDENT IN AN ORGANIZED HEALTH CARE EDUCATION/TRAINING PROGRAM

## 2019-12-03 RX ORDER — CEFUROXIME AXETIL 500 MG/1
500 TABLET ORAL EVERY 12 HOURS
Qty: 28 TABLET | Refills: 0 | Status: SHIPPED | OUTPATIENT
Start: 2019-12-03 | End: 2019-12-17

## 2019-12-03 RX ORDER — CEFUROXIME AXETIL 500 MG/1
500 TABLET ORAL EVERY 12 HOURS
Qty: 28 TABLET | Refills: 0 | Status: SHIPPED | OUTPATIENT
Start: 2019-12-03 | End: 2019-12-03 | Stop reason: SDUPTHER

## 2019-12-03 NOTE — ED NOTES
Dr Dietz at bedside, attempted to replace suprapubic catheter without success.  Will consult with urology

## 2019-12-03 NOTE — ED NOTES
Pt repositioned to back with pillow beneath left foot (heel off bed) and pillow behind left flank/back for support

## 2019-12-03 NOTE — ED NOTES
Suprapubic catheter patent, draining to gravity.  Pt denies pain or discomfort at this time.  Continuing to monitor.

## 2019-12-03 NOTE — ED NOTES
EMS at bedside to transfer patient to home.  Pt awake, alert and oriented.  Suprapubic catheter patent and draining to gravity.
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
## 2019-12-03 NOTE — ED NOTES
Pt repositioned to right side with pillow beneath left foot (heel off bed) and pillow behind left flank/back for support
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
## 2019-12-03 NOTE — ED NOTES
Pt repositioned to left side with pillow beneath left foot (heel off bed) and pillow behind right flank/back for support

## 2019-12-03 NOTE — ED NOTES
Pt reports that he has people at home to meet ambulance upon arrival.  Apartment is on ground floor

## 2019-12-06 LAB — BACTERIA UR CULT: ABNORMAL

## 2019-12-20 ENCOUNTER — TELEPHONE (OUTPATIENT)
Dept: UROLOGY | Facility: CLINIC | Age: 44
End: 2019-12-20

## 2019-12-20 NOTE — TELEPHONE ENCOUNTER
Spoke with Ms Young about her concerns with Mr Jeff catheter. I stated that Dr Navarrete is on vacation until the 26th in which we can move his appointment up to then, She stated she would just wait until the 2nd to discuss the future plans of his 2 wk change.

## 2019-12-20 NOTE — TELEPHONE ENCOUNTER
----- Message from Avani oMy sent at 12/20/2019  8:47 AM CST -----  Contact: Hannah aguilera/[facility] #[phone]  Calling regarding patient is needing catheter changed every two weeks and getting infections. Needs to speak with nurse or doctor regarding plan of care.

## 2019-12-29 ENCOUNTER — TELEPHONE (OUTPATIENT)
Dept: FAMILY MEDICINE | Facility: CLINIC | Age: 44
End: 2019-12-29

## 2019-12-29 NOTE — TELEPHONE ENCOUNTER
----- Message from Jessica Garzon sent at 12/27/2019  4:48 PM CST -----  Contact: fax  Dr. Breen    Please review document  scanned into patient's media.      Re: [facility] lab results

## 2019-12-30 ENCOUNTER — PATIENT OUTREACH (OUTPATIENT)
Dept: ADMINISTRATIVE | Facility: OTHER | Age: 44
End: 2019-12-30

## 2019-12-30 ENCOUNTER — TELEPHONE (OUTPATIENT)
Dept: UROLOGY | Facility: CLINIC | Age: 44
End: 2019-12-30

## 2019-12-30 NOTE — TELEPHONE ENCOUNTER
----- Message from Juhi Jeffries sent at 12/30/2019 11:26 AM CST -----  Contact: Blanca bethea [facility]  [phone]  Blanca is requesting to speak with nurse regarding pt's urinalysis. Please advise.

## 2019-12-30 NOTE — TELEPHONE ENCOUNTER
Spoke with Nova, urine obtained for culture on 12/24/19, positive proteus bacteria.  Informed patient has had this bacteria before and was referred to ID due to multi-drug resistance (oral abx)  Evaluated by ID in Sept.  She will fax over culture results, patient scheduled to see Dr Navarrete on 1/2/20.

## 2020-01-02 ENCOUNTER — OFFICE VISIT (OUTPATIENT)
Dept: UROLOGY | Facility: CLINIC | Age: 45
End: 2020-01-02
Payer: MEDICARE

## 2020-01-02 VITALS — SYSTOLIC BLOOD PRESSURE: 127 MMHG | TEMPERATURE: 98 F | DIASTOLIC BLOOD PRESSURE: 82 MMHG | HEART RATE: 107 BPM

## 2020-01-02 DIAGNOSIS — G82.20 PARAPLEGIA AT T9 LEVEL: ICD-10-CM

## 2020-01-02 DIAGNOSIS — Z93.59 CHRONIC SUPRAPUBIC CATHETER: ICD-10-CM

## 2020-01-02 DIAGNOSIS — N31.9 NEUROGENIC BLADDER: Primary | ICD-10-CM

## 2020-01-02 DIAGNOSIS — R82.90 UNSPECIFIED ABNORMAL FINDINGS IN URINE: ICD-10-CM

## 2020-01-02 DIAGNOSIS — N39.0 RECURRENT UTI: ICD-10-CM

## 2020-01-02 PROCEDURE — 99214 PR OFFICE/OUTPT VISIT, EST, LEVL IV, 30-39 MIN: ICD-10-PCS | Mod: S$PBB,25,, | Performed by: STUDENT IN AN ORGANIZED HEALTH CARE EDUCATION/TRAINING PROGRAM

## 2020-01-02 PROCEDURE — 99213 OFFICE O/P EST LOW 20 MIN: CPT | Mod: PBBFAC,PO,25 | Performed by: STUDENT IN AN ORGANIZED HEALTH CARE EDUCATION/TRAINING PROGRAM

## 2020-01-02 PROCEDURE — 99214 OFFICE O/P EST MOD 30 MIN: CPT | Mod: S$PBB,25,, | Performed by: STUDENT IN AN ORGANIZED HEALTH CARE EDUCATION/TRAINING PROGRAM

## 2020-01-02 PROCEDURE — 87086 URINE CULTURE/COLONY COUNT: CPT

## 2020-01-02 PROCEDURE — 87088 URINE BACTERIA CULTURE: CPT

## 2020-01-02 PROCEDURE — 51705 CHANGE OF BLADDER TUBE: CPT | Mod: S$PBB,,, | Performed by: STUDENT IN AN ORGANIZED HEALTH CARE EDUCATION/TRAINING PROGRAM

## 2020-01-02 PROCEDURE — 87077 CULTURE AEROBIC IDENTIFY: CPT

## 2020-01-02 PROCEDURE — 99999 PR PBB SHADOW E&M-EST. PATIENT-LVL III: CPT | Mod: PBBFAC,,, | Performed by: STUDENT IN AN ORGANIZED HEALTH CARE EDUCATION/TRAINING PROGRAM

## 2020-01-02 PROCEDURE — 51705 PR CHANGE OF BLADDER TUBE,SIMPLE: ICD-10-PCS | Mod: S$PBB,,, | Performed by: STUDENT IN AN ORGANIZED HEALTH CARE EDUCATION/TRAINING PROGRAM

## 2020-01-02 PROCEDURE — 99999 PR PBB SHADOW E&M-EST. PATIENT-LVL III: ICD-10-PCS | Mod: PBBFAC,,, | Performed by: STUDENT IN AN ORGANIZED HEALTH CARE EDUCATION/TRAINING PROGRAM

## 2020-01-02 PROCEDURE — 51705 CHANGE OF BLADDER TUBE: CPT | Mod: PBBFAC,PO | Performed by: STUDENT IN AN ORGANIZED HEALTH CARE EDUCATION/TRAINING PROGRAM

## 2020-01-02 PROCEDURE — 87186 SC STD MICRODIL/AGAR DIL: CPT

## 2020-01-02 RX ORDER — ASPIRIN 81 MG/1
TABLET ORAL
Status: ON HOLD | COMMUNITY
End: 2020-09-11

## 2020-01-02 RX ORDER — FERROUS SULFATE 325(65) MG
TABLET ORAL
COMMUNITY
End: 2020-07-20 | Stop reason: SDUPTHER
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
## 2020-01-03 ENCOUNTER — TELEPHONE (OUTPATIENT)
Dept: FAMILY MEDICINE | Facility: CLINIC | Age: 45
End: 2020-01-03

## 2020-01-03 NOTE — TELEPHONE ENCOUNTER
----- Message from Dianna Hadley sent at 1/3/2020 12:08 PM CST -----  Contact: Nicki, [facility]  Ms. Nicki is calling to speak with Staff regarding diagnosis codes for urine culture & hemoglobin A1C.    She can be reached at [phone].    Thank you.

## 2020-01-03 NOTE — TELEPHONE ENCOUNTER
Spoke to Franci and she was notified Dr. Breen didn't order the labs. She understood and will contact the ordering provider.

## 2020-01-04 LAB — BACTERIA UR CULT: ABNORMAL

## 2020-01-06 ENCOUNTER — TELEPHONE (OUTPATIENT)
Dept: UROLOGY | Facility: CLINIC | Age: 45
End: 2020-01-06

## 2020-01-06 DIAGNOSIS — N39.0 URINARY TRACT INFECTION WITHOUT HEMATURIA, SITE UNSPECIFIED: Primary | ICD-10-CM

## 2020-01-06 RX ORDER — AMOXICILLIN AND CLAVULANATE POTASSIUM 875; 125 MG/1; MG/1
1 TABLET, FILM COATED ORAL EVERY 12 HOURS
Qty: 28 TABLET | Refills: 0 | Status: SHIPPED | OUTPATIENT
Start: 2020-01-06 | End: 2020-01-20

## 2020-01-06 NOTE — TELEPHONE ENCOUNTER
Spoke with Mr Mccrary about his positive urine culture asked that he  his antibiotic, he voiced his understanding.

## 2020-01-06 NOTE — TELEPHONE ENCOUNTER
----- Message from Ruchi Navarrete MD sent at 1/6/2020 11:54 AM CST -----  Ordering augmentin antibiotic. Please call patient and notify of positive culture and antibiotics. The urine culture grew out bacteria concerning for a urinary tract infection.

## 2020-01-12 ENCOUNTER — HOSPITAL ENCOUNTER (EMERGENCY)
Facility: HOSPITAL | Age: 45
Discharge: HOME | End: 2020-01-12
Attending: EMERGENCY MEDICINE
Payer: MEDICARE

## 2020-01-12 VITALS
TEMPERATURE: 98 F | SYSTOLIC BLOOD PRESSURE: 133 MMHG | RESPIRATION RATE: 18 BRPM | DIASTOLIC BLOOD PRESSURE: 82 MMHG | HEART RATE: 88 BPM | OXYGEN SATURATION: 98 %

## 2020-01-12 DIAGNOSIS — T83.090A OBSTRUCTION OF SUPRAPUBIC CATHETER, INITIAL ENCOUNTER: Primary | ICD-10-CM

## 2020-01-12 PROCEDURE — 99283 EMERGENCY DEPT VISIT LOW MDM: CPT | Mod: 25

## 2020-01-12 PROCEDURE — 51705 CHANGE OF BLADDER TUBE: CPT

## 2020-01-12 NOTE — ED TRIAGE NOTES
"Pt presents to ED with 18 Fr Suprapubic catheter to  drainage bag in place. Pt c/o "no output from suprapubic catheter" and lower abd pain since midnight. Pt rates pain 8 on 1/10 pain scale. Describes abd pain as a "constant tightness". Pt reports having this suprapubic cathter inserted x 1 wk per Dr. Navarrete. Denies fever, nausea / vomiting, or hematuria.   "

## 2020-01-12 NOTE — ED NOTES
Dr. Dietz at bedside re-assessing suprapubic for urine output. Small amt pale yellow urine with white sediment noted in drainage tubing.

## 2020-01-12 NOTE — ED NOTES
"APPEARANCE: Alert, oriented and in no acute distress.  CARDIAC: Normal rate and rhythm, no murmur heard.   PERIPHERAL VASCULAR: peripheral pulses present. Normal cap refill. No edema. Warm to touch.    RESPIRATORY:Normal rate and effort, breath sounds clear bilaterally throughout chest. Respirations are equal and unlabored no obvious signs of distress.  GASTRO: soft, bowel sounds normal, lower abd distention, "tightness" and tenderness; lower abd pain 8/10. Suprapubic catheter with thick beige drainage with sediment in tubing and  bag  MUSC: Full ROM to upper extremities. No bony tenderness or soft tissue tenderness. Paraplegia to lower extremities. Right BKA.  SKIN: Skin is warm and dry, normal skin turgor, mucous membranes moist.  MENTAL STATUS: awake, alert and aware of environment.  EYE: PERRL, both eyes: pupils brisk and reactive to light. Normal size.  ENT: EARS: no obvious drainage. NOSE: no active bleeding.       "
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
## 2020-01-12 NOTE — DISCHARGE INSTRUCTIONS
Encourage hydration, return promptly if concerning symptoms arise, continue your recently prescribed Augmentin

## 2020-01-12 NOTE — ED NOTES
Current suprapubic catheter removed and replaced with 18Fr Suprapubic Catheter attached to  drainage bag by Dr. Dietz. Small amt clear pale yellow urine returned immediately. Pt tolerated procedure well. Caregiver at bedside.

## 2020-01-23 ENCOUNTER — OFFICE VISIT (OUTPATIENT)
Dept: UROLOGY | Facility: CLINIC | Age: 45
End: 2020-01-23
Payer: MEDICARE

## 2020-01-23 ENCOUNTER — PATIENT OUTREACH (OUTPATIENT)
Dept: ADMINISTRATIVE | Facility: OTHER | Age: 45
End: 2020-01-23

## 2020-01-23 VITALS — TEMPERATURE: 98 F | DIASTOLIC BLOOD PRESSURE: 87 MMHG | HEART RATE: 73 BPM | SYSTOLIC BLOOD PRESSURE: 134 MMHG

## 2020-01-23 DIAGNOSIS — N39.0 RECURRENT UTI: ICD-10-CM

## 2020-01-23 DIAGNOSIS — Z93.59 CHRONIC SUPRAPUBIC CATHETER: Primary | ICD-10-CM

## 2020-01-23 DIAGNOSIS — N31.9 NEUROGENIC BLADDER: ICD-10-CM

## 2020-01-23 PROCEDURE — 99999 PR PBB SHADOW E&M-EST. PATIENT-LVL III: ICD-10-PCS | Mod: PBBFAC,,, | Performed by: STUDENT IN AN ORGANIZED HEALTH CARE EDUCATION/TRAINING PROGRAM

## 2020-01-23 PROCEDURE — 99999 PR PBB SHADOW E&M-EST. PATIENT-LVL III: CPT | Mod: PBBFAC,,, | Performed by: STUDENT IN AN ORGANIZED HEALTH CARE EDUCATION/TRAINING PROGRAM

## 2020-01-23 PROCEDURE — 99213 OFFICE O/P EST LOW 20 MIN: CPT | Mod: PBBFAC,PO | Performed by: STUDENT IN AN ORGANIZED HEALTH CARE EDUCATION/TRAINING PROGRAM

## 2020-01-23 PROCEDURE — 99214 PR OFFICE/OUTPT VISIT, EST, LEVL IV, 30-39 MIN: ICD-10-PCS | Mod: S$PBB,25,, | Performed by: STUDENT IN AN ORGANIZED HEALTH CARE EDUCATION/TRAINING PROGRAM

## 2020-01-23 PROCEDURE — 99214 OFFICE O/P EST MOD 30 MIN: CPT | Mod: S$PBB,25,, | Performed by: STUDENT IN AN ORGANIZED HEALTH CARE EDUCATION/TRAINING PROGRAM

## 2020-01-23 PROCEDURE — 51700 IRRIGATION OF BLADDER: CPT | Mod: PBBFAC,PO | Performed by: STUDENT IN AN ORGANIZED HEALTH CARE EDUCATION/TRAINING PROGRAM

## 2020-01-23 PROCEDURE — 51705 CHANGE OF BLADDER TUBE: CPT | Mod: PBBFAC,PO | Performed by: STUDENT IN AN ORGANIZED HEALTH CARE EDUCATION/TRAINING PROGRAM

## 2020-01-23 PROCEDURE — 51705 PR CHANGE OF BLADDER TUBE,SIMPLE: ICD-10-PCS | Mod: S$PBB,,, | Performed by: STUDENT IN AN ORGANIZED HEALTH CARE EDUCATION/TRAINING PROGRAM

## 2020-01-23 PROCEDURE — 51705 CHANGE OF BLADDER TUBE: CPT | Mod: S$PBB,,, | Performed by: STUDENT IN AN ORGANIZED HEALTH CARE EDUCATION/TRAINING PROGRAM

## 2020-01-23 RX ORDER — BACLOFEN 10 MG/1
TABLET ORAL
COMMUNITY
Start: 2020-01-20 | End: 2020-12-23 | Stop reason: SDUPTHER

## 2020-01-23 RX ORDER — METOPROLOL TARTRATE 25 MG/1
TABLET, FILM COATED ORAL
Status: ON HOLD | COMMUNITY
Start: 2020-01-20 | End: 2021-03-18 | Stop reason: HOSPADM

## 2020-01-23 RX ORDER — AMOXICILLIN AND CLAVULANATE POTASSIUM 875; 125 MG/1; MG/1
1 TABLET, FILM COATED ORAL EVERY 12 HOURS
Qty: 14 TABLET | Refills: 0 | Status: SHIPPED | OUTPATIENT
Start: 2020-01-23 | End: 2020-01-30

## 2020-01-23 RX ORDER — VENLAFAXINE HYDROCHLORIDE 150 MG/1
150 CAPSULE, EXTENDED RELEASE ORAL DAILY
Status: ON HOLD | COMMUNITY
Start: 2020-01-20 | End: 2021-03-18 | Stop reason: HOSPADM

## 2020-01-23 RX ORDER — COLLAGENASE SANTYL 250 [ARB'U]/G
OINTMENT TOPICAL
Status: ON HOLD | COMMUNITY
Start: 2020-01-13 | End: 2021-03-18 | Stop reason: HOSPADM
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
## 2020-02-06 ENCOUNTER — TELEPHONE (OUTPATIENT)
Dept: UROLOGY | Facility: CLINIC | Age: 45
End: 2020-02-06

## 2020-02-06 NOTE — TELEPHONE ENCOUNTER
Patient was evaluated on yesterday by home health nurse  Saw urine output from penis and bedding was saturated. Briefs saturated, Evaluated today, upon arrival 100 ml in bag, flushed with 60 cc of NS, flushed fine.  Output was 400 ml in bag.  Informed I would contact patient for update at this time.  Sol, caregiver  as patient's phone is off.

## 2020-02-06 NOTE — TELEPHONE ENCOUNTER
Contacted caregiver, Sol.  She flushed catheter last night. 400 ml urine output this morning and was drained. Currently there is 400 ml of urine in bag.  She stated patient is feeling fine, no saturation on briefs nor his bedding.  Informed I would contact her tomorrow for update.

## 2020-02-06 NOTE — TELEPHONE ENCOUNTER
----- Message from Queenie Aguirre sent at 2/6/2020 12:11 PM CST -----  Contact: Hannah from [facility]  Kalpana called to advise the patient's brief was saturated and not much urine in his catheter bag.  She flushed it to check for clogs and output was fine.    Please call Hannah at [phone] to discuss today.

## 2020-02-07 NOTE — TELEPHONE ENCOUNTER
UPDATE:  Spoke with caregiver, upon arrival this morning, Urine output was 1000 ml.  No reports of leakage or bed wetting.  Well functioning catheter.  Advised to contact home health nurse or office if needed, otherwise will see Hermann on 2/20/20.  She voiced understanding.

## 2020-02-18 ENCOUNTER — PATIENT OUTREACH (OUTPATIENT)
Dept: ADMINISTRATIVE | Facility: OTHER | Age: 45
End: 2020-02-18

## 2020-02-18 DIAGNOSIS — Z79.4 TYPE 2 DIABETES MELLITUS WITH HYPERGLYCEMIA, WITH LONG-TERM CURRENT USE OF INSULIN: Primary | Chronic | ICD-10-CM

## 2020-02-18 DIAGNOSIS — E11.65 TYPE 2 DIABETES MELLITUS WITH HYPERGLYCEMIA, WITH LONG-TERM CURRENT USE OF INSULIN: Primary | Chronic | ICD-10-CM

## 2020-02-26 ENCOUNTER — HOSPITAL ENCOUNTER (EMERGENCY)
Facility: HOSPITAL | Age: 45
Discharge: HOME | End: 2020-02-26
Attending: EMERGENCY MEDICINE
Payer: MEDICARE

## 2020-02-26 VITALS
OXYGEN SATURATION: 99 % | BODY MASS INDEX: 28 KG/M2 | DIASTOLIC BLOOD PRESSURE: 68 MMHG | SYSTOLIC BLOOD PRESSURE: 121 MMHG | HEART RATE: 98 BPM | WEIGHT: 200 LBS | HEIGHT: 71 IN | RESPIRATION RATE: 20 BRPM | TEMPERATURE: 98 F

## 2020-02-26 DIAGNOSIS — T83.9XXA PROBLEM WITH FOLEY CATHETER, INITIAL ENCOUNTER: Primary | ICD-10-CM

## 2020-02-26 DIAGNOSIS — T83.511A URINARY TRACT INFECTION ASSOCIATED WITH INDWELLING URETHRAL CATHETER, INITIAL ENCOUNTER: ICD-10-CM

## 2020-02-26 DIAGNOSIS — N39.0 URINARY TRACT INFECTION ASSOCIATED WITH INDWELLING URETHRAL CATHETER, INITIAL ENCOUNTER: ICD-10-CM

## 2020-02-26 LAB
BACTERIA #/AREA URNS HPF: ABNORMAL /HPF
BILIRUB UR QL STRIP: NEGATIVE
CLARITY UR: ABNORMAL
COLOR UR: YELLOW
GLUCOSE UR QL STRIP: NEGATIVE
HGB UR QL STRIP: ABNORMAL
HYALINE CASTS #/AREA URNS LPF: 0 /LPF
KETONES UR QL STRIP: NEGATIVE
LEUKOCYTE ESTERASE UR QL STRIP: ABNORMAL
MICROSCOPIC COMMENT: ABNORMAL
NITRITE UR QL STRIP: POSITIVE
PH UR STRIP: >8 [PH] (ref 5–8)
PROT UR QL STRIP: ABNORMAL
RBC #/AREA URNS HPF: 15 /HPF (ref 0–4)
SP GR UR STRIP: 1.01 (ref 1–1.03)
SQUAMOUS #/AREA URNS HPF: 3 /HPF
URN SPEC COLLECT METH UR: ABNORMAL
UROBILINOGEN UR STRIP-ACNC: NEGATIVE EU/DL
WBC #/AREA URNS HPF: >100 /HPF (ref 0–5)
WBC CLUMPS URNS QL MICRO: ABNORMAL

## 2020-02-26 PROCEDURE — 87077 CULTURE AEROBIC IDENTIFY: CPT | Mod: 59

## 2020-02-26 PROCEDURE — 25000003 PHARM REV CODE 250: Performed by: EMERGENCY MEDICINE

## 2020-02-26 PROCEDURE — 87086 URINE CULTURE/COLONY COUNT: CPT

## 2020-02-26 PROCEDURE — 87186 SC STD MICRODIL/AGAR DIL: CPT

## 2020-02-26 PROCEDURE — 81000 URINALYSIS NONAUTO W/SCOPE: CPT

## 2020-02-26 PROCEDURE — 99283 EMERGENCY DEPT VISIT LOW MDM: CPT | Mod: 25

## 2020-02-26 PROCEDURE — 87088 URINE BACTERIA CULTURE: CPT

## 2020-02-26 PROCEDURE — 51705 CHANGE OF BLADDER TUBE: CPT

## 2020-02-26 RX ORDER — AMOXICILLIN AND CLAVULANATE POTASSIUM 875; 125 MG/1; MG/1
1 TABLET, FILM COATED ORAL
Status: COMPLETED | OUTPATIENT
Start: 2020-02-26 | End: 2020-02-26

## 2020-02-26 RX ORDER — AMOXICILLIN AND CLAVULANATE POTASSIUM 875; 125 MG/1; MG/1
1 TABLET, FILM COATED ORAL 2 TIMES DAILY
Qty: 14 TABLET | Refills: 0 | Status: SHIPPED | OUTPATIENT
Start: 2020-02-26 | End: 2020-03-25 | Stop reason: SDUPTHER

## 2020-02-26 RX ADMIN — AMOXICILLIN AND CLAVULANATE POTASSIUM 1 TABLET: 875; 125 TABLET, FILM COATED ORAL at 03:02

## 2020-02-26 NOTE — ED NOTES
Pt arrives via EMS c/o urinary retention. 20 Iraqi suprapubic catheter in place w/ approximately 20 mL dark urine noted in catheter bag. Reports waist of pants became saturated w/ urine, and urine no longer draining into bag. States he was supposed to have the catheter changed a week ago but missed the appointment. Pt has above knee amputation on right leg and slight contracture to left leg. NAD noted. Girlfriend at bedside.

## 2020-02-26 NOTE — ED NOTES
Recd report assumed care at this time. Pt lying on stretcher NAD. Awaiting diagnostic lab results, MD disposition.
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
## 2020-02-28 LAB
BACTERIA UR CULT: ABNORMAL
BACTERIA UR CULT: ABNORMAL

## 2020-03-24 ENCOUNTER — TELEPHONE (OUTPATIENT)
Dept: UROLOGY | Facility: CLINIC | Age: 45
End: 2020-03-24

## 2020-03-24 NOTE — TELEPHONE ENCOUNTER
----- Message from Alyson Botello sent at 3/24/2020  2:53 PM CDT -----  Contact: Patient   Type:  Sooner Apoointment Request    Caller is requesting a sooner appointment.  Caller declined first available appointment listed below.  Caller will not accept being placed on the waitlist and is requesting a message be sent to doctor.  Name of Caller: Hermann  When is the first available appointment? 4/7/20   Symptoms: Catheter needs to be changed   Would the patient rather a call back or a response via [facility]? Call back   Best Call Back Number: [phone]   Additional Information:  No

## 2020-03-24 NOTE — TELEPHONE ENCOUNTER
Spoke with patient, [facility] (active)  Stated last home visit was approximately 1.5 weeks.    Informed I will contact [facility] and call him back with an update.  He voiced understanding.

## 2020-03-25 ENCOUNTER — TELEPHONE (OUTPATIENT)
Dept: UROLOGY | Facility: CLINIC | Age: 45
End: 2020-03-25

## 2020-03-25 DIAGNOSIS — N39.0 URINARY TRACT INFECTION WITHOUT HEMATURIA, SITE UNSPECIFIED: Primary | ICD-10-CM

## 2020-03-25 DIAGNOSIS — N39.0 URINARY TRACT INFECTION WITHOUT HEMATURIA, SITE UNSPECIFIED: ICD-10-CM

## 2020-03-25 RX ORDER — AMOXICILLIN AND CLAVULANATE POTASSIUM 875; 125 MG/1; MG/1
1 TABLET, FILM COATED ORAL 2 TIMES DAILY
Qty: 20 TABLET | Refills: 0 | OUTPATIENT
Start: 2020-03-25 | End: 2020-03-25 | Stop reason: SDUPTHER

## 2020-03-25 RX ORDER — AMOXICILLIN AND CLAVULANATE POTASSIUM 875; 125 MG/1; MG/1
1 TABLET, FILM COATED ORAL 2 TIMES DAILY
Qty: 20 TABLET | Refills: 0 | Status: SHIPPED | OUTPATIENT
Start: 2020-03-25 | End: 2020-03-31 | Stop reason: ALTCHOICE

## 2020-03-25 NOTE — TELEPHONE ENCOUNTER
----- Message from Charanjit Holcomb sent at 3/25/2020  3:50 PM CDT -----  Contact: [phone]/self  Patient states the pharmacy did not receive Rx amoxicillin-clavulanate 875-125mg (AUGMENTIN) 875-125 mg per tablet.     Please advise

## 2020-03-25 NOTE — TELEPHONE ENCOUNTER
Home health nurse at patient's home to perform SPT change.  Questioning urine test due to symptoms of body aches and patient's request.

## 2020-03-26 ENCOUNTER — TELEPHONE (OUTPATIENT)
Dept: HOME HEALTH SERVICES | Facility: HOSPITAL | Age: 45
End: 2020-03-26

## 2020-03-31 ENCOUNTER — TELEPHONE (OUTPATIENT)
Dept: UROLOGY | Facility: CLINIC | Age: 45
End: 2020-03-31

## 2020-03-31 ENCOUNTER — DOCUMENTATION ONLY (OUTPATIENT)
Dept: UROLOGY | Facility: CLINIC | Age: 45
End: 2020-03-31

## 2020-03-31 DIAGNOSIS — N39.0 URINARY TRACT INFECTION ASSOCIATED WITH CATHETERIZATION OF URINARY TRACT, UNSPECIFIED INDWELLING URINARY CATHETER TYPE, INITIAL ENCOUNTER: Primary | ICD-10-CM

## 2020-03-31 DIAGNOSIS — T83.511A URINARY TRACT INFECTION ASSOCIATED WITH CATHETERIZATION OF URINARY TRACT, UNSPECIFIED INDWELLING URINARY CATHETER TYPE, INITIAL ENCOUNTER: Primary | ICD-10-CM

## 2020-03-31 RX ORDER — GENTAMICIN SULFATE 40 MG/ML
160 INJECTION, SOLUTION INTRAMUSCULAR; INTRAVENOUS ONCE
Qty: 4 ML | Refills: 0 | Status: SHIPPED | OUTPATIENT
Start: 2020-03-31 | End: 2020-03-31

## 2020-03-31 RX ORDER — CEPHALEXIN 500 MG/1
500 CAPSULE ORAL EVERY 12 HOURS
Qty: 20 CAPSULE | Refills: 0 | Status: SHIPPED | OUTPATIENT
Start: 2020-03-31 | End: 2020-04-02 | Stop reason: ALTCHOICE

## 2020-03-31 NOTE — TELEPHONE ENCOUNTER
Spoke with Ina at home health agency, after not being able to get in contact with Mr Mccrary. I gave her the antibiotic instructions and was asked to fax the order for the IM injections to [phone]. She stated that she will let the home health nurse know to  the medicine. Once the Rx is completed they will call us back to report improvement.

## 2020-03-31 NOTE — TELEPHONE ENCOUNTER
Patient's urine culture results back from , resistant to Augmentin. No good obvious oral agent. Will start patient on Keflex and Gentamicin IM until patient can see ID. Was notified by pharmacy that Gentamicin on back order from all distributors until May. Attempted to call patient to discuss. Our office and  has been unable to get in touch with Mr. Mccrary for days.  nurse is scheduled to check on patient tomorrow, she will call during the visit so that I can talk to patient and discuss possible virtual ID visit.

## 2020-03-31 NOTE — TELEPHONE ENCOUNTER
----- Message from Alia Edwards NP sent at 3/31/2020  1:49 PM CDT -----  Mr. Mccrary's UTI is resistant to the Augmentin that I started him on. He needs to stop the Augmentin and start the Keflex x 10 days. I would also like his  nurse to administer Gentamicin IM injection. Both medications have been sent to his pharmacy. Can you please have them follow-up with us if no improvement. Thank you

## 2020-04-02 ENCOUNTER — DOCUMENTATION ONLY (OUTPATIENT)
Dept: UROLOGY | Facility: CLINIC | Age: 45
End: 2020-04-02

## 2020-04-02 DIAGNOSIS — T83.511A URINARY TRACT INFECTION ASSOCIATED WITH CATHETERIZATION OF URINARY TRACT, UNSPECIFIED INDWELLING URINARY CATHETER TYPE, INITIAL ENCOUNTER: Primary | ICD-10-CM

## 2020-04-02 DIAGNOSIS — N39.0 URINARY TRACT INFECTION ASSOCIATED WITH CATHETERIZATION OF URINARY TRACT, UNSPECIFIED INDWELLING URINARY CATHETER TYPE, INITIAL ENCOUNTER: Primary | ICD-10-CM

## 2020-04-02 RX ORDER — GRANULES FOR ORAL 3 G/1
3 POWDER ORAL ONCE
Qty: 3 G | Refills: 0 | Status: SHIPPED | OUTPATIENT
Start: 2020-04-02 | End: 2020-04-02

## 2020-04-02 NOTE — PROGRESS NOTES
Patient's UTI resistant to Augmentin. Not many good PO options. Reached out to ID who recommended fosfomycin and if no improvement or severe symptoms patient will need to be admitted for IV antibiotics. Have been unsuccessful in getting in touch with patient via telephone since Monday 3/30. Patient's voicemail not set up. Have been in touch with patient's HH nurse Kalpana, who has also been unsuccessful getting in touch with patient and any of his caregivers. HH nurse reports this has happened in the past. Letter sent to patient notifying him of urine culture results and Fosfomyin sent to pharmacy. Asked patient to contact our office once he receives the letter and for patient to update us on his status and go the the ED for IV antibiotic administration if no improvement or worsening of symptoms.

## 2020-05-01 ENCOUNTER — TELEPHONE (OUTPATIENT)
Dept: UROLOGY | Facility: CLINIC | Age: 45
End: 2020-05-01

## 2020-05-01 ENCOUNTER — HOSPITAL ENCOUNTER (INPATIENT)
Facility: HOSPITAL | Age: 45
LOS: 10 days | Discharge: HOME HEALTH | DRG: 853 | End: 2020-05-11
Attending: EMERGENCY MEDICINE | Admitting: HOSPITALIST
Payer: MEDICARE

## 2020-05-01 DIAGNOSIS — Z93.59 CHRONIC SUPRAPUBIC CATHETER: Chronic | ICD-10-CM

## 2020-05-01 DIAGNOSIS — R78.81 BACTEREMIA DUE TO GRAM-NEGATIVE BACTERIA: ICD-10-CM

## 2020-05-01 DIAGNOSIS — A41.9 SEPSIS WITHOUT ACUTE ORGAN DYSFUNCTION: ICD-10-CM

## 2020-05-01 DIAGNOSIS — Z79.4 TYPE 2 DIABETES MELLITUS WITH HYPERGLYCEMIA, WITH LONG-TERM CURRENT USE OF INSULIN: Chronic | ICD-10-CM

## 2020-05-01 DIAGNOSIS — L89.313 PRESSURE INJURY OF RIGHT BUTTOCK, STAGE 3: ICD-10-CM

## 2020-05-01 DIAGNOSIS — M86.9 OSTEOMYELITIS, UNSPECIFIED SITE, UNSPECIFIED TYPE: ICD-10-CM

## 2020-05-01 DIAGNOSIS — L08.9 INFECTED DECUBITUS ULCER, STAGE IV: ICD-10-CM

## 2020-05-01 DIAGNOSIS — Z16.24 NEWLY DIAGNOSED INFECTION DUE TO MULTIDRUG RESISTANT ORGANISM: ICD-10-CM

## 2020-05-01 DIAGNOSIS — D50.9 IRON DEFICIENCY ANEMIA, UNSPECIFIED IRON DEFICIENCY ANEMIA TYPE: Chronic | ICD-10-CM

## 2020-05-01 DIAGNOSIS — I10 ESSENTIAL HYPERTENSION: Chronic | ICD-10-CM

## 2020-05-01 DIAGNOSIS — L89.324 PRESSURE INJURY OF LEFT ISCHIUM, STAGE 4: ICD-10-CM

## 2020-05-01 DIAGNOSIS — L08.9 DECUBITUS ULCER, INFECTED, STAGE III: Primary | ICD-10-CM

## 2020-05-01 DIAGNOSIS — Z43.5 ENCOUNTER FOR SUPRAPUBIC CATHETER CARE: ICD-10-CM

## 2020-05-01 DIAGNOSIS — Z86.718 HISTORY OF DVT OF LOWER EXTREMITY: ICD-10-CM

## 2020-05-01 DIAGNOSIS — R00.0 TACHYCARDIA: ICD-10-CM

## 2020-05-01 DIAGNOSIS — L89.93 DECUBITUS ULCER, INFECTED, STAGE III: Primary | ICD-10-CM

## 2020-05-01 DIAGNOSIS — E11.65 TYPE 2 DIABETES MELLITUS WITH HYPERGLYCEMIA, WITH LONG-TERM CURRENT USE OF INSULIN: Chronic | ICD-10-CM

## 2020-05-01 DIAGNOSIS — L89.94 INFECTED DECUBITUS ULCER, STAGE IV: ICD-10-CM

## 2020-05-01 DIAGNOSIS — A41.9 SEPSIS WITHOUT ACUTE ORGAN DYSFUNCTION, DUE TO UNSPECIFIED ORGANISM: ICD-10-CM

## 2020-05-01 DIAGNOSIS — Z89.511 HX OF RIGHT BKA: Chronic | ICD-10-CM

## 2020-05-01 DIAGNOSIS — G82.20 PARAPLEGIA AT T9 LEVEL: Chronic | ICD-10-CM

## 2020-05-01 LAB
ALBUMIN SERPL BCP-MCNC: 2.9 G/DL (ref 3.5–5.2)
ALP SERPL-CCNC: 133 U/L (ref 55–135)
ALT SERPL W/O P-5'-P-CCNC: 8 U/L (ref 10–44)
ANION GAP SERPL CALC-SCNC: 11 MMOL/L (ref 8–16)
AST SERPL-CCNC: 9 U/L (ref 10–40)
BASOPHILS # BLD AUTO: 0.06 K/UL (ref 0–0.2)
BASOPHILS NFR BLD: 0.4 % (ref 0–1.9)
BILIRUB SERPL-MCNC: 0.4 MG/DL (ref 0.1–1)
BILIRUB UR QL STRIP: NEGATIVE
BUN SERPL-MCNC: 8 MG/DL (ref 6–20)
CALCIUM SERPL-MCNC: 9.5 MG/DL (ref 8.7–10.5)
CHLORIDE SERPL-SCNC: 104 MMOL/L (ref 95–110)
CLARITY UR: ABNORMAL
CO2 SERPL-SCNC: 24 MMOL/L (ref 23–29)
COLOR UR: YELLOW
CREAT SERPL-MCNC: 0.9 MG/DL (ref 0.5–1.4)
CRP SERPL-MCNC: 220.8 MG/L (ref 0–8.2)
DIFFERENTIAL METHOD: ABNORMAL
EOSINOPHIL # BLD AUTO: 0.2 K/UL (ref 0–0.5)
EOSINOPHIL NFR BLD: 1.5 % (ref 0–8)
ERYTHROCYTE [DISTWIDTH] IN BLOOD BY AUTOMATED COUNT: 15.4 % (ref 11.5–14.5)
ERYTHROCYTE [SEDIMENTATION RATE] IN BLOOD BY WESTERGREN METHOD: 95 MM/HR (ref 0–10)
EST. GFR  (AFRICAN AMERICAN): >60 ML/MIN/1.73 M^2
EST. GFR  (NON AFRICAN AMERICAN): >60 ML/MIN/1.73 M^2
ESTIMATED AVG GLUCOSE: 146 MG/DL (ref 68–131)
GLUCOSE SERPL-MCNC: 105 MG/DL (ref 70–110)
GLUCOSE UR QL STRIP: NEGATIVE
HBA1C MFR BLD HPLC: 6.7 % (ref 4–5.6)
HCT VFR BLD AUTO: 31.7 % (ref 40–54)
HGB BLD-MCNC: 10.4 G/DL (ref 14–18)
HGB UR QL STRIP: ABNORMAL
IMM GRANULOCYTES # BLD AUTO: 0.07 K/UL (ref 0–0.04)
IMM GRANULOCYTES NFR BLD AUTO: 0.4 % (ref 0–0.5)
KETONES UR QL STRIP: NEGATIVE
LACTATE SERPL-SCNC: 0.8 MMOL/L (ref 0.5–2.2)
LACTATE SERPL-SCNC: 2.3 MMOL/L (ref 0.5–2.2)
LEUKOCYTE ESTERASE UR QL STRIP: ABNORMAL
LYMPHOCYTES # BLD AUTO: 2.5 K/UL (ref 1–4.8)
LYMPHOCYTES NFR BLD: 16 % (ref 18–48)
MAGNESIUM SERPL-MCNC: 2 MG/DL (ref 1.6–2.6)
MCH RBC QN AUTO: 25.7 PG (ref 27–31)
MCHC RBC AUTO-ENTMCNC: 32.8 G/DL (ref 32–36)
MCV RBC AUTO: 78 FL (ref 82–98)
MICROSCOPIC COMMENT: ABNORMAL
MONOCYTES # BLD AUTO: 1.5 K/UL (ref 0.3–1)
MONOCYTES NFR BLD: 9.3 % (ref 4–15)
NEUTROPHILS # BLD AUTO: 11.3 K/UL (ref 1.8–7.7)
NEUTROPHILS NFR BLD: 72.4 % (ref 38–73)
NITRITE UR QL STRIP: NEGATIVE
NRBC BLD-RTO: 0 /100 WBC
PH UR STRIP: 7 [PH] (ref 5–8)
PHOSPHATE SERPL-MCNC: 2.6 MG/DL (ref 2.7–4.5)
PLATELET # BLD AUTO: 836 K/UL (ref 150–350)
PMV BLD AUTO: 9.9 FL (ref 9.2–12.9)
POCT GLUCOSE: 112 MG/DL (ref 70–110)
POTASSIUM SERPL-SCNC: 3.8 MMOL/L (ref 3.5–5.1)
PROCALCITONIN SERPL IA-MCNC: 0.07 NG/ML
PROT SERPL-MCNC: 8.7 G/DL (ref 6–8.4)
PROT UR QL STRIP: ABNORMAL
RBC # BLD AUTO: 4.05 M/UL (ref 4.6–6.2)
RBC #/AREA URNS HPF: >100 /HPF (ref 0–4)
SARS-COV-2 RDRP RESP QL NAA+PROBE: NEGATIVE
SODIUM SERPL-SCNC: 139 MMOL/L (ref 136–145)
SP GR UR STRIP: 1.01 (ref 1–1.03)
URN SPEC COLLECT METH UR: ABNORMAL
UROBILINOGEN UR STRIP-ACNC: NEGATIVE EU/DL
WBC # BLD AUTO: 15.64 K/UL (ref 3.9–12.7)
WBC #/AREA URNS HPF: 10 /HPF (ref 0–5)

## 2020-05-01 PROCEDURE — 87186 SC STD MICRODIL/AGAR DIL: CPT

## 2020-05-01 PROCEDURE — U0002 COVID-19 LAB TEST NON-CDC: HCPCS

## 2020-05-01 PROCEDURE — 83036 HEMOGLOBIN GLYCOSYLATED A1C: CPT

## 2020-05-01 PROCEDURE — 87077 CULTURE AEROBIC IDENTIFY: CPT

## 2020-05-01 PROCEDURE — 85025 COMPLETE CBC W/AUTO DIFF WBC: CPT

## 2020-05-01 PROCEDURE — 83735 ASSAY OF MAGNESIUM: CPT

## 2020-05-01 PROCEDURE — 99285 EMERGENCY DEPT VISIT HI MDM: CPT | Mod: 25,,, | Performed by: STUDENT IN AN ORGANIZED HEALTH CARE EDUCATION/TRAINING PROGRAM

## 2020-05-01 PROCEDURE — 87040 BLOOD CULTURE FOR BACTERIA: CPT

## 2020-05-01 PROCEDURE — 85652 RBC SED RATE AUTOMATED: CPT

## 2020-05-01 PROCEDURE — 99291 CRITICAL CARE FIRST HOUR: CPT | Mod: 25

## 2020-05-01 PROCEDURE — 99285 PR EMERGENCY DEPT VISIT,LEVEL V: ICD-10-PCS | Mod: 25,,, | Performed by: STUDENT IN AN ORGANIZED HEALTH CARE EDUCATION/TRAINING PROGRAM

## 2020-05-01 PROCEDURE — C9399 UNCLASSIFIED DRUGS OR BIOLOG: HCPCS | Performed by: HOSPITALIST

## 2020-05-01 PROCEDURE — 87075 CULTR BACTERIA EXCEPT BLOOD: CPT

## 2020-05-01 PROCEDURE — 63600175 PHARM REV CODE 636 W HCPCS: Performed by: EMERGENCY MEDICINE

## 2020-05-01 PROCEDURE — 81000 URINALYSIS NONAUTO W/SCOPE: CPT

## 2020-05-01 PROCEDURE — 51710 PR CHANGE OF BLADDER TUBE,COMPLICATED: ICD-10-PCS | Mod: ,,, | Performed by: STUDENT IN AN ORGANIZED HEALTH CARE EDUCATION/TRAINING PROGRAM

## 2020-05-01 PROCEDURE — 11000001 HC ACUTE MED/SURG PRIVATE ROOM

## 2020-05-01 PROCEDURE — 25000003 PHARM REV CODE 250: Performed by: EMERGENCY MEDICINE

## 2020-05-01 PROCEDURE — 84145 PROCALCITONIN (PCT): CPT

## 2020-05-01 PROCEDURE — 25000003 PHARM REV CODE 250: Performed by: NURSE PRACTITIONER

## 2020-05-01 PROCEDURE — 96374 THER/PROPH/DIAG INJ IV PUSH: CPT

## 2020-05-01 PROCEDURE — 63600175 PHARM REV CODE 636 W HCPCS: Performed by: HOSPITALIST

## 2020-05-01 PROCEDURE — 86140 C-REACTIVE PROTEIN: CPT

## 2020-05-01 PROCEDURE — 87070 CULTURE OTHR SPECIMN AEROBIC: CPT

## 2020-05-01 PROCEDURE — 36415 COLL VENOUS BLD VENIPUNCTURE: CPT

## 2020-05-01 PROCEDURE — 87076 CULTURE ANAEROBE IDENT EACH: CPT

## 2020-05-01 PROCEDURE — 93005 ELECTROCARDIOGRAM TRACING: CPT

## 2020-05-01 PROCEDURE — 51710 CHANGE OF BLADDER TUBE: CPT | Mod: ,,, | Performed by: STUDENT IN AN ORGANIZED HEALTH CARE EDUCATION/TRAINING PROGRAM

## 2020-05-01 PROCEDURE — 84100 ASSAY OF PHOSPHORUS: CPT

## 2020-05-01 PROCEDURE — P9612 CATHETERIZE FOR URINE SPEC: HCPCS

## 2020-05-01 PROCEDURE — 83605 ASSAY OF LACTIC ACID: CPT

## 2020-05-01 PROCEDURE — 80053 COMPREHEN METABOLIC PANEL: CPT

## 2020-05-01 RX ORDER — DEXTROSE 50 % IN WATER (D50W) INTRAVENOUS SYRINGE
12.5
Status: DISCONTINUED | OUTPATIENT
Start: 2020-05-01 | End: 2020-05-11 | Stop reason: HOSPADM

## 2020-05-01 RX ORDER — DEXTROSE 50 % IN WATER (D50W) INTRAVENOUS SYRINGE
25
Status: DISCONTINUED | OUTPATIENT
Start: 2020-05-01 | End: 2020-05-11 | Stop reason: HOSPADM

## 2020-05-01 RX ORDER — GABAPENTIN 300 MG/1
300 CAPSULE ORAL 2 TIMES DAILY
Status: DISCONTINUED | OUTPATIENT
Start: 2020-05-01 | End: 2020-05-11 | Stop reason: HOSPADM

## 2020-05-01 RX ORDER — INSULIN ASPART 100 [IU]/ML
10 INJECTION, SUSPENSION SUBCUTANEOUS
Status: DISCONTINUED | OUTPATIENT
Start: 2020-05-02 | End: 2020-05-01

## 2020-05-01 RX ORDER — ASPIRIN 81 MG/1
81 TABLET ORAL DAILY
Status: DISCONTINUED | OUTPATIENT
Start: 2020-05-02 | End: 2020-05-11 | Stop reason: HOSPADM

## 2020-05-01 RX ORDER — IBUPROFEN 200 MG
16 TABLET ORAL
Status: DISCONTINUED | OUTPATIENT
Start: 2020-05-01 | End: 2020-05-11 | Stop reason: HOSPADM

## 2020-05-01 RX ORDER — ACETAMINOPHEN 500 MG
1000 TABLET ORAL
Status: COMPLETED | OUTPATIENT
Start: 2020-05-01 | End: 2020-05-01

## 2020-05-01 RX ORDER — FERROUS SULFATE 325(65) MG
325 TABLET, DELAYED RELEASE (ENTERIC COATED) ORAL 2 TIMES DAILY
Status: DISCONTINUED | OUTPATIENT
Start: 2020-05-01 | End: 2020-05-02

## 2020-05-01 RX ORDER — IBUPROFEN 200 MG
24 TABLET ORAL
Status: DISCONTINUED | OUTPATIENT
Start: 2020-05-01 | End: 2020-05-11 | Stop reason: HOSPADM

## 2020-05-01 RX ORDER — DOCUSATE SODIUM 100 MG/1
100 CAPSULE, LIQUID FILLED ORAL 2 TIMES DAILY
Status: DISCONTINUED | OUTPATIENT
Start: 2020-05-01 | End: 2020-05-02

## 2020-05-01 RX ORDER — GLUCAGON 1 MG
1 KIT INJECTION
Status: DISCONTINUED | OUTPATIENT
Start: 2020-05-01 | End: 2020-05-11 | Stop reason: HOSPADM

## 2020-05-01 RX ORDER — ACETAMINOPHEN 325 MG/1
650 TABLET ORAL EVERY 6 HOURS PRN
Status: DISCONTINUED | OUTPATIENT
Start: 2020-05-01 | End: 2020-05-11 | Stop reason: HOSPADM

## 2020-05-01 RX ORDER — INSULIN ASPART 100 [IU]/ML
1-10 INJECTION, SOLUTION INTRAVENOUS; SUBCUTANEOUS
Status: DISCONTINUED | OUTPATIENT
Start: 2020-05-01 | End: 2020-05-11 | Stop reason: HOSPADM

## 2020-05-01 RX ADMIN — FERROUS SULFATE TAB EC 325 MG (65 MG FE EQUIVALENT) 325 MG: 325 (65 FE) TABLET DELAYED RESPONSE at 11:05

## 2020-05-01 RX ADMIN — PIPERACILLIN AND TAZOBACTAM 4.5 G: 4; .5 INJECTION, POWDER, LYOPHILIZED, FOR SOLUTION INTRAVENOUS; PARENTERAL at 06:05

## 2020-05-01 RX ADMIN — DOCUSATE SODIUM 100 MG: 100 CAPSULE, LIQUID FILLED ORAL at 11:05

## 2020-05-01 RX ADMIN — INSULIN DETEMIR 7 UNITS: 100 INJECTION, SOLUTION SUBCUTANEOUS at 11:05

## 2020-05-01 RX ADMIN — SODIUM CHLORIDE, SODIUM LACTATE, POTASSIUM CHLORIDE, AND CALCIUM CHLORIDE 1000 ML: .6; .31; .03; .02 INJECTION, SOLUTION INTRAVENOUS at 05:05

## 2020-05-01 RX ADMIN — GABAPENTIN 300 MG: 300 CAPSULE ORAL at 11:05

## 2020-05-01 RX ADMIN — ACETAMINOPHEN 1000 MG: 500 TABLET ORAL at 05:05

## 2020-05-01 RX ADMIN — VANCOMYCIN HYDROCHLORIDE 2000 MG: 100 INJECTION, POWDER, LYOPHILIZED, FOR SOLUTION INTRAVENOUS at 10:05

## 2020-05-01 NOTE — TELEPHONE ENCOUNTER
Spoke with .  Admitted yesterday to [facility].  SPT accidentally came out per patient a few hours ago.   is at patient's residence.  Questioning if 18 Fr can be placed.  In Jan 2020, 20 fr placed due to clogging.  Asked to speak with patient to get updated phone number: [phone].  Patient stated he does not remember last SPT change.  Call placed on hold.
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
## 2020-05-01 NOTE — TELEPHONE ENCOUNTER
Returned call, no answer for Emily.  Last SPT change with Urology Dept was 1/2020.  Need to confirm fax number.

## 2020-05-01 NOTE — ED NOTES
Attempted second iv access per CATARINO Avelar RN. Pt. Refuses iv but allowed blood draw for second culture.

## 2020-05-01 NOTE — TELEPHONE ENCOUNTER
----- Message from Charanjit Holcomb sent at 5/1/2020 11:06 AM CDT -----  Contact: Emily aguilera/[facility]  [phone]  Emily is requesting orders for catheter replacement.   She need to know size of catheter and how often to change.  Fax# [phone]   Please advise

## 2020-05-01 NOTE — TELEPHONE ENCOUNTER
with HH called back, she was unsuccessful in replacing SPT, patient also with temperature of 101. She called EMS. Gave report to to Rosario, ER charge nurse.

## 2020-05-01 NOTE — ED NOTES
Arrive to ED via EMS. C/o fever and chills x 1 day & suprapubic catheter came out today around 11. Denies cough, N/V/D or SOB or being anyone with known Covid.
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
## 2020-05-02 PROBLEM — L89.94 INFECTED DECUBITUS ULCER, STAGE IV: Status: ACTIVE | Noted: 2020-05-01

## 2020-05-02 PROBLEM — A41.9 SEPSIS WITHOUT ACUTE ORGAN DYSFUNCTION: Status: ACTIVE | Noted: 2020-05-02

## 2020-05-02 LAB
BACTERIA #/AREA URNS HPF: ABNORMAL /HPF
BILIRUB UR QL STRIP: NEGATIVE
CLARITY UR: CLEAR
COLOR UR: YELLOW
GLUCOSE UR QL STRIP: NEGATIVE
HGB UR QL STRIP: ABNORMAL
HYALINE CASTS #/AREA URNS LPF: 0 /LPF
KETONES UR QL STRIP: NEGATIVE
LEUKOCYTE ESTERASE UR QL STRIP: ABNORMAL
MICROSCOPIC COMMENT: ABNORMAL
NITRITE UR QL STRIP: NEGATIVE
PH UR STRIP: 6 [PH] (ref 5–8)
POCT GLUCOSE: 101 MG/DL (ref 70–110)
POCT GLUCOSE: 102 MG/DL (ref 70–110)
POCT GLUCOSE: 108 MG/DL (ref 70–110)
POCT GLUCOSE: 94 MG/DL (ref 70–110)
PROT UR QL STRIP: NEGATIVE
RBC #/AREA URNS HPF: 4 /HPF (ref 0–4)
SP GR UR STRIP: 1.01 (ref 1–1.03)
SQUAMOUS #/AREA URNS HPF: 1 /HPF
URN SPEC COLLECT METH UR: ABNORMAL
UROBILINOGEN UR STRIP-ACNC: NEGATIVE EU/DL
WBC #/AREA URNS HPF: 6 /HPF (ref 0–5)
WBC CLUMPS URNS QL MICRO: ABNORMAL
YEAST URNS QL MICRO: ABNORMAL

## 2020-05-02 PROCEDURE — 63600175 PHARM REV CODE 636 W HCPCS: Performed by: HOSPITALIST

## 2020-05-02 PROCEDURE — 94761 N-INVAS EAR/PLS OXIMETRY MLT: CPT

## 2020-05-02 PROCEDURE — 25000003 PHARM REV CODE 250: Performed by: HOSPITALIST

## 2020-05-02 PROCEDURE — 25000003 PHARM REV CODE 250: Performed by: NURSE PRACTITIONER

## 2020-05-02 PROCEDURE — 81000 URINALYSIS NONAUTO W/SCOPE: CPT

## 2020-05-02 PROCEDURE — 11000001 HC ACUTE MED/SURG PRIVATE ROOM

## 2020-05-02 RX ORDER — TALC
3 POWDER (GRAM) TOPICAL NIGHTLY PRN
Status: DISCONTINUED | OUTPATIENT
Start: 2020-05-02 | End: 2020-05-11 | Stop reason: HOSPADM

## 2020-05-02 RX ORDER — CYCLOBENZAPRINE HCL 10 MG
10 TABLET ORAL 3 TIMES DAILY PRN
Status: DISCONTINUED | OUTPATIENT
Start: 2020-05-02 | End: 2020-05-11 | Stop reason: HOSPADM

## 2020-05-02 RX ORDER — METOPROLOL TARTRATE 25 MG/1
25 TABLET, FILM COATED ORAL 2 TIMES DAILY
Status: DISCONTINUED | OUTPATIENT
Start: 2020-05-02 | End: 2020-05-05

## 2020-05-02 RX ORDER — FERROUS SULFATE 325(65) MG
325 TABLET, DELAYED RELEASE (ENTERIC COATED) ORAL DAILY
Status: DISCONTINUED | OUTPATIENT
Start: 2020-05-03 | End: 2020-05-11 | Stop reason: HOSPADM

## 2020-05-02 RX ORDER — POLYETHYLENE GLYCOL 3350 17 G/17G
17 POWDER, FOR SOLUTION ORAL 2 TIMES DAILY PRN
Status: DISCONTINUED | OUTPATIENT
Start: 2020-05-02 | End: 2020-05-11 | Stop reason: HOSPADM

## 2020-05-02 RX ORDER — LIDOCAINE HYDROCHLORIDE 10 MG/ML
1 INJECTION INFILTRATION; PERINEURAL ONCE
Status: DISCONTINUED | OUTPATIENT
Start: 2020-05-02 | End: 2020-05-11 | Stop reason: HOSPADM

## 2020-05-02 RX ORDER — GEMFIBROZIL 600 MG/1
600 TABLET, FILM COATED ORAL
Status: DISCONTINUED | OUTPATIENT
Start: 2020-05-02 | End: 2020-05-11 | Stop reason: HOSPADM

## 2020-05-02 RX ORDER — ENOXAPARIN SODIUM 100 MG/ML
40 INJECTION SUBCUTANEOUS EVERY 24 HOURS
Status: COMPLETED | OUTPATIENT
Start: 2020-05-02 | End: 2020-05-03

## 2020-05-02 RX ADMIN — GABAPENTIN 300 MG: 300 CAPSULE ORAL at 08:05

## 2020-05-02 RX ADMIN — DOCUSATE SODIUM 100 MG: 100 CAPSULE, LIQUID FILLED ORAL at 08:05

## 2020-05-02 RX ADMIN — PIPERACILLIN AND TAZOBACTAM 4.5 G: 4; .5 INJECTION, POWDER, LYOPHILIZED, FOR SOLUTION INTRAVENOUS; PARENTERAL at 01:05

## 2020-05-02 RX ADMIN — ASPIRIN 81 MG: 81 TABLET, COATED ORAL at 08:05

## 2020-05-02 RX ADMIN — FERROUS SULFATE TAB EC 325 MG (65 MG FE EQUIVALENT) 325 MG: 325 (65 FE) TABLET DELAYED RESPONSE at 08:05

## 2020-05-02 RX ADMIN — VANCOMYCIN HYDROCHLORIDE 1750 MG: 100 INJECTION, POWDER, LYOPHILIZED, FOR SOLUTION INTRAVENOUS at 12:05

## 2020-05-02 RX ADMIN — VANCOMYCIN HYDROCHLORIDE 1750 MG: 100 INJECTION, POWDER, LYOPHILIZED, FOR SOLUTION INTRAVENOUS at 11:05

## 2020-05-02 RX ADMIN — INSULIN DETEMIR 7 UNITS: 100 INJECTION, SOLUTION SUBCUTANEOUS at 09:05

## 2020-05-02 RX ADMIN — METOPROLOL TARTRATE 25 MG: 25 TABLET ORAL at 09:05

## 2020-05-02 RX ADMIN — GABAPENTIN 300 MG: 300 CAPSULE ORAL at 09:05

## 2020-05-02 RX ADMIN — INSULIN DETEMIR 7 UNITS: 100 INJECTION, SOLUTION SUBCUTANEOUS at 08:05

## 2020-05-02 RX ADMIN — PIPERACILLIN AND TAZOBACTAM 4.5 G: 4; .5 INJECTION, POWDER, LYOPHILIZED, FOR SOLUTION INTRAVENOUS; PARENTERAL at 03:05

## 2020-05-02 RX ADMIN — METOPROLOL TARTRATE 25 MG: 25 TABLET ORAL at 08:05

## 2020-05-02 RX ADMIN — GEMFIBROZIL 600 MG: 600 TABLET ORAL at 04:05

## 2020-05-02 RX ADMIN — ENOXAPARIN SODIUM 40 MG: 100 INJECTION SUBCUTANEOUS at 04:05
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
## 2020-05-02 NOTE — ASSESSMENT & PLAN NOTE
A1C  6.7. Hold metformin and insulin levemir 15 units BID. Guve Inuslin detemir 7 units  BID while inpatient + SSI. Accucheck AC&HS. Diabetic Diet.

## 2020-05-02 NOTE — ASSESSMENT & PLAN NOTE
A1C  6.7. Hold metformin and insulin levemir 15 units BID  - give Insulin detemir 7 units BID while inpatient + SSI  - accucheck AC&HS  - diabetic diet

## 2020-05-02 NOTE — NURSING
Received patient from ED around 8 pm. He is AAO x 4. Introduced myself as his bedside nurse to night. Informed about VN and her role in patient care. Call bell explained. Has suprapubic catheter in place. Very unco-operative will not allow me to look at the suprapubic catheter site and the wound on his sacrum. At present he is on IV fluids LR which was started in ER. Will continue to monitor patient. Patient care handed over to VALERIO Brunner for continuity.

## 2020-05-02 NOTE — SUBJECTIVE & OBJECTIVE
Interval History: Doing well today, laying in bed with covers over face. States that he is tired. Otherwise, denies any further fevers, chills. He does not have any pain sensation associated with sacral wound; he has been unable to receive wound care due to transportation issues. He is unaware if there has been any increase in drainage. Does report his urine appeared more cloudy than normal and that he has history of UTIs in the past. Suprapubic catheter is in place.    Review of Systems   Constitutional: Negative for chills and fever.   Respiratory: Negative for shortness of breath.    Cardiovascular: Negative for chest pain.   Genitourinary: Positive for difficulty urinating.   Skin: Positive for wound.   Neurological: Positive for weakness (paraplegia).     Objective:     Vital Signs (Most Recent):  Temp: 98.3 °F (36.8 °C) (05/02/20 0828)  Pulse: 95 (05/02/20 0828)  Resp: 20 (05/02/20 0828)  BP: 126/74 (05/02/20 0828)  SpO2: 98 % (05/02/20 0828) Vital Signs (24h Range):  Temp:  [98.3 °F (36.8 °C)-100.4 °F (38 °C)] 98.3 °F (36.8 °C)  Pulse:  [] 95  Resp:  [20-28] 20  SpO2:  [98 %-100 %] 98 %  BP: (120-146)/(60-86) 126/74     Weight: 96 kg (211 lb 10.3 oz)  Body mass index is 29.52 kg/m².    Intake/Output Summary (Last 24 hours) at 5/2/2020 1054  Last data filed at 5/2/2020 0508  Gross per 24 hour   Intake 500 ml   Output 775 ml   Net -275 ml      Physical Exam   Constitutional: He is oriented to person, place, and time. He appears well-developed and well-nourished. No distress.   HENT:   Head: Normocephalic and atraumatic.   Eyes: Pupils are equal, round, and reactive to light. Conjunctivae are normal.   Neck: Normal range of motion. Neck supple. No JVD present.   Cardiovascular: Normal rate, regular rhythm, normal heart sounds and intact distal pulses.   Pulmonary/Chest: Effort normal and breath sounds normal. No respiratory distress. He has no wheezes.   Abdominal: Soft. Bowel sounds are normal. He  exhibits no distension. There is no tenderness. There is no guarding.   Musculoskeletal: He exhibits no edema or tenderness.   Right BKA. Paraplegic   Neurological: He is alert and oriented to person, place, and time.   Skin: Skin is warm and dry. Capillary refill takes less than 2 seconds. No erythema.   Stage IV ischial ulcer, no surrounding cellulitis. Packing in place.   Psychiatric: He has a normal mood and affect. His behavior is normal.       Significant Labs: All pertinent labs within the past 24 hours have been reviewed.    Significant Imaging: I have reviewed all pertinent imaging results/findings within the past 24 hours.

## 2020-05-02 NOTE — ASSESSMENT & PLAN NOTE
- ESR 95,  - continue Vancomycin and  Zosyn  - General Surgery consulted for possible debridement, planned for 5/4 am  - turn q2h   - see above

## 2020-05-02 NOTE — HOSPITAL COURSE
Admitted to hospital medicine for sepsis 2/2 infected ischial ulcer. Consulted General Surgery for debridement and initiated on IV antibiotics. POD#2 sacral debridement per Dr. Tom. Appreciate ID---will start ertapenem daily.
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
## 2020-05-02 NOTE — ED NOTES
Report given to katia on 5th floor. Pt. Is resting quietly without distress. Awaiting transfer to floor.

## 2020-05-02 NOTE — NURSING
Telemetry monitor read sustained HR in 120s for a few minutes then went back down to mid 80s. Pt asymptomatic. Reported to Dr. Humphrey and no orders were given at this time.

## 2020-05-02 NOTE — CONSULTS
Today`s Date: 5/2/2020     Admit Date: 5/1/2020    Admitting Physician: Jas Humphrey, *    Patient`s Name: Hermann Aguilar , 44 y.o. male    Reason for consultation  Wound care   Patient Active Problem List:     Iron deficiency anemia     Recurrent major depressive disorder, in remission     Chronic suprapubic catheter     Essential hypertension     Vitamin D deficiency     Paraplegia at T9 level     History of DVT of lower extremity     Hx of right BKA     Non-healing wound of lower extremity, initial encounter     Chronic post-traumatic stress disorder     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     PAD (peripheral artery disease)     Neuropathic foot ulcer     Constipation     Type 2 diabetes mellitus with hyperglycemia, with long-term current use of insulin     Chronic ulcer of left lower extremity     Major depressive disorder     Phantom limb syndrome     Cigarette smoker     Hematuria     Neurogenic bladder     Pressure injury of left ankle, stage 4     Pressure injury of buttock, stage 3     Bacteremia due to Gram-negative bacteria     Pressure injury of left ischium, stage 4     Newly diagnosed infection due to multidrug resistant organism     Decubitus ulcer, infected, stage III      Past Medical History:  No date: 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  No date: Acute postoperative respiratory failure  No date: Anemia, iron deficiency  No date: Chronic posttraumatic stress disorder  No date: Constipation  No date: Diabetes mellitus  No date: Gastric ulcer  No date: Hypertension  No date: 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 Yadiel Haley  No date: CHEST TUBE INSERTION; Right  8/30/2018: CYSTOSCOPY; N/A      Comment:  Procedure: CYSTOSCOPY, clot evacuation, suprapubic tube                exchange;  Surgeon: Ruchi Navarrete MD;  Location: [facility]                OR;  Service: Urology;  Laterality: N/A;  12/15/2016: GASTROSTOMY TUBE PLACEMENT  12/15/2016: ORIF HUMERUS FRACTURE; Left  8/30/2018: REMOVAL OF BLOOD CLOT      Comment:  Procedure: REMOVAL, BLOOD CLOT;  Surgeon: Ruchi Navarrete MD;  Location: [facility] OR;  Service: Urology;;  No date: TRACHEOSTOMY TUBE PLACEMENT    Prior to Admission medications :  Medication albuterol-ipratropium (DUO-NEB) 2.5 mg-0.5 mg/3 mL nebulizer solution, Sig Inhale 3 mLs into the lungs., Start Date , End Date , Taking? , Authorizing Provider Historical Provider, MD    Medication alcohol swabs (ALCOHOL PADS) PadM, Sig Apply 1 each topically once daily., Start Date 5/15/19, End Date , Taking? , Authorizing Provider Ramu Breen MD    Medication ammonium lactate 12 % Crea, Sig MIHAELA EXT AA ON SKIN BID, Start Date 4/11/19, End Date , Taking? , Authorizing Provider Historical Provider, MD    Medication aspirin (ECOTRIN) 81 MG EC tablet, Sig aspirin, Start Date , End Date , Taking? , Authorizing Provider Historical Provider, MD    Medication baclofen (LIORESAL) 10 MG tablet, Sig , Start Date 1/20/20, End Date , Taking? , Authorizing Provider Historical Provider, MD    Medication cadexomer iodine (IODOSORB) 0.9 % gel, Sig Apply topically daily as needed for Wound Care., Start Date , End Date , Taking? , Authorizing Provider Historical Provider, MD    Medication cyclobenzaprine (FLEXERIL) 10 MG tablet, Sig Take 1 tablet (10 mg total) by mouth 3 (three) times daily as needed., Start Date 4/6/19, End Date , Taking? , Authorizing Provider Ramu Breen MD    Medication dextran 70-hypromellose (TEARS) ophthalmic solution, Sig Apply 1 drop to eye., Start Date , End Date ,  Taking? , Authorizing Provider Historical MD Zion    Medication docusate sodium (COLACE) 100 MG capsule, Sig Take 1 capsule (100 mg total) by mouth 2 (two) times daily., Start Date 2/14/19, End Date , Taking? , Authorizing Provider Ramu Breen MD    Medication drainage bag Misc, Sig 1 Units by Misc.(Non-Drug; Combo Route) route every 30 days., Start Date 5/15/19, End Date , Taking? , Authorizing Provider Ramu Breen MD    Medication famotidine (PEPCID) 20 MG tablet, Sig Take 1 tablet (20 mg total) by mouth 2 (two) times daily., Start Date 2/14/19, End Date 1/2/20, Taking? , Authorizing Provider Ramu Breen MD    Medication ferrous sulfate (IRON, FERROUS SULFATE,) 325 mg (65 mg iron) Tab tablet, Sig ferrous sulfate 325mg, Start Date , End Date , Taking? , Authorizing Provider Alesia Donovan MD    Medication gabapentin (NEURONTIN) 300 MG capsule, Sig Take 1 capsule (300 mg total) by mouth 2 (two) times daily., Start Date 4/6/19, End Date , Taking? , Authorizing Provider Ramu Breen MD    Medication gemfibrozil (LOPID) 600 MG tablet, Sig Take 1 tablet (600 mg total) by mouth 2 (two) times daily before meals., Start Date 4/6/19, End Date 1/2/20, Taking? , Authorizing Provider Ramu Breen MD    Medication insulin detemir U-100 (LEVEMIR) 100 unit/mL injection, Sig Inject 15 Units into the skin 2 (two) times daily., Start Date 2/14/19, End Date , Taking? , Authorizing Provider Ramu Breen MD    Medication insulin lispro protamin-lispro (HUMALOG MIX 75-25 KWIKPEN) 100 unit/mL (75-25) InPn, Sig Inject 20 Units into the skin 3 (three) times daily with meals., Start Date 5/15/19, End Date 5/14/20, Taking? , Authorizing Provider Ramu Breen MD    Medication ketoconazole (NIZORAL) 2 % shampoo, Sig Apply topically twice a week., Start Date 2/14/19, End Date , Taking? , Authorizing Provider Ramu Breen MD    Medication melatonin 10 mg Cap, Sig Take 1 tablet by mouth every evening., Start  "Date 2/14/19, End Date , Taking? , Authorizing Provider Ramu Breen MD    Medication meloxicam (MOBIC) 7.5 MG tablet, Sig Take 1 tablet (7.5 mg total) by mouth 2 (two) times daily as needed for Pain., Start Date 4/6/19, End Date , Taking? , Authorizing Provider Ramu Breen MD    Medication metFORMIN (GLUCOPHAGE) 500 MG tablet, Sig Take 1 tablet (500 mg total) by mouth 2 (two) times daily with meals., Start Date 4/6/19, End Date 1/23/20, Taking? , Authorizing Provider Ramu Breen MD    Medication metoprolol tartrate (LOPRESSOR) 25 MG tablet, Sig , Start Date 1/20/20, End Date , Taking? , Authorizing Provider Alesia Donovan MD    Medication mirtazapine (REMERON) 30 MG tablet, Sig Take 30 mg by mouth., Start Date , End Date , Taking? , Authorizing Provider Alesia Donovan MD    Medication orphenadrine (NORFLEX) 100 mg tablet, Sig Take 1 tablet (100 mg total) by mouth 2 (two) times daily as needed for Muscle spasms or Pain., Start Date 10/22/19, End Date , Taking? , Authorizing Provider Brian Lindsey MD    Medication pen needle, diabetic (COMFORT EZ PEN NEEDLES) 29 gauge x 1/2" Ndle, Sig 1 Units by Misc.(Non-Drug; Combo Route) route 3 (three) times daily., Start Date 5/15/19, End Date , Taking? , Authorizing Provider Ramu Breen MD    Medication promethazine (PHENERGAN) 25 MG tablet, Sig Take 1 tablet (25 mg total) by mouth every 6 (six) hours as needed for Nausea., Start Date 4/6/19, End Date , Taking? , Authorizing Provider Ramu Breen MD    Medication SANTYL ointment, Sig APPLY TO CLEANSED AFFECTED ARE TOPICALLY ONCE DAILY, Start Date 1/13/20, End Date , Taking? , Authorizing Provider Alesia Donovan MD    Medication TRUE METRIX GLUCOSE METER Misc, Sig AS DIRECTED, Start Date 4/26/19, End Date , Taking? , Authorizing Provider Historical MD Zion    Medication TRUE METRIX GLUCOSE TEST STRIP Strp, Sig USE AS DIRECTED TID, Start Date 6/6/19, End Date , Taking? , Authorizing " Provider Ramu Breen MD    Medication TRUEPLUS LANCETS 30 gauge Misc, Sig USE AS DIRECTED TID, Start Date 4/26/19, End Date , Taking? , Authorizing Provider Historical Provider, MD    Medication venlafaxine (EFFEXOR-XR) 150 MG Cp24, Sig , Start Date 1/20/20, End Date , Taking? , Authorizing Provider Historical Provider, MD    Medication wheelchair Korina, Sig 1 Units/oz/day by Misc.(Non-Drug; Combo Route) route once daily., Start Date 7/11/19, End Date , Taking? , Authorizing Provider Ramu Breen MD      No current facility-administered medications on file prior to encounter.   Current Outpatient Medications on File Prior to Encounter:  albuterol-ipratropium (DUO-NEB) 2.5 mg-0.5 mg/3 mL nebulizer solution, Inhale 3 mLs into the lungs., Disp: , Rfl:   alcohol swabs (ALCOHOL PADS) PadM, Apply 1 each topically once daily., Disp: 400 each, Rfl: 11  ammonium lactate 12 % Crea, MIHAELA EXT AA ON SKIN BID, Disp: , Rfl: 3  aspirin (ECOTRIN) 81 MG EC tablet, aspirin, Disp: , Rfl:   baclofen (LIORESAL) 10 MG tablet, , Disp: , Rfl:   cadexomer iodine (IODOSORB) 0.9 % gel, Apply topically daily as needed for Wound Care., Disp: , Rfl:   cyclobenzaprine (FLEXERIL) 10 MG tablet, Take 1 tablet (10 mg total) by mouth 3 (three) times daily as needed., Disp: 90 tablet, Rfl: 3  dextran 70-hypromellose (TEARS) ophthalmic solution, Apply 1 drop to eye., Disp: , Rfl:   docusate sodium (COLACE) 100 MG capsule, Take 1 capsule (100 mg total) by mouth 2 (two) times daily., Disp: 180 capsule, Rfl: 3  drainage bag Misc, 1 Units by Misc.(Non-Drug; Combo Route) route every 30 days., Disp: 3 each, Rfl: 3  famotidine (PEPCID) 20 MG tablet, Take 1 tablet (20 mg total) by mouth 2 (two) times daily., Disp: 180 tablet, Rfl: 3  ferrous sulfate (IRON, FERROUS SULFATE,) 325 mg (65 mg iron) Tab tablet, ferrous sulfate 325mg, Disp: , Rfl:   gabapentin (NEURONTIN) 300 MG capsule, Take 1 capsule (300 mg total) by mouth 2 (two) times daily., Disp: 180  "capsule, Rfl: 3  gemfibrozil (LOPID) 600 MG tablet, Take 1 tablet (600 mg total) by mouth 2 (two) times daily before meals., Disp: 180 tablet, Rfl: 1  insulin detemir U-100 (LEVEMIR) 100 unit/mL injection, Inject 15 Units into the skin 2 (two) times daily., Disp: 10 mL, Rfl: 11  insulin lispro protamin-lispro (HUMALOG MIX 75-25 KWIKPEN) 100 unit/mL (75-25) InPn, Inject 20 Units into the skin 3 (three) times daily with meals., Disp: 3 Box, Rfl: 11  ketoconazole (NIZORAL) 2 % shampoo, Apply topically twice a week., Disp: 120 mL, Rfl: 11  melatonin 10 mg Cap, Take 1 tablet by mouth every evening., Disp: 90 capsule, Rfl: 3  meloxicam (MOBIC) 7.5 MG tablet, Take 1 tablet (7.5 mg total) by mouth 2 (two) times daily as needed for Pain., Disp: 180 tablet, Rfl: 1  metFORMIN (GLUCOPHAGE) 500 MG tablet, Take 1 tablet (500 mg total) by mouth 2 (two) times daily with meals., Disp: 180 tablet, Rfl: 3  metoprolol tartrate (LOPRESSOR) 25 MG tablet, , Disp: , Rfl:   mirtazapine (REMERON) 30 MG tablet, Take 30 mg by mouth., Disp: , Rfl:   orphenadrine (NORFLEX) 100 mg tablet, Take 1 tablet (100 mg total) by mouth 2 (two) times daily as needed for Muscle spasms or Pain., Disp: 20 tablet, Rfl: 0  pen needle, diabetic (COMFORT EZ PEN NEEDLES) 29 gauge x 1/2" Ndle, 1 Units by Misc.(Non-Drug; Combo Route) route 3 (three) times daily., Disp: 400 each, Rfl: 11  promethazine (PHENERGAN) 25 MG tablet, Take 1 tablet (25 mg total) by mouth every 6 (six) hours as needed for Nausea., Disp: 30 tablet, Rfl: 0  SANTYL ointment, APPLY TO CLEANSED AFFECTED ARE TOPICALLY ONCE DAILY, Disp: , Rfl:   TRUE METRIX GLUCOSE METER Misc, AS DIRECTED, Disp: , Rfl: 0  TRUE METRIX GLUCOSE TEST STRIP Strp, USE AS DIRECTED TID, Disp: 200 strip, Rfl: 3  TRUEPLUS LANCETS 30 gauge Misc, USE AS DIRECTED TID, Disp: , Rfl: 3  venlafaxine (EFFEXOR-XR) 150 MG Cp24, , Disp: , Rfl:   wheelchair Korina, 1 Units/oz/day by Misc.(Non-Drug; Combo Route) route once daily., Disp: 1 " each, Rfl: 0         Review of patient's allergies indicates:  No Known Allergies    Social History:   reports that he has been smoking. He has been smoking about 0.50 packs per day. He has never used smokeless tobacco. He reports that he does not drink alcohol or use drugs.     History reviewed.  No pertinent family history.      PHYSICAL EXAMINATION  Temp:  [98.3 °F (36.8 °C)-100.4 °F (38 °C)] 98.3 °F (36.8 °C)  Pulse:  [] 95  Resp:  [20-28] 20  SpO2:  [98 %-100 %] 98 %  BP: (120-146)/(60-86) 126/74    General Condition:   alert x 3     Head & Neck  Anemia: None  Jaundice: None  Neck vein: Not distended  Carotid Bruits: none  Lymph nodes: none palpable  Thyroid: normal    Chest: normal    Heart: normal    Rt. Breast: not examined  Lt. Breast: not examined  Axillary lymph nodes: none    Abdomen: Soft,  None tender with no palpable mass or organ  Hernia: none    Rectal: Defered    Extremities: s/p amputation right leg  Vascular: normal    Specific focus Examination  Stage 4 right ischial decubitus wound    Imp: infected ischial decubitus wound , sepsis, dm , htn    Plan: debridement in am Monday.

## 2020-05-02 NOTE — ASSESSMENT & PLAN NOTE
- with chronic suprapubic catheter  - traumatically removed catheter on arrival; Urology consulted and replaced on 5/1  - replaced 16 [place] catheter, which will need to be upsized in Urology clinic in 1 month (appointment already made)  - appreciate Urology assistance

## 2020-05-02 NOTE — PLAN OF CARE
VN attempted to round, pt with staff at this time.  Pt's chart, labs and vital signs reviewed, will be available to intervene if needed.

## 2020-05-02 NOTE — ED NOTES
Wound to rt. Buttock is large and tunneling. Wound cleaned with ns and packed with ns moistened 4x4's.

## 2020-05-02 NOTE — ED NOTES
Suprapubic catheter was replaced at bedside by Dr. Navarrete. Pt. Tolerated well. Pale yellow, slightly cloudy urine draining from catheter.

## 2020-05-02 NOTE — ASSESSMENT & PLAN NOTE
Continue Vancomycin and  Zosyn. Wound cultures pending. General surgery consulted for possible debridement. Turn q2h
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
## 2020-05-02 NOTE — PLAN OF CARE
Admit completed per flowsheet. Patient's questions answered.  Pt instructed on VTE, diet, I&Os, tests, fall precautions and medications. Understanding verbalized. He is agitated by my questions. Patient has no complaints at this time.

## 2020-05-02 NOTE — ASSESSMENT & PLAN NOTE
- presents with fever 100.4, pulse 124, RR 28, and with WBC 15.6 with normal BP and mentation  - LA 2.3->0.8  - fluid resuscitated with 1L LR with improvement in vital sign abnormalities  - initiated on BSAbx with IV vancomycin and zosyn  - likely source is infected sacral ulcer, although also with UTIs in the past which have been sensitive to zosyn  - UA following catheter exchange relatively unremarkable (1+ leuks, 6 WBC), CXR nonacute, COVID negative; BCx and UCx pending   - MRI of sacrum to r/o osteo pending  - have consulted General Surgery for debridement of decub ulcer, appreciate recs

## 2020-05-02 NOTE — ASSESSMENT & PLAN NOTE
Stable   - unclear if still on mirtazapine; patient is having family check pill bottles at home and will communicate home meds

## 2020-05-02 NOTE — PROGRESS NOTES
MD Tom at patient's bedside assessing wound. Verbal order to pack wound with Dankins, Kerlix and ABD pad. Patient to get wound debridement on Monday. Will continue to monitor.

## 2020-05-02 NOTE — PLAN OF CARE
Scheduled medications administered per MD order. Afebrile. No adverse reaction to antibiotics. Blood glucose monitored. No complaints of pain. Appears to be in no distress or discomfort. Safety maintained. Will continue to monitor.

## 2020-05-02 NOTE — PROGRESS NOTES
Pharmacokinetic Initial Assessment: IV Vancomycin    Assessment/Plan:    Initiate intravenous vancomycin with loading dose of 2000 mg once followed by a maintenance dose of vancomycin 1750mg IV every 12 hours  Desired empiric serum trough concentration is 10-15  Draw vancomycin trough level 30 min prior to fourth dose on 05/3/20 at approximately 1000   Pharmacy will continue to follow and monitor vancomycin.      Please contact pharmacy at extension [phone] with any questions regarding this assessment.     Thank you for the consult,   Christine Pompa       Patient brief summary:  Hermann Aguilar is a 44 y.o. male initiated on antimicrobial therapy with IV Vancomycin for treatment of suspected skin & soft tissue infection    Drug Allergies:   Review of patient's allergies indicates:  No Known Allergies    Actual Body Weight:   96kg    Renal Function:   Estimated Creatinine Clearance: 126.6 mL/min (based on SCr of 0.9 mg/dL).,     Dialysis Method (if applicable):  N/A    CBC (last 72 hours):  Recent Labs   Lab Result Units 05/01/20  1616   WBC K/uL 15.64*   Hemoglobin g/dL 10.4*   Hematocrit % 31.7*   Platelets K/uL 836*   Gran% % 72.4   Lymph% % 16.0*   Mono% % 9.3   Eosinophil% % 1.5   Basophil% % 0.4   Differential Method  Automated       Metabolic Panel (last 72 hours):  Recent Labs   Lab Result Units 05/01/20  1557 05/01/20  1616   Sodium mmol/L  --  139   Potassium mmol/L  --  3.8   Chloride mmol/L  --  104   CO2 mmol/L  --  24   Glucose mg/dL  --  105   Glucose, UA  Negative  --    BUN, Bld mg/dL  --  8   Creatinine mg/dL  --  0.9   Albumin g/dL  --  2.9*   Total Bilirubin mg/dL  --  0.4   Alkaline Phosphatase U/L  --  133   AST U/L  --  9*   ALT U/L  --  8*   Magnesium mg/dL  --  2.0   Phosphorus mg/dL  --  2.6*       Drug levels (last 3 results):  No results for input(s): VANCOMYCINRA, VANCOMYCINPE, VANCOMYCINTR in the last 72 hours.    Microbiologic Results:  Microbiology Results (last 7 days)       Procedure  Component Value Units Date/Time    Blood culture x two cultures. Draw prior to antibiotics. [[id number]] Collected:  05/01/20 1616    Order Status:  Sent Specimen:  Blood from Peripheral, Upper Arm, Left Updated:  05/01/20 2004    Aerobic culture [[id number]] Collected:  05/01/20 1558    Order Status:  Sent Specimen:  Wound from Buttocks, Right Updated:  05/01/20 2004    Culture, Anaerobic [[id number]] Collected:  05/01/20 1558    Order Status:  Sent Specimen:  Wound from Buttocks, Right Updated:  05/01/20 2004    Blood culture x two cultures. Draw prior to antibiotics. [[id number]] Collected:  05/01/20 1730    Order Status:  Sent Specimen:  Blood from Peripheral, Antecubital, Left Updated:  05/01/20 1745

## 2020-05-02 NOTE — PLAN OF CARE
Pt AAOx4, sleepy throughout day, awakens to verbal stimulation. No complaints of pain, sacral wound dressing changed. Expecting debridement this Monday. Medications administered per MD order, no PRNs needed.

## 2020-05-03 ENCOUNTER — ANESTHESIA EVENT (OUTPATIENT)
Dept: MEDSURG UNIT | Facility: HOSPITAL | Age: 45
DRG: 853 | End: 2020-05-03
Payer: MEDICARE

## 2020-05-03 LAB
ANION GAP SERPL CALC-SCNC: 9 MMOL/L (ref 8–16)
BASOPHILS # BLD AUTO: 0.05 K/UL (ref 0–0.2)
BASOPHILS NFR BLD: 0.6 % (ref 0–1.9)
BUN SERPL-MCNC: 5 MG/DL (ref 6–20)
CALCIUM SERPL-MCNC: 9.3 MG/DL (ref 8.7–10.5)
CHLORIDE SERPL-SCNC: 104 MMOL/L (ref 95–110)
CO2 SERPL-SCNC: 27 MMOL/L (ref 23–29)
CREAT SERPL-MCNC: 1 MG/DL (ref 0.5–1.4)
DIFFERENTIAL METHOD: ABNORMAL
EOSINOPHIL # BLD AUTO: 0.5 K/UL (ref 0–0.5)
EOSINOPHIL NFR BLD: 5.6 % (ref 0–8)
ERYTHROCYTE [DISTWIDTH] IN BLOOD BY AUTOMATED COUNT: 15.2 % (ref 11.5–14.5)
EST. GFR  (AFRICAN AMERICAN): >60 ML/MIN/1.73 M^2
EST. GFR  (NON AFRICAN AMERICAN): >60 ML/MIN/1.73 M^2
GLUCOSE SERPL-MCNC: 98 MG/DL (ref 70–110)
HCT VFR BLD AUTO: 29.6 % (ref 40–54)
HGB BLD-MCNC: 9.7 G/DL (ref 14–18)
IMM GRANULOCYTES # BLD AUTO: 0.04 K/UL (ref 0–0.04)
IMM GRANULOCYTES NFR BLD AUTO: 0.4 % (ref 0–0.5)
LYMPHOCYTES # BLD AUTO: 1.9 K/UL (ref 1–4.8)
LYMPHOCYTES NFR BLD: 21.6 % (ref 18–48)
MCH RBC QN AUTO: 25.6 PG (ref 27–31)
MCHC RBC AUTO-ENTMCNC: 32.8 G/DL (ref 32–36)
MCV RBC AUTO: 78 FL (ref 82–98)
MONOCYTES # BLD AUTO: 1 K/UL (ref 0.3–1)
MONOCYTES NFR BLD: 10.8 % (ref 4–15)
NEUTROPHILS # BLD AUTO: 5.5 K/UL (ref 1.8–7.7)
NEUTROPHILS NFR BLD: 61 % (ref 38–73)
NRBC BLD-RTO: 0 /100 WBC
PLATELET # BLD AUTO: 758 K/UL (ref 150–350)
PMV BLD AUTO: 9.6 FL (ref 9.2–12.9)
POCT GLUCOSE: 116 MG/DL (ref 70–110)
POCT GLUCOSE: 124 MG/DL (ref 70–110)
POCT GLUCOSE: 135 MG/DL (ref 70–110)
POCT GLUCOSE: 73 MG/DL (ref 70–110)
POTASSIUM SERPL-SCNC: 3.9 MMOL/L (ref 3.5–5.1)
RBC # BLD AUTO: 3.79 M/UL (ref 4.6–6.2)
SODIUM SERPL-SCNC: 140 MMOL/L (ref 136–145)
VANCOMYCIN TROUGH SERPL-MCNC: 12.3 UG/ML (ref 10–22)
VANCOMYCIN TROUGH SERPL-MCNC: 27.7 UG/ML (ref 10–22)
WBC # BLD AUTO: 8.97 K/UL (ref 3.9–12.7)

## 2020-05-03 PROCEDURE — 25000242 PHARM REV CODE 250 ALT 637 W/ HCPCS: Performed by: NURSE PRACTITIONER

## 2020-05-03 PROCEDURE — 63600175 PHARM REV CODE 636 W HCPCS: Performed by: HOSPITALIST

## 2020-05-03 PROCEDURE — 85025 COMPLETE CBC W/AUTO DIFF WBC: CPT

## 2020-05-03 PROCEDURE — 80202 ASSAY OF VANCOMYCIN: CPT

## 2020-05-03 PROCEDURE — 94640 AIRWAY INHALATION TREATMENT: CPT

## 2020-05-03 PROCEDURE — 63600175 PHARM REV CODE 636 W HCPCS: Performed by: NURSE PRACTITIONER

## 2020-05-03 PROCEDURE — 25000003 PHARM REV CODE 250: Performed by: NURSE PRACTITIONER

## 2020-05-03 PROCEDURE — 11000001 HC ACUTE MED/SURG PRIVATE ROOM

## 2020-05-03 PROCEDURE — 94761 N-INVAS EAR/PLS OXIMETRY MLT: CPT

## 2020-05-03 PROCEDURE — 80202 ASSAY OF VANCOMYCIN: CPT | Mod: 91

## 2020-05-03 PROCEDURE — 99900035 HC TECH TIME PER 15 MIN (STAT)

## 2020-05-03 PROCEDURE — 36415 COLL VENOUS BLD VENIPUNCTURE: CPT

## 2020-05-03 PROCEDURE — 25000003 PHARM REV CODE 250: Performed by: HOSPITALIST

## 2020-05-03 PROCEDURE — 80048 BASIC METABOLIC PNL TOTAL CA: CPT

## 2020-05-03 RX ORDER — IPRATROPIUM BROMIDE AND ALBUTEROL SULFATE 2.5; .5 MG/3ML; MG/3ML
3 SOLUTION RESPIRATORY (INHALATION) EVERY 4 HOURS PRN
Status: DISCONTINUED | OUTPATIENT
Start: 2020-05-03 | End: 2020-05-11 | Stop reason: HOSPADM

## 2020-05-03 RX ORDER — BENZONATATE 100 MG/1
100 CAPSULE ORAL 3 TIMES DAILY PRN
Status: DISCONTINUED | OUTPATIENT
Start: 2020-05-03 | End: 2020-05-11 | Stop reason: HOSPADM

## 2020-05-03 RX ADMIN — IPRATROPIUM BROMIDE AND ALBUTEROL SULFATE 3 ML: .5; 3 SOLUTION RESPIRATORY (INHALATION) at 03:05

## 2020-05-03 RX ADMIN — ASPIRIN 81 MG: 81 TABLET, COATED ORAL at 10:05

## 2020-05-03 RX ADMIN — PIPERACILLIN AND TAZOBACTAM 4.5 G: 4; .5 INJECTION, POWDER, LYOPHILIZED, FOR SOLUTION INTRAVENOUS; PARENTERAL at 12:05

## 2020-05-03 RX ADMIN — FERROUS SULFATE TAB EC 325 MG (65 MG FE EQUIVALENT) 325 MG: 325 (65 FE) TABLET DELAYED RESPONSE at 10:05

## 2020-05-03 RX ADMIN — METOPROLOL TARTRATE 25 MG: 25 TABLET ORAL at 10:05

## 2020-05-03 RX ADMIN — GABAPENTIN 300 MG: 300 CAPSULE ORAL at 10:05

## 2020-05-03 RX ADMIN — PIPERACILLIN AND TAZOBACTAM 4.5 G: 4; .5 INJECTION, POWDER, LYOPHILIZED, FOR SOLUTION INTRAVENOUS; PARENTERAL at 04:05

## 2020-05-03 RX ADMIN — VANCOMYCIN HYDROCHLORIDE 1750 MG: 100 INJECTION, POWDER, LYOPHILIZED, FOR SOLUTION INTRAVENOUS at 10:05

## 2020-05-03 RX ADMIN — BENZONATATE 100 MG: 100 CAPSULE ORAL at 02:05

## 2020-05-03 RX ADMIN — INSULIN DETEMIR 7 UNITS: 100 INJECTION, SOLUTION SUBCUTANEOUS at 08:05

## 2020-05-03 RX ADMIN — INSULIN ASPART 2 UNITS: 100 INJECTION, SOLUTION INTRAVENOUS; SUBCUTANEOUS at 05:05

## 2020-05-03 RX ADMIN — GABAPENTIN 300 MG: 300 CAPSULE ORAL at 08:05

## 2020-05-03 RX ADMIN — PIPERACILLIN AND TAZOBACTAM 4.5 G: 4; .5 INJECTION, POWDER, LYOPHILIZED, FOR SOLUTION INTRAVENOUS; PARENTERAL at 08:05

## 2020-05-03 RX ADMIN — METOPROLOL TARTRATE 25 MG: 25 TABLET ORAL at 08:05

## 2020-05-03 RX ADMIN — ENOXAPARIN SODIUM 40 MG: 100 INJECTION SUBCUTANEOUS at 06:05

## 2020-05-03 RX ADMIN — GEMFIBROZIL 600 MG: 600 TABLET ORAL at 05:05

## 2020-05-03 RX ADMIN — INSULIN DETEMIR 7 UNITS: 100 INJECTION, SOLUTION SUBCUTANEOUS at 10:05

## 2020-05-03 RX ADMIN — GEMFIBROZIL 600 MG: 600 TABLET ORAL at 06:05

## 2020-05-03 NOTE — PLAN OF CARE
VIRTUAL NURSE:  Cued into patient's room.  Rooms lights off.  Permission received per patient to turn camera to view patient; still unable to view patient.  Introduced as VN for night shift that will be working with floor nurse and nursing assistant.  Educated patient on VN's role in patient care. Plan of care reviewed with patient. Education per flowsheet.  Opportunity given for questions and questions answered.  Requesting 3 cranberry juices; Reanecia, PCT notified.   Instructed to call for assistance.  Will cont to monitor.    Labs, notes, and orders reviewed.

## 2020-05-03 NOTE — ASSESSMENT & PLAN NOTE
- ESR 95,  - continue Vancomycin and  Zosyn  - General Surgery consulted for debridement, planned for tomorrow am  - turn q2h   - see above

## 2020-05-03 NOTE — SUBJECTIVE & OBJECTIVE
Interval History: States that he feels well today, without any further fevers. He is still tired upon my interview this am, and is not very cooperative with interview, giving one word answers to questions. Discussed that he would have wound debridement tomorrow. Despite stating that he would have family look up home meds he was on yesterday, he still has not done so. Encouraged him to do so this afternoon.    Review of Systems   Constitutional: Negative for chills and fever.   Respiratory: Negative for shortness of breath.    Cardiovascular: Negative for chest pain.   Genitourinary: Positive for difficulty urinating.   Skin: Positive for wound.   Neurological: Positive for weakness (paraplegia).     Objective:     Vital Signs (Most Recent):  Temp: 98.6 °F (37 °C) (05/03/20 0746)  Pulse: 86 (05/03/20 0746)  Resp: 20 (05/03/20 0746)  BP: 110/62 (05/03/20 0746)  SpO2: 99 % (05/03/20 0746) Vital Signs (24h Range):  Temp:  [98.5 °F (36.9 °C)-99.7 °F (37.6 °C)] 98.6 °F (37 °C)  Pulse:  [79-97] 86  Resp:  [16-20] 20  SpO2:  [98 %-99 %] 99 %  BP: (110-127)/(62-80) 110/62     Weight: 95.7 kg (210 lb 15.7 oz)  Body mass index is 29.43 kg/m².    Intake/Output Summary (Last 24 hours) at 5/3/2020 1006  Last data filed at 5/3/2020 0600  Gross per 24 hour   Intake 2490 ml   Output 3000 ml   Net -510 ml      Physical Exam   Constitutional: He is oriented to person, place, and time. He appears well-developed and well-nourished. No distress.   HENT:   Head: Normocephalic and atraumatic.   Eyes: Pupils are equal, round, and reactive to light. Conjunctivae are normal.   Neck: Normal range of motion. Neck supple. No JVD present.   Cardiovascular: Normal rate, regular rhythm, normal heart sounds and intact distal pulses.   Pulmonary/Chest: Effort normal and breath sounds normal. No respiratory distress. He has no wheezes.   Abdominal: Soft. Bowel sounds are normal. He exhibits no distension. There is no tenderness. There is no guarding.    Musculoskeletal: He exhibits no edema or tenderness.   Right BKA. Paraplegic   Neurological: He is alert and oriented to person, place, and time.   Skin: Skin is warm and dry. Capillary refill takes less than 2 seconds. No erythema.   Stage IV ischial ulcer, no surrounding cellulitis. Packing in place.   Psychiatric: He has a normal mood and affect. His behavior is normal.       Significant Labs: All pertinent labs within the past 24 hours have been reviewed.    Significant Imaging: I have reviewed all pertinent imaging results/findings within the past 24 hours.

## 2020-05-03 NOTE — ASSESSMENT & PLAN NOTE
Stable   - unclear if still on mirtazapine; patient is having family check pill bottles at home and will communicate home meds, still has not done this yet

## 2020-05-03 NOTE — NURSING
Contacted TODD Wyatt about patient having problems coughing up sputum after eating. PRN treatment put in. No distress noted. Will continue to monitor.

## 2020-05-03 NOTE — PLAN OF CARE
Problem: Adult Inpatient Plan of Care  Goal: Plan of Care Review  Outcome: Ongoing, Progressing  Mr. Aguilar is resting and medications were given per orders. IV Antibiotics infused. No complaints of PAIN/NV/SOB. Cardiac and blood glucose monitoring in place. Dressing to wounds changed. New IV access obtained. Safety maintained.
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
## 2020-05-03 NOTE — PLAN OF CARE
VN note: VN cued into patient's room for introduction. Patient deferred rounds at this time. Will continue to follow.

## 2020-05-03 NOTE — PROGRESS NOTES
Pharmacokinetic Assessment Follow Up: IV Vancomycin    Vancomycin serum concentration assessment(s):    The trough level was drawn incorrectly and cannot be used to guide therapy at this time.    Vancomycin Regimen Plan:    Vancomycin trough re-ordered for 2200 on 5-3-20 and continue regimen of Vancomycin pending trough results.        Drug levels (last 3 results):  Recent Labs   Lab Result Units 05/03/20  1028   Vancomycin-Trough ug/mL 27.7*       Pharmacy will continue to follow and monitor vancomycin.    Please contact pharmacy at extension [phone] for questions regarding this assessment.    Thank you for the consult,   Diogenes Luna       Patient brief summary:  Hermann Aguilar is a 44 y.o. male initiated on antimicrobial therapy with IV Vancomycin for treatment of skin & soft tissue infection    The patient's current regimen was Vancomycin 1750 mg IVPB q12h.      Drug Allergies:   Review of patient's allergies indicates:  No Known Allergies    Actual Body Weight:   95.7 kg    Renal Function:   Estimated Creatinine Clearance: 113.7 mL/min (based on SCr of 1 mg/dL).,     Dialysis Method (if applicable):  N/A    CBC (last 72 hours):  Recent Labs   Lab Result Units 05/01/20  1616 05/01/20  2256 05/03/20  1028   WBC K/uL 15.64*  --  8.97   Hemoglobin g/dL 10.4*  --  9.7*   Hemoglobin A1C %  --  6.7*  --    Hematocrit % 31.7*  --  29.6*   Platelets K/uL 836*  --  758*   Gran% % 72.4  --  61.0   Lymph% % 16.0*  --  21.6   Mono% % 9.3  --  10.8   Eosinophil% % 1.5  --  5.6   Basophil% % 0.4  --  0.6   Differential Method  Automated  --  Automated       Metabolic Panel (last 72 hours):  Recent Labs   Lab Result Units 05/01/20  1557 05/01/20  1616 05/02/20  0838 05/03/20  1028   Sodium mmol/L  --  139  --  140   Potassium mmol/L  --  3.8  --  3.9   Chloride mmol/L  --  104  --  104   CO2 mmol/L  --  24  --  27   Glucose mg/dL  --  105  --  98   Glucose, UA  Negative  --  Negative  --    BUN, Bld mg/dL  --  8  --  5*    Creatinine mg/dL  --  0.9  --  1.0   Albumin g/dL  --  2.9*  --   --    Total Bilirubin mg/dL  --  0.4  --   --    Alkaline Phosphatase U/L  --  133  --   --    AST U/L  --  9*  --   --    ALT U/L  --  8*  --   --    Magnesium mg/dL  --  2.0  --   --    Phosphorus mg/dL  --  2.6*  --   --        Vancomycin Administrations:  vancomycin given in the last 96 hours                     vancomycin (VANCOCIN) 1,750 mg in dextrose 5 % 500 mL IVPB (mg) 1,750 mg New Bag 05/03/20 1010     1,750 mg New Bag 05/02/20 2319     1,750 mg New Bag  1220    vancomycin (VANCOCIN) 2,000 mg in dextrose 5 % 500 mL IVPB (mg) 2,000 mg New Bag 05/01/20 2215                      Microbiologic Results:  Microbiology Results (last 7 days)       Procedure Component Value Units Date/Time    Blood culture x two cultures. Draw prior to antibiotics. [[id number]] Collected:  05/01/20 1730    Order Status:  Completed Specimen:  Blood from Peripheral, Antecubital, Left Updated:  05/03/20 0612     Blood Culture, Routine No Growth to date      No Growth to date    Narrative:       Aerobic and anaerobic    Blood culture x two cultures. Draw prior to antibiotics. [[id number]] Collected:  05/01/20 1616    Order Status:  Completed Specimen:  Blood from Peripheral, Upper Arm, Left Updated:  05/02/20 2212     Blood Culture, Routine No Growth to date      No Growth to date    Narrative:       Aerobic and anaerobic    Aerobic culture [[id number]]  (Abnormal) Collected:  05/01/20 1558    Order Status:  Completed Specimen:  Wound from Buttocks, Right Updated:  05/02/20 1327     Aerobic Bacterial Culture PRESUMPTIVE PROTEUS SPECIES  Moderate  Identification and susceptibility pending      Culture, Anaerobic [[id number]] Collected:  05/01/20 1558    Order Status:  Sent Specimen:  Wound from Buttocks, Right Updated:  05/01/20 2004

## 2020-05-03 NOTE — PLAN OF CARE
VN Note: VN cued into patient's room for rounds. Patient requesting OJ. Will check with nurse if ok for patient. VN will continue to follow.

## 2020-05-03 NOTE — ASSESSMENT & PLAN NOTE
- with chronic suprapubic catheter  - traumatically removed catheter on arrival; Urology consulted and replaced on 5/1  - replaced 16 Austrian catheter, which will need to be upsized in Urology clinic in 1 month (appointment already made)  - appreciate Urology assistance

## 2020-05-04 ENCOUNTER — ANESTHESIA EVENT (OUTPATIENT)
Dept: MEDSURG UNIT | Facility: HOSPITAL | Age: 45
DRG: 853 | End: 2020-05-04
Payer: MEDICARE

## 2020-05-04 ENCOUNTER — CLINICAL SUPPORT (OUTPATIENT)
Dept: SMOKING CESSATION | Facility: CLINIC | Age: 45
End: 2020-05-04
Payer: COMMERCIAL

## 2020-05-04 ENCOUNTER — ANESTHESIA (OUTPATIENT)
Dept: MEDSURG UNIT | Facility: HOSPITAL | Age: 45
DRG: 853 | End: 2020-05-04
Payer: MEDICARE

## 2020-05-04 DIAGNOSIS — F17.210 CIGARETTE SMOKER: Primary | ICD-10-CM

## 2020-05-04 LAB
ANION GAP SERPL CALC-SCNC: 8 MMOL/L (ref 8–16)
BACTERIA SPEC AEROBE CULT: ABNORMAL
BASOPHILS # BLD AUTO: 0.05 K/UL (ref 0–0.2)
BASOPHILS NFR BLD: 0.7 % (ref 0–1.9)
BUN SERPL-MCNC: 5 MG/DL (ref 6–20)
CALCIUM SERPL-MCNC: 9.2 MG/DL (ref 8.7–10.5)
CHLORIDE SERPL-SCNC: 106 MMOL/L (ref 95–110)
CO2 SERPL-SCNC: 26 MMOL/L (ref 23–29)
CREAT SERPL-MCNC: 0.9 MG/DL (ref 0.5–1.4)
DIFFERENTIAL METHOD: ABNORMAL
EOSINOPHIL # BLD AUTO: 0.6 K/UL (ref 0–0.5)
EOSINOPHIL NFR BLD: 8.7 % (ref 0–8)
ERYTHROCYTE [DISTWIDTH] IN BLOOD BY AUTOMATED COUNT: 15.3 % (ref 11.5–14.5)
EST. GFR  (AFRICAN AMERICAN): >60 ML/MIN/1.73 M^2
EST. GFR  (NON AFRICAN AMERICAN): >60 ML/MIN/1.73 M^2
GLUCOSE SERPL-MCNC: 82 MG/DL (ref 70–110)
HCT VFR BLD AUTO: 29 % (ref 40–54)
HGB BLD-MCNC: 9.5 G/DL (ref 14–18)
IMM GRANULOCYTES # BLD AUTO: 0.02 K/UL (ref 0–0.04)
IMM GRANULOCYTES NFR BLD AUTO: 0.3 % (ref 0–0.5)
INR PPP: 1 (ref 0.8–1.2)
LYMPHOCYTES # BLD AUTO: 1.7 K/UL (ref 1–4.8)
LYMPHOCYTES NFR BLD: 25.5 % (ref 18–48)
MCH RBC QN AUTO: 25.5 PG (ref 27–31)
MCHC RBC AUTO-ENTMCNC: 32.8 G/DL (ref 32–36)
MCV RBC AUTO: 78 FL (ref 82–98)
MONOCYTES # BLD AUTO: 0.7 K/UL (ref 0.3–1)
MONOCYTES NFR BLD: 10.8 % (ref 4–15)
NEUTROPHILS # BLD AUTO: 3.6 K/UL (ref 1.8–7.7)
NEUTROPHILS NFR BLD: 54 % (ref 38–73)
NRBC BLD-RTO: 0 /100 WBC
PLATELET # BLD AUTO: 723 K/UL (ref 150–350)
PMV BLD AUTO: 9 FL (ref 9.2–12.9)
POCT GLUCOSE: 106 MG/DL (ref 70–110)
POCT GLUCOSE: 133 MG/DL (ref 70–110)
POCT GLUCOSE: 75 MG/DL (ref 70–110)
POTASSIUM SERPL-SCNC: 4 MMOL/L (ref 3.5–5.1)
PROTHROMBIN TIME: 10.4 SEC (ref 9–12.5)
RBC # BLD AUTO: 3.72 M/UL (ref 4.6–6.2)
SODIUM SERPL-SCNC: 140 MMOL/L (ref 136–145)
WBC # BLD AUTO: 6.75 K/UL (ref 3.9–12.7)

## 2020-05-04 PROCEDURE — 76937 US GUIDE VASCULAR ACCESS: CPT | Performed by: STUDENT IN AN ORGANIZED HEALTH CARE EDUCATION/TRAINING PROGRAM

## 2020-05-04 PROCEDURE — 94761 N-INVAS EAR/PLS OXIMETRY MLT: CPT

## 2020-05-04 PROCEDURE — 99999 PR PBB SHADOW E&M-EST. PATIENT-LVL II: ICD-10-PCS | Mod: PBBFAC,,,

## 2020-05-04 PROCEDURE — 25000003 PHARM REV CODE 250: Performed by: HOSPITALIST

## 2020-05-04 PROCEDURE — 99900035 HC TECH TIME PER 15 MIN (STAT)

## 2020-05-04 PROCEDURE — 63600175 PHARM REV CODE 636 W HCPCS: Performed by: HOSPITALIST

## 2020-05-04 PROCEDURE — 99999 PR PBB SHADOW E&M-EST. PATIENT-LVL II: CPT | Mod: PBBFAC,,,

## 2020-05-04 PROCEDURE — 85025 COMPLETE CBC W/AUTO DIFF WBC: CPT

## 2020-05-04 PROCEDURE — 99407 PR TOBACCO USE CESSATION INTENSIVE >10 MINUTES: ICD-10-PCS | Mod: S$GLB,,,

## 2020-05-04 PROCEDURE — 99407 BEHAV CHNG SMOKING > 10 MIN: CPT | Mod: S$GLB,,,

## 2020-05-04 PROCEDURE — S4991 NICOTINE PATCH NONLEGEND: HCPCS | Performed by: HOSPITALIST

## 2020-05-04 PROCEDURE — 36415 COLL VENOUS BLD VENIPUNCTURE: CPT

## 2020-05-04 PROCEDURE — 85610 PROTHROMBIN TIME: CPT

## 2020-05-04 PROCEDURE — 25000003 PHARM REV CODE 250: Performed by: NURSE PRACTITIONER

## 2020-05-04 PROCEDURE — 80048 BASIC METABOLIC PNL TOTAL CA: CPT

## 2020-05-04 PROCEDURE — 11000001 HC ACUTE MED/SURG PRIVATE ROOM

## 2020-05-04 RX ORDER — AMOXICILLIN 250 MG
2 CAPSULE ORAL 2 TIMES DAILY PRN
Status: DISCONTINUED | OUTPATIENT
Start: 2020-05-04 | End: 2020-05-11 | Stop reason: HOSPADM

## 2020-05-04 RX ORDER — MUPIROCIN 20 MG/G
OINTMENT TOPICAL
Status: DISCONTINUED | OUTPATIENT
Start: 2020-05-04 | End: 2020-05-05 | Stop reason: HOSPADM

## 2020-05-04 RX ORDER — IBUPROFEN 200 MG
1 TABLET ORAL DAILY
Status: DISCONTINUED | OUTPATIENT
Start: 2020-05-04 | End: 2020-05-11 | Stop reason: HOSPADM

## 2020-05-04 RX ORDER — OXYCODONE AND ACETAMINOPHEN 5; 325 MG/1; MG/1
1 TABLET ORAL EVERY 8 HOURS PRN
Status: DISCONTINUED | OUTPATIENT
Start: 2020-05-04 | End: 2020-05-11 | Stop reason: HOSPADM

## 2020-05-04 RX ORDER — VENLAFAXINE HYDROCHLORIDE 75 MG/1
75 CAPSULE, EXTENDED RELEASE ORAL DAILY
Status: DISCONTINUED | OUTPATIENT
Start: 2020-05-05 | End: 2020-05-11 | Stop reason: HOSPADM

## 2020-05-04 RX ADMIN — MEROPENEM 1 G: 1 INJECTION, POWDER, FOR SOLUTION INTRAVENOUS at 04:05

## 2020-05-04 RX ADMIN — GABAPENTIN 300 MG: 300 CAPSULE ORAL at 09:05

## 2020-05-04 RX ADMIN — SODIUM CHLORIDE, SODIUM LACTATE, POTASSIUM CHLORIDE, AND CALCIUM CHLORIDE 500 ML: .6; .31; .03; .02 INJECTION, SOLUTION INTRAVENOUS at 04:05

## 2020-05-04 RX ADMIN — METOPROLOL TARTRATE 25 MG: 25 TABLET ORAL at 09:05

## 2020-05-04 RX ADMIN — INSULIN DETEMIR 7 UNITS: 100 INJECTION, SOLUTION SUBCUTANEOUS at 09:05

## 2020-05-04 RX ADMIN — Medication 1 PATCH: at 12:05

## 2020-05-04 RX ADMIN — OXYCODONE HYDROCHLORIDE AND ACETAMINOPHEN 1 TABLET: 5; 325 TABLET ORAL at 05:05

## 2020-05-04 RX ADMIN — VANCOMYCIN HYDROCHLORIDE 1750 MG: 100 INJECTION, POWDER, LYOPHILIZED, FOR SOLUTION INTRAVENOUS at 05:05

## 2020-05-04 RX ADMIN — GABAPENTIN 300 MG: 300 CAPSULE ORAL at 08:05

## 2020-05-04 RX ADMIN — ASPIRIN 81 MG: 81 TABLET, COATED ORAL at 09:05

## 2020-05-04 RX ADMIN — FERROUS SULFATE TAB EC 325 MG (65 MG FE EQUIVALENT) 325 MG: 325 (65 FE) TABLET DELAYED RESPONSE at 08:05

## 2020-05-04 RX ADMIN — CYCLOBENZAPRINE HYDROCHLORIDE 10 MG: 10 TABLET, FILM COATED ORAL at 09:05

## 2020-05-04 RX ADMIN — GEMFIBROZIL 600 MG: 600 TABLET ORAL at 04:05

## 2020-05-04 RX ADMIN — VANCOMYCIN HYDROCHLORIDE 1750 MG: 100 INJECTION, POWDER, LYOPHILIZED, FOR SOLUTION INTRAVENOUS at 01:05

## 2020-05-04 RX ADMIN — PIPERACILLIN AND TAZOBACTAM 4.5 G: 4; .5 INJECTION, POWDER, LYOPHILIZED, FOR SOLUTION INTRAVENOUS; PARENTERAL at 05:05

## 2020-05-04 NOTE — SUBJECTIVE & OBJECTIVE
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 Yadiel Haley    CHEST TUBE INSERTION Right     CYSTOSCOPY N/A 8/30/2018    Procedure: CYSTOSCOPY, clot evacuation, suprapubic tube exchange;  Surgeon: Ruchi Navarrete MD;  Location: [facility] OR;  Service: Urology;  Laterality: N/A;    GASTROSTOMY TUBE PLACEMENT  12/15/2016    ORIF HUMERUS FRACTURE Left 12/15/2016    REMOVAL OF BLOOD CLOT  8/30/2018    Procedure: REMOVAL, BLOOD CLOT;  Surgeon: Ruchi Navarrete MD;  Location: [facility] OR;  Service: Urology;;    TRACHEOSTOMY TUBE PLACEMENT         Review of patient's allergies indicates:  No Known Allergies    Medications:  Medications Prior to Admission   Medication Sig    albuterol-ipratropium (DUO-NEB) 2.5 mg-0.5 mg/3 mL nebulizer solution Inhale 3 mLs into the lungs.    alcohol swabs (ALCOHOL PADS) PadM Apply 1 each topically once daily.    ammonium lactate 12 % Crea MIHAELA EXT AA ON SKIN BID    aspirin (ECOTRIN) 81 MG EC tablet aspirin    baclofen (LIORESAL) 10 MG tablet     cadexomer iodine (IODOSORB) 0.9 % gel Apply topically daily as needed for Wound Care.    cyclobenzaprine (FLEXERIL) 10 MG tablet Take 1 tablet (10 mg total) by mouth 3 (three) times daily as needed.    dextran 70-hypromellose (TEARS) ophthalmic solution Apply 1 drop  "to eye.    docusate sodium (COLACE) 100 MG capsule Take 1 capsule (100 mg total) by mouth 2 (two) times daily.    drainage bag Misc 1 Units by Misc.(Non-Drug; Combo Route) route every 30 days.    famotidine (PEPCID) 20 MG tablet Take 1 tablet (20 mg total) by mouth 2 (two) times daily.    ferrous sulfate (IRON, FERROUS SULFATE,) 325 mg (65 mg iron) Tab tablet ferrous sulfate   325mg    gabapentin (NEURONTIN) 300 MG capsule Take 1 capsule (300 mg total) by mouth 2 (two) times daily.    gemfibrozil (LOPID) 600 MG tablet Take 1 tablet (600 mg total) by mouth 2 (two) times daily before meals.    insulin detemir U-100 (LEVEMIR) 100 unit/mL injection Inject 15 Units into the skin 2 (two) times daily.    insulin lispro protamin-lispro (HUMALOG MIX 75-25 KWIKPEN) 100 unit/mL (75-25) InPn Inject 20 Units into the skin 3 (three) times daily with meals.    ketoconazole (NIZORAL) 2 % shampoo Apply topically twice a week.    melatonin 10 mg Cap Take 1 tablet by mouth every evening.    meloxicam (MOBIC) 7.5 MG tablet Take 1 tablet (7.5 mg total) by mouth 2 (two) times daily as needed for Pain.    metFORMIN (GLUCOPHAGE) 500 MG tablet Take 1 tablet (500 mg total) by mouth 2 (two) times daily with meals.    metoprolol tartrate (LOPRESSOR) 25 MG tablet     mirtazapine (REMERON) 30 MG tablet Take 30 mg by mouth.    orphenadrine (NORFLEX) 100 mg tablet Take 1 tablet (100 mg total) by mouth 2 (two) times daily as needed for Muscle spasms or Pain.    pen needle, diabetic (COMFORT EZ PEN NEEDLES) 29 gauge x 1/2" Ndle 1 Units by Misc.(Non-Drug; Combo Route) route 3 (three) times daily.    promethazine (PHENERGAN) 25 MG tablet Take 1 tablet (25 mg total) by mouth every 6 (six) hours as needed for Nausea.    SANTYL ointment APPLY TO CLEANSED AFFECTED ARE TOPICALLY ONCE DAILY    TRUE METRIX GLUCOSE METER Misc AS DIRECTED    TRUE METRIX GLUCOSE TEST STRIP Strp USE AS DIRECTED TID    TRUEPLUS LANCETS 30 gauge Misc USE AS " DIRECTED TID    venlafaxine (EFFEXOR-XR) 150 MG Cp24     wheelchair Korina 1 Units/oz/day by Misc.(Non-Drug; Combo Route) route once daily.     Antibiotics (From admission, onward)    Start     Stop Route Frequency Ordered    05/04/20 1700  meropenem 1 g in sodium chloride 0.9 % 100 mL IVPB (ready to mix system)      -- IV Every 8 hours (non-standard times) 05/04/20 1503    05/04/20 1300  vancomycin (VANCOCIN) 1,750 mg in dextrose 5 % 500 mL IVPB      -- IV Every 12 hours (non-standard times) 05/04/20 0850    05/04/20 0758  vancomycin - pharmacy to dose  (vancomycin IVPB)      -- IV pharmacy to manage frequency 05/04/20 0658    05/04/20 0018  mupirocin 2 % ointment      -- Nasl On Call Procedure 05/04/20 0018        Antifungals (From admission, onward)    None        Antivirals (From admission, onward)    None           Immunization History   Administered Date(s) Administered    Influenza - Quadrivalent - PF (6 months and older) 02/16/2017, 10/10/2017, 11/03/2017, 10/30/2018, 08/28/2019    PPD Test 12/12/2016    Pneumococcal Conjugate - 13 Valent 02/16/2017, 08/28/2019    Tdap 11/30/2016       Family History     None        Social History     Socioeconomic History    Marital status: Single     Spouse name: Not on file    Number of children: Not on file    Years of education: Not on file    Highest education level: Not on file   Occupational History    Not on file   Social Needs    Financial resource strain: Not on file    Food insecurity:     Worry: Not on file     Inability: Not on file    Transportation needs:     Medical: Not on file     Non-medical: Not on file   Tobacco Use    Smoking status: Current Some Day Smoker     Packs/day: 0.50     Years: 33.00     Pack years: 16.50     Types: Cigarettes     Start date: 1987    Smokeless tobacco: Never Used    Tobacco comment: Pt is currently enrolled in [employer].  Ambulatory referral to Smoking Cessation program .   Substance and Sexual Activity     Alcohol use: No     Frequency: Never     Comment: occ    Drug use: No    Sexual activity: Not on file   Lifestyle    Physical activity:     Days per week: Not on file     Minutes per session: Not on file    Stress: Not on file   Relationships    Social connections:     Talks on phone: Not on file     Gets together: Not on file     Attends Latter-day service: Not on file     Active member of club or organization: Not on file     Attends meetings of clubs or organizations: Not on file     Relationship status: Not on file   Other Topics Concern    Not on file   Social History Narrative    Not on file     Review of Systems   Constitutional: Positive for chills and fever. Negative for activity change and appetite change.   HENT: Negative for congestion.    Eyes: Negative for discharge.   Respiratory: Negative for cough, choking and shortness of breath.    Gastrointestinal: Negative for abdominal pain, constipation, diarrhea, nausea and vomiting.   Genitourinary:        Cannot feel dysuria but noticed foul smelling and change in color in urine when admitted   Musculoskeletal: Positive for gait problem.        Paraplegic   Skin: Positive for wound.   Neurological: Negative for speech difficulty.   Psychiatric/Behavioral: Negative for agitation and behavioral problems.     Objective:     Vital Signs (Most Recent):  Temp: 98.1 °F (36.7 °C) (05/04/20 1205)  Pulse: 71 (05/04/20 1205)  Resp: 17 (05/04/20 0808)  BP: (!) 89/55 (05/04/20 1205)  SpO2: 99 % (05/04/20 1119) Vital Signs (24h Range):  Temp:  [98 °F (36.7 °C)-98.7 °F (37.1 °C)] 98.1 °F (36.7 °C)  Pulse:  [70-90] 71  Resp:  [17-20] 17  SpO2:  [95 %-99 %] 99 %  BP: ()/(55-74) 89/55     Weight: 99.3 kg (218 lb 14.7 oz)  Body mass index is 30.53 kg/m².    Estimated Creatinine Clearance: 128.4 mL/min (based on SCr of 0.9 mg/dL).    Physical Exam   Constitutional: He is oriented to person, place, and time. He appears well-developed and well-nourished. No distress.    HENT:   Head: Normocephalic and atraumatic.   Mouth/Throat: No oropharyngeal exudate.   Eyes: Pupils are equal, round, and reactive to light. EOM are normal. Right eye exhibits no discharge. Left eye exhibits no discharge. No scleral icterus.   Neck: Normal range of motion. Neck supple. No JVD present.   Scar mid neck from old trech site   Cardiovascular: Normal rate, regular rhythm and normal heart sounds.   No murmur heard.  Pulmonary/Chest: Effort normal and breath sounds normal. No respiratory distress. He has no wheezes. He has no rales.   Abdominal: Soft. Bowel sounds are normal. He exhibits no distension. There is no tenderness. There is no rebound.   Suprapubic catheter seen and intact, no purulence no erythema around   Musculoskeletal:   Right BKA stump dry and hard skin, no open wound  Left foot with superficial ulcer on left big toe, no purulence, dry skin of heel and plantar area, healed wound from before  paraplegic   Lymphadenopathy:     He has no cervical adenopathy.   Neurological: He is alert and oriented to person, place, and time.   paraplegic   Skin: Skin is warm and dry. No rash noted. He is not diaphoretic.   Psychiatric: He has a normal mood and affect. His behavior is normal.   Nursing note and vitals reviewed.    Antibiotics (From admission, onward)    Start     Stop Route Frequency Ordered    05/04/20 1700  meropenem 1 g in sodium chloride 0.9 % 100 mL IVPB (ready to mix system)      -- IV Every 8 hours (non-standard times) 05/04/20 1503    05/04/20 1300  vancomycin (VANCOCIN) 1,750 mg in dextrose 5 % 500 mL IVPB      -- IV Every 12 hours (non-standard times) 05/04/20 0850    05/04/20 0758  vancomycin - pharmacy to dose  (vancomycin IVPB)      -- IV pharmacy to manage frequency 05/04/20 0658    05/04/20 0018  mupirocin 2 % ointment      -- Nasl On Call Procedure 05/04/20 0018        Was on pip-tazo but now changed to meropenem    Significant Labs:   CBC:   Recent Labs   Lab 05/03/20  1028  05/04/20  1025   WBC 8.97 6.75   HGB 9.7* 9.5*   HCT 29.6* 29.0*   * 723*     CMP:   Recent Labs   Lab 05/03/20  1028 05/04/20  1025    140   K 3.9 4.0    106   CO2 27 26   GLU 98 82   BUN 5* 5*   CREATININE 1.0 0.9   CALCIUM 9.3 9.2   ANIONGAP 9 8   EGFRNONAA >60 >60     Microbiology Results (last 7 days)     Procedure Component Value Units Date/Time    Aerobic culture [[id number]]  (Abnormal)  (Susceptibility) Collected:  05/01/20 1558    Order Status:  Completed Specimen:  Wound from Buttocks, Right Updated:  05/04/20 1100     Aerobic Bacterial Culture PROTEUS MIRABILIS ESBL  Moderate  No other significant isolate       Comment: Previous value was PPR verified by ADB at 13:25 on 05/02/2020       Culture, Anaerobic [[id number]] Collected:  05/01/20 1558    Order Status:  Completed Specimen:  Wound from Buttocks, Right Updated:  05/04/20 0903     Anaerobic Culture Culture in progress    Blood culture x two cultures. Draw prior to antibiotics. [[id number]] Collected:  05/01/20 1730    Order Status:  Completed Specimen:  Blood from Peripheral, Antecubital, Left Updated:  05/04/20 0612     Blood Culture, Routine No Growth to date      No Growth to date      No Growth to date    Narrative:       Aerobic and anaerobic    Blood culture x two cultures. Draw prior to antibiotics. [[id number]] Collected:  05/01/20 1616    Order Status:  Completed Specimen:  Blood from Peripheral, Upper Arm, Left Updated:  05/03/20 2212     Blood Culture, Routine No Growth to date      No Growth to date      No Growth to date    Narrative:       Aerobic and anaerobic        All pertinent labs within the past 24 hours have been reviewed.    Significant Imaging: I have reviewed all pertinent imaging results/findings within the past 24 hours.

## 2020-05-04 NOTE — ASSESSMENT & PLAN NOTE
- with chronic suprapubic catheter  - traumatically removed catheter on arrival; Urology consulted and replaced on 5/1  - replaced 16 [place]ogolese catheter, which will need to be upsized in Urology clinic in 1 month (appointment already made)  - appreciate Urology assistance
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
## 2020-05-04 NOTE — ASSESSMENT & PLAN NOTE
- presented with fever 100.4, pulse 124, RR 28, and with WBC 15.6 with normal BP and mentation  - LA 2.3->0.8  - fluid resuscitated with 1L LR with improvement in vital sign abnormalities  - initiated on BSAbx with IV vancomycin and zosyn, will continue for now  - likely source is infected sacral ulcer, although also with UTIs in the past which have been sensitive to zosyn  - UA following catheter exchange relatively unremarkable (1+ leuks, 6 WBC), CXR nonacute, COVID negative; BCx NGTD and wound cultures with ESBL proteus  - MRI of sacrum to r/o osteo pending  - have consulted General Surgery for debridement of decub ulcer, planned for this afternoon  - consulted ID for antibiotic recs

## 2020-05-04 NOTE — ASSESSMENT & PLAN NOTE
- ESR 95,  - continue Vancomycin and  Zosyn  - General Surgery consulted for debridement, planned for this afternoon  - turn q2h   - see above

## 2020-05-04 NOTE — ANESTHESIA PROCEDURE NOTES
Peripheral IV Insertion    Diagnosis: I87.9 Disorder of vein, unspecified.    Patient location during procedure: floor  Procedure start time: 5/4/2020 4:31 PM  Timeout: 5/4/2020 4:30 PM  Procedure end time: 5/4/2020 4:36 PM    Staffing  Authorizing Provider: Gustabo Gunn MD  Performing Provider: Gustabo Gunn MD    Staffing  Other anesthesia staff: Yefri Damico MD  Anesthesiologist was present at the time of the procedure.    Preanesthetic Checklist  Completed: patient identified, site marked, surgical consent, pre-op evaluation, timeout performed, IV checked, risks and benefits discussed, monitors and equipment checked and anesthesia consent givenPeripheral IV Insertion  Skin Prep: chlorhexidine gluconate  Local Infiltration: none  Orientation: left  Location: antecubital  Catheter Size: 22 G  Catheter placement by Ultrasound guidance. Heme positive aspiration all ports.  Vessel Caliber: medium, patent  Needle advanced into vessel with real time Ultrasound guidance.  Image recorded and saved.Insertion Attempts: 1  Assessment  Dressing: secured with tape and tegaderm  Patient: Tolerated well  Line flushed easily.
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
## 2020-05-04 NOTE — SUBJECTIVE & OBJECTIVE
Interval History: Sitting up eating breakfast today and states that overall he feels well without any fever, chills, lightheadedness. Awaiting wound debridement today.    Review of Systems   Constitutional: Negative for chills and fever.   Respiratory: Negative for shortness of breath.    Cardiovascular: Negative for chest pain.   Genitourinary: Positive for difficulty urinating.   Skin: Positive for wound.   Neurological: Positive for weakness (paraplegia).     Objective:     Vital Signs (Most Recent):  Temp: 98.1 °F (36.7 °C) (05/04/20 1205)  Pulse: 71 (05/04/20 1205)  Resp: 17 (05/04/20 0808)  BP: (!) 89/55 (05/04/20 1205)  SpO2: 99 % (05/04/20 1119) Vital Signs (24h Range):  Temp:  [98 °F (36.7 °C)-98.7 °F (37.1 °C)] 98.1 °F (36.7 °C)  Pulse:  [70-90] 71  Resp:  [17-20] 17  SpO2:  [95 %-99 %] 99 %  BP: ()/(55-74) 89/55     Weight: 99.3 kg (218 lb 14.7 oz)  Body mass index is 30.53 kg/m².    Intake/Output Summary (Last 24 hours) at 5/4/2020 1312  Last data filed at 5/4/2020 1205  Gross per 24 hour   Intake 950 ml   Output 2100 ml   Net -1150 ml      Physical Exam   Constitutional: He is oriented to person, place, and time. He appears well-developed and well-nourished. No distress.   HENT:   Head: Normocephalic and atraumatic.   Eyes: Pupils are equal, round, and reactive to light. Conjunctivae are normal.   Neck: Normal range of motion. Neck supple. No JVD present.   Cardiovascular: Normal rate, regular rhythm, normal heart sounds and intact distal pulses.   Pulmonary/Chest: Effort normal and breath sounds normal. No respiratory distress. He has no wheezes.   Abdominal: Soft. Bowel sounds are normal. He exhibits no distension. There is no tenderness. There is no guarding.   Musculoskeletal: He exhibits no edema or tenderness.   Right BKA. Paraplegic   Neurological: He is alert and oriented to person, place, and time.   Skin: Skin is warm and dry. Capillary refill takes less than 2 seconds. No erythema.    Stage IV ischial ulcer, no surrounding cellulitis. Packing in place.   Psychiatric: He has a normal mood and affect. His behavior is normal.       Significant Labs: All pertinent labs within the past 24 hours have been reviewed.    Significant Imaging: I have reviewed all pertinent imaging results/findings within the past 24 hours.

## 2020-05-04 NOTE — PROGRESS NOTES
Individual Follow-Up Form    5/4/2020    Quit Date: To be determined    Clinical Status of Patient: Inpatient    Length of Service: 30 minutes    Comments: Smoking cessation education provided.Pt is a 0.5+ pack per day cigarette smoker x 33 years (first tried a cigarette at age 12, started smoking regularly by 19).  21 mg nicotine patch requested by patient and order obtained from Dr. Humphrey.. Pt is currently enrolled in the [employer].      Diagnosis: F17.210    Next Visit:  Ambulatory referral to Smoking Cessation program following hospital discharge.

## 2020-05-04 NOTE — PLAN OF CARE
Please place on contact isolation per protocol for wound with drainage - P mirablis ESBL.  Call Infection Control @ [phone] for questions.

## 2020-05-04 NOTE — PROGRESS NOTES
Pharmacokinetic Assessment Follow Up: IV Vancomycin    Vancomycin serum concentration assessment(s):    The trough level was drawn correctly and can be used to guide therapy at this time. The measurement is within the desired definitive target range of 10 to 15 mcg/mL.    Vancomycin Regimen Plan:    Continue regimen to Vancomycin 1750 mg IV every 12 hours with next serum trough concentration measured at 5/5/20 prior to 4 dose on 1200     Drug levels (last 3 results):  Recent Labs   Lab Result Units 05/03/20  1028 05/03/20  2143   Vancomycin-Trough ug/mL 27.7* 12.3       Pharmacy will continue to follow and monitor vancomycin.    Please contact pharmacy at extension [phone] for questions regarding this assessment.    Thank you for the consult,   Christine Pompa       Patient brief summary:  Hermann Aguilar is a 44 y.o. male initiated on antimicrobial therapy with IV Vancomycin for treatment of skin & soft tissue infection    The patient's current regimen is 1750mg q12    Drug Allergies:   Review of patient's allergies indicates:  No Known Allergies    Actual Body Weight:   95kg    Renal Function:   Estimated Creatinine Clearance: 113.7 mL/min (based on SCr of 1 mg/dL).,     Dialysis Method (if applicable):  N/A    CBC (last 72 hours):  Recent Labs   Lab Result Units 05/01/20  1616 05/01/20  2256 05/03/20  1028   WBC K/uL 15.64*  --  8.97   Hemoglobin g/dL 10.4*  --  9.7*   Hemoglobin A1C %  --  6.7*  --    Hematocrit % 31.7*  --  29.6*   Platelets K/uL 836*  --  758*   Gran% % 72.4  --  61.0   Lymph% % 16.0*  --  21.6   Mono% % 9.3  --  10.8   Eosinophil% % 1.5  --  5.6   Basophil% % 0.4  --  0.6   Differential Method  Automated  --  Automated       Metabolic Panel (last 72 hours):  Recent Labs   Lab Result Units 05/01/20  1557 05/01/20  1616 05/02/20  0838 05/03/20  1028   Sodium mmol/L  --  139  --  140   Potassium mmol/L  --  3.8  --  3.9   Chloride mmol/L  --  104  --  104   CO2 mmol/L  --  24  --  27   Glucose mg/dL   --  105  --  98   Glucose, UA  Negative  --  Negative  --    BUN, Bld mg/dL  --  8  --  5*   Creatinine mg/dL  --  0.9  --  1.0   Albumin g/dL  --  2.9*  --   --    Total Bilirubin mg/dL  --  0.4  --   --    Alkaline Phosphatase U/L  --  133  --   --    AST U/L  --  9*  --   --    ALT U/L  --  8*  --   --    Magnesium mg/dL  --  2.0  --   --    Phosphorus mg/dL  --  2.6*  --   --        Vancomycin Administrations:  vancomycin given in the last 96 hours                     vancomycin (VANCOCIN) 1,750 mg in dextrose 5 % 500 mL IVPB (mg) 1,750 mg New Bag 05/03/20 1010     1,750 mg New Bag 05/02/20 2319     1,750 mg New Bag  1220    vancomycin (VANCOCIN) 2,000 mg in dextrose 5 % 500 mL IVPB (mg) 2,000 mg New Bag 05/01/20 2215                      Microbiologic Results:  Microbiology Results (last 7 days)       Procedure Component Value Units Date/Time    Blood culture x two cultures. Draw prior to antibiotics. [[id number]] Collected:  05/01/20 1616    Order Status:  Completed Specimen:  Blood from Peripheral, Upper Arm, Left Updated:  05/03/20 2212     Blood Culture, Routine No Growth to date      No Growth to date      No Growth to date    Narrative:       Aerobic and anaerobic    Aerobic culture [[id number]]  (Abnormal)  (Susceptibility) Collected:  05/01/20 1558    Order Status:  Completed Specimen:  Wound from Buttocks, Right Updated:  05/03/20 1309     Aerobic Bacterial Culture PROTEUS MIRABILIS ESBL  Moderate      Blood culture x two cultures. Draw prior to antibiotics. [[id number]] Collected:  05/01/20 1730    Order Status:  Completed Specimen:  Blood from Peripheral, Antecubital, Left Updated:  05/03/20 0612     Blood Culture, Routine No Growth to date      No Growth to date    Narrative:       Aerobic and anaerobic    Culture, Anaerobic [[id number]] Collected:  05/01/20 1558    Order Status:  Sent Specimen:  Wound from Buttocks, Right Updated:  05/01/20 2004

## 2020-05-04 NOTE — PLAN OF CARE
Problem: Adult Inpatient Plan of Care  Goal: Plan of Care Review  Outcome: Ongoing, Progressing  Mr. Aguilar is resting and medications were given per orders. IV antibiotics infused. Pending surgery later this morning. Cardiac and blood glucose monitoring continued. Dressings remained DI. BKA noted to RLE. Safety maintained.
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
## 2020-05-04 NOTE — ASSESSMENT & PLAN NOTE
Turn patient q2h   - continue home gabapentin  - continue home flexeril prn  - resume venlafaxine 75mg daily, can uptitrate as tolerated

## 2020-05-04 NOTE — PLAN OF CARE
TN met with patient at bedside to complete discharge assessment. Currently, patient states that he lives alone and uses a wheelchair to get around. No other DME noted. Patient is not current with any home health but has [facility] in the past. Upon discharge, patient states that he does not have transportation home. Per patient, he will have a male friend that will provide assistance at home if needed. Patient's PCP is Dr. Farrar.      TN case manger updated whiteboard with contact information. TN instructed patient to contact TN if he has any further questions or concerns. TN will continue to follow throughout transitions of care and will assist with any discharge needs.            05/04/20 6450   Discharge Assessment   Assessment Type Discharge Planning Assessment   Confirmed/corrected address and phone number on facesheet? Yes   Assessment information obtained from? Patient;Medical Record   Communicated expected length of stay with patient/caregiver yes   Prior to hospitilization cognitive status: Alert/Oriented   Prior to hospitalization functional status: Assistive Equipment   Current cognitive status: Alert/Oriented   Current Functional Status: Assistive Equipment;Needs Assistance   Lives With alone   Able to Return to Prior Arrangements other (see comments)  (TBD)   Is patient able to care for self after discharge? Unable to determine at this time (comments)   Patient's perception of discharge disposition home or selfcare   Readmission Within the Last 30 Days no previous admission in last 30 days   Patient currently being followed by outpatient case management? No   Patient currently receives any other outside agency services? No   Equipment Currently Used at Home wheelchair   Do you have any problems affording any of your prescribed medications? No   Is the patient taking medications as prescribed? yes   Does the patient have transportation home? Yes   Transportation Anticipated family or friend will provide    Does the patient receive services at the [facility]? No   Discharge Plan A Home   Discharge Plan B Home;Home Health   DME Needed Upon Discharge  other (see comments)  (TBD)   Patient/Family in Agreement with Plan yes

## 2020-05-04 NOTE — PLAN OF CARE
Patient aware of sacral wound.  Awaiting surgical I&D.  Patient went for MRI of wound today.  Patient repositioned in bed throughout shift.  IV antibiotic therapy ongoing.
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
## 2020-05-05 ENCOUNTER — ANESTHESIA EVENT (OUTPATIENT)
Dept: SURGERY | Facility: HOSPITAL | Age: 45
DRG: 853 | End: 2020-05-05
Payer: MEDICARE

## 2020-05-05 ENCOUNTER — ANESTHESIA (OUTPATIENT)
Dept: SURGERY | Facility: HOSPITAL | Age: 45
DRG: 853 | End: 2020-05-05
Payer: MEDICARE

## 2020-05-05 PROBLEM — M86.9 OSTEOMYELITIS: Status: ACTIVE | Noted: 2020-05-05

## 2020-05-05 LAB
ANION GAP SERPL CALC-SCNC: 10 MMOL/L (ref 8–16)
BACTERIA BLD CULT: NORMAL
BASOPHILS # BLD AUTO: 0.06 K/UL (ref 0–0.2)
BASOPHILS NFR BLD: 0.8 % (ref 0–1.9)
BUN SERPL-MCNC: 4 MG/DL (ref 6–20)
CALCIUM SERPL-MCNC: 9.7 MG/DL (ref 8.7–10.5)
CHLORIDE SERPL-SCNC: 106 MMOL/L (ref 95–110)
CO2 SERPL-SCNC: 23 MMOL/L (ref 23–29)
CREAT SERPL-MCNC: 0.8 MG/DL (ref 0.5–1.4)
DIFFERENTIAL METHOD: ABNORMAL
EOSINOPHIL # BLD AUTO: 0.8 K/UL (ref 0–0.5)
EOSINOPHIL NFR BLD: 9.7 % (ref 0–8)
ERYTHROCYTE [DISTWIDTH] IN BLOOD BY AUTOMATED COUNT: 15.4 % (ref 11.5–14.5)
EST. GFR  (AFRICAN AMERICAN): >60 ML/MIN/1.73 M^2
EST. GFR  (NON AFRICAN AMERICAN): >60 ML/MIN/1.73 M^2
GLUCOSE SERPL-MCNC: 117 MG/DL (ref 70–110)
HCT VFR BLD AUTO: 33.3 % (ref 40–54)
HGB BLD-MCNC: 10.8 G/DL (ref 14–18)
IMM GRANULOCYTES # BLD AUTO: 0.02 K/UL (ref 0–0.04)
IMM GRANULOCYTES NFR BLD AUTO: 0.3 % (ref 0–0.5)
LYMPHOCYTES # BLD AUTO: 1.7 K/UL (ref 1–4.8)
LYMPHOCYTES NFR BLD: 21.1 % (ref 18–48)
MCH RBC QN AUTO: 25.4 PG (ref 27–31)
MCHC RBC AUTO-ENTMCNC: 32.4 G/DL (ref 32–36)
MCV RBC AUTO: 78 FL (ref 82–98)
MONOCYTES # BLD AUTO: 0.6 K/UL (ref 0.3–1)
MONOCYTES NFR BLD: 7.7 % (ref 4–15)
NEUTROPHILS # BLD AUTO: 4.8 K/UL (ref 1.8–7.7)
NEUTROPHILS NFR BLD: 60.4 % (ref 38–73)
NRBC BLD-RTO: 0 /100 WBC
PLATELET # BLD AUTO: 816 K/UL (ref 150–350)
PMV BLD AUTO: 9.3 FL (ref 9.2–12.9)
POCT GLUCOSE: 115 MG/DL (ref 70–110)
POCT GLUCOSE: 64 MG/DL (ref 70–110)
POCT GLUCOSE: 77 MG/DL (ref 70–110)
POCT GLUCOSE: 81 MG/DL (ref 70–110)
POTASSIUM SERPL-SCNC: 4.1 MMOL/L (ref 3.5–5.1)
RBC # BLD AUTO: 4.25 M/UL (ref 4.6–6.2)
SODIUM SERPL-SCNC: 139 MMOL/L (ref 136–145)
WBC # BLD AUTO: 7.93 K/UL (ref 3.9–12.7)

## 2020-05-05 PROCEDURE — 87205 SMEAR GRAM STAIN: CPT | Mod: 59

## 2020-05-05 PROCEDURE — 63600175 PHARM REV CODE 636 W HCPCS: Performed by: SURGERY

## 2020-05-05 PROCEDURE — 87116 MYCOBACTERIA CULTURE: CPT

## 2020-05-05 PROCEDURE — 63600175 PHARM REV CODE 636 W HCPCS: Performed by: NURSE ANESTHETIST, CERTIFIED REGISTERED

## 2020-05-05 PROCEDURE — 37000009 HC ANESTHESIA EA ADD 15 MINS: Performed by: SURGERY

## 2020-05-05 PROCEDURE — 88311 DECALCIFY TISSUE: CPT | Performed by: PATHOLOGY

## 2020-05-05 PROCEDURE — 25000003 PHARM REV CODE 250: Performed by: HOSPITALIST

## 2020-05-05 PROCEDURE — 94761 N-INVAS EAR/PLS OXIMETRY MLT: CPT

## 2020-05-05 PROCEDURE — 36000707: Performed by: SURGERY

## 2020-05-05 PROCEDURE — 88305 TISSUE EXAM BY PATHOLOGIST: CPT | Performed by: PATHOLOGY

## 2020-05-05 PROCEDURE — 25000003 PHARM REV CODE 250: Performed by: SURGERY

## 2020-05-05 PROCEDURE — 87075 CULTR BACTERIA EXCEPT BLOOD: CPT

## 2020-05-05 PROCEDURE — 88305 TISSUE EXAM BY PATHOLOGIST: CPT | Mod: 26,,, | Performed by: PATHOLOGY

## 2020-05-05 PROCEDURE — 37000008 HC ANESTHESIA 1ST 15 MINUTES: Performed by: SURGERY

## 2020-05-05 PROCEDURE — 36415 COLL VENOUS BLD VENIPUNCTURE: CPT

## 2020-05-05 PROCEDURE — 85025 COMPLETE CBC W/AUTO DIFF WBC: CPT

## 2020-05-05 PROCEDURE — 63600175 PHARM REV CODE 636 W HCPCS: Performed by: HOSPITALIST

## 2020-05-05 PROCEDURE — 25000003 PHARM REV CODE 250: Performed by: NURSE ANESTHETIST, CERTIFIED REGISTERED

## 2020-05-05 PROCEDURE — 11000001 HC ACUTE MED/SURG PRIVATE ROOM

## 2020-05-05 PROCEDURE — 88307 TISSUE EXAM BY PATHOLOGIST: CPT | Mod: 26,,, | Performed by: PATHOLOGY

## 2020-05-05 PROCEDURE — 88311 DECALCIFY TISSUE: CPT | Mod: 26,,, | Performed by: PATHOLOGY

## 2020-05-05 PROCEDURE — 25000003 PHARM REV CODE 250: Performed by: NURSE PRACTITIONER

## 2020-05-05 PROCEDURE — 87070 CULTURE OTHR SPECIMN AEROBIC: CPT

## 2020-05-05 PROCEDURE — 80202 ASSAY OF VANCOMYCIN: CPT

## 2020-05-05 PROCEDURE — 71000033 HC RECOVERY, INTIAL HOUR: Performed by: SURGERY

## 2020-05-05 PROCEDURE — 88307 PR  SURG PATH,LEVEL V: ICD-10-PCS | Mod: 26,,, | Performed by: PATHOLOGY

## 2020-05-05 PROCEDURE — 87102 FUNGUS ISOLATION CULTURE: CPT | Mod: 59

## 2020-05-05 PROCEDURE — 88311 PR  DECALCIFY TISSUE: ICD-10-PCS | Mod: 26,,, | Performed by: PATHOLOGY

## 2020-05-05 PROCEDURE — 80048 BASIC METABOLIC PNL TOTAL CA: CPT

## 2020-05-05 PROCEDURE — S4991 NICOTINE PATCH NONLEGEND: HCPCS | Performed by: HOSPITALIST

## 2020-05-05 PROCEDURE — 88307 TISSUE EXAM BY PATHOLOGIST: CPT | Performed by: PATHOLOGY

## 2020-05-05 PROCEDURE — 36000706: Performed by: SURGERY

## 2020-05-05 PROCEDURE — 88305 TISSUE EXAM BY PATHOLOGIST: ICD-10-PCS | Mod: 26,,, | Performed by: PATHOLOGY

## 2020-05-05 RX ORDER — MIDAZOLAM HYDROCHLORIDE 1 MG/ML
INJECTION, SOLUTION INTRAMUSCULAR; INTRAVENOUS
Status: DISCONTINUED | OUTPATIENT
Start: 2020-05-05 | End: 2020-05-05

## 2020-05-05 RX ORDER — LIDOCAINE HYDROCHLORIDE 10 MG/ML
1 INJECTION, SOLUTION EPIDURAL; INFILTRATION; INTRACAUDAL; PERINEURAL ONCE
Status: DISCONTINUED | OUTPATIENT
Start: 2020-05-05 | End: 2020-05-11 | Stop reason: HOSPADM

## 2020-05-05 RX ORDER — BACITRACIN 50000 [IU]/1
INJECTION, POWDER, FOR SOLUTION INTRAMUSCULAR
Status: DISCONTINUED | OUTPATIENT
Start: 2020-05-05 | End: 2020-05-05 | Stop reason: HOSPADM

## 2020-05-05 RX ORDER — PHENYLEPHRINE HYDROCHLORIDE 10 MG/ML
INJECTION INTRAVENOUS
Status: DISCONTINUED | OUTPATIENT
Start: 2020-05-05 | End: 2020-05-05

## 2020-05-05 RX ORDER — PROPOFOL 10 MG/ML
VIAL (ML) INTRAVENOUS
Status: DISCONTINUED | OUTPATIENT
Start: 2020-05-05 | End: 2020-05-05

## 2020-05-05 RX ORDER — LIDOCAINE HYDROCHLORIDE 20 MG/ML
INJECTION INTRAVENOUS
Status: DISCONTINUED | OUTPATIENT
Start: 2020-05-05 | End: 2020-05-05

## 2020-05-05 RX ORDER — SODIUM CHLORIDE 9 MG/ML
INJECTION, SOLUTION INTRAVENOUS CONTINUOUS PRN
Status: DISCONTINUED | OUTPATIENT
Start: 2020-05-05 | End: 2020-05-05

## 2020-05-05 RX ORDER — SODIUM CHLORIDE 9 MG/ML
INJECTION, SOLUTION INTRAVENOUS CONTINUOUS
Status: CANCELLED | OUTPATIENT
Start: 2020-05-05

## 2020-05-05 RX ORDER — MUPIROCIN 20 MG/G
OINTMENT TOPICAL
Status: CANCELLED | OUTPATIENT
Start: 2020-05-05

## 2020-05-05 RX ORDER — LIDOCAINE HYDROCHLORIDE 10 MG/ML
INJECTION, SOLUTION EPIDURAL; INFILTRATION; INTRACAUDAL; PERINEURAL
Status: DISCONTINUED | OUTPATIENT
Start: 2020-05-05 | End: 2020-05-05 | Stop reason: HOSPADM

## 2020-05-05 RX ORDER — PROPOFOL 10 MG/ML
VIAL (ML) INTRAVENOUS CONTINUOUS PRN
Status: DISCONTINUED | OUTPATIENT
Start: 2020-05-05 | End: 2020-05-05

## 2020-05-05 RX ADMIN — Medication 1 PATCH: at 09:05

## 2020-05-05 RX ADMIN — OXYCODONE HYDROCHLORIDE AND ACETAMINOPHEN 1 TABLET: 5; 325 TABLET ORAL at 01:05

## 2020-05-05 RX ADMIN — SODIUM CHLORIDE: 0.9 INJECTION, SOLUTION INTRAVENOUS at 03:05

## 2020-05-05 RX ADMIN — PROPOFOL 150 MCG/KG/MIN: 10 INJECTION, EMULSION INTRAVENOUS at 04:05

## 2020-05-05 RX ADMIN — MEROPENEM 1 G: 1 INJECTION, POWDER, FOR SOLUTION INTRAVENOUS at 12:05

## 2020-05-05 RX ADMIN — GABAPENTIN 300 MG: 300 CAPSULE ORAL at 11:05

## 2020-05-05 RX ADMIN — PHENYLEPHRINE HYDROCHLORIDE 100 MCG: 10 INJECTION INTRAVENOUS at 04:05

## 2020-05-05 RX ADMIN — MEROPENEM 1 G: 1 INJECTION, POWDER, FOR SOLUTION INTRAVENOUS at 08:05

## 2020-05-05 RX ADMIN — VANCOMYCIN HYDROCHLORIDE 1750 MG: 100 INJECTION, POWDER, LYOPHILIZED, FOR SOLUTION INTRAVENOUS at 05:05

## 2020-05-05 RX ADMIN — DEXTROSE 2 G: 50 INJECTION, SOLUTION INTRAVENOUS at 04:05

## 2020-05-05 RX ADMIN — LIDOCAINE HYDROCHLORIDE 100 MG: 20 INJECTION, SOLUTION INTRAVENOUS at 04:05

## 2020-05-05 RX ADMIN — MEROPENEM 1 G: 1 INJECTION, POWDER, FOR SOLUTION INTRAVENOUS at 06:05

## 2020-05-05 RX ADMIN — PROPOFOL 30 MG: 10 INJECTION, EMULSION INTRAVENOUS at 04:05

## 2020-05-05 RX ADMIN — CYCLOBENZAPRINE HYDROCHLORIDE 10 MG: 10 TABLET, FILM COATED ORAL at 11:05

## 2020-05-05 RX ADMIN — DEXTROSE MONOHYDRATE 12.5 G: 500 INJECTION PARENTERAL at 03:05

## 2020-05-05 RX ADMIN — CYCLOBENZAPRINE HYDROCHLORIDE 10 MG: 10 TABLET, FILM COATED ORAL at 01:05

## 2020-05-05 RX ADMIN — MIDAZOLAM 2 MG: 1 INJECTION INTRAMUSCULAR; INTRAVENOUS at 03:05

## 2020-05-05 RX ADMIN — OXYCODONE HYDROCHLORIDE AND ACETAMINOPHEN 1 TABLET: 5; 325 TABLET ORAL at 11:05

## 2020-05-05 NOTE — OP NOTE
[facility]  Brief Operative Note    SUMMARY     Surgery Date: 5/5/2020     Surgeon(s) and Role:     * Jesus Tom MD - Primary    Assisting Surgeon: None    Pre-op Diagnosis:  Tachycardia [R00.0]  Decubitus ulcer, infected, stage III [L89.93, L08.9]  Encounter for suprapubic catheter care [Z43.5]  Iron deficiency anemia, unspecified iron deficiency anemia type [D50.9]  Chronic suprapubic catheter [Z93.59]    Post-op Diagnosis:  Post-Op Diagnosis Codes:     * Tachycardia [R00.0]     * Decubitus ulcer, infected, stage III [L89.93, L08.9]     * Encounter for suprapubic catheter care [Z43.5]     * Iron deficiency anemia, unspecified iron deficiency anemia type [D50.9]     * Chronic suprapubic catheter [Z93.59]    Procedure(s) (LRB):  DEBRIDEMENT, WOUND, SACRUM (N/A)  Right hip with excision of infected tissue , muscle , fascia and bone  6x 7 x 6 cm  Anesthesia: Local MAC    Description of Procedure:  After satisfactory IV sedation patient in semi prone position left side down right side up area of the wound was prepped and draped normal sterile manner using Betadine scrub and solution left side down right side up time-out was called site was confirmed PE area of the infected tissue was then infiltrated using 1% xylocaine solution with help of knife and Metzenbaum all infected necrotic tissue was debrided above freshly bleeding tissue specimen was sent for culture and pathology patient also had in for the exposed bone multiple excision a bone biopsy was performed using rongeur wound was cauterized electrocautery was thoroughly irrigated antibiotic solution hemostasis was perfectly maintained wound was then openly packed using iodoform packing Xeroform 4 x 4 ABD pad dressing was applied instrument counts sponge count needle count was correct for dilated well estimated blood loss was 30 cc no intraop complication patient was sent to recovery room in stable condition.    Description of the  findings of the procedure:  Excision debridement infected decubitus ulcer right hip done under mac anesthesia with excision infected tissue including bone muscle fascia and skin patient tolerated well was sent to recovery room in stable condition    Estimated Blood Loss:  40 cc      Specimens:   Specimen (12h ago, onward)    None

## 2020-05-05 NOTE — SUBJECTIVE & OBJECTIVE
Interval History: no new complaints, asking when he will get I and D done, has a PIV line now, denied any N/V/D    Review of Systems   Constitutional: Positive for chills and fever. Negative for activity change and appetite change.   HENT: Negative for congestion.    Eyes: Negative for discharge.   Respiratory: Negative for cough, choking and shortness of breath.    Gastrointestinal: Negative for abdominal pain, constipation, diarrhea, nausea and vomiting.   Genitourinary:        Cannot feel dysuria but noticed foul smelling and change in color in urine when admitted   Musculoskeletal: Positive for gait problem.        Paraplegic   Skin: Positive for wound.   Neurological: Negative for speech difficulty.   Psychiatric/Behavioral: Negative for agitation and behavioral problems.     Objective:     Vital Signs (Most Recent):  Temp: 97.7 °F (36.5 °C) (05/05/20 0537)  Pulse: 80 (05/05/20 0744)  Resp: 17 (05/05/20 0537)  BP: 111/68 (05/05/20 0537)  SpO2: 97 % (05/05/20 0722) Vital Signs (24h Range):  Temp:  [97.7 °F (36.5 °C)-98.1 °F (36.7 °C)] 97.7 °F (36.5 °C)  Pulse:  [67-85] 80  Resp:  [17-20] 17  SpO2:  [97 %-100 %] 97 %  BP: ()/(55-69) 111/68     Weight: 104.1 kg (229 lb 8 oz)  Body mass index is 32.01 kg/m².    Estimated Creatinine Clearance: 131.3 mL/min (based on SCr of 0.9 mg/dL).    Physical Exam   Constitutional: He is oriented to person, place, and time. He appears well-developed and well-nourished. No distress.   HENT:   Head: Normocephalic and atraumatic.   Mouth/Throat: No oropharyngeal exudate.   Eyes: Pupils are equal, round, and reactive to light. EOM are normal. Right eye exhibits no discharge. Left eye exhibits no discharge. No scleral icterus.   Neck: Normal range of motion. Neck supple. No JVD present.   Scar mid neck from old trech site   Cardiovascular: Normal rate, regular rhythm and normal heart sounds.   No murmur heard.  Pulmonary/Chest: Effort normal and breath sounds normal. No  respiratory distress. He has no wheezes. He has no rales.   Abdominal: Soft. Bowel sounds are normal. He exhibits no distension. There is no tenderness. There is no rebound.   Suprapubic catheter seen and intact, no purulence no erythema around   Musculoskeletal:   Right BKA stump dry and hard skin, no open wound  Left foot with superficial ulcer on left big toe, no purulence, dry skin of heel and plantar area, healed wound from before  Paraplegic  Large right ischial wound stage IV, media pictures seen   Lymphadenopathy:     He has no cervical adenopathy.   Neurological: He is alert and oriented to person, place, and time.   paraplegic   Skin: Skin is warm and dry. No rash noted. He is not diaphoretic.   Psychiatric: He has a normal mood and affect. His behavior is normal.   Nursing note and vitals reviewed.    Antibiotics (From admission, onward)    Start     Stop Route Frequency Ordered    05/04/20 1700  meropenem 1 g in sodium chloride 0.9 % 100 mL IVPB (ready to mix system)      -- IV Every 8 hours (non-standard times) 05/04/20 1503    05/04/20 1300  vancomycin (VANCOCIN) 1,750 mg in dextrose 5 % 500 mL IVPB      -- IV Every 12 hours (non-standard times) 05/04/20 0850    05/04/20 0758  vancomycin - pharmacy to dose  (vancomycin IVPB)      -- IV pharmacy to manage frequency 05/04/20 0658    05/04/20 0018  mupirocin 2 % ointment      -- Nasl On Call Procedure 05/04/20 0018          Significant Labs:   CBC:   Recent Labs   Lab 05/03/20  1028 05/04/20  1025 05/05/20  0710   WBC 8.97 6.75 7.93   HGB 9.7* 9.5* 10.8*   HCT 29.6* 29.0* 33.3*   * 723* 816*     CMP:   Recent Labs   Lab 05/03/20  1028 05/04/20  1025    140   K 3.9 4.0    106   CO2 27 26   GLU 98 82   BUN 5* 5*   CREATININE 1.0 0.9   CALCIUM 9.3 9.2   ANIONGAP 9 8   EGFRNONAA >60 >60     Microbiology Results (last 7 days)     Procedure Component Value Units Date/Time    Blood culture x two cultures. Draw prior to antibiotics. [[id number]]  Collected:  05/01/20 1730    Order Status:  Completed Specimen:  Blood from Peripheral, Antecubital, Left Updated:  05/05/20 0612     Blood Culture, Routine No Growth to date      No Growth to date      No Growth to date      No Growth to date    Narrative:       Aerobic and anaerobic    Blood culture x two cultures. Draw prior to antibiotics. [[id number]] Collected:  05/01/20 1616    Order Status:  Completed Specimen:  Blood from Peripheral, Upper Arm, Left Updated:  05/04/20 2212     Blood Culture, Routine No Growth to date      No Growth to date      No Growth to date      No Growth to date    Narrative:       Aerobic and anaerobic    Aerobic culture [[id number]]  (Abnormal)  (Susceptibility) Collected:  05/01/20 1558    Order Status:  Completed Specimen:  Wound from Buttocks, Right Updated:  05/04/20 1100     Aerobic Bacterial Culture PROTEUS MIRABILIS ESBL  Moderate  No other significant isolate       Comment: Previous value was PPR verified by ADB at 13:25 on 05/02/2020       Culture, Anaerobic [[id number]] Collected:  05/01/20 1558    Order Status:  Completed Specimen:  Wound from Buttocks, Right Updated:  05/04/20 0903     Anaerobic Culture Culture in progress        All pertinent labs within the past 24 hours have been reviewed.    Significant Imaging: I have reviewed all pertinent imaging results/findings within the past 24 hours.
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
## 2020-05-05 NOTE — PLAN OF CARE
Pt meets PACU discharge criteria. Pt signed out of PACU by Dr. Grimm. Report called to [address]

## 2020-05-05 NOTE — PLAN OF CARE
Problem: Wound  Goal: Optimal Wound Healing  Outcome: Ongoing, Progressing     Problem: Fall Injury Risk  Goal: Absence of Fall and Fall-Related Injury  Outcome: Ongoing, Progressing     Problem: Adult Inpatient Plan of Care  Goal: Plan of Care Review  Outcome: Ongoing, Progressing  Flowsheets (Taken 5/5/2020 0217)  Plan of Care Reviewed With: patient  Goal: Patient-Specific Goal (Individualization)  Outcome: Ongoing, Progressing  Goal: Absence of Hospital-Acquired Illness or Injury  Outcome: Ongoing, Progressing  Goal: Optimal Comfort and Wellbeing  Outcome: Ongoing, Progressing  Goal: Readiness for Transition of Care  Outcome: Ongoing, Progressing  Goal: Rounds/Family Conference  Outcome: Ongoing, Progressing     Problem: Diabetes Comorbidity  Goal: Blood Glucose Level Within Desired Range  Outcome: Ongoing, Progressing     Problem: Infection  Goal: Infection Symptom Resolution  Outcome: Ongoing, Progressing     Problem: Skin Injury Risk Increased  Goal: Skin Health and Integrity  Outcome: Ongoing, Progressing     Problem: Adjustment to Illness (Sepsis/Septic Shock)  Goal: Optimal Coping  Outcome: Ongoing, Progressing     Problem: Bleeding (Sepsis/Septic Shock)  Goal: Absence of Bleeding  Outcome: Ongoing, Progressing     Problem: Glycemic Control Impaired (Sepsis/Septic Shock)  Goal: Blood Glucose Level Within Desired Range  Outcome: Ongoing, Progressing     Problem: Hemodynamic Instability (Sepsis/Septic Shock)  Goal: Effective Tissue Perfusion  Outcome: Ongoing, Progressing     Problem: Infection (Sepsis/Septic Shock)  Goal: Absence of Infection Signs/Symptoms  Outcome: Ongoing, Progressing     Problem: Nutrition Impaired (Sepsis/Septic Shock)  Goal: Optimal Nutrition Intake  Outcome: Ongoing, Progressing     Problem: Respiratory Compromise (Sepsis/Septic Shock)  Goal: Effective Oxygenation and Ventilation  Outcome: Ongoing, Progressing     Problem: Non[place]  Goal: Collaboration with the Healthcare  Team  Outcome: Ongoing, Progressing     Problem: Bowel Elimination Impaired (Spinal Cord Injury)  Goal: Effective Bowel Elimination  Outcome: Ongoing, Progressing     Problem: Extended Neurologic Impairment (Spinal Cord Injury)  Goal: Absence of Extended Neurologic Injury  Outcome: Ongoing, Progressing     Problem: Functional Ability Impaired (Spinal Cord Injury)  Goal: Optimal Functional Ability  Outcome: Ongoing, Progressing     Problem: Nutrition Impaired (Spinal Cord Injury)  Goal: Optimal Nutrition Intake  Outcome: Ongoing, Progressing     Problem: Orthostatic Hypotension (Spinal Cord Injury)  Goal: Absence of Postural Hypotension  Outcome: Ongoing, Progressing     Problem: Pain (Spinal Cord Injury)  Goal: Acceptable Pain Control  Outcome: Ongoing, Progressing     Problem: Sensorimotor Impairment (Spinal Cord Injury)  Goal: Optimal Sensorimotor Function  Outcome: Ongoing, Progressing     Problem: Skin Injury (Spinal Cord Injury)  Goal: Skin Health and Integrity  Outcome: Ongoing, Progressing     Problem: Urinary Elimination Impaired (Spinal Cord Injury)  Goal: Effective Urinary Elimination  Outcome: Ongoing, Progressing     Problem: Bleeding (Surgery Nonspecified)  Goal: Absence of Bleeding  Outcome: Ongoing, Progressing     Problem: Bowel Elimination Impaired (Surgery Nonspecified)  Goal: Effective Bowel Elimination  Outcome: Ongoing, Progressing     Problem: Infection (Surgery Nonspecified)  Goal: Absence of Infection Signs/Symptoms  Outcome: Ongoing, Progressing     Problem: Pain (Surgery Nonspecified)  Goal: Acceptable Pain Control  Outcome: Ongoing, Progressing

## 2020-05-05 NOTE — ANESTHESIA POSTPROCEDURE EVALUATION
Anesthesia Post Evaluation    Patient: Hermann Aguilar    Procedure(s) Performed: Procedure(s) (LRB):  DEBRIDEMENT, WOUND, SACRUM (N/A)    Final Anesthesia Type: MAC    Patient location during evaluation: PACU  Patient participation: Yes- Able to Participate  Level of consciousness: awake and alert  Post-procedure vital signs: reviewed and stable  Pain management: adequate  Airway patency: patent    PONV status at discharge: No PONV  Anesthetic complications: no      Cardiovascular status: blood pressure returned to baseline  Respiratory status: unassisted  Hydration status: euvolemic            Vitals Value Taken Time   BP 95/51 5/5/2020  5:07 PM   Temp 36.7 °C (98.1 °F) 5/5/2020 12:26 PM   Pulse 68 5/5/2020  5:10 PM   Resp 22 5/5/2020  5:10 PM   SpO2 100 % 5/5/2020  5:10 PM   Vitals shown include unvalidated device data.      No case tracking events are documented in the log.      Pain/Harrison Score: Pain Rating Prior to Med Admin: 8 (5/5/2020  1:33 PM)  Pain Rating Post Med Admin: 3 (5/5/2020  2:33 PM)
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
## 2020-05-05 NOTE — TRANSFER OF CARE
"Anesthesia Transfer of Care Note    Patient: Hermann Aguilar    Procedure(s) Performed: Procedure(s) (LRB):  DEBRIDEMENT, WOUND, SACRUM (N/A)    Patient location: PACU    Anesthesia Type: MAC    Transport from OR: Transported from OR on room air with adequate spontaneous ventilation    Post pain: adequate analgesia    Post assessment: no apparent anesthetic complications and tolerated procedure well    Post vital signs: stable    Level of consciousness: awake, alert and oriented    Nausea/Vomiting: no nausea/vomiting    Complications: none    Transfer of care protocol was followed      Last vitals:   Visit Vitals  BP (!) 108/59 (BP Location: Right arm, Patient Position: Lying)   Pulse (!) 115   Temp 36.7 °C (98.1 °F) (Oral)   Resp 19   Ht 5' 11" (1.803 m)   Wt 104.1 kg (229 lb 8 oz)   SpO2 99%   BMI 32.01 kg/m²     "

## 2020-05-05 NOTE — NURSING
This RN spoke with surgery department, Gita, regarding rapid COVID19 swab/pending procedure. Per Dr Martin, no need for swab at this time, will take pt for surgery. POCT BG obtained, 64. 12.5gm dextrose administered IV.

## 2020-05-06 LAB
ANION GAP SERPL CALC-SCNC: 10 MMOL/L (ref 8–16)
BACTERIA BLD CULT: NORMAL
BACTERIA SPEC ANAEROBE CULT: NORMAL
BASOPHILS # BLD AUTO: 0.06 K/UL (ref 0–0.2)
BASOPHILS NFR BLD: 0.6 % (ref 0–1.9)
BUN SERPL-MCNC: 4 MG/DL (ref 6–20)
CALCIUM SERPL-MCNC: 9.4 MG/DL (ref 8.7–10.5)
CHLORIDE SERPL-SCNC: 107 MMOL/L (ref 95–110)
CO2 SERPL-SCNC: 23 MMOL/L (ref 23–29)
CREAT SERPL-MCNC: 0.8 MG/DL (ref 0.5–1.4)
DIFFERENTIAL METHOD: ABNORMAL
EOSINOPHIL # BLD AUTO: 0.7 K/UL (ref 0–0.5)
EOSINOPHIL NFR BLD: 7.6 % (ref 0–8)
ERYTHROCYTE [DISTWIDTH] IN BLOOD BY AUTOMATED COUNT: 15.4 % (ref 11.5–14.5)
EST. GFR  (AFRICAN AMERICAN): >60 ML/MIN/1.73 M^2
EST. GFR  (NON AFRICAN AMERICAN): >60 ML/MIN/1.73 M^2
GLUCOSE SERPL-MCNC: 120 MG/DL (ref 70–110)
HCT VFR BLD AUTO: 31 % (ref 40–54)
HGB BLD-MCNC: 10.1 G/DL (ref 14–18)
IMM GRANULOCYTES # BLD AUTO: 0.03 K/UL (ref 0–0.04)
IMM GRANULOCYTES NFR BLD AUTO: 0.3 % (ref 0–0.5)
LYMPHOCYTES # BLD AUTO: 2 K/UL (ref 1–4.8)
LYMPHOCYTES NFR BLD: 21.7 % (ref 18–48)
MCH RBC QN AUTO: 25.3 PG (ref 27–31)
MCHC RBC AUTO-ENTMCNC: 32.6 G/DL (ref 32–36)
MCV RBC AUTO: 78 FL (ref 82–98)
MONOCYTES # BLD AUTO: 0.6 K/UL (ref 0.3–1)
MONOCYTES NFR BLD: 6.6 % (ref 4–15)
NEUTROPHILS # BLD AUTO: 5.9 K/UL (ref 1.8–7.7)
NEUTROPHILS NFR BLD: 63.2 % (ref 38–73)
NRBC BLD-RTO: 0 /100 WBC
PLATELET # BLD AUTO: 800 K/UL (ref 150–350)
PMV BLD AUTO: 9.1 FL (ref 9.2–12.9)
POCT GLUCOSE: 103 MG/DL (ref 70–110)
POCT GLUCOSE: 112 MG/DL (ref 70–110)
POCT GLUCOSE: 121 MG/DL (ref 70–110)
POCT GLUCOSE: 143 MG/DL (ref 70–110)
POTASSIUM SERPL-SCNC: 4 MMOL/L (ref 3.5–5.1)
RBC # BLD AUTO: 4 M/UL (ref 4.6–6.2)
SODIUM SERPL-SCNC: 140 MMOL/L (ref 136–145)
VANCOMYCIN TROUGH SERPL-MCNC: 17.1 UG/ML (ref 10–22)
WBC # BLD AUTO: 9.37 K/UL (ref 3.9–12.7)

## 2020-05-06 PROCEDURE — 11000001 HC ACUTE MED/SURG PRIVATE ROOM

## 2020-05-06 PROCEDURE — 25000003 PHARM REV CODE 250: Performed by: NURSE PRACTITIONER

## 2020-05-06 PROCEDURE — 85025 COMPLETE CBC W/AUTO DIFF WBC: CPT

## 2020-05-06 PROCEDURE — 25000003 PHARM REV CODE 250: Performed by: SURGERY

## 2020-05-06 PROCEDURE — 63600175 PHARM REV CODE 636 W HCPCS: Performed by: SURGERY

## 2020-05-06 PROCEDURE — S4991 NICOTINE PATCH NONLEGEND: HCPCS | Performed by: SURGERY

## 2020-05-06 PROCEDURE — 63600175 PHARM REV CODE 636 W HCPCS: Performed by: FAMILY MEDICINE

## 2020-05-06 PROCEDURE — 25000003 PHARM REV CODE 250: Performed by: HOSPITALIST

## 2020-05-06 PROCEDURE — 36415 COLL VENOUS BLD VENIPUNCTURE: CPT

## 2020-05-06 PROCEDURE — 80048 BASIC METABOLIC PNL TOTAL CA: CPT

## 2020-05-06 RX ADMIN — MEROPENEM 1 G: 1 INJECTION, POWDER, FOR SOLUTION INTRAVENOUS at 12:05

## 2020-05-06 RX ADMIN — OXYCODONE HYDROCHLORIDE AND ACETAMINOPHEN 1 TABLET: 5; 325 TABLET ORAL at 09:05

## 2020-05-06 RX ADMIN — GEMFIBROZIL 600 MG: 600 TABLET ORAL at 06:05

## 2020-05-06 RX ADMIN — ASPIRIN 81 MG: 81 TABLET, COATED ORAL at 09:05

## 2020-05-06 RX ADMIN — CYCLOBENZAPRINE HYDROCHLORIDE 10 MG: 10 TABLET, FILM COATED ORAL at 09:05

## 2020-05-06 RX ADMIN — MEROPENEM 1 G: 1 INJECTION, POWDER, FOR SOLUTION INTRAVENOUS at 09:05

## 2020-05-06 RX ADMIN — MEROPENEM 1 G: 1 INJECTION, POWDER, FOR SOLUTION INTRAVENOUS at 05:05

## 2020-05-06 RX ADMIN — GABAPENTIN 300 MG: 300 CAPSULE ORAL at 09:05

## 2020-05-06 RX ADMIN — FERROUS SULFATE TAB EC 325 MG (65 MG FE EQUIVALENT) 325 MG: 325 (65 FE) TABLET DELAYED RESPONSE at 09:05

## 2020-05-06 RX ADMIN — GEMFIBROZIL 600 MG: 600 TABLET ORAL at 05:05

## 2020-05-06 RX ADMIN — Medication 1 PATCH: at 09:05

## 2020-05-06 RX ADMIN — VENLAFAXINE HYDROCHLORIDE 75 MG: 75 CAPSULE, EXTENDED RELEASE ORAL at 09:05

## 2020-05-06 RX ADMIN — VANCOMYCIN HYDROCHLORIDE 1500 MG: 1.5 INJECTION, POWDER, LYOPHILIZED, FOR SOLUTION INTRAVENOUS at 01:05

## 2020-05-06 RX ADMIN — VANCOMYCIN HYDROCHLORIDE 1500 MG: 1.5 INJECTION, POWDER, LYOPHILIZED, FOR SOLUTION INTRAVENOUS at 02:05

## 2020-05-06 NOTE — PROGRESS NOTES
Right ischial tub Stage 4 wound cleaned with wound cleanser,Wound vac applied as directed, good seal obtained, set at 125 mmHg.       05/06/20 1700        Negative Pressure Wound Therapy  05/06/20 1600 Right   Placement Date/Time: 05/06/20 1600   Side: Right  Location: Ischial tuberosity   NPWT Type Vacuum Therapy   Therapy Setting NPWT Continuous therapy   Pressure Setting NPWT 125 mmHg   Sponges Inserted NPWT 3   Sponges Removed NPWT Other  (not applicable)   General Output (mL)   (first application)
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
## 2020-05-06 NOTE — ASSESSMENT & PLAN NOTE
- with chronic suprapubic catheter  - traumatically removed catheter on arrival; Urology consulted and replaced on 5/1  - replaced 16 Belizean catheter, which will need to be upsized in Urology clinic in 1 month (appointment already made)  - appreciate Urology assistance

## 2020-05-06 NOTE — SUBJECTIVE & OBJECTIVE
Interval History: Sitting up in bed, no distress noted. No complaints of pain noted. Monitor.     Review of Systems   Constitutional: Negative for chills and fever.   Respiratory: Negative for shortness of breath.    Cardiovascular: Negative for chest pain.   Genitourinary: Positive for difficulty urinating.   Skin: Positive for wound.   Neurological: Positive for weakness (paraplegia).     Objective:     Vital Signs (Most Recent):  Temp: 98.4 °F (36.9 °C) (05/06/20 1133)  Pulse: 91 (05/06/20 1151)  Resp: 18 (05/06/20 0816)  BP: (!) 98/55 (05/06/20 1133)  SpO2: 98 % (05/06/20 1133) Vital Signs (24h Range):  Temp:  [97.1 °F (36.2 °C)-98.4 °F (36.9 °C)] 98.4 °F (36.9 °C)  Pulse:  [] 91  Resp:  [14-20] 18  SpO2:  [97 %-100 %] 98 %  BP: ()/(51-81) 98/55     Weight: 104.1 kg (229 lb 8 oz)  Body mass index is 32.01 kg/m².    Intake/Output Summary (Last 24 hours) at 5/6/2020 1157  Last data filed at 5/6/2020 0655  Gross per 24 hour   Intake 475 ml   Output 1750 ml   Net -1275 ml      Physical Exam   Constitutional: He is oriented to person, place, and time. He appears well-developed and well-nourished. No distress.   HENT:   Head: Normocephalic and atraumatic.   Eyes: Pupils are equal, round, and reactive to light. Conjunctivae are normal.   Neck: Normal range of motion. Neck supple. No JVD present.   Cardiovascular: Normal rate, regular rhythm, normal heart sounds and intact distal pulses.   Pulmonary/Chest: Effort normal and breath sounds normal. No respiratory distress. He has no wheezes.   Abdominal: Soft. Bowel sounds are normal. He exhibits no distension. There is no tenderness. There is no guarding.   Musculoskeletal: He exhibits no edema or tenderness.   Right BKA. Paraplegic   Neurological: He is alert and oriented to person, place, and time.   Skin: Skin is warm and dry. Capillary refill takes less than 2 seconds. No erythema.   Stage IV ischial ulcer, no surrounding cellulitis.   Psychiatric: He has  a normal mood and affect. His behavior is normal.       Significant Labs: All pertinent labs within the past 24 hours have been reviewed.    Significant Imaging: I have reviewed all pertinent imaging results/findings within the past 24 hours.
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
## 2020-05-06 NOTE — SUBJECTIVE & OBJECTIVE
Review of Systems   Constitutional: Positive for chills and fever. Negative for activity change and appetite change.   HENT: Negative for congestion.    Eyes: Negative for discharge.   Respiratory: Negative for cough, choking and shortness of breath.    Gastrointestinal: Negative for abdominal pain, constipation, diarrhea, nausea and vomiting.   Genitourinary:        Cannot feel dysuria but noticed foul smelling and change in color in urine when admitted   Musculoskeletal: Positive for gait problem.        Paraplegic   Skin: Positive for wound.   Neurological: Negative for speech difficulty.   Psychiatric/Behavioral: Negative for agitation and behavioral problems.     Objective:     Vital Signs (Most Recent):  Temp: 98.1 °F (36.7 °C) (05/06/20 0816)  Pulse: 97 (05/06/20 0816)  Resp: 18 (05/06/20 0816)  BP: 104/61 (05/06/20 0816)  SpO2: 99 % (05/06/20 0816) Vital Signs (24h Range):  Temp:  [97.1 °F (36.2 °C)-98.2 °F (36.8 °C)] 98.1 °F (36.7 °C)  Pulse:  [] 97  Resp:  [14-20] 18  SpO2:  [97 %-100 %] 99 %  BP: ()/(51-81) 104/61     Weight: 104.1 kg (229 lb 8 oz)  Body mass index is 32.01 kg/m².    Estimated Creatinine Clearance: 147.7 mL/min (based on SCr of 0.8 mg/dL).    Physical Exam   Constitutional: He is oriented to person, place, and time. He appears well-developed and well-nourished. No distress.   HENT:   Head: Normocephalic and atraumatic.   Mouth/Throat: No oropharyngeal exudate.   Eyes: Pupils are equal, round, and reactive to light. EOM are normal. Right eye exhibits no discharge. Left eye exhibits no discharge. No scleral icterus.   Neck: Normal range of motion. Neck supple. No JVD present.   Scar mid neck from old trech site   Cardiovascular: Normal rate, regular rhythm and normal heart sounds.   No murmur heard.  Pulmonary/Chest: Effort normal and breath sounds normal. No respiratory distress. He has no wheezes. He has no rales.   Abdominal: Soft. Bowel sounds are normal. He exhibits no  distension. There is no tenderness. There is no rebound.   Suprapubic catheter seen and intact, no purulence no erythema around   Musculoskeletal:   Right BKA stump dry and hard skin, no open wound  Left foot with superficial ulcer on left big toe, no purulence, dry skin of heel and plantar area, healed wound from before  Paraplegic  Large right ischial wound stage IV, media pictures seen   Lymphadenopathy:     He has no cervical adenopathy.   Neurological: He is alert and oriented to person, place, and time.   paraplegic   Skin: Skin is warm and dry. No rash noted. He is not diaphoretic.   Psychiatric: He has a normal mood and affect. His behavior is normal.   Nursing note and vitals reviewed.    Significant Labs:   CBC:   Recent Labs   Lab 05/05/20  0710 05/06/20  0812   WBC 7.93 9.37   HGB 10.8* 10.1*   HCT 33.3* 31.0*   * 800*     CMP:   Recent Labs   Lab 05/05/20  0710 05/06/20  0812    140   K 4.1 4.0    107   CO2 23 23   * 120*   BUN 4* 4*   CREATININE 0.8 0.8   CALCIUM 9.7 9.4   ANIONGAP 10 10   EGFRNONAA >60 >60     Microbiology Results (last 7 days)     Procedure Component Value Units Date/Time    Culture, Anaerobic [[id number]] Collected:  05/01/20 1558    Order Status:  Completed Specimen:  Wound from Buttocks, Right Updated:  05/06/20 1014     Anaerobic Culture No anaerobes isolated    Blood culture x two cultures. Draw prior to antibiotics. [[id number]] Collected:  05/01/20 1730    Order Status:  Completed Specimen:  Blood from Peripheral, Antecubital, Left Updated:  05/06/20 0612     Blood Culture, Routine No Growth after 4 days.     Narrative:       Aerobic and anaerobic    Blood culture x two cultures. Draw prior to antibiotics. [[id number]] Collected:  05/01/20 1616    Order Status:  Completed Specimen:  Blood from Peripheral, Upper Arm, Left Updated:  05/05/20 2212     Blood Culture, Routine No Growth after 4 days.     Narrative:       Aerobic and anaerobic    Fungus culture  [[id number]] Collected:  05/05/20 1700    Order Status:  Sent Specimen:  Bone from Hip, Left Updated:  05/05/20 1736    Culture, Anaerobe [[id number]] Collected:  05/05/20 1700    Order Status:  Sent Specimen:  Bone from Hip, Left Updated:  05/05/20 1736    Aerobic culture [[id number]] Collected:  05/05/20 1700    Order Status:  Sent Specimen:  Bone from Hip, Left Updated:  05/05/20 1735    AFB Culture & Smear [[id number]] Collected:  05/05/20 1700    Order Status:  Sent Specimen:  Bone from Hip, Left Updated:  05/05/20 1735    Gram stain [[id number]] Collected:  05/05/20 1700    Order Status:  Sent Specimen:  Bone from Hip, Left Updated:  05/05/20 1735    AFB Culture & Smear [[id number]] Collected:  05/05/20 1635    Order Status:  Sent Specimen:  Tissue from Sacrum Updated:  05/05/20 1733    Gram stain [[id number]] Collected:  05/05/20 1635    Order Status:  Sent Specimen:  Tissue from Sacrum Updated:  05/05/20 1732    Fungus culture [[id number]] Collected:  05/05/20 1635    Order Status:  Sent Specimen:  Tissue from Sacrum Updated:  05/05/20 1732    Aerobic culture [[id number]] Collected:  05/05/20 1635    Order Status:  Sent Specimen:  Wound from Sacrum Updated:  05/05/20 1732    Culture, Anaerobe [[id number]] Collected:  05/05/20 1635    Order Status:  Sent Specimen:  Tissue from Sacrum Updated:  05/05/20 1635    Aerobic culture [[id number]]  (Abnormal)  (Susceptibility) Collected:  05/01/20 1558    Order Status:  Completed Specimen:  Wound from Buttocks, Right Updated:  05/04/20 1100     Aerobic Bacterial Culture PROTEUS MIRABILIS ESBL  Moderate  No other significant isolate       Comment: Previous value was PPR verified by ADB at 13:25 on 05/02/2020           All pertinent labs within the past 24 hours have been reviewed.    Significant Imaging: I have reviewed all pertinent imaging results/findings within the past 24 hours.
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
## 2020-05-06 NOTE — ASSESSMENT & PLAN NOTE
- ESR 95,  - continue Vancomycin and  Zosyn  - General Surgery consulted for debridement, planned on today  - turn q2h   - see above

## 2020-05-06 NOTE — PROGRESS NOTES
Pharmacokinetic Assessment Follow Up: IV Vancomycin    Vancomycin serum concentration assessment(s):    The trough level was drawn correctly and can be used to guide therapy at this time. The measurement is above the desired definitive target range of 10 to 15 mcg/mL.    Vancomycin Regimen Plan:    Change regimen to Vancomycin 1500 mg IV every 12 hours with next serum trough concentration measured at 5/7/20 prior to 4 dose on 1300     Drug levels (last 3 results):  Recent Labs   Lab Result Units 05/03/20  1028 05/03/20  2143 05/05/20  2346   Vancomycin-Trough ug/mL 27.7* 12.3 17.1       Pharmacy will continue to follow and monitor vancomycin.    Please contact pharmacy at extension [phone] for questions regarding this assessment.    Thank you for the consult,   Christine Pompa       Patient brief summary:  Hermann Aguilar is a 44 y.o. male initiated on antimicrobial therapy with IV Vancomycin for treatment of skin & soft tissue infection    The patient's current regimen is 175omg q12     Drug Allergies:   Review of patient's allergies indicates:  No Known Allergies    Actual Body Weight:   104kg    Renal Function:   Estimated Creatinine Clearance: 147.7 mL/min (based on SCr of 0.8 mg/dL).,     Dialysis Method (if applicable):  N/A    CBC (last 72 hours):  Recent Labs   Lab Result Units 05/03/20  1028 05/04/20  1025 05/05/20  0710   WBC K/uL 8.97 6.75 7.93   Hemoglobin g/dL 9.7* 9.5* 10.8*   Hematocrit % 29.6* 29.0* 33.3*   Platelets K/uL 758* 723* 816*   Gran% % 61.0 54.0 60.4   Lymph% % 21.6 25.5 21.1   Mono% % 10.8 10.8 7.7   Eosinophil% % 5.6 8.7* 9.7*   Basophil% % 0.6 0.7 0.8   Differential Method  Automated Automated Automated       Metabolic Panel (last 72 hours):  Recent Labs   Lab Result Units 05/03/20  1028 05/04/20  1025 05/05/20  0710   Sodium mmol/L 140 140 139   Potassium mmol/L 3.9 4.0 4.1   Chloride mmol/L 104 106 106   CO2 mmol/L 27 26 23   Glucose mg/dL 98 82 117*   BUN, Bld mg/dL 5* 5* 4*   Creatinine  mg/dL 1.0 0.9 0.8       Vancomycin Administrations:  vancomycin given in the last 96 hours                     vancomycin (VANCOCIN) 1,750 mg in dextrose 5 % 500 mL IVPB (mg) 1,750 mg New Bag 05/05/20 0516     1,750 mg New Bag 05/04/20 1739    vancomycin (VANCOCIN) 1,750 mg in dextrose 5 % 500 mL IVPB (mg) 1,750 mg New Bag 05/04/20 0115                      Microbiologic Results:  Microbiology Results (last 7 days)       Procedure Component Value Units Date/Time    Blood culture x two cultures. Draw prior to antibiotics. [[id number]] Collected:  05/01/20 1616    Order Status:  Completed Specimen:  Blood from Peripheral, Upper Arm, Left Updated:  05/05/20 2212     Blood Culture, Routine No Growth after 4 days.     Narrative:       Aerobic and anaerobic    Fungus culture [[id number]] Collected:  05/05/20 1700    Order Status:  Sent Specimen:  Bone from Hip, Left Updated:  05/05/20 1736    Culture, Anaerobe [[id number]] Collected:  05/05/20 1700    Order Status:  Sent Specimen:  Bone from Hip, Left Updated:  05/05/20 1736    Aerobic culture [[id number]] Collected:  05/05/20 1700    Order Status:  Sent Specimen:  Bone from Hip, Left Updated:  05/05/20 1735    AFB Culture & Smear [[id number]] Collected:  05/05/20 1700    Order Status:  Sent Specimen:  Bone from Hip, Left Updated:  05/05/20 1735    Gram stain [[id number]] Collected:  05/05/20 1700    Order Status:  Sent Specimen:  Bone from Hip, Left Updated:  05/05/20 1735    AFB Culture & Smear [[id number]] Collected:  05/05/20 1635    Order Status:  Sent Specimen:  Tissue from Sacrum Updated:  05/05/20 1733    Gram stain [[id number]] Collected:  05/05/20 1635    Order Status:  Sent Specimen:  Tissue from Sacrum Updated:  05/05/20 1732    Fungus culture [[id number]] Collected:  05/05/20 1635    Order Status:  Sent Specimen:  Tissue from Sacrum Updated:  05/05/20 1732    Aerobic culture [[id number]] Collected:  05/05/20 1635    Order Status:  Sent Specimen:  Wound from Sacrum  Updated:  05/05/20 1732    Culture, Anaerobe [[id number]] Collected:  05/05/20 1635    Order Status:  Sent Specimen:  Tissue from Sacrum Updated:  05/05/20 1635    Blood culture x two cultures. Draw prior to antibiotics. [[id number]] Collected:  05/01/20 1730    Order Status:  Completed Specimen:  Blood from Peripheral, Antecubital, Left Updated:  05/05/20 0612     Blood Culture, Routine No Growth to date      No Growth to date      No Growth to date      No Growth to date    Narrative:       Aerobic and anaerobic    Aerobic culture [[id number]]  (Abnormal)  (Susceptibility) Collected:  05/01/20 1558    Order Status:  Completed Specimen:  Wound from Buttocks, Right Updated:  05/04/20 1100     Aerobic Bacterial Culture PROTEUS MIRABILIS ESBL  Moderate  No other significant isolate       Comment: Previous value was PPR verified by ADB at 13:25 on 05/02/2020       Culture, Anaerobic [[id number]] Collected:  05/01/20 1558    Order Status:  Completed Specimen:  Wound from Buttocks, Right Updated:  05/04/20 0903     Anaerobic Culture Culture in progress

## 2020-05-06 NOTE — PLAN OF CARE
Pt remains A+Ox4. Received pt post op this PM, tolerated procedure well with no complaints of pain. Dressing to sacrum CDI post op. Offered options for diet, pt declined. BG 77. Suprapubic remains CDI, good output. Safety precautions maintained.

## 2020-05-06 NOTE — PROGRESS NOTES
Notified lab of overdue vancomycin trough (1630), was informed that someone would be up to draw lab. Tech (sugar)  arrived around 2130 and was unsuccessful after 1 try, she stated the pt will only give her one hand and refuses to let her stick him again. After I spoke with pt, he agreed to lab draw, spoke to Melodie in lab and requested a new tech. Still awaiting new tech to arrive to draw labs, to start next dose of vancomycin. Will continue to monitor.

## 2020-05-06 NOTE — ASSESSMENT & PLAN NOTE
- with chronic suprapubic catheter  - traumatically removed catheter on arrival; Urology consulted and replaced on 5/1  - replaced 16 Lebanese catheter, which will need to be upsized in Urology clinic in 1 month (appointment already made)  - appreciate Urology assistance
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
## 2020-05-06 NOTE — SUBJECTIVE & OBJECTIVE
Interval History: Sitting up eating breakfast today and states that overall he feels well without any fever, chills, lightheadedness. Awaiting wound debridement today.    Review of Systems   Constitutional: Negative for chills and fever.   Respiratory: Negative for shortness of breath.    Cardiovascular: Negative for chest pain.   Genitourinary: Positive for difficulty urinating.   Skin: Positive for wound.   Neurological: Positive for weakness (paraplegia).     Objective:     Vital Signs (Most Recent):  Temp: 98.2 °F (36.8 °C) (05/05/20 2024)  Pulse: 85 (05/05/20 2024)  Resp: 17 (05/05/20 2024)  BP: 135/81 (05/05/20 2024)  SpO2: 98 % (05/05/20 2024) Vital Signs (24h Range):  Temp:  [97.5 °F (36.4 °C)-98.2 °F (36.8 °C)] 98.2 °F (36.8 °C)  Pulse:  [] 85  Resp:  [14-20] 17  SpO2:  [97 %-100 %] 98 %  BP: ()/(51-81) 135/81     Weight: 104.1 kg (229 lb 8 oz)  Body mass index is 32.01 kg/m².    Intake/Output Summary (Last 24 hours) at 5/5/2020 2159  Last data filed at 5/5/2020 1815  Gross per 24 hour   Intake 475 ml   Output 2400 ml   Net -1925 ml      Physical Exam   Constitutional: He is oriented to person, place, and time. He appears well-developed and well-nourished. No distress.   HENT:   Head: Normocephalic and atraumatic.   Eyes: Pupils are equal, round, and reactive to light. Conjunctivae are normal.   Neck: Normal range of motion. Neck supple. No JVD present.   Cardiovascular: Normal rate, regular rhythm, normal heart sounds and intact distal pulses.   Pulmonary/Chest: Effort normal and breath sounds normal. No respiratory distress. He has no wheezes.   Abdominal: Soft. Bowel sounds are normal. He exhibits no distension. There is no tenderness. There is no guarding.   Musculoskeletal: He exhibits no edema or tenderness.   Right BKA. Paraplegic   Neurological: He is alert and oriented to person, place, and time.   Skin: Skin is warm and dry. Capillary refill takes less than 2 seconds. No erythema.    Stage IV ischial ulcer, no surrounding cellulitis. Packing in place.   Psychiatric: He has a normal mood and affect. His behavior is normal.       Significant Labs: All pertinent labs within the past 24 hours have been reviewed.    Significant Imaging: I have reviewed all pertinent imaging results/findings within the past 24 hours.

## 2020-05-06 NOTE — ASSESSMENT & PLAN NOTE
Sacral Osteomyelitis  Decubitus ulcer, infected, stage III    - presented with fever 100.4, pulse 124, RR 28, and with WBC 15.6 with normal BP and mentation  - LA 2.3->0.8  - fluid resuscitated with 1L LR with improvement in vital sign abnormalities  - initiated on BSAbx with IV vancomycin and zosyn, will continue for now  - likely source is infected sacral ulcer, although also with UTIs in the past which have been sensitive to zosyn  - UA following catheter exchange relatively unremarkable (1+ leuks, 6 WBC), CXR nonacute, COVID negative; BCx NGTD and wound cultures with ESBL proteus  - MRI of sacrum concerning osteo   - we appreciate General Surgery for debridement of decub ulcer, planned for today  - we appreciate ID

## 2020-05-06 NOTE — NURSING
MD notified on pt BP.  MD Innocent stated to space out pain med and Flexeril at least two hours about.

## 2020-05-06 NOTE — PROGRESS NOTES
Called lab and spoke with Heather, stated the other tech is on the floor. Attempted to call tech on floor, phone busy. Will continue to monitor.

## 2020-05-07 LAB
ANION GAP SERPL CALC-SCNC: 10 MMOL/L (ref 8–16)
BASOPHILS # BLD AUTO: 0.07 K/UL (ref 0–0.2)
BASOPHILS NFR BLD: 0.7 % (ref 0–1.9)
BUN SERPL-MCNC: 4 MG/DL (ref 6–20)
CALCIUM SERPL-MCNC: 9.3 MG/DL (ref 8.7–10.5)
CHLORIDE SERPL-SCNC: 106 MMOL/L (ref 95–110)
CO2 SERPL-SCNC: 23 MMOL/L (ref 23–29)
CREAT SERPL-MCNC: 0.8 MG/DL (ref 0.5–1.4)
DIFFERENTIAL METHOD: ABNORMAL
EOSINOPHIL # BLD AUTO: 0.6 K/UL (ref 0–0.5)
EOSINOPHIL NFR BLD: 6.5 % (ref 0–8)
ERYTHROCYTE [DISTWIDTH] IN BLOOD BY AUTOMATED COUNT: 15.5 % (ref 11.5–14.5)
EST. GFR  (AFRICAN AMERICAN): >60 ML/MIN/1.73 M^2
EST. GFR  (NON AFRICAN AMERICAN): >60 ML/MIN/1.73 M^2
GLUCOSE SERPL-MCNC: 95 MG/DL (ref 70–110)
HCT VFR BLD AUTO: 32.5 % (ref 40–54)
HGB BLD-MCNC: 10.5 G/DL (ref 14–18)
IMM GRANULOCYTES # BLD AUTO: 0.03 K/UL (ref 0–0.04)
IMM GRANULOCYTES NFR BLD AUTO: 0.3 % (ref 0–0.5)
LYMPHOCYTES # BLD AUTO: 2.9 K/UL (ref 1–4.8)
LYMPHOCYTES NFR BLD: 30.5 % (ref 18–48)
MCH RBC QN AUTO: 25.1 PG (ref 27–31)
MCHC RBC AUTO-ENTMCNC: 32.3 G/DL (ref 32–36)
MCV RBC AUTO: 78 FL (ref 82–98)
MONOCYTES # BLD AUTO: 0.8 K/UL (ref 0.3–1)
MONOCYTES NFR BLD: 8.9 % (ref 4–15)
NEUTROPHILS # BLD AUTO: 5 K/UL (ref 1.8–7.7)
NEUTROPHILS NFR BLD: 53.1 % (ref 38–73)
NRBC BLD-RTO: 0 /100 WBC
PLATELET # BLD AUTO: 752 K/UL (ref 150–350)
PMV BLD AUTO: 9.3 FL (ref 9.2–12.9)
POCT GLUCOSE: 102 MG/DL (ref 70–110)
POCT GLUCOSE: 105 MG/DL (ref 70–110)
POCT GLUCOSE: 106 MG/DL (ref 70–110)
POCT GLUCOSE: 126 MG/DL (ref 70–110)
POCT GLUCOSE: 133 MG/DL (ref 70–110)
POTASSIUM SERPL-SCNC: 4.2 MMOL/L (ref 3.5–5.1)
RBC # BLD AUTO: 4.18 M/UL (ref 4.6–6.2)
SODIUM SERPL-SCNC: 139 MMOL/L (ref 136–145)
VANCOMYCIN TROUGH SERPL-MCNC: 26.2 UG/ML (ref 10–22)
WBC # BLD AUTO: 9.43 K/UL (ref 3.9–12.7)

## 2020-05-07 PROCEDURE — 25000003 PHARM REV CODE 250: Performed by: SURGERY

## 2020-05-07 PROCEDURE — 36415 COLL VENOUS BLD VENIPUNCTURE: CPT

## 2020-05-07 PROCEDURE — 11000001 HC ACUTE MED/SURG PRIVATE ROOM

## 2020-05-07 PROCEDURE — 85025 COMPLETE CBC W/AUTO DIFF WBC: CPT

## 2020-05-07 PROCEDURE — 63600175 PHARM REV CODE 636 W HCPCS: Performed by: FAMILY MEDICINE

## 2020-05-07 PROCEDURE — 63600175 PHARM REV CODE 636 W HCPCS: Performed by: SURGERY

## 2020-05-07 PROCEDURE — 63600175 PHARM REV CODE 636 W HCPCS: Performed by: HOSPITALIST

## 2020-05-07 PROCEDURE — 80202 ASSAY OF VANCOMYCIN: CPT

## 2020-05-07 PROCEDURE — 25000003 PHARM REV CODE 250: Performed by: NURSE PRACTITIONER

## 2020-05-07 PROCEDURE — 25000003 PHARM REV CODE 250: Performed by: HOSPITALIST

## 2020-05-07 PROCEDURE — 80048 BASIC METABOLIC PNL TOTAL CA: CPT

## 2020-05-07 PROCEDURE — S4991 NICOTINE PATCH NONLEGEND: HCPCS | Performed by: SURGERY

## 2020-05-07 RX ADMIN — MEROPENEM 1 G: 1 INJECTION, POWDER, FOR SOLUTION INTRAVENOUS at 08:05

## 2020-05-07 RX ADMIN — OXYCODONE HYDROCHLORIDE AND ACETAMINOPHEN 1 TABLET: 5; 325 TABLET ORAL at 06:05

## 2020-05-07 RX ADMIN — VANCOMYCIN HYDROCHLORIDE 1500 MG: 1.5 INJECTION, POWDER, LYOPHILIZED, FOR SOLUTION INTRAVENOUS at 03:05

## 2020-05-07 RX ADMIN — GABAPENTIN 300 MG: 300 CAPSULE ORAL at 08:05

## 2020-05-07 RX ADMIN — Medication 1 PATCH: at 08:05

## 2020-05-07 RX ADMIN — FERROUS SULFATE TAB EC 325 MG (65 MG FE EQUIVALENT) 325 MG: 325 (65 FE) TABLET DELAYED RESPONSE at 08:05

## 2020-05-07 RX ADMIN — ERTAPENEM 1 G: 1 INJECTION INTRAMUSCULAR; INTRAVENOUS at 03:05

## 2020-05-07 RX ADMIN — GABAPENTIN 300 MG: 300 CAPSULE ORAL at 09:05

## 2020-05-07 RX ADMIN — GEMFIBROZIL 600 MG: 600 TABLET ORAL at 06:05

## 2020-05-07 RX ADMIN — ASPIRIN 81 MG: 81 TABLET, COATED ORAL at 08:05

## 2020-05-07 RX ADMIN — MEROPENEM 1 G: 1 INJECTION, POWDER, FOR SOLUTION INTRAVENOUS at 01:05

## 2020-05-07 RX ADMIN — CYCLOBENZAPRINE HYDROCHLORIDE 10 MG: 10 TABLET, FILM COATED ORAL at 06:05

## 2020-05-07 RX ADMIN — VENLAFAXINE HYDROCHLORIDE 75 MG: 75 CAPSULE, EXTENDED RELEASE ORAL at 08:05

## 2020-05-07 NOTE — ASSESSMENT & PLAN NOTE
- with chronic suprapubic catheter  - traumatically removed catheter on arrival; Urology consulted and replaced on 5/1  - replaced 16 [place] catheter, which will need to be upsized in Urology clinic in 1 month (appointment already made)  - appreciate Urology assistance
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
## 2020-05-07 NOTE — SUBJECTIVE & OBJECTIVE
Interval History: He is in bed resting with no distress noted. No complaints of pain noted. Monitor.     Review of Systems   Constitutional: Negative for chills and fever.   Respiratory: Negative for shortness of breath.    Cardiovascular: Negative for chest pain.   Genitourinary: Positive for difficulty urinating (has suprapubic cath).   Skin: Positive for wound.   Neurological: Positive for weakness (paraplegia).   Psychiatric/Behavioral: Negative for confusion.     Objective:     Vital Signs (Most Recent):  Temp: 96.6 °F (35.9 °C) (05/07/20 0856)  Pulse: 87 (05/07/20 0856)  Resp: 20 (05/07/20 0856)  BP: 108/63 (05/07/20 0856)  SpO2: 97 % (05/07/20 0856) Vital Signs (24h Range):  Temp:  [96.6 °F (35.9 °C)-98.4 °F (36.9 °C)] 96.6 °F (35.9 °C)  Pulse:  [] 87  Resp:  [16-20] 20  SpO2:  [97 %-100 %] 97 %  BP: ()/(55-74) 108/63     Weight: 100.4 kg (221 lb 5.5 oz)  Body mass index is 30.87 kg/m².    Intake/Output Summary (Last 24 hours) at 5/7/2020 1025  Last data filed at 5/7/2020 0627  Gross per 24 hour   Intake 1175 ml   Output 1225 ml   Net -50 ml      Physical Exam   Constitutional: He is oriented to person, place, and time. He appears well-developed and well-nourished. No distress.   HENT:   Head: Normocephalic and atraumatic.   Eyes: Pupils are equal, round, and reactive to light. Conjunctivae are normal.   Neck: Normal range of motion. Neck supple. No JVD present.   Cardiovascular: Normal rate, regular rhythm, normal heart sounds and intact distal pulses.   Pulmonary/Chest: Effort normal and breath sounds normal. No respiratory distress. He has no wheezes.   Abdominal: Soft. Bowel sounds are normal. He exhibits no distension. There is no tenderness. There is no guarding.   Musculoskeletal: He exhibits no edema or tenderness.   Right BKA. Paraplegic   Neurological: He is alert and oriented to person, place, and time.   Skin: Skin is warm and dry. Capillary refill takes less than 2 seconds. No  erythema.   Stage IV ischial ulcer, no surrounding cellulitis.   Psychiatric: He has a normal mood and affect. His behavior is normal.       Significant Labs: All pertinent labs within the past 24 hours have been reviewed.    Significant Imaging: I have reviewed all pertinent imaging results/findings within the past 24 hours.
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
## 2020-05-07 NOTE — PLAN OF CARE
Pt. AAO x4.  Pain med given upon pt request.  IV antibiotics given per MAR.  Turned with 2- assist.  Wound vac in place.  Wound care performed by nurse.  Safety maintained.  Pt instructed to call for assistance.

## 2020-05-07 NOTE — PLAN OF CARE
Pt AAOx3. Medications administered per order. Wound vac maintained; site and drainage monitored. Pain managed with prn medication. Blood glucose monitored. Pt slept through the night. Encouraged to call with questions/concerns/assistance. Will continue to monitor. Safety maintained.

## 2020-05-07 NOTE — PLAN OF CARE
Progress notes reviewed. Evening rounds completed. Introduced self as VN for this shift. Educated pt on VN's role in patient's care.  Plan of care reviewed with patient. Opportunity given for pt's questions. No questions or concerns expressed at this time. VN to continue to monitor.
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
## 2020-05-07 NOTE — SUBJECTIVE & OBJECTIVE
Interval History: pt feels fine, no new complaints, no diarrhea, no rash, no N/V, afebrile    Review of Systems   Constitutional: Negative for activity change, appetite change, chills and fever.   HENT: Negative for congestion.    Eyes: Negative for discharge.   Respiratory: Negative for cough, choking and shortness of breath.    Gastrointestinal: Negative for abdominal pain, constipation, diarrhea, nausea and vomiting.   Genitourinary:        Cannot feel dysuria but noticed foul smelling and change in color in urine when admitted   Musculoskeletal: Positive for gait problem.        Paraplegic   Skin: Positive for wound.   Neurological: Negative for speech difficulty.   Psychiatric/Behavioral: Negative for agitation and behavioral problems.     Objective:     Vital Signs (Most Recent):  Temp: 96.4 °F (35.8 °C) (05/07/20 1154)  Pulse: 87 (05/07/20 1558)  Resp: 20 (05/07/20 1154)  BP: 114/63 (05/07/20 1154)  SpO2: 97 % (05/07/20 0856) Vital Signs (24h Range):  Temp:  [96.4 °F (35.8 °C)-98.2 °F (36.8 °C)] 96.4 °F (35.8 °C)  Pulse:  [] 87  Resp:  [16-20] 20  SpO2:  [97 %-100 %] 97 %  BP: (104-135)/(55-74) 114/63     Weight: 100.4 kg (221 lb 5.5 oz)  Body mass index is 30.87 kg/m².    Estimated Creatinine Clearance: 145.1 mL/min (based on SCr of 0.8 mg/dL).    Physical Exam   Constitutional: He is oriented to person, place, and time. He appears well-developed and well-nourished. No distress.   HENT:   Head: Normocephalic and atraumatic.   Mouth/Throat: No oropharyngeal exudate.   Eyes: Pupils are equal, round, and reactive to light. EOM are normal. Right eye exhibits no discharge. Left eye exhibits no discharge. No scleral icterus.   Neck: Normal range of motion. Neck supple. No JVD present.   Scar mid neck from old trech site   Cardiovascular: Normal rate, regular rhythm and normal heart sounds.   No murmur heard.  Pulmonary/Chest: Effort normal and breath sounds normal. No respiratory distress. He has no wheezes.  He has no rales.   Abdominal: Soft. Bowel sounds are normal. He exhibits no distension. There is no tenderness. There is no rebound.   Suprapubic catheter seen and intact, no purulence no erythema around   Musculoskeletal:   Right BKA stump dry and hard skin, no open wound  Left foot with superficial ulcer on left big toe, no purulence, dry skin of heel and plantar area, healed wound from before  Paraplegic  Large right ischial wound stage IV, media pictures seen   Lymphadenopathy:     He has no cervical adenopathy.   Neurological: He is alert and oriented to person, place, and time.   paraplegic   Skin: Skin is warm and dry. No rash noted. He is not diaphoretic.   Psychiatric: He has a normal mood and affect. His behavior is normal.   Nursing note and vitals reviewed.    Antibiotics (From admission, onward)    Start     Stop Route Frequency Ordered    05/07/20 1600  ertapenem (INVANZ) 1 g in sodium chloride 0.9 % 100 mL IVPB (ready to mix system)      -- IV Every 24 hours (non-standard times) 05/07/20 1500            Significant Labs:   CBC:   Recent Labs   Lab 05/06/20  0812 05/07/20  0619   WBC 9.37 9.43   HGB 10.1* 10.5*   HCT 31.0* 32.5*   * 752*     CMP:   Recent Labs   Lab 05/06/20  0812 05/07/20  0618    139   K 4.0 4.2    106   CO2 23 23   * 95   BUN 4* 4*   CREATININE 0.8 0.8   CALCIUM 9.4 9.3   ANIONGAP 10 10   EGFRNONAA >60 >60     Microbiology Results (last 7 days)     Procedure Component Value Units Date/Time    Culture, Anaerobic [[id number]] Collected:  05/01/20 1558    Order Status:  Completed Specimen:  Wound from Buttocks, Right Updated:  05/06/20 1014     Anaerobic Culture No anaerobes isolated    Blood culture x two cultures. Draw prior to antibiotics. [[id number]] Collected:  05/01/20 1730    Order Status:  Completed Specimen:  Blood from Peripheral, Antecubital, Left Updated:  05/06/20 0612     Blood Culture, Routine No Growth after 4 days.     Narrative:       Aerobic  and anaerobic    Blood culture x two cultures. Draw prior to antibiotics. [[id number]] Collected:  05/01/20 1616    Order Status:  Completed Specimen:  Blood from Peripheral, Upper Arm, Left Updated:  05/05/20 2212     Blood Culture, Routine No Growth after 4 days.     Narrative:       Aerobic and anaerobic    Fungus culture [[id number]] Collected:  05/05/20 1700    Order Status:  Canceled Specimen:  Bone from Hip, Left     Culture, Anaerobe [[id number]] Collected:  05/05/20 1700    Order Status:  Canceled Specimen:  Bone from Hip, Left     Aerobic culture [[id number]] Collected:  05/05/20 1700    Order Status:  Canceled Specimen:  Bone from Hip, Left     AFB Culture & Smear [[id number]] Collected:  05/05/20 1700    Order Status:  Canceled Specimen:  Bone from Hip, Left     Gram stain [[id number]] Collected:  05/05/20 1700    Order Status:  Canceled Specimen:  Bone from Hip, Left     Culture, Anaerobe [[id number]] Collected:  05/05/20 1635    Order Status:  Canceled Specimen:  Tissue from Sacrum     Aerobic culture [[id number]] Collected:  05/05/20 1635    Order Status:  Canceled Specimen:  Wound from Sacrum     AFB Culture & Smear [[id number]] Collected:  05/05/20 1635    Order Status:  Canceled Specimen:  Tissue from Sacrum     Gram stain [[id number]] Collected:  05/05/20 1635    Order Status:  Canceled Specimen:  Tissue from Sacrum     Fungus culture [[id number]] Collected:  05/05/20 1635    Order Status:  Canceled Specimen:  Tissue from Sacrum     Aerobic culture [[id number]]  (Abnormal)  (Susceptibility) Collected:  05/01/20 1558    Order Status:  Completed Specimen:  Wound from Buttocks, Right Updated:  05/04/20 1100     Aerobic Bacterial Culture PROTEUS MIRABILIS ESBL  Moderate  No other significant isolate       Comment: Previous value was PPR verified by ADB at 13:25 on 05/02/2020           All pertinent labs within the past 24 hours have been reviewed.    Significant Imaging: I have reviewed all pertinent imaging  results/findings within the past 24 hours.

## 2020-05-07 NOTE — ASSESSMENT & PLAN NOTE
- ESR 95,  - was given Vancomycin and  Zosyn initially --- now Vanc and Meropenem which was switched on 5/3  - General Surgery consulted for debridement, planned on today  - turn q2h   - Recommendations per ID---  MRI showing osteomyelitis  Recommendations:   -- Continue vancomycin as per pharmacy assistance until culture data results from OR  -- Would continue Meropenem 1gm IV Q8hrs that will cover any anaerobes/ ESBL proteus  -- Since has osteomyelitis and this wound was treated before- will need 6 weeks of antibiotics - in future after I and D and antibiotic course, if wound does not heal- may need a flap   -- ID will follow along and await cultures of wound

## 2020-05-07 NOTE — ASSESSMENT & PLAN NOTE
Sacral Osteomyelitis  Decubitus ulcer, infected, stage III    - presented with fever 100.4, pulse 124, RR 28, and with WBC 15.6 with normal BP and mentation  - LA 2.3->0.8  - fluid resuscitated with 1L LR with improvement in vital sign abnormalities  - initiated on BSAbx with IV vancomycin and zosyn, now on Vanc and Meropenum since 5/3   - UA following catheter exchange relatively unremarkable (1+ leuks, 6 WBC), CXR nonacute, COVID negative; BCx NGTD and wound cultures with ESBL proteus  - MRI of sacrum concerning osteo   - we appreciate General Surgery for debridement of decub ulcer  - we appreciate ID and will cont to follow recs  -blood cultures with NGTD  -following wound cultures

## 2020-05-07 NOTE — PROGRESS NOTES
Patient not medically ready for discharge at this time. Antent currently on IV abx for decubitus ucler. Patient is s/p debridement, waiting wound cx, PICC placement and final ID recs at this time. TN will continue to follow.

## 2020-05-07 NOTE — SIGNIFICANT EVENT
Reported per nurse that [facility] from [facility] called to state that the bone specimen from recent right hip with excision debridement was not received by Micro but went directly to pathology and for this reason nothing has resulted.

## 2020-05-08 LAB
ANION GAP SERPL CALC-SCNC: 8 MMOL/L (ref 8–16)
BASOPHILS # BLD AUTO: 0.05 K/UL (ref 0–0.2)
BASOPHILS NFR BLD: 0.6 % (ref 0–1.9)
BUN SERPL-MCNC: 5 MG/DL (ref 6–20)
CALCIUM SERPL-MCNC: 9.2 MG/DL (ref 8.7–10.5)
CHLORIDE SERPL-SCNC: 104 MMOL/L (ref 95–110)
CO2 SERPL-SCNC: 25 MMOL/L (ref 23–29)
CREAT SERPL-MCNC: 0.7 MG/DL (ref 0.5–1.4)
CRP SERPL-MCNC: 78.8 MG/L (ref 0–8.2)
DIFFERENTIAL METHOD: ABNORMAL
EOSINOPHIL # BLD AUTO: 0.7 K/UL (ref 0–0.5)
EOSINOPHIL NFR BLD: 7.9 % (ref 0–8)
ERYTHROCYTE [DISTWIDTH] IN BLOOD BY AUTOMATED COUNT: 15.4 % (ref 11.5–14.5)
ERYTHROCYTE [SEDIMENTATION RATE] IN BLOOD BY WESTERGREN METHOD: 103 MM/HR (ref 0–10)
EST. GFR  (AFRICAN AMERICAN): >60 ML/MIN/1.73 M^2
EST. GFR  (NON AFRICAN AMERICAN): >60 ML/MIN/1.73 M^2
FINAL PATHOLOGIC DIAGNOSIS: NORMAL
GLUCOSE SERPL-MCNC: 90 MG/DL (ref 70–110)
GROSS: NORMAL
HCT VFR BLD AUTO: 30.6 % (ref 40–54)
HGB BLD-MCNC: 9.9 G/DL (ref 14–18)
IMM GRANULOCYTES # BLD AUTO: 0.03 K/UL (ref 0–0.04)
IMM GRANULOCYTES NFR BLD AUTO: 0.4 % (ref 0–0.5)
LYMPHOCYTES # BLD AUTO: 2.2 K/UL (ref 1–4.8)
LYMPHOCYTES NFR BLD: 26.7 % (ref 18–48)
MCH RBC QN AUTO: 25.2 PG (ref 27–31)
MCHC RBC AUTO-ENTMCNC: 32.4 G/DL (ref 32–36)
MCV RBC AUTO: 78 FL (ref 82–98)
MONOCYTES # BLD AUTO: 0.5 K/UL (ref 0.3–1)
MONOCYTES NFR BLD: 6.5 % (ref 4–15)
NEUTROPHILS # BLD AUTO: 4.7 K/UL (ref 1.8–7.7)
NEUTROPHILS NFR BLD: 57.9 % (ref 38–73)
NRBC BLD-RTO: 0 /100 WBC
PLATELET # BLD AUTO: 772 K/UL (ref 150–350)
PMV BLD AUTO: 9.2 FL (ref 9.2–12.9)
POCT GLUCOSE: 111 MG/DL (ref 70–110)
POCT GLUCOSE: 83 MG/DL (ref 70–110)
POCT GLUCOSE: 92 MG/DL (ref 70–110)
POCT GLUCOSE: 95 MG/DL (ref 70–110)
POTASSIUM SERPL-SCNC: 4.3 MMOL/L (ref 3.5–5.1)
RBC # BLD AUTO: 3.93 M/UL (ref 4.6–6.2)
SODIUM SERPL-SCNC: 137 MMOL/L (ref 136–145)
WBC # BLD AUTO: 8.19 K/UL (ref 3.9–12.7)

## 2020-05-08 PROCEDURE — 99900035 HC TECH TIME PER 15 MIN (STAT)

## 2020-05-08 PROCEDURE — 25000003 PHARM REV CODE 250: Performed by: SURGERY

## 2020-05-08 PROCEDURE — 11000001 HC ACUTE MED/SURG PRIVATE ROOM

## 2020-05-08 PROCEDURE — 80048 BASIC METABOLIC PNL TOTAL CA: CPT

## 2020-05-08 PROCEDURE — 36569 INSJ PICC 5 YR+ W/O IMAGING: CPT

## 2020-05-08 PROCEDURE — 85025 COMPLETE CBC W/AUTO DIFF WBC: CPT

## 2020-05-08 PROCEDURE — 25000003 PHARM REV CODE 250: Performed by: NURSE PRACTITIONER

## 2020-05-08 PROCEDURE — 86140 C-REACTIVE PROTEIN: CPT

## 2020-05-08 PROCEDURE — C1751 CATH, INF, PER/CENT/MIDLINE: HCPCS

## 2020-05-08 PROCEDURE — 36415 COLL VENOUS BLD VENIPUNCTURE: CPT

## 2020-05-08 PROCEDURE — 25000003 PHARM REV CODE 250: Performed by: HOSPITALIST

## 2020-05-08 PROCEDURE — A4216 STERILE WATER/SALINE, 10 ML: HCPCS | Performed by: NURSE PRACTITIONER

## 2020-05-08 PROCEDURE — S4991 NICOTINE PATCH NONLEGEND: HCPCS | Performed by: SURGERY

## 2020-05-08 PROCEDURE — 63600175 PHARM REV CODE 636 W HCPCS: Performed by: HOSPITALIST

## 2020-05-08 PROCEDURE — 85652 RBC SED RATE AUTOMATED: CPT

## 2020-05-08 RX ORDER — SODIUM CHLORIDE 0.9 % (FLUSH) 0.9 %
10 SYRINGE (ML) INJECTION
Status: DISCONTINUED | OUTPATIENT
Start: 2020-05-08 | End: 2020-05-11 | Stop reason: HOSPADM

## 2020-05-08 RX ORDER — SODIUM CHLORIDE 0.9 % (FLUSH) 0.9 %
10 SYRINGE (ML) INJECTION EVERY 6 HOURS
Status: DISCONTINUED | OUTPATIENT
Start: 2020-05-08 | End: 2020-05-11 | Stop reason: HOSPADM

## 2020-05-08 RX ADMIN — Medication 1 PATCH: at 09:05

## 2020-05-08 RX ADMIN — ERTAPENEM 1 G: 1 INJECTION INTRAMUSCULAR; INTRAVENOUS at 04:05

## 2020-05-08 RX ADMIN — FERROUS SULFATE TAB EC 325 MG (65 MG FE EQUIVALENT) 325 MG: 325 (65 FE) TABLET DELAYED RESPONSE at 08:05

## 2020-05-08 RX ADMIN — ASPIRIN 81 MG: 81 TABLET, COATED ORAL at 08:05

## 2020-05-08 RX ADMIN — OXYCODONE HYDROCHLORIDE AND ACETAMINOPHEN 1 TABLET: 5; 325 TABLET ORAL at 04:05

## 2020-05-08 RX ADMIN — GEMFIBROZIL 600 MG: 600 TABLET ORAL at 06:05

## 2020-05-08 RX ADMIN — Medication 10 ML: at 09:05

## 2020-05-08 RX ADMIN — OXYCODONE HYDROCHLORIDE AND ACETAMINOPHEN 1 TABLET: 5; 325 TABLET ORAL at 06:05

## 2020-05-08 RX ADMIN — GEMFIBROZIL 600 MG: 600 TABLET ORAL at 04:05

## 2020-05-08 RX ADMIN — VENLAFAXINE HYDROCHLORIDE 75 MG: 75 CAPSULE, EXTENDED RELEASE ORAL at 08:05

## 2020-05-08 RX ADMIN — GABAPENTIN 300 MG: 300 CAPSULE ORAL at 08:05

## 2020-05-08 RX ADMIN — GABAPENTIN 300 MG: 300 CAPSULE ORAL at 09:05

## 2020-05-08 RX ADMIN — CYCLOBENZAPRINE HYDROCHLORIDE 10 MG: 10 TABLET, FILM COATED ORAL at 08:05

## 2020-05-08 RX ADMIN — INSULIN DETEMIR 7 UNITS: 100 INJECTION, SOLUTION SUBCUTANEOUS at 08:05

## 2020-05-08 NOTE — SUBJECTIVE & OBJECTIVE
Interval History: He is in bed resting with no distress noted. No complaints of pain noted. Monitor.     Review of Systems   Constitutional: Negative for chills and fever.   Respiratory: Negative for shortness of breath.    Cardiovascular: Negative for chest pain.   Genitourinary: Positive for difficulty urinating (has suprapubic cath).   Skin: Positive for wound.   Neurological: Positive for weakness (paraplegia).   Psychiatric/Behavioral: Negative for confusion.     Objective:     Vital Signs (Most Recent):  Temp: 97.9 °F (36.6 °C) (05/08/20 1633)  Pulse: 80 (05/08/20 1633)  Resp: 18 (05/08/20 1633)  BP: 128/86 (05/08/20 1633)  SpO2: 100 % (05/08/20 1633) Vital Signs (24h Range):  Temp:  [96.6 °F (35.9 °C)-98.2 °F (36.8 °C)] 97.9 °F (36.6 °C)  Pulse:  [] 80  Resp:  [18-20] 18  SpO2:  [98 %-100 %] 100 %  BP: (100-135)/(57-86) 128/86     Weight: 98.1 kg (216 lb 4.3 oz)  Body mass index is 30.16 kg/m².    Intake/Output Summary (Last 24 hours) at 5/8/2020 1709  Last data filed at 5/8/2020 1400  Gross per 24 hour   Intake 125 ml   Output 1410 ml   Net -1285 ml      Physical Exam   Constitutional: He is oriented to person, place, and time. He appears well-developed and well-nourished. No distress.   HENT:   Head: Normocephalic and atraumatic.   Eyes: Pupils are equal, round, and reactive to light. Conjunctivae are normal.   Neck: Normal range of motion. Neck supple. No JVD present.   Cardiovascular: Normal rate, regular rhythm, normal heart sounds and intact distal pulses.   Pulmonary/Chest: Effort normal and breath sounds normal. No respiratory distress. He has no wheezes.   Abdominal: Soft. Bowel sounds are normal. He exhibits no distension. There is no tenderness. There is no guarding.   Musculoskeletal: He exhibits no edema or tenderness.   Right BKA. Paraplegic   Neurological: He is alert and oriented to person, place, and time.   Skin: Skin is warm and dry. Capillary refill takes less than 2 seconds. No  erythema.   Stage IV ischial ulcer, no surrounding cellulitis.   Psychiatric: He has a normal mood and affect. His behavior is normal.       Significant Labs: All pertinent labs within the past 24 hours have been reviewed.    Significant Imaging: I have reviewed all pertinent imaging results/findings within the past 24 hours.

## 2020-05-08 NOTE — PHYSICIAN QUERY
PT Name: Hermann Aguilar  MR #: [id number]     ACUITY OF CONDITION CLARIFICATION      CDS/: ADELINA Ye, RN, CDS               Contact information:[email or web address]   This form is a permanent document in the medical record.     Query Date: May 8, 2020    By submitting this query, we are merely seeking further clarification of documentation to reflect the severity of illness of your patient. Please utilize your independent clinical judgment when addressing the question(s) below.    The Medical record reflects the following:     Indicators   Supporting Clinical Findings Location in Medical Record   X Documentation of condition  Osteomyelitis of right ischium  SKYLA WILL NP/Dr. Hartmann, 5/7   X Lab Value(s)    5/1 5/8   .8 (H) 78.8 (H)        5/1   Sed Rate 95 (H)         Lab 5/1 and 5/8         Lab 5/1   X Radiology Findings  MRI showing osteomyelitis     MRI impression: Prominent marrow edema in the right ischium with surrounding soft tissue inflammatory change, concerning for osteomyelitis.    SKYLA WILL NP/Dr. Hartmann, 5/7     MRI 5/4   X Treatment/Medication  Since has osteomyelitis and this wound was treated before- will need 6 weeks of antibiotics      Continue vancomycin      Would continue Meropenem 1gm IV Q8hrs      Debridement per SKYLA Goddard NP/Dr. Hartmann, 5/7   X Other  sepsis 2/2 infected ischial ulcer.    SKYLA WILL NP/Dr. Hartmann, 5/7      Provider, please specify the acuity/chronicity of Osteomyelitis:    [ x  ] Acute   [   ] Other:_________________   [   ]  Clinically Undetermined         Present on admission (POA) status:   [   ] Yes (Y)                           [   ] Clinically Undetermined (W)  [   ] No (N)                            [   ] Documentation insufficient to determine if condition is POA (U)       Please document in your progress notes daily for the duration of  treatment until resolved, and include in your discharge summary.
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
## 2020-05-08 NOTE — ASSESSMENT & PLAN NOTE
- ESR 95,  - continue Vancomycin and  Zosyn  - General Surgery consulted for debridement, planned on today  - turn q2h   - see above     DISPLAY PLAN FREE TEXT

## 2020-05-08 NOTE — PLAN OF CARE
Per Np awaiting culture result and ID final recommendations for IV antibiotics; PICC to be ordered; Tn also noted pt has wound vac in place and was informed pt will need home wound vac if d/c home. TN notified IVONNE [facility] Wound Care nurse of need for measurements and for [facility] home wound vac forms and forms placed in pt's chart to be filled out. Tn spoke to Thais [facility] Representative( [phone]) who informed TN to send facesheet,h&p, clinicals and [facility] forms once completed and notify her once done and wound vac can be delivered tomorrow if pt to d/c home. Thais also stated w/e TN can call her for wound vac questions.   Wound Care nurse voices concerns over complexity of wound and ability of pt and hh to properly manage. Tn requested she speak with doctor regarding wound concerns. TN noted pt has been with [facility] and [facility] in past and would prefer to resume if able to go home. Tn also noted NP placed LTAC consult yesterday and Tn sent to [facility] [place] as [facility] only taking covid + patients at this time.    TN sent secure chat to NP to call TN to discuss d/c plan---hh vs LTAC      1600- Pt REFUSES LTAC;  TODD Smith aware of Wound Care Nurse concerns of wound complexity as well as pt but pt refuses and prefers to go home and resume hh. TN requested hh orders asap;  Pt is NOT with [facility] but [facility] ([phone]/ fax # [phone]) TN verified with Figueroa at [facility] and informed of plan for d/c over the weekend with IV antibiotics and wound vac. TN also spoke with Eddy at [facility] and sent referral and informed of plan for d/c over weekend. Brain stated will come see pt today for education. TN spoke to  KARMEN Plascencia Representative( [phone])  again at [facility] as [facility] home wound vac papers still not completed as wound care nurse not able to see pt yet. Thais requested Tn email pt's facesheet, h&P. Wound care notes to her at and she will begin processing ; Tn  emailed Thais and CC weekend Tn Shira Ojeda on email.     PCP f/u scheduled and Tn attempted to schedule ID follow up but had to send message; Tn placed info on AVS and informed pt to call ID office if he doesn't receive a call by Tuesday.     05/08/20 1209   Discharge Reassessment   Assessment Type Discharge Planning Reassessment   Provided patient/caregiver education on the expected discharge date and the discharge plan Yes   Do you have any problems affording any of your prescribed medications? No   Discharge Plan A Home Health   Discharge Plan B Long-term acute care facility (LTAC)   Patient choice form signed by patient/caregiver N/A   Anticipated Discharge Disposition Home   Can the patient/caregiver answer the patient profile reliably? Yes, cognitively intact

## 2020-05-08 NOTE — ASSESSMENT & PLAN NOTE
- ESR 95,  - was given Vancomycin and  Zosyn initially --- now Vanc and Meropenem which was switched on 5/3  - General Surgery consulted for debridement, POD#3  - turn q2h   - Recommendations per ID---  MRI showing osteomyelitis  Recommendations:   -- we started vancomycin as per pharmacy assistance until culture data results from OR  -- we started  Meropenem 1gm IV Q8hrs that will cover any anaerobes/ ESBL proteus  --Meropenem and Vancomycin have been stopped as ID has recommended 6 weeks of ertapenem  --will need  PICC line  -- Since has osteomyelitis and this wound was treated before- will need 6 weeks of antibiotics - in future after I and D and antibiotic course, if wound does not heal- may need a flap   As outpatient on IV antibiotics patient will need weekly labs CBC, CMP, ESR and CRP. Labs to be faxed to ID office, attention to Dr Silva [phone]  -- patient will need close follow up with surgery team and ID team for wound.

## 2020-05-08 NOTE — PROCEDURES
"Hermann Aguilar is a 44 y.o. male patient.    Temp: 97.6 °F (36.4 °C) (05/08/20 1206)  Pulse: 69 (05/08/20 1206)  Resp: 18 (05/08/20 1206)  BP: 116/69 (05/08/20 1206)  SpO2: 100 % (05/08/20 1206)  Weight: 98.1 kg (216 lb 4.3 oz) (05/08/20 0438)  Height: 5' 11" (180.3 cm) (05/01/20 2129)    PICC  Date/Time: 5/8/2020 3:40 PM  Performed by: Bora Roy RN  Consent Done: Yes  Time out: Immediately prior to procedure a time out was called to verify the correct patient, procedure, equipment, support staff and site/side marked as required  Indications: med administration  Anesthesia: local infiltration  Local anesthetic: lidocaine 1% without epinephrine  Anesthetic Total (mL): 1  Preparation: skin prepped with ChloraPrep  Skin prep agent dried: skin prep agent completely dried prior to procedure  Sterile barriers: all five maximum sterile barriers used - cap, mask, sterile gown, sterile gloves, and large sterile sheet  Hand hygiene: hand hygiene performed prior to central venous catheter insertion  Location details: right basilic  Catheter type: double lumen  Catheter size: 5 Fr  Catheter Length: 39cm    Ultrasound guidance: yes  Vessel Caliber: medium and patent, compressibility normal  Vascular Doppler: not done  Needle advanced into vessel with real time Ultrasound guidance.  Guidewire confirmed in vessel.  Sterile sheath used.  Number of attempts: 1  Post-procedure: blood return through all ports, chlorhexidine patch and sterile dressing applied  Estimated blood loss (mL): 0            Bora Roy  5/8/2020    "

## 2020-05-08 NOTE — ASSESSMENT & PLAN NOTE
- with chronic suprapubic catheter  - traumatically removed catheter on arrival; Urology consulted and replaced on 5/1  - replaced 16 [place] catheter, which will need to be upsized in Urology clinic in 1 month (appointment already made)  - appreciate Urology assistance

## 2020-05-08 NOTE — SUBJECTIVE & OBJECTIVE
Interval History: denied any pain, no N/V/D, had right sided PICC placed, wanted to go home    Review of Systems   Constitutional: Negative for activity change, appetite change, chills and fever.   HENT: Negative for congestion.    Eyes: Negative for discharge.   Respiratory: Negative for cough, choking and shortness of breath.    Gastrointestinal: Negative for abdominal pain, constipation, diarrhea, nausea and vomiting.   Genitourinary:        Cannot feel dysuria but noticed foul smelling and change in color in urine when admitted but now resolved as per patient   Musculoskeletal: Positive for gait problem.        Paraplegic   Skin: Positive for wound.   Neurological: Negative for speech difficulty.   Psychiatric/Behavioral: Negative for agitation and behavioral problems.     Objective:     Vital Signs (Most Recent):  Temp: 97.9 °F (36.6 °C) (05/08/20 1633)  Pulse: 80 (05/08/20 1633)  Resp: 18 (05/08/20 1633)  BP: 128/86 (05/08/20 1633)  SpO2: 100 % (05/08/20 1633) Vital Signs (24h Range):  Temp:  [96.6 °F (35.9 °C)-98.2 °F (36.8 °C)] 97.9 °F (36.6 °C)  Pulse:  [] 80  Resp:  [18-20] 18  SpO2:  [98 %-100 %] 100 %  BP: (100-135)/(57-86) 128/86     Weight: 98.1 kg (216 lb 4.3 oz)  Body mass index is 30.16 kg/m².    Estimated Creatinine Clearance: 164.1 mL/min (based on SCr of 0.7 mg/dL).    Physical Exam   Constitutional: He is oriented to person, place, and time. He appears well-developed and well-nourished. No distress.   HENT:   Head: Normocephalic and atraumatic.   Mouth/Throat: No oropharyngeal exudate.   Eyes: Pupils are equal, round, and reactive to light. EOM are normal. Right eye exhibits no discharge. Left eye exhibits no discharge. No scleral icterus.   Neck: Normal range of motion. Neck supple. No JVD present.   Scar mid neck from old trech site   Cardiovascular: Normal rate, regular rhythm and normal heart sounds.   No murmur heard.  Pulmonary/Chest: Effort normal and breath sounds normal. No  respiratory distress. He has no wheezes. He has no rales.   Abdominal: Soft. Bowel sounds are normal. He exhibits no distension. There is no tenderness. There is no rebound.   Suprapubic catheter seen and intact, no purulence no erythema around   Musculoskeletal:   Right BKA stump dry and hard skin, no open wound  Left foot with superficial ulcer on left big toe, no purulence, dry skin of heel and plantar area, healed wound from before  Paraplegic  Large right ischial wound stage IV, media pictures seen- now s/p I and D and has a wound vac   Lymphadenopathy:     He has no cervical adenopathy.   Neurological: He is alert and oriented to person, place, and time.   paraplegic   Skin: Skin is warm and dry. No rash noted. He is not diaphoretic.   Psychiatric: He has a normal mood and affect. His behavior is normal.   Nursing note and vitals reviewed.    Antibiotics (From admission, onward)    Start     Stop Route Frequency Ordered    05/07/20 1600  ertapenem (INVANZ) 1 g in sodium chloride 0.9 % 100 mL IVPB (ready to mix system)      -- IV Every 24 hours (non-standard times) 05/07/20 1500            Significant Labs:   CBC:   Recent Labs   Lab 05/07/20  0619 05/08/20  0708   WBC 9.43 8.19   HGB 10.5* 9.9*   HCT 32.5* 30.6*   * 772*     CMP:   Recent Labs   Lab 05/07/20  0618 05/08/20  0704    137   K 4.2 4.3    104   CO2 23 25   GLU 95 90   BUN 4* 5*   CREATININE 0.8 0.7   CALCIUM 9.3 9.2   ANIONGAP 10 8   EGFRNONAA >60 >60     Microbiology Results (last 7 days)     Procedure Component Value Units Date/Time    Culture, Anaerobic [[id number]] Collected:  05/01/20 1558    Order Status:  Completed Specimen:  Wound from Buttocks, Right Updated:  05/06/20 1014     Anaerobic Culture No anaerobes isolated    Blood culture x two cultures. Draw prior to antibiotics. [[id number]] Collected:  05/01/20 1730    Order Status:  Completed Specimen:  Blood from Peripheral, Antecubital, Left Updated:  05/06/20 0612      Blood Culture, Routine No Growth after 4 days.     Narrative:       Aerobic and anaerobic    Blood culture x two cultures. Draw prior to antibiotics. [[id number]] Collected:  05/01/20 1616    Order Status:  Completed Specimen:  Blood from Peripheral, Upper Arm, Left Updated:  05/05/20 2212     Blood Culture, Routine No Growth after 4 days.     Narrative:       Aerobic and anaerobic    Fungus culture [[id number]] Collected:  05/05/20 1700    Order Status:  Canceled Specimen:  Bone from Hip, Left     Culture, Anaerobe [[id number]] Collected:  05/05/20 1700    Order Status:  Canceled Specimen:  Bone from Hip, Left     Aerobic culture [[id number]] Collected:  05/05/20 1700    Order Status:  Canceled Specimen:  Bone from Hip, Left     AFB Culture & Smear [[id number]] Collected:  05/05/20 1700    Order Status:  Canceled Specimen:  Bone from Hip, Left     Gram stain [[id number]] Collected:  05/05/20 1700    Order Status:  Canceled Specimen:  Bone from Hip, Left     Culture, Anaerobe [[id number]] Collected:  05/05/20 1635    Order Status:  Canceled Specimen:  Tissue from Sacrum     Aerobic culture [[id number]] Collected:  05/05/20 1635    Order Status:  Canceled Specimen:  Wound from Sacrum     AFB Culture & Smear [[id number]] Collected:  05/05/20 1635    Order Status:  Canceled Specimen:  Tissue from Sacrum     Gram stain [[id number]] Collected:  05/05/20 1635    Order Status:  Canceled Specimen:  Tissue from Sacrum     Fungus culture [[id number]] Collected:  05/05/20 1635    Order Status:  Canceled Specimen:  Tissue from Sacrum     Aerobic culture [[id number]]  (Abnormal)  (Susceptibility) Collected:  05/01/20 1558    Order Status:  Completed Specimen:  Wound from Buttocks, Right Updated:  05/04/20 1100     Aerobic Bacterial Culture PROTEUS MIRABILIS ESBL  Moderate  No other significant isolate       Comment: Previous value was PPR verified by ADB at 13:25 on 05/02/2020           All pertinent labs within the past 24 hours have been  reviewed.    Significant Imaging: I have reviewed all pertinent imaging results/findings within the past 24 hours.

## 2020-05-08 NOTE — PHYSICIAN QUERY
PT Name: Hermann Aguilar  MR #: [id number]     PHYSICIAN QUERY - INTEGUMENTARY CLARIFICATION     CDS/: ADELINA Ye, RN, CDS               Contact information:[email or web address]   This form is a permanent document in the medical record.     Query Date: May 8, 2020    By submitting this query, we are merely seeking further clarification of documentation.  Please utilize your independent clinical judgment when addressing the question(s) below.    The Medical Record contains the following:   Indicators   Supporting Clinical Findings Location in Medical Record    Redness     X Decubitus, Pressure, Ulcer, etc.  Chronic ulcer of left lower extremity     Pressure injury Left lateral toe, 1st Stage 2   Present on admission: yes     Left foot - superficial ulcer/skin slough medial great toe     Left foot with superficial ulcer on left big toe    Surgery, Dr. Tom, 5/2     [facility], 5/2 at 2300        ID, Dr. Monae/Dr. Petty, 5/4    Deep Tissue Injury      Wound care consult      Medication:      Treatment:     X Other:   Hx- diabetes mellitus type 2 (treated with insulin),  T9 paraplegia     , Dr. Humphrey, 5/2       National Pressure Ulcer Advisory Panel (2007) Pressure Ulcer Definitions & Stages:  Stage I:                     Intact skin with non-blanchable redness of a localized area usually over a bony prominence.   Stage II:                    Partial thickness loss of dermis presenting as a shallow open ulcer with a red pink wound bed, without slough.   Stage III:                   Full thickness tissue loss. Subcutaneous fat may be visible but bone, tendon or muscle is not exposed. Slough may be                                      present but does not obscure the depth of tissue loss. May include undermining and tunneling.  Stage IV:                  Full thickness tissue loss with exposed bone, tendon or muscle. Slough or eschar may be present on some parts of the                                       wound bed. Often include undermining and tunneling.  Unstageable:           Full thickness tissue loss but base of ulcer is covered by slough and/or eschar in the wound bed. Until enough                                        slough and/or eschar is removed to expose wound base, its true depth, and therefore stage, cannot be determined  Deep Tissue Injury: Purple or maroon localized area of discolored intact skin or blood-filled blister due to damage of underlying soft tissue   from pressure and/or shear.  Suspected deep tissue injuries may develop into a stageable ulcer or turn out to be another diagnosis (e.g. an ecchymosis)     Provider, please clarify Type of Ulcer of Left Great Toe related to the above documentation:    [ x  ] Decubitus (Pressure) Ulcer    [   ] Diabetic ulcer   [   ] Non-pressure ulcer   [   ] Other Integumentary Diagnosis (please specify):______________   [  ] Diagnosis Clinically Undetermined   [   ] Diagnosis Ruled Out       Please document in your progress notes daily for the duration of treatment until resolved and include in your discharge summary.

## 2020-05-09 VITALS
TEMPERATURE: 98 F | HEART RATE: 86 BPM | RESPIRATION RATE: 20 BRPM | WEIGHT: 219.56 LBS | HEIGHT: 71 IN | SYSTOLIC BLOOD PRESSURE: 121 MMHG | BODY MASS INDEX: 30.74 KG/M2 | OXYGEN SATURATION: 100 % | DIASTOLIC BLOOD PRESSURE: 85 MMHG

## 2020-05-09 LAB
ANION GAP SERPL CALC-SCNC: 8 MMOL/L (ref 8–16)
BASOPHILS # BLD AUTO: 0.05 K/UL (ref 0–0.2)
BASOPHILS NFR BLD: 0.5 % (ref 0–1.9)
BUN SERPL-MCNC: 7 MG/DL (ref 6–20)
CALCIUM SERPL-MCNC: 9.1 MG/DL (ref 8.7–10.5)
CHLORIDE SERPL-SCNC: 104 MMOL/L (ref 95–110)
CO2 SERPL-SCNC: 25 MMOL/L (ref 23–29)
CREAT SERPL-MCNC: 0.8 MG/DL (ref 0.5–1.4)
DIFFERENTIAL METHOD: ABNORMAL
EOSINOPHIL # BLD AUTO: 0.8 K/UL (ref 0–0.5)
EOSINOPHIL NFR BLD: 7.6 % (ref 0–8)
ERYTHROCYTE [DISTWIDTH] IN BLOOD BY AUTOMATED COUNT: 15.6 % (ref 11.5–14.5)
EST. GFR  (AFRICAN AMERICAN): >60 ML/MIN/1.73 M^2
EST. GFR  (NON AFRICAN AMERICAN): >60 ML/MIN/1.73 M^2
GLUCOSE SERPL-MCNC: 95 MG/DL (ref 70–110)
HCT VFR BLD AUTO: 30.9 % (ref 40–54)
HGB BLD-MCNC: 10 G/DL (ref 14–18)
IMM GRANULOCYTES # BLD AUTO: 0.04 K/UL (ref 0–0.04)
IMM GRANULOCYTES NFR BLD AUTO: 0.4 % (ref 0–0.5)
LYMPHOCYTES # BLD AUTO: 2.5 K/UL (ref 1–4.8)
LYMPHOCYTES NFR BLD: 24.8 % (ref 18–48)
MCH RBC QN AUTO: 25.2 PG (ref 27–31)
MCHC RBC AUTO-ENTMCNC: 32.4 G/DL (ref 32–36)
MCV RBC AUTO: 78 FL (ref 82–98)
MONOCYTES # BLD AUTO: 0.6 K/UL (ref 0.3–1)
MONOCYTES NFR BLD: 6 % (ref 4–15)
NEUTROPHILS # BLD AUTO: 6.1 K/UL (ref 1.8–7.7)
NEUTROPHILS NFR BLD: 60.7 % (ref 38–73)
NRBC BLD-RTO: 0 /100 WBC
PLATELET # BLD AUTO: 809 K/UL (ref 150–350)
PMV BLD AUTO: 9.2 FL (ref 9.2–12.9)
POCT GLUCOSE: 85 MG/DL (ref 70–110)
POCT GLUCOSE: 89 MG/DL (ref 70–110)
POCT GLUCOSE: 95 MG/DL (ref 70–110)
POTASSIUM SERPL-SCNC: 4.5 MMOL/L (ref 3.5–5.1)
RBC # BLD AUTO: 3.97 M/UL (ref 4.6–6.2)
SODIUM SERPL-SCNC: 137 MMOL/L (ref 136–145)
WBC # BLD AUTO: 10.09 K/UL (ref 3.9–12.7)

## 2020-05-09 PROCEDURE — 63600175 PHARM REV CODE 636 W HCPCS: Performed by: HOSPITALIST

## 2020-05-09 PROCEDURE — 25000003 PHARM REV CODE 250: Performed by: NURSE PRACTITIONER

## 2020-05-09 PROCEDURE — A4216 STERILE WATER/SALINE, 10 ML: HCPCS | Performed by: NURSE PRACTITIONER

## 2020-05-09 PROCEDURE — 99900035 HC TECH TIME PER 15 MIN (STAT)

## 2020-05-09 PROCEDURE — 25000003 PHARM REV CODE 250: Performed by: HOSPITALIST

## 2020-05-09 PROCEDURE — 85025 COMPLETE CBC W/AUTO DIFF WBC: CPT

## 2020-05-09 PROCEDURE — S4991 NICOTINE PATCH NONLEGEND: HCPCS | Performed by: SURGERY

## 2020-05-09 PROCEDURE — 21400001 HC TELEMETRY ROOM

## 2020-05-09 PROCEDURE — 80048 BASIC METABOLIC PNL TOTAL CA: CPT

## 2020-05-09 PROCEDURE — 25000003 PHARM REV CODE 250: Performed by: SURGERY

## 2020-05-09 RX ORDER — OXYCODONE AND ACETAMINOPHEN 5; 325 MG/1; MG/1
1 TABLET ORAL EVERY 8 HOURS PRN
Qty: 30 TABLET | Refills: 0 | Status: SHIPPED | OUTPATIENT
Start: 2020-05-09 | End: 2020-08-07

## 2020-05-09 RX ADMIN — VENLAFAXINE HYDROCHLORIDE 75 MG: 75 CAPSULE, EXTENDED RELEASE ORAL at 10:05

## 2020-05-09 RX ADMIN — Medication 10 ML: at 12:05

## 2020-05-09 RX ADMIN — GABAPENTIN 300 MG: 300 CAPSULE ORAL at 10:05

## 2020-05-09 RX ADMIN — Medication 1 PATCH: at 10:05

## 2020-05-09 RX ADMIN — ASPIRIN 81 MG: 81 TABLET, COATED ORAL at 10:05

## 2020-05-09 RX ADMIN — OXYCODONE HYDROCHLORIDE AND ACETAMINOPHEN 1 TABLET: 5; 325 TABLET ORAL at 10:05

## 2020-05-09 RX ADMIN — GEMFIBROZIL 600 MG: 600 TABLET ORAL at 04:05

## 2020-05-09 RX ADMIN — FERROUS SULFATE TAB EC 325 MG (65 MG FE EQUIVALENT) 325 MG: 325 (65 FE) TABLET DELAYED RESPONSE at 10:05

## 2020-05-09 RX ADMIN — ERTAPENEM 1 G: 1 INJECTION INTRAMUSCULAR; INTRAVENOUS at 04:05

## 2020-05-09 RX ADMIN — GEMFIBROZIL 600 MG: 600 TABLET ORAL at 10:05

## 2020-05-09 RX ADMIN — GABAPENTIN 300 MG: 300 CAPSULE ORAL at 08:05

## 2020-05-09 NOTE — PLAN OF CARE
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]  Yes    Decubitus ulcer, infected, stage III [L89.93, L08.9]  Yes    Infected decubitus ulcer, stage IV with osteomyelitis of right ischium [L89.94, L08.9]  Yes    Neurogenic bladder [N31.9]  Yes     Chronic    Type 2 diabetes mellitus with hyperglycemia, with long-term current use of insulin [E11.65, Z79.4]  Not Applicable     Chronic    Phantom limb syndrome [G54.7]  Yes     Chronic    Constipation [K59.00]  Yes    Cigarette smoker [F17.210]  Yes     Chronic    Iron deficiency anemia [D50.9]  Yes     Chronic    Recurrent major depressive disorder, in remission [F33.40]  Yes     Chronic    Chronic suprapubic catheter [Z93.59]  Not Applicable     Chronic     Exchanged in [facility] ED on 8/24/2019 by ED MD.       Essential hypertension [I10]  Yes     Chronic    Vitamin D deficiency [E55.9]  Yes     Chronic    Paraplegia at T9 level [G82.20]  Yes     Chronic      Resolved Hospital Problems   No resolved problems to display.       Future Appointments   Date Time Provider Department Center   5/11/2020 11:00 AM Marlene Vargas NP [facility] C[facility] [place]   5/22/2020  8:40 AM Ramu Breen MD [facility] Med   5/28/2020 11:20 AM Ruchi Navarrete MD [facility] UROLOGY [place] [facility]     Follow-up Information     Ramu Breen MD On 5/22/2020.    Specialty:  Family Medicine  Why:  8:40am  Contact information:  [address] [place], [place] [address] [place] [address]  [phone]             [place] - Infectious Disease.    Specialty:  Infectious Diseases  Why:  TN left  message for follow up ; IF you do not receive a call by Tuesday 5/12/2020,  CALL OFFICE TO SCHEDULE  Contact information:  [address], [place] [address] [phone]  Additional information:  At this time [facility] will only use these entries [facility], [facility], and Emergency Department due to COVID-19 precautions.                    I have seen and examined this patient face to face today. My clinical findings that support the need for the home health skilled services and home bound status are the following:  Weakness/numbness causing balance and gait disturbance due to Infection and Weakness/Debility making it taxing to leave home.    Allergies:Review of patient's allergies indicates:  No Known Allergies    Diet: diabetic diet: 2000 calorie    Activities: activity as tolerated    Nursing:   SN to complete comprehensive assessment including routine vital signs. Instruct on disease process and s/s of complications to report to MD. Review/verify medication list sent home with the patient at time of discharge  and instruct patient/caregiver as needed. Frequency may be adjusted depending on start of care date.    Notify MD if SBP > 160 or < 90; DBP > 90 or < 50; HR > 120 or < 50; Temp > 101.    Labs: he will need weekly labs CBC, CMP, ESR and CRP. Labs to be faxed to ID office, attention to Dr Silva [phone]  Please schedule [facility] ID clinic follow up 1 -2 weeks after discharge.    Picc Line: Always clamp the PICC tubing when not in use.  Use only 10 mL syringes to flush PICC; flush each lumen of PICC every 6 hours with 10 mL preservative-free saline flush solution.    --Use strict sterile technique   Maintain occlusive dressing unless soiled, damp, unable to assess insertion site.  Change injection caps every 7 days and/or as needed when soiled or if catheter-related infection is suspected or documented.    --OK to discontinue the PICC line on 6/16/2020  after the last dose of antibiotics are given. Will need to use 4x4, Vaseline guaze, and tape to secure dressing after PICC line removal.          CONSULTS:    Physical Therapy to evaluate and treat. Evaluate for home safety and equipment needs; Establish/upgrade home exercise program. Perform / instruct on therapeutic exercises, gait training, transfer training, and Range of Motion.  Occupational Therapy to evaluate and treat. Evaluate home environment for safety and equipment needs. Perform/Instruct on transfers, ADL training, ROM, and therapeutic exercises.    MISCELLANEOUS CARE:  Routine Skin for Bedridden Patients: Instruct patient/caregiver to apply moisture barrier cream to all skin folds and wet areas in perineal area daily and after baths and all bowel movements.    WOUND CARE ORDERS  Wound vac:  Frequency: M W F  Location: Right hip /Ischium  Cleanse with:Wound cleanser  Cover wound base with: none  Fill / Pack with: med black foam  Wound VAC: 125 mmHg, cont low      Medications: Review discharge medications with patient and family and provide education.      Current Discharge Medication List      START taking these medications    Details   ertapenem sodium (ERTAPENEM, INVANZ, 1 G/100 ML NS, READY TO MIX,) Inject 100 mLs (1 g total) into the vein once daily.   Start 05/09/2020 and End on  6/16/2020    oxyCODONE-acetaminophen (PERCOCET) 5-325 mg per tablet Take 1 tablet by mouth every 8 (eight) hours as needed.  Qty: 30 tablet, Refills: 0    Comments: Quantity prescribed more than 7 day supply? Yes, quantity medically necessary         CONTINUE these medications which have NOT CHANGED    Details   albuterol-ipratropium (DUO-NEB) 2.5 mg-0.5 mg/3 mL nebulizer solution Inhale 3 mLs into the lungs.      alcohol swabs (ALCOHOL PADS) PadM Apply 1 each topically once daily.  Qty: 400 each, Refills: 11      ammonium lactate 12 % Crea MIHAELA EXT AA ON SKIN BID  Refills: 3      aspirin (ECOTRIN) 81 MG EC tablet aspirin       baclofen (LIORESAL) 10 MG tablet       cadexomer iodine (IODOSORB) 0.9 % gel Apply topically daily as needed for Wound Care.      cyclobenzaprine (FLEXERIL) 10 MG tablet Take 1 tablet (10 mg total) by mouth 3 (three) times daily as needed.  Qty: 90 tablet, Refills: 3      dextran 70-hypromellose (TEARS) ophthalmic solution Apply 1 drop to eye.      docusate sodium (COLACE) 100 MG capsule Take 1 capsule (100 mg total) by mouth 2 (two) times daily.  Qty: 180 capsule, Refills: 3      drainage bag Misc 1 Units by Misc.(Non-Drug; Combo Route) route every 30 days.  Qty: 3 each, Refills: 3    Comments: Large bedside urine drainage bag      famotidine (PEPCID) 20 MG tablet Take 1 tablet (20 mg total) by mouth 2 (two) times daily.  Qty: 180 tablet, Refills: 3      ferrous sulfate (IRON, FERROUS SULFATE,) 325 mg (65 mg iron) Tab tablet ferrous sulfate   325mg      gabapentin (NEURONTIN) 300 MG capsule Take 1 capsule (300 mg total) by mouth 2 (two) times daily.  Qty: 180 capsule, Refills: 3      gemfibrozil (LOPID) 600 MG tablet Take 1 tablet (600 mg total) by mouth 2 (two) times daily before meals.  Qty: 180 tablet, Refills: 1      insulin detemir U-100 (LEVEMIR) 100 unit/mL injection Inject 15 Units into the skin 2 (two) times daily.  Qty: 10 mL, Refills: 11      insulin lispro protamin-lispro (HUMALOG MIX 75-25 KWIKPEN) 100 unit/mL (75-25) InPn Inject 20 Units into the skin 3 (three) times daily with meals.  Qty: 3 Box, Refills: 11      ketoconazole (NIZORAL) 2 % shampoo Apply topically twice a week.  Qty: 120 mL, Refills: 11      melatonin 10 mg Cap Take 1 tablet by mouth every evening.  Qty: 90 capsule, Refills: 3      meloxicam (MOBIC) 7.5 MG tablet Take 1 tablet (7.5 mg total) by mouth 2 (two) times daily as needed for Pain.  Qty: 180 tablet, Refills: 1      metFORMIN (GLUCOPHAGE) 500 MG tablet Take 1 tablet (500 mg total) by mouth 2 (two) times daily with meals.  Qty: 180 tablet, Refills: 3      metoprolol  "tartrate (LOPRESSOR) 25 MG tablet       mirtazapine (REMERON) 30 MG tablet Take 30 mg by mouth.      orphenadrine (NORFLEX) 100 mg tablet Take 1 tablet (100 mg total) by mouth 2 (two) times daily as needed for Muscle spasms or Pain.  Qty: 20 tablet, Refills: 0      pen needle, diabetic (COMFORT EZ PEN NEEDLES) 29 gauge x 1/2" Ndle 1 Units by Misc.(Non-Drug; Combo Route) route 3 (three) times daily.  Qty: 400 each, Refills: 11      promethazine (PHENERGAN) 25 MG tablet Take 1 tablet (25 mg total) by mouth every 6 (six) hours as needed for Nausea.  Qty: 30 tablet, Refills: 0      SANTYL ointment APPLY TO CLEANSED AFFECTED ARE TOPICALLY ONCE DAILY      TRUE METRIX GLUCOSE METER Misc AS DIRECTED  Refills: 0      TRUE METRIX GLUCOSE TEST STRIP Strp USE AS DIRECTED TID  Qty: 200 strip, Refills: 3      TRUEPLUS LANCETS 30 gauge Misc USE AS DIRECTED TID  Refills: 3      venlafaxine (EFFEXOR-XR) 150 MG Cp24       wheelchair Korina 1 Units/oz/day by Misc.(Non-Drug; Combo Route) route once daily.  Qty: 1 each, Refills: 0    Comments: Electric wheelchair             I certify that this patient is confined to his home and needs intermittent skilled nursing care, physical therapy and occupational therapy.        "

## 2020-05-09 NOTE — PROGRESS NOTES
Pt wound vac dressing coming off. Removed dressing and replaced with wet to dry dressing, will continue to monitor.

## 2020-05-09 NOTE — DISCHARGE INSTRUCTIONS
Ertapenem injection (English) View Edit Remove  Sepsis, Understanding (English) View Edit Remove  Wound, Vacuum-Assisted Closure of a (English) View Edit Remove  Antibiotics, Administering IV: Discharge Instructions (English) View Edit Remove

## 2020-05-09 NOTE — PROGRESS NOTES
"Surgery follow up  /74   Pulse 85   Temp 97.8 °F (36.6 °C)   Resp 20   Ht 5' 11" (1.803 m)   Wt 99.6 kg (219 lb 9.3 oz)   SpO2 100%   BMI 30.62 kg/m² I/O last 3 completed shifts:  In: 565 [P.O.:365; IV Piggyback:200]  Out: 1525 [Urine:1450; Other:75]  No intake/output data recorded.  Recent Results (from the past 336 hour(s))   CBC auto differential    Collection Time: 05/09/20  5:42 AM   Result Value Ref Range    WBC 10.09 3.90 - 12.70 K/uL    Hemoglobin 10.0 (L) 14.0 - 18.0 g/dL    Hematocrit 30.9 (L) 40.0 - 54.0 %    Platelets 809 (H) 150 - 350 K/uL   CBC auto differential    Collection Time: 05/08/20  7:08 AM   Result Value Ref Range    WBC 8.19 3.90 - 12.70 K/uL    Hemoglobin 9.9 (L) 14.0 - 18.0 g/dL    Hematocrit 30.6 (L) 40.0 - 54.0 %    Platelets 772 (H) 150 - 350 K/uL   CBC auto differential    Collection Time: 05/07/20  6:19 AM   Result Value Ref Range    WBC 9.43 3.90 - 12.70 K/uL    Hemoglobin 10.5 (L) 14.0 - 18.0 g/dL    Hematocrit 32.5 (L) 40.0 - 54.0 %    Platelets 752 (H) 150 - 350 K/uL   being discharged today wound vac orders signed.  "

## 2020-05-09 NOTE — PLAN OF CARE
VN note: VN completed AVS and attachments and notified bedside nurse, Laurel. Waiting for family to arrive to admin discharge education. Will cont to be available and intervene prn.

## 2020-05-09 NOTE — PROGRESS NOTES
8:47 am, Discharge orders noted. HH orders sent via [employer] M[employer]/Good Thing to [facility] and [facility]. Awaiting filled out Wound Vac form, to arrange delivery of Wound Vac from [facility].     TN contacted [employer] [phone]. Awaiting return call to arrange education and delivery of IV ABX    [facility] will see pt on Joel 5/10/20    12:11 pm, per floor nurse, [facility] has delivered the pt's supplies and provided education.     Wound vac orders were signed by Dr Tom and faxed to [facility] [phone], fax [phone]. TN called to check time frame, was told to call back in 2 hours, per  this is the approximate processing time. Order confirmation # [id number]    1:31 pm, TN contacted [facility], they have received the order and are processing it, they stated that they will call TN when the review process is complete    2:35 pm, TN contacted [facility], the order is listed as pending and is still being reviewed by their processors.  stated that it takes longer on the weekend as they have fewer staff. TN told to try back in one hour for status.    3:45 pm, TN contacted [facility], order still pending, TN spoke with Juhi who will send an email to contact TN with ETA updates.

## 2020-05-09 NOTE — PLAN OF CARE
Pt AAOx4, VSS, Nad. No complaints of headache or N/V. Complains of pain, PRN medications given per MAR. IV antibiotics infusing per MAR. Blood glucose monitored. Pt tolerating diet. Pt awaiting wound vac for DC. No other needs at this time. Safety maintained. Will continue to monitor.

## 2020-05-09 NOTE — PLAN OF CARE
8:47 am, Discharge orders noted. HH orders sent via [email or web address] to [facility] and [facility]. Awaiting filled out Wound Vac form, to arrange delivery of Wound Vac from [facility].      TN contacted [employer] [phone]. Awaiting return call to arrange education and delivery of IV ABX     [facility] will see pt on Joel 5/10/20     12:11 pm, per floor nurse, [employer] has delivered the pt's supplies and provided education.      Wound vac orders were signed by Dr Tom and faxed to [facility] [phone], fax [phone]. TN called to check time frame, was told to call back in 2 hours, per  this is the approximate processing time. Order confirmation # [id number]     1:31 pm, TN contacted [facility], they have received the order and are processing it, they stated that they will call TN when the review process is complete     2:35 pm, TN contacted [facility], the order is listed as pending and is still being reviewed by their processors.  stated that it takes longer on the weekend as they have fewer staff. TN told to try back in one hour for status.     3:45 pm, TN contacted [facility], order still pending, TN spoke with Juhi who will send an email to contact TN with ETA updates.     4:18 pm, TN received call from [facility]- [place]. She stated that pt needed to have an air mattress/overlay before they would approve a wound vac. TN had floor nurse ask pt if he had one, pt stated that he did.      4:45 pm, TN spoke with Carole at [facility]. He sent an email to the processing department informing them that pt has an overlay mattress at home. Awaiting return call.     5:05 pm, TN received call from Laurel. She will give approval for the wound vac. Awaiting call from delivery department.     6:25 pm, TN called for an ETA, spoke with Misael. He stated that the order was sent to their delivery company and it could take up to 4 hours for delivery but probably not that  long.    Charge nurse will set up transportation for pt as soon as wound vac arrives.    Future Appointments   Date Time Provider Department Center   5/11/2020 11:00 AM [facility]lene [facility]argas NP [facility]   5/22/2020  8:40 AM Ramu Breen MD [facility] MED [facility]   5/28/2020 11:20 AM Ruchi Navarrete MD [facility] UROLOGY [place] [facility]       Pt's nurse will go over medications/signs and symptoms prior to discharge       05/09/20 0280   Final Note   Assessment Type Final Discharge Note   Anticipated Discharge Disposition Home-Health  ([facility])   What phone number can be called within the next 1-3 days to see how you are doing after discharge? [phone]   Hospital Follow Up  Appt(s) scheduled? Yes   Right Care Referral Info   Post Acute Recommendation Home-care   Referral Type [facility]   Facility Name [facility]     Shira Ojeda RN Transitional Navigator  [phone]

## 2020-05-09 NOTE — PROGRESS NOTES
8:47 am, Discharge orders noted. HH orders sent via CDNetworks/Webdyn to [facility] and [employer] [facility]. Awaiting filled out Wound Vac form, to arrange delivery of Wound Vac from [facility].      TN contacted [employer] [phone]. Awaiting return call to arrange education and delivery of IV ABX     [facility] will see pt on Joel 5/10/20     12:11 pm, per floor nurse, [employer] has delivered the pt's supplies and provided education.      Wound vac orders were signed by Dr Tom and faxed to [facility] [phone], fax [phone]. TN called to check time frame, was told to call back in 2 hours, per  this is the approximate processing time. Order confirmation # [id number]     1:31 pm, TN contacted [facility], they have received the order and are processing it, they stated that they will call TN when the review process is complete     2:35 pm, TN contacted [facility], the order is listed as pending and is still being reviewed by their processors.  stated that it takes longer on the weekend as they have fewer staff. TN told to try back in one hour for status.     3:45 pm, TN contacted [facility], order still pending, TN spoke with Juhi who will send an email to contact TN with ETA updates.    4:18 pm, TN received call from [facility]- Laurel. She stated that pt needed to have an air mattress/overlay before they would approve a wound vac. TN had floor nurse ask pt if he had one, pt stated that he did.     4:45 pm, TN spoke with Carole at [facility]. He sent an email to the processing department informing them that pt has an overlay mattress at home. Awaiting return call.    5:05 pm, TN received call from Laurel. She will give approval for the wound vac. Awaiting call from delivery department.    6:25 pm, TN called for an ETA, spoke with Misael. He stated that the order was sent to their delivery company and it could take up to 4 hours for delivery but probably not that long.

## 2020-05-09 NOTE — PLAN OF CARE
Pt rested well through out shift, instructed to call staff for assistance. PICC line inserted in Rt arm, with no difficulties. Wound Vac, suprapubic catheter in places, draining w/o difficulty. Contact precautions maintained. Bg controlled w/o insulin. Diab diet. Pt shows no s/o sob, n/v, or pain.

## 2020-05-10 PROCEDURE — 11000001 HC ACUTE MED/SURG PRIVATE ROOM

## 2020-05-10 NOTE — PROGRESS NOTES
Wound Vac and dressings arrived and dressing placed on patient. Wound Vac in place with good suction noted. Patient tolerated dressing change well. Transportation arrangement made for 2200 to be picked up. Patient leaving with PICC line in place for home IV antibiotics. Currently awaiting transport to arrive.

## 2020-05-10 NOTE — PROGRESS NOTES
Service to  patient called said they are running behind and will be there soon to  patient. Will continue to monitor.

## 2020-05-10 NOTE — PROGRESS NOTES
Ambulance here to take patient home. All belongings packed up and given to patient. Went over discharge orders again. Patient had gone over discharge instructions previously during day shift with the VN. Presently without complaints.
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
## 2020-05-12 ENCOUNTER — PATIENT OUTREACH (OUTPATIENT)
Dept: ADMINISTRATIVE | Facility: CLINIC | Age: 45
End: 2020-05-12

## 2020-05-12 NOTE — PATIENT INSTRUCTIONS
Sepsis     To treat sepsis, antibiotics and fluids may by given through an intravenous (IV) line.     Sepsis happens when your body responds with widespread inflammation to a bad infection or bacteremia--the presence of bacteria in your bloodstream. Sepsis can be deadly. Blood pressure may drop and the lungs and kidneys may start to fail. Emergency care for sepsis is crucial.  Risk factors  Those most at risk for sepsis are:  · Infants or older adults  · People who have an illness that weakens their immune system, such as cancer, AIDS, or diabetes  · People being treated with chemotherapy medicines or radiation, which weakens the immune system  · People who have had a transplant  · People with a very severe infection such as pneumonia, meningitis, or a urinary tract infection  When to go to the emergency department (ED)  Sepsis is an emergency. Go to the nearest ED if you have a fever with any of these symptoms:  · Chills and shaking  · Rapid heartbeat and breathing  · Trouble breathing  · Severe nausea or uncontrolled vomiting  · Confusion, disorientation, drowsiness, or dizziness  · Decreased urination  · Severe pain, including in the back or joints   What to expect in the ED  · Blood and urine tests are done to look for bacteria. They also check for organ failure.  · Blood, urine, or sputum cultures may be taken. The samples are sent to a lab. They are placed in a special container. Any bacteria should grow in 24 hours.  · X-rays or other imaging tests may be done.  A person with sepsis will be admitted to the hospital and treated with antibiotics. Treatment may also include oxygen and intravenous fluids.  Date Last Reviewed: 10/1/2016  © 4411-6201 [employer]. [address] [place] [address], [place] [address]. All rights reserved. This information is not intended as a substitute for professional medical care. Always follow your healthcare professional's instructions.

## 2020-05-18 ENCOUNTER — NURSE TRIAGE (OUTPATIENT)
Dept: ADMINISTRATIVE | Facility: CLINIC | Age: 45
End: 2020-05-18

## 2020-05-22 ENCOUNTER — TELEPHONE (OUTPATIENT)
Dept: FAMILY MEDICINE | Facility: CLINIC | Age: 45
End: 2020-05-22

## 2020-05-22 NOTE — TELEPHONE ENCOUNTER
Patient declined setting up [employer] portal. Patient advised he needs to be active on [email or web address] portal to have a virtual visit.  States he doesn't have time for all that. Patient very upset. Hung up phone stating he's going back to Dr. Farrar.      ----- Message from Deborah Oleary sent at 5/22/2020  8:43 AM CDT -----  Contact: [phone] patient  Patient is unable to make his appt today and wants to do a virtual visit.

## 2020-05-26 ENCOUNTER — PATIENT OUTREACH (OUTPATIENT)
Dept: ADMINISTRATIVE | Facility: OTHER | Age: 45
End: 2020-05-26

## 2020-05-28 ENCOUNTER — TELEPHONE (OUTPATIENT)
Dept: UROLOGY | Facility: CLINIC | Age: 45
End: 2020-05-28

## 2020-05-28 ENCOUNTER — OFFICE VISIT (OUTPATIENT)
Dept: UROLOGY | Facility: CLINIC | Age: 45
End: 2020-05-28
Payer: MEDICARE

## 2020-05-28 VITALS
WEIGHT: 219 LBS | DIASTOLIC BLOOD PRESSURE: 80 MMHG | SYSTOLIC BLOOD PRESSURE: 129 MMHG | HEIGHT: 71 IN | TEMPERATURE: 99 F | BODY MASS INDEX: 30.66 KG/M2 | HEART RATE: 105 BPM

## 2020-05-28 DIAGNOSIS — N39.0 RECURRENT UTI: ICD-10-CM

## 2020-05-28 DIAGNOSIS — N31.9 NEUROGENIC BLADDER: Primary | ICD-10-CM

## 2020-05-28 DIAGNOSIS — Z93.59 CHRONIC SUPRAPUBIC CATHETER: ICD-10-CM

## 2020-05-28 DIAGNOSIS — G82.20 PARAPLEGIA AT T9 LEVEL: ICD-10-CM

## 2020-05-28 PROCEDURE — 99999 PR PBB SHADOW E&M-EST. PATIENT-LVL III: ICD-10-PCS | Mod: PBBFAC,,, | Performed by: STUDENT IN AN ORGANIZED HEALTH CARE EDUCATION/TRAINING PROGRAM

## 2020-05-28 PROCEDURE — 99213 OFFICE O/P EST LOW 20 MIN: CPT | Mod: PBBFAC,PO | Performed by: STUDENT IN AN ORGANIZED HEALTH CARE EDUCATION/TRAINING PROGRAM

## 2020-05-28 PROCEDURE — 99214 OFFICE O/P EST MOD 30 MIN: CPT | Mod: S$PBB,25,, | Performed by: STUDENT IN AN ORGANIZED HEALTH CARE EDUCATION/TRAINING PROGRAM

## 2020-05-28 PROCEDURE — 51700 IRRIGATION OF BLADDER: CPT | Mod: PBBFAC,PO | Performed by: STUDENT IN AN ORGANIZED HEALTH CARE EDUCATION/TRAINING PROGRAM

## 2020-05-28 PROCEDURE — 99999 PR PBB SHADOW E&M-EST. PATIENT-LVL III: CPT | Mod: PBBFAC,,, | Performed by: STUDENT IN AN ORGANIZED HEALTH CARE EDUCATION/TRAINING PROGRAM

## 2020-05-28 PROCEDURE — 51705 CHANGE OF BLADDER TUBE: CPT | Mod: S$PBB,,, | Performed by: STUDENT IN AN ORGANIZED HEALTH CARE EDUCATION/TRAINING PROGRAM

## 2020-05-28 PROCEDURE — 51705 PR CHANGE OF BLADDER TUBE,SIMPLE: ICD-10-PCS | Mod: S$PBB,,, | Performed by: STUDENT IN AN ORGANIZED HEALTH CARE EDUCATION/TRAINING PROGRAM

## 2020-05-28 PROCEDURE — 99214 PR OFFICE/OUTPT VISIT, EST, LEVL IV, 30-39 MIN: ICD-10-PCS | Mod: S$PBB,25,, | Performed by: STUDENT IN AN ORGANIZED HEALTH CARE EDUCATION/TRAINING PROGRAM

## 2020-05-28 PROCEDURE — 51705 CHANGE OF BLADDER TUBE: CPT | Mod: PBBFAC,PO | Performed by: STUDENT IN AN ORGANIZED HEALTH CARE EDUCATION/TRAINING PROGRAM
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
## 2020-05-28 NOTE — TELEPHONE ENCOUNTER
----- Message from Hailey Cavanaugh sent at 5/28/2020 10:28 AM CDT -----  Contact:  Bridget caregiver [phone]  Pt's caregiver is requesting to speak with you re: upcoming appt. She states that medicaid transport ion is delayed and the the pt would arrive late to appt. Please advise

## 2020-06-11 ENCOUNTER — TELEPHONE (OUTPATIENT)
Dept: ADMINISTRATIVE | Facility: OTHER | Age: 45
End: 2020-06-11
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
## 2020-06-24 NOTE — ASSESSMENT & PLAN NOTE
- ESR 95,  - was given Vancomycin and  Zosyn initially --- now Vanc and Meropenem which was switched on 5/3  - General Surgery consulted for debridement, POD#1  - turn q2h   - Recommendations per ID---  MRI showing osteomyelitis  Recommendations:   -- we started vancomycin as per pharmacy assistance until culture data results from OR  -- we started  Meropenem 1gm IV Q8hrs that will cover any anaerobes/ ESBL proteus  --Meropenem and Vancomycin have been stopped as ID has recommended 6 weeks of ertapenem  --will need  PICC line  -- Since has osteomyelitis and this wound was treated before- will need 6 weeks of antibiotics - in future after I and D and antibiotic course, if wound does not heal- may need a flap   As outpatient on IV antibiotics patient will need weekly labs CBC, CMP, ESR and CRP. Labs to be faxed to ID office, attention to Dr Silva [phone]  -- patient will need close follow up with surgery team and ID team for wound.

## 2020-06-24 NOTE — ASSESSMENT & PLAN NOTE
- with chronic suprapubic catheter  - traumatically removed catheter on arrival; Urology consulted and replaced on 5/1  - replaced 16 [place] catheter, which will need to be upsized in Urology clinic in 1 month (appointment already made)  - appreciate Urology assistance

## 2020-06-24 NOTE — ASSESSMENT & PLAN NOTE
- ESR 95,  - was given Vancomycin and  Zosyn initially --- now Vanc and Meropenem which was switched on 5/3  - General Surgery consulted for debridement, POD#2  - turn q2h   - Recommendations per ID---  MRI showing osteomyelitis  Recommendations:   -- we started vancomycin as per pharmacy assistance until culture data results from OR  -- we started  Meropenem 1gm IV Q8hrs that will cover any anaerobes/ ESBL proteus  --Meropenem and Vancomycin have been stopped as ID has recommended 6 weeks of ertapenem  --will need  PICC line  -- Since has osteomyelitis and this wound was treated before- will need 6 weeks of antibiotics - in future after I and D and antibiotic course, if wound does not heal- may need a flap   As outpatient on IV antibiotics patient will need weekly labs CBC, CMP, ESR and CRP. Labs to be faxed to ID office, attention to Dr Silva [phone]  -- patient will need close follow up with surgery team and ID team for wound.

## 2020-06-24 NOTE — SUBJECTIVE & OBJECTIVE
Interval History: He is in bed resting with no distress noted. No complaints of pain noted. Monitor.     Review of Systems   Constitutional: Negative for chills and fever.   Respiratory: Negative for shortness of breath.    Cardiovascular: Negative for chest pain.   Genitourinary: Positive for difficulty urinating (has suprapubic cath).   Skin: Positive for wound.   Neurological: Positive for weakness (paraplegia).   Psychiatric/Behavioral: Negative for confusion.     Objective:     Vital Signs (Most Recent):  Temp: 98 °F (36.7 °C) (05/09/20 2101)  Pulse: 86 (05/09/20 2101)  Resp: 20 (05/09/20 2101)  BP: 121/85 (05/09/20 2101)  SpO2: 100 % (05/09/20 2101) Vital Signs (24h Range):        Weight: 99.6 kg (219 lb 9.3 oz)  Body mass index is 30.62 kg/m².  No intake or output data in the 24 hours ending 06/24/20 0430   Physical Exam  Constitutional:       General: He is not in acute distress.     Appearance: He is well-developed and well-nourished.   HENT:      Head: Normocephalic and atraumatic.   Eyes:      Conjunctiva/sclera: Conjunctivae normal.      Pupils: Pupils are equal, round, and reactive to light.   Neck:      Musculoskeletal: Normal range of motion and neck supple.      Vascular: No JVD.   Cardiovascular:      Rate and Rhythm: Normal rate and regular rhythm.      Pulses: Intact distal pulses.      Heart sounds: Normal heart sounds.   Pulmonary:      Effort: Pulmonary effort is normal. No respiratory distress.      Breath sounds: Normal breath sounds. No wheezing.   Abdominal:      General: Bowel sounds are normal. There is no distension.      Palpations: Abdomen is soft.      Tenderness: There is no abdominal tenderness. There is no guarding.   Musculoskeletal:         General: No tenderness or edema.      Comments: Right BKA. Paraplegic   Skin:     General: Skin is warm and dry.      Capillary Refill: Capillary refill takes less than 2 seconds.      Findings: No erythema.      Comments: Stage IV ischial  ulcer, no surrounding cellulitis.   Neurological:      Mental Status: He is alert and oriented to person, place, and time.   Psychiatric:         Mood and Affect: Mood and affect normal.         Behavior: Behavior normal.         Significant Labs: All pertinent labs within the past 24 hours have been reviewed.    Significant Imaging: I have reviewed all pertinent imaging results/findings within the past 24 hours.
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
## 2020-06-24 NOTE — SUBJECTIVE & OBJECTIVE
Interval History: No distress, patient is requesting home with HH. States he he folks who he has hired who will care for him at home. Will work with case management for home with HH.      Review of Systems   Constitutional: Negative for chills and fever.   Respiratory: Negative for shortness of breath.    Cardiovascular: Negative for chest pain.   Genitourinary: Positive for difficulty urinating (has suprapubic cath).   Skin: Positive for wound.   Neurological: Positive for weakness (paraplegia).   Psychiatric/Behavioral: Negative for confusion.     Objective:     Vital Signs (Most Recent):  Temp: 98 °F (36.7 °C) (05/09/20 2101)  Pulse: 86 (05/09/20 2101)  Resp: 20 (05/09/20 2101)  BP: 121/85 (05/09/20 2101)  SpO2: 100 % (05/09/20 2101) Vital Signs (24h Range):        Weight: 99.6 kg (219 lb 9.3 oz)  Body mass index is 30.62 kg/m².  No intake or output data in the 24 hours ending 06/24/20 0436   Physical Exam  Constitutional:       General: He is not in acute distress.     Appearance: He is well-developed and well-nourished.   HENT:      Head: Normocephalic and atraumatic.   Eyes:      Conjunctiva/sclera: Conjunctivae normal.      Pupils: Pupils are equal, round, and reactive to light.   Neck:      Musculoskeletal: Normal range of motion and neck supple.      Vascular: No JVD.   Cardiovascular:      Rate and Rhythm: Normal rate and regular rhythm.      Pulses: Intact distal pulses.      Heart sounds: Normal heart sounds.   Pulmonary:      Effort: Pulmonary effort is normal. No respiratory distress.      Breath sounds: Normal breath sounds. No wheezing.   Abdominal:      General: Bowel sounds are normal. There is no distension.      Palpations: Abdomen is soft.      Tenderness: There is no abdominal tenderness. There is no guarding.   Musculoskeletal:         General: No tenderness or edema.      Comments: Right BKA. Paraplegic   Skin:     General: Skin is warm and dry.      Capillary Refill: Capillary refill takes  less than 2 seconds.      Findings: No erythema.      Comments: Stage IV ischial ulcer, no surrounding cellulitis.   Neurological:      Mental Status: He is alert and oriented to person, place, and time.   Psychiatric:         Mood and Affect: Mood and affect normal.         Behavior: Behavior normal.         Significant Labs: All pertinent labs within the past 24 hours have been reviewed.    Significant Imaging: I have reviewed all pertinent imaging results/findings within the past 24 hours.

## 2020-06-25 ENCOUNTER — PATIENT OUTREACH (OUTPATIENT)
Dept: ADMINISTRATIVE | Facility: OTHER | Age: 45
End: 2020-06-25

## 2020-06-25 NOTE — PROGRESS NOTES
Requested updates within Care Everywhere.  Patient's chart was reviewed for overdue GEE topics.  Immunizations reconciled.

## 2020-07-06 ENCOUNTER — HOSPITAL ENCOUNTER (INPATIENT)
Facility: HOSPITAL | Age: 45
LOS: 9 days | Discharge: HOME | DRG: 628 | End: 2020-07-16
Attending: EMERGENCY MEDICINE | Admitting: INTERNAL MEDICINE
Payer: MEDICARE

## 2020-07-06 DIAGNOSIS — Z93.59 CHRONIC SUPRAPUBIC CATHETER: Chronic | ICD-10-CM

## 2020-07-06 DIAGNOSIS — N39.0 COMPLICATED UTI (URINARY TRACT INFECTION): ICD-10-CM

## 2020-07-06 DIAGNOSIS — N39.0 URINARY TRACT INFECTION ASSOCIATED WITH INDWELLING URETHRAL CATHETER, SUBSEQUENT ENCOUNTER: Primary | ICD-10-CM

## 2020-07-06 DIAGNOSIS — Z89.511 HX OF RIGHT BKA: Chronic | ICD-10-CM

## 2020-07-06 DIAGNOSIS — Z79.4 TYPE 2 DIABETES MELLITUS WITH HYPERGLYCEMIA, WITH LONG-TERM CURRENT USE OF INSULIN: Chronic | ICD-10-CM

## 2020-07-06 DIAGNOSIS — M86.9 OSTEOMYELITIS: ICD-10-CM

## 2020-07-06 DIAGNOSIS — L89.93 DECUBITUS ULCER, INFECTED, STAGE III: ICD-10-CM

## 2020-07-06 DIAGNOSIS — T83.511A URINARY TRACT INFECTION ASSOCIATED WITH INDWELLING URETHRAL CATHETER, INITIAL ENCOUNTER: ICD-10-CM

## 2020-07-06 DIAGNOSIS — R10.2 PELVIC AND PERINEAL PAIN: ICD-10-CM

## 2020-07-06 DIAGNOSIS — M99.9 BIOMECHANICAL LESION, UNSPECIFIED: ICD-10-CM

## 2020-07-06 DIAGNOSIS — E11.65 TYPE 2 DIABETES MELLITUS WITH HYPERGLYCEMIA, WITH LONG-TERM CURRENT USE OF INSULIN: Chronic | ICD-10-CM

## 2020-07-06 DIAGNOSIS — F17.210 CIGARETTE SMOKER: Chronic | ICD-10-CM

## 2020-07-06 DIAGNOSIS — G82.20 PARAPLEGIA AT T9 LEVEL: Chronic | ICD-10-CM

## 2020-07-06 DIAGNOSIS — M86.19 OTHER ACUTE OSTEOMYELITIS, MULTIPLE SITES: ICD-10-CM

## 2020-07-06 DIAGNOSIS — L08.9 DECUBITUS ULCER, INFECTED, STAGE III: ICD-10-CM

## 2020-07-06 DIAGNOSIS — T83.511D URINARY TRACT INFECTION ASSOCIATED WITH INDWELLING URETHRAL CATHETER, SUBSEQUENT ENCOUNTER: Primary | ICD-10-CM

## 2020-07-06 DIAGNOSIS — M86.9 OSTEOMYELITIS, UNSPECIFIED SITE, UNSPECIFIED TYPE: ICD-10-CM

## 2020-07-06 DIAGNOSIS — N39.0 URINARY TRACT INFECTION ASSOCIATED WITH INDWELLING URETHRAL CATHETER, INITIAL ENCOUNTER: ICD-10-CM

## 2020-07-06 LAB
ALBUMIN SERPL BCP-MCNC: 2.7 G/DL (ref 3.5–5.2)
ALP SERPL-CCNC: 136 U/L (ref 55–135)
ALT SERPL W/O P-5'-P-CCNC: 6 U/L (ref 10–44)
ANION GAP SERPL CALC-SCNC: 11 MMOL/L (ref 8–16)
AST SERPL-CCNC: 8 U/L (ref 10–40)
BACTERIA #/AREA URNS HPF: ABNORMAL /HPF
BASOPHILS # BLD AUTO: 0.06 K/UL (ref 0–0.2)
BASOPHILS NFR BLD: 0.6 % (ref 0–1.9)
BILIRUB SERPL-MCNC: 0.1 MG/DL (ref 0.1–1)
BILIRUB UR QL STRIP: NEGATIVE
BUN SERPL-MCNC: 7 MG/DL (ref 6–20)
CALCIUM SERPL-MCNC: 8.9 MG/DL (ref 8.7–10.5)
CHLORIDE SERPL-SCNC: 106 MMOL/L (ref 95–110)
CLARITY UR: CLEAR
CO2 SERPL-SCNC: 23 MMOL/L (ref 23–29)
COLOR UR: YELLOW
CREAT SERPL-MCNC: 1 MG/DL (ref 0.5–1.4)
DIFFERENTIAL METHOD: ABNORMAL
EOSINOPHIL # BLD AUTO: 0.6 K/UL (ref 0–0.5)
EOSINOPHIL NFR BLD: 6 % (ref 0–8)
ERYTHROCYTE [DISTWIDTH] IN BLOOD BY AUTOMATED COUNT: 18 % (ref 11.5–14.5)
EST. GFR  (AFRICAN AMERICAN): >60 ML/MIN/1.73 M^2
EST. GFR  (NON AFRICAN AMERICAN): >60 ML/MIN/1.73 M^2
GLUCOSE SERPL-MCNC: 107 MG/DL (ref 70–110)
GLUCOSE UR QL STRIP: NEGATIVE
HCT VFR BLD AUTO: 30.4 % (ref 40–54)
HGB BLD-MCNC: 9.5 G/DL (ref 14–18)
HGB UR QL STRIP: NEGATIVE
HYALINE CASTS #/AREA URNS LPF: 0 /LPF
IMM GRANULOCYTES # BLD AUTO: 0.06 K/UL (ref 0–0.04)
IMM GRANULOCYTES NFR BLD AUTO: 0.6 % (ref 0–0.5)
KETONES UR QL STRIP: NEGATIVE
LEUKOCYTE ESTERASE UR QL STRIP: ABNORMAL
LYMPHOCYTES # BLD AUTO: 2.6 K/UL (ref 1–4.8)
LYMPHOCYTES NFR BLD: 24.6 % (ref 18–48)
MCH RBC QN AUTO: 23.3 PG (ref 27–31)
MCHC RBC AUTO-ENTMCNC: 31.3 G/DL (ref 32–36)
MCV RBC AUTO: 75 FL (ref 82–98)
MICROSCOPIC COMMENT: ABNORMAL
MONOCYTES # BLD AUTO: 0.7 K/UL (ref 0.3–1)
MONOCYTES NFR BLD: 6.7 % (ref 4–15)
NEUTROPHILS # BLD AUTO: 6.4 K/UL (ref 1.8–7.7)
NEUTROPHILS NFR BLD: 61.5 % (ref 38–73)
NITRITE UR QL STRIP: POSITIVE
NRBC BLD-RTO: 0 /100 WBC
PH UR STRIP: 8 [PH] (ref 5–8)
PLATELET # BLD AUTO: 793 K/UL (ref 150–350)
PMV BLD AUTO: 9.5 FL (ref 9.2–12.9)
POTASSIUM SERPL-SCNC: 3.6 MMOL/L (ref 3.5–5.1)
PROT SERPL-MCNC: 8.3 G/DL (ref 6–8.4)
PROT UR QL STRIP: ABNORMAL
RBC # BLD AUTO: 4.07 M/UL (ref 4.6–6.2)
RBC #/AREA URNS HPF: 0 /HPF (ref 0–4)
SODIUM SERPL-SCNC: 140 MMOL/L (ref 136–145)
SP GR UR STRIP: 1.01 (ref 1–1.03)
TRI-PHOS CRY URNS QL MICRO: ABNORMAL
URN SPEC COLLECT METH UR: ABNORMAL
UROBILINOGEN UR STRIP-ACNC: NEGATIVE EU/DL
WBC # BLD AUTO: 10.37 K/UL (ref 3.9–12.7)
WBC #/AREA URNS HPF: 4 /HPF (ref 0–5)

## 2020-07-06 PROCEDURE — 80061 LIPID PANEL: CPT

## 2020-07-06 PROCEDURE — 86140 C-REACTIVE PROTEIN: CPT

## 2020-07-06 PROCEDURE — 82607 VITAMIN B-12: CPT

## 2020-07-06 PROCEDURE — 82728 ASSAY OF FERRITIN: CPT

## 2020-07-06 PROCEDURE — 87040 BLOOD CULTURE FOR BACTERIA: CPT | Mod: 59

## 2020-07-06 PROCEDURE — 80053 COMPREHEN METABOLIC PANEL: CPT

## 2020-07-06 PROCEDURE — 81000 URINALYSIS NONAUTO W/SCOPE: CPT

## 2020-07-06 PROCEDURE — 82746 ASSAY OF FOLIC ACID SERUM: CPT

## 2020-07-06 PROCEDURE — 83540 ASSAY OF IRON: CPT

## 2020-07-06 PROCEDURE — 85025 COMPLETE CBC W/AUTO DIFF WBC: CPT

## 2020-07-06 PROCEDURE — 99285 EMERGENCY DEPT VISIT HI MDM: CPT | Mod: 25

## 2020-07-07 PROBLEM — T83.511A URINARY TRACT INFECTION ASSOCIATED WITH INDWELLING URETHRAL CATHETER: Status: ACTIVE | Noted: 2020-07-07

## 2020-07-07 PROBLEM — N39.0 URINARY TRACT INFECTION ASSOCIATED WITH INDWELLING URETHRAL CATHETER: Status: ACTIVE | Noted: 2020-07-07

## 2020-07-07 LAB
ANION GAP SERPL CALC-SCNC: 7 MMOL/L (ref 8–16)
BASOPHILS # BLD AUTO: 0.08 K/UL (ref 0–0.2)
BASOPHILS NFR BLD: 0.8 % (ref 0–1.9)
BUN SERPL-MCNC: 7 MG/DL (ref 6–20)
CALCIUM SERPL-MCNC: 9 MG/DL (ref 8.7–10.5)
CHLORIDE SERPL-SCNC: 109 MMOL/L (ref 95–110)
CHOLEST SERPL-MCNC: 166 MG/DL (ref 120–199)
CHOLEST/HDLC SERPL: 6.1 {RATIO} (ref 2–5)
CO2 SERPL-SCNC: 21 MMOL/L (ref 23–29)
CREAT SERPL-MCNC: 0.8 MG/DL (ref 0.5–1.4)
CRP SERPL-MCNC: 69.1 MG/L (ref 0–8.2)
DIFFERENTIAL METHOD: ABNORMAL
EOSINOPHIL # BLD AUTO: 0.7 K/UL (ref 0–0.5)
EOSINOPHIL NFR BLD: 6.5 % (ref 0–8)
ERYTHROCYTE [DISTWIDTH] IN BLOOD BY AUTOMATED COUNT: 18.5 % (ref 11.5–14.5)
ERYTHROCYTE [SEDIMENTATION RATE] IN BLOOD BY WESTERGREN METHOD: 103 MM/HR (ref 0–10)
EST. GFR  (AFRICAN AMERICAN): >60 ML/MIN/1.73 M^2
EST. GFR  (NON AFRICAN AMERICAN): >60 ML/MIN/1.73 M^2
FERRITIN SERPL-MCNC: 90 NG/ML (ref 20–300)
FOLATE SERPL-MCNC: 5.6 NG/ML (ref 4–24)
GLUCOSE SERPL-MCNC: 100 MG/DL (ref 70–110)
HAV IGM SERPL QL IA: NEGATIVE
HBV CORE IGM SERPL QL IA: NEGATIVE
HBV SURFACE AG SERPL QL IA: NEGATIVE
HCT VFR BLD AUTO: 31.1 % (ref 40–54)
HCV AB SERPL QL IA: NEGATIVE
HDLC SERPL-MCNC: 27 MG/DL (ref 40–75)
HDLC SERPL: 16.3 % (ref 20–50)
HGB BLD-MCNC: 9.8 G/DL (ref 14–18)
HIV 1+2 AB+HIV1 P24 AG SERPL QL IA: NEGATIVE
IMM GRANULOCYTES # BLD AUTO: 0.05 K/UL (ref 0–0.04)
IMM GRANULOCYTES NFR BLD AUTO: 0.5 % (ref 0–0.5)
IRON SERPL-MCNC: 18 UG/DL (ref 45–160)
LDLC SERPL CALC-MCNC: 95.4 MG/DL (ref 63–159)
LYMPHOCYTES # BLD AUTO: 2.8 K/UL (ref 1–4.8)
LYMPHOCYTES NFR BLD: 26.2 % (ref 18–48)
MCH RBC QN AUTO: 23.2 PG (ref 27–31)
MCHC RBC AUTO-ENTMCNC: 31.5 G/DL (ref 32–36)
MCV RBC AUTO: 74 FL (ref 82–98)
MONOCYTES # BLD AUTO: 0.7 K/UL (ref 0.3–1)
MONOCYTES NFR BLD: 6.3 % (ref 4–15)
NEUTROPHILS # BLD AUTO: 6.3 K/UL (ref 1.8–7.7)
NEUTROPHILS NFR BLD: 59.7 % (ref 38–73)
NONHDLC SERPL-MCNC: 139 MG/DL
NRBC BLD-RTO: 0 /100 WBC
PLATELET # BLD AUTO: 775 K/UL (ref 150–350)
PMV BLD AUTO: 9.4 FL (ref 9.2–12.9)
POCT GLUCOSE: 106 MG/DL (ref 70–110)
POCT GLUCOSE: 110 MG/DL (ref 70–110)
POCT GLUCOSE: 77 MG/DL (ref 70–110)
POCT GLUCOSE: 84 MG/DL (ref 70–110)
POTASSIUM SERPL-SCNC: 4 MMOL/L (ref 3.5–5.1)
RBC # BLD AUTO: 4.22 M/UL (ref 4.6–6.2)
SARS-COV-2 RDRP RESP QL NAA+PROBE: NEGATIVE
SATURATED IRON: 7 % (ref 20–50)
SODIUM SERPL-SCNC: 137 MMOL/L (ref 136–145)
TOTAL IRON BINDING CAPACITY: 255 UG/DL (ref 250–450)
TRANSFERRIN SERPL-MCNC: 172 MG/DL (ref 200–375)
TRIGL SERPL-MCNC: 218 MG/DL (ref 30–150)
VIT B12 SERPL-MCNC: 418 PG/ML (ref 210–950)
WBC # BLD AUTO: 10.57 K/UL (ref 3.9–12.7)

## 2020-07-07 PROCEDURE — 63600175 PHARM REV CODE 636 W HCPCS: Performed by: STUDENT IN AN ORGANIZED HEALTH CARE EDUCATION/TRAINING PROGRAM

## 2020-07-07 PROCEDURE — 80074 ACUTE HEPATITIS PANEL: CPT

## 2020-07-07 PROCEDURE — 86703 HIV-1/HIV-2 1 RESULT ANTBDY: CPT

## 2020-07-07 PROCEDURE — 96376 TX/PRO/DX INJ SAME DRUG ADON: CPT | Performed by: EMERGENCY MEDICINE

## 2020-07-07 PROCEDURE — 99214 PR OFFICE/OUTPT VISIT, EST, LEVL IV, 30-39 MIN: ICD-10-PCS | Mod: ,,, | Performed by: STUDENT IN AN ORGANIZED HEALTH CARE EDUCATION/TRAINING PROGRAM

## 2020-07-07 PROCEDURE — U0002 COVID-19 LAB TEST NON-CDC: HCPCS

## 2020-07-07 PROCEDURE — 99214 OFFICE O/P EST MOD 30 MIN: CPT | Mod: ,,, | Performed by: STUDENT IN AN ORGANIZED HEALTH CARE EDUCATION/TRAINING PROGRAM

## 2020-07-07 PROCEDURE — 25000003 PHARM REV CODE 250: Performed by: STUDENT IN AN ORGANIZED HEALTH CARE EDUCATION/TRAINING PROGRAM

## 2020-07-07 PROCEDURE — 85025 COMPLETE CBC W/AUTO DIFF WBC: CPT

## 2020-07-07 PROCEDURE — 85652 RBC SED RATE AUTOMATED: CPT

## 2020-07-07 PROCEDURE — 96365 THER/PROPH/DIAG IV INF INIT: CPT | Performed by: EMERGENCY MEDICINE

## 2020-07-07 PROCEDURE — 11000001 HC ACUTE MED/SURG PRIVATE ROOM

## 2020-07-07 PROCEDURE — 80048 BASIC METABOLIC PNL TOTAL CA: CPT

## 2020-07-07 RX ORDER — ENOXAPARIN SODIUM 100 MG/ML
40 INJECTION SUBCUTANEOUS EVERY 24 HOURS
Status: DISCONTINUED | OUTPATIENT
Start: 2020-07-07 | End: 2020-07-16 | Stop reason: HOSPADM

## 2020-07-07 RX ORDER — TALC
9 POWDER (GRAM) TOPICAL NIGHTLY
Status: DISCONTINUED | OUTPATIENT
Start: 2020-07-07 | End: 2020-07-16 | Stop reason: HOSPADM

## 2020-07-07 RX ORDER — OXYCODONE AND ACETAMINOPHEN 7.5; 325 MG/1; MG/1
1 TABLET ORAL EVERY 6 HOURS PRN
Status: DISCONTINUED | OUTPATIENT
Start: 2020-07-07 | End: 2020-07-16 | Stop reason: HOSPADM

## 2020-07-07 RX ORDER — METOPROLOL TARTRATE 25 MG/1
25 TABLET, FILM COATED ORAL 2 TIMES DAILY
Status: DISCONTINUED | OUTPATIENT
Start: 2020-07-07 | End: 2020-07-16 | Stop reason: HOSPADM

## 2020-07-07 RX ORDER — SODIUM CHLORIDE 0.9 % (FLUSH) 0.9 %
10 SYRINGE (ML) INJECTION
Status: DISCONTINUED | OUTPATIENT
Start: 2020-07-07 | End: 2020-07-16 | Stop reason: HOSPADM

## 2020-07-07 RX ORDER — GABAPENTIN 300 MG/1
300 CAPSULE ORAL 2 TIMES DAILY
Status: DISCONTINUED | OUTPATIENT
Start: 2020-07-07 | End: 2020-07-16 | Stop reason: HOSPADM

## 2020-07-07 RX ORDER — CYCLOBENZAPRINE HCL 10 MG
10 TABLET ORAL 3 TIMES DAILY PRN
Status: DISCONTINUED | OUTPATIENT
Start: 2020-07-07 | End: 2020-07-16 | Stop reason: HOSPADM

## 2020-07-07 RX ORDER — IBUPROFEN 200 MG
24 TABLET ORAL
Status: DISCONTINUED | OUTPATIENT
Start: 2020-07-07 | End: 2020-07-16 | Stop reason: HOSPADM

## 2020-07-07 RX ORDER — INSULIN ASPART 100 [IU]/ML
1-10 INJECTION, SOLUTION INTRAVENOUS; SUBCUTANEOUS
Status: DISCONTINUED | OUTPATIENT
Start: 2020-07-07 | End: 2020-07-16 | Stop reason: HOSPADM

## 2020-07-07 RX ORDER — ERTAPENEM 1 G/1
1 INJECTION, POWDER, LYOPHILIZED, FOR SOLUTION INTRAMUSCULAR; INTRAVENOUS
Status: DISCONTINUED | OUTPATIENT
Start: 2020-07-07 | End: 2020-07-07

## 2020-07-07 RX ORDER — ASPIRIN 81 MG/1
81 TABLET ORAL DAILY
Status: DISCONTINUED | OUTPATIENT
Start: 2020-07-07 | End: 2020-07-16 | Stop reason: HOSPADM

## 2020-07-07 RX ORDER — DOCUSATE SODIUM 100 MG/1
100 CAPSULE, LIQUID FILLED ORAL 2 TIMES DAILY
Status: DISCONTINUED | OUTPATIENT
Start: 2020-07-07 | End: 2020-07-16 | Stop reason: HOSPADM

## 2020-07-07 RX ORDER — IBUPROFEN 200 MG
16 TABLET ORAL
Status: DISCONTINUED | OUTPATIENT
Start: 2020-07-07 | End: 2020-07-16 | Stop reason: HOSPADM

## 2020-07-07 RX ORDER — GLUCAGON 1 MG
1 KIT INJECTION
Status: DISCONTINUED | OUTPATIENT
Start: 2020-07-07 | End: 2020-07-16 | Stop reason: HOSPADM

## 2020-07-07 RX ORDER — BACLOFEN 5 MG/1
10 TABLET ORAL EVERY 6 HOURS PRN
Status: DISCONTINUED | OUTPATIENT
Start: 2020-07-07 | End: 2020-07-16 | Stop reason: HOSPADM

## 2020-07-07 RX ORDER — GEMFIBROZIL 600 MG/1
600 TABLET, FILM COATED ORAL
Status: DISCONTINUED | OUTPATIENT
Start: 2020-07-07 | End: 2020-07-16 | Stop reason: HOSPADM

## 2020-07-07 RX ADMIN — GEMFIBROZIL 600 MG: 600 TABLET ORAL at 03:07

## 2020-07-07 RX ADMIN — ASPIRIN 81 MG: 81 TABLET, COATED ORAL at 08:07

## 2020-07-07 RX ADMIN — GABAPENTIN 300 MG: 300 CAPSULE ORAL at 08:07

## 2020-07-07 RX ADMIN — METOPROLOL TARTRATE 25 MG: 25 TABLET, FILM COATED ORAL at 08:07

## 2020-07-07 RX ADMIN — DOCUSATE SODIUM 100 MG: 100 CAPSULE, LIQUID FILLED ORAL at 10:07

## 2020-07-07 RX ADMIN — MEROPENEM 1 G: 1 INJECTION, POWDER, FOR SOLUTION INTRAVENOUS at 01:07

## 2020-07-07 RX ADMIN — MEROPENEM 1 G: 1 INJECTION, POWDER, FOR SOLUTION INTRAVENOUS at 11:07

## 2020-07-07 RX ADMIN — Medication 9 MG: at 10:07

## 2020-07-07 RX ADMIN — GEMFIBROZIL 600 MG: 600 TABLET ORAL at 06:07

## 2020-07-07 RX ADMIN — GABAPENTIN 300 MG: 300 CAPSULE ORAL at 10:07

## 2020-07-07 RX ADMIN — ENOXAPARIN SODIUM 40 MG: 100 INJECTION SUBCUTANEOUS at 05:07

## 2020-07-07 RX ADMIN — DOCUSATE SODIUM 100 MG: 100 CAPSULE, LIQUID FILLED ORAL at 08:07

## 2020-07-07 RX ADMIN — OXYCODONE AND ACETAMINOPHEN 1 TABLET: 7.5; 325 TABLET ORAL at 05:07

## 2020-07-07 RX ADMIN — ERTAPENEM 1 G: 1 INJECTION INTRAMUSCULAR; INTRAVENOUS at 05:07

## 2020-07-07 RX ADMIN — METOPROLOL TARTRATE 25 MG: 25 TABLET, FILM COATED ORAL at 10:07

## 2020-07-07 RX ADMIN — OXYCODONE AND ACETAMINOPHEN 1 TABLET: 7.5; 325 TABLET ORAL at 03:07

## 2020-07-07 RX ADMIN — Medication 9 MG: at 01:07
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
## 2020-07-07 NOTE — PLAN OF CARE
Patient AAOx3  Paraplegic and bed bound  Discussed help at home  Lives at home with friend and has PCA sitters 43 hours/week.    Has s/p sage catheter and gets changed monthly.    Has done home IV antibiotics before in the past. Current with [facility]   Has used [facility] IV abx.    Discussed SNF vs home with home health patient does NOT want SNF    Discussed [facility] Home program- and patient agreeable- placed referral    This  put name on white board and explained blue discharge folder to patient. Discharge planning brochure and/or business card given to patient.  Patient verbalized understanding.    Has hospital bed with air mattress. Spoke to charge nurse to order air mattress for patient.       07/07/20 1016   Discharge Assessment   Assessment Type Discharge Planning Assessment   Confirmed/corrected address and phone number on facesheet? Yes   Assessment information obtained from? Patient   Communicated expected length of stay with patient/caregiver yes   Prior to hospitilization cognitive status: Alert/Oriented   Prior to hospitalization functional status: Wheelchair Bound   Current cognitive status: Alert/Oriented   Current Functional Status: Wheelchair Bound   Lives With friend(s)   Able to Return to Prior Arrangements yes   Patient's perception of discharge disposition home or selfcare;home health   Readmission Within the Last 30 Days no previous admission in last 30 days   Patient currently being followed by outpatient case management? No   Patient currently receives any other outside agency services? Yes   Name and contact number of agency or person providing outside services Has PCA 43 hours/week- also uses [facility]   Is it the patient/care giver preference to resume care with the current outside agency? Yes   Equipment Currently Used at Home wheelchair;hospital bed;lift device   Do you have any problems affording any of your prescribed medications? No   Is the patient taking  medications as prescribed? yes   Does the patient have transportation home? No   Discharge Plan A Home;Home Health   Discharge Plan B Skilled Nursing Facility   DME Needed Upon Discharge  none   Patient/Family in Agreement with Plan yes       Michelle Fontana, RN, CCM, CMSRN  RN Transition Navigator  [phone]

## 2020-07-07 NOTE — ED NOTES
Repot received from ALFONSO Arana. Assumed care of pt. Resting in stretcher in no acute distress. Will continue to monitor.
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
## 2020-07-07 NOTE — PLAN OF CARE
VN note: Patient's chart, progress notes, and care plan reviewed.     VN unable to cue in to complete admission questions. Monitor offline. VN notified beside nurse.
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
## 2020-07-07 NOTE — PLAN OF CARE
07/07/20 1015   EMERY Message   Medicare Outpatient and Observation Notification regarding financial responsibility Given to patient/caregiver;Explained to patient/caregiver;Signed/date by patient/caregiver   Date EMERY was signed 07/07/20   Time EMERY was signed 1014
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
## 2020-07-07 NOTE — ED NOTES
Pt presents stating his PCP told him his urine culture was positive and was given antibiotics and is taking them. Cannot verbalize why he came to ED. Pt has indwelling Huff catheter.  Patient identifiers verified by spelling and stated name on armband along with .   APPEARANCE: Alert, oriented and in no acute distress.  CARDIAC: Normal rate and rhythm, no murmur heard.   PERIPHERAL VASCULAR: peripheral pulses present. Normal cap refill. No edema. Warm to touch.    RESPIRATORY:Normal rate and effort, breath sounds clear bilaterally throughout chest. Respirations are equal and unlabored no obvious signs of distress.  GASTRO: soft, bowel sounds normal, no tenderness, no abdominal distention.  MUSC: Full ROM. No bony tenderness or soft tissue tenderness. No obvious deformity.  SKIN: Skin is warm and dry, normal skin turgor, mucous membranes moist.  NEURO: 5/5 strength major flexors/extensors bilaterally. Sensory intact to light touch bilaterally. Sharon coma scale: eyes open spontaneously-4, oriented & converses-5, obeys commands-6. No neurological abnormalities.   MENTAL STATUS: awake, alert and aware of environment.  EYE: PERRL, both eyes: pupils brisk and reactive to light. Normal size.  ENT: EARS: no obvious drainage. NOSE: no active bleeding.   BREAST: symmetrical. No masses. No tenderness.  GENITALIA: Normal external genitalia.   Patient verbalized understanding of status and plan of care. Patient changed into hospital gown   Patient side rails are up x 2, bed is low and locked, call light is in reachWill continue to monitor.
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
## 2020-07-07 NOTE — ASSESSMENT & PLAN NOTE
- Per patient, SPT was already exchanged in ED. Obtain urine culture from new SPT. Antibiotics per ID.  - Patient can follow-up in clinic in 4 weeks for SPT change, will upsize to 20 fr at that time. Will have our staff schedule appt.

## 2020-07-07 NOTE — PLAN OF CARE
Bedside nurse will assist in completion of admission questions since VN is unable to cue in.     V[employer] Camera not working in room. Bedside nurse aware. IS work ticket opened (#[id number]).

## 2020-07-07 NOTE — ED NOTES
Pt turned an readjusted in bed. Personal items placed at bedside. Brief is clean/dry.  EMS sheet removed. Pillows placed on pt's bed under lower extremity for comfort. Pt denies any needs at this time. Call light within reach.

## 2020-07-07 NOTE — PLAN OF CARE
Plan of care reviewed with patient. Patient voiced understanding. No acute distress noted. Side rails x 2, bed in lowest position, call bell within reach, pt advised to call for assistance. Maintain bed alarm for patient safety.

## 2020-07-08 ENCOUNTER — CLINICAL SUPPORT (OUTPATIENT)
Dept: SMOKING CESSATION | Facility: CLINIC | Age: 45
End: 2020-07-08
Payer: COMMERCIAL

## 2020-07-08 DIAGNOSIS — F17.210 CIGARETTE SMOKER: Primary | ICD-10-CM

## 2020-07-08 LAB
ANION GAP SERPL CALC-SCNC: 10 MMOL/L (ref 8–16)
BASOPHILS # BLD AUTO: 0.07 K/UL (ref 0–0.2)
BASOPHILS NFR BLD: 0.9 % (ref 0–1.9)
BUN SERPL-MCNC: 8 MG/DL (ref 6–20)
CALCIUM SERPL-MCNC: 9 MG/DL (ref 8.7–10.5)
CHLORIDE SERPL-SCNC: 109 MMOL/L (ref 95–110)
CO2 SERPL-SCNC: 19 MMOL/L (ref 23–29)
CREAT SERPL-MCNC: 0.8 MG/DL (ref 0.5–1.4)
DIFFERENTIAL METHOD: ABNORMAL
EOSINOPHIL # BLD AUTO: 0.7 K/UL (ref 0–0.5)
EOSINOPHIL NFR BLD: 8.7 % (ref 0–8)
ERYTHROCYTE [DISTWIDTH] IN BLOOD BY AUTOMATED COUNT: 18.1 % (ref 11.5–14.5)
EST. GFR  (AFRICAN AMERICAN): >60 ML/MIN/1.73 M^2
EST. GFR  (NON AFRICAN AMERICAN): >60 ML/MIN/1.73 M^2
GLUCOSE SERPL-MCNC: 94 MG/DL (ref 70–110)
HCT VFR BLD AUTO: 29.6 % (ref 40–54)
HGB BLD-MCNC: 9.5 G/DL (ref 14–18)
IMM GRANULOCYTES # BLD AUTO: 0.09 K/UL (ref 0–0.04)
IMM GRANULOCYTES NFR BLD AUTO: 1.2 % (ref 0–0.5)
LYMPHOCYTES # BLD AUTO: 2.1 K/UL (ref 1–4.8)
LYMPHOCYTES NFR BLD: 26.3 % (ref 18–48)
MCH RBC QN AUTO: 23.3 PG (ref 27–31)
MCHC RBC AUTO-ENTMCNC: 32.1 G/DL (ref 32–36)
MCV RBC AUTO: 73 FL (ref 82–98)
MONOCYTES # BLD AUTO: 0.5 K/UL (ref 0.3–1)
MONOCYTES NFR BLD: 6.8 % (ref 4–15)
NEUTROPHILS # BLD AUTO: 4.4 K/UL (ref 1.8–7.7)
NEUTROPHILS NFR BLD: 56.1 % (ref 38–73)
NRBC BLD-RTO: 0 /100 WBC
PLATELET # BLD AUTO: 698 K/UL (ref 150–350)
PMV BLD AUTO: 10 FL (ref 9.2–12.9)
POCT GLUCOSE: 112 MG/DL (ref 70–110)
POCT GLUCOSE: 114 MG/DL (ref 70–110)
POCT GLUCOSE: 141 MG/DL (ref 70–110)
POCT GLUCOSE: 159 MG/DL (ref 70–110)
POTASSIUM SERPL-SCNC: 5.1 MMOL/L (ref 3.5–5.1)
RBC # BLD AUTO: 4.07 M/UL (ref 4.6–6.2)
SODIUM SERPL-SCNC: 138 MMOL/L (ref 136–145)
WBC # BLD AUTO: 7.82 K/UL (ref 3.9–12.7)

## 2020-07-08 PROCEDURE — 85025 COMPLETE CBC W/AUTO DIFF WBC: CPT

## 2020-07-08 PROCEDURE — 25000003 PHARM REV CODE 250: Performed by: STUDENT IN AN ORGANIZED HEALTH CARE EDUCATION/TRAINING PROGRAM

## 2020-07-08 PROCEDURE — 99407 BEHAV CHNG SMOKING > 10 MIN: CPT | Mod: S$GLB,,,

## 2020-07-08 PROCEDURE — 11000001 HC ACUTE MED/SURG PRIVATE ROOM

## 2020-07-08 PROCEDURE — 63600175 PHARM REV CODE 636 W HCPCS: Performed by: STUDENT IN AN ORGANIZED HEALTH CARE EDUCATION/TRAINING PROGRAM

## 2020-07-08 PROCEDURE — 97802 MEDICAL NUTRITION INDIV IN: CPT

## 2020-07-08 PROCEDURE — 80048 BASIC METABOLIC PNL TOTAL CA: CPT

## 2020-07-08 PROCEDURE — 99407 PR TOBACCO USE CESSATION INTENSIVE >10 MINUTES: ICD-10-PCS | Mod: S$GLB,,,

## 2020-07-08 PROCEDURE — 87086 URINE CULTURE/COLONY COUNT: CPT

## 2020-07-08 PROCEDURE — 36415 COLL VENOUS BLD VENIPUNCTURE: CPT

## 2020-07-08 RX ORDER — AMMONIUM LACTATE 12 G/100G
LOTION TOPICAL 2 TIMES DAILY PRN
Status: DISCONTINUED | OUTPATIENT
Start: 2020-07-08 | End: 2020-07-16 | Stop reason: HOSPADM

## 2020-07-08 RX ADMIN — ASPIRIN 81 MG: 81 TABLET, COATED ORAL at 08:07

## 2020-07-08 RX ADMIN — Medication 9 MG: at 09:07

## 2020-07-08 RX ADMIN — METOPROLOL TARTRATE 25 MG: 25 TABLET, FILM COATED ORAL at 08:07

## 2020-07-08 RX ADMIN — GEMFIBROZIL 600 MG: 600 TABLET ORAL at 06:07

## 2020-07-08 RX ADMIN — OXYCODONE AND ACETAMINOPHEN 1 TABLET: 7.5; 325 TABLET ORAL at 05:07

## 2020-07-08 RX ADMIN — DOCUSATE SODIUM 100 MG: 100 CAPSULE, LIQUID FILLED ORAL at 09:07

## 2020-07-08 RX ADMIN — GABAPENTIN 300 MG: 300 CAPSULE ORAL at 09:07

## 2020-07-08 RX ADMIN — METOPROLOL TARTRATE 25 MG: 25 TABLET, FILM COATED ORAL at 09:07

## 2020-07-08 RX ADMIN — ERTAPENEM 1 G: 1 INJECTION INTRAMUSCULAR; INTRAVENOUS at 06:07

## 2020-07-08 RX ADMIN — DOCUSATE SODIUM 100 MG: 100 CAPSULE, LIQUID FILLED ORAL at 08:07

## 2020-07-08 RX ADMIN — GABAPENTIN 300 MG: 300 CAPSULE ORAL at 08:07

## 2020-07-08 RX ADMIN — ENOXAPARIN SODIUM 40 MG: 100 INJECTION SUBCUTANEOUS at 04:07

## 2020-07-08 RX ADMIN — INSULIN ASPART 2 UNITS: 100 INJECTION, SOLUTION INTRAVENOUS; SUBCUTANEOUS at 01:07

## 2020-07-08 RX ADMIN — GEMFIBROZIL 600 MG: 600 TABLET ORAL at 04:07

## 2020-07-08 NOTE — PLAN OF CARE
Recommendation:   1. Add beneprotein tid and Nelson bid to promote wound healing.   2. Encourage good intake at meals.   3. RD to follow to monitor po intake    Goals:   Pt will tolerate po diet with at least 75% intake at meals  Nutrition Goal Status: new  Communication of RD Recs: reviewed with RN(Laurel)

## 2020-07-08 NOTE — PLAN OF CARE
Safety maintained, bed alarm on and call bell in reach.Dressings to wounds clean/dry/intact. Plan of care reviewed, Questions answered.Continue on antibiotic therapy. Denies any pain at present. Supra-pubic catheter intact draining yellow urine. Blood glucose monitored as ordered. Accucheck blood glucose at bedtime 77mg/dl. Will continue to monitor.

## 2020-07-08 NOTE — PLAN OF CARE
Rounded on patient.  Patient sleeping at this time    MRI pending    Per MD team, patient will need home IV abx either 2 or 6 weeks.  Called [facility] and spoke to alfredo  and asked if patient did his own home IV abx last time. She said her notes say that patient did return demonstration well- needs to be encouraged to flush PICC line and heparinize PICC line. Will discuss with patient.     Sent info to [facility] and [employer] (last infusion company)- will have IV infusion nurse to reteach upon discharge.    Sent referral to At Home NP program      Has wound vac     07/08/20 1019   Discharge Reassessment   Assessment Type Discharge Planning Reassessment   Do you have any problems affording any of your prescribed medications? No   Discharge Plan A Home;[facility]     Michelle Fontana, RN, CCM, CMSRN  RN Transition Navigator  [phone]

## 2020-07-08 NOTE — CONSULTS
Dr. LORAINE Pereira notified of assessment of wounds present on admission, wound care orders given. Will resume wound vac to right Isch Tub wound on Friday.

## 2020-07-08 NOTE — PROGRESS NOTES
U Infectious Diseases Resident [facility] Progress Note    Assessment/Plan:     ESBL Proteus UTI, h/o MDR UTIs  Recent UCx with ESBL Proteus. Suprapubic catheter exchanged in ED per patient.  - Recommend switching to Ertapenem for qd dosing (need to complete 14 day due to complicated UTI, end date: 2020).     Recent right ischial osteomyelitis  Completed course of outpatient Ertapenem x6 weeks, end ~ . Patient without follow up since.  - Inflammatory markers suggestive of ongoing infection  - Recommend repeat MRI   - Consider surgery consult for flap evaluation     Thank you for allowing us to participate in the care of this patient. We will continue to follow along. Please contact me if you have any questions regarding this consult.    Darleen Fletcher  U Internal Medicine [facility]  U Infectious Diseases     Thank you for allowing us to participate in the care of this patient. We will continue to follow along. Case has been discussed with consult staff, who is in agreement with assessment and plan.     Subjective:      Hermann Aguilar is a 44 y.o. male who is being followed by the [facility] Infectious Diseases service at [facility] for ESBL Proteus UTI and concern for ongoing sacral OM.     Patient resting comfortably this morning. Denies any problems or complaints. Not very talkative.     Objective:   Last 24 Hour Vital Signs:  BP  Min: 96/57  Max: 117/68  Temp  Av.1 °F (36.2 °C)  Min: 96 °F (35.6 °C)  Max: 98.1 °F (36.7 °C)  Pulse  Av.7  Min: 61  Max: 80  Resp  Av.4  Min: 16  Max: 20  SpO2  Av.5 %  Min: 94 %  Max: 100 %  Weight  Av.4 kg (192 lb 10.9 oz)  Min: 87.4 kg (192 lb 10.9 oz)  Max: 87.4 kg (192 lb 10.9 oz)  I/O last 3 completed shifts:  In: 120 [P.O.:120]  Out: 1000 [Urine:1000]    Physical Examination:  General: awake and alert, in no apparent distress, non-toxic appearing  Head: NC/AT  Eyes: PERRL, EOMI, no scleral icterus  Throat: MMM, no oropharyngeal  exudate or erythema  CV: normal rate, regular rhythm, no mgr noted  Resp :CTAB, no crackles or whezes, comfortable on room air   Abdomen: soft, NTND, +BS, suprapubic catheter in place   Extremities: R wound vac in place over R ischial wound, good suction, no surrounding erythema or drainage  Skin: dry and flaky over LEs, otherwise no rashes/lesions/bruising  Neuro: AAOx3, GCS 15, no focal deficits     Laboratory:  Laboratory Data Reviewed: yes  Pertinent Findings:  Recent Labs   Lab 07/06/20  2230 07/07/20  0515   WBC 10.37 10.57   HGB 9.5* 9.8*   HCT 30.4* 31.1*   * 775*   MCV 75* 74*   RDW 18.0* 18.5*    137   K 3.6 4.0    109   CO2 23 21*   BUN 7 7   CREATININE 1.0 0.8    100   PROT 8.3  --    ALBUMIN 2.7*  --    BILITOT 0.1  --    AST 8*  --    ALKPHOS 136*  --    ALT 6*  --          Microbiology Data Reviewed: yes  Pertinent Findings:  (Prior to admission) UCx: ESBL Proteus    Other Results:  Radiology Data Reviewed: yes  Pertinent Findings:  No new.    Current Medications:     Infusions:       Scheduled:   aspirin  81 mg Oral Daily    docusate sodium  100 mg Oral BID    enoxaparin  40 mg Subcutaneous Q24H    ertapenem (INVANZ) IVPB  1 g Intravenous Q24H    gabapentin  300 mg Oral BID    gemfibroziL  600 mg Oral BID AC    melatonin  9 mg Oral Nightly    metoprolol tartrate  25 mg Oral BID        PRN:  baclofen, cyclobenzaprine, dextrose 50%, dextrose 50%, glucagon (human recombinant), glucose, glucose, insulin aspart U-100, oxyCODONE-acetaminophen, sodium chloride 0.9%    Antibiotics and Day Number of Therapy:    Ertapenem (7/7 - current)    Lines and Day Number of Therapy:  PIV  Suprapubic catheter

## 2020-07-08 NOTE — PROGRESS NOTES
Individual Follow-Up Form    7/8/2020    Quit Date: To be determined    Clinical Status of Patient: Inpatient    Length of Service: 30 minutes    Comments: Smoking cessation education provided. Pt denies nicotine withdrawal symptoms stating that he is cutting down on his own and does not even purchase cigarettes any longer. Pt is currently enrolled in the [employer].      Diagnosis: F17.210    Next Visit: Ambulatory referral to Smoking Cessation program following hospital discharge.

## 2020-07-08 NOTE — PLAN OF CARE
VN rounds: VN cued into pt's room with pt's permission. Pt resting in bed. VN instructed pt to call for assistance. Pt aware and agreeable. Allowed time for questions. Denies pain. NAD noted. Will cont to be available as needed.
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
## 2020-07-08 NOTE — PROGRESS NOTES
"[facility]  Adult Nutrition  Progress Note    SUMMARY       Recommendations    Recommendation:   1. Add beneprotein tid and Nelson bid to promote wound healing.   2. Encourage good intake at meals.   3. RD to follow to monitor po intake    Goals:   Pt will tolerate po diet with at least 75% intake at meals  Nutrition Goal Status: new  Communication of RD Recs: reviewed with RN(Laurel)    Reason for Assessment  Reason For Assessment: identified at risk by screening criteria(wound)  Diagnosis: (complicated UTI)  Relevant Medical History: trach, HTN, DM, PEG, ORIF L humerus, R BKA  General Information Comments: Pt on 2000 ADA diet. RN reports pt with good intake at meals. Unable to assess NFPE-pt on isolation.  Nutrition Discharge Planning: pt to d/c on ADA diet    Nutrition Risk Screen  Nutrition Risk Screen: large or nonhealing wound, burn or pressure injury    Nutrition/Diet History  Food Preferences: no Gnosticist or cultural food prefs identified  Factors Affecting Nutritional Intake: None identified at this time    Anthropometrics  Temp: 97.5 °F (36.4 °C)  Height Method: Stated  Height: 5' 11" (180.3 cm)  Height (inches): 71 in  Weight Method: Bed Scale  Weight: 87.4 kg (192 lb 10.9 oz)  Weight (lb): 192.68 lb  Ideal Body Weight (IBW), Male: 172 lb  % Ideal Body Weight, Male (lb): 112.02 %  BMI (Calculated): 26.9  Amputation %: 5.9  Total Amputation %: 5.9  Amputation Ideal Body Weight (IBW), Male (lb): 166.1 lb  Amputee BMI (kg/m2): 28.64 kg/m2     Lab/Procedures/Meds  Pertinent Labs Reviewed: reviewed  Pertinent Labs Comments: Alb 2.7L  Pertinent Medications Reviewed: reviewed  Pertinent Medications Comments: aspirin, lopressor    Estimated/Assessed Needs  Weight Used For Calorie Calculations: 87.4 kg (192 lb 10.9 oz)  Energy Calorie Requirements (kcal): 2467 (30 kcal/kg with ampuation %)  Energy Need Method: Kcal/kg  Protein Requirements: 106g (1.3g/kg with amputation %)'  Weight Used For " Protein Calculations: 87.4 kg (192 lb 10.9 oz)  Estimated Fluid Requirement Method: RDA Method  RDA Method (mL): 2467  CHO Requirement: 270g    Nutrition Prescription Ordered  Current Diet Order: 2000 ADA    Evaluation of Received Nutrient/Fluid Intake  I/O: -880  Energy Calories Required: meeting needs  Protein Required: meeting needs  Fluid Required: meeting needs  Comments: LBM 7/6  % Intake of Estimated Energy Needs: 50 - 75 %  % Meal Intake: 50 - 75 %    Nutrition Risk  Level of Risk/Frequency of Follow-up: (2xweekly)     Assessment and Plan  Nutrition Problem  Increased energy needs    Related to (etiology):   Wound healing    Signs and Symptoms (as evidenced by):   Wounds    Interventions:  Collaboration with other providers  Commercial beverage  Increased protein diet    Nutrition Diagnosis Status:   New     Monitor and Evaluation  Food and Nutrient Intake: food and beverage intake  Food and Nutrient Adminstration: diet order  Physical Activity and Function: nutrition-related ADLs and IADLs  Anthropometric Measurements: weight  Biochemical Data, Medical Tests and Procedures: electrolyte and renal panel  Nutrition-Focused Physical Findings: overall appearance     Malnutrition Assessment  Unable to assess NFPE -pt on isolation        Nutrition Follow-Up  RD Follow-up?: Yes

## 2020-07-08 NOTE — PLAN OF CARE
VN note: Patient's chart, progress notes, and care plan reviewed. Will be available as needed.

## 2020-07-08 NOTE — PLAN OF CARE
VN rounds: VN attempted to cue into pt's room. V[employer] Camera still not working. Work order placed yesterday.

## 2020-07-09 LAB
ANION GAP SERPL CALC-SCNC: 10 MMOL/L (ref 8–16)
BACTERIA UR CULT: NO GROWTH
BASOPHILS # BLD AUTO: 0.07 K/UL (ref 0–0.2)
BASOPHILS NFR BLD: 0.9 % (ref 0–1.9)
BUN SERPL-MCNC: 7 MG/DL (ref 6–20)
CALCIUM SERPL-MCNC: 9.3 MG/DL (ref 8.7–10.5)
CHLORIDE SERPL-SCNC: 106 MMOL/L (ref 95–110)
CO2 SERPL-SCNC: 20 MMOL/L (ref 23–29)
CREAT SERPL-MCNC: 0.7 MG/DL (ref 0.5–1.4)
DIFFERENTIAL METHOD: ABNORMAL
EOSINOPHIL # BLD AUTO: 0.7 K/UL (ref 0–0.5)
EOSINOPHIL NFR BLD: 8.9 % (ref 0–8)
ERYTHROCYTE [DISTWIDTH] IN BLOOD BY AUTOMATED COUNT: 18 % (ref 11.5–14.5)
EST. GFR  (AFRICAN AMERICAN): >60 ML/MIN/1.73 M^2
EST. GFR  (NON AFRICAN AMERICAN): >60 ML/MIN/1.73 M^2
GLUCOSE SERPL-MCNC: 85 MG/DL (ref 70–110)
HCT VFR BLD AUTO: 30.5 % (ref 40–54)
HGB BLD-MCNC: 9.9 G/DL (ref 14–18)
IMM GRANULOCYTES # BLD AUTO: 0.11 K/UL (ref 0–0.04)
IMM GRANULOCYTES NFR BLD AUTO: 1.4 % (ref 0–0.5)
LYMPHOCYTES # BLD AUTO: 2.2 K/UL (ref 1–4.8)
LYMPHOCYTES NFR BLD: 27.8 % (ref 18–48)
MCH RBC QN AUTO: 23.5 PG (ref 27–31)
MCHC RBC AUTO-ENTMCNC: 32.5 G/DL (ref 32–36)
MCV RBC AUTO: 72 FL (ref 82–98)
MONOCYTES # BLD AUTO: 0.5 K/UL (ref 0.3–1)
MONOCYTES NFR BLD: 6.9 % (ref 4–15)
NEUTROPHILS # BLD AUTO: 4.2 K/UL (ref 1.8–7.7)
NEUTROPHILS NFR BLD: 54.1 % (ref 38–73)
NRBC BLD-RTO: 0 /100 WBC
PLATELET # BLD AUTO: 754 K/UL (ref 150–350)
PMV BLD AUTO: 10 FL (ref 9.2–12.9)
POCT GLUCOSE: 110 MG/DL (ref 70–110)
POCT GLUCOSE: 74 MG/DL (ref 70–110)
POCT GLUCOSE: 90 MG/DL (ref 70–110)
POCT GLUCOSE: 91 MG/DL (ref 70–110)
POTASSIUM SERPL-SCNC: 4.9 MMOL/L (ref 3.5–5.1)
RBC # BLD AUTO: 4.22 M/UL (ref 4.6–6.2)
SODIUM SERPL-SCNC: 136 MMOL/L (ref 136–145)
WBC # BLD AUTO: 7.78 K/UL (ref 3.9–12.7)

## 2020-07-09 PROCEDURE — 11000001 HC ACUTE MED/SURG PRIVATE ROOM

## 2020-07-09 PROCEDURE — 80048 BASIC METABOLIC PNL TOTAL CA: CPT

## 2020-07-09 PROCEDURE — 85025 COMPLETE CBC W/AUTO DIFF WBC: CPT

## 2020-07-09 PROCEDURE — 36415 COLL VENOUS BLD VENIPUNCTURE: CPT

## 2020-07-09 PROCEDURE — 63600175 PHARM REV CODE 636 W HCPCS: Performed by: STUDENT IN AN ORGANIZED HEALTH CARE EDUCATION/TRAINING PROGRAM

## 2020-07-09 PROCEDURE — 25000003 PHARM REV CODE 250: Performed by: STUDENT IN AN ORGANIZED HEALTH CARE EDUCATION/TRAINING PROGRAM

## 2020-07-09 RX ADMIN — DOCUSATE SODIUM 100 MG: 100 CAPSULE, LIQUID FILLED ORAL at 09:07

## 2020-07-09 RX ADMIN — Medication 9 MG: at 09:07

## 2020-07-09 RX ADMIN — ASPIRIN 81 MG: 81 TABLET, COATED ORAL at 09:07

## 2020-07-09 RX ADMIN — GEMFIBROZIL 600 MG: 600 TABLET ORAL at 03:07

## 2020-07-09 RX ADMIN — ENOXAPARIN SODIUM 40 MG: 100 INJECTION SUBCUTANEOUS at 06:07

## 2020-07-09 RX ADMIN — GABAPENTIN 300 MG: 300 CAPSULE ORAL at 09:07

## 2020-07-09 RX ADMIN — ERTAPENEM 1 G: 1 INJECTION INTRAMUSCULAR; INTRAVENOUS at 07:07

## 2020-07-09 RX ADMIN — METOPROLOL TARTRATE 25 MG: 25 TABLET, FILM COATED ORAL at 09:07

## 2020-07-09 RX ADMIN — OXYCODONE AND ACETAMINOPHEN 1 TABLET: 7.5; 325 TABLET ORAL at 03:07

## 2020-07-09 RX ADMIN — GEMFIBROZIL 600 MG: 600 TABLET ORAL at 05:07

## 2020-07-09 NOTE — PROGRESS NOTES
"[facility] Internal Medicine Resident Progress Note    Subjective:      Doing well this morning.  Denies abdominal pain, nausea, vomiting, chest pain, or shortness of breath.  MRI was ordered 2 days ago, and we confirmed that he was on the schedule yesterday, but it has still not been done- will investigate today.     Objective:   Last 24 Hour Vital Signs:  BP  Min: 98/63  Max: 122/70  Temp  Av.4 °F (36.3 °C)  Min: 96.8 °F (36 °C)  Max: 98.3 °F (36.8 °C)  Pulse  Av.1  Min: 66  Max: 85  Resp  Av.3  Min: 16  Max: 20  SpO2  Av %  Min: 97 %  Max: 100 %  Height  Av' 11" (180.3 cm)  Min: 5' 11" (180.3 cm)  Max: 5' 11" (180.3 cm)  Weight  Av.4 kg (192 lb 10.9 oz)  Min: 87.4 kg (192 lb 10.9 oz)  Max: 87.4 kg (192 lb 10.9 oz)  I/O last 3 completed shifts:  In: 1070 [P.O.:970; IV Piggyback:100]  Out: 3470 [Urine:3470]    Physical Examination:  Gen: Flat affect. NAD.  HEENT: head normocephalic, atraumatic;trachea midline  Cardiac: regular rate and rhythm; no murmurs  Resp: clear to auscultation bilaterally;  GI: Mild suprapubic tenderness, No evidence of skin breakdown. abdomen soft, non-distended; normoactive bowel sounds, no hepatosplenomegaly.  Extrem: right below knee amputation. No clubbing or swelling noted. No ecchymoses.  Skin: Dry flaky skin on abdomen. Area of skin hypopigmentation on abdomen. No ecchymosis or rashes.  Neuro: strength 5/5 in upper extremities. 0/5 in lower extremities bilaterally; sensation intact to light touch in upper extremities, but no sensation in lower extremities bilaterally.    Laboratory:  Laboratory Data Reviewed:  Trended Lab Data:  Recent Labs   Lab 20  2230 20  0515 20  0829 20  0837   WBC 10.37 10.57  --  7.82   HGB 9.5* 9.8*  --  9.5*   HCT 30.4* 31.1*  --  29.6*   * 775*  --  698*   MCV 75* 74*  --  73*   RDW 18.0* 18.5*  --  18.1*    137 138  --    K 3.6 4.0 5.1  --     109 109  --    CO2 23 21* 19*  --    BUN 7 7 8  " --    CREATININE 1.0 0.8 0.8  --     100 94  --    PROT 8.3  --   --   --    ALBUMIN 2.7*  --   --   --    BILITOT 0.1  --   --   --    AST 8*  --   --   --    ALKPHOS 136*  --   --   --    ALT 6*  --   --   --        Trended Cardiac Data:  No results for input(s): TROPONINI, CKTOTAL, CKMB, BNP in the last 168 hours.    Microbiology Data   Microbiology Results (last 7 days)     Procedure Component Value Units Date/Time    Blood culture #2 **CANNOT BE ORDERED STAT** [[id number]] Collected: 07/06/20 2250    Order Status: Completed Specimen: Blood from Peripheral, Antecubital, Left Updated: 07/09/20 0613     Blood Culture, Routine No Growth to date      No Growth to date      No Growth to date    Blood culture #1 **CANNOT BE ORDERED STAT** [[id number]] Collected: 07/06/20 2230    Order Status: Completed Specimen: Blood from Peripheral, Antecubital, Left Updated: 07/09/20 0613     Blood Culture, Routine No Growth to date      No Growth to date      No Growth to date    Urine Culture High Risk [[id number]] Collected: 07/08/20 0840    Order Status: Sent Specimen: Urine, Catheterized Updated: 07/08/20 1802    Urine Culture High Risk [[id number]]     Order Status: Canceled Specimen: Urine     Blood culture [[id number]]     Order Status: Canceled Specimen: Blood           Radiology:  Imaging Results    None           Current Medications:     Infusions:       Scheduled:   aspirin  81 mg Oral Daily    docusate sodium  100 mg Oral BID    enoxaparin  40 mg Subcutaneous Q24H    ertapenem (INVANZ) IVPB  1 g Intravenous Q24H    gabapentin  300 mg Oral BID    gemfibroziL  600 mg Oral BID AC    melatonin  9 mg Oral Nightly    metoprolol tartrate  25 mg Oral BID        PRN:  ammonium lactate, baclofen, cyclobenzaprine, dextrose 50%, dextrose 50%, glucagon (human recombinant), glucose, glucose, insulin aspart U-100, oxyCODONE-acetaminophen, sodium chloride 0.9%    Antibiotics and Day Number of Therapy:  Ertapenem 7/7 -  present    Lines and Day Number of Therapy:  PIV  Suprapubic catheter    Assessment and Plan   44 y.o. 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 Continuing ertapenem and repeating MRI to further evaluate R ischium     Complicated UTI, History of MDR UTI  - In the ED, temp 98.5 WBC 10.37  - History of proteus ESBL and Serratia  - Urine cultures from outside facility are in media tab.   - F/u blood cultures   - Merrem switched to Ertapenem 7/7 (will need either 2 or 6 weeks of abx depending if he has acute osteomyelitis)  - ID consulted  - Contact precautions for ESBL  - per CM pt does not want to go to SNF for continued abx, agreeable to at home program     Concern for right ischium osteomyelitis   - MRI 5/2/20 showing right ischium soft tissue inflammation concerning for osteomyelitis.   - Repeating MRI, will F/U  - Wound care consulted     Anemia of chronic inflammation  - Initial Hgb 9.5, Baseline 10.  - No signs or symptoms of bleeding  - Repeat iron studies mixed picture - ferritin 90, iron 18, TIBC 255, folate 5.6, vitamin 418     Protein gap   - Protein gap of 6   - HIV/Hep C panel negative  - suspect low albumin resulting from chronic inflammatory processes     HTN:  - Normotensive  - Continue home lopressor 25 BID     Type II DM  - Well controlled; insulin dependent  - Home meds: Levomir 15 BID, Lispro 20 TID with meals, and metformin 500 BID. Pt states he is unsure what he takes.  - SSI      Hyperlipidemia  - Continue home aspirin, gemfibrozil  - Recheck lipid panel     Fluids: None  Electrolytes: Replace as need  Nutrition: Diabetic diet  Prophylaxis: Lovenox     Disposition: Follow up ID recs on home antibiotics, MRI. Can likely be discharged 7/9 vs 7/10 if home antibiotics are set up.     Carmelo  FANNIE Beavers  [facility] Internal Medicine -III  [facility] Hospitalist Medicine Team B    [facility] Medicine Hospitalist Pager numbers:   [facility] Hospitalist Medicine Team A (Farrukh/Valdemar): [phone]  [facility] Hospitalist Medicine Team B (Connie/Mariya):  [phone]

## 2020-07-09 NOTE — PLAN OF CARE
[facility] INFECTIOUS DISEASES PLAN OF CARE    Reviewed patient's chart, no acute events overnight. Patient evaluated by Wound Care. Afebrile, no labs to review this morning.       Recommendations:  ESBL Proteus UTI, h/o MDR UTIs  - Continue Ertapenem qd      Recent right ischial osteomyelitis  - Inflammatory markers suggestive of ongoing infection  - Recommend repeat MRI, will determine duration of tx       Thank you for allowing us to participate in the care of this patient. We will continue to follow along. Please contact me if you have any questions regarding this consult.     Darleen Fletcher MD   [facility] Internal Medicine HO-III  [facility] Infectious Diseases

## 2020-07-09 NOTE — PLAN OF CARE
Problem: Adult Inpatient Plan of Care  Goal: Chart Review  Outcome: Ongoing, Progressing   Patient and VN rounded and discussed various topics including plan of care and medication regimen.  Patient denies any questions or complaints at this time.  All safety measures maintained with the bed locked and in lowest position with alarm on.  The call light and all personal items is within reach. Education given on fall risk and patient verbalizes understanding of call light use for assistance.

## 2020-07-09 NOTE — PLAN OF CARE
Rounded on patient  MRI still pending- called MRI and they stated they should do MRI today    Per nursing from yesterday- patient's home wound vac at bedside but patient does NOT have power chord. Asked patient to call friend or family member to bring his power cord of his home wound vac. Discussed SNF with patient- but patient declined- he stated he was at [facility] for 2 years and does not want to back to a NH. Asked patient to think about it and reconsider. Patient has medicaid PCA sitters 43 hours/week.    Uses [facility]  and [facility] for home IV abx       07/09/20 1200   Discharge Reassessment   Provided patient/caregiver education on the expected discharge date and the discharge plan Yes   Discharge Plan A Home;Home Health   Discharge Plan B Skilled Nursing Facility     Michelle Fontana, RN, CCM, CMSRN  RN Transition Navigator  [phone]

## 2020-07-10 LAB
ANION GAP SERPL CALC-SCNC: 14 MMOL/L (ref 8–16)
BASOPHILS # BLD AUTO: 0.08 K/UL (ref 0–0.2)
BASOPHILS NFR BLD: 1.1 % (ref 0–1.9)
BUN SERPL-MCNC: 9 MG/DL (ref 6–20)
CALCIUM SERPL-MCNC: 9.6 MG/DL (ref 8.7–10.5)
CHLORIDE SERPL-SCNC: 106 MMOL/L (ref 95–110)
CO2 SERPL-SCNC: 19 MMOL/L (ref 23–29)
CREAT SERPL-MCNC: 0.8 MG/DL (ref 0.5–1.4)
DIFFERENTIAL METHOD: ABNORMAL
EOSINOPHIL # BLD AUTO: 0.6 K/UL (ref 0–0.5)
EOSINOPHIL NFR BLD: 8.1 % (ref 0–8)
ERYTHROCYTE [DISTWIDTH] IN BLOOD BY AUTOMATED COUNT: 18.4 % (ref 11.5–14.5)
EST. GFR  (AFRICAN AMERICAN): >60 ML/MIN/1.73 M^2
EST. GFR  (NON AFRICAN AMERICAN): >60 ML/MIN/1.73 M^2
GLUCOSE SERPL-MCNC: 94 MG/DL (ref 70–110)
HCT VFR BLD AUTO: 34.8 % (ref 40–54)
HGB BLD-MCNC: 11.1 G/DL (ref 14–18)
IMM GRANULOCYTES # BLD AUTO: 0.07 K/UL (ref 0–0.04)
IMM GRANULOCYTES NFR BLD AUTO: 0.9 % (ref 0–0.5)
LYMPHOCYTES # BLD AUTO: 2.6 K/UL (ref 1–4.8)
LYMPHOCYTES NFR BLD: 34.5 % (ref 18–48)
MCH RBC QN AUTO: 23.3 PG (ref 27–31)
MCHC RBC AUTO-ENTMCNC: 31.9 G/DL (ref 32–36)
MCV RBC AUTO: 73 FL (ref 82–98)
MONOCYTES # BLD AUTO: 0.7 K/UL (ref 0.3–1)
MONOCYTES NFR BLD: 8.9 % (ref 4–15)
NEUTROPHILS # BLD AUTO: 3.5 K/UL (ref 1.8–7.7)
NEUTROPHILS NFR BLD: 46.5 % (ref 38–73)
NRBC BLD-RTO: 0 /100 WBC
PLATELET # BLD AUTO: 624 K/UL (ref 150–350)
PLATELET BLD QL SMEAR: ABNORMAL
PMV BLD AUTO: 9.9 FL (ref 9.2–12.9)
POCT GLUCOSE: 101 MG/DL (ref 70–110)
POCT GLUCOSE: 107 MG/DL (ref 70–110)
POCT GLUCOSE: 112 MG/DL (ref 70–110)
POTASSIUM SERPL-SCNC: 4.8 MMOL/L (ref 3.5–5.1)
RBC # BLD AUTO: 4.76 M/UL (ref 4.6–6.2)
SODIUM SERPL-SCNC: 139 MMOL/L (ref 136–145)
WBC # BLD AUTO: 7.45 K/UL (ref 3.9–12.7)

## 2020-07-10 PROCEDURE — A4216 STERILE WATER/SALINE, 10 ML: HCPCS | Performed by: STUDENT IN AN ORGANIZED HEALTH CARE EDUCATION/TRAINING PROGRAM

## 2020-07-10 PROCEDURE — C1751 CATH, INF, PER/CENT/MIDLINE: HCPCS

## 2020-07-10 PROCEDURE — A9585 GADOBUTROL INJECTION: HCPCS | Performed by: INTERNAL MEDICINE

## 2020-07-10 PROCEDURE — 85025 COMPLETE CBC W/AUTO DIFF WBC: CPT

## 2020-07-10 PROCEDURE — 11000001 HC ACUTE MED/SURG PRIVATE ROOM

## 2020-07-10 PROCEDURE — 25500020 PHARM REV CODE 255: Performed by: INTERNAL MEDICINE

## 2020-07-10 PROCEDURE — 36569 INSJ PICC 5 YR+ W/O IMAGING: CPT

## 2020-07-10 PROCEDURE — 36415 COLL VENOUS BLD VENIPUNCTURE: CPT

## 2020-07-10 PROCEDURE — 80048 BASIC METABOLIC PNL TOTAL CA: CPT

## 2020-07-10 PROCEDURE — 25000003 PHARM REV CODE 250: Performed by: STUDENT IN AN ORGANIZED HEALTH CARE EDUCATION/TRAINING PROGRAM

## 2020-07-10 PROCEDURE — 63600175 PHARM REV CODE 636 W HCPCS: Performed by: STUDENT IN AN ORGANIZED HEALTH CARE EDUCATION/TRAINING PROGRAM

## 2020-07-10 RX ORDER — SODIUM CHLORIDE 0.9 % (FLUSH) 0.9 %
10 SYRINGE (ML) INJECTION EVERY 6 HOURS
Status: DISCONTINUED | OUTPATIENT
Start: 2020-07-10 | End: 2020-07-16 | Stop reason: HOSPADM

## 2020-07-10 RX ORDER — GADOBUTROL 604.72 MG/ML
8 INJECTION INTRAVENOUS
Status: COMPLETED | OUTPATIENT
Start: 2020-07-10 | End: 2020-07-10

## 2020-07-10 RX ORDER — SODIUM CHLORIDE 0.9 % (FLUSH) 0.9 %
10 SYRINGE (ML) INJECTION
Status: DISCONTINUED | OUTPATIENT
Start: 2020-07-10 | End: 2020-07-16 | Stop reason: HOSPADM

## 2020-07-10 RX ADMIN — GEMFIBROZIL 600 MG: 600 TABLET ORAL at 06:07

## 2020-07-10 RX ADMIN — DOCUSATE SODIUM 100 MG: 100 CAPSULE, LIQUID FILLED ORAL at 09:07

## 2020-07-10 RX ADMIN — GABAPENTIN 300 MG: 300 CAPSULE ORAL at 09:07

## 2020-07-10 RX ADMIN — GADOBUTROL 8 ML: 604.72 INJECTION INTRAVENOUS at 02:07

## 2020-07-10 RX ADMIN — ASPIRIN 81 MG: 81 TABLET, COATED ORAL at 09:07

## 2020-07-10 RX ADMIN — Medication 10 ML: at 06:07

## 2020-07-10 RX ADMIN — METOPROLOL TARTRATE 25 MG: 25 TABLET, FILM COATED ORAL at 09:07

## 2020-07-10 RX ADMIN — Medication 9 MG: at 09:07

## 2020-07-10 RX ADMIN — GEMFIBROZIL 600 MG: 600 TABLET ORAL at 05:07

## 2020-07-10 RX ADMIN — ERTAPENEM 1 G: 1 INJECTION INTRAMUSCULAR; INTRAVENOUS at 05:07

## 2020-07-10 RX ADMIN — ENOXAPARIN SODIUM 40 MG: 100 INJECTION SUBCUTANEOUS at 05:07

## 2020-07-10 RX ADMIN — OXYCODONE AND ACETAMINOPHEN 1 TABLET: 7.5; 325 TABLET ORAL at 05:07

## 2020-07-10 NOTE — PLAN OF CARE
VN note: PICC line orders in. VN called house supervisor, line busy. Will attempt again.    --Called house supervisor again, no response. Sent secure message.    10:31 am--Madeline House Supervisor aware. Bedside nurse updated.

## 2020-07-10 NOTE — PLAN OF CARE
VN note: Per  , patient not discharging today. I cued into patient's room to inform wound care nurse Mary Ann not to place patient's home wound vac on since not leaving (keep hospital wound vac on). Patient also inquiring about MRI results. I spoke with Dr. Pereira, and he will notify patient MRI results.

## 2020-07-10 NOTE — PROCEDURES
"Hermann Aguilar is a 44 y.o. male patient.    Temp: 97.2 °F (36.2 °C) (07/10/20 0723)  Pulse: 75 (07/10/20 0723)  Resp: 18 (07/10/20 0723)  BP: 115/76 (07/10/20 0723)  SpO2: 100 % (07/10/20 0723)  Weight: 83.1 kg (183 lb 3.2 oz) (07/10/20 0316)  Height: 5' 11" (180.3 cm) (07/08/20 1300)    PICC  Date/Time: 7/10/2020 2:30 PM  Performed by: Hector Salcido RN  Consent Done: Yes  Time out: Immediately prior to procedure a time out was called to verify the correct patient, procedure, equipment, support staff and site/side marked as required  Indications: med administration and vascular access  Anesthesia: local infiltration  Local anesthetic: lidocaine 1% without epinephrine  Anesthetic Total (mL): 3  Preparation: skin prepped with ChloraPrep  Skin prep agent dried: skin prep agent completely dried prior to procedure  Sterile barriers: all five maximum sterile barriers used - cap, mask, sterile gown, sterile gloves, and large sterile sheet  Hand hygiene: hand hygiene performed prior to central venous catheter insertion  Location details: right basilic  Catheter type: double lumen  Catheter size: 5 Fr  Catheter Length: 39cm    Ultrasound guidance: yes  Vessel Caliber: medium, compressibility normal  Needle advanced into vessel with real time Ultrasound guidance.  Guidewire confirmed in vessel.  Sterile sheath used.  Number of attempts: 1  Post-procedure: blood return through all ports, chlorhexidine patch and sterile dressing applied    Comments: Slight difficulty threading picc secondary to multiple picc lines in past.           Hector Salcido  7/10/2020    "

## 2020-07-10 NOTE — PROGRESS NOTES
U Internal Medicine Resident Progress Note    Subjective:      Doing well this morning.  Denies abdominal pain, nausea, vomiting, chest pain, or shortness of breath.  Confirmed he was first on schedule for MRI this morning.     Objective:   Last 24 Hour Vital Signs:  BP  Min: 105/64  Max: 118/69  Temp  Av.8 °F (36.6 °C)  Min: 97.2 °F (36.2 °C)  Max: 98.1 °F (36.7 °C)  Pulse  Av.6  Min: 67  Max: 117  Resp  Av.2  Min: 13  Max: 17  SpO2  Av %  Min: 98 %  Max: 100 %  Weight  Av.1 kg (183 lb 3.2 oz)  Min: 83.1 kg (183 lb 3.2 oz)  Max: 83.1 kg (183 lb 3.2 oz)  I/O last 3 completed shifts:  In: 450 [P.O.:250; IV Piggyback:200]  Out: 3270 [Urine:3270]    Physical Examination:  Gen: Flat affect. NAD.  HEENT: head normocephalic, atraumatic;trachea midline  Cardiac: regular rate and rhythm; no murmurs  Resp: clear to auscultation bilaterally;  GI: Mild suprapubic tenderness, No evidence of skin breakdown. abdomen soft, non-distended; normoactive bowel sounds, no hepatosplenomegaly.  Extrem: right below knee amputation. No clubbing or swelling noted. No ecchymoses.  Skin: Dry flaky skin on abdomen. Area of skin hypopigmentation on abdomen. No ecchymosis or rashes.  Neuro: strength 5/5 in upper extremities. 0/5 in lower extremities bilaterally; sensation intact to light touch in upper extremities, but no sensation in lower extremities bilaterally.    Laboratory:  Laboratory Data Reviewed:  Trended Lab Data:  Recent Labs   Lab 20  2230 20  0515 20  0829 20  0837 20  1136   WBC 10.37 10.57  --  7.82 7.78   HGB 9.5* 9.8*  --  9.5* 9.9*   HCT 30.4* 31.1*  --  29.6* 30.5*   * 775*  --  698* 754*   MCV 75* 74*  --  73* 72*   RDW 18.0* 18.5*  --  18.1* 18.0*    137 138  --  136   K 3.6 4.0 5.1  --  4.9    109 109  --  106   CO2 23 21* 19*  --  20*   BUN 7 7 8  --  7   CREATININE 1.0 0.8 0.8  --  0.7    100 94  --  85   PROT 8.3  --   --   --   --     ALBUMIN 2.7*  --   --   --   --    BILITOT 0.1  --   --   --   --    AST 8*  --   --   --   --    ALKPHOS 136*  --   --   --   --    ALT 6*  --   --   --   --        Trended Cardiac Data:  No results for input(s): TROPONINI, CKTOTAL, CKMB, BNP in the last 168 hours.    Microbiology Data   Microbiology Results (last 7 days)     Procedure Component Value Units Date/Time    Blood culture #1 **CANNOT BE ORDERED STAT** [[id number]] Collected: 07/06/20 2230    Order Status: Completed Specimen: Blood from Peripheral, Antecubital, Left Updated: 07/10/20 0613     Blood Culture, Routine No Growth to date      No Growth to date      No Growth to date      No Growth to date    Blood culture #2 **CANNOT BE ORDERED STAT** [[id number]] Collected: 07/06/20 2250    Order Status: Completed Specimen: Blood from Peripheral, Antecubital, Left Updated: 07/10/20 0613     Blood Culture, Routine No Growth to date      No Growth to date      No Growth to date      No Growth to date    Urine Culture High Risk [[id number]] Collected: 07/08/20 0840    Order Status: Completed Specimen: Urine, Catheterized Updated: 07/09/20 2141     Urine Culture, Routine No growth    Narrative:      Indicated criteria for high risk culture:->Other  Other (specify):->Suspicion for UTI, obtaining new culture  s/p SPT replacement    Urine Culture High Risk [[id number]]     Order Status: Canceled Specimen: Urine     Blood culture [[id number]]     Order Status: Canceled Specimen: Blood           Radiology:  Imaging Results    None           Current Medications:     Infusions:       Scheduled:   aspirin  81 mg Oral Daily    docusate sodium  100 mg Oral BID    enoxaparin  40 mg Subcutaneous Q24H    ertapenem (INVANZ) IVPB  1 g Intravenous Q24H    gabapentin  300 mg Oral BID    gemfibroziL  600 mg Oral BID AC    melatonin  9 mg Oral Nightly    metoprolol tartrate  25 mg Oral BID        PRN:  ammonium lactate, baclofen, cyclobenzaprine, dextrose 50%, dextrose 50%,  glucagon (human recombinant), glucose, glucose, insulin aspart U-100, oxyCODONE-acetaminophen, sodium chloride 0.9%    Antibiotics and Day Number of Therapy:  Ertapenem 7/7 - present    Lines and Day Number of Therapy:  PIV  Suprapubic catheter    Assessment and Plan   44 y.o. 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 Continuing ertapenem and repeating MRI to further evaluate R ischium     Complicated UTI, History of MDR UTI  - In the ED, temp 98.5 WBC 10.37  - History of proteus ESBL and Serratia  - Urine cultures from outside facility are in media tab.   - F/u blood cultures   - Merrem switched to Ertapenem 7/7 (will need either 2 or 6 weeks of abx depending if he has acute osteomyelitis)  - ID consulted  - Contact precautions for ESBL  - per CM pt does not want to go to SNF for continued abx, agreeable to at home program     Concern for right ischium osteomyelitis   - MRI 5/2/20 showing right ischium soft tissue inflammation concerning for osteomyelitis.   - Repeating MRI, will F/U  - Wound care consulted     Anemia of chronic inflammation  - Initial Hgb 9.5, Baseline 10.  - No signs or symptoms of bleeding  - Repeat iron studies mixed picture - ferritin 90, iron 18, TIBC 255, folate 5.6, vitamin 418     Protein gap   - Protein gap of 6   - HIV/Hep C panel negative  - suspect low albumin resulting from chronic inflammatory processes     HTN:  - Normotensive  - Continue home lopressor 25 BID     Type II DM  - Well controlled; insulin dependent  - Home meds: Levomir 15 BID, Lispro 20 TID with meals, and metformin 500 BID. Pt states he is unsure what he takes.  - SSI      Hyperlipidemia  - Continue home aspirin, gemfibrozil  - Recheck lipid panel     Fluids: None  Electrolytes: Replace as need  Nutrition:  Diabetic diet  Prophylaxis: Lovenox     Disposition: Follow up ID recs on home antibiotics, MRI. Can likely be discharged 7/10 if home antibiotics are set up.     Kalyn Samano  [facility] Internal Medicine HO-II  [facility] Hospitalist Medicine Team B    [facility] Medicine Hospitalist Pager numbers:   [facility] Hospitalist Medicine Team A (Farrukh/Valdemar): [phone]  [facility] Hospitalist Medicine Team B (Connie/Mariya):  [phone]

## 2020-07-10 NOTE — PROGRESS NOTES
U Infectious Diseases Resident HO-III Progress Note    Assessment/Plan:     ESBL Proteus UTI, h/o MDR UTIs  Recent UCx +ESBL Proteus. Suprapubic catheter exchanged in ED per patient. Repeat UCx NG.  - Recommend switching to Ertapenem for qd dosing (need to complete 14 day due to complicated UTI, end date: 2020).     Recent right ischial ESBL Proteus Osteomyelitis  Wound cultures from prior admission +ESBL Proteus. Completed course of outpatient Ertapenem x6 weeks, end ~ . Patient without follow up since.  - Inflammatory markers suggestive of ongoing infection  - Repeat MRI with ongoing OM of right ischium and new OM of coccyx with likely sinus tract. Will need Gen Surgery consult for sinus tract and likely debridement.  - Will need coccyx bone biopsy if possible   - On discharge, need weekly labs CBC, CMP, ESR and CRP. Labs to be faxed to ID office, attention to Dr Silva [phone]. Please schedule [facility] ID clinic follow up 1 -2 weeks after discharge. If unable to schedule [facility] - please let U ID service know.      Thank you for allowing us to participate in the care of this patient. Please contact me if you have any questions regarding this consult.    Darleen Fletcher  [facility] Internal Medicine -III  [facility] Infectious Diseases     Subjective:      Hermann Aguilar is a 44 y.o. male who is being followed by the [facility] Infectious Diseases service at [facility] for ESBL Proteus UTI and concern for ongoing sacral OM.     Patient resting comfortably this morning. Denies any problems or complaints. Wound vac is not in place this morning, set to be hooked back up this afternoon. Explained plan for MRI to evaluate for sacral OM, patient understanding of plan.     Objective:   Last 24 Hour Vital Signs:  BP  Min: 105/64  Max: 118/69  Temp  Av.7 °F (36.5 °C)  Min: 97.2 °F (36.2 °C)  Max: 98.1 °F (36.7 °C)  Pulse  Av.7  Min: 67  Max: 117  Resp  Av  Min: 13  Max:  17  SpO2  Av %  Min: 98 %  Max: 100 %  Weight  Av.1 kg (183 lb 3.2 oz)  Min: 83.1 kg (183 lb 3.2 oz)  Max: 83.1 kg (183 lb 3.2 oz)  I/O last 3 completed shifts:  In: 450 [P.O.:250; IV Piggyback:200]  Out: 3270 [Urine:3270]    Physical Examination:  General: awake and alert, in no apparent distress, non-toxic appearing  Head: NC/AT  Eyes: PERRL, EOMI, no scleral icterus  Throat: MMM, no oropharyngeal exudate or erythema  CV: normal rate, regular rhythm, no mgr noted  Resp :CTAB, no crackles or whezes, comfortable on room air   Abdomen: soft, NTND, +BS, suprapubic catheter in place   Extremities: R ischial wound with kerlix packing, non-erythematous and without drainage. Overlying bandage cdi.  Skin: dry and flaky over LEs, otherwise no rashes/lesions/bruising  Neuro: AAOx3, GCS 15, no focal deficits     Laboratory:  Laboratory Data Reviewed: yes  Pertinent Findings:  Recent Labs   Lab 20  2230  20  0829 20  0837 20  1136 07/10/20  0627   WBC 10.37   < >  --  7.82 7.78 7.45   HGB 9.5*   < >  --  9.5* 9.9* 11.1*   HCT 30.4*   < >  --  29.6* 30.5* 34.8*   *   < >  --  698* 754* 624*   MCV 75*   < >  --  73* 72* 73*   RDW 18.0*   < >  --  18.1* 18.0* 18.4*      < > 138  --  136 139   K 3.6   < > 5.1  --  4.9 4.8      < > 109  --  106 106   CO2 23   < > 19*  --  20* 19*   BUN 7   < > 8  --  7 9   CREATININE 1.0   < > 0.8  --  0.7 0.8      < > 94  --  85 94   PROT 8.3  --   --   --   --   --    ALBUMIN 2.7*  --   --   --   --   --    BILITOT 0.1  --   --   --   --   --    AST 8*  --   --   --   --   --    ALKPHOS 136*  --   --   --   --   --    ALT 6*  --   --   --   --   --     < > = values in this interval not displayed.         Microbiology Data Reviewed: yes  Pertinent Findings:  (Prior to admission) UCx: ESBL Proteus  Repeat UCx: NGTD     Other Results:  Radiology Data Reviewed: yes  Pertinent Findings:  No new.    Current Medications:     Infusions:        Scheduled:   aspirin  81 mg Oral Daily    docusate sodium  100 mg Oral BID    enoxaparin  40 mg Subcutaneous Q24H    ertapenem (INVANZ) IVPB  1 g Intravenous Q24H    gabapentin  300 mg Oral BID    gemfibroziL  600 mg Oral BID AC    melatonin  9 mg Oral Nightly    metoprolol tartrate  25 mg Oral BID        PRN:  ammonium lactate, baclofen, cyclobenzaprine, dextrose 50%, dextrose 50%, glucagon (human recombinant), glucose, glucose, insulin aspart U-100, oxyCODONE-acetaminophen, sodium chloride 0.9%    Antibiotics and Day Number of Therapy:    Ertapenem (7/7 - current)    Lines and Day Number of Therapy:  PIV  Suprapubic catheter

## 2020-07-10 NOTE — PLAN OF CARE
Pt. AAO x4.  Complaint of pain x1. Wound care performed per order.  Pt able to turn with minimal assist.  BM x1.  Abx. infusing per order. Safety maintainted.  Wound vac to be placed Friday 7/10 per wound care nurse.

## 2020-07-10 NOTE — PLAN OF CARE
Received home health orders with home IV abx.  MRI still pending  PICC line ordered    Sent orders to [facility]   Patient has home wound vac at bedside- to be changed upon discharged.       07/10/20 1025   Post-Acute Status   Post-Acute Authorization Home Health   Home Health Status Awaiting Clarification of Orders   Discharge Delays (!) Procedure Scheduling (IR, OR, Labs, Echo, Cath, Echo, EEG)     Michelle Fontana RN, CCM, CMSRN  RN Transition Navigator  [phone]

## 2020-07-10 NOTE — NURSING
Pt refused overlay to be placed on bed that was delivered around 2300. Pt stated he would like it to be placed while awake on the next shift. Pt voice complete understanding of use and continues for placement on tomorrow.

## 2020-07-11 LAB
ANION GAP SERPL CALC-SCNC: 8 MMOL/L (ref 8–16)
BASOPHILS # BLD AUTO: 0.05 K/UL (ref 0–0.2)
BASOPHILS NFR BLD: 0.7 % (ref 0–1.9)
BUN SERPL-MCNC: 8 MG/DL (ref 6–20)
CALCIUM SERPL-MCNC: 9.4 MG/DL (ref 8.7–10.5)
CHLORIDE SERPL-SCNC: 107 MMOL/L (ref 95–110)
CO2 SERPL-SCNC: 22 MMOL/L (ref 23–29)
CREAT SERPL-MCNC: 0.8 MG/DL (ref 0.5–1.4)
DIFFERENTIAL METHOD: ABNORMAL
EOSINOPHIL # BLD AUTO: 0.6 K/UL (ref 0–0.5)
EOSINOPHIL NFR BLD: 8.7 % (ref 0–8)
ERYTHROCYTE [DISTWIDTH] IN BLOOD BY AUTOMATED COUNT: 18 % (ref 11.5–14.5)
EST. GFR  (AFRICAN AMERICAN): >60 ML/MIN/1.73 M^2
EST. GFR  (NON AFRICAN AMERICAN): >60 ML/MIN/1.73 M^2
GLUCOSE SERPL-MCNC: 97 MG/DL (ref 70–110)
HCT VFR BLD AUTO: 32.7 % (ref 40–54)
HGB BLD-MCNC: 10.2 G/DL (ref 14–18)
IMM GRANULOCYTES # BLD AUTO: 0.01 K/UL (ref 0–0.04)
IMM GRANULOCYTES NFR BLD AUTO: 0.1 % (ref 0–0.5)
LYMPHOCYTES # BLD AUTO: 2.6 K/UL (ref 1–4.8)
LYMPHOCYTES NFR BLD: 37.1 % (ref 18–48)
MCH RBC QN AUTO: 23.2 PG (ref 27–31)
MCHC RBC AUTO-ENTMCNC: 31.2 G/DL (ref 32–36)
MCV RBC AUTO: 74 FL (ref 82–98)
MONOCYTES # BLD AUTO: 0.7 K/UL (ref 0.3–1)
MONOCYTES NFR BLD: 10 % (ref 4–15)
NEUTROPHILS # BLD AUTO: 3.1 K/UL (ref 1.8–7.7)
NEUTROPHILS NFR BLD: 43.4 % (ref 38–73)
NRBC BLD-RTO: 0 /100 WBC
PLATELET # BLD AUTO: 654 K/UL (ref 150–350)
PMV BLD AUTO: 9.4 FL (ref 9.2–12.9)
POCT GLUCOSE: 120 MG/DL (ref 70–110)
POCT GLUCOSE: 125 MG/DL (ref 70–110)
POCT GLUCOSE: 92 MG/DL (ref 70–110)
POTASSIUM SERPL-SCNC: 4.3 MMOL/L (ref 3.5–5.1)
RBC # BLD AUTO: 4.4 M/UL (ref 4.6–6.2)
SODIUM SERPL-SCNC: 137 MMOL/L (ref 136–145)
WBC # BLD AUTO: 7.09 K/UL (ref 3.9–12.7)

## 2020-07-11 PROCEDURE — 85025 COMPLETE CBC W/AUTO DIFF WBC: CPT

## 2020-07-11 PROCEDURE — 25000003 PHARM REV CODE 250: Performed by: STUDENT IN AN ORGANIZED HEALTH CARE EDUCATION/TRAINING PROGRAM

## 2020-07-11 PROCEDURE — 36415 COLL VENOUS BLD VENIPUNCTURE: CPT

## 2020-07-11 PROCEDURE — 80048 BASIC METABOLIC PNL TOTAL CA: CPT

## 2020-07-11 PROCEDURE — 11000001 HC ACUTE MED/SURG PRIVATE ROOM

## 2020-07-11 PROCEDURE — A4216 STERILE WATER/SALINE, 10 ML: HCPCS | Performed by: STUDENT IN AN ORGANIZED HEALTH CARE EDUCATION/TRAINING PROGRAM

## 2020-07-11 PROCEDURE — 63600175 PHARM REV CODE 636 W HCPCS: Performed by: STUDENT IN AN ORGANIZED HEALTH CARE EDUCATION/TRAINING PROGRAM

## 2020-07-11 RX ORDER — MEROPENEM AND SODIUM CHLORIDE 1 G/50ML
1 INJECTION, SOLUTION INTRAVENOUS
Status: DISCONTINUED | OUTPATIENT
Start: 2020-07-12 | End: 2020-07-11

## 2020-07-11 RX ADMIN — Medication 10 ML: at 05:07

## 2020-07-11 RX ADMIN — ASPIRIN 81 MG: 81 TABLET, COATED ORAL at 10:07

## 2020-07-11 RX ADMIN — GABAPENTIN 300 MG: 300 CAPSULE ORAL at 08:07

## 2020-07-11 RX ADMIN — METOPROLOL TARTRATE 25 MG: 25 TABLET, FILM COATED ORAL at 08:07

## 2020-07-11 RX ADMIN — Medication 10 ML: at 12:07

## 2020-07-11 RX ADMIN — Medication 10 ML: at 01:07

## 2020-07-11 RX ADMIN — GEMFIBROZIL 600 MG: 600 TABLET ORAL at 05:07

## 2020-07-11 RX ADMIN — OXYCODONE AND ACETAMINOPHEN 1 TABLET: 7.5; 325 TABLET ORAL at 08:07

## 2020-07-11 RX ADMIN — ENOXAPARIN SODIUM 40 MG: 100 INJECTION SUBCUTANEOUS at 05:07

## 2020-07-11 RX ADMIN — GABAPENTIN 300 MG: 300 CAPSULE ORAL at 10:07

## 2020-07-11 RX ADMIN — DOCUSATE SODIUM 100 MG: 100 CAPSULE, LIQUID FILLED ORAL at 08:07

## 2020-07-11 RX ADMIN — DOCUSATE SODIUM 100 MG: 100 CAPSULE, LIQUID FILLED ORAL at 10:07

## 2020-07-11 RX ADMIN — METOPROLOL TARTRATE 25 MG: 25 TABLET, FILM COATED ORAL at 10:07

## 2020-07-11 RX ADMIN — OXYCODONE AND ACETAMINOPHEN 1 TABLET: 7.5; 325 TABLET ORAL at 10:07

## 2020-07-11 RX ADMIN — Medication 9 MG: at 08:07

## 2020-07-11 NOTE — PROGRESS NOTES
[facility] Internal Medicine Resident Progress Note    Subjective:      Went over MRI results last night, no questions today. Wounds dressed yesterday, has wound vac. Pt with flat affect and holding blanket over head.     Objective:   Last 24 Hour Vital Signs:  BP  Min: 106/62  Max: 119/77  Temp  Av.9 °F (36.1 °C)  Min: 96.4 °F (35.8 °C)  Max: 98.2 °F (36.8 °C)  Pulse  Av.8  Min: 72  Max: 80  Resp  Av  Min: 16  Max: 18  SpO2  Av %  Min: 100 %  Max: 100 %  I/O last 3 completed shifts:  In: 100 [IV Piggyback:100]  Out: 1500 [Urine:1500]    Physical Examination:  Gen: Flat affect. NAD.  Further physical exam refused    Laboratory:  Laboratory Data Reviewed:  Trended Lab Data:  Recent Labs   Lab 20  2230  20  1136 07/10/20  0627 20  0514   WBC 10.37   < > 7.78 7.45 7.09   HGB 9.5*   < > 9.9* 11.1* 10.2*   HCT 30.4*   < > 30.5* 34.8* 32.7*   *   < > 754* 624* 654*   MCV 75*   < > 72* 73* 74*   RDW 18.0*   < > 18.0* 18.4* 18.0*      < > 136 139 137   K 3.6   < > 4.9 4.8 4.3      < > 106 106 107   CO2 23   < > 20* 19* 22*   BUN 7   < > 7 9 8   CREATININE 1.0   < > 0.7 0.8 0.8      < > 85 94 97   PROT 8.3  --   --   --   --    ALBUMIN 2.7*  --   --   --   --    BILITOT 0.1  --   --   --   --    AST 8*  --   --   --   --    ALKPHOS 136*  --   --   --   --    ALT 6*  --   --   --   --     < > = values in this interval not displayed.       Trended Cardiac Data:  No results for input(s): TROPONINI, CKTOTAL, CKMB, BNP in the last 168 hours.    Microbiology Data   Microbiology Results (last 7 days)     Procedure Component Value Units Date/Time    Blood culture #2 **CANNOT BE ORDERED STAT** [[id number]] Collected: 20    Order Status: Completed Specimen: Blood from Peripheral, Antecubital, Left Updated: 20     Blood Culture, Routine No Growth to date      No Growth to date      No Growth to date      No Growth to date      No Growth to date    Blood  culture #1 **CANNOT BE ORDERED STAT** [[id number]] Collected: 07/06/20 2230    Order Status: Completed Specimen: Blood from Peripheral, Antecubital, Left Updated: 07/11/20 0612     Blood Culture, Routine No Growth to date      No Growth to date      No Growth to date      No Growth to date      No Growth to date    Urine Culture High Risk [[id number]] Collected: 07/08/20 0840    Order Status: Completed Specimen: Urine, Catheterized Updated: 07/09/20 2141     Urine Culture, Routine No growth    Narrative:      Indicated criteria for high risk culture:->Other  Other (specify):->Suspicion for UTI, obtaining new culture  s/p SPT replacement    Urine Culture High Risk [[id number]]     Order Status: Canceled Specimen: Urine     Blood culture [[id number]]     Order Status: Canceled Specimen: Blood           Radiology:  Imaging Results    None           Current Medications:     Infusions:       Scheduled:   aspirin  81 mg Oral Daily    docusate sodium  100 mg Oral BID    enoxaparin  40 mg Subcutaneous Q24H    ertapenem (INVANZ) IVPB  1 g Intravenous Q24H    gabapentin  300 mg Oral BID    gemfibroziL  600 mg Oral BID AC    melatonin  9 mg Oral Nightly    metoprolol tartrate  25 mg Oral BID    sodium chloride 0.9%  10 mL Intravenous Q6H        PRN:  ammonium lactate, baclofen, cyclobenzaprine, dextrose 50%, dextrose 50%, glucagon (human recombinant), glucose, glucose, insulin aspart U-100, oxyCODONE-acetaminophen, sodium chloride 0.9%, Flushing PICC Protocol **AND** sodium chloride 0.9% **AND** sodium chloride 0.9%    Antibiotics and Day Number of Therapy:  Ertapenem 7/7 -     Lines and Day Number of Therapy:  PIV  Suprapubic catheter    Radiology    MRI sacrum/coccyx 7/10    FINDINGS:  There is T1 replacement and STIR signal hyperintensity with enhancement involving the coccyx consistent with osteomyelitis.  This is in contiguity with peripherally enhancing fluid collection measuring approximately 3 x 3 x 1 cm just  distal to the coccyx.  Possible extension to the skin in the perianal region, not well seen. Presacral edema noted.     There is partially visualized T1 hypointense and STIR hyperintense signal at the ischial tuberosity consistent with osteomyelitis.  Marked edema of the adjacent  musculature, partially visualized.     Bilateral gluteus muscle edema noted.     Urinary bladder thickening with suprapubic catheter noted.     Impression:     1. Osteomyelitis of the coccyx with adjacent fluid collection, likely with sinus tract to skin.  2. Osteomyelitis of the right ischium.    Assessment and Plan   44 y.o. 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 Continuing ertapenem and repeating MRI to further evaluate R ischium     Complicated UTI, History of MDR UTI  - In the ED, temp 98.5 WBC 10.37  - History of proteus ESBL and Serratia  - Urine cultures from outside facility are in media tab.   - F/u blood cultures   - Merrem switched to Ertapenem 7/7, end date for UTI 7/21  - ID consulted  - Contact precautions for ESBL  - per CM pt does not want to go to SNF for continued abx, agreeable to at home program     Concern for right ischium osteomyelitis, coccyx osteomyelitis with potential sinus tract  - MRI 5/2/20 showing right ischium soft tissue inflammation concerning for osteomyelitis.   - concern for osteomyelitis of coccyx, IR biopsy scheduled per ID recs  - suspicion for sinus tract on MRI, gen surg consulted to evaluate need for surgery  - Wound care consulted     Anemia of chronic inflammation  - Initial Hgb 9.5, Baseline 10.  - No signs or symptoms of bleeding  - Repeat iron studies mixed picture - ferritin 90, iron 18, TIBC 255, folate 5.6, vitamin 418     Protein gap   - Protein gap of 6   - HIV/Hep C  panel negative  - suspect low albumin resulting from chronic inflammatory processes     HTN:  - Normotensive  - Continue home lopressor 25 BID     Type II DM  - Well controlled; insulin dependent  - Home meds: Levomir 15 BID, Lispro 20 TID with meals, and metformin 500 BID. Pt states he is unsure what he takes.  - SSI      Hyperlipidemia  - Continue home aspirin, gemfibrozil  - Recheck lipid panel     Fluids: None  Electrolytes: Replace as need  Nutrition: Diabetic diet  Prophylaxis: Lovenox     Disposition: Pending sinus tract workup/biopsy results.    Nadir Pereira  \[facility]\"" Internal Medicine HO-I  \[facility]\"" Hospitalist Medicine Team B    \[facility]\"" Medicine Hospitalist Pager numbers:   \[facility]\"" Hospitalist Medicine Team A (Farrukh/Valdemar): [phone]  \[facility]\"" Hospitalist Medicine Team B (Connie/Mariya):  [phone]

## 2020-07-11 NOTE — PLAN OF CARE
Infectious Diseases Plan of Care Note    Mr. Aguilar has failed a 6-week course of osteomyelitis of the ischium. MRI yesterday revealed continued right ischial osteomyelitis with new osteo of the coccyx and fluid collection that extends to skin and perianal area with significant edema of the surrounding skin, soft tissues and musculature of coccyx area and gluteus muscles bilaterally.     Needs surgical debridement. Prolonged course of antibiotics for osteomyelitis failed and actually progressed during 6 week course of ertapenem. (Unknown debridement status or source control status at the time that course was started in May).     Send deep cultures from to confirm current organisms and if ESBL proteus is still present, need to confirm sensitivity to ertapenem. For now, recommend change to meropenem. Discussion regarding surgical measure to flap, etc. needed or our efforts to treat his osteo and deep infection are futile. May need to consider suppressive therapy if there is no surgical solution.    Will follow and discuss with surgery and team.      Eun Robles MD  [facility] Staff

## 2020-07-11 NOTE — PLAN OF CARE
VN rounds completed.  The patient has no complaints at this time and states that he didn't have a bowel movement today.  IV sights were not visible due to [place]. Patient instructed to call for needs.

## 2020-07-11 NOTE — CONSULTS
Today`s Date: 7/11/2020     Admit Date: 7/6/2020    Admitting Physician: John Rosales MD    Patient`s Name: Hermann Aguilar , 44 y.o. male    Reason for consultation  Wound care   Patient Active Problem List:     Iron deficiency anemia     Recurrent major depressive disorder, in remission     Chronic suprapubic catheter     Essential hypertension     Vitamin D deficiency     Paraplegia at T9 level     History of DVT of lower extremity     Hx of right BKA     Non-healing wound of lower extremity, initial encounter     Chronic post-traumatic stress disorder     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     PAD (peripheral artery disease)     Neuropathic foot ulcer     Constipation     Type 2 diabetes mellitus with hyperglycemia, with long-term current use of insulin     Chronic ulcer of left lower extremity     Major depressive disorder     Phantom limb syndrome     Cigarette smoker     Hematuria     Neurogenic bladder     Pressure injury of left ankle, stage 4     Complicated UTI (urinary tract infection)     Pressure injury of buttock, stage 3     Bacteremia due to Gram-negative bacteria     Pressure injury of left ischium, stage 4     Newly diagnosed infection due to multidrug resistant organism     Infected decubitus ulcer, stage IV with osteomyelitis of right ischium     Sepsis without acute organ dysfunction     Decubitus ulcer, infected, stage III     Sacral Osteomyelitis     Urinary tract infection associated with indwelling urethral catheter      Past Medical History:  No date: 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  No date: Acute postoperative respiratory failure  No date: Anemia, iron deficiency  No date: Chronic posttraumatic stress disorder  No date: Constipation  No date: Diabetes mellitus  No date: Gastric ulcer  No date: Hypertension  No date: 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 Yadiel Haley  No date: CHEST TUBE INSERTION; Right  8/30/2018: CYSTOSCOPY; N/A      Comment:  Procedure: CYSTOSCOPY, clot evacuation, suprapubic tube                exchange;  Surgeon: Ruchi Navarrete MD;  Location: [facility]                OR;  Service: Urology;  Laterality: N/A;  5/5/2020: DEBRIDEMENT OF SACRAL WOUND; N/A      Comment:  Procedure: DEBRIDEMENT, WOUND, SACRUM;  Surgeon:                Jesus Tom MD;  Location: [facility] OR;  Service:                General;  Laterality: N/A;  12/15/2016: GASTROSTOMY TUBE PLACEMENT  12/15/2016: ORIF HUMERUS FRACTURE; Left  8/30/2018: REMOVAL OF BLOOD CLOT      Comment:  Procedure: REMOVAL, BLOOD CLOT;  Surgeon: Ruchi Navarrete MD;  Location: [facility] OR;  Service: Urology;;  No date: TRACHEOSTOMY TUBE PLACEMENT    Prior to Admission medications :  Medication albuterol-ipratropium (DUO-NEB) 2.5 mg-0.5 mg/3 mL nebulizer solution, Sig Inhale 3 mLs into the lungs., Start Date , End Date , Taking? , Authorizing Provider Historical Provider, MD    Medication alcohol swabs (ALCOHOL PADS) PadM, Sig Apply 1 each topically once daily., Start Date 5/15/19, End Date , Taking? , Authorizing Provider Ramu Breen MD    Medication ammonium lactate 12 % Crea, Sig MIHAELA EXT AA ON SKIN BID, Start Date 4/11/19, End Date , Taking? , Authorizing Provider Historical Provider, MD    Medication aspirin (ECOTRIN) 81 MG EC tablet, Sig aspirin, Start Date , End Date , Taking? , Authorizing Provider Historical Provider, MD    Medication baclofen (LIORESAL) 10 MG tablet, Sig , Start Date 1/20/20, End Date , Taking? , Authorizing Provider Historical Provider, MD    Medication cadexomer iodine (IODOSORB)  0.9 % gel, Sig Apply topically daily as needed for Wound Care., Start Date , End Date , Taking? , Authorizing Provider Alesia Donovan MD    Medication cyclobenzaprine (FLEXERIL) 10 MG tablet, Sig Take 1 tablet (10 mg total) by mouth 3 (three) times daily as needed., Start Date 4/6/19, End Date , Taking? , Authorizing Provider Ramu Breen MD    Medication dextran 70-hypromellose (TEARS) ophthalmic solution, Sig Apply 1 drop to eye., Start Date , End Date , Taking? , Authorizing Provider Alesia Donovan MD    Medication docusate sodium (COLACE) 100 MG capsule, Sig Take 1 capsule (100 mg total) by mouth 2 (two) times daily., Start Date 2/14/19, End Date , Taking? , Authorizing Provider Ramu Breen MD    Medication drainage bag Misc, Sig 1 Units by Misc.(Non-Drug; Combo Route) route every 30 days., Start Date 5/15/19, End Date , Taking? , Authorizing Provider Ramu Breen MD    Medication ertapenem sodium (ERTAPENEM, INVANZ, 1 G/100 ML NS, READY TO MIX,), Sig Inject 100 mLs (1 g total) into the vein once daily., Start Date 5/9/20, End Date , Taking? , Authorizing Provider Ammy Soriano, NP    Medication famotidine (PEPCID) 20 MG tablet, Sig Take 1 tablet (20 mg total) by mouth 2 (two) times daily., Start Date 2/14/19, End Date 5/12/20, Taking? , Authorizing Provider Ramu Breen MD    Medication ferrous sulfate (IRON, FERROUS SULFATE,) 325 mg (65 mg iron) Tab tablet, Sig ferrous sulfate 325mg, Start Date , End Date , Taking? , Authorizing Provider Alesia Donovan MD    Medication gabapentin (NEURONTIN) 300 MG capsule, Sig Take 1 capsule (300 mg total) by mouth 2 (two) times daily., Start Date 4/6/19, End Date , Taking? , Authorizing Provider Ramu Breen MD    Medication gemfibrozil (LOPID) 600 MG tablet, Sig Take 1 tablet (600 mg total) by mouth 2 (two) times daily before meals., Start Date 4/6/19, End Date 5/12/20, Taking? , Authorizing Provider Ramu Breen,  MD    Medication insulin detemir U-100 (LEVEMIR) 100 unit/mL injection, Sig Inject 15 Units into the skin 2 (two) times daily., Start Date 2/14/19, End Date , Taking? , Authorizing Provider Ramu Breen MD    Medication insulin lispro protamin-lispro (HUMALOG MIX 75-25 KWIKPEN) 100 unit/mL (75-25) InPn, Sig Inject 20 Units into the skin 3 (three) times daily with meals., Start Date 5/15/19, End Date 5/14/20, Taking? , Authorizing Provider Ramu Breen MD    Medication ketoconazole (NIZORAL) 2 % shampoo, Sig Apply topically twice a week., Start Date 2/14/19, End Date , Taking? , Authorizing Provider Ramu Breen MD    Medication melatonin 10 mg Cap, Sig Take 1 tablet by mouth every evening., Start Date 2/14/19, End Date , Taking? , Authorizing Provider Ramu Breen MD    Medication meloxicam (MOBIC) 7.5 MG tablet, Sig Take 1 tablet (7.5 mg total) by mouth 2 (two) times daily as needed for Pain., Start Date 4/6/19, End Date , Taking? , Authorizing Provider Ramu Breen MD    Medication metFORMIN (GLUCOPHAGE) 500 MG tablet, Sig Take 1 tablet (500 mg total) by mouth 2 (two) times daily with meals., Start Date 4/6/19, End Date 5/12/20, Taking? , Authorizing Provider Ramu Breen MD    Medication metoprolol tartrate (LOPRESSOR) 25 MG tablet, Sig , Start Date 1/20/20, End Date , Taking? , Authorizing Provider Alesia Donovan MD    Medication mirtazapine (REMERON) 30 MG tablet, Sig Take 30 mg by mouth., Start Date , End Date , Taking? , Authorizing Provider Historical MD iZon    Medication orphenadrine (NORFLEX) 100 mg tablet, Sig Take 1 tablet (100 mg total) by mouth 2 (two) times daily as needed for Muscle spasms or Pain., Start Date 10/22/19, End Date , Taking? , Authorizing Provider Brian Lindsey MD    Medication oxyCODONE-acetaminophen (PERCOCET) 5-325 mg per tablet, Sig Take 1 tablet by mouth every 8 (eight) hours as needed., Start Date 5/9/20, End Date , Taking? , Authorizing  "Provider Ammy Soriano, NP    Medication pen needle, diabetic (COMFORT EZ PEN NEEDLES) 29 gauge x 1/2" Ndle, Sig 1 Units by Misc.(Non-Drug; Combo Route) route 3 (three) times daily., Start Date 5/15/19, End Date , Taking? , Authorizing Provider Ramu Breen MD    Medication promethazine (PHENERGAN) 25 MG tablet, Sig Take 1 tablet (25 mg total) by mouth every 6 (six) hours as needed for Nausea., Start Date 4/6/19, End Date , Taking? , Authorizing Provider Ramu Breen MD    Medication SANTYL ointment, Sig APPLY TO CLEANSED AFFECTED ARE TOPICALLY ONCE DAILY, Start Date 1/13/20, End Date , Taking? , Authorizing Provider Historical MD [place]    Medication TRUE METRIX GLUCOSE METER Misc, Sig AS DIRECTED, Start Date 4/26/19, End Date , Taking? , Authorizing Provider Historical MD [place]    Medication TRUE METRIX GLUCOSE TEST STRIP Strp, Sig USE AS DIRECTED TID, Start Date 6/6/19, End Date , Taking? , Authorizing Provider Ramu Breen MD    Medication TRUEPLUS LANCETS 30 gauge Misc, Sig USE AS DIRECTED TID, Start Date 4/26/19, End Date , Taking? , Authorizing Provider Alesia Donovan MD    Medication venlafaxine (EFFEXOR-XR) 150 MG Cp24, Sig Take 150 mg by mouth once daily. , Start Date 1/20/20, End Date , Taking? , Authorizing Provider Historical MD [place]    Medication wheelchair Korina, Sig 1 Units/oz/day by Misc.(Non-Drug; Combo Route) route once daily., Start Date 7/11/19, End Date , Taking? , Authorizing Provider Ramu Breen MD      No current facility-administered medications on file prior to encounter.   Current Outpatient Medications on File Prior to Encounter:  albuterol-ipratropium (DUO-NEB) 2.5 mg-0.5 mg/3 mL nebulizer solution, Inhale 3 mLs into the lungs., Disp: , Rfl:   alcohol swabs (ALCOHOL PADS) PadM, Apply 1 each topically once daily., Disp: 400 each, Rfl: 11  ammonium lactate 12 % Crea, MIHAELA EXT AA ON SKIN BID, Disp: , Rfl: 3  aspirin (ECOTRIN) 81 MG EC tablet, " aspirin, Disp: , Rfl:   baclofen (LIORESAL) 10 MG tablet, , Disp: , Rfl:   cadexomer iodine (IODOSORB) 0.9 % gel, Apply topically daily as needed for Wound Care., Disp: , Rfl:   cyclobenzaprine (FLEXERIL) 10 MG tablet, Take 1 tablet (10 mg total) by mouth 3 (three) times daily as needed., Disp: 90 tablet, Rfl: 3  dextran 70-hypromellose (TEARS) ophthalmic solution, Apply 1 drop to eye., Disp: , Rfl:   docusate sodium (COLACE) 100 MG capsule, Take 1 capsule (100 mg total) by mouth 2 (two) times daily., Disp: 180 capsule, Rfl: 3  drainage bag Misc, 1 Units by Misc.(Non-Drug; Combo Route) route every 30 days., Disp: 3 each, Rfl: 3  ertapenem sodium (ERTAPENEM, INVANZ, 1 G/100 ML NS, READY TO MIX,), Inject 100 mLs (1 g total) into the vein once daily., Disp: , Rfl:   famotidine (PEPCID) 20 MG tablet, Take 1 tablet (20 mg total) by mouth 2 (two) times daily., Disp: 180 tablet, Rfl: 3  ferrous sulfate (IRON, FERROUS SULFATE,) 325 mg (65 mg iron) Tab tablet, ferrous sulfate 325mg, Disp: , Rfl:   gabapentin (NEURONTIN) 300 MG capsule, Take 1 capsule (300 mg total) by mouth 2 (two) times daily., Disp: 180 capsule, Rfl: 3  gemfibrozil (LOPID) 600 MG tablet, Take 1 tablet (600 mg total) by mouth 2 (two) times daily before meals., Disp: 180 tablet, Rfl: 1  insulin detemir U-100 (LEVEMIR) 100 unit/mL injection, Inject 15 Units into the skin 2 (two) times daily., Disp: 10 mL, Rfl: 11  insulin lispro protamin-lispro (HUMALOG MIX 75-25 KWIKPEN) 100 unit/mL (75-25) InPn, Inject 20 Units into the skin 3 (three) times daily with meals., Disp: 3 Box, Rfl: 11  ketoconazole (NIZORAL) 2 % shampoo, Apply topically twice a week., Disp: 120 mL, Rfl: 11  melatonin 10 mg Cap, Take 1 tablet by mouth every evening., Disp: 90 capsule, Rfl: 3  meloxicam (MOBIC) 7.5 MG tablet, Take 1 tablet (7.5 mg total) by mouth 2 (two) times daily as needed for Pain., Disp: 180 tablet, Rfl: 1  metFORMIN (GLUCOPHAGE) 500 MG tablet, Take 1 tablet (500 mg total) by  "mouth 2 (two) times daily with meals., Disp: 180 tablet, Rfl: 3  metoprolol tartrate (LOPRESSOR) 25 MG tablet, , Disp: , Rfl:   mirtazapine (REMERON) 30 MG tablet, Take 30 mg by mouth., Disp: , Rfl:   orphenadrine (NORFLEX) 100 mg tablet, Take 1 tablet (100 mg total) by mouth 2 (two) times daily as needed for Muscle spasms or Pain., Disp: 20 tablet, Rfl: 0  oxyCODONE-acetaminophen (PERCOCET) 5-325 mg per tablet, Take 1 tablet by mouth every 8 (eight) hours as needed., Disp: 30 tablet, Rfl: 0  pen needle, diabetic (COMFORT EZ PEN NEEDLES) 29 gauge x 1/2" Ndle, 1 Units by Misc.(Non-Drug; Combo Route) route 3 (three) times daily., Disp: 400 each, Rfl: 11  promethazine (PHENERGAN) 25 MG tablet, Take 1 tablet (25 mg total) by mouth every 6 (six) hours as needed for Nausea., Disp: 30 tablet, Rfl: 0  SANTYL ointment, APPLY TO CLEANSED AFFECTED ARE TOPICALLY ONCE DAILY, Disp: , Rfl:   TRUE METRIX GLUCOSE METER Misc, AS DIRECTED, Disp: , Rfl: 0  TRUE METRIX GLUCOSE TEST STRIP Strp, USE AS DIRECTED TID, Disp: 200 strip, Rfl: 3  TRUEPLUS LANCETS 30 gauge Misc, USE AS DIRECTED TID, Disp: , Rfl: 3  venlafaxine (EFFEXOR-XR) 150 MG Cp24, Take 150 mg by mouth once daily. , Disp: , Rfl:   wheelchair Korina, 1 Units/oz/day by Misc.(Non-Drug; Combo Route) route once daily., Disp: 1 each, Rfl: 0         Review of patient's allergies indicates:  No Known Allergies    Social History:   reports that he has been smoking cigarettes. He started smoking about 33 years ago. He has a 16.50 pack-year smoking history. He has never used smokeless tobacco. He reports that he does not drink alcohol or use drugs.     History reviewed.  No pertinent family history.      PHYSICAL EXAMINATION  Temp:  [96.4 °F (35.8 °C)-98.2 °F (36.8 °C)] 98 °F (36.7 °C)  Pulse:  [72-80] 80  Resp:  [16-20] 20  SpO2:  [100 %] 100 %  BP: (106-119)/(59-77) 112/59    General Condition:   alert x 3     Head & Neck  Anemia: None  Jaundice: None  Neck vein: Not distended  Carotid " Bruits: none  Lymph nodes: none palpable  Thyroid: normal    Chest: normal    Heart: normal    Rt. Breast: not examined  Lt. Breast: not examined  Axillary lymph nodes: none    Abdomen: Soft,  None tender with no palpable mass or organ  Hernia: none    Rectal: Defered    Extremities: paralysis both lower legs, wound vac in place right hip  3 x 4 cm stage 3 left hip decubitus wound   Vascular: normal    Specific focus Examination  Paraplegic    Imp: infected stage 3 left hip wound , wound vac right hip wound with sinus    Plan: apply santyl and xeroform left hip and check sinogram right hip.

## 2020-07-12 LAB
BACTERIA BLD CULT: NORMAL
BACTERIA BLD CULT: NORMAL
POCT GLUCOSE: 105 MG/DL (ref 70–110)
POCT GLUCOSE: 127 MG/DL (ref 70–110)
POCT GLUCOSE: 163 MG/DL (ref 70–110)
POCT GLUCOSE: 182 MG/DL (ref 70–110)

## 2020-07-12 PROCEDURE — 25000003 PHARM REV CODE 250: Performed by: INTERNAL MEDICINE

## 2020-07-12 PROCEDURE — 25000003 PHARM REV CODE 250: Performed by: STUDENT IN AN ORGANIZED HEALTH CARE EDUCATION/TRAINING PROGRAM

## 2020-07-12 PROCEDURE — 11000001 HC ACUTE MED/SURG PRIVATE ROOM

## 2020-07-12 PROCEDURE — 63600175 PHARM REV CODE 636 W HCPCS: Performed by: INTERNAL MEDICINE

## 2020-07-12 PROCEDURE — A4216 STERILE WATER/SALINE, 10 ML: HCPCS | Performed by: STUDENT IN AN ORGANIZED HEALTH CARE EDUCATION/TRAINING PROGRAM

## 2020-07-12 PROCEDURE — 63600175 PHARM REV CODE 636 W HCPCS: Performed by: STUDENT IN AN ORGANIZED HEALTH CARE EDUCATION/TRAINING PROGRAM

## 2020-07-12 RX ADMIN — ASPIRIN 81 MG: 81 TABLET, COATED ORAL at 09:07

## 2020-07-12 RX ADMIN — GABAPENTIN 300 MG: 300 CAPSULE ORAL at 08:07

## 2020-07-12 RX ADMIN — Medication 10 ML: at 12:07

## 2020-07-12 RX ADMIN — MEROPENEM 1 G: 1 INJECTION, POWDER, FOR SOLUTION INTRAVENOUS at 03:07

## 2020-07-12 RX ADMIN — DOCUSATE SODIUM 100 MG: 100 CAPSULE, LIQUID FILLED ORAL at 09:07

## 2020-07-12 RX ADMIN — Medication 10 ML: at 05:07

## 2020-07-12 RX ADMIN — OXYCODONE AND ACETAMINOPHEN 1 TABLET: 7.5; 325 TABLET ORAL at 11:07

## 2020-07-12 RX ADMIN — VANCOMYCIN HYDROCHLORIDE 2000 MG: 100 INJECTION, POWDER, LYOPHILIZED, FOR SOLUTION INTRAVENOUS at 03:07

## 2020-07-12 RX ADMIN — OXYCODONE AND ACETAMINOPHEN 1 TABLET: 7.5; 325 TABLET ORAL at 09:07

## 2020-07-12 RX ADMIN — GABAPENTIN 300 MG: 300 CAPSULE ORAL at 09:07

## 2020-07-12 RX ADMIN — MEROPENEM 1 G: 1 INJECTION, POWDER, FOR SOLUTION INTRAVENOUS at 11:07

## 2020-07-12 RX ADMIN — METOPROLOL TARTRATE 25 MG: 25 TABLET, FILM COATED ORAL at 08:07

## 2020-07-12 RX ADMIN — Medication 10 ML: at 11:07

## 2020-07-12 RX ADMIN — MEROPENEM 1 G: 1 INJECTION, POWDER, FOR SOLUTION INTRAVENOUS at 09:07

## 2020-07-12 RX ADMIN — DOCUSATE SODIUM 100 MG: 100 CAPSULE, LIQUID FILLED ORAL at 08:07

## 2020-07-12 RX ADMIN — GEMFIBROZIL 600 MG: 600 TABLET ORAL at 05:07

## 2020-07-12 RX ADMIN — Medication 9 MG: at 08:07

## 2020-07-12 RX ADMIN — ENOXAPARIN SODIUM 40 MG: 100 INJECTION SUBCUTANEOUS at 05:07

## 2020-07-12 RX ADMIN — GEMFIBROZIL 600 MG: 600 TABLET ORAL at 03:07

## 2020-07-12 RX ADMIN — METOPROLOL TARTRATE 25 MG: 25 TABLET, FILM COATED ORAL at 09:07

## 2020-07-12 NOTE — PLAN OF CARE
Plan of care reviewed with patient, understanding verbalized. PRN pain med administered for complaints of pain. Suprapubic cath patent draining yellow urine. BG monitored and SSI administered per orders. Wound vac in place. Pt not on tele. Contact precautions maintained. Instructed to call for any assistance, understanding verbalized. Bed alarm on, call light in reach, fall precautions in place. Will continue to monitor.

## 2020-07-12 NOTE — PROGRESS NOTES
[facility] Resident Progress Note    Subjective:       Wounds dressed yesterday, has wound vac. Pt resting comfortably in bed. Denies headaches, dizziness, chest pain, dyspnea, abdominal pain     Objective:   Last 24 Hour Vital Signs:  BP  Min: 94/57  Max: 128/73  Temp  Av.3 °F (36.8 °C)  Min: 97.9 °F (36.6 °C)  Max: 99.3 °F (37.4 °C)  Pulse  Av  Min: 73  Max: 83  Resp  Av.3  Min: 16  Max: 20  SpO2  Av.8 %  Min: 97 %  Max: 100 %  I/O last 3 completed shifts:  In: 740 [P.O.:740]  Out:  [Urine:]    Physical Examination:  General: Alert and oriented, well nourished, no acute distress.  Lungs: Clear to auscultation, non-labored respiration, no wheezes, rales or rhonchi.  Heart: Normal rate, regular rhythm, no murmur, gallop or edema.  Abdomen: Soft, non-tender, non-distended, normal bowel sounds, no masses.  Extremities: No edema, distal pulses 2+  Skin: Skin is warm, dry and pink, no rashes or lesions  Psychiatric: Cooperative, appropriate mood and affect    Laboratory:  Laboratory Data Reviewed:  Trended Lab Data:  Recent Labs   Lab 20  2230  20  1136 07/10/20  0627 20  0514   WBC 10.37   < > 7.78 7.45 7.09   HGB 9.5*   < > 9.9* 11.1* 10.2*   HCT 30.4*   < > 30.5* 34.8* 32.7*   *   < > 754* 624* 654*   MCV 75*   < > 72* 73* 74*   RDW 18.0*   < > 18.0* 18.4* 18.0*      < > 136 139 137   K 3.6   < > 4.9 4.8 4.3      < > 106 106 107   CO2 23   < > 20* 19* 22*   BUN 7   < > 7 9 8   CREATININE 1.0   < > 0.7 0.8 0.8      < > 85 94 97   PROT 8.3  --   --   --   --    ALBUMIN 2.7*  --   --   --   --    BILITOT 0.1  --   --   --   --    AST 8*  --   --   --   --    ALKPHOS 136*  --   --   --   --    ALT 6*  --   --   --   --     < > = values in this interval not displayed.       Trended Cardiac Data:  No results for input(s): TROPONINI, CKTOTAL, CKMB, BNP in the last 168 hours.    Microbiology Data   Microbiology Results (last 7 days)      Procedure Component Value Units Date/Time    Blood culture #2 **CANNOT BE ORDERED STAT** [[id number]] Collected: 07/06/20 2250    Order Status: Completed Specimen: Blood from Peripheral, Antecubital, Left Updated: 07/12/20 0612     Blood Culture, Routine No growth after 5 days.    Blood culture #1 **CANNOT BE ORDERED STAT** [[id number]] Collected: 07/06/20 2230    Order Status: Completed Specimen: Blood from Peripheral, Antecubital, Left Updated: 07/12/20 0612     Blood Culture, Routine No growth after 5 days.    Urine Culture High Risk [[id number]] Collected: 07/08/20 0840    Order Status: Completed Specimen: Urine, Catheterized Updated: 07/09/20 2141     Urine Culture, Routine No growth    Narrative:      Indicated criteria for high risk culture:->Other  Other (specify):->Suspicion for UTI, obtaining new culture  s/p SPT replacement    Urine Culture High Risk [[id number]]     Order Status: Canceled Specimen: Urine     Blood culture [[id number]]     Order Status: Canceled Specimen: Blood           Radiology:  Imaging Results    None           Current Medications:     Infusions:       Scheduled:   aspirin  81 mg Oral Daily    docusate sodium  100 mg Oral BID    enoxaparin  40 mg Subcutaneous Q24H    gabapentin  300 mg Oral BID    gemfibroziL  600 mg Oral BID AC    melatonin  9 mg Oral Nightly    meropenem (MERREM) IVPB  1 g Intravenous Q8H    metoprolol tartrate  25 mg Oral BID    sodium chloride 0.9%  10 mL Intravenous Q6H        PRN:  ammonium lactate, baclofen, cyclobenzaprine, dextrose 50%, dextrose 50%, glucagon (human recombinant), glucose, glucose, insulin aspart U-100, oxyCODONE-acetaminophen, sodium chloride 0.9%, Flushing PICC Protocol **AND** sodium chloride 0.9% **AND** sodium chloride 0.9%    Antibiotics and Day Number of Therapy:  Ertapenem 7/7 -     Lines and Day Number of Therapy:  PIV  Suprapubic catheter    Radiology  MRI sacrum/coccyx 7/10    FINDINGS:  There is T1 replacement and STIR signal  hyperintensity with enhancement involving the coccyx consistent with osteomyelitis.  This is in contiguity with peripherally enhancing fluid collection measuring approximately 3 x 3 x 1 cm just distal to the coccyx.  Possible extension to the skin in the perianal region, not well seen. Presacral edema noted.     There is partially visualized T1 hypointense and STIR hyperintense signal at the ischial tuberosity consistent with osteomyelitis.  Marked edema of the adjacent  musculature, partially visualized.     Bilateral gluteus muscle edema noted.     Urinary bladder thickening with suprapubic catheter noted.     Impression:     1. Osteomyelitis of the coccyx with adjacent fluid collection, likely with sinus tract to skin.  2. Osteomyelitis of the right ischium.    Assessment and Plan   44 y.o. 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 Continuing ertapenem and repeating MRI to further evaluate R ischium     Complicated UTI, History of MDR UTI  - In the ED, temp 98.5 WBC 10.37  - History of proteus ESBL and Serratia  - Urine cultures from outside facility are in media tab.   - F/u blood cultures   - Merrem switched to Ertapenem 7/7, end date for UTI 7/21  - ID consulted  - Contact precautions for ESBL  - per CM pt does not want to go to SNF for continued abx, agreeable to at home program     Concern for right ischium osteomyelitis, coccyx osteomyelitis with potential sinus tract  - MRI 5/2/20 showing right ischium soft tissue inflammation concerning for osteomyelitis.   - concern for osteomyelitis of coccyx, IR biopsy scheduled per ID recs  - suspicion for sinus tract on MRI, gen surg consulted to evaluate need for surgery  - Wound care consulted     Anemia of chronic inflammation  - Initial Hgb 9.5,  Baseline 10.  - No signs or symptoms of bleeding  - Repeat iron studies mixed picture - ferritin 90, iron 18, TIBC 255, folate 5.6, vitamin 418     Protein gap   - Protein gap of 6   - HIV/Hep C panel negative  - suspect low albumin resulting from chronic inflammatory processes     HTN:  - Normotensive  - Continue home lopressor 25 BID     Type II DM  - Well controlled; insulin dependent  - Home meds: Levomir 15 BID, Lispro 20 TID with meals, and metformin 500 BID. Pt states he is unsure what he takes.  - SSI      Hyperlipidemia  - Continue home aspirin, gemfibrozil  - Recheck lipid panel     Fluids: None  Electrolytes: Replace as need  Nutrition: Diabetic diet  Prophylaxis: Lovenox     Disposition: Pending sinus tract workup/biopsy results.    Teja Aponte  \[facility]\"" Internal Medicine HO-I  U Hospitalist Medicine Team B    \[facility]\"" Medicine Hospitalist Pager numbers:   \[facility]\"" Hospitalist Medicine Team A (Farrukh/Valdemar): [phone]  \[facility]\"" Hospitalist Medicine Team B (Connie/Mariya):  [phone]

## 2020-07-12 NOTE — PROGRESS NOTES
"Surgery follow up.  /65 (BP Location: Left arm, Patient Position: Lying)   Pulse 85   Temp 96.8 °F (36 °C) (Oral)   Resp 18   Ht 5' 11" (1.803 m)   Wt 83.1 kg (183 lb 3.2 oz)   SpO2 99%   BMI 25.55 kg/m²   I/O last 3 completed shifts:  In: 740 [P.O.:740]  Out: 2050 [Urine:2050]  No intake/output data recorded.  Recent Results (from the past 336 hour(s))   CBC auto differential    Collection Time: 07/11/20  5:14 AM   Result Value Ref Range    WBC 7.09 3.90 - 12.70 K/uL    Hemoglobin 10.2 (L) 14.0 - 18.0 g/dL    Hematocrit 32.7 (L) 40.0 - 54.0 %    Platelets 654 (H) 150 - 350 K/uL   CBC auto differential    Collection Time: 07/10/20  6:27 AM   Result Value Ref Range    WBC 7.45 3.90 - 12.70 K/uL    Hemoglobin 11.1 (L) 14.0 - 18.0 g/dL    Hematocrit 34.8 (L) 40.0 - 54.0 %    Platelets 624 (H) 150 - 350 K/uL   CBC auto differential    Collection Time: 07/09/20 11:36 AM   Result Value Ref Range    WBC 7.78 3.90 - 12.70 K/uL    Hemoglobin 9.9 (L) 14.0 - 18.0 g/dL    Hematocrit 30.5 (L) 40.0 - 54.0 %    Platelets 754 (H) 150 - 350 K/uL     Recent Results (from the past 336 hour(s))   Basic Metabolic Panel    Collection Time: 07/11/20  5:14 AM   Result Value Ref Range    Sodium 137 136 - 145 mmol/L    Potassium 4.3 3.5 - 5.1 mmol/L    Chloride 107 95 - 110 mmol/L    CO2 22 (L) 23 - 29 mmol/L    BUN, Bld 8 6 - 20 mg/dL    Creatinine 0.8 0.5 - 1.4 mg/dL    Calcium 9.4 8.7 - 10.5 mg/dL    Anion Gap 8 8 - 16 mmol/L   Basic Metabolic Panel    Collection Time: 07/10/20  6:27 AM   Result Value Ref Range    Sodium 139 136 - 145 mmol/L    Potassium 4.8 3.5 - 5.1 mmol/L    Chloride 106 95 - 110 mmol/L    CO2 19 (L) 23 - 29 mmol/L    BUN, Bld 9 6 - 20 mg/dL    Creatinine 0.8 0.5 - 1.4 mg/dL    Calcium 9.6 8.7 - 10.5 mg/dL    Anion Gap 14 8 - 16 mmol/L   Basic Metabolic Panel    Collection Time: 07/09/20 11:36 AM   Result Value Ref Range    Sodium 136 136 - 145 mmol/L    Potassium 4.9 3.5 - 5.1 mmol/L    Chloride 106 95 - " 110 mmol/L    CO2 20 (L) 23 - 29 mmol/L    BUN, Bld 7 6 - 20 mg/dL    Creatinine 0.7 0.5 - 1.4 mg/dL    Calcium 9.3 8.7 - 10.5 mg/dL    Anion Gap 10 8 - 16 mmol/L   wound unchanged , will get sinogram in am , after d/c wound vac.

## 2020-07-12 NOTE — PROGRESS NOTES
Pharmacokinetic Initial Assessment: IV Vancomycin    Assessment/Plan:    Initiate intravenous vancomycin with loading dose of 2000 mg once followed by a maintenance dose of vancomycin 1500 mg IV every 12 hours  Desired empiric serum trough concentration is 15 to 20 mcg/mL  Draw vancomycin trough level 30 min prior to fourth dose on 7-14-20 at approximately 0200  Pharmacy will continue to follow and monitor vancomycin.      Please contact pharmacy at extension [phone] with any questions regarding this assessment.     Thank you for the consult,   Diogenes Persony       Patient brief summary:  Hermann Aguilar is a 44 y.o. male initiated on antimicrobial therapy with IV Vancomycin for treatment of suspected bacteremia    Drug Allergies:   Review of patient's allergies indicates:  No Known Allergies    Actual Body Weight:   83.1 kg    Renal Function:   Estimated Creatinine Clearance: 128.1 mL/min (based on SCr of 0.8 mg/dL).,     Dialysis Method (if applicable):  N/A    CBC (last 72 hours):  Recent Labs   Lab Result Units 07/10/20  0627 07/11/20  0514   WBC K/uL 7.45 7.09   Hemoglobin g/dL 11.1* 10.2*   Hematocrit % 34.8* 32.7*   Platelets K/uL 624* 654*   Gran% % 46.5 43.4   Lymph% % 34.5 37.1   Mono% % 8.9 10.0   Eosinophil% % 8.1* 8.7*   Basophil% % 1.1 0.7   Differential Method  Automated Automated       Metabolic Panel (last 72 hours):  Recent Labs   Lab Result Units 07/10/20  0627 07/11/20  0514   Sodium mmol/L 139 137   Potassium mmol/L 4.8 4.3   Chloride mmol/L 106 107   CO2 mmol/L 19* 22*   Glucose mg/dL 94 97   BUN, Bld mg/dL 9 8   Creatinine mg/dL 0.8 0.8       Drug levels (last 3 results):  No results for input(s): VANCOMYCINRA, VANCOMYCINPE, VANCOMYCINTR in the last 72 hours.    Microbiologic Results:  Microbiology Results (last 7 days)       Procedure Component Value Units Date/Time    Blood culture #2 **CANNOT BE ORDERED STAT** [[id number]] Collected: 07/06/20 4550    Order Status: Completed Specimen: Blood from  Peripheral, Antecubital, Left Updated: 07/12/20 0612     Blood Culture, Routine No growth after 5 days.    Blood culture #1 **CANNOT BE ORDERED STAT** [[id number]] Collected: 07/06/20 2230    Order Status: Completed Specimen: Blood from Peripheral, Antecubital, Left Updated: 07/12/20 0612     Blood Culture, Routine No growth after 5 days.    Urine Culture High Risk [[id number]] Collected: 07/08/20 0840    Order Status: Completed Specimen: Urine, Catheterized Updated: 07/09/20 2141     Urine Culture, Routine No growth    Narrative:      Indicated criteria for high risk culture:->Other  Other (specify):->Suspicion for UTI, obtaining new culture  s/p SPT replacement    Urine Culture High Risk [[id number]]     Order Status: Canceled Specimen: Urine     Blood culture [[id number]]     Order Status: Canceled Specimen: Blood

## 2020-07-12 NOTE — PLAN OF CARE
Patient is AAO x4. Patient is not on telemetry. Patient has has sp tube, draining yellow urine. Patient has PRN medication for medication. Patient has heparin VTE. Patient has wound vac to his sacrum. Patient has foot booties on. Monitoring patient glucose sc/hs and covered per sliding scale.  Patient bed alarm is on, bed in the lowest position, and call light within reach.

## 2020-07-12 NOTE — PLAN OF CARE
Plan of care reviewed patient verbalized understanding. Medications administered. Blood glucose monitoring per sliding scale. IV antibiotics infused. PICC line right upper arm dressing clean,dry and intact. Suprapubic catheter intact and dry. Wound vac dressing cont. Suction -125 mmHg. No telemetry. Safety maintained bed in the lowest position, call bell within reach, bed alarm on.

## 2020-07-12 NOTE — PROGRESS NOTES
[facility] Resident [facility] Progress Note    Assessment/Plan:     ESBL Proteus UTI, h/o MDR UTIs  Recent UCx +ESBL Proteus. Suprapubic catheter exchanged in ED per patient. Repeat UCx NG.  - Continue meropenem (switched from ertapenem ).     Persistent right ischial ESBL Proteus Osteomyelitis  New Coccyx Osteomyelitis with sinus tract   Wound cultures from prior admission +ESBL Proteus. Completed course of outpatient Ertapenem x6 weeks, end ~ . Patient without follow up. Repeat MRI with ongoing OM of right ischium and new OM of coccyx with likely sinus tract.   - Will need Gen Surgery consult for sinus tract eval and likely debridement/deep cultures. Would ideally like to get new culture data for this new site given read of imaging reports, will touch base with Dr. Tom. Agree with sinogram planned for tomorrow.   - IR consulted for bone biopsy  - On meropenem as above since failure of treatment/progression on ertapenem. Recommend start vanocmycin while awaiting further culture data.       Thank you for allowing us to participate in the care of this patient. Please contact me if you have any questions regarding this consult.    Darleen Fletcher MD   [facility] Medicine -III  [facility] Infectious Diseases     Subjective:      Hermann Aguilar is a 44 y.o. male who is being followed by the [facility] Infect[facility] Diseases service at [facility] for ESBL Proteus UTI and concern for ongoing sacral OM.     Patient resting comfortably this morning. Denies any problems or complaints.      Objective:   Last 24 Hour Vital Signs:  BP  Min: 94/57  Max: 128/73  Temp  Av.1 °F (36.7 °C)  Min: 96.8 °F (36 °C)  Max: 99.3 °F (37.4 °C)  Pulse  Av.8  Min: 73  Max: 85  Resp  Av.8  Min: 16  Max: 20  SpO2  Av.7 %  Min: 97 %  Max: 100 %  I/O last 3 completed shifts:  In: 740 [P.O.:740]  Out:  [Urine:]    Physical Examination:  General: awake and alert, in no apparent distress,  non-toxic appearing  Head: NC/AT  Eyes: PERRL, EOMI, no scleral icterus  Throat: MMM, no oropharyngeal exudate or erythema  CV: normal rate, regular rhythm, no mgr noted  Resp :CTAB, no crackles or whezes, comfortable on room air   Abdomen: soft, NTND, +BS, suprapubic catheter in place   Extremities: wound vac in place with good suction  Skin: dry and flaky over LEs, otherwise no rashes/lesions/bruising  Neuro: AAOx3, GCS 15, no focal deficits     Laboratory:  Laboratory Data Reviewed: yes  Pertinent Findings:  Recent Labs   Lab 07/06/20  2230  07/09/20  1136 07/10/20  0627 07/11/20  0514   WBC 10.37   < > 7.78 7.45 7.09   HGB 9.5*   < > 9.9* 11.1* 10.2*   HCT 30.4*   < > 30.5* 34.8* 32.7*   *   < > 754* 624* 654*   MCV 75*   < > 72* 73* 74*   RDW 18.0*   < > 18.0* 18.4* 18.0*      < > 136 139 137   K 3.6   < > 4.9 4.8 4.3      < > 106 106 107   CO2 23   < > 20* 19* 22*   BUN 7   < > 7 9 8   CREATININE 1.0   < > 0.7 0.8 0.8      < > 85 94 97   PROT 8.3  --   --   --   --    ALBUMIN 2.7*  --   --   --   --    BILITOT 0.1  --   --   --   --    AST 8*  --   --   --   --    ALKPHOS 136*  --   --   --   --    ALT 6*  --   --   --   --     < > = values in this interval not displayed.         Microbiology Data Reviewed: yes  Pertinent Findings:  (Prior to admission) UCx: ESBL Proteus  Repeat UCx: NGTD     Other Results:  Radiology Data Reviewed: yes  Pertinent Findings:  7/7 MRI sacrum/coccyx -   1. Osteomyelitis of the coccyx with adjacent fluid collection, likely with sinus tract to skin.  2. Osteomyelitis of the right ischium.    Current Medications:     Infusions:       Scheduled:   aspirin  81 mg Oral Daily    docusate sodium  100 mg Oral BID    enoxaparin  40 mg Subcutaneous Q24H    gabapentin  300 mg Oral BID    gemfibroziL  600 mg Oral BID AC    melatonin  9 mg Oral Nightly    meropenem (MERREM) IVPB  1 g Intravenous Q8H    metoprolol tartrate  25 mg Oral BID    sodium chloride  0.9%  10 mL Intravenous Q6H        PRN:  ammonium lactate, baclofen, cyclobenzaprine, dextrose 50%, dextrose 50%, glucagon (human recombinant), glucose, glucose, insulin aspart U-100, oxyCODONE-acetaminophen, sodium chloride 0.9%, Flushing PICC Protocol **AND** sodium chloride 0.9% **AND** sodium chloride 0.9%    Antibiotics and Day Number of Therapy:  Meropenem (7/6-7/7, 7/11 -  current)      Lines and Day Number of Therapy:  PIV  Suprapubic catheter

## 2020-07-13 LAB
ANION GAP SERPL CALC-SCNC: 8 MMOL/L (ref 8–16)
BASOPHILS # BLD AUTO: 0.05 K/UL (ref 0–0.2)
BASOPHILS NFR BLD: 0.8 % (ref 0–1.9)
BUN SERPL-MCNC: 9 MG/DL (ref 6–20)
CALCIUM SERPL-MCNC: 8.8 MG/DL (ref 8.7–10.5)
CHLORIDE SERPL-SCNC: 104 MMOL/L (ref 95–110)
CO2 SERPL-SCNC: 25 MMOL/L (ref 23–29)
CREAT SERPL-MCNC: 0.8 MG/DL (ref 0.5–1.4)
DIFFERENTIAL METHOD: ABNORMAL
EOSINOPHIL # BLD AUTO: 0.9 K/UL (ref 0–0.5)
EOSINOPHIL NFR BLD: 13.8 % (ref 0–8)
ERYTHROCYTE [DISTWIDTH] IN BLOOD BY AUTOMATED COUNT: 18.2 % (ref 11.5–14.5)
EST. GFR  (AFRICAN AMERICAN): >60 ML/MIN/1.73 M^2
EST. GFR  (NON AFRICAN AMERICAN): >60 ML/MIN/1.73 M^2
GLUCOSE SERPL-MCNC: 133 MG/DL (ref 70–110)
HCT VFR BLD AUTO: 27.8 % (ref 40–54)
HGB BLD-MCNC: 8.9 G/DL (ref 14–18)
IMM GRANULOCYTES # BLD AUTO: 0.02 K/UL (ref 0–0.04)
IMM GRANULOCYTES NFR BLD AUTO: 0.3 % (ref 0–0.5)
LYMPHOCYTES # BLD AUTO: 2.4 K/UL (ref 1–4.8)
LYMPHOCYTES NFR BLD: 37.5 % (ref 18–48)
MCH RBC QN AUTO: 23.6 PG (ref 27–31)
MCHC RBC AUTO-ENTMCNC: 32 G/DL (ref 32–36)
MCV RBC AUTO: 74 FL (ref 82–98)
MONOCYTES # BLD AUTO: 0.5 K/UL (ref 0.3–1)
MONOCYTES NFR BLD: 7.8 % (ref 4–15)
NEUTROPHILS # BLD AUTO: 2.5 K/UL (ref 1.8–7.7)
NEUTROPHILS NFR BLD: 39.8 % (ref 38–73)
NRBC BLD-RTO: 0 /100 WBC
PLATELET # BLD AUTO: 639 K/UL (ref 150–350)
PMV BLD AUTO: 9.5 FL (ref 9.2–12.9)
POCT GLUCOSE: 103 MG/DL (ref 70–110)
POCT GLUCOSE: 104 MG/DL (ref 70–110)
POCT GLUCOSE: 120 MG/DL (ref 70–110)
POCT GLUCOSE: 129 MG/DL (ref 70–110)
POCT GLUCOSE: 62 MG/DL (ref 70–110)
POTASSIUM SERPL-SCNC: 4.1 MMOL/L (ref 3.5–5.1)
RBC # BLD AUTO: 3.77 M/UL (ref 4.6–6.2)
SODIUM SERPL-SCNC: 137 MMOL/L (ref 136–145)
WBC # BLD AUTO: 6.29 K/UL (ref 3.9–12.7)

## 2020-07-13 PROCEDURE — 88307 TISSUE EXAM BY PATHOLOGIST: CPT | Performed by: PATHOLOGY

## 2020-07-13 PROCEDURE — A4216 STERILE WATER/SALINE, 10 ML: HCPCS | Performed by: STUDENT IN AN ORGANIZED HEALTH CARE EDUCATION/TRAINING PROGRAM

## 2020-07-13 PROCEDURE — 87205 SMEAR GRAM STAIN: CPT

## 2020-07-13 PROCEDURE — 88307 TISSUE EXAM BY PATHOLOGIST: CPT | Mod: 26,,, | Performed by: PATHOLOGY

## 2020-07-13 PROCEDURE — 88307 PR  SURG PATH,LEVEL V: ICD-10-PCS | Mod: 26,,, | Performed by: PATHOLOGY

## 2020-07-13 PROCEDURE — 11000001 HC ACUTE MED/SURG PRIVATE ROOM

## 2020-07-13 PROCEDURE — 87116 MYCOBACTERIA CULTURE: CPT

## 2020-07-13 PROCEDURE — 87206 SMEAR FLUORESCENT/ACID STAI: CPT

## 2020-07-13 PROCEDURE — 80048 BASIC METABOLIC PNL TOTAL CA: CPT

## 2020-07-13 PROCEDURE — 85025 COMPLETE CBC W/AUTO DIFF WBC: CPT

## 2020-07-13 PROCEDURE — 87070 CULTURE OTHR SPECIMN AEROBIC: CPT

## 2020-07-13 PROCEDURE — 25000003 PHARM REV CODE 250: Performed by: STUDENT IN AN ORGANIZED HEALTH CARE EDUCATION/TRAINING PROGRAM

## 2020-07-13 PROCEDURE — 63600175 PHARM REV CODE 636 W HCPCS: Performed by: STUDENT IN AN ORGANIZED HEALTH CARE EDUCATION/TRAINING PROGRAM

## 2020-07-13 PROCEDURE — 63600175 PHARM REV CODE 636 W HCPCS: Performed by: INTERNAL MEDICINE

## 2020-07-13 PROCEDURE — 88311 DECALCIFY TISSUE: CPT | Performed by: PATHOLOGY

## 2020-07-13 PROCEDURE — 88311 DECALCIFY TISSUE: CPT | Mod: 26,,, | Performed by: PATHOLOGY

## 2020-07-13 PROCEDURE — 87102 FUNGUS ISOLATION CULTURE: CPT

## 2020-07-13 PROCEDURE — 88311 PR  DECALCIFY TISSUE: ICD-10-PCS | Mod: 26,,, | Performed by: PATHOLOGY

## 2020-07-13 PROCEDURE — 87075 CULTR BACTERIA EXCEPT BLOOD: CPT

## 2020-07-13 PROCEDURE — 63600175 PHARM REV CODE 636 W HCPCS: Performed by: RADIOLOGY

## 2020-07-13 RX ORDER — FENTANYL CITRATE 50 UG/ML
INJECTION, SOLUTION INTRAMUSCULAR; INTRAVENOUS CODE/TRAUMA/SEDATION MEDICATION
Status: COMPLETED | OUTPATIENT
Start: 2020-07-13 | End: 2020-07-13

## 2020-07-13 RX ORDER — MIDAZOLAM HYDROCHLORIDE 1 MG/ML
INJECTION INTRAMUSCULAR; INTRAVENOUS CODE/TRAUMA/SEDATION MEDICATION
Status: COMPLETED | OUTPATIENT
Start: 2020-07-13 | End: 2020-07-13

## 2020-07-13 RX ADMIN — METOPROLOL TARTRATE 25 MG: 25 TABLET, FILM COATED ORAL at 08:07

## 2020-07-13 RX ADMIN — Medication 10 ML: at 12:07

## 2020-07-13 RX ADMIN — DOCUSATE SODIUM 100 MG: 100 CAPSULE, LIQUID FILLED ORAL at 08:07

## 2020-07-13 RX ADMIN — Medication 10 ML: at 06:07

## 2020-07-13 RX ADMIN — Medication 9 MG: at 08:07

## 2020-07-13 RX ADMIN — VANCOMYCIN HYDROCHLORIDE 1500 MG: 1.5 INJECTION, POWDER, LYOPHILIZED, FOR SOLUTION INTRAVENOUS at 04:07

## 2020-07-13 RX ADMIN — OXYCODONE AND ACETAMINOPHEN 1 TABLET: 7.5; 325 TABLET ORAL at 08:07

## 2020-07-13 RX ADMIN — Medication 10 ML: at 11:07

## 2020-07-13 RX ADMIN — ASPIRIN 81 MG: 81 TABLET, COATED ORAL at 08:07

## 2020-07-13 RX ADMIN — GABAPENTIN 300 MG: 300 CAPSULE ORAL at 08:07

## 2020-07-13 RX ADMIN — GEMFIBROZIL 600 MG: 600 TABLET ORAL at 04:07

## 2020-07-13 RX ADMIN — VANCOMYCIN HYDROCHLORIDE 1500 MG: 1.5 INJECTION, POWDER, LYOPHILIZED, FOR SOLUTION INTRAVENOUS at 02:07

## 2020-07-13 RX ADMIN — FENTANYL CITRATE 50 MCG: 50 INJECTION INTRAMUSCULAR; INTRAVENOUS at 02:07

## 2020-07-13 RX ADMIN — MEROPENEM 1 G: 1 INJECTION, POWDER, FOR SOLUTION INTRAVENOUS at 08:07

## 2020-07-13 RX ADMIN — MIDAZOLAM HYDROCHLORIDE 1 MG: 1 INJECTION, SOLUTION INTRAMUSCULAR; INTRAVENOUS at 02:07

## 2020-07-13 RX ADMIN — MEROPENEM 1 G: 1 INJECTION, POWDER, FOR SOLUTION INTRAVENOUS at 04:07

## 2020-07-13 RX ADMIN — ENOXAPARIN SODIUM 40 MG: 100 INJECTION SUBCUTANEOUS at 04:07

## 2020-07-13 RX ADMIN — MEROPENEM 1 G: 1 INJECTION, POWDER, FOR SOLUTION INTRAVENOUS at 11:07

## 2020-07-13 NOTE — PROGRESS NOTES
[facility] Internal Medicine Resident Progress Note    Subjective:       Resting in bed comfortably, no complaints. Denies f/c, n/v, abdominal/back pain.     Objective:   Last 24 Hour Vital Signs:  BP  Min: 99/63  Max: 118/71  Temp  Av.8 °F (36 °C)  Min: 96.1 °F (35.6 °C)  Max: 97.3 °F (36.3 °C)  Pulse  Av.6  Min: 64  Max: 77  Resp  Av.4  Min: 16  Max: 18  SpO2  Av.4 %  Min: 99 %  Max: 100 %  I/O last 3 completed shifts:  In: 1325 [P.O.:625; IV Piggyback:700]  Out: 2775 [Urine:2775]    Physical Examination:  General: NAD, lying in bed  Lungs: Clear to auscultation, non-labored respiration  Heart: Normal rate, regular rhythm, no murmur, gallop or edema.  Abdomen/back: wound vac to sacrum, supra-pubic catheter c/d/i  Extremities: No edema, distal pulses 2+  Skin: Skin is warm, dry and pink, no rashes or lesions  Psychiatric: Cooperative, flat affect    Laboratory:  Laboratory Data Reviewed:  Trended Lab Data:  Recent Labs   Lab 20  2230  07/10/20  0627 20  0514 20  0409   WBC 10.37   < > 7.45 7.09 6.29   HGB 9.5*   < > 11.1* 10.2* 8.9*   HCT 30.4*   < > 34.8* 32.7* 27.8*   *   < > 624* 654* 639*   MCV 75*   < > 73* 74* 74*   RDW 18.0*   < > 18.4* 18.0* 18.2*      < > 139 137 137   K 3.6   < > 4.8 4.3 4.1      < > 106 107 104   CO2 23   < > 19* 22* 25   BUN 7   < > 9 8 9   CREATININE 1.0   < > 0.8 0.8 0.8      < > 94 97 133*   PROT 8.3  --   --   --   --    ALBUMIN 2.7*  --   --   --   --    BILITOT 0.1  --   --   --   --    AST 8*  --   --   --   --    ALKPHOS 136*  --   --   --   --    ALT 6*  --   --   --   --     < > = values in this interval not displayed.       Trended Cardiac Data:  No results for input(s): TROPONINI, CKTOTAL, CKMB, BNP in the last 168 hours.    Microbiology Data   Microbiology Results (last 7 days)     Procedure Component Value Units Date/Time    Blood culture #2 **CANNOT BE ORDERED STAT** [[id number]] Collected: 20 6190    Order  Status: Completed Specimen: Blood from Peripheral, Antecubital, Left Updated: 07/12/20 0612     Blood Culture, Routine No growth after 5 days.    Blood culture #1 **CANNOT BE ORDERED STAT** [[id number]] Collected: 07/06/20 2230    Order Status: Completed Specimen: Blood from Peripheral, Antecubital, Left Updated: 07/12/20 0612     Blood Culture, Routine No growth after 5 days.    Urine Culture High Risk [[id number]] Collected: 07/08/20 0840    Order Status: Completed Specimen: Urine, Catheterized Updated: 07/09/20 2141     Urine Culture, Routine No growth    Narrative:      Indicated criteria for high risk culture:->Other  Other (specify):->Suspicion for UTI, obtaining new culture  s/p SPT replacement    Urine Culture High Risk [[id number]]     Order Status: Canceled Specimen: Urine     Blood culture [[id number]]     Order Status: Canceled Specimen: Blood           Radiology:  Imaging Results    None           Current Medications:     Infusions:       Scheduled:   aspirin  81 mg Oral Daily    docusate sodium  100 mg Oral BID    enoxaparin  40 mg Subcutaneous Q24H    gabapentin  300 mg Oral BID    gemfibroziL  600 mg Oral BID AC    melatonin  9 mg Oral Nightly    meropenem (MERREM) IVPB  1 g Intravenous Q8H    metoprolol tartrate  25 mg Oral BID    sodium chloride 0.9%  10 mL Intravenous Q6H    vancomycin (VANCOCIN) IVPB  1,500 mg Intravenous Q12H        PRN:  ammonium lactate, baclofen, cyclobenzaprine, dextrose 50%, dextrose 50%, glucagon (human recombinant), glucose, glucose, insulin aspart U-100, oxyCODONE-acetaminophen, sodium chloride 0.9%, Flushing PICC Protocol **AND** sodium chloride 0.9% **AND** sodium chloride 0.9%, Pharmacy to dose Vancomycin consult **AND** vancomycin - pharmacy to dose    Antibiotics and Day Number of Therapy:  Ertapenem 7/7 -   Vanc 7/12-  Lines and Day Number of Therapy:  PIV  Suprapubic catheter    Radiology  MRI sacrum/coccyx 7/10    FINDINGS:  There is T1 replacement and STIR  signal hyperintensity with enhancement involving the coccyx consistent with osteomyelitis.  This is in contiguity with peripherally enhancing fluid collection measuring approximately 3 x 3 x 1 cm just distal to the coccyx.  Possible extension to the skin in the perianal region, not well seen. Presacral edema noted.     There is partially visualized T1 hypointense and STIR hyperintense signal at the ischial tuberosity consistent with osteomyelitis.  Marked edema of the adjacent  musculature, partially visualized.     Bilateral gluteus muscle edema noted.     Urinary bladder thickening with suprapubic catheter noted.     Impression:     1. Osteomyelitis of the coccyx with adjacent fluid collection, likely with sinus tract to skin.  2. Osteomyelitis of the right ischium.    Assessment and Plan   44 y.o. 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 Continuing ertapenem and repeating MRI to further evaluate R ischium     Complicated UTI, History of MDR UTI  - In the ED, temp 98.5 WBC 10.37  - History of proteus ESBL and Serratia  - Urine cultures from outside facility are in media tab.   - F/u blood cultures   - Merrem switched to Ertapenem 7/7, end date for UTI 7/21  - ID consulted  - Contact precautions for ESBL  - per CM pt does not want to go to SNF for continued abx, agreeable to at home program     Concern for right ischium osteomyelitis, coccyx osteomyelitis with potential sinus tract  - MRI 5/2/20 showing right ischium soft tissue inflammation concerning for osteomyelitis.   - concern for osteomyelitis of coccyx, IR biopsy scheduled per ID recs  - sinogram today to evaluate sinus tract,f/u surgery recs  - ID recommended starting vanc for sinus tract pending culture results  - Bone biopsy today with  IR  - Wound care consulted     Anemia of chronic inflammation  - Initial Hgb 9.5, Baseline 10.  - No signs or symptoms of bleeding  - Repeat iron studies mixed picture - ferritin 90, iron 18, TIBC 255, folate 5.6, vitamin 418     Protein gap   - Protein gap of 6   - HIV/Hep C panel negative  - suspect low albumin resulting from chronic inflammatory processes     HTN:  - Normotensive  - Continue home lopressor 25 BID     Type II DM  - Well controlled; insulin dependent  - Home meds: Levomir 15 BID, Lispro 20 TID with meals, and metformin 500 BID. Pt states he is unsure what he takes.  - SSI      Hyperlipidemia  - Continue home aspirin, gemfibrozil  - Recheck lipid panel     Fluids: None  Electrolytes: Replace as need  Nutrition: Diabetic diet  Prophylaxis: Lovenox     Disposition: Pending sinus tract workup/biopsy results.    Nadir Pereira  [facility] Internal Medicine HO-I  [facility] Hospitalist Medicine Team B    [facility] Medicine Hospitalist Pager numbers:   [facility] Hospitalist Medicine Team A (Farrukh/Valdemar): [phone]  [facility] Hospitalist Medicine Team B (Connie/Mariya):  [phone]

## 2020-07-13 NOTE — PLAN OF CARE
VN rounds completed.  The patient has no complaints at this time and states that he didn't have a bowel movement today.  IV sights were not visible due to Coban.

## 2020-07-13 NOTE — CONSULTS
Interventional Radiology Consult/Pre-Procedure Note      Chief Complaint/Reason for Consult: Osteomyelitis    History of Present Illness:  Hermann Aguilar is a 44 y.o. male with the PMH listed below who presents with osteomyelitis. IR consulted for bone biopsy of the coccyx.    Admission H&P reviewed.    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 Yadiel Haley    CHEST TUBE INSERTION Right     CYSTOSCOPY N/A 8/30/2018    Procedure: CYSTOSCOPY, clot evacuation, suprapubic tube exchange;  Surgeon: Ruchi Navarrete MD;  Location: [facility] OR;  Service: Urology;  Laterality: N/A;    DEBRIDEMENT OF SACRAL WOUND N/A 5/5/2020    Procedure: DEBRIDEMENT, WOUND, SACRUM;  Surgeon: Jesus Tom MD;  Location: [facility] OR;  Service: General;  Laterality: N/A;    GASTROSTOMY TUBE PLACEMENT  12/15/2016    ORIF HUMERUS FRACTURE Left 12/15/2016    REMOVAL OF BLOOD CLOT  8/30/2018    Procedure: REMOVAL, BLOOD CLOT;  Surgeon: Ruchi Navarrete MD;  Location: [facility] OR;  Service: Urology;;    TRACHEOSTOMY TUBE PLACEMENT         Allergies:   Review of patient's allergies indicates:  No Known Allergies    Scheduled Meds:    aspirin  81 mg Oral Daily    docusate sodium  100 mg Oral BID    enoxaparin  40 mg Subcutaneous Q24H    gabapentin  300 mg Oral BID     gemfibroziL  600 mg Oral BID AC    melatonin  9 mg Oral Nightly    meropenem (MERREM) IVPB  1 g Intravenous Q8H    metoprolol tartrate  25 mg Oral BID    sodium chloride 0.9%  10 mL Intravenous Q6H    vancomycin (VANCOCIN) IVPB  1,500 mg Intravenous Q12H     Continuous Infusions:   PRN Meds:ammonium lactate, baclofen, cyclobenzaprine, dextrose 50%, dextrose 50%, glucagon (human recombinant), glucose, glucose, insulin aspart U-100, oxyCODONE-acetaminophen, sodium chloride 0.9%, Flushing PICC Protocol **AND** sodium chloride 0.9% **AND** sodium chloride 0.9%, Pharmacy to dose Vancomycin consult **AND** vancomycin - pharmacy to dose    Anticoagulation/Antiplatelet Meds: aspirin and Lovenox    Review of Systems:   As documented in admission H&P.    Physical Exam:  Temp: 96.1 °F (35.6 °C) (07/13/20 1117)  Pulse: 74 (07/13/20 1525)  Resp: 20 (07/13/20 1525)  BP: 107/83 (07/13/20 1525)  SpO2: 98 % (07/13/20 1525)     General: WNWD, NAD  HEENT: Normocephalic, sclera anicteric, oropharynx clear  Heart: RRR  Lungs: Symmetric excursions, breathing unlabored  Abd: NTND, soft  MSK: Soft tissue defect/sinus tract posteriorly, just above the intergluteal cleft    Labs:  No results for input(s): INR in the last 168 hours.    Invalid input(s):  PT,  PTT    Recent Labs   Lab 07/13/20  0409   WBC 6.29   HGB 8.9*   HCT 27.8*   MCV 74*   *      Recent Labs   Lab 07/06/20  2230  07/13/20  0409      < > 133*      < > 137   K 3.6   < > 4.1      < > 104   CO2 23   < > 25   BUN 7   < > 9   CREATININE 1.0   < > 0.8   CALCIUM 8.9   < > 8.8   ALT 6*  --   --    AST 8*  --   --    ALBUMIN 2.7*  --   --    BILITOT 0.1  --   --     < > = values in this interval not displayed.     Imaging:  MRI coccyx/sacrum 7/10/20 reviewed.    Assessment/Plan:   Osteomyelitis of the coccyx and right ischium. Will undergo bx of the coccyx today.    Sedation:  Sedation history: have not been any systemic reactions  ASA: 3 /  Mallampati: 3  Sedation plan: Moderate (Versed, fentanyl)     Risks (including, but not limited to, pain, bleeding, infection, damage to nearby structures, failure to obtain sufficient material for a diagnosis, and the need for additional procedures), benefits, and alternatives were discussed with the patient. All questions were answered to the best of my abilities. The patient wishes to proceed. Written informed consent was obtained.      Aubrey Sahu MD  [facility]  Pager [phone]

## 2020-07-13 NOTE — NURSING
Patient's blood sugar is 62. He is not showing any signs of hypoglycemia.  Patient has been NPO since yesterday for a procedure and just recently returned.  Patient was given an orange juice and has a meal tray he is eating.  Will recheck blood sugar in 30 minutes.

## 2020-07-13 NOTE — NURSING
Procedure complete. Patient tolerated well. No complications. Specimen sent to pathology via RN. Orders placed. VSS. Band aid appled to puncture site to left buttocks. No bleeding or hematoma noted. Report called to RN on 4th floor. Patient ready for transport.

## 2020-07-13 NOTE — PLAN OF CARE
VN entered the patient's room with permission via camera. The patient was quietly resting in bed. He denies any pain and has no needs at this time. WCTM.

## 2020-07-13 NOTE — PLAN OF CARE
U INFECTIOUS DISEASES PLAN OF CARE    Reviewed patient's chart. No acute events yesterday or overnight. Afebrile. No leukocytosis.     Discussed with Dr. Tom yesterday ID team's request for debridement/deep culture data as none has been obtained thus far this admission. Only ED wound culture obtained on prior visit as well.       ESBL Proteus UTI, h/o MDR UTIs  Recent UCx +ESBL Proteus. Suprapubic catheter exchanged in ED per patient. Repeat UCx NG.  - Continue meropenem (switched from ertapenem 7/11).     Persistent right ischial ESBL Proteus Osteomyelitis  New Coccyx Osteomyelitis with sinus tract   Wound cultures from prior admission +ESBL Proteus. Completed course of outpatient Ertapenem x6 weeks, end ~ 6/16. Patient without follow up. Repeat MRI with ongoing OM of right ischium and new OM of coccyx with likely sinus tract.   - Will need Gen Surgery consult for sinus tract eval and likely debridement/deep cultures. Would ideally like to get new culture data for this new site given read of imaging reports, spoke with Dr. Tom yesterday. Agree with sinogram to evaluate sinus tract.   - IR consulted for bone biopsy  - Continue vancomycin and meropenem at this time.      Thank you for allowing us to participate in the care of this patient. Please contact me if you have any questions regarding this consult.     Darleen Fletcher MD   [facility] Internal Medicine HO-III  U Infectious Diseases

## 2020-07-13 NOTE — PLAN OF CARE
Patient remains in hospital with wound vac, PICC and IV abx    CT pending for bone biopsy    Patient does not want to go to a nursing home- will send referral to [facility] to see if patient eligible.       07/13/20 1530   Discharge Reassessment   Assessment Type Discharge Planning Reassessment   Discharge Plan A Home;Home Health   Discharge Plan B Long-term acute care facility (LTAC)     Michelle Fontana RN, CCM, CMSRN  RN Transition Navigator  [phone]

## 2020-07-13 NOTE — PROGRESS NOTES
"Surgery follow up  /62 (Patient Position: Lying)   Pulse 65   Temp 97.6 °F (36.4 °C) (Oral)   Resp 18   Ht 5' 11" (1.803 m)   Wt 83.1 kg (183 lb 3.2 oz)   SpO2 98%   BMI 25.55 kg/m²   I/O last 3 completed shifts:  In: 1325 [P.O.:625; IV Piggyback:700]  Out: 2775 [Urine:2775]  I/O this shift:  In: 825 [P.O.:375; IV Piggyback:450]  Out: 450 [Urine:450]  S/p percutaneous biopsy by , no need for sinogram at this point . wait culture report.  "

## 2020-07-13 NOTE — PROCEDURES
Interventional Radiology Post-Procedure Note    Pre Op Diagnosis: Osteomyelitis  Post Op Diagnosis: Same    Procedure: CT-guided coaxial core needle biopsy    Procedure performed by: Adelina    Written Informed Consent Obtained: Yes  Specimen Sent: Yes  Estimated Blood Loss: Minimal    Findings:   13-ga cores x2 taken from inferior coccyx. Location correlates with site osteo on MRI. 1 core placed in formalin for pathology. 1 core placed in sterile NS for micro.    No immediate complications. Patient tolerated procedure well. Please see full dictated procedure report for additional details and recommendations.      [facility]rey [facility]hmanLa [facility]  Pager [phone]

## 2020-07-14 ENCOUNTER — TELEPHONE (OUTPATIENT)
Dept: FAMILY MEDICINE | Facility: CLINIC | Age: 45
End: 2020-07-14

## 2020-07-14 PROBLEM — N39.0 URINARY TRACT INFECTION ASSOCIATED WITH INDWELLING URETHRAL CATHETER: Status: RESOLVED | Noted: 2020-07-07 | Resolved: 2020-07-14

## 2020-07-14 PROBLEM — F33.40 RECURRENT MAJOR DEPRESSIVE DISORDER, IN REMISSION: Chronic | Status: RESOLVED | Noted: 2017-02-11 | Resolved: 2020-07-14

## 2020-07-14 PROBLEM — N39.0 COMPLICATED UTI (URINARY TRACT INFECTION): Status: RESOLVED | Noted: 2019-08-24 | Resolved: 2020-07-14

## 2020-07-14 PROBLEM — T83.511A URINARY TRACT INFECTION ASSOCIATED WITH INDWELLING URETHRAL CATHETER: Status: RESOLVED | Noted: 2020-07-07 | Resolved: 2020-07-14

## 2020-07-14 LAB
GRAM STN SPEC: NORMAL
GRAM STN SPEC: NORMAL
POCT GLUCOSE: 136 MG/DL (ref 70–110)
POCT GLUCOSE: 138 MG/DL (ref 70–110)
POCT GLUCOSE: 95 MG/DL (ref 70–110)
POCT GLUCOSE: 96 MG/DL (ref 70–110)
VANCOMYCIN SERPL-MCNC: 12 UG/ML
VANCOMYCIN TROUGH SERPL-MCNC: 28.2 UG/ML (ref 10–22)

## 2020-07-14 PROCEDURE — 25000003 PHARM REV CODE 250: Performed by: STUDENT IN AN ORGANIZED HEALTH CARE EDUCATION/TRAINING PROGRAM

## 2020-07-14 PROCEDURE — 80202 ASSAY OF VANCOMYCIN: CPT | Mod: 91

## 2020-07-14 PROCEDURE — 97803 MED NUTRITION INDIV SUBSEQ: CPT

## 2020-07-14 PROCEDURE — 80202 ASSAY OF VANCOMYCIN: CPT

## 2020-07-14 PROCEDURE — 11000001 HC ACUTE MED/SURG PRIVATE ROOM

## 2020-07-14 PROCEDURE — 63600175 PHARM REV CODE 636 W HCPCS: Performed by: INTERNAL MEDICINE

## 2020-07-14 PROCEDURE — 25000003 PHARM REV CODE 250: Performed by: INTERNAL MEDICINE

## 2020-07-14 PROCEDURE — A4216 STERILE WATER/SALINE, 10 ML: HCPCS | Performed by: STUDENT IN AN ORGANIZED HEALTH CARE EDUCATION/TRAINING PROGRAM

## 2020-07-14 RX ADMIN — Medication 9 MG: at 08:07

## 2020-07-14 RX ADMIN — GEMFIBROZIL 600 MG: 600 TABLET ORAL at 05:07

## 2020-07-14 RX ADMIN — OXYCODONE AND ACETAMINOPHEN 1 TABLET: 7.5; 325 TABLET ORAL at 08:07

## 2020-07-14 RX ADMIN — METOPROLOL TARTRATE 25 MG: 25 TABLET, FILM COATED ORAL at 08:07

## 2020-07-14 RX ADMIN — Medication 10 ML: at 11:07

## 2020-07-14 RX ADMIN — DOCUSATE SODIUM 100 MG: 100 CAPSULE, LIQUID FILLED ORAL at 08:07

## 2020-07-14 RX ADMIN — ASPIRIN 81 MG: 81 TABLET, COATED ORAL at 08:07

## 2020-07-14 RX ADMIN — GABAPENTIN 300 MG: 300 CAPSULE ORAL at 08:07

## 2020-07-14 RX ADMIN — Medication 10 ML: at 05:07

## 2020-07-14 RX ADMIN — MEROPENEM 1 G: 1 INJECTION, POWDER, FOR SOLUTION INTRAVENOUS at 04:07

## 2020-07-14 RX ADMIN — GEMFIBROZIL 600 MG: 600 TABLET ORAL at 04:07

## 2020-07-14 RX ADMIN — Medication 10 ML: at 12:07

## 2020-07-14 RX ADMIN — MEROPENEM 1 G: 1 INJECTION, POWDER, FOR SOLUTION INTRAVENOUS at 11:07

## 2020-07-14 RX ADMIN — MEROPENEM 1 G: 1 INJECTION, POWDER, FOR SOLUTION INTRAVENOUS at 08:07

## 2020-07-14 RX ADMIN — Medication 10 ML: at 06:07

## 2020-07-14 NOTE — NURSING
Spoke to Dr. Pereira regarding Sinogram not being completed and informed him that he had to call the doctor in IR d/t need of more information as to why this patient need this type of procedure.  No new orders received.

## 2020-07-14 NOTE — TELEPHONE ENCOUNTER
--- Message from Michelle Fontana RN sent at 7/14/2020  4:14 PM CDT -----  Regarding: hospital discharge followup  Patient being discharged today to home. I sent a referral to the NP at home program-and they should see patient. If not, please schedule followup in 1-2 weeks.    Thanks,  Michelle Fontana, RN, [facility], CMSRN  RN Transition Navigator  [phone]

## 2020-07-14 NOTE — PROGRESS NOTES
Pharmacokinetic Assessment Follow Up: IV Vancomycin    Vancomycin serum concentration assessment(s):    The trough level was drawn correctly and can be used to guide therapy at this time. The measurement is above the desired definitive target range of 15 to 20 mcg/mL.    Vancomycin Regimen Plan:    Discontinue the scheduled vancomycin regimen and re-dose when the random level is less than 20 mcg/mL, next level to be drawn at 7/14/20 on 1400.    Drug levels (last 3 results):  Recent Labs   Lab Result Units 07/14/20  0200   Vancomycin-Trough ug/mL 28.2*       Pharmacy will continue to follow and monitor vancomycin.    Please contact pharmacy at extension [phone] for questions regarding this assessment.    Thank you for the consult,   Christine Pompa       Patient brief summary:  Hermann Aguilar is a 44 y.o. male initiated on antimicrobial therapy with IV Vancomycin for treatment of bacteremia    The patient's current regimen is 1500mg q12    Drug Allergies:   Review of patient's allergies indicates:  No Known Allergies    Actual Body Weight:   83kg    Renal Function:   Estimated Creatinine Clearance: 128.1 mL/min (based on SCr of 0.8 mg/dL).,     Dialysis Method (if applicable):  N/A    CBC (last 72 hours):  Recent Labs   Lab Result Units 07/11/20  0514 07/13/20  0409   WBC K/uL 7.09 6.29   Hemoglobin g/dL 10.2* 8.9*   Hematocrit % 32.7* 27.8*   Platelets K/uL 654* 639*   Gran% % 43.4 39.8   Lymph% % 37.1 37.5   Mono% % 10.0 7.8   Eosinophil% % 8.7* 13.8*   Basophil% % 0.7 0.8   Differential Method  Automated Automated       Metabolic Panel (last 72 hours):  Recent Labs   Lab Result Units 07/11/20  0514 07/13/20  0409   Sodium mmol/L 137 137   Potassium mmol/L 4.3 4.1   Chloride mmol/L 107 104   CO2 mmol/L 22* 25   Glucose mg/dL 97 133*   BUN, Bld mg/dL 8 9   Creatinine mg/dL 0.8 0.8       Vancomycin Administrations:  vancomycin given in the last 96 hours                     vancomycin 1.5 g in dextrose 5 % 250 mL IVPB  (ready to mix) (mg) 1,500 mg New Bag 07/13/20 1610     1,500 mg New Bag  0214    vancomycin (VANCOCIN) 2,000 mg in dextrose 5 % 500 mL IVPB (mg) 2,000 mg New Bag 07/12/20 1520                    Microbiologic Results:  Microbiology Results (last 7 days)       Procedure Component Value Units Date/Time    Gram stain [[id number]] Collected: 07/13/20 1514    Order Status: Completed Specimen: Bone from Coccyx Updated: 07/14/20 0135     Gram Stain Result No WBC's      No organisms seen    Culture, Anaerobic [[id number]] Collected: 07/13/20 1514    Order Status: Sent Specimen: Bone from Coccyx Updated: 07/14/20 0002    AFB Culture & Smear [[id number]] Collected: 07/13/20 1514    Order Status: Sent Specimen: Bone from Coccyx Updated: 07/14/20 0002    Aerobic culture [[id number]] Collected: 07/13/20 1514    Order Status: Sent Specimen: Bone from Coccyx Updated: 07/14/20 0002    Fungus culture [[id number]] Collected: 07/13/20 1514    Order Status: Sent Specimen: Bone from Coccyx Updated: 07/14/20 0002    Blood culture #2 **CANNOT BE ORDERED STAT** [[id number]] Collected: 07/06/20 2250    Order Status: Completed Specimen: Blood from Peripheral, Antecubital, Left Updated: 07/12/20 0612     Blood Culture, Routine No growth after 5 days.    Blood culture #1 **CANNOT BE ORDERED STAT** [[id number]] Collected: 07/06/20 2230    Order Status: Completed Specimen: Blood from Peripheral, Antecubital, Left Updated: 07/12/20 0612     Blood Culture, Routine No growth after 5 days.    Urine Culture High Risk [[id number]] Collected: 07/08/20 0840    Order Status: Completed Specimen: Urine, Catheterized Updated: 07/09/20 2141     Urine Culture, Routine No growth    Narrative:      Indicated criteria for high risk culture:->Other  Other (specify):->Suspicion for UTI, obtaining new culture  s/p SPT replacement    Urine Culture High Risk [[id number]]     Order Status: Canceled Specimen: Urine

## 2020-07-14 NOTE — PROGRESS NOTES
"[facility]  Adult Nutrition  Progress Note    SUMMARY       Recommendations    Recommendation:   1. Add beneprotein tid and Nelson bid to promote wound healing.   2. Encourage intake at meals as tolerated.   3. RD to continue to follow to monitor po intake    Goals:   Pt will tolerate po diet with at least 75% intake at meals  Nutrition Goal Status: progressing towards goal  Communication of RD Recs: other (comment)(second sign)    Reason for Assessment  Reason For Assessment: RD follow-up  Diagnosis: (complicated UTI)  Relevant Medical History: trach, HTN, DM, PEG, ORIF L humerus, R BKA  General Information Comments: Pt continues on 2000 ADA diet. Pt with good intake at recent meals. Olman 14-sacral, B hip wounds. PICC line. Pt on isolation-unable to assess NFPE.  Nutrition Discharge Planning: pt to d/c on ADA diet    Nutrition Risk Screen  Nutrition Risk Screen: large or nonhealing wound, burn or pressure injury    Nutrition/Diet History  Food Preferences: no Hinduism or cultural food prefs identified  Factors Affecting Nutritional Intake: None identified at this time    Anthropometrics  Temp: 96.9 °F (36.1 °C)  Height Method: Stated  Height: 5' 11" (180.3 cm)  Height (inches): 71 in  Weight Method: Bed Scale  Weight: 83.1 kg (183 lb 3.2 oz)  Weight (lb): 183.2 lb  Ideal Body Weight (IBW), Male: 172 lb  % Ideal Body Weight, Male (lb): 112.02 %  BMI (Calculated): 25.6  Amputation %: 5.9  Total Amputation %: 5.9  Amputation Ideal Body Weight (IBW), Male (lb): 166.1 lb  Amputee BMI (kg/m2): 28.64 kg/m2     Lab/Procedures/Meds  Pertinent Labs Reviewed: reviewed  Pertinent Labs Comments: Glu 133H, Alb 2.7L  Pertinent Medications Reviewed: reviewed  Pertinent Medications Comments: aspirin    Estimated/Assessed Needs  Weight Used For Calorie Calculations: 83.1 kg (183 lb 3.2 oz)  Energy Calorie Requirements (kcal): 2346 (30kcal/kg with amputation%)  Energy Need Method: Kcal/kg  Protein Requirements: " 117g (1.5g/kg with amputation%)  Weight Used For Protein Calculations: 83.1 kg (183 lb 3.2 oz)  Estimated Fluid Requirement Method: RDA Method  RDA Method (mL): 2346  CHO Requirement: 270g      Nutrition Prescription Ordered  Current Diet Order: 2000 ADA    Evaluation of Received Nutrient/Fluid Intake  I/O: -150  Energy Calories Required: meeting needs  Protein Required: meeting needs  Fluid Required: meeting needs  Comments: LBM 7/11  % Intake of Estimated Energy Needs: 50 - 75 %  % Meal Intake: 50 - 75 %    Nutrition Risk  Level of Risk/Frequency of Follow-up: (2xweekly)     Assessment and Plan   Nutrition Problem  Increased energy needs     Related to (etiology):   Wound healing     Signs and Symptoms (as evidenced by):   Wounds     Interventions:  Collaboration with other providers  Commercial beverage  Increased protein diet     Nutrition Diagnosis Status:   Continues    Monitor and Evaluation  Food and Nutrient Intake: food and beverage intake  Food and Nutrient Adminstration: diet order  Physical Activity and Function: nutrition-related ADLs and IADLs  Anthropometric Measurements: weight  Biochemical Data, Medical Tests and Procedures: electrolyte and renal panel  Nutrition-Focused Physical Findings: overall appearance     Malnutrition Assessment  Unable to assess NFPE-pt on isolation      Nutrition Follow-Up  RD Follow-up?: Yes

## 2020-07-14 NOTE — PLAN OF CARE
Patient declined LTAC  Home health ordered received and sent to [facility] and [facility].  Spoke to ALFONSO Kam with [facility] and he came and taught patient last week- patient return demonstrated very well. Eddy said he will have the IV meds delivered to patient's house tonight.    Spoke to patients home care director Misael with [facility]- patient gets PCA 10-5 Mon-Friday and 10-2 Sat/Sun- he is aware of patient needing the IV antibiotics and will move around the schedule of PCA as needed to help patient at home.    Patient's home wound vac at bedside. Patient does NOT have . Had asked patient to have someone bring power chord last week and daily and has not happened. Will have [facility] to place home wound vac tomorrow.    Placed referral to NP at home program and also sent epic message to Dr. Breen's office    Set up ambulance transportation for 6pm pickup    Future Appointments   Date Time Provider Department Center   8/7/2020 11:00 AM Ruchi Navarrete MD [facility] UROLOGY [facility]        07/14/20 1550   Final Note   Assessment Type Final Discharge Note   Anticipated Discharge Disposition Home-Health   Hospital Follow Up  Appt(s) scheduled? Yes  (sent referral to AT Home NP program)   Discharge plans and expectations educations in teach back method with documentation complete? Yes   Right Care Referral Info   Post Acute Recommendation Home-care   Referral Type home health   Facility Name [facility]     Michelle Fontana RN, CCM, CMSRN  RN Transition Navigator  [phone]

## 2020-07-14 NOTE — PROGRESS NOTES
[facility] Progress Note    Assessment/Plan:     ESBL Proteus UTI, h/o MDR UTIs  Recent UCx +ESBL Proteus. Suprapubic catheter exchanged in ED per patient. Repeat UCx NG.  - Continue meropenem (switched from ertapenem ).      Persistent right ischial ESBL Proteus Osteomyelitis  New Coccyx Osteomyelitis with sinus tract   Wound cultures from prior admission +ESBL Proteus. Completed course of outpatient Ertapenem x6 weeks, end ~ . Patient without follow up. Repeat MRI with ongoing OM of right ischium and new OM of coccyx with likely sinus tract.   - Will need Gen Surgery consult for sinus tract eval and likely debridement. Agree with sinogram planned.   - s/p IR bone biopsy , follow up new cultures   - Continue vanc and meropenem. If no surgical intervention planned, can plan for discharge with vanc and meropenem. Plan for outpatient repeat MRI in 2 weeks to evaluate further duration. If progressing on this regimen will need repeat surgical evaluation.      Thank you for allowing us to participate in the care of this patient. Please contact me if you have any questions regarding this consult.    Darleen Fletcher MD   U Internal Medicine -III  U Infectious Diseases     Subjective:      Hermann Aguilar is a 44 y.o. male who is being followed by the [facility] Infectious Diseases service at [facility] for ESBL Proteus UTI and concern for ongoing sacral OM.     Patient resting comfortably this morning. Denies any problems or complaints. Anxious to get home due to having to deal with bills but understanding of plan.    Objective:   Last 24 Hour Vital Signs:  BP  Min: 99/66  Max: 131/69  Temp  Av.3 °F (36.3 °C)  Min: 96.1 °F (35.6 °C)  Max: 98.2 °F (36.8 °C)  Pulse  Av.4  Min: 64  Max: 84  Resp  Av.2  Min: 16  Max: 22  SpO2  Av.5 %  Min: 96 %  Max: 100 %  I/O last 3 completed shifts:  In: 1425 [P.O.:875; IV Piggyback:550]  Out: 2200  [Urine:2200]    Physical Examination:  General: awake and alert, in no apparent distress, non-toxic appearing  Head: NC/AT  Eyes: PERRL, EOMI, no scleral icterus  Throat: MMM, no oropharyngeal exudate or erythema  CV: normal rate, regular rhythm, no mgr noted  Resp :CTAB, no crackles or whezes, comfortable on room air   Abdomen: soft, NTND, +BS, suprapubic catheter in place   Extremities: wound vac in place with good suction  Skin: dry and flaky over LEs, otherwise no rashes/lesions/bruising  Neuro: AAOx3, GCS 15, no focal deficits     Laboratory:  Laboratory Data Reviewed: yes  Pertinent Findings:  Recent Labs   Lab 07/10/20  0627 07/11/20  0514 07/13/20  0409   WBC 7.45 7.09 6.29   HGB 11.1* 10.2* 8.9*   HCT 34.8* 32.7* 27.8*   * 654* 639*   MCV 73* 74* 74*   RDW 18.4* 18.0* 18.2*    137 137   K 4.8 4.3 4.1    107 104   CO2 19* 22* 25   BUN 9 8 9   CREATININE 0.8 0.8 0.8   GLU 94 97 133*         Microbiology Data Reviewed: yes  Pertinent Findings:  (Prior to admission) UCx: ESBL Proteus  Repeat UCx: NGTD     Other Results:  Radiology Data Reviewed: yes  Pertinent Findings:  7/7 MRI sacrum/coccyx -   1. Osteomyelitis of the coccyx with adjacent fluid collection, likely with sinus tract to skin.  2. Osteomyelitis of the right ischium.    Current Medications:     Infusions:       Scheduled:   aspirin  81 mg Oral Daily    docusate sodium  100 mg Oral BID    enoxaparin  40 mg Subcutaneous Q24H    gabapentin  300 mg Oral BID    gemfibroziL  600 mg Oral BID AC    melatonin  9 mg Oral Nightly    meropenem (MERREM) IVPB  1 g Intravenous Q8H    metoprolol tartrate  25 mg Oral BID    sodium chloride 0.9%  10 mL Intravenous Q6H        PRN:  ammonium lactate, baclofen, cyclobenzaprine, dextrose 50%, dextrose 50%, glucagon (human recombinant), glucose, glucose, insulin aspart U-100, oxyCODONE-acetaminophen, sodium chloride 0.9%, Flushing PICC Protocol **AND** sodium chloride 0.9% **AND** sodium  chloride 0.9%, Pharmacy to dose Vancomycin consult **AND** vancomycin - pharmacy to dose    Antibiotics and Day Number of Therapy:  Meropenem (7/6-7/7, 7/11 -  Current)  Vancomycin (7/12 - current)      Lines and Day Number of Therapy:  PIV  Suprapubic catheter

## 2020-07-14 NOTE — PLAN OF CARE
VN cued into pt's room for introduction. VN informed pt that VN would be working along side bedside nurse and PCT throughout shift. Level of present pain assessed. At present no distress noted. Discussed with patient the plan of care. Discussed with patient High fall risk protocol and interventions that have been initiated and cont be in place for safety. Patient verbalized clear understanding and cooperation using teach back method. Bed alarm presently activated and in use. Will cont to be available to patient and intervene prn.

## 2020-07-14 NOTE — PLAN OF CARE
GAve patient LTAC list as patient will need 6 weeks of IV abx and wound vac. Patient leaning towards home with home health. Sent referral to [facility].       07/14/20 1125   Discharge Reassessment   Assessment Type Discharge Planning Reassessment   Do you have any problems affording any of your prescribed medications? Yes   Discharge Plan A Home;Home Health   Discharge Plan B Long-term acute care facility (LTAC)     Michelle Fontana RN, CCM, CMSRN  RN Transition Navigator  [phone]

## 2020-07-14 NOTE — PROGRESS NOTES
Discussed possible sinogram with primary team and further clarified with Dr. Tom. Does not appear to be needed.      Aubrey Sahu MD  [facility]  Pager [phone]

## 2020-07-14 NOTE — PLAN OF CARE
VN cued into patients room for  rounding - patient resting in bed in no acute distress at this time. Call bell within reach. Will continue to monitor and be available to intervene PRN.

## 2020-07-14 NOTE — PROGRESS NOTES
U Internal Medicine Resident Progress Note    Subjective:       Resting in bed comfortably, no complaints. Denies f/c, n/v, abdominal/back pain.     Objective:   Last 24 Hour Vital Signs:  BP  Min: 99/66  Max: 131/69  Temp  Av.3 °F (36.3 °C)  Min: 96.1 °F (35.6 °C)  Max: 98.2 °F (36.8 °C)  Pulse  Av.4  Min: 64  Max: 84  Resp  Av.2  Min: 16  Max: 22  SpO2  Av.5 %  Min: 96 %  Max: 100 %  I/O last 3 completed shifts:  In: 1425 [P.O.:875; IV Piggyback:550]  Out: 2200 [Urine:2200]    Physical Examination:  General: NAD, lying in bed  Lungs: Clear to auscultation, non-labored respiration  Heart: Normal rate, regular rhythm, no murmur, gallop or edema.  Abdomen/back: wound vac to sacrum, supra-pubic catheter c/d/i  Extremities: No edema, distal pulses 2+  Skin: Skin is warm, dry and pink, no rashes or lesions  Psychiatric: Cooperative, flat affect    Laboratory:  Laboratory Data Reviewed:  Trended Lab Data:  Recent Labs   Lab 07/10/20  0627 20  0514 20  0409   WBC 7.45 7.09 6.29   HGB 11.1* 10.2* 8.9*   HCT 34.8* 32.7* 27.8*   * 654* 639*   MCV 73* 74* 74*   RDW 18.4* 18.0* 18.2*    137 137   K 4.8 4.3 4.1    107 104   CO2 19* 22* 25   BUN 9 8 9   CREATININE 0.8 0.8 0.8   GLU 94 97 133*       Trended Cardiac Data:  No results for input(s): TROPONINI, CKTOTAL, CKMB, BNP in the last 168 hours.    Microbiology Data   Microbiology Results (last 7 days)     Procedure Component Value Units Date/Time    Gram stain [[id number]] Collected: 20 7774    Order Status: Completed Specimen: Bone from Coccyx Updated: 20     Gram Stain Result No WBC's      No organisms seen    Culture, Anaerobic [[id number]] Collected: 20 1514    Order Status: Sent Specimen: Bone from Coccyx Updated: 20 0002    AFB Culture & Smear [[id number]] Collected: 20    Order Status: Sent Specimen: Bone from Coccyx Updated: 20 0002    Aerobic culture [[id number]] Collected:  07/13/20 1514    Order Status: Sent Specimen: Bone from Coccyx Updated: 07/14/20 0002    Fungus culture [[id number]] Collected: 07/13/20 1514    Order Status: Sent Specimen: Bone from Coccyx Updated: 07/14/20 0002    Blood culture #2 **CANNOT BE ORDERED STAT** [[id number]] Collected: 07/06/20 2250    Order Status: Completed Specimen: Blood from Peripheral, Antecubital, Left Updated: 07/12/20 0612     Blood Culture, Routine No growth after 5 days.    Blood culture #1 **CANNOT BE ORDERED STAT** [[id number]] Collected: 07/06/20 2230    Order Status: Completed Specimen: Blood from Peripheral, Antecubital, Left Updated: 07/12/20 0612     Blood Culture, Routine No growth after 5 days.    Urine Culture High Risk [[id number]] Collected: 07/08/20 0840    Order Status: Completed Specimen: Urine, Catheterized Updated: 07/09/20 2141     Urine Culture, Routine No growth    Narrative:      Indicated criteria for high risk culture:->Other  Other (specify):->Suspicion for UTI, obtaining new culture  s/p SPT replacement    Urine Culture High Risk [[id number]]     Order Status: Canceled Specimen: Urine           Radiology:  Imaging Results    None           Current Medications:     Infusions:       Scheduled:   aspirin  81 mg Oral Daily    docusate sodium  100 mg Oral BID    enoxaparin  40 mg Subcutaneous Q24H    gabapentin  300 mg Oral BID    gemfibroziL  600 mg Oral BID AC    melatonin  9 mg Oral Nightly    meropenem (MERREM) IVPB  1 g Intravenous Q8H    metoprolol tartrate  25 mg Oral BID    sodium chloride 0.9%  10 mL Intravenous Q6H        PRN:  ammonium lactate, baclofen, cyclobenzaprine, dextrose 50%, dextrose 50%, glucagon (human recombinant), glucose, glucose, insulin aspart U-100, oxyCODONE-acetaminophen, sodium chloride 0.9%, Flushing PICC Protocol **AND** sodium chloride 0.9% **AND** sodium chloride 0.9%, Pharmacy to dose Vancomycin consult **AND** vancomycin - pharmacy to dose    Antibiotics and Day Number of  Therapy:  Ertapenem 7/7 -   Vanc 7/12-  Lines and Day Number of Therapy:  PIV  Suprapubic catheter    Radiology  MRI sacrum/coccyx 7/10    FINDINGS:  There is T1 replacement and STIR signal hyperintensity with enhancement involving the coccyx consistent with osteomyelitis.  This is in contiguity with peripherally enhancing fluid collection measuring approximately 3 x 3 x 1 cm just distal to the coccyx.  Possible extension to the skin in the perianal region, not well seen. Presacral edema noted.     There is partially visualized T1 hypointense and STIR hyperintense signal at the ischial tuberosity consistent with osteomyelitis.  Marked edema of the adjacent  musculature, partially visualized.     Bilateral gluteus muscle edema noted.     Urinary bladder thickening with suprapubic catheter noted.     Impression:     1. Osteomyelitis of the coccyx with adjacent fluid collection, likely with sinus tract to skin.  2. Osteomyelitis of the right ischium.    Assessment and Plan   44 y.o. 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 Continuing ertapenem, f/u results of bone biopsy.     Complicated UTI, History of MDR UTI  - In the ED, temp 98.5 WBC 10.37  - History of proteus ESBL and Serratia  - Urine cultures from outside facility are in media tab.   - F/u blood cultures   - Merrem switched to Ertapenem 7/7, end date for UTI 7/21  - ID consulted  - Contact precautions for ESBL  - per CM pt does not want to go to SNF for continued abx, agreeable to at home program     Concern for right ischium osteomyelitis, coccyx osteomyelitis with potential sinus tract  - MRI 5/2/20 showing right ischium soft tissue inflammation concerning for osteomyelitis.   - concern for osteomyelitis of coccyx, IR biopsy scheduled per ID  recs  - no need for sinogram per surgery; will f/u recs  - ID recommended starting vanc for sinus tract pending culture results  - bone biopsy 7/13, f/u results  - Wound care consulted     Anemia of chronic inflammation  - Initial Hgb 9.5, Baseline 10.  - No signs or symptoms of bleeding  - Repeat iron studies mixed picture - ferritin 90, iron 18, TIBC 255, folate 5.6, vitamin 418  -7/13 stable at 9     Protein gap   - Protein gap of 6   - HIV/Hep C panel negative  - suspect low albumin resulting from chronic inflammatory processes     HTN:  - Normotensive  - Continue home lopressor 25 BID     Type II DM  - Well controlled; insulin dependent  - Home meds: Levomir 15 BID, Lispro 20 TID with meals, and metformin 500 BID. Pt states he is unsure what he takes.  - SSI      Hyperlipidemia  - Continue home aspirin, gemfibrozil  - Recheck lipid panel     Fluids: None  Electrolytes: Replace as need  Nutrition: Diabetic diet  Prophylaxis: Lovenox     Disposition: Pending surgery recommendations; to home/SNF for LTAB    Nadir Pereira  U Internal Medicine HO-I  [facility] Hospitalist Medicine Team B    [facility] Medicine Hospitalist Pager numbers:   [facility] Hospitalist Medicine Team A (Farrukh/Valdemar): [phone]  [facility] Hospitalist Medicine Team B (Connie/Mariya):  [phone]

## 2020-07-14 NOTE — DISCHARGE INSTRUCTIONS
Caring for Your Peripherally Inserted Central Catheter (PICC), Discharge Instructions for (English) View Edit Remove  Osteomyelitis, Discharge Instructions for (English) View Edit Remove  Urinary Tract Infections in Men (English) View Edit Remove  Wound Infection, Recognizing and Treating (English) View Edit Remove  Ertapenem injection (English) View Edit Remove
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
## 2020-07-14 NOTE — PLAN OF CARE
Discharge instruction and education packet provided. VN notified. IV site removed cath tip intact. Telemetry discontinued without adverse reaction. Patient shows no acute distress. Emily wound care nurse, applying dressings. Waiting for transport.

## 2020-07-15 LAB
ANION GAP SERPL CALC-SCNC: 6 MMOL/L (ref 8–16)
BASOPHILS # BLD AUTO: 0.07 K/UL (ref 0–0.2)
BASOPHILS NFR BLD: 0.9 % (ref 0–1.9)
BUN SERPL-MCNC: 8 MG/DL (ref 6–20)
CALCIUM SERPL-MCNC: 9 MG/DL (ref 8.7–10.5)
CHLORIDE SERPL-SCNC: 108 MMOL/L (ref 95–110)
CO2 SERPL-SCNC: 26 MMOL/L (ref 23–29)
CREAT SERPL-MCNC: 0.7 MG/DL (ref 0.5–1.4)
DIFFERENTIAL METHOD: ABNORMAL
EOSINOPHIL # BLD AUTO: 1 K/UL (ref 0–0.5)
EOSINOPHIL NFR BLD: 12.7 % (ref 0–8)
ERYTHROCYTE [DISTWIDTH] IN BLOOD BY AUTOMATED COUNT: 18.5 % (ref 11.5–14.5)
EST. GFR  (AFRICAN AMERICAN): >60 ML/MIN/1.73 M^2
EST. GFR  (NON AFRICAN AMERICAN): >60 ML/MIN/1.73 M^2
GLUCOSE SERPL-MCNC: 107 MG/DL (ref 70–110)
HCT VFR BLD AUTO: 29 % (ref 40–54)
HGB BLD-MCNC: 9.2 G/DL (ref 14–18)
IMM GRANULOCYTES # BLD AUTO: 0.02 K/UL (ref 0–0.04)
IMM GRANULOCYTES NFR BLD AUTO: 0.3 % (ref 0–0.5)
LYMPHOCYTES # BLD AUTO: 3 K/UL (ref 1–4.8)
LYMPHOCYTES NFR BLD: 37.7 % (ref 18–48)
MCH RBC QN AUTO: 23.4 PG (ref 27–31)
MCHC RBC AUTO-ENTMCNC: 31.7 G/DL (ref 32–36)
MCV RBC AUTO: 74 FL (ref 82–98)
MONOCYTES # BLD AUTO: 0.6 K/UL (ref 0.3–1)
MONOCYTES NFR BLD: 7.8 % (ref 4–15)
NEUTROPHILS # BLD AUTO: 3.2 K/UL (ref 1.8–7.7)
NEUTROPHILS NFR BLD: 40.6 % (ref 38–73)
NRBC BLD-RTO: 0 /100 WBC
PLATELET # BLD AUTO: 593 K/UL (ref 150–350)
PMV BLD AUTO: 9.3 FL (ref 9.2–12.9)
POCT GLUCOSE: 107 MG/DL (ref 70–110)
POCT GLUCOSE: 113 MG/DL (ref 70–110)
POCT GLUCOSE: 161 MG/DL (ref 70–110)
POCT GLUCOSE: 96 MG/DL (ref 70–110)
POTASSIUM SERPL-SCNC: 4.3 MMOL/L (ref 3.5–5.1)
RBC # BLD AUTO: 3.93 M/UL (ref 4.6–6.2)
SODIUM SERPL-SCNC: 140 MMOL/L (ref 136–145)
VANCOMYCIN SERPL-MCNC: 3.4 UG/ML
WBC # BLD AUTO: 7.82 K/UL (ref 3.9–12.7)

## 2020-07-15 PROCEDURE — A4216 STERILE WATER/SALINE, 10 ML: HCPCS | Performed by: STUDENT IN AN ORGANIZED HEALTH CARE EDUCATION/TRAINING PROGRAM

## 2020-07-15 PROCEDURE — 25000003 PHARM REV CODE 250: Performed by: STUDENT IN AN ORGANIZED HEALTH CARE EDUCATION/TRAINING PROGRAM

## 2020-07-15 PROCEDURE — 63600175 PHARM REV CODE 636 W HCPCS: Performed by: STUDENT IN AN ORGANIZED HEALTH CARE EDUCATION/TRAINING PROGRAM

## 2020-07-15 PROCEDURE — 85025 COMPLETE CBC W/AUTO DIFF WBC: CPT

## 2020-07-15 PROCEDURE — 80202 ASSAY OF VANCOMYCIN: CPT

## 2020-07-15 PROCEDURE — 80048 BASIC METABOLIC PNL TOTAL CA: CPT

## 2020-07-15 PROCEDURE — 25500020 PHARM REV CODE 255: Performed by: INTERNAL MEDICINE

## 2020-07-15 PROCEDURE — 11000001 HC ACUTE MED/SURG PRIVATE ROOM

## 2020-07-15 PROCEDURE — 63600175 PHARM REV CODE 636 W HCPCS: Performed by: INTERNAL MEDICINE

## 2020-07-15 RX ADMIN — MEROPENEM 1 G: 1 INJECTION, POWDER, FOR SOLUTION INTRAVENOUS at 11:07

## 2020-07-15 RX ADMIN — Medication 10 ML: at 11:07

## 2020-07-15 RX ADMIN — INSULIN ASPART 1 UNITS: 100 INJECTION, SOLUTION INTRAVENOUS; SUBCUTANEOUS at 08:07

## 2020-07-15 RX ADMIN — IOHEXOL 75 ML: 350 INJECTION, SOLUTION INTRAVENOUS at 12:07

## 2020-07-15 RX ADMIN — VANCOMYCIN HYDROCHLORIDE 2000 MG: 1 INJECTION, POWDER, LYOPHILIZED, FOR SOLUTION INTRAVENOUS at 05:07

## 2020-07-15 RX ADMIN — DOCUSATE SODIUM 100 MG: 100 CAPSULE, LIQUID FILLED ORAL at 08:07

## 2020-07-15 RX ADMIN — ASPIRIN 81 MG: 81 TABLET, COATED ORAL at 08:07

## 2020-07-15 RX ADMIN — GABAPENTIN 300 MG: 300 CAPSULE ORAL at 08:07

## 2020-07-15 RX ADMIN — GEMFIBROZIL 600 MG: 600 TABLET ORAL at 04:07

## 2020-07-15 RX ADMIN — MEROPENEM 1 G: 1 INJECTION, POWDER, FOR SOLUTION INTRAVENOUS at 04:07

## 2020-07-15 RX ADMIN — METOPROLOL TARTRATE 25 MG: 25 TABLET, FILM COATED ORAL at 08:07

## 2020-07-15 RX ADMIN — OXYCODONE AND ACETAMINOPHEN 1 TABLET: 7.5; 325 TABLET ORAL at 08:07

## 2020-07-15 RX ADMIN — MEROPENEM 1 G: 1 INJECTION, POWDER, FOR SOLUTION INTRAVENOUS at 08:07

## 2020-07-15 RX ADMIN — Medication 9 MG: at 08:07

## 2020-07-15 NOTE — PLAN OF CARE
U Internal Medicine Plan of Care Note    Patient's discharge on hold until he gets new CT scan.    Carmelo Beavers   [facility] Internal Medicine, HO-III  [facility] Hospitalist Medicine Team B

## 2020-07-15 NOTE — PLAN OF CARE
VN note: Patient's progress notes and labs reviewed. Care plan resolved yesterday as patient possible discharge. Will be available as needed.

## 2020-07-15 NOTE — PLAN OF CARE
U Internal Medicine Plan of Care Note      Per Dr. Sahu- on the  CT images for his bone biopsy there is a partially visualized collection of gas along the right ischium suspicious for a larger abscess within the right leg which has not yet been characterized. We will need to delay his discharge for further evaluation with a CT right thigh non-contrast. New dispo will be pending CT results.

## 2020-07-15 NOTE — PROGRESS NOTES
U Infectious Diseases Resident HO-III Progress Note    Assessment/Plan:     ESBL Proteus UTI, h/o MDR UTIs  Recent UCx +ESBL Proteus. Suprapubic catheter exchanged in ED per patient. Repeat UCx NG.  - Continue meropenem (switched from ertapenem 7/11).      Persistent right ischial ESBL Proteus Osteomyelitis  New Coccyx Osteomyelitis with sinus tract   New Concern for ischial abscess, incidental finding  Wound cultures from prior admission +ESBL Proteus. Completed course of outpatient Ertapenem x6 weeks, end ~ 6/16. Patient without follow up. Repeat MRI this admission with ongoing OM of right ischium and new OM of coccyx with likely sinus tract.   - s/p IR bone biopsy 7/13, follow up cultures: NGTD, though patient has been on abx throughout this admission.   - Gen Surgery consulted for debridement and evaluation of sinus tract, indicated no interventions needed at this time. In light of this, plan for discharge with vanc and meropenem. Plan for outpatient repeat MRI in 2 weeks to evaluate further duration of abx. If still progressing on this regimen, would need repeat surgical evaluation.   - Incidental find on imaging used for bone biopsy of area of gas along right ischium at known area of OM. Per radiology, concerning for abscess. Follow up CT ordered. If fluid collection, will need drainage for source control.       Thank you for allowing us to participate in the care of this patient. Please contact me if you have any questions regarding this consult.    Darleen Fletcher MD   U Internal Medicine -III  U Infectious Diseases     Subjective:      Hermann Aguilar is a 44 y.o. male who is being followed by the [facility] Infectious Diseases service at [facility] for ESBL Proteus UTI, ongoing sacral OM and new coccyx OM.     Patient was to be discharged yesterday afternoon. However, on further review of CT images used for patient's bone biopsy on 7/13, radiology noted a new area of gas along the R  ischium near the patient's known area of OM, concerning for abscess that has not been explored. Recommended CT of thigh, which has been ordered.     Patient resting comfortably this morning. Denies any problems or complaints. Anxious to get home due to having to deal with bills but understanding of plan.    Objective:   Last 24 Hour Vital Signs:  BP  Min: 99/59  Max: 118/65  Temp  Av.9 °F (36.6 °C)  Min: 96.7 °F (35.9 °C)  Max: 98.6 °F (37 °C)  Pulse  Av.2  Min: 67  Max: 95  Resp  Av  Min: 16  Max: 20  SpO2  Av.7 %  Min: 99 %  Max: 100 %  I/O last 3 completed shifts:  In: 1370 [P.O.:1260; I.V.:10; IV Piggyback:100]  Out: 2450 [Urine:2450]    Physical Examination:  General: awake and alert, in no apparent distress, non-toxic appearing  Head: NC/AT  Eyes: PERRL, EOMI, no scleral icterus  Throat: MMM, no oropharyngeal exudate or erythema  CV: normal rate, regular rhythm, no mgr noted  Resp :CTAB, no crackles or whezes, comfortable on room air   Abdomen: soft, NTND, +BS, suprapubic catheter in place   Extremities: wound vac in place with good suction  Skin: dry and flaky over LEs, otherwise no rashes/lesions/bruising  Neuro: AAOx3, GCS 15, no focal deficits     Laboratory:  Laboratory Data Reviewed: yes  Pertinent Findings:  Recent Labs   Lab 20  0514 20  0409 07/15/20  0500   WBC 7.09 6.29 7.82   HGB 10.2* 8.9* 9.2*   HCT 32.7* 27.8* 29.0*   * 639* 593*   MCV 74* 74* 74*   RDW 18.0* 18.2* 18.5*    137 140   K 4.3 4.1 4.3    104 108   CO2 22* 25 26   BUN 8 9 8   CREATININE 0.8 0.8 0.7   GLU 97 133* 107         Microbiology Data Reviewed: yes  Pertinent Findings:  (Prior to admission) UCx: ESBL Proteus  Repeat UCx: NGTD    Bone Bx: NG   Bone gram stain: no WBC, no organisms seen    Other Results:  Radiology Data Reviewed: yes  Pertinent Findings:   MRI sacrum/coccyx -   1. Osteomyelitis of the coccyx with adjacent fluid collection, likely with sinus tract to  skin.  2. Osteomyelitis of the right ischium.    Current Medications:     Infusions:       Scheduled:   aspirin  81 mg Oral Daily    docusate sodium  100 mg Oral BID    enoxaparin  40 mg Subcutaneous Q24H    gabapentin  300 mg Oral BID    gemfibroziL  600 mg Oral BID AC    melatonin  9 mg Oral Nightly    meropenem (MERREM) IVPB  1 g Intravenous Q8H    metoprolol tartrate  25 mg Oral BID    sodium chloride 0.9%  10 mL Intravenous Q6H        PRN:  ammonium lactate, baclofen, cyclobenzaprine, dextrose 50%, dextrose 50%, glucagon (human recombinant), glucose, glucose, insulin aspart U-100, oxyCODONE-acetaminophen, sodium chloride 0.9%, Flushing PICC Protocol **AND** sodium chloride 0.9% **AND** sodium chloride 0.9%, Pharmacy to dose Vancomycin consult **AND** vancomycin - pharmacy to dose    Antibiotics and Day Number of Therapy:  Meropenem (7/6-7/7, 7/11 -  Current)  Vancomycin (7/12 - current)      Lines and Day Number of Therapy:  PIV  Suprapubic catheter

## 2020-07-15 NOTE — PROGRESS NOTES
U Internal Medicine Resident Progress Note    Subjective:      Doing well.  Denies pain, nausea, vomiting, fever, or chills.  States he is ready to go home.     Objective:   Last 24 Hour Vital Signs:  BP  Min: 99/59  Max: 124/65  Temp  Av.7 °F (36.5 °C)  Min: 96.7 °F (35.9 °C)  Max: 98.6 °F (37 °C)  Pulse  Av.3  Min: 67  Max: 95  Resp  Av.8  Min: 16  Max: 18  SpO2  Av.8 %  Min: 99 %  Max: 100 %  I/O last 3 completed shifts:  In:  [P.O.:1385; IV Piggyback:550]  Out:  [Urine:]    Physical Examination:  General: NAD, lying in bed  Lungs: Clear to auscultation, non-labored respiration  Heart: Normal rate, regular rhythm, no murmur, gallop or edema.  Abdomen/back: wound vac to sacrum, supra-pubic catheter c/d/i  Extremities: No edema, distal pulses 2+  Skin: Skin is warm, dry and pink, no rashes or lesions  Psychiatric: Cooperative, flat affect    Laboratory:  Laboratory Data Reviewed:  Trended Lab Data:  Recent Labs   Lab 07/10/20  0627 20  0514 20  0409   WBC 7.45 7.09 6.29   HGB 11.1* 10.2* 8.9*   HCT 34.8* 32.7* 27.8*   * 654* 639*   MCV 73* 74* 74*   RDW 18.4* 18.0* 18.2*    137 137   K 4.8 4.3 4.1    107 104   CO2 19* 22* 25   BUN 9 8 9   CREATININE 0.8 0.8 0.8   GLU 94 97 133*       Trended Cardiac Data:  No results for input(s): TROPONINI, CKTOTAL, CKMB, BNP in the last 168 hours.    Microbiology Data   Microbiology Results (last 7 days)     Procedure Component Value Units Date/Time    AFB Culture & Smear [[id number]] Collected: 20 7900    Order Status: Completed Specimen: Bone from Coccyx Updated: 20 1321     AFB CULTURE STAIN No acid fast bacilli seen.    Fungus culture [[id number]] Collected: 20    Order Status: Completed Specimen: Bone from Coccyx Updated: 20 1210     Fungus (Mycology) Culture Culture in progress    Gram stain [[id number]] Collected: 20    Order Status: Completed Specimen: Bone from Coccyx  Updated: 07/14/20 0135     Gram Stain Result No WBC's      No organisms seen    Culture, Anaerobic [[id number]] Collected: 07/13/20 1514    Order Status: Sent Specimen: Bone from Coccyx Updated: 07/14/20 0002    Aerobic culture [[id number]] Collected: 07/13/20 1514    Order Status: Sent Specimen: Bone from Coccyx Updated: 07/14/20 0002    Blood culture #2 **CANNOT BE ORDERED STAT** [[id number]] Collected: 07/06/20 2250    Order Status: Completed Specimen: Blood from Peripheral, Antecubital, Left Updated: 07/12/20 0612     Blood Culture, Routine No growth after 5 days.    Blood culture #1 **CANNOT BE ORDERED STAT** [[id number]] Collected: 07/06/20 2230    Order Status: Completed Specimen: Blood from Peripheral, Antecubital, Left Updated: 07/12/20 0612     Blood Culture, Routine No growth after 5 days.    Urine Culture High Risk [[id number]] Collected: 07/08/20 0840    Order Status: Completed Specimen: Urine, Catheterized Updated: 07/09/20 2141     Urine Culture, Routine No growth    Narrative:      Indicated criteria for high risk culture:->Other  Other (specify):->Suspicion for UTI, obtaining new culture  s/p SPT replacement    Urine Culture High Risk [[id number]]     Order Status: Canceled Specimen: Urine           Radiology:  Imaging Results    None           Current Medications:     Infusions:       Scheduled:   aspirin  81 mg Oral Daily    docusate sodium  100 mg Oral BID    enoxaparin  40 mg Subcutaneous Q24H    gabapentin  300 mg Oral BID    gemfibroziL  600 mg Oral BID AC    melatonin  9 mg Oral Nightly    meropenem (MERREM) IVPB  1 g Intravenous Q8H    metoprolol tartrate  25 mg Oral BID    sodium chloride 0.9%  10 mL Intravenous Q6H        PRN:  ammonium lactate, baclofen, cyclobenzaprine, dextrose 50%, dextrose 50%, glucagon (human recombinant), glucose, glucose, insulin aspart U-100, oxyCODONE-acetaminophen, sodium chloride 0.9%, Flushing PICC Protocol **AND** sodium chloride 0.9% **AND** sodium  chloride 0.9%, Pharmacy to dose Vancomycin consult **AND** vancomycin - pharmacy to dose    Antibiotics and Day Number of Therapy:  Ertapenem 7/7 -   Vanc 7/12-  Lines and Day Number of Therapy:  PIV  Suprapubic catheter    Radiology  MRI sacrum/coccyx 7/10    FINDINGS:  There is T1 replacement and STIR signal hyperintensity with enhancement involving the coccyx consistent with osteomyelitis.  This is in contiguity with peripherally enhancing fluid collection measuring approximately 3 x 3 x 1 cm just distal to the coccyx.  Possible extension to the skin in the perianal region, not well seen. Presacral edema noted.     There is partially visualized T1 hypointense and STIR hyperintense signal at the ischial tuberosity consistent with osteomyelitis.  Marked edema of the adjacent  musculature, partially visualized.     Bilateral gluteus muscle edema noted.     Urinary bladder thickening with suprapubic catheter noted.     Impression:     1. Osteomyelitis of the coccyx with adjacent fluid collection, likely with sinus tract to skin.  2. Osteomyelitis of the right ischium.    Assessment and Plan   44 y.o. 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 Continuing ertapenem, f/u results of bone biopsy.     Complicated UTI, History of MDR UTI  - In the ED, temp 98.5 WBC 10.37  - History of proteus ESBL and Serratia  - Urine cultures from outside facility are in media tab.   - F/u blood cultures   - Merrem switched to Ertapenem 7/7, end date for UTI 7/21  - ID consulted  - Contact precautions for ESBL  - per CM pt does not want to go to SNF for continued abx, agreeable to at home program     Concern for right ischium osteomyelitis, coccyx osteomyelitis with potential sinus tract  - MRI 5/2/20 showing right ischium  soft tissue inflammation concerning for osteomyelitis.   - concern for osteomyelitis of coccyx, IR biopsy scheduled per ID recs  - no need for sinogram per surgery; will f/u recs  - ID recommended starting vanc for sinus tract pending culture results  - bone biopsy 7/13, f/u results  - Wound care consulted  - Dr. Sahu (IR) saw gas on CT guided biopsy, concerning for abscess formation in his thigh  - F/u CT right thigh      Anemia of chronic inflammation  - Initial Hgb 9.5, Baseline 10.  - No signs or symptoms of bleeding  - Repeat iron studies mixed picture - ferritin 90, iron 18, TIBC 255, folate 5.6, vitamin 418  - F/u CBC     Protein gap   - Protein gap of 6   - HIV/Hep C panel negative  - suspect low albumin resulting from chronic inflammatory processes     HTN:  - Normotensive  - Continue home lopressor 25 BID     Type II DM  - Well controlled; insulin dependent  - Home meds: Levomir 15 BID, Lispro 20 TID with meals, and metformin 500 BID. Pt states he is unsure what he takes.  - SSI      Hyperlipidemia  - Lipid panel (7/6/20) - chol 166, HDL 27, LDL 95.4  - Continue home aspirin, gemfibrozil     Fluids: None  Electrolytes: Replace as need  Nutrition: Diabetic diet  Prophylaxis: Lovenox     Disposition: F/u CT right thigh, if there is abscess, will need drainage; if not, can discharge today w/ vanc and meropenem (end date 7/20/20) for home antibiotics    Carmelo Beavers  [facility] Internal Medicine HO-III  [facility] Hospitalist Medicine Team B    [facility] Medicine Hospitalist Pager numbers:   U Hospitalist Medicine Team A (Farrukh/Valdemar): [phone]  [facility] Hospitalist Medicine Team B (Connie/Mariya):  [phone]

## 2020-07-15 NOTE — PROGRESS NOTES
"U Internal Medicine Resident Progress Note    Subjective:      Per Dr. Sahu- on the  CT images for his bone biopsy there is a partially visualized collection of gas along the right ischium suspicious for a larger abscess within the right leg which has not yet been characterized. We will need to delay his discharge for further evaluation with a CT right thigh non-contrast. New dispo will be pending CT results.    No f/c, pain overnight. Patient very upset at receiving news he wasn't going home yesterday, withdrawn and minimally participatory in conversation. Said he "wanted to die" on hearing he would need to stay, but on further questioning did not have a plan to hurt himself or others.     Objective:   Last 24 Hour Vital Signs:  BP  Min: 99/59  Max: 124/65  Temp  Av.7 °F (36.5 °C)  Min: 96.7 °F (35.9 °C)  Max: 98.6 °F (37 °C)  Pulse  Av.6  Min: 67  Max: 95  Resp  Av.1  Min: 16  Max: 20  SpO2  Av.7 %  Min: 99 %  Max: 100 %  I/O last 3 completed shifts:  In: 1370 [P.O.:1260; I.V.:10; IV Piggyback:100]  Out: 2450 [Urine:2450]    Physical Examination:  General: NAD, lying in bed  Lungs: Clear to auscultation, non-labored respiration  Heart: Normal rate, regular rhythm, no murmur, gallop or edema.  Abdomen/back: wound vac to sacrum, supra-pubic catheter c/d/i  Extremities: S/p right BKA, no edema  Skin: Skin is warm, dry and pink, no rashes or lesions  Psychiatric: Withdrawn, flat affect    Laboratory:  Laboratory Data Reviewed:  Trended Lab Data:  Recent Labs   Lab 20  0514 20  0409 07/15/20  0500   WBC 7.09 6.29 7.82   HGB 10.2* 8.9* 9.2*   HCT 32.7* 27.8* 29.0*   * 639* 593*   MCV 74* 74* 74*   RDW 18.0* 18.2* 18.5*    137 140   K 4.3 4.1 4.3    104 108   CO2 22* 25 26   BUN 8 9 8   CREATININE 0.8 0.8 0.7   GLU 97 133* 107       Trended Cardiac Data:  No results for input(s): TROPONINI, CKTOTAL, CKMB, BNP in the last 168 hours.    Microbiology Data "   Microbiology Results (last 7 days)     Procedure Component Value Units Date/Time    Aerobic culture [[id number]] Collected: 07/13/20 1514    Order Status: Completed Specimen: Bone from Coccyx Updated: 07/15/20 0733     Aerobic Bacterial Culture No growth    Culture, Anaerobic [[id number]] Collected: 07/13/20 1514    Order Status: Completed Specimen: Bone from Coccyx Updated: 07/15/20 0704     Anaerobic Culture Culture in progress    AFB Culture & Smear [[id number]] Collected: 07/13/20 1514    Order Status: Completed Specimen: Bone from Coccyx Updated: 07/14/20 1321     AFB CULTURE STAIN No acid fast bacilli seen.    Fungus culture [[id number]] Collected: 07/13/20 1514    Order Status: Completed Specimen: Bone from Coccyx Updated: 07/14/20 1210     Fungus (Mycology) Culture Culture in progress    Gram stain [[id number]] Collected: 07/13/20 1514    Order Status: Completed Specimen: Bone from Coccyx Updated: 07/14/20 0135     Gram Stain Result No WBC's      No organisms seen    Blood culture #2 **CANNOT BE ORDERED STAT** [[id number]] Collected: 07/06/20 2250    Order Status: Completed Specimen: Blood from Peripheral, Antecubital, Left Updated: 07/12/20 0612     Blood Culture, Routine No growth after 5 days.    Blood culture #1 **CANNOT BE ORDERED STAT** [[id number]] Collected: 07/06/20 2230    Order Status: Completed Specimen: Blood from Peripheral, Antecubital, Left Updated: 07/12/20 0612     Blood Culture, Routine No growth after 5 days.    Urine Culture High Risk [[id number]] Collected: 07/08/20 0840    Order Status: Completed Specimen: Urine, Catheterized Updated: 07/09/20 2141     Urine Culture, Routine No growth    Narrative:      Indicated criteria for high risk culture:->Other  Other (specify):->Suspicion for UTI, obtaining new culture  s/p SPT replacement          Radiology:  Imaging Results    None           Current Medications:     Infusions:       Scheduled:   aspirin  81 mg Oral Daily    docusate sodium  100  mg Oral BID    enoxaparin  40 mg Subcutaneous Q24H    gabapentin  300 mg Oral BID    gemfibroziL  600 mg Oral BID AC    melatonin  9 mg Oral Nightly    meropenem (MERREM) IVPB  1 g Intravenous Q8H    metoprolol tartrate  25 mg Oral BID    sodium chloride 0.9%  10 mL Intravenous Q6H        PRN:  ammonium lactate, baclofen, cyclobenzaprine, dextrose 50%, dextrose 50%, glucagon (human recombinant), glucose, glucose, insulin aspart U-100, oxyCODONE-acetaminophen, sodium chloride 0.9%, Flushing PICC Protocol **AND** sodium chloride 0.9% **AND** sodium chloride 0.9%, Pharmacy to dose Vancomycin consult **AND** vancomycin - pharmacy to dose    Antibiotics and Day Number of Therapy:  Ertapenem 7/7 -   Vanc 7/12-    Lines and Day Number of Therapy:  PIV  Suprapubic catheter    Radiology  MRI sacrum/coccyx 7/10    FINDINGS:  There is T1 replacement and STIR signal hyperintensity with enhancement involving the coccyx consistent with osteomyelitis.  This is in contiguity with peripherally enhancing fluid collection measuring approximately 3 x 3 x 1 cm just distal to the coccyx.  Possible extension to the skin in the perianal region, not well seen. Presacral edema noted.     There is partially visualized T1 hypointense and STIR hyperintense signal at the ischial tuberosity consistent with osteomyelitis.  Marked edema of the adjacent  musculature, partially visualized.     Bilateral gluteus muscle edema noted.     Urinary bladder thickening with suprapubic catheter noted.     Impression:     1. Osteomyelitis of the coccyx with adjacent fluid collection, likely with sinus tract to skin.  2. Osteomyelitis of the right ischium.    Assessment and Plan   44 y.o. 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  Continuing ertapenem, f/u results of bone biopsy.     Complicated UTI, History of MDR UTI  - In the ED, temp 98.5 WBC 10.37  - History of proteus ESBL and Serratia  - Urine cultures from outside facility are in media tab.   - F/u blood cultures   - Merrem switched to Ertapenem 7/7, end date for UTI 7/21  - ID consulted  - Contact precautions for ESBL  - per CM pt does not want to go to SNF for continued abx, agreeable to at home program     Concern for right ischium osteomyelitis, coccyx osteomyelitis with potential sinus tract  - MRI 5/2/20 showing right ischium soft tissue inflammation concerning for osteomyelitis.   - concern for osteomyelitis of coccyx, IR biopsy scheduled per ID recs  -vanc ertepenem for osteo  - bone biopsy 7/13, showing gas near right ischium, CT right thigh ordered to evaluate for abscess  - Wound care consulted     Anemia of chronic inflammation  - Initial Hgb 9.5, Baseline 10.  - No signs or symptoms of bleeding  - Repeat iron studies mixed picture - ferritin 90, iron 18, TIBC 255, folate 5.6, vitamin 418  -7/13 stable at 9     Protein gap   - Protein gap of 6   - HIV/Hep C panel negative  - suspect low albumin resulting from chronic inflammatory processes     HTN:  - Normotensive  - Continue home lopressor 25 BID     Type II DM  - Well controlled; insulin dependent  - Home meds: Levomir 15 BID, Lispro 20 TID with meals, and metformin 500 BID. Pt states he is unsure what he takes.  - SSI      Hyperlipidemia  - Continue home aspirin, gemfibrozil  - Recheck lipid panel     Fluids: None  Electrolytes: Replace as need  Nutrition: Diabetic diet  Prophylaxis: Lovenox     Disposition: To home for LTAB pending CT results    Nadir Pereira  U Internal Medicine HO-I  [facility] Hospitalist Medicine Team B    [facility] Hospitalist Pager numbers:   [facility] Hospitalist Medicine Team A (Farrukh/Valdemar): [phone]  [facility] Hospitalist Medicine Team B (Connie/Mariya):  [phone]

## 2020-07-15 NOTE — PLAN OF CARE
Discharge held at 7pm last night.    Discharge plans all set up with home IV abx with [facility] and [facility] and PCA to resume   MD held discharge due to patient now needs CT of hip.    Barrier to discharge: CT of Hip ordered at 7/14 at 7pm- pending    CM has all discharge plan in place (IV Vancomycin and meropeneum already delivered to patient's house last night)         07/15/20 0956   Discharge Reassessment   Assessment Type Discharge Planning Reassessment   Provided patient/caregiver education on the expected discharge date and the discharge plan Yes   Do you have any problems affording any of your prescribed medications? Yes   Discharge Plan A Home;Home Health     Michelle Fontana, RN, CCM, CMSRN  RN Transition Navigator  [phone]
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
## 2020-07-15 NOTE — PROGRESS NOTES
Wound care performed by wound care nurse as directed. Wound vac applied to right Ischial tub Stage 4 wound.       07/14/20 2045        Negative Pressure Wound Therapy  07/14/20 1730 Right   Placement Date/Time: 07/14/20 1730   Side: Right  Location: Ischial tuberosity   NPWT Type Vacuum Therapy   Therapy Setting NPWT Continuous therapy   Pressure Setting NPWT 125 mmHg   Sponges Inserted NPWT 3   Safety Management   Patient Rounds bed in low position;bed wheels locked;call light in patient/parent reach   Safety Promotion/Fall Prevention bed alarm set;side rails raised x 2   Positioning   Body Position supine   Head of Bed (HOB) HOB elevated

## 2020-07-15 NOTE — PLAN OF CARE
Discharge instruction and education provided. Patient voices understanding. PICC line CDI. Flushes w/o difficulty. Patient shows no acute distress. Currently hooked on to home wound vac.

## 2020-07-16 VITALS
DIASTOLIC BLOOD PRESSURE: 74 MMHG | WEIGHT: 183.19 LBS | OXYGEN SATURATION: 99 % | HEART RATE: 84 BPM | BODY MASS INDEX: 25.65 KG/M2 | HEIGHT: 71 IN | TEMPERATURE: 96 F | RESPIRATION RATE: 18 BRPM | SYSTOLIC BLOOD PRESSURE: 115 MMHG

## 2020-07-16 LAB — POCT GLUCOSE: 119 MG/DL (ref 70–110)

## 2020-07-16 PROCEDURE — 63600175 PHARM REV CODE 636 W HCPCS: Performed by: INTERNAL MEDICINE

## 2020-07-16 PROCEDURE — A4216 STERILE WATER/SALINE, 10 ML: HCPCS | Performed by: STUDENT IN AN ORGANIZED HEALTH CARE EDUCATION/TRAINING PROGRAM

## 2020-07-16 PROCEDURE — 25000003 PHARM REV CODE 250: Performed by: STUDENT IN AN ORGANIZED HEALTH CARE EDUCATION/TRAINING PROGRAM

## 2020-07-16 RX ADMIN — Medication 10 ML: at 06:07

## 2020-07-16 RX ADMIN — VANCOMYCIN HYDROCHLORIDE 1500 MG: 1.5 INJECTION, POWDER, LYOPHILIZED, FOR SOLUTION INTRAVENOUS at 07:07

## 2020-07-16 RX ADMIN — MEROPENEM 1 G: 1 INJECTION, POWDER, FOR SOLUTION INTRAVENOUS at 09:07

## 2020-07-16 NOTE — PLAN OF CARE
Patient received Vancomycin last night and recived his am IV antibiotics.  Sent updated home health orders to [facility] and [facility] (no change on the IV abx)  Sent referral to NP at home program  Patient has PCA [facility] 10-5 and Sat and Sun 10-2 and help patient.    ALFONSO Kam with [facility] had taught patient how to do home IV antibiotics and placed extenders on PICC line. Had offered patietn SNF and LTAC and patietn declined stating he has done the home IV abx in the past. Spoke to Eddy at [facility] and confirmed IV meds were delivered to patient's home on Tuesday. Called Hamida at [facility] to let her know patient will be dc home today. She verbalized understanding and received orders.    Patient has wound vac at bedside- informed nurse to place home wound vac prior to discharge.    Placed ambulance transportation via patient flow center for 1230pm pickup    Future Appointments   Date Time Provider Department Center   7/22/2020  1:00 PM Jesus Tom MD [facility] WOUND [facility]   7/29/2020 11:00 AM Marlene Vargas NP [id number]   8/7/2020 11:00 AM Ruchi Navarrete MD [facility] UROLOGY [facility]        07/16/20 1100   Final Note   Assessment Type Final Discharge Note   Anticipated Discharge Disposition Home-Health   Hospital Follow Up  Appt(s) scheduled? Yes   Discharge plans and expectations educations in teach back method with documentation complete? Yes   Right Care Referral Info   Post Acute Recommendation Home-care   Referral Type [facility]     Michelle Fontana, RN, CCM, CMSRN  RN Transition Navigator  [phone]

## 2020-07-16 NOTE — PLAN OF CARE
Plan of care reviewed with patient, understanding verbalized. PRN pain medication given overnight.  Wound vac in place.  Bed alarm on, call light in reach, fall precautions in place.

## 2020-07-16 NOTE — PLAN OF CARE
VN rounds completed.  The patient has no complaints at this time. Patient states he is going home in the morning. He is in good spirits.

## 2020-07-16 NOTE — PLAN OF CARE
VN reviewed discharge instructions with pt.  AVS printed and handed to pt by bedside nurse.  Reviewed followup appts, medication, diet, and importance of medication compliance.  Reviewed home care instructions, treatment plan, self management and when to seek medical attention.  Allowed time for questions, all questions answered.  Pt verbalized complete understanding of discharge instructions and voices no concerns.      Discharge instructions complete.    Awaiting transport  Bedside nurse notified.

## 2020-07-16 NOTE — NURSING
Patient safety maintained. AAOx3. Medications administered per order. Monitoring pain level ans treating as needed. Instructed to call for assistance. Continued wound vac, connection to be switch to home equipment on discharge.
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
## 2020-07-17 LAB
BACTERIA SPEC AEROBE CULT: NO GROWTH
FINAL PATHOLOGIC DIAGNOSIS: NORMAL
GROSS: NORMAL

## 2020-07-20 ENCOUNTER — CARE AT HOME (OUTPATIENT)
Dept: HOME HEALTH SERVICES | Facility: CLINIC | Age: 45
End: 2020-07-20
Payer: MEDICARE

## 2020-07-20 VITALS — RESPIRATION RATE: 16 BRPM | TEMPERATURE: 98 F | OXYGEN SATURATION: 98 %

## 2020-07-20 DIAGNOSIS — Z87.19 HISTORY OF GI BLEED: ICD-10-CM

## 2020-07-20 DIAGNOSIS — L89.94 INFECTED DECUBITUS ULCER, STAGE IV: ICD-10-CM

## 2020-07-20 DIAGNOSIS — G82.20 PARAPLEGIA AT T9 LEVEL: Primary | Chronic | ICD-10-CM

## 2020-07-20 DIAGNOSIS — Z59.811: ICD-10-CM

## 2020-07-20 DIAGNOSIS — Z93.59 CHRONIC SUPRAPUBIC CATHETER: Chronic | ICD-10-CM

## 2020-07-20 DIAGNOSIS — N39.0 RECURRENT UTI: ICD-10-CM

## 2020-07-20 DIAGNOSIS — L08.9 INFECTED DECUBITUS ULCER, STAGE IV: ICD-10-CM

## 2020-07-20 DIAGNOSIS — M86.9 OSTEOMYELITIS, UNSPECIFIED SITE, UNSPECIFIED TYPE: ICD-10-CM

## 2020-07-20 PROCEDURE — 99496 TRANSJ CARE MGMT HIGH F2F 7D: CPT | Mod: S$GLB,,, | Performed by: NURSE PRACTITIONER

## 2020-07-20 PROCEDURE — 99496 TRANSITIONAL CARE MANAGE SERVICE 7 DAY DISCHARGE: ICD-10-PCS | Mod: S$GLB,,, | Performed by: NURSE PRACTITIONER

## 2020-07-20 RX ORDER — PANTOPRAZOLE SODIUM 40 MG/1
40 TABLET, DELAYED RELEASE ORAL DAILY
Qty: 30 TABLET | Refills: 11 | Status: SHIPPED | OUTPATIENT
Start: 2020-07-20 | End: 2021-08-03

## 2020-07-20 RX ORDER — MEROPENEM 1 G/1
INJECTION, POWDER, FOR SOLUTION INTRAVENOUS
COMMUNITY
Start: 2020-07-14 | End: 2020-11-05

## 2020-07-20 RX ORDER — COLLAGENASE SANTYL 250 [ARB'U]/G
OINTMENT TOPICAL DAILY
Qty: 30 G | Refills: 3 | Status: ON HOLD | OUTPATIENT
Start: 2020-07-20 | End: 2021-03-18 | Stop reason: HOSPADM

## 2020-07-20 RX ORDER — FERROUS SULFATE 325(65) MG
325 TABLET ORAL DAILY
Qty: 30 TABLET | Refills: 5 | Status: SHIPPED | OUTPATIENT
Start: 2020-07-20 | End: 2021-01-16

## 2020-07-20 SDOH — SOCIAL DETERMINANTS OF HEALTH (SDOH): HOUSING INSTABILITY WITH RISK OF HOMELESSNESS: Z59.811
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
## 2020-07-21 LAB — BACTERIA SPEC ANAEROBE CULT: NORMAL

## 2020-07-22 ENCOUNTER — HOSPITAL ENCOUNTER (OUTPATIENT)
Dept: WOUND CARE | Facility: HOSPITAL | Age: 45
Discharge: HOME | End: 2020-07-22
Attending: SURGERY
Payer: MEDICARE

## 2020-07-22 ENCOUNTER — DOCUMENT SCAN (OUTPATIENT)
Dept: HOME HEALTH SERVICES | Facility: HOSPITAL | Age: 45
End: 2020-07-22
Payer: MEDICAID

## 2020-07-22 ENCOUNTER — CLINICAL SUPPORT (OUTPATIENT)
Dept: LAB | Facility: HOSPITAL | Age: 45
End: 2020-07-22
Attending: SURGERY
Payer: MEDICARE

## 2020-07-22 VITALS
TEMPERATURE: 97 F | WEIGHT: 183 LBS | HEIGHT: 71 IN | DIASTOLIC BLOOD PRESSURE: 80 MMHG | SYSTOLIC BLOOD PRESSURE: 135 MMHG | BODY MASS INDEX: 25.62 KG/M2 | HEART RATE: 83 BPM

## 2020-07-22 DIAGNOSIS — N31.9 NEUROGENIC BLADDER: Chronic | ICD-10-CM

## 2020-07-22 DIAGNOSIS — D50.9 IRON DEFICIENCY ANEMIA, UNSPECIFIED IRON DEFICIENCY ANEMIA TYPE: Chronic | ICD-10-CM

## 2020-07-22 DIAGNOSIS — G82.20 PARAPLEGIA AT T9 LEVEL: Chronic | ICD-10-CM

## 2020-07-22 DIAGNOSIS — L89.324 PRESSURE INJURY OF LEFT ISCHIUM, STAGE 4: Primary | ICD-10-CM

## 2020-07-22 DIAGNOSIS — L89.324 PRESSURE INJURY OF LEFT ISCHIUM, STAGE 4: ICD-10-CM

## 2020-07-22 DIAGNOSIS — L08.9 DECUBITUS ULCER, INFECTED, STAGE III: ICD-10-CM

## 2020-07-22 DIAGNOSIS — L89.313 PRESSURE INJURY OF RIGHT BUTTOCK, STAGE 3: ICD-10-CM

## 2020-07-22 DIAGNOSIS — L89.93 DECUBITUS ULCER, INFECTED, STAGE III: ICD-10-CM

## 2020-07-22 DIAGNOSIS — M86.9 OSTEOMYELITIS, UNSPECIFIED SITE, UNSPECIFIED TYPE: ICD-10-CM

## 2020-07-22 PROCEDURE — 97605 NEG PRS WND THER DME<=50SQCM: CPT

## 2020-07-22 PROCEDURE — 99203 OFFICE O/P NEW LOW 30 MIN: CPT | Mod: 25

## 2020-07-22 NOTE — PROGRESS NOTES
Subjective:       Patient ID: Hermann Aguilar is a 44 y.o. male.    Chief Complaint: Wound Care    07/22/2020- new pt to clinic for Dr. Tom. Pt with history with paraplegia since 2016, dm and htn. Pt lives alone but states that his sister lives 5 minutes away and he has a caregiver that comes daily from 10am- 5pm. Pt presents with right ischial wound that is recurrent for 1.5 years and left buttocks wound. Pt was recently hospitalized for UTI. 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 Recent wound cultures negative for any growth. Pt is receiving IV meropenem and vancomycin via medicine ball pump. Wound care as follows: wound vac to right ischial and  Santyl/ xeroform/ foam border to left buttocks. Pt receiving home health via [facility]. Orders routed. Pt educated about HBO treatment, ekg and labs drawn, awaiting insurance authorization. Pt to f/u with Dr. Tom in 2 weeks 08/05/2020.     Review of Systems   Constitutional: Negative.    HENT: Negative.    Eyes: Negative.    Respiratory: Negative.    Cardiovascular: Negative.    Gastrointestinal: Negative.    Genitourinary: Negative.    Musculoskeletal: Negative.    Neurological: Negative.    Psychiatric/Behavioral: Negative.        Objective:      Physical Exam  Constitutional:       Appearance: He is well-developed.   HENT:      Head: Normocephalic.   Eyes:      Conjunctiva/sclera: Conjunctivae normal.      Pupils: Pupils are equal, round, and reactive to light.   Neck:      Musculoskeletal: Normal range of motion and neck supple.   Cardiovascular:      Rate and Rhythm: Normal rate and regular rhythm.      Heart sounds: Normal heart sounds.   Pulmonary:      Effort: Pulmonary effort is normal.      Breath sounds: Normal breath sounds.   Abdominal:      General: Bowel sounds are normal.      Palpations: Abdomen is soft.   Musculoskeletal: Normal range of motion.   Skin:     General: Skin is warm and  dry.   Neurological:      Mental Status: He is alert and oriented to person, place, and time.      Deep Tendon Reflexes: Reflexes are normal and symmetric.         Assessment:       1. Pressure injury of left ischium, stage 4    2. Osteomyelitis, unspecified site, unspecified type    3. Pressure injury of right buttock, stage 3    4. Neurogenic bladder    5. Decubitus ulcer, infected, stage III    6. Iron deficiency anemia, unspecified iron deficiency anemia type    7. Paraplegia at T9 level           Negative Pressure Wound Therapy  07/14/20 1730 Right (Active)   07/14/20 1730   Side: Right   Orientation:    Location: Ischial tuberosity   Additional Comments:    Location:    SDO Location:    NPWT Type Vacuum Therapy 07/22/20 1400   Therapy Setting NPWT Continuous therapy 07/22/20 1400   Pressure Setting NPWT 125 mmHg 07/22/20 1400   Sponges Inserted NPWT Black;1 07/22/20 1400   Sponges Removed NPWT Black;1 07/22/20 1400   General Output (mL) 75 07/22/20 1400            Altered Skin Integrity 05/02/20 2300 Right Ischial tuberosity #1 Full thickness tissue loss with exposed bone, tendon, or muscle. Often includes undermining and tunneling. May extend into muscle and/or supporting structures. (Active)   05/02/20 2300   Altered Skin Integrity Present on Admission: yes   Side: Right   Orientation:    Location: Ischial tuberosity   Wound Number (optional): #1   Is this injury device related?: No   Primary Wound Type:    Description of Altered Skin Integrity: Full thickness tissue loss with exposed bone, tendon, or muscle. Often includes undermining and tunneling. May extend into muscle and/or supporting structures.   Removal Indication and Assessment:    Wound Outcome: Healed   (Retired) Wound Length (cm):    (Retired) Wound Width (cm):    (Retired) Depth (cm):    Wound Description (Comments):    Removal Indications:    Wound Image   07/22/20 1400   Description of Altered Skin Integrity Full thickness tissue loss with  exposed bone, tendon, or muscle. Often includes undermining and tunneling. May extend into muscle and/or supporting structures. 07/22/20 1400   Dressing Appearance Intact 07/22/20 1400   Drainage Amount Moderate 07/22/20 1400   Drainage Characteristics/Odor Serosanguineous 07/22/20 1400   Appearance Pink;Red 07/22/20 1400   Tissue loss description Full thickness 07/22/20 1400   Red (%), Wound Tissue Color 100 % 07/22/20 1400   Periwound Area Intact 07/22/20 1400   Wound Edges Open 07/22/20 1400   Wound Length (cm) 3.5 cm 07/22/20 1400   Wound Width (cm) 5.3 cm 07/22/20 1400   Wound Depth (cm) 5.9 cm 07/22/20 1400   Wound Volume (cm^3) 109.44 cm^3 07/22/20 1400   Wound Surface Area (cm^2) 18.55 cm^2 07/22/20 1400   Tunneling (depth (cm)/location) 7cm @ 11am 07/22/20 1400   Care Cleansed with:;Sterile normal saline 07/22/20 1400   Periwound Care Skin barrier film applied 07/22/20 1400   Dressing Change Due 07/27/20 07/22/20 1400            Altered Skin Integrity 07/22/20 1400 Left Buttocks #2 Full thickness tissue loss. Subcutaneous fat may be visible but bone, tendon or muscle are not exposed (Active)   07/22/20 1400   Altered Skin Integrity Present on Admission: yes   Side: Left   Orientation:    Location: Buttocks   Wound Number (optional): #2   Is this injury device related?:    Primary Wound Type:    Description of Altered Skin Integrity: Full thickness tissue loss. Subcutaneous fat may be visible but bone, tendon or muscle are not exposed   Removal Indication and Assessment:    Wound Outcome:    (Retired) Wound Length (cm):    (Retired) Wound Width (cm):    (Retired) Depth (cm):    Wound Description (Comments):    Removal Indications:    Wound Image   07/22/20 1400   Description of Altered Skin Integrity Full thickness tissue loss. Subcutaneous fat may be visible but bone, tendon or muscle are not exposed 07/22/20 1400   Dressing Appearance Intact;Dried drainage 07/22/20 1400   Drainage Amount Moderate 07/22/20  1400   Drainage Characteristics/Odor Serosanguineous 07/22/20 1400   Appearance Pink;Yellow 07/22/20 1400   Tissue loss description Full thickness 07/22/20 1400   Red (%), Wound Tissue Color 40 % 07/22/20 1400   Yellow (%), Wound Tissue Color 60 % 07/22/20 1400   Periwound Area Intact 07/22/20 1400   Wound Edges Irregular 07/22/20 1400   Wound Length (cm) 3 cm 07/22/20 1400   Wound Width (cm) 1.9 cm 07/22/20 1400   Wound Depth (cm) 1 cm 07/22/20 1400   Wound Volume (cm^3) 5.7 cm^3 07/22/20 1400   Wound Surface Area (cm^2) 5.7 cm^2 07/22/20 1400   Care Cleansed with:;Sterile normal saline 07/22/20 1400   Dressing Island/border 07/22/20 1400   Periwound Care Skin barrier film applied 07/22/20 1400   Dressing Change Due 07/24/20 07/22/20 1400       Right Ischial Wound  Cleanse wound with: normal saline  Lidocaine: prn  Silver nitrate: prn  Primary dressing: black foam, wound vac continuous suction 125mmHg  Offloading: low air loss mattress at home  Frequency: Mondays and Thursdays except when in clinic    Left Buttocks Wound  Cleanse wound with: normal saline  Lidocaine: prn  Silver nitrate: prn  Periwound care: cavilon  Primary dressing: santyl/ xeroform  Secondary dressing: aquacel 4x4 foam border  Offloading: low air loss mattress  Frequency: Mondays, Wednesdays, Fridays  Follow-up: in 2 weeks 08/05/2020 with Dr. Tom  [facility]: [facility] to perform dressing changes as ordered.   Other Orders: EKG and labs drawn 07/22/2020 for possible HBO treatment.       Plan:                Follow up in about 2 weeks (around 8/5/2020).

## 2020-08-05 ENCOUNTER — HOSPITAL ENCOUNTER (OUTPATIENT)
Dept: WOUND CARE | Facility: HOSPITAL | Age: 45
Discharge: HOME | End: 2020-08-05
Attending: SURGERY
Payer: MEDICARE

## 2020-08-05 ENCOUNTER — PATIENT OUTREACH (OUTPATIENT)
Dept: ADMINISTRATIVE | Facility: OTHER | Age: 45
End: 2020-08-05

## 2020-08-05 VITALS
DIASTOLIC BLOOD PRESSURE: 77 MMHG | BODY MASS INDEX: 25.62 KG/M2 | SYSTOLIC BLOOD PRESSURE: 125 MMHG | WEIGHT: 183 LBS | TEMPERATURE: 98 F | HEART RATE: 84 BPM | HEIGHT: 71 IN

## 2020-08-05 DIAGNOSIS — E11.65 TYPE 2 DIABETES MELLITUS WITH HYPERGLYCEMIA, WITH LONG-TERM CURRENT USE OF INSULIN: Chronic | ICD-10-CM

## 2020-08-05 DIAGNOSIS — Z79.4 TYPE 2 DIABETES MELLITUS WITH HYPERGLYCEMIA, WITH LONG-TERM CURRENT USE OF INSULIN: Primary | Chronic | ICD-10-CM

## 2020-08-05 DIAGNOSIS — G82.20 PARAPLEGIA AT T9 LEVEL: Chronic | ICD-10-CM

## 2020-08-05 DIAGNOSIS — Z89.511 HX OF RIGHT BKA: Chronic | ICD-10-CM

## 2020-08-05 DIAGNOSIS — L89.324 PRESSURE INJURY OF LEFT ISCHIUM, STAGE 4: Primary | ICD-10-CM

## 2020-08-05 DIAGNOSIS — E11.65 TYPE 2 DIABETES MELLITUS WITH HYPERGLYCEMIA, WITH LONG-TERM CURRENT USE OF INSULIN: Primary | Chronic | ICD-10-CM

## 2020-08-05 DIAGNOSIS — I10 ESSENTIAL HYPERTENSION: Chronic | ICD-10-CM

## 2020-08-05 DIAGNOSIS — L89.313 PRESSURE INJURY OF RIGHT BUTTOCK, STAGE 3: ICD-10-CM

## 2020-08-05 DIAGNOSIS — L89.524 PRESSURE INJURY OF LEFT ANKLE, STAGE 4: ICD-10-CM

## 2020-08-05 DIAGNOSIS — Z79.4 TYPE 2 DIABETES MELLITUS WITH HYPERGLYCEMIA, WITH LONG-TERM CURRENT USE OF INSULIN: Chronic | ICD-10-CM

## 2020-08-05 PROCEDURE — 97605 NEG PRS WND THER DME<=50SQCM: CPT

## 2020-08-05 NOTE — PROGRESS NOTES
Requested updates within Care Everywhere.  Patient's chart was reviewed for overdue GEE topics.  Immunizations reconciled.    Orders placed: a1c  Tasked appts:  Labs Linked:

## 2020-08-07 ENCOUNTER — OFFICE VISIT (OUTPATIENT)
Dept: UROLOGY | Facility: CLINIC | Age: 45
End: 2020-08-07
Payer: MEDICARE

## 2020-08-07 VITALS
HEART RATE: 87 BPM | WEIGHT: 183 LBS | BODY MASS INDEX: 25.62 KG/M2 | DIASTOLIC BLOOD PRESSURE: 105 MMHG | SYSTOLIC BLOOD PRESSURE: 161 MMHG | TEMPERATURE: 98 F | HEIGHT: 71 IN

## 2020-08-07 DIAGNOSIS — Z93.59 CHRONIC SUPRAPUBIC CATHETER: ICD-10-CM

## 2020-08-07 DIAGNOSIS — N31.9 NEUROGENIC BLADDER: Primary | ICD-10-CM

## 2020-08-07 PROCEDURE — 99999 PR PBB SHADOW E&M-EST. PATIENT-LVL V: CPT | Mod: PBBFAC,,, | Performed by: STUDENT IN AN ORGANIZED HEALTH CARE EDUCATION/TRAINING PROGRAM

## 2020-08-07 PROCEDURE — 99999 PR PBB SHADOW E&M-EST. PATIENT-LVL V: ICD-10-PCS | Mod: PBBFAC,,, | Performed by: STUDENT IN AN ORGANIZED HEALTH CARE EDUCATION/TRAINING PROGRAM

## 2020-08-07 PROCEDURE — 99499 NO LOS: ICD-10-PCS | Mod: S$PBB,,, | Performed by: STUDENT IN AN ORGANIZED HEALTH CARE EDUCATION/TRAINING PROGRAM

## 2020-08-07 PROCEDURE — 51705 CHANGE OF BLADDER TUBE: CPT | Mod: PBBFAC,PO | Performed by: STUDENT IN AN ORGANIZED HEALTH CARE EDUCATION/TRAINING PROGRAM

## 2020-08-07 PROCEDURE — 99499 UNLISTED E&M SERVICE: CPT | Mod: S$PBB,,, | Performed by: STUDENT IN AN ORGANIZED HEALTH CARE EDUCATION/TRAINING PROGRAM

## 2020-08-07 PROCEDURE — 51705 PR CHANGE OF BLADDER TUBE,SIMPLE: ICD-10-PCS | Mod: S$PBB,,, | Performed by: STUDENT IN AN ORGANIZED HEALTH CARE EDUCATION/TRAINING PROGRAM

## 2020-08-07 PROCEDURE — 51705 CHANGE OF BLADDER TUBE: CPT | Mod: S$PBB,,, | Performed by: STUDENT IN AN ORGANIZED HEALTH CARE EDUCATION/TRAINING PROGRAM

## 2020-08-07 PROCEDURE — 99215 OFFICE O/P EST HI 40 MIN: CPT | Mod: PBBFAC,PO,25 | Performed by: STUDENT IN AN ORGANIZED HEALTH CARE EDUCATION/TRAINING PROGRAM

## 2020-08-07 RX ORDER — IBUPROFEN 800 MG/1
TABLET ORAL
Status: ON HOLD | COMMUNITY
Start: 2020-08-03 | End: 2020-12-30 | Stop reason: HOSPADM

## 2020-08-07 RX ORDER — OXYCODONE AND ACETAMINOPHEN 7.5; 325 MG/1; MG/1
TABLET ORAL
Status: ON HOLD | COMMUNITY
Start: 2020-07-27 | End: 2021-03-18 | Stop reason: HOSPADM

## 2020-08-10 ENCOUNTER — HOSPITAL ENCOUNTER (OUTPATIENT)
Dept: WOUND CARE | Facility: HOSPITAL | Age: 45
Discharge: HOME | End: 2020-08-10
Attending: SURGERY
Payer: MEDICARE

## 2020-08-10 VITALS — DIASTOLIC BLOOD PRESSURE: 81 MMHG | SYSTOLIC BLOOD PRESSURE: 172 MMHG | TEMPERATURE: 99 F | HEART RATE: 70 BPM

## 2020-08-10 DIAGNOSIS — L89.324 STAGE IV PRESSURE ULCER OF LEFT BUTTOCK: ICD-10-CM

## 2020-08-10 DIAGNOSIS — M86.68 CHRONIC OSTEOMYELITIS OF SYMPHYSIS PUBIS: ICD-10-CM

## 2020-08-10 DIAGNOSIS — Z79.4 TYPE 2 DIABETES MELLITUS WITH HYPERGLYCEMIA, WITH LONG-TERM CURRENT USE OF INSULIN: ICD-10-CM

## 2020-08-10 DIAGNOSIS — G82.20 PARAPLEGIA AT T9 LEVEL: ICD-10-CM

## 2020-08-10 DIAGNOSIS — L89.323 PRESSURE INJURY OF LEFT BUTTOCK, STAGE 3: ICD-10-CM

## 2020-08-10 DIAGNOSIS — E11.65 TYPE 2 DIABETES MELLITUS WITH HYPERGLYCEMIA, WITH LONG-TERM CURRENT USE OF INSULIN: ICD-10-CM

## 2020-08-10 DIAGNOSIS — L89.314 PRESSURE INJURY OF RIGHT ISCHIUM, STAGE 4: Primary | ICD-10-CM

## 2020-08-10 LAB
POCT GLUCOSE: 100 MG/DL (ref 70–110)
POCT GLUCOSE: 104 MG/DL (ref 70–110)
POCT GLUCOSE: 122 MG/DL (ref 70–110)
POCT GLUCOSE: 132 MG/DL (ref 70–110)

## 2020-08-10 PROCEDURE — G0277 HBOT, FULL BODY CHAMBER, 30M: HCPCS | Mod: CCAT

## 2020-08-10 PROCEDURE — 97605 NEG PRS WND THER DME<=50SQCM: CPT

## 2020-08-10 NOTE — PROGRESS NOTES
08/10/20 1533   Hyperbaric Pre-Inspection   Mattress cleaned prior to treatment Yes   2 Patient Identifiers Verified Yes   For Inpatients: Verify correct ID band/correct chart Yes   Consent Obtained Yes   Patient voided Yes   Drainage Bags Emptied Not applicable   Patient Pregnant Not applicable   Glasses, Jewelry, Makeup, etc. Removed Not applicable   Hard contacts removed Yes   Dentures, Hearing Aid, Prosthetic Devices Removed Yes   Touch hair to check for hair spray Yes   Cold/Flu Symptoms No   Diabetic Patient Eaten Yes   Medications Given No   For Inpatients: Medication sheet sent with patient No   Fears and apprehensions verbalized No   Ground Wire Secured Yes   HBO Treatment Course Details   Treatment Course Number 1   Total Treatments Ordered 40   Ordering Provider Dr. Marrufo   Indications Chronic refractory osteomyelitis;Diabetic wounds of lower extremities   HBO Treatment Start Date 08/10/20   HBO Treatment Details   Treatment Number 1   Inpatient Visit No   Total Treatment Length Calc (minutes) 112   Chamber Type Monoplace   Chamber # 2   HBO Dive Log # [id number]   Treatment Protocol 2.0 PETER x 120 minutes w/100% oxygen and no air break   Treatment Details   Dive Rate Down 1.0 psi/minute  (0.4-0.5)   Dive Rate Up 1.5 psi/minute   Compress Begins 1.0 PETER   Clock Time 1125   Tx Pressure Reached 2.0 PETER   Clock Time 1140   Decompress Begins 2.0 PETER   Clock Time 1310   Decompress Ends 1.0   Clock Time 1317   Pre HBO Vital Signs   /83   Pulse 80   Resp 16   Temp 98.6 °F (37 °C)   Blood Glucose 122   Glucose Meter # HBO   Action Taken sandwich, juice and snacks given to increae 92 blood sugar   Pain Level 0   Post HBO Vital Signs   BP (!) 172/81   Pulse 70   Resp 16   Temp 98.6 °F (37 °C)   Blood Glucose 132   Glucose Meter # HBO   Pain Level 0   Ear Evaluation   Left Teed Scale Pre Grade 0   Left Teed Scale Post Grade I   Right Teed Scale Pre Grade 0   Right Teed Scale Post Grade 0   Physician  Supervision  I provided direct supervision and was immediately available to furnish assistance and direction throughout the performance of the procedure.  Patient tolerated treatment well.  Continue present treatment plan.  Emergency Response Team  A trained emergency response team and emergency services were available throughout procedure.   DX CODES: M86.68, L89.324

## 2020-08-10 NOTE — PROGRESS NOTES
Subjective:       Patient ID: Hermann Aguilar is a 44 y.o. male.    Chief Complaint: Pressure Ulcer    07/22/2020- new pt to clinic for Dr. Tom. Pt with history with paraplegia since 2016, dm and htn. Pt lives alone but states that his sister lives 5 minutes away and he has a caregiver that comes daily from 10am- 5pm. Pt presents with right ischial wound that is recurrent for 1.5 years and left buttocks wound. Pt was recently hospitalized for UTI. 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 Recent wound cultures negative for any growth. Pt is receiving IV meropenem and vancomycin via medicine ball pump. Wound care as follows: wound vac to right ischial and  Santyl/ xeroform/ foam border to left buttocks. Pt receiving home health via [facility]. Orders routed. Pt educated about HBO treatment, ekg and labs drawn, awaiting insurance authorization. Pt to f/u with Dr. Tom in 2 weeks 08/05/2020.   08/05/2020- f/u with Dr. Tom. Lab results reviewed. Pt to start HBO 08/10/2020. Pt currently receiving home health. If pt starts HBO, home health will be dc'd and wound care visits will be Mondays & Thursdays. Wound vac placed, seal intact. F/u with Dr. Tom in 2 weeks 08/19/2020.  8/10/2020: Nurse visit for dressing change. Patient started HBOT today. Will DC home health, dressing to be changed in clinic on Mondays, and Thursdays. Orders sent to home health. Continuned with [employer] wound vac at 125mmhg continuous: Applied cavilon, benzoin and vac drape to yahaira wound, black foam x 1 to undermining at 11:00 and wound bed, secured with vac drape, tacked to right hip off bony prominence. Patient tolerated well. Nurse visits 8/13/, 8/17/2020 fu with Dr. Tom 8/20/2020.    Review of Systems    Objective:      Physical Exam    Assessment:       1. Pressure injury of right ischium, stage 4    2. Type 2 diabetes mellitus with hyperglycemia, with long-term current use  of insulin    3. Paraplegia at T9 level    4. Pressure injury of left buttock, stage 3           Negative Pressure Wound Therapy  07/14/20 1730 Right (Active)   07/14/20 1730   Side: Right   Orientation:    Location: Ischial tuberosity   Additional Comments:    Location:    SDO Location:    NPWT Type Vacuum Therapy 08/10/20 1400   Therapy Setting NPWT Continuous therapy 08/10/20 1400   Pressure Setting NPWT 125 mmHg 08/10/20 1400   Therapy Interventions NPWT Dressing changed 08/10/20 1400   Sponges Inserted NPWT Black;1 08/10/20 1400   Sponges Removed NPWT Black;1 08/10/20 1400   General Output (mL) 25 08/10/20 1400            Altered Skin Integrity 05/02/20 2300 Right Ischial tuberosity #1 Full thickness tissue loss with exposed bone, tendon, or muscle. Often includes undermining and tunneling. May extend into muscle and/or supporting structures. (Active)   05/02/20 2300   Altered Skin Integrity Present on Admission: yes   Side: Right   Orientation:    Location: Ischial tuberosity   Wound Number (optional): #1   Is this injury device related?: No   Primary Wound Type:    Description of Altered Skin Integrity: Full thickness tissue loss with exposed bone, tendon, or muscle. Often includes undermining and tunneling. May extend into muscle and/or supporting structures.   Removal Indication and Assessment:    Wound Outcome: Healed   (Retired) Wound Length (cm):    (Retired) Wound Width (cm):    (Retired) Depth (cm):    Wound Description (Comments):    Removal Indications:    Wound Image   08/10/20 1400   Description of Altered Skin Integrity Full thickness tissue loss with exposed bone, tendon, or muscle. Often includes undermining and tunneling. May extend into muscle and/or supporting structures. 08/10/20 1400   Dressing Appearance Intact 08/10/20 1400   Drainage Amount Moderate 08/10/20 1400   Drainage Characteristics/Odor Serosanguineous 08/10/20 1400   Appearance Pink;Red;Moist 08/10/20 1400   Tissue loss  description Full thickness 08/10/20 1400   Red (%), Wound Tissue Color 100 % 08/10/20 1400   Periwound Area Intact;Dry 08/10/20 1400   Wound Edges Open 08/10/20 1400   Wound Length (cm) 2.7 cm 08/10/20 1400   Wound Width (cm) 4.8 cm 08/10/20 1400   Wound Depth (cm) 4 cm 08/10/20 1400   Wound Volume (cm^3) 51.84 cm^3 08/10/20 1400   Wound Surface Area (cm^2) 12.96 cm^2 08/10/20 1400   Tunneling (depth (cm)/location) 6cm @11:00 08/10/20 1400   Care Cleansed with:;Sterile normal saline 08/10/20 1400   Dressing Applied;Foam;Transparent film;Other (see comments) 08/10/20 1400   Periwound Care Skin barrier film applied;Dry periwound area maintained 08/10/20 1400   Dressing Change Due 08/13/20 08/10/20 1400            Altered Skin Integrity 07/22/20 1400 Left Buttocks #2 Full thickness tissue loss. Subcutaneous fat may be visible but bone, tendon or muscle are not exposed (Active)   07/22/20 1400   Altered Skin Integrity Present on Admission: yes   Side: Left   Orientation:    Location: Buttocks   Wound Number (optional): #2   Is this injury device related?:    Primary Wound Type:    Description of Altered Skin Integrity: Full thickness tissue loss. Subcutaneous fat may be visible but bone, tendon or muscle are not exposed   Removal Indication and Assessment:    Wound Outcome:    (Retired) Wound Length (cm):    (Retired) Wound Width (cm):    (Retired) Depth (cm):    Wound Description (Comments):    Removal Indications:    Wound Image   08/10/20 1400   Description of Altered Skin Integrity Full thickness tissue loss. Subcutaneous fat may be visible but bone, tendon or muscle are not exposed 08/10/20 1400   Dressing Appearance Intact;Moist drainage 08/10/20 1400   Drainage Amount Moderate 08/10/20 1400   Drainage Characteristics/Odor Serosanguineous 08/10/20 1400   Appearance Pink;Red;Yellow;Moist 08/10/20 1400   Tissue loss description Full thickness 08/10/20 1400   Red (%), Wound Tissue Color 50 % 08/10/20 1400   Yellow  (%), Wound Tissue Color 50 % 08/10/20 1400   Periwound Area Intact 08/10/20 1400   Wound Edges Irregular 08/10/20 1400   Wound Length (cm) 2 cm 08/10/20 1400   Wound Width (cm) 2.2 cm 08/10/20 1400   Wound Depth (cm) 1 cm 08/10/20 1400   Wound Volume (cm^3) 4.4 cm^3 08/10/20 1400   Wound Surface Area (cm^2) 4.4 cm^2 08/10/20 1400   Care Cleansed with:;Sterile normal saline 08/10/20 1400   Dressing Applied;Other (see comments);[place]/border 08/10/20 1400   Periwound Care Skin barrier film applied 08/10/20 1400   Dressing Change Due 08/13/20 08/10/20 1400       Right Ischial Wound   Cleanse wound with: Normal saline   Lidocaine: prn   Silver nitrate: prn   Breanna wound: Cavilon, Benzoin, Vac drape,  Primary dressing: Black foam to wound bed and tunneling @11 ,secure with vac drape,  wound vac continuous suction 125mmHg, track to right hip off bony prominence.   Offloading: Low air loss mattress at home   Frequency: Mondays and Thursdays in clinic     Left Buttocks Wound   Cleanse wound with: Normal saline   Lidocaine: prn   Silver nitrate: prn   Periwound care: Cavilon   Primary dressing: Santyl/ xeroform   Secondary dressing: Aquacel 4x4 foam border   Offloading: Low air loss mattress   Frequency: Mondays,and Thursdays in clinic    Follow-up: in 2 weeks 08/20/2020 with Dr. Tom   Home Health: [facility]. Discontinue home health.  Other Orders: Continue abx.    Plan:                8/20/2020

## 2020-08-11 ENCOUNTER — HOSPITAL ENCOUNTER (OUTPATIENT)
Dept: WOUND CARE | Facility: HOSPITAL | Age: 45
Discharge: HOME | End: 2020-08-11
Attending: SURGERY
Payer: MEDICARE

## 2020-08-11 DIAGNOSIS — L89.324 STAGE IV PRESSURE ULCER OF LEFT BUTTOCK: ICD-10-CM

## 2020-08-11 DIAGNOSIS — M86.68 CHRONIC OSTEOMYELITIS OF SYMPHYSIS PUBIS: ICD-10-CM

## 2020-08-11 LAB
POCT GLUCOSE: 103 MG/DL (ref 70–110)
POCT GLUCOSE: 106 MG/DL (ref 70–110)
POCT GLUCOSE: 107 MG/DL (ref 70–110)
POCT GLUCOSE: 116 MG/DL (ref 70–110)

## 2020-08-11 PROCEDURE — G0277 HBOT, FULL BODY CHAMBER, 30M: HCPCS | Mod: CCAT

## 2020-08-11 NOTE — PROGRESS NOTES
08/11/20 1335   Hyperbaric Pre-Inspection   Mattress cleaned prior to treatment Yes   2 Patient Identifiers Verified Yes   For Inpatients: Verify correct ID band/correct chart Yes   Consent Obtained Yes   Cotton Gown Yes   Patient voided No   Drainage Bags Emptied Yes   Drainage tubes secured Yes   Patient Pregnant Not applicable   Glasses, Jewelry, Makeup, etc. Removed Yes   Hard contacts removed Yes   Dentures, Hearing Aid, Prosthetic Devices Removed Yes   Touch hair to check for hair spray Yes   Cold/Flu Symptoms No   Diabetic Patient Eaten Yes   Medications Given No   Fears and apprehensions verbalized No   Ground Wire Secured Yes   HBO Treatment Course Details   Treatment Course Number 1   Total Treatments Ordered 40   Ordering Provider Dr. Tom   Indications Chronic refractory osteomyelitis   HBO Treatment Start Date 08/10/20   HBO Treatment Details   Treatment Number 2   Inpatient Visit No   Total Treatment Length Calc (minutes) 112   Chamber Type Monoplace   Chamber # 2   HBO Dive Log # [id number]   Treatment Protocol 2.0 PETER x 120 minutes w/100% oxygen and no air break   Treatment Details   Dive Rate Down 1.5 psi/minute   Dive Rate Up 2.0 psi/minute   Compress Begins 1.0 PETER   Clock Time 1100   Tx Pressure Reached 2.0 PETER   Clock Time 1115   Decompress Begins 2.0 PETER   Clock Time 1245   Decompress Ends 1.0 PETER   Clock Time 1252   Pre HBO Vital Signs   /78   Pulse 99   Resp 18   Temp 98.9 °F (37.2 °C)   Blood Glucose 116   Glucose Meter # HBO   Action Taken A light meal provided   Pain Level 0   Post HBO Vital Signs   BP (!) 145/81   Pulse 86   Resp 18   Temp 98.9 °F (37.2 °C)   Blood Glucose 106   Glucose Meter # HBO   Pain Level 0   Ear Evaluation   Left Teed Scale Pre Grade 0   Left Teed Scale Post Grade 0   Right Teed Scale Pre Grade 0   Right Teed Scale Post Grade 0   Physician Supervision  I provided direct supervision and was immediately available to furnish assistance and direction throughout  the performance of the procedure.    Emergency Response Team  A trained emergency response team and emergency services were available throughout procedure.  Patient tolerated treatment well.  Continue present treatment plan.  Patient reports no complaints, tolerated well.   DX CODES: M86.68, L89.324

## 2020-08-12 ENCOUNTER — HOSPITAL ENCOUNTER (OUTPATIENT)
Dept: WOUND CARE | Facility: HOSPITAL | Age: 45
Discharge: HOME | End: 2020-08-12
Attending: SURGERY
Payer: MEDICARE

## 2020-08-12 DIAGNOSIS — M86.68 CHRONIC OSTEOMYELITIS OF SYMPHYSIS PUBIS: ICD-10-CM

## 2020-08-12 DIAGNOSIS — L89.324 STAGE IV PRESSURE ULCER OF LEFT BUTTOCK: ICD-10-CM

## 2020-08-12 LAB
FUNGUS SPEC CULT: NORMAL
POCT GLUCOSE: 109 MG/DL (ref 70–110)
POCT GLUCOSE: 128 MG/DL (ref 70–110)
POCT GLUCOSE: 135 MG/DL (ref 70–110)

## 2020-08-12 PROCEDURE — G0277 HBOT, FULL BODY CHAMBER, 30M: HCPCS | Mod: CCAT

## 2020-08-12 NOTE — PROGRESS NOTES
08/12/20 1455   Hyperbaric Pre-Inspection   Mattress cleaned prior to treatment Yes   2 Patient Identifiers Verified Yes   Consent Obtained Yes   Cotton Gown Yes   Patient voided No   Drainage Bags Emptied Yes   Drainage tubes secured Yes   Patient Pregnant Not applicable   Glasses, Jewelry, Makeup, etc. Removed Yes   Hard contacts removed Yes   Dentures, Hearing Aid, Prosthetic Devices Removed Yes   Touch hair to check for hair spray Yes   Cold/Flu Symptoms No   Diabetic Patient Eaten Yes   Medications Given No   Fears and apprehensions verbalized Yes   Ground Wire Secured Yes   HBO Treatment Course Details   Treatment Course Number 1   Total Treatments Ordered 40   Ordering Provider Negro   Indications Diabetic wounds of lower extremities;Chronic refractory osteomyelitis   HBO Treatment Start Date 08/10/20   HBO Treatment Details   Treatment Number 3   Total Treatment Length Calc (minutes) 97   Chamber Type Monoplace   Chamber # 2   HBO Dive Log # [id number]   Treatment Protocol 2.0 PETER x 120 minutes w/100% oxygen and no air break   Treatment Details   Dive Rate Down 1.5 psi/minute   Dive Rate Up 2.0 psi/minute   Compress Begins 1 PETER   Clock Time 1120   Tx Pressure Reached 2 PETER   Clock Time 1130   Decompress Begins 2 PETER   Clock Time 1250   Decompress Ends 1 PETER   Clock Time 1257   Pre HBO Vital Signs   /85   Pulse 73   Resp 18   Temp 97.1 °F (36.2 °C)   Blood Glucose 128   Glucose Meter # hbo   Pain Level 0   Post HBO Vital Signs   BP (!) 144/90   Pulse 62   Resp 16   Temp 97.1 °F (36.2 °C)   Blood Glucose 135   Glucose Meter # hbo   Pain Level 0   Ear Evaluation   Left Teed Scale Pre Grade 0   Left Teed Scale Post Grade 0   Right Teed Scale Pre Grade 0   Right Teed Scale Post Grade 0   Physician Supervision  I provided direct supervision and was immediately available to furnish assistance and direction throughout the performance of the procedure.    Emergency Response Team  A trained emergency response  team and emergency services were available throughout procedure.  Patient tolerated treatment well.  Continue present treatment plan.  Patient reports no complaints , tolerated well.     Dx:  M86.68, l89.324

## 2020-08-13 ENCOUNTER — HOSPITAL ENCOUNTER (OUTPATIENT)
Dept: WOUND CARE | Facility: HOSPITAL | Age: 45
Discharge: HOME | End: 2020-08-13
Attending: SURGERY
Payer: MEDICARE

## 2020-08-13 DIAGNOSIS — L89.324 PRESSURE INJURY OF LEFT ISCHIUM, STAGE 4: Primary | ICD-10-CM

## 2020-08-13 DIAGNOSIS — L89.324 STAGE IV PRESSURE ULCER OF LEFT BUTTOCK: ICD-10-CM

## 2020-08-13 DIAGNOSIS — M86.68 CHRONIC OSTEOMYELITIS OF SYMPHYSIS PUBIS: ICD-10-CM

## 2020-08-13 PROCEDURE — G0277 HBOT, FULL BODY CHAMBER, 30M: HCPCS | Mod: CCAT

## 2020-08-13 PROCEDURE — 97605 NEG PRS WND THER DME<=50SQCM: CPT

## 2020-08-13 NOTE — PROGRESS NOTES
08/13/20 1333   Hyperbaric Pre-Inspection   Mattress cleaned prior to treatment Yes   2 Patient Identifiers Verified Yes   For Inpatients: Verify correct ID band/correct chart No   Consent Obtained Yes   Cotton Gown Yes   Drainage Bags Emptied Yes   Drainage tubes secured Yes   Patient Pregnant Not applicable   Glasses, Jewelry, Makeup, etc. Removed Yes   Dentures, Hearing Aid, Prosthetic Devices Removed Yes   Touch hair to check for hair spray Yes   Cold/Flu Symptoms No   Diabetic Patient Eaten Yes   Medications Given No   For Inpatients: Medication sheet sent with patient No   Fears and apprehensions verbalized Yes   Ground Wire Secured Yes   HBO Treatment Course Details   Treatment Course Number 1   Total Treatments Ordered 40   Ordering Provider KOJO   Indications Chronic refractory osteomyelitis   HBO Treatment Start Date 08/10/20   HBO Treatment Details   Treatment Number 4   Total Treatment Length Calc (minutes) 167   Chamber Type Monoplace   Chamber # 2   HBO Dive Log # [id number]   Treatment Protocol 2.0 PETER x 90 minutes w/100% oxygen and no air break   Treatment Details   Dive Rate Down 1.5 psi/minute   Dive Rate Up 2.0 psi/minute   Compress Begins 1 PETER   Clock Time 1010   Tx Pressure Reached 2 PETER   Clock Time 1020   Decompress Begins 2 PETER   Clock Time 1150   Decompress Ends 1 PETER   Clock Time 1257   Pre HBO Vital Signs   BP (!) 144/60   Pulse 72   Resp 16   Temp 98.5 °F (36.9 °C)   Blood Glucose 124   Glucose Meter # HBO   Pain Level 0   Post HBO Vital Signs   /76   Pulse 75   Resp 16   Temp 98.5 °F (36.9 °C)   Blood Glucose 125   Glucose Meter # HBO   Pain Level 0   Ear Evaluation   Left Teed Scale Pre Grade 0   Left Teed Scale Post Grade 0   Right Teed Scale Pre Grade 0   Right Teed Scale Post Grade 0   Physician Supervision  I provided direct supervision and was immediately available to furnish assistance and direction throughout the performance of the procedure.    Emergency Response Team  A  trained emergency response team and emergency services were available throughout procedure.  Patient tolerated treatment well.  Continue present treatment plan.  Patient reports no complaints, tolerated treatment well.   DX:  M86.68, L89.324

## 2020-08-13 NOTE — PROGRESS NOTES
08/13/20 1333   Hyperbaric Pre-Inspection   Mattress cleaned prior to treatment Yes   2 Patient Identifiers Verified Yes   For Inpatients: Verify correct ID band/correct chart No   Consent Obtained Yes   Cotton Gown Yes   Drainage Bags Emptied Yes   Drainage tubes secured Yes   Patient Pregnant Not applicable   Glasses, Jewelry, Makeup, etc. Removed Yes   Dentures, Hearing Aid, Prosthetic Devices Removed Yes   Touch hair to check for hair spray Yes   Cold/Flu Symptoms No   Diabetic Patient Eaten Yes   Medications Given No   For Inpatients: Medication sheet sent with patient No   Fears and apprehensions verbalized Yes   Ground Wire Secured Yes   HBO Treatment Course Details   Treatment Course Number 1   Total Treatments Ordered 40   Ordering Provider KOJO   Indications Chronic refractory osteomyelitis   HBO Treatment Start Date 08/10/20   HBO Treatment Details   Treatment Number 4   Total Treatment Length Calc (minutes) 107   Chamber Type Monoplace   Chamber # 2   HBO Dive Log # [id number]   Treatment Protocol 2.0 PETER x 90 minutes w/100% oxygen and no air break   Treatment Details   Dive Rate Down 1.5 psi/minute   Dive Rate Up 2.0 psi/minute   Compress Begins 1 PETER   Clock Time 1010   Tx Pressure Reached 2 PETER   Clock Time 1020   Decompress Begins 2 PETER   Clock Time 1150   Decompress Ends 1 PETER   Clock Time 1157   Pre HBO Vital Signs   BP (!) 144/60   Pulse 72   Resp 16   Temp 98.5 °F (36.9 °C)   Blood Glucose 124   Glucose Meter # HBO   Pain Level 0   Post HBO Vital Signs   /76   Pulse 75   Resp 16   Temp 98.5 °F (36.9 °C)   Blood Glucose 125   Glucose Meter # HBO   Pain Level 0   Ear Evaluation   Left Teed Scale Pre Grade 0   Left Teed Scale Post Grade 0   Right Teed Scale Pre Grade 0   Right Teed Scale Post Grade 0   Physician Supervision  I provided direct supervision and was immediately available to furnish assistance and direction throughout the performance of the procedure.    Emergency Response Team  A  trained emergency response team and emergency services were available throughout procedure.  Patient tolerated treatment well.  Continue present treatment plan.  Patient reports no complaints , tolerated well.     DX:  M86.68, L89.324

## 2020-08-13 NOTE — PROGRESS NOTES
08/12/20 1455   Hyperbaric Pre-Inspection   Mattress cleaned prior to treatment Yes   2 Patient Identifiers Verified Yes   Consent Obtained Yes   Cotton Gown Yes   Patient voided No   Drainage Bags Emptied Yes   Drainage tubes secured Yes   Patient Pregnant Not applicable   Glasses, Jewelry, Makeup, etc. Removed Yes   Hard contacts removed Yes   Dentures, Hearing Aid, Prosthetic Devices Removed Yes   Touch hair to check for hair spray Yes   Cold/Flu Symptoms No   Diabetic Patient Eaten Yes   Medications Given No   Fears and apprehensions verbalized Yes   Ground Wire Secured Yes   HBO Treatment Course Details   Treatment Course Number 1   Total Treatments Ordered 40   Ordering Provider Negro   Indications Diabetic wounds of lower extremities;Chronic refractory osteomyelitis   HBO Treatment Start Date 08/10/20   HBO Treatment Details   Treatment Number 3   Total Treatment Length Calc (minutes) 107   Chamber Type Monoplace   Chamber # 2   HBO Dive Log # [id number]   Treatment Protocol 2.0 PETER x 120 minutes w/100% oxygen and no air break   Treatment Details   Dive Rate Down 1.5 psi/minute   Dive Rate Up 2.0 psi/minute   Compress Begins 1 PETER   Clock Time 1120   Tx Pressure Reached 2 PETER   Clock Time 1130   Decompress Begins 2 PETER   Clock Time 1300   Decompress Ends 1 PETER   Clock Time 1307   Pre HBO Vital Signs   /85   Pulse 73   Resp 18   Temp 97.1 °F (36.2 °C)   Blood Glucose 128   Glucose Meter # hbo   Pain Level 0   Post HBO Vital Signs   BP (!) 144/90   Pulse 62   Resp 16   Temp 97.1 °F (36.2 °C)   Blood Glucose 135   Glucose Meter # hbo   Pain Level 0   Ear Evaluation   Left Teed Scale Pre Grade 0   Left Teed Scale Post Grade 0   Right Teed Scale Pre Grade 0   Right Teed Scale Post Grade 0   Physician Supervision  I provided direct supervision and was immediately available to furnish assistance and direction throughout the performance of the procedure.    Emergency Response Team  A trained emergency response  team and emergency services were available throughout procedure.  Patient tolerated treatment well.  Continue present treatment plan.  Patient reports no complaints , tolerated  well   DX:  M86.68, L89.324

## 2020-08-13 NOTE — PROGRESS NOTES
Subjective:       Patient ID: Hermann Aguilar is a 44 y.o. male.    Chief Complaint: Wound Care    07/22/2020- new pt to clinic for Dr. Tom. Pt with history with paraplegia since 2016, dm and htn. Pt lives alone but states that his sister lives 5 minutes away and he has a caregiver that comes daily from 10am- 5pm. Pt presents with right ischial wound that is recurrent for 1.5 years and left buttocks wound. Pt was recently hospitalized for UTI. 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 Recent wound cultures negative for any growth. Pt is receiving IV meropenem and vancomycin via medicine ball pump. Wound care as follows: wound vac to right ischial and  Santyl/ xeroform/ foam border to left buttocks. Pt receiving home health via [facility]. Orders routed. Pt educated about HBO treatment, ekg and labs drawn, awaiting insurance authorization. Pt to f/u with Dr. Tom in 2 weeks 08/05/2020.   08/05/2020- f/u with Dr. Tom. Lab results reviewed. Pt to start HBO 08/10/2020. Pt currently receiving home health. If pt starts HBO, home health will be dc'd and wound care visits will be Mondays & Thursdays. Wound vac placed, seal intact. F/u with Dr. Tom in 2 weeks 08/19/2020.  8/10/2020: Nurse visit for dressing change. Patient started HBOT today. Will DC home health, dressing to be changed in clinic on Mondays, and Thursdays. Orders sent to home health. Continuned with [employer] wound vac at 125mmhg continuous: Applied cavilon, benzoin and vac drape to yahaira wound, black foam x 1 to undermining at 11:00 and wound bed, secured with vac drape, tacked to right hip off bony prominence. Patient tolerated well. Nurse visits 8/13/, 8/17/2020 fu with Dr. Tom 8/20/2020.  08/13/2020- nurse visit for dressing change following HBOT. Wound vac changed, seal intact, canister changed, suction at 125mmHg. Pt tolerated. Next dressing change 08/17/2020 and f/u with Dr. Tom  08/20/2020.    Review of Systems    Objective:      Physical Exam    Assessment:       1. Pressure injury of left ischium, stage 4           Negative Pressure Wound Therapy  07/14/20 1730 Right (Active)   07/14/20 1730   Side: Right   Orientation:    Location: Ischial tuberosity   Additional Comments:    Location:    SDO Location:    NPWT Type Vacuum Therapy 08/13/20 1300   Therapy Setting NPWT Continuous therapy 08/13/20 1300   Pressure Setting NPWT 125 mmHg 08/13/20 1300   Therapy Interventions NPWT Canister changed;Dressing changed;Seal intact;Trac pad replaced 08/13/20 1300   Sponges Inserted NPWT Black;1 08/13/20 1300   Sponges Removed NPWT Black;1 08/13/20 1300   General Output (mL) 75 08/13/20 1300            Altered Skin Integrity 05/02/20 2300 Right Ischial tuberosity #1 Full thickness tissue loss with exposed bone, tendon, or muscle. Often includes undermining and tunneling. May extend into muscle and/or supporting structures. (Active)   05/02/20 2300   Altered Skin Integrity Present on Admission: yes   Side: Right   Orientation:    Location: Ischial tuberosity   Wound Number (optional): #1   Is this injury device related?: No   Primary Wound Type:    Description of Altered Skin Integrity: Full thickness tissue loss with exposed bone, tendon, or muscle. Often includes undermining and tunneling. May extend into muscle and/or supporting structures.   Removal Indication and Assessment:    Wound Outcome: Healed   (Retired) Wound Length (cm):    (Retired) Wound Width (cm):    (Retired) Depth (cm):    Wound Description (Comments):    Removal Indications:    Description of Altered Skin Integrity Full thickness tissue loss with exposed bone, tendon, or muscle. Often includes undermining and tunneling. May extend into muscle and/or supporting structures. 08/13/20 1300   Dressing Appearance Intact 08/13/20 1300   Drainage Amount Moderate 08/13/20 1300   Drainage Characteristics/Odor Serosanguineous 08/13/20 1300    Appearance Pink;Red;Moist 08/13/20 1300   Tissue loss description Full thickness 08/13/20 1300   Red (%), Wound Tissue Color 100 % 08/13/20 1300   Periwound Area Intact;Dry 08/13/20 1300   Wound Edges Open 08/13/20 1300   Wound Length (cm) 2.7 cm 08/13/20 1300   Wound Width (cm) 4.8 cm 08/13/20 1300   Wound Depth (cm) 4 cm 08/13/20 1300   Wound Volume (cm^3) 51.84 cm^3 08/13/20 1300   Wound Surface Area (cm^2) 12.96 cm^2 08/13/20 1300   Tunneling (depth (cm)/location) 6cm @ 11:00 08/13/20 1300   Care Cleansed with:;Sterile normal saline 08/13/20 1300   Dressing Foam 08/13/20 1300   Periwound Care Skin barrier film applied 08/13/20 1300            Altered Skin Integrity 07/22/20 1400 Left Buttocks #2 Full thickness tissue loss. Subcutaneous fat may be visible but bone, tendon or muscle are not exposed (Active)   07/22/20 1400   Altered Skin Integrity Present on Admission: yes   Side: Left   Orientation:    Location: Buttocks   Wound Number (optional): #2   Is this injury device related?:    Primary Wound Type:    Description of Altered Skin Integrity: Full thickness tissue loss. Subcutaneous fat may be visible but bone, tendon or muscle are not exposed   Removal Indication and Assessment:    Wound Outcome:    (Retired) Wound Length (cm):    (Retired) Wound Width (cm):    (Retired) Depth (cm):    Wound Description (Comments):    Removal Indications:    Description of Altered Skin Integrity Full thickness tissue loss. Subcutaneous fat may be visible but bone, tendon or muscle are not exposed 08/13/20 1300   Dressing Appearance Intact;Moist drainage 08/13/20 1300   Drainage Amount Moderate 08/13/20 1300   Drainage Characteristics/Odor Serosanguineous 08/13/20 1300   Appearance Pink;Red;Yellow;Moist 08/13/20 1300   Tissue loss description Full thickness 08/13/20 1300   Red (%), Wound Tissue Color 50 % 08/13/20 1300   Yellow (%), Wound Tissue Color 50 % 08/13/20 1300   Periwound Area Intact 08/13/20 1300   Wound Edges  Irregular 08/13/20 1300   Wound Length (cm) 2 cm 08/13/20 1300   Wound Width (cm) 2.2 cm 08/13/20 1300   Wound Depth (cm) 1 cm 08/13/20 1300   Wound Volume (cm^3) 4.4 cm^3 08/13/20 1300   Wound Surface Area (cm^2) 4.4 cm^2 08/13/20 1300   Care Cleansed with:;Sterile normal saline 08/13/20 1300   Dressing Island/border 08/13/20 1300   Periwound Care Skin barrier film applied 08/13/20 1300   Dressing Change Due 08/17/20 08/13/20 1300       Right Ischial Wound   Cleanse wound with: Normal saline   Lidocaine: prn   Silver nitrate: prn   Breanna wound: Cavilon, Benzoin, Vac drape,   Primary dressing: Black foam to wound bed and tunneling @11 ,secure with vac drape,  wound vac continuous suction 125mmHg, track to right hip off bony prominence.   Offloading: Low air loss mattress at home   Frequency: Mondays and Thursdays in clinic     Left Buttocks Wound   Cleanse wound with: Normal saline   Lidocaine: prn   Silver nitrate: prn   Periwound care: Cavilon   Primary dressing: Santyl/ xeroform   Secondary dressing: Aquacel 4x4 foam border   Offloading: Low air loss mattress   Frequency: Mondays,and Thursdays in clinic     Follow-up: in 2 weeks 08/20/2020 with Dr. Tom   Home Health: [facility]. Discontinue home health.   Other Orders: Continue abx.    Plan:                Follow up in about 1 week (around 8/20/2020).

## 2020-08-14 ENCOUNTER — HOSPITAL ENCOUNTER (OUTPATIENT)
Dept: WOUND CARE | Facility: HOSPITAL | Age: 45
Discharge: HOME | End: 2020-08-14
Attending: SURGERY
Payer: MEDICARE

## 2020-08-14 DIAGNOSIS — L89.324 STAGE IV PRESSURE ULCER OF LEFT BUTTOCK: ICD-10-CM

## 2020-08-14 DIAGNOSIS — M86.68 CHRONIC OSTEOMYELITIS OF SYMPHYSIS PUBIS: ICD-10-CM

## 2020-08-14 PROCEDURE — G0277 HBOT, FULL BODY CHAMBER, 30M: HCPCS | Mod: CCAT

## 2020-08-14 NOTE — PROGRESS NOTES
08/14/20 1532   Hyperbaric Pre-Inspection   Mattress cleaned prior to treatment Yes   2 Patient Identifiers Verified Yes   Consent Obtained Yes   Cotton Gown Yes   Drainage Bags Emptied Yes   Glasses, Jewelry, Makeup, etc. Removed Yes   Hard contacts removed Yes   Dentures, Hearing Aid, Prosthetic Devices Removed Yes   Touch hair to check for hair spray Yes   Cold/Flu Symptoms Yes   Diabetic Patient Eaten Yes   Medications Given No   Fears and apprehensions verbalized Yes   Ground Wire Secured Yes   HBO Treatment Course Details   Treatment Course Number 2   Total Treatments Ordered 60   Ordering Provider Negro   Indications Diabetic wounds of lower extremities;Chronic refractory osteomyelitis   HBO Treatment Start Date 05/18/20   HBO Treatment Details   Treatment Number 5   Inpatient Visit Yes   Total Treatment Length Calc (minutes) 107   Chamber Type Monoplace   Chamber # 2   HBO Dive Log # [id number]   Treatment Protocol 2.0 PETER x 90 minutes w/100% oxygen and no air break   Treatment Details   Dive Rate Down 1.5 psi/minute   Dive Rate Up 2.0 psi/minute   Compress Begins 1 PETER   Clock Time 1030   Tx Pressure Reached 2 PETER   Clock Time 1040   Decompress Begins 2 PETER   Clock Time 1210   Decompress Ends 1 PETER   Clock Time 1217   Pre HBO Vital Signs   /77   Pulse 77   Resp 16   Temp 98.6 °F (37 °C)   Blood Glucose 125   Glucose Meter # HBO   Pain Level 0   Post HBO Vital Signs   BP (!) 149/81   Pulse 68   Resp 16   Temp 98.6 °F (37 °C)   Blood Glucose 123   Glucose Meter # HBO   Pain Level 0   Ear Evaluation   Left Teed Scale Pre Grade 0   Left Teed Scale Post Grade 0   Right Teed Scale Pre Grade 0   Right Teed Scale Post Grade 0   Physician Supervision  I provided direct supervision and was immediately available to furnish assistance and direction throughout the performance of the procedure.    Emergency Response Team  A trained emergency response team and emergency services were available throughout  procedure.  Patient tolerated treatment well.  Continue present treatment plan.  Patient reports no complaints, tolerated well.     DX:  M86.68, L89.324

## 2020-08-17 ENCOUNTER — HOSPITAL ENCOUNTER (OUTPATIENT)
Dept: WOUND CARE | Facility: HOSPITAL | Age: 45
Discharge: HOME | End: 2020-08-17
Attending: SURGERY
Payer: MEDICARE

## 2020-08-17 DIAGNOSIS — L89.324 STAGE IV PRESSURE ULCER OF LEFT BUTTOCK: ICD-10-CM

## 2020-08-17 DIAGNOSIS — L89.324 PRESSURE INJURY OF LEFT ISCHIUM, STAGE 4: Primary | ICD-10-CM

## 2020-08-17 DIAGNOSIS — M86.68 CHRONIC OSTEOMYELITIS OF SYMPHYSIS PUBIS: ICD-10-CM

## 2020-08-17 PROCEDURE — 97605 NEG PRS WND THER DME<=50SQCM: CPT

## 2020-08-17 PROCEDURE — G0277 HBOT, FULL BODY CHAMBER, 30M: HCPCS | Mod: CCAT

## 2020-08-17 NOTE — PROGRESS NOTES
Subjective:       Patient ID: Hremann Aguilar is a 44 y.o. male.    Chief Complaint: Wound Care    07/22/2020- new pt to clinic for Dr. Tom. Pt with history with paraplegia since 2016, dm and htn. Pt lives alone but states that his sister lives 5 minutes away and he has a caregiver that comes daily from 10am- 5pm. Pt presents with right ischial wound that is recurrent for 1.5 years and left buttocks wound. Pt was recently hospitalized for UTI. 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 Recent wound cultures negative for any growth. Pt is receiving IV meropenem and vancomycin via medicine ball pump. Wound care as follows: wound vac to right ischial and  Santyl/ xeroform/ foam border to left buttocks. Pt receiving home health via [facility]. Orders routed. Pt educated about HBO treatment, ekg and labs drawn, awaiting insurance authorization. Pt to f/u with Dr. Tom in 2 weeks 08/05/2020.   08/05/2020- f/u with Dr. Tom. Lab results reviewed. Pt to start HBO 08/10/2020. Pt currently receiving home health. If pt starts HBO, home health will be dc'd and wound care visits will be Mondays & Thursdays. Wound vac placed, seal intact. F/u with Dr. Tom in 2 weeks 08/19/2020.  8/10/2020: Nurse visit for dressing change. Patient started HBOT today. Will DC home health, dressing to be changed in clinic on Mondays, and Thursdays. Orders sent to home health. Continuned with [employer] wound vac at 125mmhg continuous: Applied cavilon, benzoin and vac drape to yahaira wound, black foam x 1 to undermining at 11:00 and wound bed, secured with vac drape, tacked to right hip off bony prominence. Patient tolerated well. Nurse visits 8/13/, 8/17/2020 fu with Dr. Tom 8/20/2020.  08/13/2020- nurse visit for dressing change following HBOT. Wound vac changed, seal intact, canister changed, suction at 125mmHg. Pt tolerated. Next dressing change 08/17/2020 and f/u with Dr. Tom  08/20/2020.  08/17/2020- nurse visit for dressing change following HBOT. Wound vac changed, seal intact, suction at 125mmHg. Pt tolerated. Next dressing change 08/24/2020 and f/u with Dr. Tom 08/20/2020.      Review of Systems    Objective:      Physical Exam    Assessment:       1. Pressure injury of left ischium, stage 4           Negative Pressure Wound Therapy  07/14/20 1730 Right (Active)   07/14/20 1730   Side: Right   Orientation:    Location: Ischial tuberosity   Additional Comments:    Location:    SDO Location:    NPWT Type Vacuum Therapy 08/17/20 1300   Therapy Setting NPWT Continuous therapy 08/17/20 1300   Pressure Setting NPWT 125 mmHg 08/17/20 1300   Therapy Interventions NPWT Dressing changed;Seal intact;Trac pad replaced 08/17/20 1300   Sponges Inserted NPWT Black;1 08/17/20 1300   Sponges Removed NPWT Black;1 08/17/20 1300   General Output (mL) 50 08/17/20 1300            Altered Skin Integrity 05/02/20 2300 Right Ischial tuberosity #1 Full thickness tissue loss with exposed bone, tendon, or muscle. Often includes undermining and tunneling. May extend into muscle and/or supporting structures. (Active)   05/02/20 2300   Altered Skin Integrity Present on Admission: yes   Side: Right   Orientation:    Location: Ischial tuberosity   Wound Number (optional): #1   Is this injury device related?: No   Primary Wound Type:    Description of Altered Skin Integrity: Full thickness tissue loss with exposed bone, tendon, or muscle. Often includes undermining and tunneling. May extend into muscle and/or supporting structures.   Removal Indication and Assessment:    Wound Outcome: Healed   (Retired) Wound Length (cm):    (Retired) Wound Width (cm):    (Retired) Depth (cm):    Wound Description (Comments):    Removal Indications:    Description of Altered Skin Integrity Full thickness tissue loss with exposed bone, tendon, or muscle. Often includes undermining and tunneling. May extend into muscle and/or  supporting structures. 08/17/20 1300   Dressing Appearance Intact 08/17/20 1300   Drainage Amount Moderate 08/17/20 1300   Drainage Characteristics/Odor Serosanguineous 08/17/20 1300   Appearance Pink;Red;Moist 08/17/20 1300   Tissue loss description Full thickness 08/17/20 1300   Red (%), Wound Tissue Color 100 % 08/17/20 1300   Periwound Area Intact;Dry 08/17/20 1300   Wound Edges Open 08/17/20 1300   Wound Length (cm) 2.7 cm 08/17/20 1300   Wound Width (cm) 4.8 cm 08/17/20 1300   Wound Depth (cm) 4 cm 08/17/20 1300   Wound Volume (cm^3) 51.84 cm^3 08/17/20 1300   Wound Surface Area (cm^2) 12.96 cm^2 08/17/20 1300   Tunneling (depth (cm)/location) 6cm @11:00 08/17/20 1300   Care Cleansed with:;Sterile normal saline 08/17/20 1300   Dressing Foam 08/17/20 1300   Periwound Care Skin barrier film applied 08/17/20 1300   Dressing Change Due 08/20/20 08/17/20 1300            Altered Skin Integrity 07/22/20 1400 Left Buttocks #2 Full thickness tissue loss. Subcutaneous fat may be visible but bone, tendon or muscle are not exposed (Active)   07/22/20 1400   Altered Skin Integrity Present on Admission: yes   Side: Left   Orientation:    Location: Buttocks   Wound Number (optional): #2   Is this injury device related?:    Primary Wound Type:    Description of Altered Skin Integrity: Full thickness tissue loss. Subcutaneous fat may be visible but bone, tendon or muscle are not exposed   Removal Indication and Assessment:    Wound Outcome:    (Retired) Wound Length (cm):    (Retired) Wound Width (cm):    (Retired) Depth (cm):    Wound Description (Comments):    Removal Indications:    Description of Altered Skin Integrity Full thickness tissue loss. Subcutaneous fat may be visible but bone, tendon or muscle are not exposed 08/17/20 1300   Dressing Appearance Intact;Moist drainage 08/17/20 1300   Drainage Amount Moderate 08/17/20 1300   Drainage Characteristics/Odor Serosanguineous 08/17/20 1300   Appearance  Pink;Red;Yellow;Moist 08/17/20 1300   Tissue loss description Full thickness 08/17/20 1300   Red (%), Wound Tissue Color 50 % 08/17/20 1300   Yellow (%), Wound Tissue Color 50 % 08/17/20 1300   Periwound Area Intact;Dry 08/17/20 1300   Wound Edges Irregular 08/17/20 1300   Wound Length (cm) 2 cm 08/17/20 1300   Wound Width (cm) 2.2 cm 08/17/20 1300   Wound Depth (cm) 1 cm 08/17/20 1300   Wound Volume (cm^3) 4.4 cm^3 08/17/20 1300   Wound Surface Area (cm^2) 4.4 cm^2 08/17/20 1300   Care Cleansed with:;Sterile normal saline 08/17/20 1300   Dressing Island/border 08/17/20 1300   Periwound Care Skin barrier film applied 08/17/20 1300   Dressing Change Due 08/20/20 08/17/20 1300       Right Ischial Wound   Cleanse wound with: Normal saline   Lidocaine: prn   Silver nitrate: prn   Breanna wound: Cavilon, Benzoin, Vac drape,   Primary dressing: Black foam to wound bed and tunneling @11 ,secure with vac drape,  wound vac continuous suction 125mmHg, track to right hip off bony prominence.   Offloading: Low air loss mattress at home   Frequency: Mondays and Thursdays in clinic     Left Buttocks Wound   Cleanse wound with: Normal saline   Lidocaine: prn   Silver nitrate: prn   Periwound care: Cavilon   Primary dressing: Santyl/ xeroform   Secondary dressing: Aquacel 4x4 foam border   Offloading: Low air loss mattress   Frequency: Mondays,and Thursdays in clinic     Follow-up: in 2 weeks 08/20/2020 with Dr. oTm   Home Health: [facility]. Discontinue home health.   Other Orders: Continue abx.    Plan:                Follow up in about 3 days (around 8/20/2020).

## 2020-08-17 NOTE — PROGRESS NOTES
08/17/20 1340   Hyperbaric Pre-Inspection   Mattress cleaned prior to treatment Yes   2 Patient Identifiers Verified Yes   Consent Obtained Yes   Cotton Gown Yes   Patient voided Yes   Drainage Bags Emptied Yes   Drainage tubes secured Yes   Patient Pregnant Not applicable   Glasses, Jewelry, Makeup, etc. Removed Yes   Hard contacts removed Yes   Dentures, Hearing Aid, Prosthetic Devices Removed Yes   Touch hair to check for hair spray Yes   Cold/Flu Symptoms No   Diabetic Patient Eaten Yes   Medications Given No   Fears and apprehensions verbalized Yes   Ground Wire Secured Yes   HBO Treatment Course Details   Treatment Course Number 1   Total Treatments Ordered 40   Ordering Provider Negro   Indications Chronic refractory osteomyelitis   HBO Treatment Start Date 08/10/20   HBO Treatment Details   Treatment Number 6   Total Treatment Length Calc (minutes) 107   Chamber Type Monoplace   Chamber # 2   HBO Dive Log # [id number]   Treatment Protocol 2.0 PETER x 90 minutes w/100% oxygen and no air break   Treatment Details   Dive Rate Down 1.5 psi/minute   Dive Rate Up 2.0 psi/minute   Compress Begins 1 PETER   Clock Time 1010   Tx Pressure Reached 2 PETER   Clock Time 1020   Decompress Begins 2 PETER   Clock Time 1150   Decompress Ends 1 PETER   Clock Time 1157   Pre HBO Vital Signs   /75   Pulse 68   Resp 16   Temp 98.7 °F (37.1 °C)   Blood Glucose 114   Glucose Meter # HBO   Pain Level 0   Post HBO Vital Signs   BP (!) 140/86   Pulse 68   Resp 16   Temp 98.7 °F (37.1 °C)   Blood Glucose 121   Glucose Meter # hbo   Pain Level 0   Ear Evaluation   Left Teed Scale Pre Grade 0   Left Teed Scale Post Grade 0   Right Teed Scale Pre Grade 0   Right Teed Scale Post Grade 0   Physician Supervision  I provided direct supervision and was immediately available to furnish assistance and direction throughout the performance of the procedure.  Patient tolerated treatment well.  Continue present treatment plan.    Emergency Response  Team  A trained emergency response team and emergency services were available throughout procedure.   DX:  M86.671, L89.324

## 2020-08-18 ENCOUNTER — HOSPITAL ENCOUNTER (OUTPATIENT)
Dept: WOUND CARE | Facility: HOSPITAL | Age: 45
Discharge: HOME | End: 2020-08-18
Attending: SURGERY
Payer: MEDICARE

## 2020-08-18 DIAGNOSIS — M86.68 CHRONIC OSTEOMYELITIS OF SYMPHYSIS PUBIS: ICD-10-CM

## 2020-08-18 DIAGNOSIS — L89.324 STAGE IV PRESSURE ULCER OF LEFT BUTTOCK: ICD-10-CM

## 2020-08-18 PROCEDURE — G0277 HBOT, FULL BODY CHAMBER, 30M: HCPCS | Mod: CCAT

## 2020-08-18 NOTE — PROGRESS NOTES
08/18/20 1022   Hyperbaric Pre-Inspection   Mattress cleaned prior to treatment Yes   2 Patient Identifiers Verified Yes   Consent Obtained Yes   Cotton Gown Yes   Drainage Bags Emptied Yes   Drainage tubes secured Yes   Hard contacts removed No   Dentures, Hearing Aid, Prosthetic Devices Removed Yes   Touch hair to check for hair spray Yes   Cold/Flu Symptoms No   Diabetic Patient Eaten Yes   Medications Given No   Fears and apprehensions verbalized Yes   Ground Wire Secured Yes   HBO Treatment Course Details   Treatment Course Number 1   Total Treatments Ordered 40   Ordering Provider Negro   HBO Treatment Start Date 08/10/20   HBO Treatment Details   Treatment Number 7   Inpatient Visit No   Total Treatment Length Calc (minutes) 112   Chamber # 2   HBO Dive Log # [id number]   Treatment Protocol 2.0 PETER x 90 minutes w/100% oxygen and no air break   Treatment Details   Dive Rate Down 1.0 psi/minute   Dive Rate Up 2.0 psi/minute   Compress Begins 1 PETER   Clock Time 1005   Tx Pressure Reached 2 PETER   Clock Time 1020   Decompress Begins 2 PETER   Clock Time 1150   Decompress Ends 1 PETER   Clock Time 1157   Pre HBO Vital Signs   /77   Pulse 92   Resp 16   Temp 98 °F (36.7 °C)   Blood Glucose 127   Glucose Meter # HBO   Pain Level 0   Post HBO Vital Signs   BP (!) 159/89   Pulse 64   Resp 16   Temp 98 °F (36.7 °C)   Blood Glucose 115   Glucose Meter # HBO   Pain Level 0   Ear Evaluation   Left Teed Scale Pre Grade 0   Left Teed Scale Post Grade 0   Right Teed Scale Pre Grade 0   Right Teed Scale Post Grade 0   Physician Supervision  I provided direct supervision and was immediately available to furnish assistance and direction throughout the performance of the procedure.    Emergency Response Team  A trained emergency response team and emergency services were available throughout procedure.  Patient tolerated treatment well.  Continue present treatment plan.  Patient reports no complaints, tolerated well.   DX:   M86.68, L89.324

## 2020-08-20 ENCOUNTER — HOSPITAL ENCOUNTER (OUTPATIENT)
Dept: WOUND CARE | Facility: HOSPITAL | Age: 45
Discharge: HOME | End: 2020-08-20
Attending: SURGERY
Payer: MEDICARE

## 2020-08-20 DIAGNOSIS — M86.671 CHRONIC OSTEOMYELITIS OF HINDFOOT, RIGHT: ICD-10-CM

## 2020-08-20 DIAGNOSIS — M86.9 DIABETIC FOOT ULCER WITH OSTEOMYELITIS: ICD-10-CM

## 2020-08-20 DIAGNOSIS — L97.512 RIGHT FOOT ULCER, WITH FAT LAYER EXPOSED: ICD-10-CM

## 2020-08-20 DIAGNOSIS — L97.509 DIABETIC FOOT ULCER WITH OSTEOMYELITIS: ICD-10-CM

## 2020-08-20 DIAGNOSIS — E11.69 DIABETIC FOOT ULCER WITH OSTEOMYELITIS: ICD-10-CM

## 2020-08-20 DIAGNOSIS — E11.621 DIABETIC FOOT ULCER WITH OSTEOMYELITIS: ICD-10-CM

## 2020-08-20 PROCEDURE — G0277 HBOT, FULL BODY CHAMBER, 30M: HCPCS | Mod: CCAT

## 2020-08-20 PROCEDURE — 97605 NEG PRS WND THER DME<=50SQCM: CPT

## 2020-08-20 NOTE — PROGRESS NOTES
08/20/20 1409   Hyperbaric Pre-Inspection   Mattress cleaned prior to treatment Yes   2 Patient Identifiers Verified Yes   For Inpatients: Verify correct ID band/correct chart Yes   Consent Obtained Yes   Cotton Gown Yes   Patient voided Yes   Drainage Bags Emptied Yes   Patient Pregnant Not applicable   Glasses, Jewelry, Makeup, etc. Removed Yes   Hard contacts removed Yes   Dentures, Hearing Aid, Prosthetic Devices Removed Yes   Touch hair to check for hair spray Yes   Cold/Flu Symptoms No   Diabetic Patient Eaten Yes   Medications Given No   For Inpatients: Medication sheet sent with patient No   Fears and apprehensions verbalized No   Ground Wire Secured Yes   HBO Treatment Course Details   Treatment Course Number 1   Total Treatments Ordered 40   Ordering Provider Dr. Tom   Indications Chronic refractory osteomyelitis;Diabetic wounds of lower extremities   HBO Treatment Start Date 08/10/20   HBO Treatment Details   Treatment Number 8   Total Treatment Length Calc (minutes) 107   Chamber Type Monoplace   Chamber # 2   HBO Dive Log # [id number]   Treatment Protocol 2.0 PETER x 120 minutes w/100% oxygen and no air break   Treatment Details   Dive Rate Down 1.5 psi/minute   Dive Rate Up 2.0 psi/minute   Compress Begins 1.0 PETER   Clock Time 1130   Tx Pressure Reached 2.0 PETER   Clock Time 1140   Decompress Begins 2.0 PETER   Clock Time 1310   Decompress Ends 1.0 PETER   Clock Time 1317   Pre HBO Vital Signs   BP (!) 121/55   Pulse 76   Resp 16   Temp 98.4 °F (36.9 °C)   Blood Glucose 125   Glucose Meter # HBO   Pain Level 0   Post HBO Vital Signs   BP (!) 163/90   Pulse 66   Resp 16   Temp 98.4 °F (36.9 °C)   Blood Glucose 136   Glucose Meter # HBO   Pain Level 0   Ear Evaluation   Left Teed Scale Pre Grade 0   Left Teed Scale Post Grade 0   Right Teed Scale Pre Grade 0   Right Teed Scale Post Grade 0   Physician Supervision  I provided direct supervision and was immediately available to furnish assistance and direction  throughout the performance of the procedure.    Emergency Response Team  A trained emergency response team and emergency services were available throughout procedure.  Patient tolerated treatment well.  Continue present treatment plan.  Patient reports no complaints, tolertaed well.   DX CODES: E11.621, M86.671, L97.512

## 2020-08-20 NOTE — PROGRESS NOTES
[facility]                Progress Note    Subjective:       Patient ID: Hermann Aguilar is a 44 y.o. male.    Chief Complaint: Wound Care    F/u for wound care    07/22/2020- new pt to clinic for Dr. Tom. Pt with history with paraplegia since 2016, dm and htn. Pt lives alone but states that his sister lives 5 minutes away and he has a caregiver that comes daily from 10am- 5pm. Pt presents with right ischial wound that is recurrent for 1.5 years and left buttocks wound. Pt was recently hospitalized for UTI. 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 Recent wound cultures negative for any growth. Pt is receiving IV meropenem and vancomycin via medicine ball pump. Wound care as follows: wound vac to right ischial and  Santyl/ xeroform/ foam border to left buttocks. Pt receiving home health via [facility]/ [facility]. Orders routed. Pt educated about HBO treatment, ekg and labs drawn, awaiting insurance authorization. Pt to f/u with Dr. Tom in 2 weeks 08/05/2020.   08/05/2020- f/u with Dr. Tom. Lab results reviewed. Pt to start HBO 08/10/2020. Pt currently receiving home health. If pt starts HBO, home health will be dc'd and wound care visits will be Mondays & Thursdays. Wound vac placed, seal intact. F/u with Dr. Tom in 2 weeks 08/19/2020.  8/10/2020: Nurse visit for dressing change. Patient started HBOT today. Will DC home health, dressing to be changed in clinic on Mondays, and Thursdays. Orders sent to home health. Continuned with [employer] wound vac at 125mmhg continuous: Applied cavilon, benzoin and vac drape to yahaira wound, black foam x 1 to undermining at 11:00 and wound bed, secured with vac drape, tacked to right hip off bony prominence. Patient tolerated well. Nurse visits 8/13/, 8/17/2020 fu with Dr. Tom 8/20/2020.  08/13/2020- nurse visit for dressing change following HBOT. Wound vac changed,  seal intact, canister changed, suction at 125mmHg. Pt tolerated. Next dressing change 08/17/2020 and f/u with Dr. Tom 08/20/2020.  08/17/2020- nurse visit for dressing change following HBOT. Wound vac changed, seal intact, suction at 125mmHg. Pt tolerated. Next dressing change 08/24/2020 and f/u with Dr. Tom 08/20/2020.   08/20/20:  F/U with Dr. Tom.  Wound progressing well. Small amount of bruising noted to wound bed from pressure.  Patient instructed to do pressure reliefs and and turn every 2 hours     Review of Systems   Constitutional: Negative.    HENT: Negative.    Eyes: Negative.    Respiratory: Negative.    Cardiovascular: Negative.    Gastrointestinal: Negative.    Genitourinary: Negative.    Musculoskeletal: Negative.    Neurological: Negative.    Psychiatric/Behavioral: Negative.          Objective:        Physical Exam  Constitutional:       Appearance: He is well-developed.   HENT:      Head: Normocephalic.   Eyes:      Conjunctiva/sclera: Conjunctivae normal.      Pupils: Pupils are equal, round, and reactive to light.   Neck:      Musculoskeletal: Normal range of motion and neck supple.   Cardiovascular:      Rate and Rhythm: Normal rate and regular rhythm.      Heart sounds: Normal heart sounds.   Pulmonary:      Effort: Pulmonary effort is normal.      Breath sounds: Normal breath sounds.   Abdominal:      General: Bowel sounds are normal.      Palpations: Abdomen is soft.   Musculoskeletal: Normal range of motion.   Skin:     General: Skin is warm and dry.   Neurological:      Mental Status: He is alert and oriented to person, place, and time.      Deep Tendon Reflexes: Reflexes are normal and symmetric.         There were no vitals filed for this visit.    Assessment:         No diagnosis found.         Negative Pressure Wound Therapy  07/14/20 1730 Right (Active)   07/14/20 1730   Side: Right   Orientation:    Location: Ischial tuberosity   Additional Comments:    Location:    SDO  Location:    NPWT Type Vacuum Therapy 08/20/20 1145   Therapy Setting NPWT Continuous therapy 08/20/20 1145   Pressure Setting NPWT 125 mmHg 08/20/20 1145   Therapy Interventions NPWT Canister changed;Trac pad replaced 08/20/20 1145   Sponges Inserted NPWT Black;1 08/20/20 1145   Sponges Removed NPWT Black 08/20/20 1145   General Output (mL) 50 08/20/20 1145            Altered Skin Integrity 05/02/20 2300 Right Ischial tuberosity #1 Full thickness tissue loss with exposed bone, tendon, or muscle. Often includes undermining and tunneling. May extend into muscle and/or supporting structures. (Active)   05/02/20 2300   Altered Skin Integrity Present on Admission: yes   Side: Right   Orientation:    Location: Ischial tuberosity   Wound Number (optional): #1   Is this injury device related?: No   Primary Wound Type:    Description of Altered Skin Integrity: Full thickness tissue loss with exposed bone, tendon, or muscle. Often includes undermining and tunneling. May extend into muscle and/or supporting structures.   Removal Indication and Assessment:    Wound Outcome: Healed   (Retired) Wound Length (cm):    (Retired) Wound Width (cm):    (Retired) Depth (cm):    Wound Description (Comments):    Removal Indications:    Dressing Appearance Moist drainage 08/20/20 1145   Drainage Amount Moderate 08/20/20 1145   Drainage Characteristics/Odor Serosanguineous 08/20/20 1145   Tissue loss description Full thickness 08/20/20 1145   Red (%), Wound Tissue Color 100 % 08/20/20 1145   Periwound Area Moist 08/20/20 1145   Wound Length (cm) 4.1 cm 08/20/20 1145   Wound Width (cm) 3.2 cm 08/20/20 1145   Wound Depth (cm) 1.5 cm 08/20/20 1145   Wound Volume (cm^3) 19.68 cm^3 08/20/20 1145   Wound Surface Area (cm^2) 13.12 cm^2 08/20/20 1145   Care Cleansed with:;Sterile normal saline 08/20/20 1145   Periwound Care Moisture barrier applied;Skin barrier film applied 08/20/20 1145   Dressing Change Due 08/24/20 08/20/20 1145             Altered Skin Integrity 07/22/20 1400 Left Buttocks #2 Full thickness tissue loss. Subcutaneous fat may be visible but bone, tendon or muscle are not exposed (Active)   07/22/20 1400   Altered Skin Integrity Present on Admission: yes   Side: Left   Orientation:    Location: Buttocks   Wound Number (optional): #2   Is this injury device related?:    Primary Wound Type:    Description of Altered Skin Integrity: Full thickness tissue loss. Subcutaneous fat may be visible but bone, tendon or muscle are not exposed   Removal Indication and Assessment:    Wound Outcome:    (Retired) Wound Length (cm):    (Retired) Wound Width (cm):    (Retired) Depth (cm):    Wound Description (Comments):    Removal Indications:    Drainage Amount Moderate 08/20/20 1145   Drainage Characteristics/Odor Serosanguineous 08/20/20 1145   Appearance Pink;Red 08/20/20 1145   Red (%), Wound Tissue Color 100 % 08/20/20 1145   Periwound Area Moist 08/20/20 1145   Wound Length (cm) 4.1 cm 08/20/20 1145   Wound Width (cm) 3.2 cm 08/20/20 1145   Wound Depth (cm) 5 cm 08/20/20 1145   Wound Volume (cm^3) 65.6 cm^3 08/20/20 1145   Wound Surface Area (cm^2) 13.12 cm^2 08/20/20 1145   Care Cleansed with:;Sterile normal saline 08/20/20 1145   Periwound Care Skin barrier film applied;Dry periwound area maintained;Moisture barrier applied 08/20/20 1145   Dressing Change Due 08/24/20 08/20/20 1145        Right Ischial Wound   Cleanse wound with: Normal saline   Lidocaine: prn   Silver nitrate: prn   Breanna wound: Cavilon, Benzoin, Vac drape,   Primary dressing: Black foam to wound bed and tunneling @11 ,secure with vac drape,  wound vac continuous suction 125mmHg, track to right hip off bony prominence.   Offloading: Low air loss mattress at home   Frequency: Mondays and Thursdays in clinic     Left Buttocks Wound   Cleanse wound with: Normal saline   Lidocaine: prn   Silver nitrate: prn   Periwound care: Cavilon   Primary dressing: Santyl/ xeroform   Secondary  dressing: Aquacel 4x4 foam border   Offloading: Low air loss mattress   Frequency: Mondays,and Thursdays in clinic     Follow-up: in 2 weeks 09/03/2020 with Dr. Tom   Home Health: [facility]. PRN dressing changes for wound vac leaks  Other Orders: Continue abx.       Plan:          Patient to return to clinic in 2 weeks to f/u with Dr. Tom  Nurse visits Mondays and Thursdays when not seen by Dr. Tom

## 2020-08-21 ENCOUNTER — HOSPITAL ENCOUNTER (OUTPATIENT)
Dept: WOUND CARE | Facility: HOSPITAL | Age: 45
Discharge: HOME | End: 2020-08-21
Attending: SURGERY
Payer: MEDICARE

## 2020-08-21 DIAGNOSIS — M86.68 CHRONIC OSTEOMYELITIS OF SYMPHYSIS PUBIS: ICD-10-CM

## 2020-08-21 DIAGNOSIS — L89.324 STAGE IV PRESSURE ULCER OF LEFT BUTTOCK: ICD-10-CM

## 2020-08-21 PROCEDURE — G0277 HBOT, FULL BODY CHAMBER, 30M: HCPCS | Mod: CCAT

## 2020-08-21 NOTE — PROGRESS NOTES
08/21/20 1421   Hyperbaric Pre-Inspection   2 Patient Identifiers Verified Yes   For Inpatients: Verify correct ID band/correct chart Yes   Consent Obtained Yes   Cotton Gown Yes   Patient voided Yes   Drainage Bags Emptied Not applicable   Patient Pregnant Not applicable   Hard contacts removed Yes   Dentures, Hearing Aid, Prosthetic Devices Removed Yes   Touch hair to check for hair spray Yes   Cold/Flu Symptoms No   Diabetic Patient Eaten Yes   Medications Given No   For Inpatients: Medication sheet sent with patient No   Fears and apprehensions verbalized No   Ground Wire Secured Yes   HBO Treatment Course Details   Treatment Course Number 1   Total Treatments Ordered 40   Ordering Provider Dr. Tom   Indications Chronic refractory osteomyelitis;Diabetic wounds of lower extremities   HBO Treatment Start Date 05/18/20   HBO Treatment Details   Treatment Number 9   Inpatient Visit No   Total Treatment Length Calc (minutes) 107   Chamber Type Monoplace   Chamber # 2   HBO Dive Log # [id number]   Treatment Protocol 2.0 PETER x 120 minutes w/100% oxygen and no air break   Treatment Details   Dive Rate Down 1.5 psi/minute   Dive Rate Up 2.0 psi/minute   Compress Begins 1.0 PETER   Clock Time 1105   Tx Pressure Reached 2.0 PETER   Clock Time 1115   Decompress Begins 2.0 PETER   Clock Time 1245   Decompress Ends 1.0 PETER   Clock Time 1252   Pre HBO Vital Signs   /87   Pulse 69   Resp 16   Temp 98.4 °F (36.9 °C)   Blood Glucose 142   Glucose Meter # HBO   Pain Level 0   Post HBO Vital Signs   BP (!) 144/81   Pulse 66   Resp 16   Temp 98.4 °F (36.9 °C)   Blood Glucose 106   Glucose Meter # HBO   Pain Level 0   Ear Evaluation   Left Teed Scale Pre Grade 0   Left Teed Scale Post Grade 0   Right Teed Scale Pre Grade 0   Right Teed Scale Post Grade 0   Physician Supervision  I provided direct supervision and was immediately available to furnish assistance and direction throughout the performance of the procedure.  Patient  tolerated treatment well.  Continue present treatment plan.  Emergency Response Team  A trained emergency response team and emergency services were available throughout procedure.   DX CODES: M86.68, L89.324

## 2020-08-25 ENCOUNTER — HOSPITAL ENCOUNTER (OUTPATIENT)
Dept: WOUND CARE | Facility: HOSPITAL | Age: 45
Discharge: HOME | End: 2020-08-25
Attending: SURGERY
Payer: MEDICARE

## 2020-08-25 DIAGNOSIS — M86.68 CHRONIC OSTEOMYELITIS OF SYMPHYSIS PUBIS: ICD-10-CM

## 2020-08-25 DIAGNOSIS — L97.324 NON-PRESSURE CHRONIC ULCER OF LEFT ANKLE WITH NECROSIS OF BONE: ICD-10-CM

## 2020-08-25 PROCEDURE — G0277 HBOT, FULL BODY CHAMBER, 30M: HCPCS | Mod: CCAT

## 2020-08-25 NOTE — PROGRESS NOTES
08/25/20 1318   Hyperbaric Pre-Inspection   2 Patient Identifiers Verified Yes   For Inpatients: Verify correct ID band/correct chart Yes   Consent Obtained Yes   Cotton Gown Yes   Patient voided Yes   Drainage Bags Emptied Not applicable   Patient Pregnant Not applicable   Glasses, Jewelry, Makeup, etc. Removed Yes   Dentures, Hearing Aid, Prosthetic Devices Removed Yes   Touch hair to check for hair spray Yes   Cold/Flu Symptoms No   Diabetic Patient Eaten Yes   Medications Given No   For Inpatients: Medication sheet sent with patient No   Fears and apprehensions verbalized No   Ground Wire Secured Yes   HBO Treatment Course Details   Treatment Course Number 1   Total Treatments Ordered 40   Ordering Provider Dr. Tom   Indications Chronic refractory osteomyelitis   HBO Treatment Start Date 08/10/20   HBO Treatment Details   Treatment Number 10   Inpatient Visit No   Total Treatment Length Calc (minutes) 107   Chamber Type Monoplace   Chamber # 4   HBO Dive Log # [id number]   Treatment Protocol 2.0 PETER x 120 minutes w/100% oxygen and no air break   Treatment Details   Dive Rate Down 1.5 psi/minute   Dive Rate Up 2.0 psi/minute   Compress Begins 1.0 PETER   Clock Time 1125   Tx Pressure Reached 2.0 PETER   Clock Time 1135   Decompress Begins 2.0 PETER   Clock Time 1305   Decompress Ends 1.0 PETER   Clock Time 1312   Pre HBO Vital Signs   /82   Pulse 80   Resp 16   Temp 98.1 °F (36.7 °C)   Blood Glucose 127   Glucose Meter # HBO   Pain Level 0   Post HBO Vital Signs   BP (!) 152/80   Pulse 76   Resp 16   Temp 98.1 °F (36.7 °C)   Blood Glucose 143   Glucose Meter # HBO   Pain Level 0   Ear Evaluation   Left Teed Scale Pre Grade 0   Left Teed Scale Post Grade 0   Right Teed Scale Pre Grade 0   Right Teed Scale Post Grade 0   Physician Supervision  I provided direct supervision and was immediately available to furnish assistance and direction throughout the performance of the procedure.    Emergency Response Team  A  trained emergency response team and emergency services were available throughout procedure.  Patient tolerated treatment well.  Continue present treatment plan.  Patient reports Patient tolerated treatment well.  Continue present treatment plan.  Patient reports no com[plaints, tolerated well.   DX CODES: M86.68, L89.324

## 2020-08-26 ENCOUNTER — HOSPITAL ENCOUNTER (OUTPATIENT)
Dept: WOUND CARE | Facility: HOSPITAL | Age: 45
Discharge: HOME | End: 2020-08-26
Attending: SURGERY
Payer: MEDICARE

## 2020-08-26 DIAGNOSIS — L89.324 STAGE IV PRESSURE ULCER OF LEFT BUTTOCK: ICD-10-CM

## 2020-08-26 DIAGNOSIS — M86.68 CHRONIC OSTEOMYELITIS OF SYMPHYSIS PUBIS: ICD-10-CM

## 2020-08-26 PROCEDURE — G0277 HBOT, FULL BODY CHAMBER, 30M: HCPCS | Mod: CCAT

## 2020-08-26 NOTE — PROGRESS NOTES
08/26/20 1351   Hyperbaric Pre-Inspection   Mattress cleaned prior to treatment Yes   2 Patient Identifiers Verified Yes   For Inpatients: Verify correct ID band/correct chart Yes   Consent Obtained Yes   Cotton Gown Yes   Patient voided Yes   Drainage Bags Emptied Not applicable   Patient Pregnant Not applicable   Glasses, Jewelry, Makeup, etc. Removed Yes   Hard contacts removed Yes   Dentures, Hearing Aid, Prosthetic Devices Removed Yes   Touch hair to check for hair spray Yes   Cold/Flu Symptoms No   Diabetic Patient Eaten Yes   Medications Given No   For Inpatients: Medication sheet sent with patient No   Fears and apprehensions verbalized No   Ground Wire Secured Yes   HBO Treatment Course Details   Treatment Course Number 1   Total Treatments Ordered 40   Ordering Provider Dr. Tom   Indications Chronic refractory osteomyelitis   HBO Treatment Start Date 08/10/20   HBO Treatment Details   Treatment Number 11   Inpatient Visit No   Total Treatment Length Calc (minutes) 107   Chamber Type Monoplace   Chamber # 4   HBO Dive Log # [id number]   Treatment Protocol 2.0 PETER x 120 minutes w/100% oxygen and no air break   Treatment Details   Dive Rate Down 1.5 psi/minute   Dive Rate Up 2.0 psi/minute   Compress Begins 1.0 PETER   Clock Time 1225   Tx Pressure Reached 2.0 PETER   Clock Time 1235   Decompress Begins 2.0 PETER   Clock Time 1405   Decompress Ends 1.0 PETER   Clock Time 1412   Pre HBO Vital Signs   /83   Pulse 77   Resp 16   Temp 98.2 °F (36.8 °C)   Blood Glucose 136   Glucose Meter # HBO   Action Taken Meal provided   Pain Level 0   Post HBO Vital Signs   BP (!) 160/85   Pulse 72   Resp 16   Temp 98.2 °F (36.8 °C)   Blood Glucose 124   Glucose Meter # HBO   Pain Level 0   Ear Evaluation   Left Teed Scale Pre Grade 0   Left Teed Scale Post Grade 0   Right Teed Scale Pre Grade 0   Right Teed Scale Post Grade 0   Physician Supervision  I provided direct supervision and was immediately available to furnish  assistance and direction throughout the performance of the procedure.    Emergency Response Team  A trained emergency response team and emergency services were available throughout procedure.Patient tolerated treatment well.  Continue present treatment plan.  Patient reports no complaints , tolerated well.     DX CODES: M86.68, L89.324

## 2020-08-28 ENCOUNTER — HOSPITAL ENCOUNTER (OUTPATIENT)
Dept: WOUND CARE | Facility: HOSPITAL | Age: 45
Discharge: HOME | End: 2020-08-28
Attending: SURGERY
Payer: MEDICARE

## 2020-08-28 DIAGNOSIS — M86.68 CHRONIC OSTEOMYELITIS OF SYMPHYSIS PUBIS: ICD-10-CM

## 2020-08-28 DIAGNOSIS — L89.324 STAGE IV PRESSURE ULCER OF LEFT BUTTOCK: ICD-10-CM

## 2020-08-28 PROCEDURE — 99211 OFF/OP EST MAY X REQ PHY/QHP: CPT

## 2020-08-28 NOTE — PROGRESS NOTES
Patient presented to HBOat 09:30  with low glucose of 92.  Provided an snack to the patient and  40 minutes, glucose 89.    Had a food tray sent for patient and waited another 45 minutes, glucose came up to 112 at 11:45    Patient opted to cancel HBO treatment as he had company coming to his house today.

## 2020-08-31 ENCOUNTER — HOSPITAL ENCOUNTER (OUTPATIENT)
Dept: WOUND CARE | Facility: HOSPITAL | Age: 45
Discharge: HOME | End: 2020-08-31
Attending: SURGERY
Payer: MEDICARE

## 2020-08-31 DIAGNOSIS — L89.324 PRESSURE INJURY OF LEFT ISCHIUM, STAGE 4: Primary | ICD-10-CM

## 2020-08-31 DIAGNOSIS — M86.68 CHRONIC OSTEOMYELITIS OF SYMPHYSIS PUBIS: ICD-10-CM

## 2020-08-31 DIAGNOSIS — L89.324 STAGE IV PRESSURE ULCER OF LEFT BUTTOCK: ICD-10-CM

## 2020-08-31 PROCEDURE — 97605 NEG PRS WND THER DME<=50SQCM: CPT

## 2020-08-31 PROCEDURE — G0277 HBOT, FULL BODY CHAMBER, 30M: HCPCS | Mod: CCAT

## 2020-08-31 NOTE — PROGRESS NOTES
08/31/20 1423   Hyperbaric Pre-Inspection   Mattress cleaned prior to treatment Yes   2 Patient Identifiers Verified Yes   For Inpatients: Verify correct ID band/correct chart Yes   Consent Obtained Yes   Cotton Gown Yes   Patient voided Yes   Drainage Bags Emptied Not applicable   Patient Pregnant Not applicable   Glasses, Jewelry, Makeup, etc. Removed Yes   Hard contacts removed Yes   Touch hair to check for hair spray Yes   Cold/Flu Symptoms No   Diabetic Patient Eaten Yes   Medications Given No   For Inpatients: Medication sheet sent with patient No   Fears and apprehensions verbalized No   Ground Wire Secured Yes   HBO Treatment Course Details   Treatment Course Number 1   Total Treatments Ordered 40   Ordering Provider Dr. Tom   Indications Chronic refractory osteomyelitis;Diabetic wounds of lower extremities   HBO Treatment Start Date 08/10/20   HBO Treatment Details   Treatment Number 12   Inpatient Visit No   Total Treatment Length Calc (minutes) 107   Chamber Type Monoplace   Chamber # 4   HBO Dive Log # [id number]   Treatment Protocol 2.0 PETER x 120 minutes w/100% oxygen and no air break   Treatment Details   Dive Rate Down 1.5 psi/minute   Dive Rate Up 2.0 psi/minute   Compress Begins 1.0 PETER   Clock Time 1050   Tx Pressure Reached 2.0 PETER   Clock Time 1100   Decompress Begins 2.0 PETER   Clock Time 1230   Decompress Ends 1.0 PETER   Clock Time 1237   Pre HBO Vital Signs   /71   Pulse 76   Resp 16   Temp 98.9 °F (37.2 °C)   Blood Glucose 117   Glucose Meter # HBO   Pain Level 0   Post HBO Vital Signs   /77   Pulse 64   Resp 16   Temp 98.9 °F (37.2 °C)   Blood Glucose 97   Glucose Meter # HBO   Pain Level 0   Ear Evaluation   Left Teed Scale Pre Grade 0   Left Teed Scale Post Grade 0   Right Teed Scale Pre Grade 0   Right Teed Scale Post Grade 0   Physician Supervision  I provided direct supervision and was immediately available to furnish assistance and direction throughout the performance of the  procedure.  Patient tolerated treatment well.  Continue present treatment plan.  Emergency Response Team  A trained emergency response team and emergency services were available throughout procedure.   DX CODES: M86.68, L89.324

## 2020-08-31 NOTE — PROGRESS NOTES
Subjective:       Patient ID: Hermann Aguilar is a 44 y.o. male.    Chief Complaint: Wound Care    07/22/2020- new pt to clinic for Dr. Tom. Pt with history with paraplegia since 2016, dm and htn. Pt lives alone but states that his sister lives 5 minutes away and he has a caregiver that comes daily from 10am- 5pm. Pt presents with right ischial wound that is recurrent for 1.5 years and left buttocks wound. Pt was recently hospitalized for UTI. 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 Recent wound cultures negative for any growth. Pt is receiving IV meropenem and vancomycin via medicine ball pump. Wound care as follows: wound vac to right ischial and  Santyl/ xeroform/ foam border to left buttocks. Pt receiving home health via [facility]. Orders routed. Pt educated about HBO treatment, ekg and labs drawn, awaiting insurance authorization. Pt to f/u with Dr. Tom in 2 weeks 08/05/2020.   08/05/2020- f/u with Dr. Tom. Lab results reviewed. Pt to start HBO 08/10/2020. Pt currently receiving home health. If pt starts HBO, home health will be dc'd and wound care visits will be Mondays & Thursdays. Wound vac placed, seal intact. F/u with Dr. Tom in 2 weeks 08/19/2020.  8/10/2020: Nurse visit for dressing change. Patient started HBOT today. Will DC home health, dressing to be changed in clinic on Mondays, and Thursdays. Orders sent to home health. Continuned with [employer] wound vac at 125mmhg continuous: Applied cavilon, benzoin and vac drape to yahaira wound, black foam x 1 to undermining at 11:00 and wound bed, secured with vac drape, tacked to right hip off bony prominence. Patient tolerated well. Nurse visits 8/13/, 8/17/2020 fu with Dr. Tom 8/20/2020.  08/13/2020- nurse visit for dressing change following HBOT. Wound vac changed, seal intact, canister changed, suction at 125mmHg. Pt tolerated. Next dressing change 08/17/2020 and f/u with Dr. Tom  08/20/2020.  08/17/2020- nurse visit for dressing change following HBOT. Wound vac changed, seal intact, suction at 125mmHg. Pt tolerated. Next dressing change 08/24/2020 and f/u with Dr. Tom 08/20/2020.  08/31/2020- nurse visit for dressing change following HBOT. Wound vac changed, seal intact, suction at 125mmHg. Pt tolerated. Cont POC. F/u with Dr. Tom 09/03/2020.    Review of Systems    Objective:      Physical Exam    Assessment:       1. Pressure injury of left ischium, stage 4           Negative Pressure Wound Therapy  07/14/20 1730 Right (Active)   07/14/20 1730   Side: Right   Orientation:    Location: Ischial tuberosity   Additional Comments:    Location:    SDO Location:    NPWT Type Vacuum Therapy 08/31/20 1300   Therapy Setting NPWT Continuous therapy 08/31/20 1300   Pressure Setting NPWT 125 mmHg 08/31/20 1300   Therapy Interventions NPWT Dressing changed;Seal intact;Trac pad replaced 08/31/20 1300   Sponges Inserted NPWT Black;1 08/31/20 1300   Sponges Removed NPWT Black;1 08/31/20 1300   General Output (mL) 25 08/31/20 1300            Altered Skin Integrity 05/02/20 2300 Right Ischial tuberosity #1 Full thickness tissue loss with exposed bone, tendon, or muscle. Often includes undermining and tunneling. May extend into muscle and/or supporting structures. (Active)   05/02/20 2300   Altered Skin Integrity Present on Admission: yes   Side: Right   Orientation:    Location: Ischial tuberosity   Wound Number (optional): #1   Is this injury device related?: No   Primary Wound Type:    Description of Altered Skin Integrity: Full thickness tissue loss with exposed bone, tendon, or muscle. Often includes undermining and tunneling. May extend into muscle and/or supporting structures.   Removal Indication and Assessment:    Wound Outcome: Healed   (Retired) Wound Length (cm):    (Retired) Wound Width (cm):    (Retired) Depth (cm):    Wound Description (Comments):    Removal Indications:    Dressing  Appearance Intact;Moist drainage 08/31/20 1300   Drainage Amount Moderate 08/31/20 1300   Drainage Characteristics/Odor Serosanguineous 08/31/20 1300   Appearance Pink;Red;Moist 08/31/20 1300   Tissue loss description Full thickness 08/31/20 1300   Red (%), Wound Tissue Color 100 % 08/31/20 1300   Periwound Area Moist 08/31/20 1300   Wound Edges Open 08/31/20 1300   Wound Length (cm) 4.1 cm 08/31/20 1300   Wound Width (cm) 3.2 cm 08/31/20 1300   Wound Depth (cm) 5 cm 08/31/20 1300   Wound Volume (cm^3) 65.6 cm^3 08/31/20 1300   Wound Surface Area (cm^2) 13.12 cm^2 08/31/20 1300   Tunneling (depth (cm)/location) 6cm @ 11:00 08/31/20 1300   Care Cleansed with:;Sterile normal saline 08/31/20 1300   Dressing Foam 08/31/20 1300   Periwound Care Moisture barrier applied;Skin barrier film applied 08/31/20 1300   Dressing Change Due 09/03/20 08/31/20 1300            Altered Skin Integrity 07/22/20 1400 Left Buttocks #2 Full thickness tissue loss. Subcutaneous fat may be visible but bone, tendon or muscle are not exposed (Active)   07/22/20 1400   Altered Skin Integrity Present on Admission: yes   Side: Left   Orientation:    Location: Buttocks   Wound Number (optional): #2   Is this injury device related?:    Primary Wound Type:    Description of Altered Skin Integrity: Full thickness tissue loss. Subcutaneous fat may be visible but bone, tendon or muscle are not exposed   Removal Indication and Assessment:    Wound Outcome:    (Retired) Wound Length (cm):    (Retired) Wound Width (cm):    (Retired) Depth (cm):    Wound Description (Comments):    Removal Indications:    Description of Altered Skin Integrity Full thickness tissue loss. Subcutaneous fat may be visible but bone, tendon or muscle are not exposed 08/31/20 1300   Dressing Appearance Intact;Moist drainage 08/31/20 1300   Drainage Amount Moderate 08/31/20 1300   Drainage Characteristics/Odor Serosanguineous 08/31/20 1300   Appearance Pink;Red 08/31/20 1300   Tissue  loss description Full thickness 08/31/20 1300   Red (%), Wound Tissue Color 100 % 08/31/20 1300   Periwound Area Intact 08/31/20 1300   Wound Edges Irregular 08/31/20 1300   Wound Length (cm) 4.1 cm 08/31/20 1300   Wound Width (cm) 3.2 cm 08/31/20 1300   Wound Depth (cm) 5 cm 08/31/20 1300   Wound Volume (cm^3) 65.6 cm^3 08/31/20 1300   Wound Surface Area (cm^2) 13.12 cm^2 08/31/20 1300   Care Cleansed with:;Sterile normal saline 08/31/20 1300   Dressing Island/border 08/31/20 1300   Periwound Care Skin barrier film applied 08/31/20 1300   Dressing Change Due 09/03/20 08/31/20 1300       Right Ischial Wound   Cleanse wound with: Normal saline   Lidocaine: prn   Silver nitrate: prn   Breanna wound: Cavilon, Benzoin, Vac drape,   Primary dressing: Black foam to wound bed and tunneling @11 ,secure with vac drape,  wound vac continuous suction 125mmHg, track to right hip off bony prominence.   Offloading: Low air loss mattress at home   Frequency: Mondays and Thursdays in clinic     Left Buttocks Wound   Cleanse wound with: Normal saline   Lidocaine: prn   Silver nitrate: prn   Periwound care: Cavilon   Primary dressing: Santyl/ xeroform   Secondary dressing: Aquacel 4x4 foam border   Offloading: Low air loss mattress   Frequency: Mondays,and Thursdays in clinic     Follow-up: in 2 weeks 08/20/2020 with Dr. Tom   Home Health: [facility]. Discontinue home health.   Other Orders: Continue abx.    Plan:                Follow up in about 3 days (around 9/3/2020).

## 2020-09-01 ENCOUNTER — HOSPITAL ENCOUNTER (OUTPATIENT)
Dept: WOUND CARE | Facility: HOSPITAL | Age: 45
Discharge: HOME | End: 2020-09-01
Attending: SURGERY
Payer: MEDICARE

## 2020-09-01 DIAGNOSIS — L89.324 STAGE IV PRESSURE ULCER OF LEFT BUTTOCK: ICD-10-CM

## 2020-09-01 DIAGNOSIS — M86.68 CHRONIC OSTEOMYELITIS OF SYMPHYSIS PUBIS: ICD-10-CM

## 2020-09-01 PROCEDURE — G0277 HBOT, FULL BODY CHAMBER, 30M: HCPCS | Mod: CCAT

## 2020-09-01 NOTE — PROGRESS NOTES
09/01/20 1352   Hyperbaric Pre-Inspection   Mattress cleaned prior to treatment Yes   2 Patient Identifiers Verified Yes   For Inpatients: Verify correct ID band/correct chart Yes   Consent Obtained Yes   Cotton Gown Yes   Drainage Bags Emptied Not applicable   Patient Pregnant No   Hard contacts removed Yes   Dentures, Hearing Aid, Prosthetic Devices Removed Yes   Touch hair to check for hair spray Yes   Cold/Flu Symptoms No   Diabetic Patient Eaten Yes   Medications Given No   For Inpatients: Medication sheet sent with patient No   Fears and apprehensions verbalized No   Ground Wire Secured Yes   HBO Treatment Course Details   Treatment Course Number 1   Total Treatments Ordered 40   Ordering Provider Dr. Tom   Indications Chronic refractory osteomyelitis   HBO Treatment Start Date 08/10/20   HBO Treatment Details   Treatment Number 13   Inpatient Visit No   Total Treatment Length Calc (minutes) 107   Chamber Type Monoplace   Chamber # 4   HBO Dive Log # [id number]   Treatment Protocol 2.0 PETER x 120 minutes w/100% oxygen and no air break   Treatment Details   Dive Rate Down 1.5 psi/minute   Dive Rate Up 2.0 psi/minute   Compress Begins 1.0 PETER   Clock Time 1110   Tx Pressure Reached 2.0 PETER   Clock Time 1120   Decompress Begins 2.0 PETER   Clock Time 1250   Decompress Ends 1.0 PETER   Clock Time 1257   Pre HBO Vital Signs   /79   Pulse 89   Resp 16   Temp 98.2 °F (36.8 °C)   Blood Glucose 143   Glucose Meter # After providing a breakfast tray   Pain Level 0   Post HBO Vital Signs   /77   Pulse 80   Resp 16   Temp 98.2 °F (36.8 °C)   Blood Glucose 91   Pain Level 0   Ear Evaluation   Left Teed Scale Pre Grade 0   Left Teed Scale Post Grade 0   Right Teed Scale Pre Grade 0   Right Teed Scale Post Grade 0   Physician Supervision  I provided direct supervision and was immediately available to furnish assistance and direction throughout the performance of the procedure.    Emergency Response Team  A trained  emergency response team and emergency services were available throughout procedure.  Patient tolerated treatment well.  Continue present treatment plan.  Patient reports no complaints , tolerated treatment well.   DX CODES: M86.68, L89.3.24

## 2020-09-02 NOTE — PROGRESS NOTES
09/02/2020 1034- spoke to Genny from [facility] and per Dr. Tom IV abx ok to be dc'd and picc line ok to be removed. She will enter orders.

## 2020-09-03 ENCOUNTER — HOSPITAL ENCOUNTER (OUTPATIENT)
Dept: WOUND CARE | Facility: HOSPITAL | Age: 45
Discharge: HOME | End: 2020-09-03
Attending: SURGERY
Payer: MEDICARE

## 2020-09-03 DIAGNOSIS — G82.20 PARAPLEGIA: ICD-10-CM

## 2020-09-03 DIAGNOSIS — M86.9 OSTEOMYELITIS HIP: ICD-10-CM

## 2020-09-03 DIAGNOSIS — G82.20 PARAPLEGIA AT T9 LEVEL: Chronic | ICD-10-CM

## 2020-09-03 DIAGNOSIS — M86.9 OSTEOMYELITIS: ICD-10-CM

## 2020-09-03 DIAGNOSIS — L89.314 PRESSURE INJURY OF RIGHT ISCHIUM, STAGE 4: Primary | ICD-10-CM

## 2020-09-03 DIAGNOSIS — M86.60 CHRONIC OSTEOMYELITIS: Primary | ICD-10-CM

## 2020-09-03 DIAGNOSIS — M86.9 OSTEOMYELITIS, UNSPECIFIED SITE, UNSPECIFIED TYPE: ICD-10-CM

## 2020-09-03 DIAGNOSIS — M86.68 CHRONIC OSTEOMYELITIS OF SYMPHYSIS PUBIS: ICD-10-CM

## 2020-09-03 DIAGNOSIS — L89.324 STAGE IV PRESSURE ULCER OF LEFT BUTTOCK: ICD-10-CM

## 2020-09-03 PROCEDURE — G0277 HBOT, FULL BODY CHAMBER, 30M: HCPCS | Mod: CCAT

## 2020-09-03 PROCEDURE — 97605 NEG PRS WND THER DME<=50SQCM: CPT

## 2020-09-03 RX ORDER — SODIUM CHLORIDE 9 MG/ML
INJECTION, SOLUTION INTRAVENOUS CONTINUOUS
Status: CANCELLED | OUTPATIENT
Start: 2020-09-03

## 2020-09-03 RX ORDER — MUPIROCIN 20 MG/G
OINTMENT TOPICAL
Status: CANCELLED | OUTPATIENT
Start: 2020-09-03

## 2020-09-03 NOTE — PROGRESS NOTES
Subjective:       Patient ID: Hermann Aguilar is a 44 y.o. male.    Chief Complaint: Wound Care    07/22/2020- new pt to clinic for Dr. Tom. Pt with history with paraplegia since 2016, dm and htn. Pt lives alone but states that his sister lives 5 minutes away and he has a caregiver that comes daily from 10am- 5pm. Pt presents with right ischial wound that is recurrent for 1.5 years and left buttocks wound. Pt was recently hospitalized for UTI. 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 Recent wound cultures negative for any growth. Pt is receiving IV meropenem and vancomycin via medicine ball pump. Wound care as follows: wound vac to right ischial and  Santyl/ xeroform/ foam border to left buttocks. Pt receiving home health via [facility]. Orders routed. Pt educated about HBO treatment, ekg and labs drawn, awaiting insurance authorization. Pt to f/u with Dr. Tom in 2 weeks 08/05/2020.   08/05/2020- f/u with Dr. Tom. Lab results reviewed. Pt to start HBO 08/10/2020. Pt currently receiving home health. If pt starts HBO, home health will be dc'd and wound care visits will be Mondays & Thursdays. Wound vac placed, seal intact. F/u with Dr. Tom in 2 weeks 08/19/2020.  8/10/2020: Nurse visit for dressing change. Patient started HBOT today. Will DC home health, dressing to be changed in clinic on Mondays, and Thursdays. Orders sent to home health. Continuned with [employer] wound vac at 125mmhg continuous: Applied cavilon, benzoin and vac drape to yahaira wound, black foam x 1 to undermining at 11:00 and wound bed, secured with vac drape, tacked to right hip off bony prominence. Patient tolerated well. Nurse visits 8/13/, 8/17/2020 fu with Dr. Tom 8/20/2020.  08/13/2020- nurse visit for dressing change following HBOT. Wound vac changed, seal intact, canister changed, suction at 125mmHg. Pt tolerated. Next dressing change 08/17/2020 and f/u with Dr. Tom  08/20/2020.  08/17/2020- nurse visit for dressing change following HBOT. Wound vac changed, seal intact, suction at 125mmHg. Pt tolerated. Next dressing change 08/24/2020 and f/u with Dr. Tom 08/20/2020.  08/31/2020- nurse visit for dressing change following HBOT. Wound vac changed, seal intact, suction at 125mmHg. Pt tolerated. Cont POC. F/u with Dr. Tom 09/03/2020.  09/03/2020- f/u with dr. Tom following HBOT. Wound vac changed, seal intact, suction at 125mmHg and canister changed. Consent for colostomy signed with Dr. Tom for 09/11/2020. Home health to change dressing as needed. Pt for nurse visit 09/10/2020, f/u with Dr. Tom 09/17/2020.    Review of Systems   Constitutional: Negative.    HENT: Negative.    Eyes: Negative.    Respiratory: Negative.    Cardiovascular: Negative.    Gastrointestinal: Negative.    Genitourinary: Negative.    Musculoskeletal: Negative.    Neurological: Negative.    Psychiatric/Behavioral: Negative.        Objective:      Physical Exam  Constitutional:       Appearance: He is well-developed.   HENT:      Head: Normocephalic.   Eyes:      Conjunctiva/sclera: Conjunctivae normal.      Pupils: Pupils are equal, round, and reactive to light.   Neck:      Musculoskeletal: Normal range of motion and neck supple.   Cardiovascular:      Rate and Rhythm: Normal rate and regular rhythm.      Heart sounds: Normal heart sounds.   Pulmonary:      Effort: Pulmonary effort is normal.      Breath sounds: Normal breath sounds.   Abdominal:      General: Bowel sounds are normal.      Palpations: Abdomen is soft.   Musculoskeletal: Normal range of motion.   Skin:     General: Skin is warm and dry.   Neurological:      Mental Status: He is alert and oriented to person, place, and time.      Deep Tendon Reflexes: Reflexes are normal and symmetric.         Assessment:       1. Pressure injury of right ischium, stage 4    2. Osteomyelitis, unspecified site, unspecified type    3.  Paraplegia at T9 level           Negative Pressure Wound Therapy  07/14/20 1730 Right (Active)   07/14/20 1730   Side: Right   Orientation:    Location: Ischial tuberosity   Additional Comments:    Location:    SDO Location:    NPWT Type Vacuum Therapy 09/03/20 1300   Therapy Setting NPWT Continuous therapy 09/03/20 1300   Pressure Setting NPWT 125 mmHg 09/03/20 1300   Therapy Interventions NPWT Canister changed;Dressing changed;Seal intact;Trac pad replaced 09/03/20 1300   Sponges Inserted NPWT Black;1 09/03/20 1300   Sponges Removed NPWT Black;1 09/03/20 1300   General Output (mL) 20 09/03/20 1300            Altered Skin Integrity 05/02/20 2300 Right Ischial tuberosity #1 Full thickness tissue loss with exposed bone, tendon, or muscle. Often includes undermining and tunneling. May extend into muscle and/or supporting structures. (Active)   05/02/20 2300   Altered Skin Integrity Present on Admission: yes   Side: Right   Orientation:    Location: Ischial tuberosity   Wound Number (optional): #1   Is this injury device related?: No   Primary Wound Type:    Description of Altered Skin Integrity: Full thickness tissue loss with exposed bone, tendon, or muscle. Often includes undermining and tunneling. May extend into muscle and/or supporting structures.   Removal Indication and Assessment:    Wound Outcome: Healed   (Retired) Wound Length (cm):    (Retired) Wound Width (cm):    (Retired) Depth (cm):    Wound Description (Comments):    Removal Indications:    Dressing Appearance Intact;Moist drainage 09/03/20 1300   Drainage Amount Moderate 09/03/20 1300   Drainage Characteristics/Odor Serosanguineous 09/03/20 1300   Appearance Pink;Red 09/03/20 1300   Tissue loss description Full thickness 09/03/20 1300   Red (%), Wound Tissue Color 100 % 09/03/20 1300   Periwound Area Moist 09/03/20 1300   Wound Edges Open 09/03/20 1300   Wound Length (cm) 4.3 cm 09/03/20 1300   Wound Width (cm) 3.8 cm 09/03/20 1300   Wound Depth (cm)  3.3 cm 09/03/20 1300   Wound Volume (cm^3) 53.92 cm^3 09/03/20 1300   Wound Surface Area (cm^2) 16.34 cm^2 09/03/20 1300   Tunneling (depth (cm)/location) 5.1 @ 11:00 09/03/20 1300   Care Cleansed with:;Sterile normal saline 09/03/20 1300   Dressing Foam 09/03/20 1300   Periwound Care Skin barrier film applied 09/03/20 1300   Dressing Change Due 09/07/20 09/03/20 1300            Altered Skin Integrity 07/22/20 1400 Left Buttocks #2 Full thickness tissue loss. Subcutaneous fat may be visible but bone, tendon or muscle are not exposed (Active)   07/22/20 1400   Altered Skin Integrity Present on Admission: yes   Side: Left   Orientation:    Location: Buttocks   Wound Number (optional): #2   Is this injury device related?:    Primary Wound Type:    Description of Altered Skin Integrity: Full thickness tissue loss. Subcutaneous fat may be visible but bone, tendon or muscle are not exposed   Removal Indication and Assessment:    Wound Outcome:    (Retired) Wound Length (cm):    (Retired) Wound Width (cm):    (Retired) Depth (cm):    Wound Description (Comments):    Removal Indications:    Wound Image   09/03/20 1300   Description of Altered Skin Integrity Full thickness tissue loss. Subcutaneous fat may be visible but bone, tendon or muscle are not exposed 09/03/20 1300   Dressing Appearance Intact;Moist drainage 09/03/20 1300   Drainage Amount Moderate 09/03/20 1300   Drainage Characteristics/Odor Serosanguineous 09/03/20 1300   Appearance Pink;Red;Moist 09/03/20 1300   Tissue loss description Full thickness 09/03/20 1300   Red (%), Wound Tissue Color 100 % 09/03/20 1300   Periwound Area Intact 09/03/20 1300   Wound Edges Irregular 09/03/20 1300   Wound Length (cm) 2.1 cm 09/03/20 1300   Wound Width (cm) 2.4 cm 09/03/20 1300   Wound Depth (cm) 0.5 cm 09/03/20 1300   Wound Volume (cm^3) 2.52 cm^3 09/03/20 1300   Wound Surface Area (cm^2) 5.04 cm^2 09/03/20 1300   Care Cleansed with:;Sterile normal saline 09/03/20 1300    Dressing Island/border 09/03/20 1300   Periwound Care Skin barrier film applied 09/03/20 1300   Dressing Change Due 09/07/20 09/03/20 1300       Right Ischial Wound   Cleanse wound with: Normal saline   Lidocaine: prn   Silver nitrate: prn   Breanna wound: Cavilon, Benzoin, Vac drape,   Primary dressing: Black foam to wound bed and tunneling @11 ,secure with vac drape,  wound vac continuous suction 125mmHg, track to right hip off bony prominence.   Offloading: Low air loss mattress at home   Frequency: Mondays and Thursdays in clinic     Left Buttocks Wound   Cleanse wound with: Normal saline   Lidocaine: prn   Silver nitrate: prn   Periwound care: Cavilon   Primary dressing: Santyl/ xeroform   Secondary dressing: Aquacel 4x4 foam border   Offloading: Low air loss mattress   Frequency: Mondays and Thursdays in clinic     Follow-up: in 2 weeks 09/17/2020 with Dr. Tom   [facility] to perform dressing changes prn.   Other Orders: Discontinue IV abx. Consent for colostomy with Dr. Tom 09/11/2020 signed.      Plan:                Follow up in about 1 week (around 9/10/2020).

## 2020-09-03 NOTE — PROGRESS NOTES
09/03/20 1055   Hyperbaric Pre-Inspection   Mattress cleaned prior to treatment Yes   2 Patient Identifiers Verified Yes   For Inpatients: Verify correct ID band/correct chart No   Consent Obtained Yes   Cotton Gown Yes   Patient voided No   Drainage Bags Emptied Yes   Drainage tubes secured Yes   Patient Pregnant No   Glasses, Jewelry, Makeup, etc. Removed Yes   Hard contacts removed No   Dentures, Hearing Aid, Prosthetic Devices Removed Yes   Touch hair to check for hair spray Yes   Cold/Flu Symptoms No   Diabetic Patient Eaten Yes   For Inpatients: Medication sheet sent with patient No   Fears and apprehensions verbalized Yes   Ground Wire Secured Yes   HBO Treatment Course Details   Treatment Course Number 1   Total Treatments Ordered 40   Ordering Provider Negro   Indications Chronic refractory osteomyelitis   HBO Treatment Start Date 08/10/20   HBO Treatment Details   Treatment Number 14   Inpatient Visit No   Total Treatment Length Calc (minutes) 107   Chamber Type Monoplace   Chamber # 4   HBO Dive Log # [id number]   Treatment Protocol 2.0 PETER x 90 minutes w/100% oxygen and no air break   Treatment Details   Dive Rate Down 1.5 psi/minute   Dive Rate Up 2.0 psi/minute   Compress Begins 1 PETER   Clock Time 1000   Tx Pressure Reached 2 PETER   Clock Time 1010   Decompress Begins 2 PETER   Clock Time 1140   Decompress Ends 1 PETER   Clock Time 1147   Pre HBO Vital Signs   /71   Pulse 88   Resp 16   Temp 98.9 °F (37.2 °C)   Blood Glucose 122   Glucose Meter # HBO   Pain Level 0   Post HBO Vital Signs   /81   Pulse 87   Resp 16   Temp 98.9 °F (37.2 °C)   Blood Glucose 105   Glucose Meter # HBO   Pain Level 0   Ear Evaluation   Left Teed Scale Pre Grade 0   Left Teed Scale Post Grade 0   Right Teed Scale Pre Grade 0   Right Teed Scale Post Grade 0   Physician Supervision  I provided direct supervision and was immediately available to furnish assistance and direction throughout the performance of the  procedure.    Emergency Response Team  A trained emergency response team and emergency services were available throughout procedure.  Patient tolerated treatment well.  Continue present treatment plan.  Patient reports no complaints , tolerated well.   DX:  M86.68, L89.324

## 2020-09-04 ENCOUNTER — HOSPITAL ENCOUNTER (OUTPATIENT)
Dept: WOUND CARE | Facility: HOSPITAL | Age: 45
Discharge: HOME | End: 2020-09-04
Attending: SURGERY
Payer: MEDICARE

## 2020-09-04 ENCOUNTER — OFFICE VISIT (OUTPATIENT)
Dept: UROLOGY | Facility: CLINIC | Age: 45
End: 2020-09-04
Payer: MEDICARE

## 2020-09-04 VITALS
SYSTOLIC BLOOD PRESSURE: 136 MMHG | TEMPERATURE: 98 F | HEART RATE: 78 BPM | HEIGHT: 71 IN | WEIGHT: 183 LBS | BODY MASS INDEX: 25.62 KG/M2 | DIASTOLIC BLOOD PRESSURE: 82 MMHG

## 2020-09-04 DIAGNOSIS — M86.68 CHRONIC OSTEOMYELITIS OF SYMPHYSIS PUBIS: ICD-10-CM

## 2020-09-04 DIAGNOSIS — Z93.59 CHRONIC SUPRAPUBIC CATHETER: ICD-10-CM

## 2020-09-04 DIAGNOSIS — N31.9 NEUROGENIC BLADDER: Primary | ICD-10-CM

## 2020-09-04 DIAGNOSIS — L89.324 STAGE IV PRESSURE ULCER OF LEFT BUTTOCK: ICD-10-CM

## 2020-09-04 PROCEDURE — G0277 HBOT, FULL BODY CHAMBER, 30M: HCPCS | Mod: CCAT

## 2020-09-04 PROCEDURE — 51705 CHANGE OF BLADDER TUBE: CPT | Mod: S$GLB,,, | Performed by: NURSE PRACTITIONER

## 2020-09-04 PROCEDURE — 99999 PR PBB SHADOW E&M-EST. PATIENT-LVL V: ICD-10-PCS | Mod: PBBFAC,,, | Performed by: NURSE PRACTITIONER

## 2020-09-04 PROCEDURE — 99499 UNLISTED E&M SERVICE: CPT | Mod: S$GLB,,, | Performed by: NURSE PRACTITIONER

## 2020-09-04 PROCEDURE — 99999 PR PBB SHADOW E&M-EST. PATIENT-LVL V: CPT | Mod: PBBFAC,,, | Performed by: NURSE PRACTITIONER

## 2020-09-04 PROCEDURE — 51705 PR CHANGE OF BLADDER TUBE,SIMPLE: ICD-10-PCS | Mod: S$GLB,,, | Performed by: NURSE PRACTITIONER

## 2020-09-04 PROCEDURE — 99499 NO LOS: ICD-10-PCS | Mod: S$GLB,,, | Performed by: NURSE PRACTITIONER

## 2020-09-04 RX ORDER — SULFAMETHOXAZOLE AND TRIMETHOPRIM 800; 160 MG/1; MG/1
TABLET ORAL
COMMUNITY
Start: 2020-07-02 | End: 2020-11-05 | Stop reason: ALTCHOICE

## 2020-09-04 RX ORDER — KETOROLAC TROMETHAMINE 10 MG/1
TABLET, FILM COATED ORAL
Status: ON HOLD | COMMUNITY
Start: 2020-07-02 | End: 2021-03-18 | Stop reason: HOSPADM
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
## 2020-09-04 NOTE — PROGRESS NOTES
09/04/20 1448   Hyperbaric Pre-Inspection   Mattress cleaned prior to treatment Yes   2 Patient Identifiers Verified Yes   For Inpatients: Verify correct ID band/correct chart Yes   Consent Obtained Yes   Cotton Gown Yes   Patient voided Yes   Drainage Bags Emptied Not applicable   Patient Pregnant Not applicable   Glasses, Jewelry, Makeup, etc. Removed Yes   Hard contacts removed Yes   Dentures, Hearing Aid, Prosthetic Devices Removed Yes   Touch hair to check for hair spray Yes   Cold/Flu Symptoms No   Diabetic Patient Eaten Yes   Medications Given No   For Inpatients: Medication sheet sent with patient No   Fears and apprehensions verbalized No   Ground Wire Secured Yes   HBO Treatment Course Details   Treatment Course Number 1   Total Treatments Ordered 40   Ordering Provider Dr. Tom   Indications Chronic refractory osteomyelitis   HBO Treatment Start Date 08/10/20   HBO Treatment Details   Treatment Number 15   Inpatient Visit No   Total Treatment Length Calc (minutes) 107   Chamber Type Monoplace   Chamber # 4   HBO Dive Log # [id number]   Treatment Protocol 2.0 PETER x 120 minutes w/100% oxygen and no air break   Treatment Details   Dive Rate Down 1.5 psi/minute   Dive Rate Up 2.0 psi/minute   Compress Begins 1.0 PETER   Clock Time 1100   Tx Pressure Reached 2.0 PETER   Clock Time 1110   Decompress Begins 2.0 PETER   Clock Time 1240   Decompress Ends 1.0 PETER   Clock Time 1247   Pre HBO Vital Signs   BP (!) 115/55   Pulse 72   Resp 16   Temp 98.4 °F (36.9 °C)   Blood Glucose 113   Glucose Meter # HBO   Action Taken Snack provided   Pain Level 0   Post HBO Vital Signs   /75   Pulse 62   Resp 16   Temp 98.4 °F (36.9 °C)   Blood Glucose 116   Glucose Meter # HBO   Pain Level 0   Ear Evaluation   Left Teed Scale Pre Grade 0   Left Teed Scale Post Grade 0   Right Teed Scale Pre Grade 0   Right Teed Scale Post Grade 0   Physician Supervision  I provided direct supervision and was immediately available to furnish  assistance and direction throughout the performance of the procedure.  Patient tolerated treatment well.  Continue present treatment plan.    Emergency Response Team  A trained emergency response team and emergency services were available throughout procedure.  DX CODES: M86.68, L89.324

## 2020-09-08 ENCOUNTER — TELEPHONE (OUTPATIENT)
Dept: SURGERY | Facility: HOSPITAL | Age: 45
End: 2020-09-08

## 2020-09-08 ENCOUNTER — HOSPITAL ENCOUNTER (OUTPATIENT)
Dept: WOUND CARE | Facility: HOSPITAL | Age: 45
Discharge: HOME | End: 2020-09-08
Attending: SURGERY
Payer: MEDICARE

## 2020-09-08 DIAGNOSIS — L89.324 STAGE IV PRESSURE ULCER OF LEFT BUTTOCK: ICD-10-CM

## 2020-09-08 DIAGNOSIS — M86.68 CHRONIC OSTEOMYELITIS OF SYMPHYSIS PUBIS: ICD-10-CM

## 2020-09-08 PROCEDURE — G0277 HBOT, FULL BODY CHAMBER, 30M: HCPCS | Mod: CCAT

## 2020-09-08 NOTE — PROGRESS NOTES
09/08/20 1406   Hyperbaric Pre-Inspection   Mattress cleaned prior to treatment Yes   For Inpatients: Verify correct ID band/correct chart Yes   Consent Obtained Yes   Cotton Gown Yes   Patient voided Yes   Drainage Bags Emptied Not applicable   Patient Pregnant Not applicable   Glasses, Jewelry, Makeup, etc. Removed Yes   Hard contacts removed Yes   Dentures, Hearing Aid, Prosthetic Devices Removed Yes   Touch hair to check for hair spray Yes   Cold/Flu Symptoms No   Diabetic Patient Eaten Yes   Medications Given No   For Inpatients: Medication sheet sent with patient No   Fears and apprehensions verbalized No   Ground Wire Secured Yes   HBO Treatment Course Details   Treatment Course Number 1   Total Treatments Ordered 40   Ordering Provider Dr. Tom   Indications Chronic refractory osteomyelitis   HBO Treatment Start Date 08/10/20   HBO Treatment Details   Treatment Number 16   Total Treatment Length Calc (minutes) 107   Chamber Type Monoplace   Chamber # 1   HBO Dive Log # [id number]   Treatment Protocol 2.0 PETER x 120 minutes w/100% oxygen and no air break   Treatment Details   Dive Rate Down 1.5 psi/minute   Dive Rate Up 2.0 psi/minute   Compress Begins 1.0 PETER   Clock Time 1150   Tx Pressure Reached 2.0 PETER   Clock Time 1200   Decompress Begins 2.0 PETER   Clock Time 1330   Decompress Ends 1.0 PETER   Clock Time 1337   Pre HBO Vital Signs   /81   Pulse 89   Resp 16   Temp 97.5 °F (36.4 °C)   Blood Glucose 118   Glucose Meter # HBO   Action Taken Snack provided   Pain Level 0   Post HBO Vital Signs   /80   Pulse 86   Resp 16   Temp 97.5 °F (36.4 °C)   Blood Glucose 147   Glucose Meter # HBO   Pain Level 0   Ear Evaluation   Left Teed Scale Pre Grade 0   Left Teed Scale Post Grade 0   Right Teed Scale Pre Grade 0   Right Teed Scale Post Grade 0   Physician Supervision  I provided direct supervision and was immediately available to furnish assistance and direction throughout the performance of the  procedure.    Emergency Response Team  A trained emergency response team and emergency services were available throughout procedure.Patient tolerated treatment well.  Continue present treatment plan.  Patient reports no complaints tolertaed well.   DX CODES:M86.68, L89.321

## 2020-09-08 NOTE — PLAN OF CARE
Voicemail left for pt to go for covid screening today at any of the [facility] urgent cares. Orders in the system.

## 2020-09-09 ENCOUNTER — ANESTHESIA EVENT (OUTPATIENT)
Dept: SURGERY | Facility: HOSPITAL | Age: 45
DRG: 329 | End: 2020-09-09
Payer: MEDICARE

## 2020-09-09 ENCOUNTER — HOSPITAL ENCOUNTER (OUTPATIENT)
Dept: WOUND CARE | Facility: HOSPITAL | Age: 45
Discharge: HOME | End: 2020-09-09
Attending: SURGERY
Payer: MEDICARE

## 2020-09-09 DIAGNOSIS — M86.68 CHRONIC OSTEOMYELITIS OF SYMPHYSIS PUBIS: ICD-10-CM

## 2020-09-09 DIAGNOSIS — L89.324 STAGE IV PRESSURE ULCER OF LEFT BUTTOCK: ICD-10-CM

## 2020-09-09 PROCEDURE — G0277 HBOT, FULL BODY CHAMBER, 30M: HCPCS | Mod: CCAT

## 2020-09-09 NOTE — PROGRESS NOTES
09/09/20 1404   Hyperbaric Pre-Inspection   Mattress cleaned prior to treatment Yes   2 Patient Identifiers Verified Yes   For Inpatients: Verify correct ID band/correct chart Yes   Cotton Gown Yes   Patient voided Yes   Drainage Bags Emptied Not applicable   Patient Pregnant Not applicable   Glasses, Jewelry, Makeup, etc. Removed Yes   Hard contacts removed Yes   Dentures, Hearing Aid, Prosthetic Devices Removed Yes   Touch hair to check for hair spray Yes   Cold/Flu Symptoms No   Diabetic Patient Eaten Yes   Medications Given No   For Inpatients: Medication sheet sent with patient No   Fears and apprehensions verbalized No   Ground Wire Secured Yes   HBO Treatment Course Details   Treatment Course Number 1   Total Treatments Ordered 40   Ordering Provider Dr Tom   Indications Chronic refractory osteomyelitis   HBO Treatment Start Date 08/10/20   HBO Treatment Details   Treatment Number 17   Inpatient Visit No   Total Treatment Length Calc (minutes) 107   Chamber Type Monoplace   Chamber # 4   HBO Dive Log # [id number]   Treatment Protocol 2.0 PETER x 120 minutes w/100% oxygen and no air break   Treatment Details   Dive Rate Down 1.5 psi/minute   Dive Rate Up 2.0 psi/minute   Compress Begins 1.0 PETER   Clock Time 1120   Tx Pressure Reached 2.0 PETER   Clock Time 1130   Decompress Begins 2.0 PETER   Clock Time 1300   Decompress Ends 1.0 PETER   Clock Time 1307   Pre HBO Vital Signs   /70   Pulse 79   Resp 16   Temp 97.4 °F (36.3 °C)   Blood Glucose 136   Glucose Meter # HBO   Action Taken Meal provided   Pain Level 0   Post HBO Vital Signs   BP (!) 160/79   Pulse 76   Resp 16   Temp 97.4 °F (36.3 °C)   Blood Glucose 100   Glucose Meter # HBO   Pain Level 0   Ear Evaluation   Left Teed Scale Pre Grade 0   Left Teed Scale Post Grade 0   Right Teed Scale Pre Grade 0   Right Teed Scale Post Grade 0   Physician Supervision  I provided direct supervision and was immediately available to furnish assistance and direction  throughout the performance of the procedure.    Emergency Response Team  A trained emergency response team and emergency services were available throughout procedure.  Patient tolerated treatment well.  Continue present treatment plan.  Patient reports no complaints, tolertaed well.   DX CODES: M86.68, L89.324

## 2020-09-10 ENCOUNTER — HOSPITAL ENCOUNTER (OUTPATIENT)
Dept: WOUND CARE | Facility: HOSPITAL | Age: 45
Discharge: HOME | End: 2020-09-10
Attending: SURGERY
Payer: MEDICARE

## 2020-09-10 DIAGNOSIS — L89.324 STAGE IV PRESSURE ULCER OF LEFT BUTTOCK: ICD-10-CM

## 2020-09-10 DIAGNOSIS — L89.314 PRESSURE INJURY OF RIGHT ISCHIUM, STAGE 4: Primary | ICD-10-CM

## 2020-09-10 DIAGNOSIS — M86.68 CHRONIC OSTEOMYELITIS OF SYMPHYSIS PUBIS: ICD-10-CM

## 2020-09-10 PROCEDURE — 97605 NEG PRS WND THER DME<=50SQCM: CPT

## 2020-09-10 PROCEDURE — G0277 HBOT, FULL BODY CHAMBER, 30M: HCPCS | Mod: CCAT

## 2020-09-10 NOTE — PROGRESS NOTES
Subjective:       Patient ID: Hermann Aguilar is a 44 y.o. male.    Chief Complaint: Wound Care    07/22/2020- new pt to clinic for Dr. Tom. Pt with history with paraplegia since 2016, dm and htn. Pt lives alone but states that his sister lives 5 minutes away and he has a caregiver that comes daily from 10am- 5pm. Pt presents with right ischial wound that is recurrent for 1.5 years and left buttocks wound. Pt was recently hospitalized for UTI. 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 Recent wound cultures negative for any growth. Pt is receiving IV meropenem and vancomycin via medicine ball pump. Wound care as follows: wound vac to right ischial and  Santyl/ xeroform/ foam border to left buttocks. Pt receiving home health via [facility]. Orders routed. Pt educated about HBO treatment, ekg and labs drawn, awaiting insurance authorization. Pt to f/u with Dr. Tom in 2 weeks 08/05/2020.   08/05/2020- f/u with Dr. Tom. Lab results reviewed. Pt to start HBO 08/10/2020. Pt currently receiving home health. If pt starts HBO, home health will be dc'd and wound care visits will be Mondays & Thursdays. Wound vac placed, seal intact. F/u with Dr. Tom in 2 weeks 08/19/2020.  8/10/2020: Nurse visit for dressing change. Patient started HBOT today. Will DC home health, dressing to be changed in clinic on Mondays, and Thursdays. Orders sent to home health. Continuned with [employer] wound vac at 125mmhg continuous: Applied cavilon, benzoin and vac drape to yahaira wound, black foam x 1 to undermining at 11:00 and wound bed, secured with vac drape, tacked to right hip off bony prominence. Patient tolerated well. Nurse visits 8/13/, 8/17/2020 fu with Dr. Tom 8/20/2020.  08/13/2020- nurse visit for dressing change following HBOT. Wound vac changed, seal intact, canister changed, suction at 125mmHg. Pt tolerated. Next dressing change 08/17/2020 and f/u with Dr. Tom  08/20/2020.  08/17/2020- nurse visit for dressing change following HBOT. Wound vac changed, seal intact, suction at 125mmHg. Pt tolerated. Next dressing change 08/24/2020 and f/u with Dr. Tom 08/20/2020.  08/31/2020- nurse visit for dressing change following HBOT. Wound vac changed, seal intact, suction at 125mmHg. Pt tolerated. Cont POC. F/u with Dr. Tom 09/03/2020.  09/03/2020- f/u with dr. Tom following HBOT. Wound vac changed, seal intact, suction at 125mmHg and canister changed. Consent for colostomy signed with Dr. Tom for 09/11/2020. Home health to change dressing as needed. Pt for nurse visit 09/10/2020, f/u with Dr. Tom 09/17/2020.  09/10/2020- nurse visit following HBOT. Wound vac changed, seal intact, suction at 125mmHg and canister changed.Pt for nurse visit 09/14/2020, f/u with Dr. Tom 09/17/2020.        Review of Systems    Objective:      Physical Exam    Assessment:       1. Pressure injury of right ischium, stage 4           Negative Pressure Wound Therapy  07/14/20 1730 Right (Active)   07/14/20 1730   Side: Right   Orientation:    Location: Ischial tuberosity   Additional Comments:    Location:    SDO Location:    NPWT Type Vacuum Therapy 09/10/20 1300   Therapy Setting NPWT Continuous therapy 09/10/20 1300   Pressure Setting NPWT 125 mmHg 09/10/20 1300   Therapy Interventions NPWT Canister changed;Dressing changed;Seal intact;Trac pad replaced 09/10/20 1300   Sponges Inserted NPWT Black;1 09/10/20 1300   Sponges Removed NPWT Black;1 09/10/20 1300   General Output (mL) 50 09/10/20 1300            Altered Skin Integrity 05/02/20 2300 Right Ischial tuberosity #1 Full thickness tissue loss with exposed bone, tendon, or muscle. Often includes undermining and tunneling. May extend into muscle and/or supporting structures. (Active)   05/02/20 2300   Altered Skin Integrity Present on Admission: yes   Side: Right   Orientation:    Location: Ischial tuberosity   Wound Number  (optional): #1   Is this injury device related?: No   Primary Wound Type:    Description of Altered Skin Integrity: Full thickness tissue loss with exposed bone, tendon, or muscle. Often includes undermining and tunneling. May extend into muscle and/or supporting structures.   Removal Indication and Assessment:    Wound Outcome: Healed   (Retired) Wound Length (cm):    (Retired) Wound Width (cm):    (Retired) Depth (cm):    Wound Description (Comments):    Removal Indications:    Description of Altered Skin Integrity Full thickness tissue loss with exposed bone, tendon, or muscle. Often includes undermining and tunneling. May extend into muscle and/or supporting structures. 09/10/20 1300   Dressing Appearance Intact;Moist drainage 09/10/20 1300   Drainage Amount Moderate 09/10/20 1300   Drainage Characteristics/Odor Serosanguineous 09/10/20 1300   Appearance Pink;Red 09/10/20 1300   Tissue loss description Full thickness 09/10/20 1300   Red (%), Wound Tissue Color 100 % 09/10/20 1300   Periwound Area Moist 09/10/20 1300   Wound Edges Open 09/10/20 1300   Wound Length (cm) 4.3 cm 09/10/20 1300   Wound Width (cm) 3.8 cm 09/10/20 1300   Wound Depth (cm) 3.3 cm 09/10/20 1300   Wound Volume (cm^3) 53.92 cm^3 09/10/20 1300   Wound Surface Area (cm^2) 16.34 cm^2 09/10/20 1300   Tunneling (depth (cm)/location) 5.1 cm @ 11:00 09/10/20 1300   Care Cleansed with:;Sterile normal saline 09/10/20 1300   Dressing Foam 09/10/20 1300   Periwound Care Skin barrier film applied 09/10/20 1300   Dressing Change Due 09/14/20 09/10/20 1300            Altered Skin Integrity 07/22/20 1400 Left Buttocks #2 Full thickness tissue loss. Subcutaneous fat may be visible but bone, tendon or muscle are not exposed (Active)   07/22/20 1400   Altered Skin Integrity Present on Admission: yes   Side: Left   Orientation:    Location: Buttocks   Wound Number (optional): #2   Is this injury device related?:    Primary Wound Type:    Description of Altered  Skin Integrity: Full thickness tissue loss. Subcutaneous fat may be visible but bone, tendon or muscle are not exposed   Removal Indication and Assessment:    Wound Outcome:    (Retired) Wound Length (cm):    (Retired) Wound Width (cm):    (Retired) Depth (cm):    Wound Description (Comments):    Removal Indications:    Description of Altered Skin Integrity Full thickness tissue loss. Subcutaneous fat may be visible but bone, tendon or muscle are not exposed 09/10/20 1300   Dressing Appearance Intact;Moist drainage 09/10/20 1300   Drainage Amount Moderate 09/10/20 1300   Drainage Characteristics/Odor Serosanguineous 09/10/20 1300   Appearance Pink;Red;Moist 09/10/20 1300   Tissue loss description Full thickness 09/10/20 1300   Red (%), Wound Tissue Color 100 % 09/10/20 1300   Periwound Area Intact 09/10/20 1300   Wound Edges Irregular 09/10/20 1300   Wound Length (cm) 2.1 cm 09/10/20 1300   Wound Width (cm) 2.4 cm 09/10/20 1300   Wound Depth (cm) 0.5 cm 09/10/20 1300   Wound Volume (cm^3) 2.52 cm^3 09/10/20 1300   Wound Surface Area (cm^2) 5.04 cm^2 09/10/20 1300   Care Cleansed with:;Sterile normal saline 09/10/20 1300   Dressing Island/border;Non-adherent 09/10/20 1300   Periwound Care Skin barrier film applied 09/10/20 1300   Dressing Change Due 09/14/20 09/10/20 1300       Right Ischial Wound   Cleanse wound with: Normal saline   Lidocaine: prn   Silver nitrate: prn   Breanna wound: Cavilon, Benzoin, Vac drape,   Primary dressing: Black foam to wound bed and tunneling @11 ,secure with vac drape,  wound vac continuous suction 125mmHg, track to right hip off bony prominence.   Offloading: Low air loss mattress at home   Frequency: Mondays and Thursdays in clinic     Left Buttocks Wound   Cleanse wound with: Normal saline   Lidocaine: prn   Silver nitrate: prn   Periwound care: Cavilon   Primary dressing: Santyl/ xeroform   Secondary dressing: Aquacel 4x4 foam border   Offloading: Low air loss mattress   Frequency:  Mondays and Thursdays in clinic     Follow-up: in 2 weeks 09/17/2020 with Dr. Tom   Home Health: [facility] Pending sale to [facility] to perform dressing changes prn.   Other Orders: Discontinue IV abx. Consent for colostomy with Dr. Tom 09/11/2020 signed.     Plan:                Follow up in about 4 days (around 9/14/2020).

## 2020-09-10 NOTE — PROGRESS NOTES
09/10/20 1316   Hyperbaric Pre-Inspection   Mattress cleaned prior to treatment Yes   2 Patient Identifiers Verified Yes   For Inpatients: Verify correct ID band/correct chart Yes   Consent Obtained Yes   Cotton Gown Yes   Patient voided Yes   Drainage Bags Emptied Not applicable   Patient Pregnant Not applicable   Glasses, Jewelry, Makeup, etc. Removed Yes   Hard contacts removed Yes   Dentures, Hearing Aid, Prosthetic Devices Removed Yes   Touch hair to check for hair spray Yes   Cold/Flu Symptoms No   For Inpatients: Medication sheet sent with patient No   Fears and apprehensions verbalized No   Ground Wire Secured Yes   HBO Treatment Course Details   Treatment Course Number 1   Total Treatments Ordered 40   Ordering Provider Dr. Tom   Indications Chronic refractory osteomyelitis   HBO Treatment Start Date 08/10/20   HBO Treatment Details   Treatment Number 18   Inpatient Visit No   Total Treatment Length Calc (minutes) 107   Chamber Type Monoplace   Chamber # 4   HBO Dive Log # [id number]   Treatment Protocol 2.0 PETER x 120 minutes w/100% oxygen and no air break   Treatment Details   Dive Rate Down 1.5 psi/minute   Dive Rate Up 2.0 psi/minute   Compress Begins 1.0 PETER   Clock Time 1030   Tx Pressure Reached 2.0 PETER   Clock Time 1040   Decompress Begins 2.0 PETER   Clock Time 1210   Decompress Ends 1.0 PETER   Clock Time 1217   Pre HBO Vital Signs   /78   Pulse 86   Resp 16   Temp 98.1 °F (36.7 °C)   Blood Glucose 116   Glucose Meter # HBO   Pain Level 0   Post HBO Vital Signs   /89   Pulse 83   Resp 16   Temp 98.1 °F (36.7 °C)   Blood Glucose 131   Glucose Meter # HBO   Pain Level 0   Ear Evaluation   Left Teed Scale Pre Grade 0   Left Teed Scale Post Grade 0   Right Teed Scale Pre Grade 0   Right Teed Scale Post Grade 0   Physician Supervision  I provided direct supervision and was immediately available to furnish assistance and direction throughout the performance of the procedure.    Emergency  Response Team  A trained emergency response team and emergency services were available throughout procedure.  Patient tolerated treatment well.  Continue present treatment plan.  Patient reports no complaints, tolerated well   DX CODES: M86.68, L89.324

## 2020-09-11 ENCOUNTER — HOSPITAL ENCOUNTER (INPATIENT)
Facility: HOSPITAL | Age: 45
LOS: 6 days | Discharge: HOME HEALTH | DRG: 329 | End: 2020-09-17
Attending: SURGERY | Admitting: SURGERY
Payer: MEDICARE

## 2020-09-11 ENCOUNTER — ANESTHESIA (OUTPATIENT)
Dept: SURGERY | Facility: HOSPITAL | Age: 45
DRG: 329 | End: 2020-09-11
Payer: MEDICARE

## 2020-09-11 DIAGNOSIS — G82.20 PARAPLEGIA AT T9 LEVEL: Chronic | ICD-10-CM

## 2020-09-11 DIAGNOSIS — Z43.3 COLOSTOMY, EVALUATE: ICD-10-CM

## 2020-09-11 DIAGNOSIS — R00.0 TACHYCARDIA: ICD-10-CM

## 2020-09-11 DIAGNOSIS — I10 ESSENTIAL HYPERTENSION: Chronic | ICD-10-CM

## 2020-09-11 DIAGNOSIS — M86.60 CHRONIC OSTEOMYELITIS: ICD-10-CM

## 2020-09-11 DIAGNOSIS — M86.9 OSTEOMYELITIS, UNSPECIFIED SITE, UNSPECIFIED TYPE: Primary | ICD-10-CM

## 2020-09-11 DIAGNOSIS — Z01.818 PREOP TESTING: ICD-10-CM

## 2020-09-11 DIAGNOSIS — E11.9 TYPE 2 DIABETES MELLITUS WITHOUT COMPLICATION, WITHOUT LONG-TERM CURRENT USE OF INSULIN: ICD-10-CM

## 2020-09-11 DIAGNOSIS — L89.313 PRESSURE INJURY OF RIGHT BUTTOCK, STAGE 3: ICD-10-CM

## 2020-09-11 DIAGNOSIS — E78.2 MIXED HYPERLIPIDEMIA: ICD-10-CM

## 2020-09-11 DIAGNOSIS — R06.03 RESPIRATORY DISTRESS: ICD-10-CM

## 2020-09-11 DIAGNOSIS — I26.99 PULMONARY EMBOLISM, UNSPECIFIED CHRONICITY, UNSPECIFIED PULMONARY EMBOLISM TYPE, UNSPECIFIED WHETHER ACUTE COR PULMONALE PRESENT: ICD-10-CM

## 2020-09-11 LAB
ANION GAP SERPL CALC-SCNC: 11 MMOL/L (ref 8–16)
BASOPHILS # BLD AUTO: 0.05 K/UL (ref 0–0.2)
BASOPHILS NFR BLD: 0.6 % (ref 0–1.9)
BUN SERPL-MCNC: 6 MG/DL (ref 6–20)
CALCIUM SERPL-MCNC: 9.6 MG/DL (ref 8.7–10.5)
CHLORIDE SERPL-SCNC: 108 MMOL/L (ref 95–110)
CO2 SERPL-SCNC: 24 MMOL/L (ref 23–29)
CREAT SERPL-MCNC: 0.7 MG/DL (ref 0.5–1.4)
DIFFERENTIAL METHOD: ABNORMAL
EOSINOPHIL # BLD AUTO: 0.6 K/UL (ref 0–0.5)
EOSINOPHIL NFR BLD: 7.5 % (ref 0–8)
ERYTHROCYTE [DISTWIDTH] IN BLOOD BY AUTOMATED COUNT: 19.5 % (ref 11.5–14.5)
EST. GFR  (AFRICAN AMERICAN): >60 ML/MIN/1.73 M^2
EST. GFR  (NON AFRICAN AMERICAN): >60 ML/MIN/1.73 M^2
GLUCOSE SERPL-MCNC: 83 MG/DL (ref 70–110)
HCT VFR BLD AUTO: 33.7 % (ref 40–54)
HGB BLD-MCNC: 10.7 G/DL (ref 14–18)
IMM GRANULOCYTES # BLD AUTO: 0.01 K/UL (ref 0–0.04)
IMM GRANULOCYTES NFR BLD AUTO: 0.1 % (ref 0–0.5)
LYMPHOCYTES # BLD AUTO: 2.3 K/UL (ref 1–4.8)
LYMPHOCYTES NFR BLD: 30.1 % (ref 18–48)
MCH RBC QN AUTO: 23.9 PG (ref 27–31)
MCHC RBC AUTO-ENTMCNC: 31.8 G/DL (ref 32–36)
MCV RBC AUTO: 75 FL (ref 82–98)
MONOCYTES # BLD AUTO: 0.5 K/UL (ref 0.3–1)
MONOCYTES NFR BLD: 6.1 % (ref 4–15)
NEUTROPHILS # BLD AUTO: 4.3 K/UL (ref 1.8–7.7)
NEUTROPHILS NFR BLD: 55.6 % (ref 38–73)
NRBC BLD-RTO: 0 /100 WBC
PLATELET # BLD AUTO: 591 K/UL (ref 150–350)
PMV BLD AUTO: 9.8 FL (ref 9.2–12.9)
POTASSIUM SERPL-SCNC: 4.1 MMOL/L (ref 3.5–5.1)
RBC # BLD AUTO: 4.48 M/UL (ref 4.6–6.2)
SARS-COV-2 RDRP RESP QL NAA+PROBE: NEGATIVE
SODIUM SERPL-SCNC: 143 MMOL/L (ref 136–145)
WBC # BLD AUTO: 7.7 K/UL (ref 3.9–12.7)

## 2020-09-11 PROCEDURE — 36000708 HC OR TIME LEV III 1ST 15 MIN: Performed by: SURGERY

## 2020-09-11 PROCEDURE — 25000003 PHARM REV CODE 250: Performed by: NURSE ANESTHETIST, CERTIFIED REGISTERED

## 2020-09-11 PROCEDURE — 37000009 HC ANESTHESIA EA ADD 15 MINS: Performed by: SURGERY

## 2020-09-11 PROCEDURE — U0002 COVID-19 LAB TEST NON-CDC: HCPCS

## 2020-09-11 PROCEDURE — 37000008 HC ANESTHESIA 1ST 15 MINUTES: Performed by: SURGERY

## 2020-09-11 PROCEDURE — 36415 COLL VENOUS BLD VENIPUNCTURE: CPT

## 2020-09-11 PROCEDURE — 25000003 PHARM REV CODE 250: Performed by: SURGERY

## 2020-09-11 PROCEDURE — 63600175 PHARM REV CODE 636 W HCPCS: Performed by: NURSE ANESTHETIST, CERTIFIED REGISTERED

## 2020-09-11 PROCEDURE — 11000001 HC ACUTE MED/SURG PRIVATE ROOM

## 2020-09-11 PROCEDURE — 85025 COMPLETE CBC W/AUTO DIFF WBC: CPT

## 2020-09-11 PROCEDURE — 71000033 HC RECOVERY, INTIAL HOUR: Performed by: SURGERY

## 2020-09-11 PROCEDURE — 36000709 HC OR TIME LEV III EA ADD 15 MIN: Performed by: SURGERY

## 2020-09-11 PROCEDURE — 63600175 PHARM REV CODE 636 W HCPCS: Performed by: ANESTHESIOLOGY

## 2020-09-11 PROCEDURE — 63600175 PHARM REV CODE 636 W HCPCS: Performed by: SURGERY

## 2020-09-11 PROCEDURE — 71000039 HC RECOVERY, EACH ADD'L HOUR: Performed by: SURGERY

## 2020-09-11 PROCEDURE — 80048 BASIC METABOLIC PNL TOTAL CA: CPT

## 2020-09-11 RX ORDER — PHENYLEPHRINE HYDROCHLORIDE 10 MG/ML
INJECTION INTRAVENOUS
Status: DISCONTINUED | OUTPATIENT
Start: 2020-09-11 | End: 2020-09-11

## 2020-09-11 RX ORDER — ONDANSETRON 2 MG/ML
INJECTION INTRAMUSCULAR; INTRAVENOUS
Status: DISCONTINUED | OUTPATIENT
Start: 2020-09-11 | End: 2020-09-11

## 2020-09-11 RX ORDER — PROPOFOL 10 MG/ML
VIAL (ML) INTRAVENOUS
Status: DISCONTINUED | OUTPATIENT
Start: 2020-09-11 | End: 2020-09-11

## 2020-09-11 RX ORDER — VANCOMYCIN HYDROCHLORIDE 1 G/20ML
INJECTION, POWDER, LYOPHILIZED, FOR SOLUTION INTRAVENOUS
Status: DISCONTINUED | OUTPATIENT
Start: 2020-09-11 | End: 2020-09-11 | Stop reason: HOSPADM

## 2020-09-11 RX ORDER — HYDROCODONE BITARTRATE AND ACETAMINOPHEN 5; 325 MG/1; MG/1
1 TABLET ORAL EVERY 4 HOURS PRN
Status: DISCONTINUED | OUTPATIENT
Start: 2020-09-11 | End: 2020-09-17 | Stop reason: HOSPADM

## 2020-09-11 RX ORDER — ONDANSETRON 2 MG/ML
4 INJECTION INTRAMUSCULAR; INTRAVENOUS ONCE AS NEEDED
Status: DISCONTINUED | OUTPATIENT
Start: 2020-09-11 | End: 2020-09-11 | Stop reason: HOSPADM

## 2020-09-11 RX ORDER — SODIUM CHLORIDE 9 MG/ML
INJECTION, SOLUTION INTRAVENOUS CONTINUOUS
Status: DISCONTINUED | OUTPATIENT
Start: 2020-09-11 | End: 2020-09-11

## 2020-09-11 RX ORDER — ACETAMINOPHEN 10 MG/ML
INJECTION, SOLUTION INTRAVENOUS
Status: DISCONTINUED | OUTPATIENT
Start: 2020-09-11 | End: 2020-09-11

## 2020-09-11 RX ORDER — CEFAZOLIN SODIUM 1 G/3ML
INJECTION, POWDER, FOR SOLUTION INTRAMUSCULAR; INTRAVENOUS
Status: DISCONTINUED | OUTPATIENT
Start: 2020-09-11 | End: 2020-09-11

## 2020-09-11 RX ORDER — NEOSTIGMINE METHYLSULFATE 1 MG/ML
INJECTION, SOLUTION INTRAVENOUS
Status: DISCONTINUED | OUTPATIENT
Start: 2020-09-11 | End: 2020-09-11

## 2020-09-11 RX ORDER — SUCCINYLCHOLINE CHLORIDE 20 MG/ML
INJECTION INTRAMUSCULAR; INTRAVENOUS
Status: DISCONTINUED | OUTPATIENT
Start: 2020-09-11 | End: 2020-09-11

## 2020-09-11 RX ORDER — ACETAMINOPHEN 325 MG/1
650 TABLET ORAL EVERY 4 HOURS PRN
Status: DISCONTINUED | OUTPATIENT
Start: 2020-09-11 | End: 2020-09-17 | Stop reason: HOSPADM

## 2020-09-11 RX ORDER — HYDROCODONE BITARTRATE AND ACETAMINOPHEN 5; 325 MG/1; MG/1
1 TABLET ORAL EVERY 6 HOURS PRN
Qty: 24 TABLET | Refills: 0 | Status: SHIPPED | OUTPATIENT
Start: 2020-09-11 | End: 2021-01-28 | Stop reason: SDUPTHER

## 2020-09-11 RX ORDER — SODIUM CHLORIDE 0.9 % (FLUSH) 0.9 %
10 SYRINGE (ML) INJECTION
Status: DISCONTINUED | OUTPATIENT
Start: 2020-09-11 | End: 2020-09-11

## 2020-09-11 RX ORDER — MUPIROCIN 20 MG/G
OINTMENT TOPICAL
Status: DISCONTINUED | OUTPATIENT
Start: 2020-09-11 | End: 2020-09-11 | Stop reason: HOSPADM

## 2020-09-11 RX ORDER — SODIUM CHLORIDE, SODIUM LACTATE, POTASSIUM CHLORIDE, CALCIUM CHLORIDE 600; 310; 30; 20 MG/100ML; MG/100ML; MG/100ML; MG/100ML
INJECTION, SOLUTION INTRAVENOUS CONTINUOUS PRN
Status: DISCONTINUED | OUTPATIENT
Start: 2020-09-11 | End: 2020-09-11

## 2020-09-11 RX ORDER — LIDOCAINE HYDROCHLORIDE 20 MG/ML
INJECTION INTRAVENOUS
Status: DISCONTINUED | OUTPATIENT
Start: 2020-09-11 | End: 2020-09-11

## 2020-09-11 RX ORDER — HYDROCODONE BITARTRATE AND ACETAMINOPHEN 5; 325 MG/1; MG/1
1 TABLET ORAL EVERY 6 HOURS PRN
Qty: 24 TABLET | Refills: 0 | Status: CANCELLED | OUTPATIENT
Start: 2020-09-11

## 2020-09-11 RX ORDER — BUPIVACAINE HYDROCHLORIDE 2.5 MG/ML
INJECTION, SOLUTION EPIDURAL; INFILTRATION; INTRACAUDAL
Status: DISCONTINUED | OUTPATIENT
Start: 2020-09-11 | End: 2020-09-11 | Stop reason: HOSPADM

## 2020-09-11 RX ORDER — ACETAMINOPHEN 325 MG/1
650 TABLET ORAL EVERY 4 HOURS PRN
Status: CANCELLED | OUTPATIENT
Start: 2020-09-11

## 2020-09-11 RX ORDER — HYDROCODONE BITARTRATE AND ACETAMINOPHEN 5; 325 MG/1; MG/1
1 TABLET ORAL EVERY 4 HOURS PRN
Status: CANCELLED | OUTPATIENT
Start: 2020-09-11

## 2020-09-11 RX ORDER — MIDAZOLAM HYDROCHLORIDE 1 MG/ML
INJECTION, SOLUTION INTRAMUSCULAR; INTRAVENOUS
Status: DISCONTINUED | OUTPATIENT
Start: 2020-09-11 | End: 2020-09-11

## 2020-09-11 RX ORDER — ROCURONIUM BROMIDE 10 MG/ML
INJECTION, SOLUTION INTRAVENOUS
Status: DISCONTINUED | OUTPATIENT
Start: 2020-09-11 | End: 2020-09-11

## 2020-09-11 RX ORDER — HYDROMORPHONE HYDROCHLORIDE 2 MG/ML
0.5 INJECTION, SOLUTION INTRAMUSCULAR; INTRAVENOUS; SUBCUTANEOUS EVERY 5 MIN PRN
Status: DISCONTINUED | OUTPATIENT
Start: 2020-09-11 | End: 2020-09-11 | Stop reason: HOSPADM

## 2020-09-11 RX ORDER — FENTANYL CITRATE 50 UG/ML
INJECTION, SOLUTION INTRAMUSCULAR; INTRAVENOUS
Status: DISCONTINUED | OUTPATIENT
Start: 2020-09-11 | End: 2020-09-11

## 2020-09-11 RX ORDER — SODIUM CHLORIDE 9 MG/ML
INJECTION, SOLUTION INTRAVENOUS CONTINUOUS
Status: CANCELLED | OUTPATIENT
Start: 2020-09-11

## 2020-09-11 RX ADMIN — ROCURONIUM BROMIDE 5 MG: 10 INJECTION, SOLUTION INTRAVENOUS at 11:09

## 2020-09-11 RX ADMIN — LIDOCAINE HYDROCHLORIDE 100 MG: 20 INJECTION, SOLUTION INTRAVENOUS at 11:09

## 2020-09-11 RX ADMIN — ACETAMINOPHEN 650 MG: 325 TABLET ORAL at 10:09

## 2020-09-11 RX ADMIN — HYDROMORPHONE HYDROCHLORIDE 0.5 MG: 2 INJECTION INTRAMUSCULAR; INTRAVENOUS; SUBCUTANEOUS at 01:09

## 2020-09-11 RX ADMIN — MIDAZOLAM 2 MG: 1 INJECTION INTRAMUSCULAR; INTRAVENOUS at 11:09

## 2020-09-11 RX ADMIN — SODIUM CHLORIDE, SODIUM LACTATE, POTASSIUM CHLORIDE, AND CALCIUM CHLORIDE: .6; .31; .03; .02 INJECTION, SOLUTION INTRAVENOUS at 11:09

## 2020-09-11 RX ADMIN — ROCURONIUM BROMIDE 25 MG: 10 INJECTION, SOLUTION INTRAVENOUS at 11:09

## 2020-09-11 RX ADMIN — PHENYLEPHRINE HYDROCHLORIDE 100 MCG: 10 INJECTION INTRAVENOUS at 11:09

## 2020-09-11 RX ADMIN — SUCCINYLCHOLINE CHLORIDE 120 MG: 20 INJECTION, SOLUTION INTRAMUSCULAR; INTRAVENOUS at 11:09

## 2020-09-11 RX ADMIN — ACETAMINOPHEN 1000 MG: 10 INJECTION, SOLUTION INTRAVENOUS at 12:09

## 2020-09-11 RX ADMIN — PHENYLEPHRINE HYDROCHLORIDE 200 MCG: 10 INJECTION INTRAVENOUS at 11:09

## 2020-09-11 RX ADMIN — GLYCOPYRROLATE 0.4 MCG: 0.2 INJECTION, SOLUTION INTRAMUSCULAR; INTRAVITREAL at 12:09

## 2020-09-11 RX ADMIN — NEOSTIGMINE METHYLSULFATE 5 MG: 1 INJECTION INTRAVENOUS at 12:09

## 2020-09-11 RX ADMIN — HYDROCODONE BITARTRATE AND ACETAMINOPHEN 1 TABLET: 5; 325 TABLET ORAL at 08:09

## 2020-09-11 RX ADMIN — ONDANSETRON 8 MG: 2 INJECTION, SOLUTION INTRAMUSCULAR; INTRAVENOUS at 12:09

## 2020-09-11 RX ADMIN — CEFAZOLIN 2 G: 330 INJECTION, POWDER, FOR SOLUTION INTRAMUSCULAR; INTRAVENOUS at 11:09

## 2020-09-11 RX ADMIN — PROPOFOL 150 MG: 10 INJECTION, EMULSION INTRAVENOUS at 11:09

## 2020-09-11 RX ADMIN — FENTANYL CITRATE 100 MCG: 50 INJECTION, SOLUTION INTRAMUSCULAR; INTRAVENOUS at 11:09

## 2020-09-11 NOTE — PLAN OF CARE
Spoke with Dr. Tom, states pt is to be discharged from [facility].  Will need home health for colostomy care QOD.  Dr. Tom is putting orders in Epic.

## 2020-09-11 NOTE — PLAN OF CARE
VN cued into pt's room for introduction with pt's permission.  VN role explained and informed pt that VN would be working with bedside nurse and rest of care team.    Instructed pt to call for assistance.  Pt aware and agreeable.  Pt's chart, labs and vital signs reviewed.  Allowed time for questions.  No acute distress noted.  Will continue to be available as needed.

## 2020-09-11 NOTE — PLAN OF CARE
Spoke with Case Management, notified of need for Home Health for care of colostomy.  States that the wound care nurse will need to see the pt and give education on how to care for his ostomy prior to discharge.

## 2020-09-11 NOTE — OP NOTE
Operative Note       Surgery Date: 9/11/2020     Surgeon(s) and Role:     * Jesus Tom MD - Primary    Pre-op Diagnosis:  Chronic osteomyelitis [M86.60]  Paraplegia [G82.20]    Post-op Diagnosis: Post-Op Diagnosis Codes:     * Chronic osteomyelitis [M86.60]     * Paraplegia [G82.20]    Procedure(s) (LRB):  CREATION, COLOSTOMY (N/A)  Diverting loop colostomy  Anesthesia: General    Procedure in Detail/Findings:  After satisfactory general endotracheal anesthesia patient in supine position time-out was called patient identity was confirmed size of the operation was confirmed abdomen was prepped and draped normal sterile manner using ChloraPrep incision was made right upper quadrant and 6-7 cm transversely was taken down to the deep subcu tissue subcutaneous bleeders were clamped bovied anterior rectus sheath was incised rectus muscle was bovied peritoneal cavity was entered right proximal transverse colon was delivered to the surface bleeders were clamped and tied using 2 silk suture a red rubber catheter was introduced through the mesenteric border adequate loop was mobilized Stefano the center line was loosely approximated using interrupted 0 Vicryl for the peritoneum and colostomy which relation was warm by suturing colostomy at the 3 well 6 and 9:00 position rectus muscle was closed the rectus sheath was closed using interrupted 0 Vicryl skin was loosely approximated using interrupted 2 nylon colotomy was made and maturation of the clot colostomy was performed using interrupted 0 chromic catgut colostomy bag was then applied no intraop complication patient tolerated well instrument counts sponge count counts correct sent to recovery room in stable condition.      Estimated Blood Loss:40 cc         Specimens (From admission, onward)    None        Implants: * No implants in log *           Disposition: PACU - hemodynamically stable.           Condition: Good    Attestation:  I performed the  "procedure.           Discharge Note    Admit Date: 9/11/2020    Attending Physician: Jesus Tom MD     Discharge Physician: Jesus Tom MD    Final Diagnosis: Post-Op Diagnosis Codes:     * Chronic osteomyelitis [M86.60]     * Paraplegia [G82.20]    Disposition: Home or Self Care    Patient Instructions:   Current Discharge Medication List      CONTINUE these medications which have NOT CHANGED    Details   cyclobenzaprine (FLEXERIL) 10 MG tablet Take 1 tablet (10 mg total) by mouth 3 (three) times daily as needed.  Qty: 90 tablet, Refills: 3      docusate sodium (COLACE) 100 MG capsule Take 1 capsule (100 mg total) by mouth 2 (two) times daily.  Qty: 180 capsule, Refills: 3      ibuprofen (ADVIL,MOTRIN) 800 MG tablet       ketoconazole (NIZORAL) 2 % shampoo Apply topically twice a week.  Qty: 120 mL, Refills: 11      pen needle, diabetic (COMFORT EZ PEN NEEDLES) 29 gauge x 1/2" Ndle 1 Units by Misc.(Non-Drug; Combo Route) route 3 (three) times daily.  Qty: 400 each, Refills: 11      !! SANTYL ointment APPLY TO CLEANSED AFFECTED ARE TOPICALLY ONCE DAILY      TRUE METRIX GLUCOSE METER Misc AS DIRECTED  Refills: 0      TRUE METRIX GLUCOSE TEST STRIP Strp USE AS DIRECTED TID  Qty: 200 strip, Refills: 3      TRUEPLUS LANCETS 30 gauge Misc USE AS DIRECTED TID  Refills: 3      alcohol swabs (ALCOHOL PADS) PadM Apply 1 each topically once daily.  Qty: 400 each, Refills: 11      ammonium lactate 12 % Crea MIHAELA EXT AA ON SKIN BID  Refills: 3      baclofen (LIORESAL) 10 MG tablet       cadexomer iodine (IODOSORB) 0.9 % gel Apply topically daily as needed for Wound Care.      !! collagenase (SANTYL) ointment Apply topically once daily.  Qty: 30 g, Refills: 3      dextran 70-hypromellose (TEARS) ophthalmic solution Apply 1 drop to eye.      drainage bag Misc 1 Units by Misc.(Non-Drug; Combo Route) route every 30 days.  Qty: 3 each, Refills: 3    Comments: Large bedside urine drainage bag      famotidine " (PEPCID) 20 MG tablet Take 1 tablet (20 mg total) by mouth 2 (two) times daily.  Qty: 180 tablet, Refills: 3      ferrous sulfate (IRON, FERROUS SULFATE,) 325 mg (65 mg iron) Tab tablet Take 1 tablet (325 mg total) by mouth once daily.  Qty: 30 tablet, Refills: 5      gabapentin (NEURONTIN) 300 MG capsule Take 1 capsule (300 mg total) by mouth 2 (two) times daily.  Qty: 180 capsule, Refills: 3      gemfibrozil (LOPID) 600 MG tablet Take 1 tablet (600 mg total) by mouth 2 (two) times daily before meals.  Qty: 180 tablet, Refills: 1      ketorolac (TORADOL) 10 mg tablet TK 1 T PO TID      melatonin 10 mg Cap Take 1 tablet by mouth every evening.  Qty: 90 capsule, Refills: 3      meloxicam (MOBIC) 7.5 MG tablet Take 1 tablet (7.5 mg total) by mouth 2 (two) times daily as needed for Pain.  Qty: 180 tablet, Refills: 1      meropenem (MERREM) 1 gram injection       metFORMIN (GLUCOPHAGE) 500 MG tablet Take 1 tablet (500 mg total) by mouth 2 (two) times daily with meals.  Qty: 180 tablet, Refills: 3      metoprolol tartrate (LOPRESSOR) 25 MG tablet       oxyCODONE-acetaminophen (PERCOCET) 7.5-325 mg per tablet TAKE 1 TABLET PO DAILY AS NEEDED FOR PAIN    Comments: Quantity prescribed more than 7 day supply? Press F2 and select one:[id number]        pantoprazole (PROTONIX) 40 MG tablet Take 1 tablet (40 mg total) by mouth once daily.  Qty: 30 tablet, Refills: 11      sulfamethoxazole-trimethoprim 800-160mg (BACTRIM DS) 800-160 mg Tab TK 1 T PO Q 12 H FOR 10 DAYS      venlafaxine (EFFEXOR-XR) 150 MG Cp24 Take 150 mg by mouth once daily.       wheelchair Korina 1 Units/oz/day by Misc.(Non-Drug; Combo Route) route once daily.  Qty: 1 each, Refills: 0    Comments: Electric wheelchair       !! - Potential duplicate medications found. Please discuss with provider.          Discharge Procedure Orders (regular diet, continue present treatment.   Discharge Procedure Orders (must include Diet, Follow-up, Activity)   COVID-19 Routine  Screening   Standing Status: Future Standing Exp. Date: 11/07/21     Order Specific Question Answer Comments   Is the patient symptomatic? No    Is this needed for pre-procedure or pre-op testing? Yes    Diagnosis: Preop testing [677694]         Discharge Date: 9/11/2020

## 2020-09-11 NOTE — ANESTHESIA PREPROCEDURE EVALUATION
09/11/2020  Hermann Aguilar is a 44 y.o., male scheduled for sacral wound I&D under MAC on 5/4/20.     Last MAC Anesthesia (2018) without complication.     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 Yadiel Haley    CHEST TUBE INSERTION Right     CYSTOSCOPY N/A 8/30/2018    Procedure: CYSTOSCOPY, clot evacuation, suprapubic tube exchange;  Surgeon: Ruchi Navarrete MD;  Location: [facility] OR;  Service: Urology;  Laterality: N/A;    DEBRIDEMENT OF SACRAL WOUND N/A 5/5/2020    Procedure: DEBRIDEMENT, WOUND, SACRUM;  Surgeon: Jesus Tom MD;  Location: [facility] OR;  Service: General;  Laterality: N/A;    GASTROSTOMY TUBE PLACEMENT  12/15/2016    ORIF HUMERUS FRACTURE Left 12/15/2016    REMOVAL OF BLOOD CLOT  8/30/2018    Procedure: REMOVAL, BLOOD CLOT;  Surgeon: Ruchi Navarrete MD;  Location: [facility] OR;  Service: Urology;;    TRACHEOSTOMY TUBE PLACEMENT         Review of patient's allergies indicates:  No Known Allergies        Pre-op Assessment    I have reviewed the Patient Summary Reports.       I have reviewed the Medications.     Review of Systems  Anesthesia Hx:  No problems with previous  Anesthesia Denies Hx of Anesthetic complications  History of prior surgery of interest to airway management or planning: (see PSH above) Previous anesthesia: MAC, General   Social:  Smoker    Hematology/Oncology:         -- Anemia:   Cardiovascular:   Hypertension ECG has been reviewed.  Hypertension (EKG (5/1/20):)    Hepatic/GI:   PUD,    Neurological:   Neuromuscular Disease,    Endocrine:   Diabetes, well controlled, type 2    Psych:   Psychiatric History depression          Physical Exam  General:  Well nourished (See assessment above)     Dental:  Dental Findings: (denies removable implants, dentures ; denies loose and/or chipped teeth) Periodontal disease, Mild   Chest/Lungs:  Chest/Lungs Clear    Heart/Vascular:  Heart Findings: Normal EKG (5/1/20):  Sinus tachycardia  Right axis deviation  Incomplete right bundle branch block  Right ventricular hypertrophy  Abnormal ECG  When compared with ECG of 21-OCT-2019 22:28,  ST no longer elevated in Anterior leads         Lab Results   Component Value Date    WBC 7.82 07/15/2020    HGB 9.2 (L) 07/15/2020    HCT 29.0 (L) 07/15/2020     (H) 07/15/2020    CHOL 166 07/06/2020    TRIG 218 (H) 07/06/2020    HDL 27 (L) 07/06/2020    ALT 6 (L) 07/06/2020    AST 8 (L) 07/06/2020     07/15/2020    K 4.3 07/15/2020     07/15/2020    CREATININE 0.7 07/15/2020    BUN 8 07/15/2020    CO2 26 07/15/2020    INR 1.0 05/04/2020    HGBA1C 6.7 (H) 05/01/2020         Anesthesia Plan  Type of Anesthesia, risks & benefits discussed:  Anesthesia Type:  MAC, general  Patient's Preference:   Intra-op Monitoring Plan: standard ASA monitors  Intra-op Monitoring Plan Comments:   Post Op Pain Control Plan: multimodal analgesia and per primary service following discharge from PACU  Post Op Pain Control Plan Comments:   Induction:   IV  Beta Blocker:  Patient is not currently on a Beta-Blocker (No further documentation required).       Informed Consent: Patient understands risks  and agrees with Anesthesia plan.  Questions answered. Anesthesia consent signed with patient.  ASA Score: 3     Day of Surgery Review of History & Physical:            Ready For Surgery From Anesthesia Perspective.

## 2020-09-11 NOTE — TRANSFER OF CARE
"Anesthesia Transfer of Care Note    Patient: Hermann Aguilar    Procedure(s) Performed: Procedure(s) (LRB):  CREATION, COLOSTOMY (N/A)    Patient location: PACU    Anesthesia Type: general    Transport from OR: Transported from OR on 100% O2 by closed face mask with adequate spontaneous ventilation    Post pain: adequate analgesia    Post assessment: no apparent anesthetic complications    Post vital signs: stable    Level of consciousness: sedated    Nausea/Vomiting: no nausea/vomiting    Complications: none    Transfer of care protocol was followed      Last vitals:   Visit Vitals  BP (!) 143/84 (BP Location: Left arm, Patient Position: Lying)   Pulse 73   Temp 36.7 °C (98.1 °F) (Skin)   Resp 18   Ht 5' 11" (1.803 m)   Wt 90.7 kg (200 lb)   SpO2 100%   BMI 27.89 kg/m²     "

## 2020-09-11 NOTE — H&P
Discharge Summary/Instructions after an Endoscopic Procedure  Patient Name: Dora Correa  Patient MRN: [id number]  Patient YOB: 1955 Friday, February 23, 2018  Carl Trevino MD  RESTRICTIONS:  During your procedure today, you received medications for sedation.  These   medications may affect your judgment, balance and coordination.  Therefore,   for 24 hours, you have the following restrictions:   - DO NOT drive a car, operate machinery, make legal/financial decisions,   sign important papers or drink alcohol.    ACTIVITY:  The following day: return to full activity including work, except no heavy   lifting, straining or running for 3 days if polyps were removed.  DIET:  Eat and drink normally unless instructed otherwise.     TREATMENT FOR COMMON SIDE EFFECTS:  - Mild abdominal pain, nausea, belching, bloating or excessive gas:  rest,   eat lightly and use a heating pad.  - Sore Throat: treat with throat lozenges and/or gargle with warm salt   water.  - Because air was used during the procedure, expelling large amounts of air   from your rectum or belching is normal.  - If a bowel prep was taken, you may not have a bowel movement for 1-3 days.    This is normal.  SYMPTOMS TO WATCH FOR AND REPORT TO YOUR PHYSICIAN:  1. Abdominal pain or bloating, other than gas cramps.  2. Chest pain.  3. Back pain.  4. Signs of infection such as: chills or fever occurring within 24 hours   after the procedure.  5. Rectal bleeding, which would show as bright red, maroon, or black stools.   (A tablespoon of blood from the rectum is not serious, especially if   hemorrhoids are present.)  6. Vomiting.  7. Weakness or dizziness.  GO DIRECTLY TO THE NEAREST EMERGENCY ROOM IF YOU HAVE ANY OF THE FOLLOWING:      Difficulty breathing  Chills and/or fever over 101 F   Persistent vomiting and/or vomiting blood   Severe abdominal pain   Severe chest pain   Black, tarry stools   Bleeding- more than one tablespoon   Any  Today`s Date: 9/11/2020     Admit Date: 9/11/2020    Admitting Physician: Jesus Tom MD    Patient`s Name: Hermann Aguilar , 44 y.o. male    HISTORY AND CHIEF COMPLAINT  Her for placement of diverting loop colostomy to divert the fecal stream   Patient Active Problem List   Diagnosis    Iron deficiency anemia    Chronic suprapubic catheter    Essential hypertension    Vitamin D deficiency    Paraplegia at T9 level    History of DVT of lower extremity    Hx of right BKA    Non-healing wound of lower extremity, initial encounter    Chronic post-traumatic stress disorder    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    PAD (peripheral artery disease)    Right foot ulcer, with fat layer exposed    Constipation    Diabetic foot ulcer with osteomyelitis    Chronic ulcer of left lower extremity    Major depressive disorder    Phantom limb syndrome    Cigarette smoker    Hematuria    Neurogenic bladder    Stage IV pressure ulcer of left buttock    Pressure injury of buttock, stage 3    Bacteremia due to Gram-negative bacteria    Pressure injury of right ischium, stage 4    Newly diagnosed infection due to multidrug resistant organism    Infected decubitus ulcer, stage IV with osteomyelitis of right ischium    Sepsis without acute organ dysfunction    Decubitus ulcer, infected, stage III    Sacral Osteomyelitis    Chronic osteomyelitis of symphysis pubis    Chronic osteomyelitis of hindfoot, right    Non-pressure chronic ulcer of left ankle with necrosis of bone       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 Yadiel Haley    CHEST TUBE INSERTION Right     CYSTOSCOPY N/A 8/30/2018    Procedure: CYSTOSCOPY, clot evacuation, suprapubic tube exchange;  Surgeon: Ruchi Navarrete MD;  Location: [facility] OR;  Service: Urology;  Laterality: N/A;    DEBRIDEMENT OF SACRAL WOUND N/A 5/5/2020    Procedure: DEBRIDEMENT, WOUND, SACRUM;  Surgeon: Jesus Tom MD;  Location: [facility] OR;  Service: General;  Laterality: N/A;    GASTROSTOMY TUBE PLACEMENT  12/15/2016    ORIF HUMERUS FRACTURE Left 12/15/2016    REMOVAL OF BLOOD CLOT  8/30/2018    Procedure: REMOVAL, BLOOD CLOT;  Surgeon: Ruchi Navarrete MD;  Location: [facility] OR;  Service: Urology;;    TRACHEOSTOMY TUBE PLACEMENT         Prior to Admission medications    Medication Sig Start Date End Date Taking? Authorizing Provider   alcohol swabs (ALCOHOL PADS) PadM Apply 1 each topically once daily. 5/15/19   Ramu Breen MD   ammonium lactate 12 % Crea MIHAELA EXT AA ON SKIN BID 4/11/19   Historical Provider, MD   aspirin (ECOTRIN) 81 MG EC tablet aspirin    Historical Provider, MD   baclofen (LIORESAL) 10 MG tablet  1/20/20   Historical Provider, MD   cadexomer iodine (IODOSORB) 0.9 % gel Apply topically daily as needed for Wound Care.    Historical Provider, MD   collagenase (SANTYL) ointment Apply topically once daily. 7/20/20   Marlene Vargas, NP   cyclobenzaprine (FLEXERIL) 10 MG tablet Take 1 tablet (10 mg total) by mouth 3 (three) times daily as needed. 4/6/19   Ramu Breen MD   dextran 70-hypromellose (TEARS) ophthalmic solution Apply 1 drop to eye.    Historical Provider, MD   docusate sodium (COLACE) 100 MG capsule Take 1 capsule (100 mg total) by mouth 2 (two) times  other symptom or condition that you feel may need urgent attention  Your doctor recommends these additional instructions:  If any biopsies were taken, your doctors clinic will contact you in 1 to 2   weeks with any results.  You have a contact number available for emergencies.  The signs and symptoms   of potential delayed complications were discussed with you.  You may return   to normal activities tomorrow.  Written discharge instructions were   provided to you.   You are being discharged to home.   Resume your previous diet.   Continue your present medications.   We are waiting for your pathology results.   Take Prilosec (omeprazole) 40 mg by mouth once a day.   Take Carafate (sucralfate) suspension 1 gram by mouth twice a day for two   weeks.   Do not take any aspirin, ibuprofen (including Advil, Motrin or Nuprin),   naproxen (including Aleve), or any other non-steroidal anti-inflammatory   drugs.  For questions, problems or results please call your physician - Carl Trevino MD at Work:  [phone].  [facility], EMERGENCY ROOM PHONE NUMBER: [phone]  IF A COMPLICATION OR EMERGENCY SITUATION ARISES AND YOU ARE UNABLE TO REACH   YOUR PHYSICIAN - GO DIRECTLY TO THE EMERGENCY ROOM.  Carl Trevino MD  2/23/2018 10:09:58 AM  This report has been verified and signed electronically.   "daily. 2/14/19   Ramu Breen MD   drainage bag Misc 1 Units by Misc.(Non-Drug; Combo Route) route every 30 days. 5/15/19   Ramu Breen MD   famotidine (PEPCID) 20 MG tablet Take 1 tablet (20 mg total) by mouth 2 (two) times daily. 2/14/19 5/12/20  Ramu Breen MD   ferrous sulfate (IRON, FERROUS SULFATE,) 325 mg (65 mg iron) Tab tablet Take 1 tablet (325 mg total) by mouth once daily. 7/20/20 1/16/21  Marlene Vargas, NP   gabapentin (NEURONTIN) 300 MG capsule Take 1 capsule (300 mg total) by mouth 2 (two) times daily. 4/6/19   Ramu Breen MD   gemfibrozil (LOPID) 600 MG tablet Take 1 tablet (600 mg total) by mouth 2 (two) times daily before meals. 4/6/19 5/12/20  Ramu Breen MD   ibuprofen (ADVIL,MOTRIN) 800 MG tablet  8/3/20   Historical Provider, MD   ketoconazole (NIZORAL) 2 % shampoo Apply topically twice a week. 2/14/19   aRmu Breen MD   ketorolac (TORADOL) 10 mg tablet TK 1 T PO TID 7/2/20   Historical Provider, MD   melatonin 10 mg Cap Take 1 tablet by mouth every evening. 2/14/19   Ramu Breen MD   meloxicam (MOBIC) 7.5 MG tablet Take 1 tablet (7.5 mg total) by mouth 2 (two) times daily as needed for Pain. 4/6/19   Ramu Breen MD   meropenem (MERREM) 1 gram injection  7/14/20   Historical Provider, MD   metFORMIN (GLUCOPHAGE) 500 MG tablet Take 1 tablet (500 mg total) by mouth 2 (two) times daily with meals. 4/6/19 5/12/20  Ramu Breen MD   metoprolol tartrate (LOPRESSOR) 25 MG tablet  1/20/20   Historical Provider, MD   oxyCODONE-acetaminophen (PERCOCET) 7.5-325 mg per tablet TAKE 1 TABLET PO DAILY AS NEEDED FOR PAIN 7/27/20   Historical Provider, MD   pantoprazole (PROTONIX) 40 MG tablet Take 1 tablet (40 mg total) by mouth once daily. 7/20/20 7/20/21  Marlene Vargas NP   pen needle, diabetic (COMFORT EZ PEN NEEDLES) 29 gauge x 1/2" Ndle 1 Units by Misc.(Non-Drug; Combo Route) route 3 (three) times daily. 5/15/19   Ramu Breen MD   SANTYL ointment APPLY TO " CLEANSED AFFECTED ARE TOPICALLY ONCE DAILY 1/13/20   Historical Provider, MD   sulfamethoxazole-trimethoprim 800-160mg (BACTRIM DS) 800-160 mg Tab TK 1 T PO Q 12 H FOR 10 DAYS 7/2/20   Historical Provider, MD   TRUE METRIX GLUCOSE METER Misc AS DIRECTED 4/26/19   Historical Provider, MD   TRUE METRIX GLUCOSE TEST STRIP Strp USE AS DIRECTED TID 6/6/19   Ramu Breen MD   TRUEPLUS LANCETS 30 gauge Misc USE AS DIRECTED TID 4/26/19   Historical Provider, MD   venlafaxine (EFFEXOR-XR) 150 MG Cp24 Take 150 mg by mouth once daily.  1/20/20   Historical Provider, MD   wheelchair Korina 1 Units/oz/day by Misc.(Non-Drug; Combo Route) route once daily. 7/11/19   Ramu Breen MD     No current facility-administered medications on file prior to encounter.      Current Outpatient Medications on File Prior to Encounter   Medication Sig Dispense Refill    alcohol swabs (ALCOHOL PADS) PadM Apply 1 each topically once daily. 400 each 11    ammonium lactate 12 % Crea MIHAELA EXT AA ON SKIN BID  3    aspirin (ECOTRIN) 81 MG EC tablet aspirin      baclofen (LIORESAL) 10 MG tablet       cadexomer iodine (IODOSORB) 0.9 % gel Apply topically daily as needed for Wound Care.      collagenase (SANTYL) ointment Apply topically once daily. 30 g 3    cyclobenzaprine (FLEXERIL) 10 MG tablet Take 1 tablet (10 mg total) by mouth 3 (three) times daily as needed. 90 tablet 3    dextran 70-hypromellose (TEARS) ophthalmic solution Apply 1 drop to eye.      docusate sodium (COLACE) 100 MG capsule Take 1 capsule (100 mg total) by mouth 2 (two) times daily. 180 capsule 3    drainage bag Misc 1 Units by Misc.(Non-Drug; Combo Route) route every 30 days. 3 each 3    famotidine (PEPCID) 20 MG tablet Take 1 tablet (20 mg total) by mouth 2 (two) times daily. 180 tablet 3    ferrous sulfate (IRON, FERROUS SULFATE,) 325 mg (65 mg iron) Tab tablet Take 1 tablet (325 mg total) by mouth once daily. 30 tablet 5    gabapentin (NEURONTIN) 300 MG capsule  "Take 1 capsule (300 mg total) by mouth 2 (two) times daily. 180 capsule 3    gemfibrozil (LOPID) 600 MG tablet Take 1 tablet (600 mg total) by mouth 2 (two) times daily before meals. 180 tablet 1    ibuprofen (ADVIL,MOTRIN) 800 MG tablet       ketoconazole (NIZORAL) 2 % shampoo Apply topically twice a week. 120 mL 11    melatonin 10 mg Cap Take 1 tablet by mouth every evening. 90 capsule 3    meloxicam (MOBIC) 7.5 MG tablet Take 1 tablet (7.5 mg total) by mouth 2 (two) times daily as needed for Pain. 180 tablet 1    meropenem (MERREM) 1 gram injection       metFORMIN (GLUCOPHAGE) 500 MG tablet Take 1 tablet (500 mg total) by mouth 2 (two) times daily with meals. 180 tablet 3    metoprolol tartrate (LOPRESSOR) 25 MG tablet       oxyCODONE-acetaminophen (PERCOCET) 7.5-325 mg per tablet TAKE 1 TABLET PO DAILY AS NEEDED FOR PAIN      pantoprazole (PROTONIX) 40 MG tablet Take 1 tablet (40 mg total) by mouth once daily. 30 tablet 11    pen needle, diabetic (COMFORT EZ PEN NEEDLES) 29 gauge x 1/2" Ndle 1 Units by Misc.(Non-Drug; Combo Route) route 3 (three) times daily. 400 each 11    SANTYL ointment APPLY TO CLEANSED AFFECTED ARE TOPICALLY ONCE DAILY      TRUE METRIX GLUCOSE METER Misc AS DIRECTED  0    TRUE METRIX GLUCOSE TEST STRIP Strp USE AS DIRECTED  strip 3    TRUEPLUS LANCETS 30 gauge Misc USE AS DIRECTED TID  3    venlafaxine (EFFEXOR-XR) 150 MG Cp24 Take 150 mg by mouth once daily.       wheelchair Korina 1 Units/oz/day by Misc.(Non-Drug; Combo Route) route once daily. 1 each 0        Review of patient's allergies indicates:  No Known Allergies    Social History:   reports that he has been smoking cigarettes. He started smoking about 33 years ago. He has a 16.50 pack-year smoking history. He has never used smokeless tobacco. He reports that he does not drink alcohol or use drugs.     No family history on file.    PHYSICAL EXAMINATION       General Condition:   alert x 3    Head & " Neck  Anemia: None  Jaundice: None  Neck vein: Not distended  Carotid Bruits: none  Lymph nodes: none palpable  Thyroid: normal    Chest: normal    Heart: normal    Rt. Breast: not examined  Lt. Breast: not examined  Axillary lymph nodes: none    Abdomen: Soft,  None tender with no palpable mass or organ  Hernia: none    Rectal: Defered    Extremities: normal    Vascular: normal    Specific focus Examination    Imp: paraplegia, osteomyelitis right ischial decubitus,     Plan: diverting loop colostomy

## 2020-09-11 NOTE — PLAN OF CARE
received call from Abigail with [facility] to arrange home health services.   sent referral to home health order and face sheet to [facility] via [facility].       09/11/20 1740   Post-Acute Status   Post-Acute Authorization Home Health   Home Health Status Referrals Sent   Discharge Delays None known at this time   Discharge Plan   Discharge Plan A Home Health

## 2020-09-11 NOTE — ANESTHESIA PROCEDURE NOTES
Intubation  Performed by: Keira Pool CRNA  Authorized by: Yennifer Fisher MD     Intubation:     Induction:  Intravenous    Intubated:  Postinduction    Mask Ventilation:  Easy mask    Attempts:  1    Attempted By:  CRNA    Method of Intubation:  Direct    Blade:  Bhatia 2    Laryngeal View Grade: Grade IIA - cords partially seen      Difficult Airway Encountered?: No      Airway Device:  Oral endotracheal tube    Airway Device Size:  7.0    Style/Cuff Inflation:  Cuffed    Inflation Amount (mL):  4    Tube secured:  21    Secured at:  The lips    Placement Verified By:  Capnometry    Complicating Factors:  None    Findings Post-Intubation:  BS equal bilateral  Notes:      7.5 ETT was unable to be advance past vocal cords.  7.0 ETT passed successfully without difficulty.

## 2020-09-11 NOTE — NURSING
Pt up to floor. VS taken and recorded. Pt oriented to room. Call bell and personal items within reach. Advised pt to call for assistance, pt verbalized understanding.

## 2020-09-11 NOTE — ANESTHESIA POSTPROCEDURE EVALUATION
Anesthesia Post Evaluation    Patient: Hermann Aguilar    Procedure(s) Performed: Procedure(s) (LRB):  CREATION, COLOSTOMY (N/A)    Final Anesthesia Type: general    Patient location during evaluation: PACU  Patient participation: Yes- Able to Participate  Level of consciousness: awake and alert  Post-procedure vital signs: reviewed and stable  Pain management: adequate  Airway patency: patent    PONV status at discharge: No PONV  Anesthetic complications: no      Cardiovascular status: blood pressure returned to baseline  Respiratory status: unassisted  Hydration status: euvolemic  Follow-up not needed.          Vitals Value Taken Time   /82 09/11/20 1425   Temp  09/11/20 1428   Pulse 60 09/11/20 1428   Resp 21 09/11/20 1428   SpO2 100 % 09/11/20 1428   Vitals shown include unvalidated device data.      No case tracking events are documented in the log.      Pain/Harrison Score: Pain Rating Prior to Med Admin: 6 (9/11/2020  1:40 PM)

## 2020-09-11 NOTE — PLAN OF CARE
spoke with Lizbeth with [facility] [phone] this patient was current with agency before. Lizbeth stated start of care for this patient will be Monday 9/14/2020.     94.7

## 2020-09-12 PROCEDURE — 25000003 PHARM REV CODE 250: Performed by: SURGERY

## 2020-09-12 PROCEDURE — 11000001 HC ACUTE MED/SURG PRIVATE ROOM

## 2020-09-12 RX ADMIN — HYDROCODONE BITARTRATE AND ACETAMINOPHEN 1 TABLET: 5; 325 TABLET ORAL at 06:09

## 2020-09-12 RX ADMIN — ACETAMINOPHEN 650 MG: 325 TABLET ORAL at 04:09

## 2020-09-12 RX ADMIN — HYDROCODONE BITARTRATE AND ACETAMINOPHEN 1 TABLET: 5; 325 TABLET ORAL at 02:09

## 2020-09-12 NOTE — PLAN OF CARE
Permission attained to enter room via virtual system.  Virtual rounds completed as documented.  Today's lab values, notes, and vital signs up to now have been reviewed.

## 2020-09-12 NOTE — PLAN OF CARE
Colostomy bag and care of ostomy and skin education provided to patient and care giver per Dr. Tom.  All question and concerns addressed.

## 2020-09-12 NOTE — PLAN OF CARE
Patient is AAOx4, NAD noted, VSS.  No complaints of pain to colostomy, but pain to right shoulder.  PRN medication given with little relief.  Patient sleeping at this time.  Will recheck pain level.  Colostomy in place, + flatus. Dressing CDI.  Suprapubic catheter intact, draining yellow urine.  No complaints of n/v.  Free from falls.  Safety maintained.  Will continue to monitor.   Problem: Wound  Goal: Optimal Wound Healing  Outcome: Ongoing, Progressing     Problem: Infection (Sepsis/Septic Shock)  Goal: Absence of Infection Signs/Symptoms  Outcome: Ongoing, Progressing     Problem: Adult Inpatient Plan of Care  Goal: Plan of Care Review  Outcome: Ongoing, Progressing     Problem: Adult Inpatient Plan of Care  Goal: Patient-Specific Goal (Individualization)  Outcome: Ongoing, Progressing     Problem: Fall Injury Risk  Goal: Absence of Fall and Fall-Related Injury  Outcome: Ongoing, Progressing     Problem: Adjustment to Surgery (Colostomy)  Goal: Psychosocial Adjustment Initiation  Outcome: Ongoing, Progressing     Problem: Bleeding (Colostomy)  Goal: Absence of Bleeding  Outcome: Ongoing, Progressing     Problem: Pain (Colostomy)  Goal: Acceptable Pain Control  Outcome: Ongoing, Progressing     Problem: Postoperative Nausea and Vomiting (Colostomy)  Goal: Nausea and Vomiting Relief  Outcome: Ongoing, Progressing     Problem: Postoperative Stoma Care (Colostomy)  Goal: Optimal Stoma Healing  Outcome: Ongoing, Progressing

## 2020-09-13 LAB
ALLENS TEST: ABNORMAL
ANION GAP SERPL CALC-SCNC: 12 MMOL/L (ref 8–16)
BACTERIA #/AREA URNS HPF: ABNORMAL /HPF
BASOPHILS # BLD AUTO: 0.06 K/UL (ref 0–0.2)
BASOPHILS NFR BLD: 0.4 % (ref 0–1.9)
BILIRUB UR QL STRIP: NEGATIVE
BUN SERPL-MCNC: 6 MG/DL (ref 6–20)
CALCIUM SERPL-MCNC: 10.2 MG/DL (ref 8.7–10.5)
CHLORIDE SERPL-SCNC: 101 MMOL/L (ref 95–110)
CLARITY UR: CLEAR
CO2 SERPL-SCNC: 26 MMOL/L (ref 23–29)
COLOR UR: YELLOW
CREAT SERPL-MCNC: 1 MG/DL (ref 0.5–1.4)
DELSYS: ABNORMAL
DIFFERENTIAL METHOD: ABNORMAL
EOSINOPHIL # BLD AUTO: 0.5 K/UL (ref 0–0.5)
EOSINOPHIL NFR BLD: 3.8 % (ref 0–8)
ERYTHROCYTE [DISTWIDTH] IN BLOOD BY AUTOMATED COUNT: 19.1 % (ref 11.5–14.5)
EST. GFR  (AFRICAN AMERICAN): >60 ML/MIN/1.73 M^2
EST. GFR  (NON AFRICAN AMERICAN): >60 ML/MIN/1.73 M^2
GLUCOSE SERPL-MCNC: 110 MG/DL (ref 70–110)
GLUCOSE UR QL STRIP: NEGATIVE
HCO3 UR-SCNC: 23.3 MMOL/L (ref 24–28)
HCT VFR BLD AUTO: 35 % (ref 40–54)
HGB BLD-MCNC: 11.6 G/DL (ref 14–18)
HGB UR QL STRIP: ABNORMAL
HYALINE CASTS #/AREA URNS LPF: 1 /LPF
IMM GRANULOCYTES # BLD AUTO: 0.03 K/UL (ref 0–0.04)
IMM GRANULOCYTES NFR BLD AUTO: 0.2 % (ref 0–0.5)
KETONES UR QL STRIP: NEGATIVE
LACTATE SERPL-SCNC: 0.8 MMOL/L (ref 0.5–2.2)
LEUKOCYTE ESTERASE UR QL STRIP: ABNORMAL
LYMPHOCYTES # BLD AUTO: 2.8 K/UL (ref 1–4.8)
LYMPHOCYTES NFR BLD: 19.9 % (ref 18–48)
MCH RBC QN AUTO: 24.2 PG (ref 27–31)
MCHC RBC AUTO-ENTMCNC: 33.1 G/DL (ref 32–36)
MCV RBC AUTO: 73 FL (ref 82–98)
MICROSCOPIC COMMENT: ABNORMAL
MONOCYTES # BLD AUTO: 0.8 K/UL (ref 0.3–1)
MONOCYTES NFR BLD: 5.7 % (ref 4–15)
NEUTROPHILS # BLD AUTO: 9.7 K/UL (ref 1.8–7.7)
NEUTROPHILS NFR BLD: 70 % (ref 38–73)
NITRITE UR QL STRIP: NEGATIVE
NRBC BLD-RTO: 0 /100 WBC
PCO2 BLDA: 32 MMHG (ref 35–45)
PH SMN: 7.47 [PH] (ref 7.35–7.45)
PH UR STRIP: 8 [PH] (ref 5–8)
PLATELET # BLD AUTO: 650 K/UL (ref 150–350)
PMV BLD AUTO: 9.7 FL (ref 9.2–12.9)
PO2 BLDA: 55 MMHG (ref 80–100)
POC BE: 0 MMOL/L
POC SATURATED O2: 90 % (ref 95–100)
POC TCO2: 24 MMOL/L (ref 23–27)
POCT GLUCOSE: 88 MG/DL (ref 70–110)
POTASSIUM SERPL-SCNC: 4.1 MMOL/L (ref 3.5–5.1)
PROT UR QL STRIP: ABNORMAL
RBC # BLD AUTO: 4.8 M/UL (ref 4.6–6.2)
RBC #/AREA URNS HPF: 10 /HPF (ref 0–4)
SAMPLE: ABNORMAL
SITE: ABNORMAL
SODIUM SERPL-SCNC: 139 MMOL/L (ref 136–145)
SP GR UR STRIP: 1.01 (ref 1–1.03)
URN SPEC COLLECT METH UR: ABNORMAL
UROBILINOGEN UR STRIP-ACNC: NEGATIVE EU/DL
WBC # BLD AUTO: 13.9 K/UL (ref 3.9–12.7)
WBC #/AREA URNS HPF: 5 /HPF (ref 0–5)
YEAST URNS QL MICRO: ABNORMAL

## 2020-09-13 PROCEDURE — 27000221 HC OXYGEN, UP TO 24 HOURS

## 2020-09-13 PROCEDURE — 93010 EKG 12-LEAD: ICD-10-PCS | Mod: ,,, | Performed by: INTERNAL MEDICINE

## 2020-09-13 PROCEDURE — 85025 COMPLETE CBC W/AUTO DIFF WBC: CPT

## 2020-09-13 PROCEDURE — 83605 ASSAY OF LACTIC ACID: CPT

## 2020-09-13 PROCEDURE — 82803 BLOOD GASES ANY COMBINATION: CPT

## 2020-09-13 PROCEDURE — 80048 BASIC METABOLIC PNL TOTAL CA: CPT

## 2020-09-13 PROCEDURE — 11000001 HC ACUTE MED/SURG PRIVATE ROOM

## 2020-09-13 PROCEDURE — 25000242 PHARM REV CODE 250 ALT 637 W/ HCPCS: Performed by: SURGERY

## 2020-09-13 PROCEDURE — 31720 CLEARANCE OF AIRWAYS: CPT

## 2020-09-13 PROCEDURE — 86140 C-REACTIVE PROTEIN: CPT

## 2020-09-13 PROCEDURE — 81000 URINALYSIS NONAUTO W/SCOPE: CPT

## 2020-09-13 PROCEDURE — 93010 ELECTROCARDIOGRAM REPORT: CPT | Mod: ,,, | Performed by: INTERNAL MEDICINE

## 2020-09-13 PROCEDURE — 93005 ELECTROCARDIOGRAM TRACING: CPT

## 2020-09-13 PROCEDURE — 36600 WITHDRAWAL OF ARTERIAL BLOOD: CPT

## 2020-09-13 PROCEDURE — 36415 COLL VENOUS BLD VENIPUNCTURE: CPT

## 2020-09-13 PROCEDURE — 25000003 PHARM REV CODE 250: Performed by: SURGERY

## 2020-09-13 RX ORDER — IPRATROPIUM BROMIDE AND ALBUTEROL SULFATE 2.5; .5 MG/3ML; MG/3ML
3 SOLUTION RESPIRATORY (INHALATION) EVERY 6 HOURS PRN
Status: DISCONTINUED | OUTPATIENT
Start: 2020-09-13 | End: 2020-09-17 | Stop reason: HOSPADM

## 2020-09-13 RX ADMIN — IPRATROPIUM BROMIDE AND ALBUTEROL SULFATE 3 ML: .5; 3 SOLUTION RESPIRATORY (INHALATION) at 03:09

## 2020-09-13 RX ADMIN — HYDROCODONE BITARTRATE AND ACETAMINOPHEN 1 TABLET: 5; 325 TABLET ORAL at 05:09

## 2020-09-13 NOTE — NURSING
MET call to patient's bedside.  Patient had an episode of respiratory distress with difficulty coughing up secretions.  Please see MET notes documented by ALFONSO Jordan, [facility].

## 2020-09-13 NOTE — PROGRESS NOTES
Surgery note:  While rounding patient founf fighting to breath and cough a, nable to cough out sputum , MET called , patient suctioned and started on nasal o2, bilateral lung with rattling sounds bilateral   cxr negaitive for aspiration , KUB ok  Patient suctioned , will check blood gases , hypoxic , will start on nasal suctions and O2.  Continue present treatment.
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
## 2020-09-13 NOTE — NURSING
Notified Dr. Tom that patient's heart rate is 150 manually, 146 on monitor and that he has been in the 120s all day.  Patient sitting up in bed watching television, denies chest pain/discomfort.  New orders received for EKG and consult to hospitalist.

## 2020-09-13 NOTE — PROGRESS NOTES
"Surgery follow up  POD#1  /66 (BP Location: Right arm, Patient Position: Lying)   Pulse 101   Temp 98.9 °F (37.2 °C) (Oral)   Resp 17   Ht 5' 11" (1.803 m)   Wt 91.6 kg (201 lb 15.1 oz)   SpO2 100%   BMI 28.17 kg/m²   I/O last 3 completed shifts:  In: 225 [P.O.:225]  Out: 3015 [Urine:2650; Other:50; Stool:315]  No intake/output data recorded.  Awake alert mild discomfort , colostomy color good with stool in place  Social service consulted for further follow up and out patient colostomy care.  "

## 2020-09-13 NOTE — PLAN OF CARE
AOX4. VS stable. Safety maintained. Meds given per MAR. Wound vac to right hip intact. Colostomy CDI with soft brown stool. Suprapubic catheter intact draining yellow urine. Denies pain at this time. Resting quietly. Encouraged to call for assistance. SR up X 2. Call light in reach. Bed locked in lowest position with alarm set. Will continue to monitor.

## 2020-09-14 ENCOUNTER — CLINICAL SUPPORT (OUTPATIENT)
Dept: SMOKING CESSATION | Facility: CLINIC | Age: 45
End: 2020-09-14
Payer: COMMERCIAL

## 2020-09-14 DIAGNOSIS — F17.210 CIGARETTE SMOKER: Primary | ICD-10-CM

## 2020-09-14 LAB
APTT BLDCRRT: 38.7 SEC (ref 21–32)
APTT BLDCRRT: 39.8 SEC (ref 21–32)
APTT BLDCRRT: 42 SEC (ref 21–32)
BASOPHILS # BLD AUTO: 0.04 K/UL (ref 0–0.2)
BASOPHILS # BLD AUTO: 0.05 K/UL (ref 0–0.2)
BASOPHILS NFR BLD: 0.4 % (ref 0–1.9)
BASOPHILS NFR BLD: 0.5 % (ref 0–1.9)
CRP SERPL-MCNC: 223.4 MG/L (ref 0–8.2)
DIFFERENTIAL METHOD: ABNORMAL
DIFFERENTIAL METHOD: ABNORMAL
EOSINOPHIL # BLD AUTO: 0.6 K/UL (ref 0–0.5)
EOSINOPHIL # BLD AUTO: 0.7 K/UL (ref 0–0.5)
EOSINOPHIL NFR BLD: 6 % (ref 0–8)
EOSINOPHIL NFR BLD: 6.6 % (ref 0–8)
ERYTHROCYTE [DISTWIDTH] IN BLOOD BY AUTOMATED COUNT: 18.9 % (ref 11.5–14.5)
ERYTHROCYTE [DISTWIDTH] IN BLOOD BY AUTOMATED COUNT: 19.1 % (ref 11.5–14.5)
HCT VFR BLD AUTO: 29.1 % (ref 40–54)
HCT VFR BLD AUTO: 29.8 % (ref 40–54)
HGB BLD-MCNC: 9.6 G/DL (ref 14–18)
HGB BLD-MCNC: 9.6 G/DL (ref 14–18)
IMM GRANULOCYTES # BLD AUTO: 0.03 K/UL (ref 0–0.04)
IMM GRANULOCYTES # BLD AUTO: 0.03 K/UL (ref 0–0.04)
IMM GRANULOCYTES NFR BLD AUTO: 0.3 % (ref 0–0.5)
IMM GRANULOCYTES NFR BLD AUTO: 0.3 % (ref 0–0.5)
INR PPP: 1.1 (ref 0.8–1.2)
LYMPHOCYTES # BLD AUTO: 2.5 K/UL (ref 1–4.8)
LYMPHOCYTES # BLD AUTO: 2.5 K/UL (ref 1–4.8)
LYMPHOCYTES NFR BLD: 23.3 % (ref 18–48)
LYMPHOCYTES NFR BLD: 24.4 % (ref 18–48)
MCH RBC QN AUTO: 23.9 PG (ref 27–31)
MCH RBC QN AUTO: 24.1 PG (ref 27–31)
MCHC RBC AUTO-ENTMCNC: 32.2 G/DL (ref 32–36)
MCHC RBC AUTO-ENTMCNC: 33 G/DL (ref 32–36)
MCV RBC AUTO: 73 FL (ref 82–98)
MCV RBC AUTO: 74 FL (ref 82–98)
MONOCYTES # BLD AUTO: 0.8 K/UL (ref 0.3–1)
MONOCYTES # BLD AUTO: 0.9 K/UL (ref 0.3–1)
MONOCYTES NFR BLD: 7.7 % (ref 4–15)
MONOCYTES NFR BLD: 8.3 % (ref 4–15)
NEUTROPHILS # BLD AUTO: 6.1 K/UL (ref 1.8–7.7)
NEUTROPHILS # BLD AUTO: 6.7 K/UL (ref 1.8–7.7)
NEUTROPHILS NFR BLD: 60 % (ref 38–73)
NEUTROPHILS NFR BLD: 62.2 % (ref 38–73)
NRBC BLD-RTO: 0 /100 WBC
NRBC BLD-RTO: 0 /100 WBC
PLATELET # BLD AUTO: 513 K/UL (ref 150–350)
PLATELET # BLD AUTO: 563 K/UL (ref 150–350)
PMV BLD AUTO: 10.1 FL (ref 9.2–12.9)
PMV BLD AUTO: 9.4 FL (ref 9.2–12.9)
PROTHROMBIN TIME: 11.3 SEC (ref 9–12.5)
RBC # BLD AUTO: 3.98 M/UL (ref 4.6–6.2)
RBC # BLD AUTO: 4.02 M/UL (ref 4.6–6.2)
WBC # BLD AUTO: 10.2 K/UL (ref 3.9–12.7)
WBC # BLD AUTO: 10.7 K/UL (ref 3.9–12.7)

## 2020-09-14 PROCEDURE — 99999 PR PBB SHADOW E&M-EST. PATIENT-LVL I: CPT | Mod: PBBFAC,,,

## 2020-09-14 PROCEDURE — 94640 AIRWAY INHALATION TREATMENT: CPT

## 2020-09-14 PROCEDURE — 63600175 PHARM REV CODE 636 W HCPCS: Performed by: STUDENT IN AN ORGANIZED HEALTH CARE EDUCATION/TRAINING PROGRAM

## 2020-09-14 PROCEDURE — 11000001 HC ACUTE MED/SURG PRIVATE ROOM

## 2020-09-14 PROCEDURE — 85730 THROMBOPLASTIN TIME PARTIAL: CPT | Mod: 91

## 2020-09-14 PROCEDURE — 25000003 PHARM REV CODE 250: Performed by: SURGERY

## 2020-09-14 PROCEDURE — 99407 PR TOBACCO USE CESSATION INTENSIVE >10 MINUTES: ICD-10-PCS | Mod: S$GLB,,,

## 2020-09-14 PROCEDURE — 25500020 PHARM REV CODE 255: Performed by: SURGERY

## 2020-09-14 PROCEDURE — 85610 PROTHROMBIN TIME: CPT

## 2020-09-14 PROCEDURE — 25000242 PHARM REV CODE 250 ALT 637 W/ HCPCS: Performed by: SURGERY

## 2020-09-14 PROCEDURE — 99999 PR PBB SHADOW E&M-EST. PATIENT-LVL I: ICD-10-PCS | Mod: PBBFAC,,,

## 2020-09-14 PROCEDURE — 36415 COLL VENOUS BLD VENIPUNCTURE: CPT

## 2020-09-14 PROCEDURE — 99407 BEHAV CHNG SMOKING > 10 MIN: CPT | Mod: S$GLB,,,

## 2020-09-14 PROCEDURE — 94761 N-INVAS EAR/PLS OXIMETRY MLT: CPT

## 2020-09-14 PROCEDURE — 99900035 HC TECH TIME PER 15 MIN (STAT)

## 2020-09-14 PROCEDURE — 85025 COMPLETE CBC W/AUTO DIFF WBC: CPT

## 2020-09-14 RX ORDER — HEPARIN SODIUM,PORCINE/D5W 25000/250
12 INTRAVENOUS SOLUTION INTRAVENOUS CONTINUOUS
Status: DISCONTINUED | OUTPATIENT
Start: 2020-09-14 | End: 2020-09-16

## 2020-09-14 RX ADMIN — HEPARIN SODIUM AND DEXTROSE 12 UNITS/KG/HR: 10000; 5 INJECTION INTRAVENOUS at 03:09

## 2020-09-14 RX ADMIN — IOHEXOL 100 ML: 350 INJECTION, SOLUTION INTRAVENOUS at 12:09

## 2020-09-14 RX ADMIN — HYDROCODONE BITARTRATE AND ACETAMINOPHEN 1 TABLET: 5; 325 TABLET ORAL at 08:09

## 2020-09-14 RX ADMIN — IPRATROPIUM BROMIDE AND ALBUTEROL SULFATE 3 ML: .5; 3 SOLUTION RESPIRATORY (INHALATION) at 08:09

## 2020-09-14 NOTE — PROGRESS NOTES
Individual Follow-Up Form    9/14/2020    Quit Date: To be determined    Clinical Status of Patient: Inpatient    Length of Service: 30 minutes    Comments: Smoking cessation education provided. Pt states that he does not wish to use the nicotine patch during this admission. He is currently enrolled in the [employer]. Handout provided for Ambulatory Smoking Cessation clinic.    Diagnosis: F17.210

## 2020-09-14 NOTE — PLAN OF CARE
Patient is awake and alert. States [place]. He wants [facility] to be resume when he is discharge and he states he use [facility] for transport. He has someone who he sends to gets his medication and and his food. Plan is to discharge home when stable.   09/14/20 1451   Discharge Assessment   Assessment Type Discharge Planning Assessment   Confirmed/corrected address and phone number on facesheet? Yes   Assessment information obtained from? Patient   Expected Length of Stay (days) 4   Communicated expected length of stay with patient/caregiver yes   Prior to hospitilization cognitive status: Alert/Oriented   Prior to hospitalization functional status: Completely Dependent   Current cognitive status: Alert/Oriented   Current Functional Status: Completely Dependent   Lives With alone   Able to Return to Prior Arrangements yes   Is patient able to care for self after discharge? Yes   Readmission Within the Last 30 Days no previous admission in last 30 days   Equipment Currently Used at Home wound care supplies;wheelchair   Do you have any problems affording any of your prescribed medications? No   Is the patient taking medications as prescribed? yes   Does the patient have transportation home? No   Discharge Plan A Home Health   Discharge Plan B Skilled Nursing Facility   Patient/Family in Agreement with Plan yes

## 2020-09-14 NOTE — PLAN OF CARE
VN cued in to pt's room for rounding s/p met earlier today. Pt reports feeling better than how he was earlier. Pt currently on RA. Denies any sob. Respirations even and unlabored. No distress noted. Orders noted for stat cta of chest. Pt reports that it has not been done yet. Informed bedside nurse, saul, and to find out regarding status of cta. Pt denies any needs. Call bell w/in reach.instructed to call for needs.

## 2020-09-14 NOTE — PROGRESS NOTES
"Surgery follow up  /69 (BP Location: Right arm, Patient Position: Lying)   Pulse 85   Temp 98.3 °F (36.8 °C) (Oral)   Resp 17   Ht 5' 11" (1.803 m)   Wt 91.6 kg (201 lb 15.1 oz)   SpO2 97%   BMI 28.17 kg/m²   I/O last 3 completed shifts:  In: 425 [P.O.:425]  Out: 2910 [Urine:2425; Other:85; Stool:400]  I/O this shift:  In: 125 [P.O.:125]  Out: -       Recent Results (from the past 336 hour(s))   CBC auto differential    Collection Time: 09/14/20  5:01 AM   Result Value Ref Range    WBC 10.20 3.90 - 12.70 K/uL    Hemoglobin 9.6 (L) 14.0 - 18.0 g/dL    Hematocrit 29.8 (L) 40.0 - 54.0 %    Platelets 563 (H) 150 - 350 K/uL   CBC auto differential    Collection Time: 09/14/20  3:45 AM   Result Value Ref Range    WBC 10.70 3.90 - 12.70 K/uL    Hemoglobin 9.6 (L) 14.0 - 18.0 g/dL    Hematocrit 29.1 (L) 40.0 - 54.0 %    Platelets 513 (H) 150 - 350 K/uL   CBC auto differential    Collection Time: 09/13/20 12:55 PM   Result Value Ref Range    WBC 13.90 (H) 3.90 - 12.70 K/uL    Hemoglobin 11.6 (L) 14.0 - 18.0 g/dL    Hematocrit 35.0 (L) 40.0 - 54.0 %    Platelets 650 (H) 150 - 350 K/uL     Recent Results (from the past 336 hour(s))   Basic metabolic panel    Collection Time: 09/13/20 12:55 PM   Result Value Ref Range    Sodium 139 136 - 145 mmol/L    Potassium 4.1 3.5 - 5.1 mmol/L    Chloride 101 95 - 110 mmol/L    CO2 26 23 - 29 mmol/L    BUN, Bld 6 6 - 20 mg/dL    Creatinine 1.0 0.5 - 1.4 mg/dL    Calcium 10.2 8.7 - 10.5 mg/dL    Anion Gap 12 8 - 16 mmol/L   Basic metabolic panel    Collection Time: 09/11/20 10:34 AM   Result Value Ref Range    Sodium 143 136 - 145 mmol/L    Potassium 4.1 3.5 - 5.1 mmol/L    Chloride 108 95 - 110 mmol/L    CO2 24 23 - 29 mmol/L    BUN, Bld 6 6 - 20 mg/dL    Creatinine 0.7 0.5 - 1.4 mg/dL    Calcium 9.6 8.7 - 10.5 mg/dL    Anion Gap 11 8 - 16 mmol/L   colostomy working , being treatment for PE , ok for heparinzation.  "
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
## 2020-09-14 NOTE — PLAN OF CARE
CTA Chest read showing multiple nonocclusive pulmonary emboli within the segmental pulmonary arteries to the right upper and right middle lobes. No indication of right heart strain on CT. Patient remains tachycardic with no SOB or chest pain.    Given patients recent surgical procedure and concern for bleeding will initiate therapy with minimal intensity heparin nomogram. Closely monitor for signs of bleeding.    Kevin Perez IV, MD  [facility]-II  2:12 AM 9/14/2020

## 2020-09-14 NOTE — PLAN OF CARE
Patient seen by ALFONSO Nagel, Wound Care Nurse.  All dressings assessed and changed.  New colostomy bag applied, sutures intact, some skin redness noted to site.  Patient tolerated well.

## 2020-09-14 NOTE — NURSING
Took pt to CT. Radiologist called with results. MD notified. No new orders at this time. Will continue to monitor.

## 2020-09-14 NOTE — PLAN OF CARE
AOX4. VS stable. Safety maintained. Colostomy intact with soft, brown stool and flatus. Suprapubic catheter intact draining clear yellow urine. Denies pain at this time. Dressing to buttocks CDI. Wound vac to right hip intact. Cardiac monitoring in place. Pt remains tachycardic. Resting quietly. Encouraged to call for assistance. SR up X 2. Call light in reach. Bed locked in lowest position with alarm set. Will continue to monitor.

## 2020-09-14 NOTE — CONSULTS
Wound nurse consult to change wound vac right isch tub wound.    Stage 4 Ischial tub wound; wound vac removed, wound cleaned with wound cleanser, filled with 1 piece black foam, tracked to right hip. Good seal obtained, pt tolerated well, set at 125 mmHg, cont.

## 2020-09-15 PROBLEM — I26.99 PE (PULMONARY THROMBOEMBOLISM): Status: ACTIVE | Noted: 2020-09-15

## 2020-09-15 LAB
ACID FAST MOD KINY STN SPEC: NORMAL
APTT BLDCRRT: 44 SEC (ref 21–32)
MYCOBACTERIUM SPEC QL CULT: NORMAL

## 2020-09-15 PROCEDURE — 25000003 PHARM REV CODE 250: Performed by: SURGERY

## 2020-09-15 PROCEDURE — 36415 COLL VENOUS BLD VENIPUNCTURE: CPT

## 2020-09-15 PROCEDURE — 63600175 PHARM REV CODE 636 W HCPCS: Performed by: STUDENT IN AN ORGANIZED HEALTH CARE EDUCATION/TRAINING PROGRAM

## 2020-09-15 PROCEDURE — 94761 N-INVAS EAR/PLS OXIMETRY MLT: CPT

## 2020-09-15 PROCEDURE — 85730 THROMBOPLASTIN TIME PARTIAL: CPT

## 2020-09-15 PROCEDURE — 11000001 HC ACUTE MED/SURG PRIVATE ROOM

## 2020-09-15 RX ADMIN — HEPARIN SODIUM AND DEXTROSE 12 UNITS/KG/HR: 10000; 5 INJECTION INTRAVENOUS at 01:09

## 2020-09-15 RX ADMIN — HYDROCODONE BITARTRATE AND ACETAMINOPHEN 1 TABLET: 5; 325 TABLET ORAL at 04:09
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
## 2020-09-15 NOTE — PLAN OF CARE
Pt AAOx4. Afebrile overnight. NSR/ST pt resting comfortably.  Sats remained >95 on RA. UOP adequate with suprapubic catheter. Wound vac intact. Colostomy site intact. Pain controlled by prn norco and tylenol. Heparin gtt present. Frequent checks for safety done during shift. Will report to oncoming RN.

## 2020-09-15 NOTE — PLAN OF CARE
Patient is awake and alert.  Wound Vac in place.  Dressings intact to left lower extremity.  Suprapubic catheter is in place, patent and draining clear yellow urine.  Has complaints of pain and pain medications given as per requests.  O2 at 2L per nasal cannula in use.  Colostomy is emptied prn.  Safety is maintained with bed low, wheels locked and side rails up.  Call light within reach.  Will continue to monitor.  Bed alarm on.

## 2020-09-15 NOTE — NURSING
Notified Dr. Hawkins that ultrasound was ordered incorrectly; new orders received for US veins of bilateral lower extremities to r/o DVT placed.

## 2020-09-15 NOTE — NURSING
Called primary team MD to inform of HR 140s-150s but returned to 110s. When checking on pt NADN. He was in bed on phone and watching television.

## 2020-09-15 NOTE — PROGRESS NOTES
"Surgery follow up  /80 (BP Location: Right arm, Patient Position: Lying)   Pulse 80   Temp 98.1 °F (36.7 °C) (Oral)   Resp 18   Ht 5' 11" (1.803 m)   Wt 91.6 kg (201 lb 15.1 oz)   SpO2 98%   BMI 28.17 kg/m²   I/O last 3 completed shifts:  In: 844.5 [P.O.:590; I.V.:254.5]  Out: 2220 [Urine:2000; Other:20; Stool:200]  I/O this shift:  In: 180 [P.O.:180]  Out: -   Recent Results (from the past 336 hour(s))   CBC auto differential    Collection Time: 09/14/20  5:01 AM   Result Value Ref Range    WBC 10.20 3.90 - 12.70 K/uL    Hemoglobin 9.6 (L) 14.0 - 18.0 g/dL    Hematocrit 29.8 (L) 40.0 - 54.0 %    Platelets 563 (H) 150 - 350 K/uL   CBC auto differential    Collection Time: 09/14/20  3:45 AM   Result Value Ref Range    WBC 10.70 3.90 - 12.70 K/uL    Hemoglobin 9.6 (L) 14.0 - 18.0 g/dL    Hematocrit 29.1 (L) 40.0 - 54.0 %    Platelets 513 (H) 150 - 350 K/uL   CBC auto differential    Collection Time: 09/13/20 12:55 PM   Result Value Ref Range    WBC 13.90 (H) 3.90 - 12.70 K/uL    Hemoglobin 11.6 (L) 14.0 - 18.0 g/dL    Hematocrit 35.0 (L) 40.0 - 54.0 %    Platelets 650 (H) 150 - 350 K/uL     Recent Results (from the past 336 hour(s))   Basic metabolic panel    Collection Time: 09/13/20 12:55 PM   Result Value Ref Range    Sodium 139 136 - 145 mmol/L    Potassium 4.1 3.5 - 5.1 mmol/L    Chloride 101 95 - 110 mmol/L    CO2 26 23 - 29 mmol/L    BUN, Bld 6 6 - 20 mg/dL    Creatinine 1.0 0.5 - 1.4 mg/dL    Calcium 10.2 8.7 - 10.5 mg/dL    Anion Gap 12 8 - 16 mmol/L   Basic metabolic panel    Collection Time: 09/11/20 10:34 AM   Result Value Ref Range    Sodium 143 136 - 145 mmol/L    Potassium 4.1 3.5 - 5.1 mmol/L    Chloride 108 95 - 110 mmol/L    CO2 24 23 - 29 mmol/L    BUN, Bld 6 6 - 20 mg/dL    Creatinine 0.7 0.5 - 1.4 mg/dL    Calcium 9.6 8.7 - 10.5 mg/dL    Anion Gap 11 8 - 16 mmol/L   feeling better , colostomy working   Ok to sweitch to oral coagulation , wound vac in place.  "

## 2020-09-16 LAB
APTT BLDCRRT: 36.4 SEC (ref 21–32)
APTT BLDCRRT: 44.3 SEC (ref 21–32)
POCT GLUCOSE: 184 MG/DL (ref 70–110)

## 2020-09-16 PROCEDURE — 85730 THROMBOPLASTIN TIME PARTIAL: CPT | Mod: 91

## 2020-09-16 PROCEDURE — 21400001 HC TELEMETRY ROOM

## 2020-09-16 PROCEDURE — 25000003 PHARM REV CODE 250: Performed by: SURGERY

## 2020-09-16 PROCEDURE — 85730 THROMBOPLASTIN TIME PARTIAL: CPT

## 2020-09-16 PROCEDURE — 36415 COLL VENOUS BLD VENIPUNCTURE: CPT

## 2020-09-16 PROCEDURE — 94760 N-INVAS EAR/PLS OXIMETRY 1: CPT

## 2020-09-16 PROCEDURE — 63600175 PHARM REV CODE 636 W HCPCS: Performed by: STUDENT IN AN ORGANIZED HEALTH CARE EDUCATION/TRAINING PROGRAM

## 2020-09-16 PROCEDURE — 99900035 HC TECH TIME PER 15 MIN (STAT)

## 2020-09-16 RX ADMIN — HYDROCODONE BITARTRATE AND ACETAMINOPHEN 1 TABLET: 5; 325 TABLET ORAL at 04:09

## 2020-09-16 RX ADMIN — HEPARIN SODIUM AND DEXTROSE 12 UNITS/KG/HR: 10000; 5 INJECTION INTRAVENOUS at 04:09

## 2020-09-16 RX ADMIN — APIXABAN 10 MG: 2.5 TABLET, FILM COATED ORAL at 04:09

## 2020-09-16 NOTE — PHYSICIAN QUERY
PT Name: Hermann Aguilar  MR #: [id number]     PHYSICIAN QUERY -  INTEGUMENTARY CLARIFICATION     CDS/: Anjana Treviño               Contact information: [email or web address]  This form is a permanent document in the medical record.     Query Date: September 16, 2020    By submitting this query, we are merely seeking further clarification of documentation.  Please utilize your independent clinical judgment when addressing the question(s) below.  The Medical Record contains the following:   Indicators   Supporting Clinical Findings Location in Medical Record    Redness     x Decubitus, Pressure, Ulcer, etc. 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  Chronic right ischial osteomyelitis   General Surgery H&P 09/11                  Internal Medicine Consult 09/13    Deep Tissue Injury     x Wound care consult Stage 4 Ischial tub wound; wound vac removed, wound cleaned with wound cleanser, filled with 1 piece black foam, tracked to right hip.   Wound Care Consult 09/14    Medication:      Treatment:      Other:          National Pressure Ulcer Advisory Panel (2007) Pressure Ulcer Definitions & Stages:  Stage I:                     Intact skin with non-blanchable redness of a localized area usually over a bony prominence.   Stage II:                    Partial thickness loss of dermis presenting as a shallow open ulcer with a red pink wound bed, without slough.   Stage III:                   Full thickness tissue loss. Subcutaneous fat may be visible but bone, tendon or muscle is not exposed. Slough may be                                      present but does not obscure the depth of tissue loss. May include undermining and  tunneling.  Stage IV:                  Full thickness tissue loss with exposed bone, tendon or muscle. Slough or eschar may be present on some parts of the                                      wound bed. Often include undermining and tunneling.  Unstageable:           Full thickness tissue loss but base of ulcer is covered by slough and/or eschar in the wound bed. Until enough                                        slough and/or eschar is removed to expose wound base, its true depth, and therefore stage, cannot be determined  Deep Tissue Injury: Purple or maroon localized area of discolored intact skin or blood-filled blister due to damage of underlying soft tissue   from pressure and/or shear.  Suspected deep tissue injuries may develop into a stageable ulcer or turn out to be another diagnosis (e.g. an ecchymosis)     Provider, please specify the location, stage and POA status of the diagnosis:___[Response from Query#1]_____________     SITE LATERALITY STAGE PRESENT ON ADMISSION?   [ x ] buttock [  ]  right       [  x]  left [  ] 3         [  ] 4  [  ]Other:  ____   [x  ] Yes    [  ] No      [  ] Clinically Undetermined (W)                [  ] Documentation Insufficient (U)   [  ] hip [  ]  right      [  ] 3         [  ] 4  [  ]Other: ____   [  ] Yes    [  ] No      [  ] Clinically Undetermined (W)                [  ] Documentation Insufficient (U)     [  ] sacrum ------------N/A--------- [  ] 1         [  ] 2  [  ] 3         [  ] 4  [  ]Unstageable   [  ] Yes    [  ] No      [  ] Clinically Undetermined (W)                [  ] Documentation Insufficient (U)   [  x] other site (please specify): ______ [ x ]  right       [  ]  left [  ] 1         [  ] 2  [  ] 3         [ x ] 4  [  ]Unstageable  [  ] DTI [ x ] Yes    [  ] No      [  ] Clinically Undetermined (W)                [  ] Documentation Insufficient (U)       [  ] Clinically undetermined         Please document in your progress notes daily for the duration  of treatment until resolved and include in your discharge summary.

## 2020-09-16 NOTE — PLAN OF CARE
Patient resting in bed, AAOx4. Heparin infusing as ordered. Medications administered as ordered. Wound vac, colostomy and suprapubic cath in place. Asleep through the night. Encouraged to call with needs or concerns. Will continue to monitor.

## 2020-09-16 NOTE — PROGRESS NOTES
"Surgery follow up  BP (!) 163/86 (Patient Position: Sitting)   Pulse 73   Temp 98.3 °F (36.8 °C) (Oral)   Resp 17   Ht 5' 11" (1.803 m)   Wt 91.6 kg (201 lb 15.1 oz)   SpO2 99%   BMI 28.17 kg/m²   I/O last 3 completed shifts:  In: 404.4 [P.O.:300; I.V.:104.4]  Out: 2200 [Urine:1700; Stool:500]  I/O this shift:  In: -   Out: 550 [Urine:550]  Colostomy working  Medical team note noted , will d/c heparin start on elequist 10 mg po bid discharge home in am.  "

## 2020-09-16 NOTE — PLAN OF CARE
Patient is awake and alert.  Wound Vac intact to hip.  Colostomy is intact and patent with stool and soft stool.  Suprapubic catheter is in place, patent and draining clear yellow urine.  Has complaints of pain and pain medications given as per requests.  Heparin discontinued this pm.  Eliquis given.  Safety is maintained with bed low, wheels locked and side rails up.  Bed alarm on.  Air mattress in use.  Will continue to monitor.

## 2020-09-17 ENCOUNTER — HOSPITAL ENCOUNTER (EMERGENCY)
Facility: HOSPITAL | Age: 45
Discharge: HOME | End: 2020-09-17
Attending: EMERGENCY MEDICINE
Payer: MEDICARE

## 2020-09-17 VITALS
SYSTOLIC BLOOD PRESSURE: 133 MMHG | RESPIRATION RATE: 15 BRPM | HEART RATE: 93 BPM | BODY MASS INDEX: 28.27 KG/M2 | OXYGEN SATURATION: 100 % | WEIGHT: 201.94 LBS | TEMPERATURE: 98 F | HEIGHT: 71 IN | DIASTOLIC BLOOD PRESSURE: 85 MMHG

## 2020-09-17 VITALS
SYSTOLIC BLOOD PRESSURE: 104 MMHG | RESPIRATION RATE: 19 BRPM | OXYGEN SATURATION: 95 % | HEIGHT: 71 IN | BODY MASS INDEX: 28.14 KG/M2 | WEIGHT: 201 LBS | TEMPERATURE: 98 F | HEART RATE: 92 BPM | DIASTOLIC BLOOD PRESSURE: 55 MMHG

## 2020-09-17 DIAGNOSIS — R00.2 PALPITATIONS: ICD-10-CM

## 2020-09-17 DIAGNOSIS — F41.9 ANXIETY: Primary | ICD-10-CM

## 2020-09-17 PROCEDURE — 63600175 PHARM REV CODE 636 W HCPCS: Performed by: SURGERY

## 2020-09-17 PROCEDURE — 93005 ELECTROCARDIOGRAM TRACING: CPT | Mod: ER

## 2020-09-17 PROCEDURE — 90686 IIV4 VACC NO PRSV 0.5 ML IM: CPT | Performed by: SURGERY

## 2020-09-17 PROCEDURE — G0008 ADMIN INFLUENZA VIRUS VAC: HCPCS | Performed by: SURGERY

## 2020-09-17 PROCEDURE — 25000003 PHARM REV CODE 250: Mod: ER | Performed by: EMERGENCY MEDICINE

## 2020-09-17 PROCEDURE — 99283 EMERGENCY DEPT VISIT LOW MDM: CPT | Mod: 25,ER

## 2020-09-17 PROCEDURE — 25000003 PHARM REV CODE 250: Performed by: SURGERY

## 2020-09-17 PROCEDURE — 93010 ELECTROCARDIOGRAM REPORT: CPT | Mod: ,,, | Performed by: INTERNAL MEDICINE

## 2020-09-17 PROCEDURE — 93010 EKG 12-LEAD: ICD-10-PCS | Mod: ,,, | Performed by: INTERNAL MEDICINE

## 2020-09-17 PROCEDURE — 90471 IMMUNIZATION ADMIN: CPT | Performed by: SURGERY

## 2020-09-17 RX ORDER — ACETAMINOPHEN 325 MG/1
650 TABLET ORAL EVERY 4 HOURS PRN
Qty: 20 TABLET | Refills: 0
Start: 2020-09-17

## 2020-09-17 RX ORDER — LORAZEPAM 1 MG/1
1 TABLET ORAL
Status: COMPLETED | OUTPATIENT
Start: 2020-09-17 | End: 2020-09-17

## 2020-09-17 RX ORDER — LORAZEPAM 1 MG/1
0.5 TABLET ORAL NIGHTLY
Qty: 10 TABLET | Refills: 0 | Status: ON HOLD | OUTPATIENT
Start: 2020-09-17 | End: 2021-03-18 | Stop reason: HOSPADM

## 2020-09-17 RX ADMIN — LORAZEPAM 1 MG: 1 TABLET ORAL at 08:09

## 2020-09-17 RX ADMIN — INFLUENZA VIRUS VACCINE 0.5 ML: 15; 15; 15; 15 SUSPENSION INTRAMUSCULAR at 11:09

## 2020-09-17 RX ADMIN — APIXABAN 10 MG: 2.5 TABLET, FILM COATED ORAL at 09:09

## 2020-09-17 NOTE — PLAN OF CARE
"Pt is AAOx4. Pt exhibits a knowledge deficit in self care. Upon assessing his chart it was noticed that he takes medications at home, but he has not been receiving them here. Medications include, muscle relaxer's, beta blockers, and cholesterol medication. When asked about if he takes medications at home he verifies "yes." When asked why he had not been receiving them here, he said "I don't know. I haven't gotten them here." When asked if he can tell us what medications he takes at home he say, "I don't know. Like 4 of five,but I don't know the names." Colostomy care provided. Will continue to monitor.   "

## 2020-09-17 NOTE — PHYSICIAN QUERY
PT Name: Hermann Aguilar  MR #: [id number]     PHYSICIAN QUERY -  INTEGUMENTARY CLARIFICATION     CDS/: Anjana Treviño               Contact information: [email or web address]  This form is a permanent document in the medical record.     Query Date: September 17, 2020    By submitting this query, we are merely seeking further clarification of documentation.  Please utilize your independent clinical judgment when addressing the question(s) below.  The Medical Record contains the following:   Indicators   Supporting Clinical Findings Location in Medical Record    Redness     x Decubitus, Pressure, Ulcer, etc. 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  Chronic right ischial osteomyelitis   General Surgery H&P 09/11                  Internal Medicine Consult 09/13    Deep Tissue Injury     x Wound care consult Stage 4 Ischial tub wound; wound vac removed, wound cleaned with wound cleanser, filled with 1 piece black foam, tracked to right hip.   Wound Care Consult 09/14    Medication:      Treatment:      Other:          National Pressure Ulcer Advisory Panel (2007) Pressure Ulcer Definitions & Stages:  Stage I:                     Intact skin with non-blanchable redness of a localized area usually over a bony prominence.   Stage II:                    Partial thickness loss of dermis presenting as a shallow open ulcer with a red pink wound bed, without slough.   Stage III:                   Full thickness tissue loss. Subcutaneous fat may be visible but bone, tendon or muscle is not exposed. Slough may be                                      present but does not obscure the depth of tissue loss. May include undermining and  tunneling.  Stage IV:                  Full thickness tissue loss with exposed bone, tendon or muscle. Slough or eschar may be present on some parts of the                                      wound bed. Often include undermining and tunneling.  Unstageable:           Full thickness tissue loss but base of ulcer is covered by slough and/or eschar in the wound bed. Until enough                                        slough and/or eschar is removed to expose wound base, its true depth, and therefore stage, cannot be determined  Deep Tissue Injury: Purple or maroon localized area of discolored intact skin or blood-filled blister due to damage of underlying soft tissue   from pressure and/or shear.  Suspected deep tissue injuries may develop into a stageable ulcer or turn out to be another diagnosis (e.g. an ecchymosis)     Provider, please specify the location and stage of the diagnosis:___Pressure Injury/Pressure Ulcer________     SITE LATERALITY STAGE   [  ] buttock [  ]  left [  ] 3         [  ] 4  [  ]Other:  ____     [  ] ischium [ x ]  right      [  ] 3         [ x ] 4  [  ]Other: ____     [  ] other site (please specify): ___________ [  ]  right       [  ]  left [  ] 1         [  ] 2  [  ] 3         [  ] 4  [  ]Unstageable  [  ] DTI       [  ] Clinically undetermined         Please document in your progress notes daily for the duration of treatment until resolved and include in your discharge summary.

## 2020-09-17 NOTE — PLAN OF CARE
VN note: Per chart review, eliquis will be ordered at discharge. AVS reviewed. VN sent message to Dr. Tom to inquiring if Eliquis needs to be ordered since not on AVS.

## 2020-09-17 NOTE — PLAN OF CARE
Discharge rounds on patient. Patient to resume  care with [facility]. Patient to follow up in wound care clinic on Monday 9/21. Patient is current with [facility] at home. Ambulance transportation has been arranged through [facility] with pick scheduled for 12:30 PM. Discharge nurse will go over home medications and reasons for medications and final discharge instructions. All patient/caregiver questions answered. Patient verbalized understanding.           09/17/20 1148   Final Note   Assessment Type Final Discharge Note   Anticipated Discharge Disposition Home-Health   What phone number can be called within the next 1-3 days to see how you are doing after discharge? [phone]   Hospital Follow Up  Appt(s) scheduled?   (Patient to follow up in wound care clinic.)   Discharge plans and expectations educations in teach back method with documentation complete? Yes   Right Care Referral Info   Post Acute Recommendation Home-care   Referral Type Home Care   [facility]   Post-Acute Status   Post-Acute Authorization Home Health         Future Appointments   Date Time Provider Department Center   9/21/2020 10:00 AM CHAMBER, ONE [facility] WOUND [facility]   9/21/2020  1:00 PM NURSE, VISIT [facility] WOUND [facility]   9/22/2020 10:00 AM CHAMBER, ONE [facility] WOUND [facility]   9/23/2020 10:00 AM CHAMBER, ONE [facility] WOUND [facility]   9/24/2020  8:30 AM [facility]   9/24/2020 10:00 AM CHAMBER, ONE [facility] W[facility]   9/24/2020  1:00 PM NURSE, VISIT [facility] W[facility]   9/25/2020 10:00 AM CHAMBER, ONE [facility] WOUND [facility]   9/28/2020 10:00 AM CHAMBER, ONE [facility] WOUND [facility]   9/28/2020  1:00 PM NURSE, VISIT [facility] W[facility]   9/29/2020 10:00 AM CHAMBER, ONE [facility] WOUND [facility]   9/30/2020 10:00 AM CHAMBER, ONE [facility] WOUND [facility]   10/1/2020 10:00 AM CHAMBER, ONE [facility] WOUND [facility]   10/2/2020 10:00 AM CHAMBER, ONE  [facility] WOUND [facility]   10/9/2020  1:45 PM Alia Edwards, NP [facility] UROLOGY [facility]

## 2020-09-17 NOTE — PLAN OF CARE
VN note: VN cued into patient's room to review discharge papers. PCT entered room to help patient gather belongings. I told patient can come back to review, but he insisted VN review. VN educated patient on new medication and side effects. Medication list reviewed. Follow-up information given. Bedside Delivery completed. VN educated patient on colostomy care at home and when to seek medical attention. I also educated patient on blood thinners.  Patient not attentive to discharge information, but he verbalized understanding and all questions answered. Refer to clinical references for further education given.     Patient informed VN that bedside nurse did educate patient regarding colostomy care at home.

## 2020-09-17 NOTE — PROGRESS NOTES
Wound vac removed from right ishial tuberosity, wound cleaned and vac re-applied. Set at   125 mmHg, good seal obtained.    New ileostomy education provided, printed material and supplies given. Pt was overwhelmed and seemed to be reassured by the fact that home health would be assisting him and his care givers with ongoing education. Pt will be limited in performing his bag changes due to limited use of one arm and hand.

## 2020-09-17 NOTE — PLAN OF CARE
referral sent to [facility].         09/17/20 0821   Post-Acute Status   Post-Acute Authorization Home Health   Home Health Status Referrals Sent   Discharge Plan   Discharge Plan A Home Health

## 2020-09-17 NOTE — NURSING
Pt AAOx4, VSS, NAD. No complaints of pain. DC orders noticed. AVS printed and given to the pt. IV and telemetry removed. VN requested into room to review AVS. Pt disconnected from Wound vac and connected to his personal vac. Hospital wound vac placed in biohazard room. Transport arrived to take pt home. No other needs at this time. Safety maintained.
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
## 2020-09-18 NOTE — ED NOTES
Pt recently had colostomy placed and pt was c/o bag having a hole in it and the smell bothering him. Colostomy bag was taken off. Stoma cleaned and new bag applied with tegaderm to incision area. Pt has sutures around stoma that were healing. One are was still open so tegaderm was applied over it

## 2020-09-19 NOTE — DISCHARGE SUMMARY
[facility]  General Surgery  Discharge Summary      Patient Name: Hermann Aguilar  MRN: [id number]  Admission Date: 9/11/2020  Hospital Length of Stay: 6 days  Discharge Date and Time: 9/17/2020  1:30 PM  Attending Physician: No att. providers found   Discharging Provider: Jesus Tom MD  Primary Care Provider: Ramu Breen MD     HPI:  44-year-old male status post colostomy for chronic osteomyelitis right ischial wound which was being contaminated by the fecal stream was admitted for working to colostomy was done on the day of admission patient was admitted observation and also postop management taking care of colostomy the patient is wound patient denied any fever chills was admitted for further follow    Procedure(s) (LRB):  CREATION, COLOSTOMY (N/A)     Hospital Course:  Patient admitted postop colostomy for observation consult problems made with social service to make arrangements for the patient to be taken care of as an outpatient colostomy care and the wound care patient had a wound VAC applied to the area while in the hospital patient developed shortness of breath patient was workup was found to have acute pulmonary embolism patient was treated with heparin was switched to p.o. anticoagulation patient was doing better arrangements were made for closure of a 4 taking the colostomy home patient to continue follow-up in the Center continued hyperbaric treatment osteomyelitis and also taking of colostomy    Consults:   Consults (From admission, onward)        Status Ordering Provider     Inpatient consult to [facility]  Once     Provider:  (Not yet assigned)    Completed JESUS TOM          Significant Diagnostic Studies: Labs: BMP: No results for input(s): GLU, NA, K, CL, CO2, BUN, CREATININE, CALCIUM, MG in the last 48 hours.  Radiology: X-Ray: CXR: X-Ray Chest 1 View (CXR): No results found for this visit on 09/11/20. and KUB: X-Ray Abdomen AP 1 View (KUB):    Results for orders placed or performed during the hospital encounter of 09/11/20   X-Ray Abdomen AP 1 View    Narrative    EXAMINATION:  XR ABDOMEN AP 1 VIEW    CLINICAL HISTORY:  emesis   post op;    TECHNIQUE:  AP View(s) of the abdomen was performed.    COMPARISON:  07/07/2019    FINDINGS:  Scattered air-filled loops of bowel are seen.  No grossly distended air-filled loops of bowel are present.  Postsurgical changes.  No pathologic calcifications.  Bones are intact.      Impression    No acute abnormality      Electronically signed by: Blayne Burgess MD  Date:    09/13/2020  Time:    13:14     MRI:     Pending Diagnostic Studies:     None        Final Active Diagnoses:    Diagnosis Date Noted POA    PRINCIPAL PROBLEM:  Chronic osteomyelitis [M86.60] 05/05/2020 Yes    Pulmonary embolism [I26.99] 09/15/2020 Yes    Mixed hyperlipidemia [E78.2]  Yes    Type 2 diabetes mellitus without complication, without long-term current use of insulin [E11.9]  Yes    Tachycardia [R00.0]  Yes    Osteomyelitis [M86.9] 09/11/2020 Yes    Colostomy, evaluate [Z43.3] 09/11/2020 Not Applicable    Pressure injury of buttock, stage 3 [L89.303] 08/24/2019 Yes    Essential hypertension [I10] 02/11/2017 Yes     Chronic    Paraplegia at T9 level [G82.20] 02/11/2017 Yes     Chronic      Problems Resolved During this Admission:      Discharged Condition: good    Disposition: Home or Self Care    Follow Up:  Follow-up Information     Jesus Tom MD On 9/21/2020.    Specialties: General Surgery, Surgery  Why: For wound care at 10:00am.  Contact information:  [address]  [address]  [place] [id number] [phone]             [facility] [place] [facility]  On 9/18/2020.    Specialties: DME Provider, Home Health Services  Why: home health  Contact information:  [address]  [place] [id number] [phone]                 Patient Instructions:      COVID-19 Routine Screening   Standing Status: Future Standing  "Exp. Date: 11/07/21     Order Specific Question Answer Comments   Is the patient symptomatic? No    Is this needed for pre-procedure or pre-op testing? Yes    Diagnosis: Preop testing [466669]      Ambulatory referral/consult to Home Health   Standing Status: Future   Referral Priority: Routine Referral Type: Home Health Care   Referral Reason: Specialty Services Required   Requested Specialty: Home Health Services   Number of Visits Requested: 1     Diet general     Keep surgical extremity elevated     Lifting restrictions     Call MD for:  temperature >100.4     Call MD for:  persistent nausea and vomiting     Call MD for:  severe uncontrolled pain     Call MD for:  redness, tenderness, or signs of infection (pain, swelling, redness, odor or green/yellow discharge around incision site)     Leave dressing on - Keep it clean, dry, and intact until clinic visit     Leave dressing on - Keep it clean, dry, and intact until clinic visit     Activity as tolerated     Medications:  Transfer Medications (for Discharge Readmit only):   No current facility-administered medications for this encounter.      Current Outpatient Medications   Medication Sig Dispense Refill    cyclobenzaprine (FLEXERIL) 10 MG tablet Take 1 tablet (10 mg total) by mouth 3 (three) times daily as needed. 90 tablet 3    docusate sodium (COLACE) 100 MG capsule Take 1 capsule (100 mg total) by mouth 2 (two) times daily. 180 capsule 3    ibuprofen (ADVIL,MOTRIN) 800 MG tablet       ketoconazole (NIZORAL) 2 % shampoo Apply topically twice a week. 120 mL 11    pen needle, diabetic (COMFORT EZ PEN NEEDLES) 29 gauge x 1/2" Ndle 1 Units by Misc.(Non-Drug; Combo Route) route 3 (three) times daily. 400 each 11    SANTYL ointment APPLY TO CLEANSED AFFECTED ARE TOPICALLY ONCE DAILY      TRUE METRIX GLUCOSE METER Misc AS DIRECTED  0    TRUE METRIX GLUCOSE TEST STRIP Strp USE AS DIRECTED  strip 3    TRUEPLUS LANCETS 30 gauge Misc USE AS DIRECTED TID "  3    acetaminophen (TYLENOL) 325 MG tablet Take 2 tablets (650 mg total) by mouth every 4 (four) hours as needed. 20 tablet 0    alcohol swabs (ALCOHOL PADS) PadM Apply 1 each topically once daily. 400 each 11    ammonium lactate 12 % Crea MIHAELA EXT AA ON SKIN BID  3    apixaban (ELIQUIS) 5 mg Tab Take 2 tablets (10 mg total) by mouth 2 (two) times daily. 20 tablet 1    baclofen (LIORESAL) 10 MG tablet       cadexomer iodine (IODOSORB) 0.9 % gel Apply topically daily as needed for Wound Care.      collagenase (SANTYL) ointment Apply topically once daily. 30 g 3    dextran 70-hypromellose (TEARS) ophthalmic solution Apply 1 drop to eye.      drainage bag Misc 1 Units by Misc.(Non-Drug; Combo Route) route every 30 days. 3 each 3    famotidine (PEPCID) 20 MG tablet Take 1 tablet (20 mg total) by mouth 2 (two) times daily. 180 tablet 3    ferrous sulfate (IRON, FERROUS SULFATE,) 325 mg (65 mg iron) Tab tablet Take 1 tablet (325 mg total) by mouth once daily. 30 tablet 5    gabapentin (NEURONTIN) 300 MG capsule Take 1 capsule (300 mg total) by mouth 2 (two) times daily. 180 capsule 3    gemfibrozil (LOPID) 600 MG tablet Take 1 tablet (600 mg total) by mouth 2 (two) times daily before meals. 180 tablet 1    HYDROcodone-acetaminophen (NORCO) 5-325 mg per tablet Take 1 tablet by mouth every 6 (six) hours as needed for Pain. 24 tablet 0    ketorolac (TORADOL) 10 mg tablet TK 1 T PO TID      LORazepam (ATIVAN) 1 MG tablet Take 0.5 tablets (0.5 mg total) by mouth every evening. 10 tablet 0    melatonin 10 mg Cap Take 1 tablet by mouth every evening. 90 capsule 3    meloxicam (MOBIC) 7.5 MG tablet Take 1 tablet (7.5 mg total) by mouth 2 (two) times daily as needed for Pain. 180 tablet 1    meropenem (MERREM) 1 gram injection       metFORMIN (GLUCOPHAGE) 500 MG tablet Take 1 tablet (500 mg total) by mouth 2 (two) times daily with meals. 180 tablet 3    metoprolol tartrate (LOPRESSOR) 25 MG tablet        oxyCODONE-acetaminophen (PERCOCET) 7.5-325 mg per tablet TAKE 1 TABLET PO DAILY AS NEEDED FOR PAIN      pantoprazole (PROTONIX) 40 MG tablet Take 1 tablet (40 mg total) by mouth once daily. 30 tablet 11    sulfamethoxazole-trimethoprim 800-160mg (BACTRIM DS) 800-160 mg Tab TK 1 T PO Q 12 H FOR 10 DAYS      venlafaxine (EFFEXOR-XR) 150 MG Cp24 Take 150 mg by mouth once daily.       wheelchair Korina 1 Units/oz/day by Misc.(Non-Drug; Combo Route) route once daily. 1 each 0       Jesus Tom MD  General Surgery  [facility]

## 2020-09-21 ENCOUNTER — HOSPITAL ENCOUNTER (OUTPATIENT)
Dept: WOUND CARE | Facility: HOSPITAL | Age: 45
Discharge: HOME | End: 2020-09-21
Attending: SURGERY
Payer: MEDICARE

## 2020-09-21 ENCOUNTER — PATIENT OUTREACH (OUTPATIENT)
Dept: ADMINISTRATIVE | Facility: HOSPITAL | Age: 45
End: 2020-09-21

## 2020-09-21 DIAGNOSIS — L89.324 STAGE IV PRESSURE ULCER OF LEFT BUTTOCK: ICD-10-CM

## 2020-09-21 DIAGNOSIS — L89.314 PRESSURE INJURY OF RIGHT ISCHIUM, STAGE 4: Primary | ICD-10-CM

## 2020-09-21 DIAGNOSIS — G82.20 PARAPLEGIA: Primary | ICD-10-CM

## 2020-09-21 DIAGNOSIS — M86.68 CHRONIC OSTEOMYELITIS OF SYMPHYSIS PUBIS: ICD-10-CM

## 2020-09-21 PROCEDURE — G0277 HBOT, FULL BODY CHAMBER, 30M: HCPCS | Mod: CCAT

## 2020-09-21 PROCEDURE — 97605 NEG PRS WND THER DME<=50SQCM: CPT

## 2020-09-21 NOTE — PROGRESS NOTES
09/21/20 1532   Hyperbaric Pre-Inspection   Mattress cleaned prior to treatment Yes   2 Patient Identifiers Verified Yes   Consent Obtained Yes   Cotton Gown Yes   Patient voided No   Drainage Bags Emptied Yes   Drainage tubes secured Yes   Patient Pregnant Not applicable   Glasses, Jewelry, Makeup, etc. Removed Yes   Hard contacts removed Yes   Dentures, Hearing Aid, Prosthetic Devices Removed Yes   Touch hair to check for hair spray Yes   Cold/Flu Symptoms No   Diabetic Patient Eaten Yes   Medications Given Yes   For Inpatients: Medication sheet sent with patient No   Fears and apprehensions verbalized Yes   Ground Wire Secured Yes   HBO Treatment Course Details   Treatment Course Number 1   Total Treatments Ordered 40   Ordering Provider Negro Ramos Chronic refractory osteomyelitis   HBO Treatment Start Date 08/10/20   HBO Treatment Details   Treatment Number 17   Total Treatment Length Calc (minutes) 107   Chamber Type Monoplace   Chamber # 1   HBO Dive Log # [id number]   Treatment Protocol 2.0 PETER x 90 minutes w/100% oxygen and no air break   Treatment Details   Dive Rate Down 1.5 psi/minute   Dive Rate Up 2.0 psi/minute   Compress Begins 1 PETER   Clock Time 1035   Tx Pressure Reached 2 PETER   Clock Time 1045   Decompress Begins 2 PETER   Clock Time 1215   Decompress Ends 1 PETER   Clock Time 1222   Pre HBO Vital Signs   /73   Pulse 105   Resp 18   Temp 97.2 °F (36.2 °C)   Blood Glucose 172   Glucose Meter # HBO   Pain Level 0   Post HBO Vital Signs   BP (!) 158/95   Pulse 60   Resp 16   Temp 97.2 °F (36.2 °C)   Blood Glucose 132   Glucose Meter # HBO   Pain Level 0   Ear Evaluation   Left Teed Scale Pre Grade 0   Left Teed Scale Post Grade 0   Right Teed Scale Pre Grade 0   Right Teed Scale Post Grade 0   Patient cleared to resume Hyperbarics per Dr. Ruiz    Physician Supervision  I provided direct supervision and was immediately available to furnish assistance and direction throughout the  performance of the procedure.    Patient tolerated treatment well.  Continue present treatment plan.        Emergency Response Team  A trained emergency response team and emergency services were available throughout procedure.   DX:  m86.68, L89.324

## 2020-09-21 NOTE — PROGRESS NOTES
Subjective:       Patient ID: Hermann Aguilar is a 44 y.o. male.    Chief Complaint: Pressure Ulcer    07/22/2020- new pt to clinic for Dr. Tom. Pt with history with paraplegia since 2016, dm and htn. Pt lives alone but states that his sister lives 5 minutes away and he has a caregiver that comes daily from 10am- 5pm. Pt presents with right ischial wound that is recurrent for 1.5 years and left buttocks wound. Pt was recently hospitalized for UTI. 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 Recent wound cultures negative for any growth. Pt is receiving IV meropenem and vancomycin via medicine ball pump. Wound care as follows: wound vac to right ischial and  Santyl/ xeroform/ foam border to left buttocks. Pt receiving home health via [facility]. Orders routed. Pt educated about HBO treatment, ekg and labs drawn, awaiting insurance authorization. Pt to f/u with Dr. Tom in 2 weeks 08/05/2020.   08/05/2020- f/u with Dr. Tom. Lab results reviewed. Pt to start HBO 08/10/2020. Pt currently receiving home health. If pt starts HBO, home health will be dc'd and wound care visits will be Mondays & Thursdays. Wound vac placed, seal intact. F/u with Dr. Tom in 2 weeks 08/19/2020.  8/10/2020: Nurse visit for dressing change. Patient started HBOT today. Will DC home health, dressing to be changed in clinic on Mondays, and Thursdays. Orders sent to home health. Continuned with [employer] wound vac at 125mmhg continuous: Applied cavilon, benzoin and vac drape to yahaira wound, black foam x 1 to undermining at 11:00 and wound bed, secured with vac drape, tacked to right hip off bony prominence. Patient tolerated well. Nurse visits 8/13/, 8/17/2020 fu with Dr. Tom 8/20/2020.  08/13/2020- nurse visit for dressing change following HBOT. Wound vac changed, seal intact, canister changed, suction at 125mmHg. Pt tolerated. Next dressing change 08/17/2020 and f/u with   Negro 08/20/2020.  08/17/2020- nurse visit for dressing change following HBOT. Wound vac changed, seal intact, suction at 125mmHg. Pt tolerated. Next dressing change 08/24/2020 and f/u with Dr. Tom 08/20/2020.  08/31/2020- nurse visit for dressing change following HBOT. Wound vac changed, seal intact, suction at 125mmHg. Pt tolerated. Cont POC. F/u with Dr. Tom 09/03/2020.  09/03/2020- f/u with dr. Tom following HBOT. Wound vac changed, seal intact, suction at 125mmHg and canister changed. Consent for colostomy signed with Dr. Tom for 09/11/2020. Home health to change dressing as needed. Pt for nurse visit 09/10/2020, f/u with Dr. Tom 09/17/2020.  09/10/2020- nurse visit following HBOT. Wound vac changed, seal intact, suction at 125mmHg and canister changed.Pt for nurse visit 09/14/2020, f/u with Dr. Tom 09/17/2020.  9/21/20: Nurse visit. Wound vac using black foam @ 125mm/hg continuous. No complaints voiced. No apparent problems noted. F/U 9/24/20.         Review of Systems    Objective:      Physical Exam    Assessment:       1. Pressure injury of right ischium, stage 4           Negative Pressure Wound Therapy  07/14/20 1730 Right (Active)   07/14/20 1730   Side: Right   Orientation:    Location: Ischial tuberosity   Additional Comments:    Location:    SDO Location:    NPWT Type Vacuum Therapy 09/21/20 1400   Therapy Setting NPWT Continuous therapy 09/21/20 1400   Pressure Setting NPWT 125 mmHg 09/21/20 1400   Therapy Interventions NPWT Canister changed 09/21/20 1400   Sponges Inserted NPWT Black;1 09/21/20 1400   Sponges Removed NPWT Black;1 09/21/20 1400   General Output (mL) 50 09/21/20 1400            Altered Skin Integrity 05/02/20 2300 Right Ischial tuberosity #1 Full thickness tissue loss with exposed bone, tendon, or muscle. Often includes undermining and tunneling. May extend into muscle and/or supporting structures. (Active)   05/02/20 2300   Altered Skin Integrity Present on  Admission: yes   Side: Right   Orientation:    Location: Ischial tuberosity   Wound Number (optional): #1   Is this injury device related?: No   Primary Wound Type:    Description of Altered Skin Integrity: Full thickness tissue loss with exposed bone, tendon, or muscle. Often includes undermining and tunneling. May extend into muscle and/or supporting structures.   Removal Indication and Assessment:    Wound Outcome: Healed   (Retired) Wound Length (cm):    (Retired) Wound Width (cm):    (Retired) Depth (cm):    Wound Description (Comments):    Removal Indications:    Description of Altered Skin Integrity Full thickness tissue loss. Subcutaneous fat may be visible but bone, tendon or muscle are not exposed 09/21/20 1400   Dressing Appearance Intact;Moist drainage 09/21/20 1400   Drainage Amount Moderate 09/21/20 1400   Drainage Characteristics/Odor Serosanguineous 09/21/20 1400   Appearance Pink;Red 09/21/20 1400   Tissue loss description Full thickness 09/21/20 1400   Red (%), Wound Tissue Color 100 % 09/21/20 1400   Periwound Area Moist 09/21/20 1400   Wound Edges Open 09/21/20 1400   Wound Length (cm) 4.3 cm 09/21/20 1400   Wound Width (cm) 3.8 cm 09/21/20 1400   Wound Depth (cm) 3.3 cm 09/21/20 1400   Wound Volume (cm^3) 53.92 cm^3 09/21/20 1400   Wound Surface Area (cm^2) 16.34 cm^2 09/21/20 1400   Tunneling (depth (cm)/location) 5.1@11 09/21/20 1400   Care Cleansed with:;Sterile normal saline 09/21/20 1400   Dressing Applied;Other (see comments) 09/21/20 1400   Periwound Care Skin barrier film applied 09/21/20 1400   Dressing Change Due 09/24/20 09/21/20 1400            Altered Skin Integrity 07/22/20 1400 Left Buttocks #2 Full thickness tissue loss. Subcutaneous fat may be visible but bone, tendon or muscle are not exposed (Active)   07/22/20 1400   Altered Skin Integrity Present on Admission: yes   Side: Left   Orientation:    Location: Buttocks   Wound Number (optional): #2   Is this injury device  related?:    Primary Wound Type:    Description of Altered Skin Integrity: Full thickness tissue loss. Subcutaneous fat may be visible but bone, tendon or muscle are not exposed   Removal Indication and Assessment:    Wound Outcome:    (Retired) Wound Length (cm):    (Retired) Wound Width (cm):    (Retired) Depth (cm):    Wound Description (Comments):    Removal Indications:    Description of Altered Skin Integrity Full thickness tissue loss. Subcutaneous fat may be visible but bone, tendon or muscle are not exposed 09/21/20 1400   Dressing Appearance Intact;Moist drainage 09/21/20 1400   Drainage Amount Moderate 09/21/20 1400   Drainage Characteristics/Odor Serosanguineous 09/21/20 1400   Appearance Pink;Red 09/21/20 1400   Tissue loss description Full thickness 09/21/20 1400   Red (%), Wound Tissue Color 100 % 09/21/20 1400   Periwound Area Intact 09/21/20 1400   Wound Edges Irregular 09/21/20 1400   Wound Length (cm) 2.1 cm 09/21/20 1400   Wound Width (cm) 2.4 cm 09/21/20 1400   Wound Depth (cm) 0.5 cm 09/21/20 1400   Wound Volume (cm^3) 2.52 cm^3 09/21/20 1400   Wound Surface Area (cm^2) 5.04 cm^2 09/21/20 1400   Care Cleansed with:;Sterile normal saline 09/21/20 1400   Dressing Island/border 09/21/20 1400   Periwound Care Skin barrier film applied 09/21/20 1400   Dressing Change Due 09/24/20 09/21/20 1400   Right Ischial Wound   Cleanse wound with: Normal saline   Lidocaine: prn   Silver nitrate: prn   Breanna wound: Cavilon, Benzoin, Vac drape,   Primary dressing: Black foam to wound bed and tunneling @11 ,secure with vac drape,  wound vac continuous suction 125mmHg, track to right hip off bony prominence.   Offloading: Low air loss mattress at home   Frequency: Mondays and Thursdays in clinic     Left Buttocks Wound   Cleanse wound with: Normal saline   Lidocaine: prn   Silver nitrate: prn   Periwound care: Cavilon   Primary dressing: Santyl/ xeroform   Secondary dressing: Aquacel 4x4 foam border   Offloading:  Low air loss mattress   Frequency: Mondays and Thursdays in clinic     Nurse visit. Wound vac using black foam @ 125mm/hg continuous. No complaints voiced. No apparent problems noted. F/U 9/24/20.         Plan:                Follow up in about 3 days (around 9/24/2020).

## 2020-09-22 ENCOUNTER — HOSPITAL ENCOUNTER (OUTPATIENT)
Dept: WOUND CARE | Facility: HOSPITAL | Age: 45
Discharge: HOME | End: 2020-09-22
Attending: SURGERY
Payer: MEDICARE

## 2020-09-22 DIAGNOSIS — M86.68 CHRONIC OSTEOMYELITIS OF SYMPHYSIS PUBIS: ICD-10-CM

## 2020-09-22 DIAGNOSIS — L89.324 STAGE IV PRESSURE ULCER OF LEFT BUTTOCK: ICD-10-CM

## 2020-09-22 PROBLEM — G82.20 PARAPLEGIA: Status: ACTIVE | Noted: 2017-02-11

## 2020-09-22 PROCEDURE — G0277 HBOT, FULL BODY CHAMBER, 30M: HCPCS | Mod: CCAT

## 2020-09-22 NOTE — PROGRESS NOTES
09/22/20 1423   Hyperbaric Pre-Inspection   Mattress cleaned prior to treatment Yes   2 Patient Identifiers Verified Yes   For Inpatients: Verify correct ID band/correct chart Yes   Consent Obtained Yes   Cotton Gown Yes   Patient voided Yes   Drainage Bags Emptied Not applicable   Patient Pregnant Not applicable   Glasses, Jewelry, Makeup, etc. Removed Yes   Hard contacts removed Yes   Dentures, Hearing Aid, Prosthetic Devices Removed Yes   Touch hair to check for hair spray Yes   Cold/Flu Symptoms No   Diabetic Patient Eaten Yes   Medications Given No   For Inpatients: Medication sheet sent with patient No   Fears and apprehensions verbalized No   Ground Wire Secured Yes   HBO Treatment Course Details   Treatment Course Number 1   Total Treatments Ordered 20   Ordering Provider Dr. Tmo   Indications Chronic refractory osteomyelitis   HBO Treatment Start Date 08/10/20   HBO Treatment Details   Treatment Number 20   Inpatient Visit No   Total Treatment Length Calc (minutes) 107   Chamber Type Monoplace   Chamber # 4   HBO Dive Log # 6605   Treatment Protocol 2.0 PETER x 120 minutes w/100% oxygen and no air break   Treatment Details   Dive Rate Down 1.5 psi/minute   Dive Rate Up 2.0 psi/minute   Compress Begins 1.0 PETER   Clock Time 1005   Tx Pressure Reached 2.0 PETER   Clock Time 1015   Decompress Begins 2.0 PETER   Clock Time 1145   Decompress Ends 1.0 PETER   Clock Time 1152   Pre HBO Vital Signs   /72   Pulse 79   Resp 16   Temp 97.3 °F (36.3 °C)   Blood Glucose 122   Glucose Meter # HBO   Pain Level 0   Post HBO Vital Signs   /76   Pulse 76   Resp 16   Temp 97.3 °F (36.3 °C)   Blood Glucose 124   Glucose Meter # HBO   Pain Level 0   Ear Evaluation   Left Teed Scale Pre Grade 0   Left Teed Scale Post Grade 0   Right Teed Scale Pre Grade 0   Right Teed Scale Post Grade 0   Physician Supervision  I provided direct supervision and was immediately available to furnish assistance and direction throughout  the performance of the procedure.    Emergency Response Team  A trained emergency response team and emergency services were available throughout procedure.Patient tolerated treatment well.  Continue present treatment plan.  Patient reports no complaints   DX CODES: M86.68, L89.324

## 2020-09-23 ENCOUNTER — HOSPITAL ENCOUNTER (OUTPATIENT)
Dept: WOUND CARE | Facility: HOSPITAL | Age: 45
Discharge: HOME | End: 2020-09-23
Attending: SURGERY
Payer: MEDICARE

## 2020-09-23 DIAGNOSIS — L89.324 STAGE IV PRESSURE ULCER OF LEFT BUTTOCK: ICD-10-CM

## 2020-09-23 DIAGNOSIS — M86.68 CHRONIC OSTEOMYELITIS OF SYMPHYSIS PUBIS: ICD-10-CM

## 2020-09-23 PROCEDURE — G0277 HBOT, FULL BODY CHAMBER, 30M: HCPCS | Mod: CCAT

## 2020-09-23 NOTE — PROGRESS NOTES
09/23/20 1347   Hyperbaric Pre-Inspection   Mattress cleaned prior to treatment Yes   2 Patient Identifiers Verified Yes   For Inpatients: Verify correct ID band/correct chart Yes   Consent Obtained Yes   Cotton Gown Yes   Patient voided Yes   Drainage Bags Emptied Not applicable   Patient Pregnant Not applicable   Glasses, Jewelry, Makeup, etc. Removed Yes   Hard contacts removed Yes   Dentures, Hearing Aid, Prosthetic Devices Removed Yes   Touch hair to check for hair spray Yes   Cold/Flu Symptoms No   Diabetic Patient Eaten Yes   Medications Given No   For Inpatients: Medication sheet sent with patient No   Fears and apprehensions verbalized No   Ground Wire Secured Yes   HBO Treatment Course Details   Treatment Course Number 1   Total Treatments Ordered 40   Ordering Provider Dr. Tom   Indications Chronic refractory osteomyelitis   HBO Treatment Start Date 08/10/20   HBO Treatment Details   Treatment Number 21   Inpatient Visit No   Total Treatment Length Calc (minutes) 107   Chamber # 4   HBO Dive Log # [id number]   Treatment Protocol 2.0 PETER x 120 minutes w/100% oxygen and no air break   Treatment Details   Dive Rate Down 1.5 psi/minute   Dive Rate Up 2.0 psi/minute   Compress Begins 1.0 PETER   Clock Time 1030   Tx Pressure Reached 2.0 PETER   Clock Time 1040   Decompress Begins 2.0 PETER   Clock Time 1210   Decompress Ends 1.0 PETER   Clock Time 1217   Pre HBO Vital Signs   /73   Pulse 81   Resp 16   Temp 96.2 °F (35.7 °C)   Blood Glucose 124   Glucose Meter # HBO   Action Taken 4 oz juice given   Pain Level 0   Post HBO Vital Signs   /73   Pulse 80   Resp 16   Temp 96.2 °F (35.7 °C)   Blood Glucose 107   Glucose Meter # HBO   Ear Evaluation   Left Teed Scale Pre Grade 0   Left Teed Scale Post Grade 0   Right Teed Scale Pre Grade 0   Right Teed Scale Post Grade 0   Physician Supervision  I provided direct supervision and was immediately available to furnish assistance and direction throughout the  performance of the procedure.    Emergency Response Team  A trained emergency response team and emergency services were available throughout procedure.  Patient tolerated treatment well.  Continue present treatment plan.  Patient reports no complaints, tolerated well.   DX CODES: M86.68, L89.324

## 2020-09-24 ENCOUNTER — HOSPITAL ENCOUNTER (OUTPATIENT)
Dept: WOUND CARE | Facility: HOSPITAL | Age: 45
Discharge: HOME | End: 2020-09-24
Attending: SURGERY
Payer: MEDICARE

## 2020-09-24 DIAGNOSIS — M86.9 OSTEOMYELITIS, UNSPECIFIED SITE, UNSPECIFIED TYPE: ICD-10-CM

## 2020-09-24 DIAGNOSIS — L89.314 PRESSURE INJURY OF RIGHT ISCHIUM, STAGE 4: Primary | ICD-10-CM

## 2020-09-24 DIAGNOSIS — Z89.511 HX OF RIGHT BKA: Chronic | ICD-10-CM

## 2020-09-24 DIAGNOSIS — M86.68 CHRONIC OSTEOMYELITIS OF SYMPHYSIS PUBIS: ICD-10-CM

## 2020-09-24 DIAGNOSIS — I26.99 PULMONARY EMBOLISM, UNSPECIFIED CHRONICITY, UNSPECIFIED PULMONARY EMBOLISM TYPE, UNSPECIFIED WHETHER ACUTE COR PULMONALE PRESENT: ICD-10-CM

## 2020-09-24 DIAGNOSIS — L89.624 STAGE IV PRESSURE ULCER OF LEFT HEEL: ICD-10-CM

## 2020-09-24 DIAGNOSIS — I10 ESSENTIAL HYPERTENSION: Chronic | ICD-10-CM

## 2020-09-24 DIAGNOSIS — E11.9 TYPE 2 DIABETES MELLITUS WITHOUT COMPLICATION, WITHOUT LONG-TERM CURRENT USE OF INSULIN: ICD-10-CM

## 2020-09-24 LAB
POCT GLUCOSE: 110 MG/DL (ref 70–110)
POCT GLUCOSE: 142 MG/DL (ref 70–110)
POCT GLUCOSE: 144 MG/DL (ref 70–110)

## 2020-09-24 PROCEDURE — 97605 NEG PRS WND THER DME<=50SQCM: CPT

## 2020-09-24 PROCEDURE — G0277 HBOT, FULL BODY CHAMBER, 30M: HCPCS | Mod: CCAT

## 2020-09-24 NOTE — PROGRESS NOTES
09/24/20 1450   Hyperbaric Pre-Inspection   Mattress cleaned prior to treatment Yes   2 Patient Identifiers Verified Yes   For Inpatients: Verify correct ID band/correct chart Yes   Consent Obtained Yes   Cotton Gown Yes   Patient voided Yes   Drainage Bags Emptied Other (Comment)   Patient Pregnant Not applicable   Glasses, Jewelry, Makeup, etc. Removed Not applicable   Hard contacts removed Yes   Dentures, Hearing Aid, Prosthetic Devices Removed Yes   Touch hair to check for hair spray Yes   Cold/Flu Symptoms No   Diabetic Patient Eaten Yes   Medications Given No   For Inpatients: Medication sheet sent with patient No   Fears and apprehensions verbalized No   Ground Wire Secured Yes   HBO Treatment Course Details   Treatment Course Number 1   Total Treatments Ordered 40   Ordering Provider Dr. Tom   Indications Chronic refractory osteomyelitis   HBO Treatment Details   Treatment Number 22   Total Treatment Length Calc (minutes) 107   Chamber Type Monoplace   Chamber # 4   HBO Dive Log # [id number]   Treatment Protocol 2.0 PETER x 120 minutes w/100% oxygen and no air break   Treatment Details   Dive Rate Down 1.5 psi/minute   Dive Rate Up 2.0 psi/minute   Compress Begins 1.0 PETER   Clock Time 1010   Tx Pressure Reached 2.0 PETER   Clock Time 1020   Decompress Begins 2.0 PETER   Clock Time 1150   Decompress Ends 1.0 PETER   Clock Time 1157   Pre HBO Vital Signs   /74   Pulse 88   Resp 16   Temp 97.8 °F (36.6 °C)   Blood Glucose 144   Glucose Meter # snack provided   Pain Level 0   Post HBO Vital Signs   /71   Pulse 84   Resp 16   Temp 97.8 °F (36.6 °C)   Blood Glucose 142   Glucose Meter # HBO   Pain Level 0   Ear Evaluation   Left Teed Scale Pre Grade 0   Left Teed Scale Post Grade 0   Right Teed Scale Pre Grade 0   Right Teed Scale Post Grade 0   Physician Supervision  I provided direct supervision and was immediately available to furnish assistance and direction throughout the performance of the  procedure.    Emergency Response Team  A trained emergency response team and emergency services were available throughout procedure.  Patient tolerated treatment well.  Continue present treatment plan.  Patient reports no complaints , tolerated well.   DX CODES: M86.68, L89.324

## 2020-09-24 NOTE — PROGRESS NOTES
Subjective:       Patient ID: Hermann Aguilar is a 44 y.o. male.    Chief Complaint: Wound Care    07/22/2020- new pt to clinic for Dr. Tom. Pt with history with paraplegia since 2016, dm and htn. Pt lives alone but states that his sister lives 5 minutes away and he has a caregiver that comes daily from 10am- 5pm. Pt presents with right ischial wound that is recurrent for 1.5 years and left buttocks wound. Pt was recently hospitalized for UTI. 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 Recent wound cultures negative for any growth. Pt is receiving IV meropenem and vancomycin via medicine ball pump. Wound care as follows: wound vac to right ischial and  Santyl/ xeroform/ foam border to left buttocks. Pt receiving home health via [facility]. Orders routed. Pt educated about HBO treatment, ekg and labs drawn, awaiting insurance authorization. Pt to f/u with Dr. Tom in 2 weeks 08/05/2020.   08/05/2020- f/u with Dr. Tom. Lab results reviewed. Pt to start HBO 08/10/2020. Pt currently receiving home health. If pt starts HBO, home health will be dc'd and wound care visits will be Mondays & Thursdays. Wound vac placed, seal intact. F/u with Dr. Tom in 2 weeks 08/19/2020.  8/10/2020: Nurse visit for dressing change. Patient started HBOT today. Will DC home health, dressing to be changed in clinic on Mondays, and Thursdays. Orders sent to home health. Continuned with [employer] wound vac at 125mmhg continuous: Applied cavilon, benzoin and vac drape to yahaira wound, black foam x 1 to undermining at 11:00 and wound bed, secured with vac drape, tacked to right hip off bony prominence. Patient tolerated well. Nurse visits 8/13/, 8/17/2020 fu with Dr. Tom 8/20/2020.  08/13/2020- nurse visit for dressing change following HBOT. Wound vac changed, seal intact, canister changed, suction at 125mmHg. Pt tolerated. Next dressing change 08/17/2020 and f/u with Dr. Tom  08/20/2020.  08/17/2020- nurse visit for dressing change following HBOT. Wound vac changed, seal intact, suction at 125mmHg. Pt tolerated. Next dressing change 08/24/2020 and f/u with Dr. Tom 08/20/2020.  08/31/2020- nurse visit for dressing change following HBOT. Wound vac changed, seal intact, suction at 125mmHg. Pt tolerated. Cont POC. F/u with Dr. Tom 09/03/2020.  09/03/2020- f/u with dr. Tom following HBOT. Wound vac changed, seal intact, suction at 125mmHg and canister changed. Consent for colostomy signed with Dr. Tom for 09/11/2020. Home health to change dressing as needed. Pt for nurse visit 09/10/2020, f/u with Dr. Tom 09/17/2020.  09/10/2020- nurse visit following HBOT. Wound vac changed, seal intact, suction at 125mmHg and canister changed.Pt for nurse visit 09/14/2020, f/u with Dr. Tom 09/17/2020.  9/21/20: Nurse visit. Wound vac using black foam @ 125mm/hg continuous. No complaints voiced. No apparent problems noted. F/U 9/24/20.   09/24/2020- f/u with Dr. Tom. New wound noted to left ankle, dress with hydrofera ready and foam border. Dr. Tom assessed colostomy site, sutures and red rubber catheter removed and bag replaced by Dr. Tom. Dr. Tom spoke to home health and they stated that they will be coming out tomorrow to change out colostomy bag. Pt to f/u with Dr. Tom in 2 weeks 10/08/2020, nurse visits Mondays and Fridays until then.      Review of Systems   Constitutional: Negative.    HENT: Negative.    Eyes: Negative.    Respiratory: Negative.    Cardiovascular: Negative.    Gastrointestinal: Negative.    Genitourinary: Negative.    Musculoskeletal: Negative.    Neurological: Negative.    Psychiatric/Behavioral: Negative.        Objective:      Physical Exam  Constitutional:       Appearance: He is well-developed.   HENT:      Head: Normocephalic.   Eyes:      Conjunctiva/sclera: Conjunctivae normal.      Pupils: Pupils are equal, round, and reactive to  light.   Neck:      Musculoskeletal: Normal range of motion and neck supple.   Cardiovascular:      Rate and Rhythm: Normal rate and regular rhythm.      Heart sounds: Normal heart sounds.   Pulmonary:      Effort: Pulmonary effort is normal.      Breath sounds: Normal breath sounds.   Abdominal:      General: Bowel sounds are normal.      Palpations: Abdomen is soft.   Musculoskeletal: Normal range of motion.   Skin:     General: Skin is warm and dry.   Neurological:      Mental Status: He is alert and oriented to person, place, and time.      Deep Tendon Reflexes: Reflexes are normal and symmetric.         Assessment:       1. Pressure injury of right ischium, stage 4           Negative Pressure Wound Therapy  07/14/20 1730 Right (Active)   07/14/20 1730   Side: Right   Orientation:    Location: Ischial tuberosity   Additional Comments:    Location:    SDO Location:    NPWT Type Vacuum Therapy 09/24/20 1300   Therapy Setting NPWT Continuous therapy 09/24/20 1300   Pressure Setting NPWT 125 mmHg 09/24/20 1300   Therapy Interventions NPWT Canister changed;Dressing changed;Seal intact;Trac pad replaced 09/24/20 1300   Sponges Inserted NPWT Black;1 09/24/20 1300   Sponges Removed NPWT Black;1 09/24/20 1300   General Output (mL) 25 09/24/20 1300            Altered Skin Integrity 05/02/20 2300 Right Ischial tuberosity #1 Full thickness tissue loss with exposed bone, tendon, or muscle. Often includes undermining and tunneling. May extend into muscle and/or supporting structures. (Active)   05/02/20 2300   Altered Skin Integrity Present on Admission: yes   Side: Right   Orientation:    Location: Ischial tuberosity   Wound Number (optional): #1   Is this injury device related?: No   Primary Wound Type:    Description of Altered Skin Integrity: Full thickness tissue loss with exposed bone, tendon, or muscle. Often includes undermining and tunneling. May extend into muscle and/or supporting structures.   Removal Indication  and Assessment:    Wound Outcome: Healed   (Retired) Wound Length (cm):    (Retired) Wound Width (cm):    (Retired) Depth (cm):    Wound Description (Comments):    Removal Indications:    Wound Image   09/24/20 1300   Description of Altered Skin Integrity Full thickness tissue loss with exposed bone, tendon, or muscle. Often includes undermining and tunneling. May extend into muscle and/or supporting structures. 09/24/20 1300   Drainage Amount Moderate 09/24/20 1300   Drainage Characteristics/Odor Serosanguineous 09/24/20 1300   Appearance Pink;Red 09/24/20 1300   Tissue loss description Full thickness 09/24/20 1300   Red (%), Wound Tissue Color 100 % 09/24/20 1300   Periwound Area Moist 09/24/20 1300   Wound Edges Open 09/24/20 1300   Wound Length (cm) 4.1 cm 09/24/20 1300   Wound Width (cm) 3.5 cm 09/24/20 1300   Wound Depth (cm) 4.3 cm 09/24/20 1300   Wound Volume (cm^3) 61.7 cm^3 09/24/20 1300   Wound Surface Area (cm^2) 14.35 cm^2 09/24/20 1300   Tunneling (depth (cm)/location) 5.2 cm @ 11  09/24/20 1300   Care Sterile normal saline 09/24/20 1300   Dressing Foam 09/24/20 1300   Periwound Care Skin barrier film applied 09/24/20 1300   Dressing Change Due 09/28/20 09/24/20 1300            Altered Skin Integrity 07/22/20 1400 Left Buttocks #2 Full thickness tissue loss. Subcutaneous fat may be visible but bone, tendon or muscle are not exposed (Active)   07/22/20 1400   Altered Skin Integrity Present on Admission: yes   Side: Left   Orientation:    Location: Buttocks   Wound Number (optional): #2   Is this injury device related?:    Primary Wound Type:    Description of Altered Skin Integrity: Full thickness tissue loss. Subcutaneous fat may be visible but bone, tendon or muscle are not exposed   Removal Indication and Assessment:    Wound Outcome:    (Retired) Wound Length (cm):    (Retired) Wound Width (cm):    (Retired) Depth (cm):    Wound Description (Comments):    Removal Indications:    Wound Image    09/24/20 1300   Description of Altered Skin Integrity Full thickness tissue loss. Subcutaneous fat may be visible but bone, tendon or muscle are not exposed 09/24/20 1300   Drainage Amount Small 09/24/20 1300   Drainage Characteristics/Odor Serosanguineous 09/24/20 1300   Appearance Pink;Red 09/24/20 1300   Tissue loss description Full thickness 09/24/20 1300   Red (%), Wound Tissue Color 100 % 09/24/20 1300   Periwound Area Intact;Dry 09/24/20 1300   Wound Edges Irregular 09/24/20 1300   Wound Length (cm) 1.9 cm 09/24/20 1300   Wound Width (cm) 1.9 cm 09/24/20 1300   Wound Depth (cm) 0.1 cm 09/24/20 1300   Wound Volume (cm^3) 0.36 cm^3 09/24/20 1300   Wound Surface Area (cm^2) 3.61 cm^2 09/24/20 1300   Care Cleansed with:;Sterile normal saline 09/24/20 1300   Dressing Hydrofiber;Island/border 09/24/20 1300   Periwound Care Skin barrier film applied 09/24/20 1300   Dressing Change Due 09/28/20 09/24/20 1300            Altered Skin Integrity 09/24/20 1330 Left lateral Malleolus Partial thickness tissue loss. Shallow open ulcer with a red or pink wound bed, without slough. Intact or Open/Ruptured Serum-filled blister. (Active)   09/24/20 1330   Altered Skin Integrity Present on Admission: yes   Side: Left   Orientation: lateral   Location: Malleolus   Wound Number (optional):    Is this injury device related?:    Primary Wound Type:    Description of Altered Skin Integrity: Partial thickness tissue loss. Shallow open ulcer with a red or pink wound bed, without slough. Intact or Open/Ruptured Serum-filled blister.   Removal Indication and Assessment:    Wound Outcome:    (Retired) Wound Length (cm):    (Retired) Wound Width (cm):    (Retired) Depth (cm):    Wound Description (Comments):    Removal Indications:    Wound Image   09/24/20 1300   Description of Altered Skin Integrity Partial thickness tissue loss. Shallow open ulcer with a red or pink wound bed, without slough. Intact or Open/Ruptured Serum-filled blister.  09/24/20 1300   Dressing Appearance Open to air 09/24/20 1300   Drainage Amount Small 09/24/20 1300   Drainage Characteristics/Odor Serosanguineous 09/24/20 1300   Appearance Pink;Red;Moist 09/24/20 1300   Tissue loss description Partial thickness 09/24/20 1300   Red (%), Wound Tissue Color 100 % 09/24/20 1300   Periwound Area Intact;Dry 09/24/20 1300   Wound Edges Irregular 09/24/20 1300   Wound Length (cm) 0.8 cm 09/24/20 1300   Wound Width (cm) 1.2 cm 09/24/20 1300   Wound Depth (cm) 0.1 cm 09/24/20 1300   Wound Volume (cm^3) 0.1 cm^3 09/24/20 1300   Wound Surface Area (cm^2) 0.96 cm^2 09/24/20 1300   Care Cleansed with:;Sterile normal saline 09/24/20 1300   Dressing Hydrofiber;Island/border 09/24/20 1300   Periwound Care Skin barrier film applied 09/24/20 1300   Dressing Change Due 09/28/20 09/24/20 1300       Right Ischial Wound   Cleanse wound with: Normal saline   Lidocaine: prn   Silver nitrate: prn   Breanna wound: Cavilon, Benzoin, Vac drape,   Primary dressing: Black foam to wound bed and tunneling @11 ,secure with vac drape,  wound vac continuous suction 125mmHg, track to right hip off bony prominence.   Offloading: Low air loss mattress at home   Frequency: Mondays and Thursdays in clinic     Left Buttocks and Left Ankle Wound   Cleanse wound with: Normal saline   Lidocaine: prn   Silver nitrate: prn   Periwound care: Cavilon   Primary dressing: Hydrofera Ready   Secondary dressing: Aquacel 4x4 foam border   Offloading: Low air loss mattress   Frequency: Mondays and Thursdays in clinic     Follow-up: in 2 weeks 10/08/2020 with Dr. Tom   [facility]: [facility] to perform dressing changes prn. Change colostomy bag weekly and prn.       Plan:                Follow up in about 4 days (around 9/28/2020).

## 2020-09-25 ENCOUNTER — HOSPITAL ENCOUNTER (EMERGENCY)
Facility: HOSPITAL | Age: 45
Discharge: HOME | End: 2020-09-25
Attending: EMERGENCY MEDICINE
Payer: MEDICARE

## 2020-09-25 VITALS
DIASTOLIC BLOOD PRESSURE: 56 MMHG | BODY MASS INDEX: 28 KG/M2 | WEIGHT: 200 LBS | HEIGHT: 71 IN | SYSTOLIC BLOOD PRESSURE: 101 MMHG | OXYGEN SATURATION: 98 % | RESPIRATION RATE: 18 BRPM | HEART RATE: 78 BPM | TEMPERATURE: 99 F

## 2020-09-25 DIAGNOSIS — Z43.3 ENCOUNTER FOR ATTENTION TO COLOSTOMY: Primary | ICD-10-CM

## 2020-09-25 PROCEDURE — 99283 EMERGENCY DEPT VISIT LOW MDM: CPT | Mod: ER

## 2020-09-25 NOTE — ED NOTES
Cleaned skin around colostomy stoma with sterile gauze and normal saline.   Applied no sting adhesive to intact skin around stoma.   New colostomy wafer placed around intact skin and new bag placed.  Barrier cream applied to excoriated, macerated skin.  Pt tolerated procedure well.

## 2020-09-25 NOTE — ED PROVIDER NOTES
"Encounter Date: 9/25/2020       History     Chief Complaint   Patient presents with    Colostomy     Pt needs colostomy wafer and bag placed. He will not tell me how the colostomy wafer came off, he just states that the "doctor slapped it on there and doesn't know what the hell is doing."     Complains of detached colostomy bag.    The history is provided by the patient. No  was used.     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 Yadiel Haley    CHEST TUBE INSERTION Right     COLOSTOMY N/A 9/11/2020    Procedure: CREATION, COLOSTOMY;  Surgeon: Jesus Tom MD;  Location: [facility] OR;  Service: General;  Laterality: N/A;    CYSTOSCOPY N/A 8/30/2018    Procedure: CYSTOSCOPY, clot evacuation, suprapubic tube exchange;  Surgeon: Ruchi Navarrete MD;  Location: [facility] OR;  Service: Urology;  Laterality: N/A;    DEBRIDEMENT OF SACRAL WOUND N/A 5/5/2020    Procedure: DEBRIDEMENT, WOUND, SACRUM;  Surgeon: Jesus Tom MD;  Location: [facility] OR;  Service: General;  Laterality: N/A;    GASTROSTOMY TUBE PLACEMENT  12/15/2016    ORIF HUMERUS FRACTURE Left 12/15/2016    REMOVAL OF BLOOD CLOT  8/30/2018    Procedure: REMOVAL, BLOOD CLOT;  Surgeon: Ruchi" IVONNE Navarrete MD;  Location: [facility] OR;  Service: Urology;;    TRACHEOSTOMY TUBE PLACEMENT       History reviewed. No pertinent family history.  Social History     Tobacco Use    Smoking status: Current Some Day Smoker     Packs/day: 0.50     Years: 33.00     Pack years: 16.50     Types: Cigarettes     Start date: 1987    Smokeless tobacco: Never Used    Tobacco comment: Pt is currently enrolled in [employer].  Ambulatory referral to Smoking Cessation program .   Substance Use Topics    Alcohol use: No     Frequency: Never     Comment: occ    Drug use: No     Review of Systems   Constitutional: Negative for chills and fever.   Respiratory: Negative for shortness of breath.    Cardiovascular: Negative for chest pain.   Gastrointestinal: Negative for abdominal pain, nausea and vomiting.   Genitourinary: Negative for difficulty urinating and dysuria.   Neurological: Negative for dizziness, light-headedness and headaches.       Physical Exam     Initial Vitals [09/25/20 0058]   BP Pulse Resp Temp SpO2   127/78 95 18 98.5 °F (36.9 °C) 100 %      MAP       --         Physical Exam    Nursing note and vitals reviewed.  Constitutional: He appears well-developed and well-nourished. He is not diaphoretic. No distress.   HENT:   Head: Normocephalic and atraumatic.   Eyes: Conjunctivae are normal. No scleral icterus.   Cardiovascular: Normal rate, regular rhythm and normal heart sounds. Exam reveals no gallop and no friction rub.    No murmur heard.  Pulmonary/Chest: Breath sounds normal. No respiratory distress. He has no wheezes. He has no rhonchi. He has no rales.   Abdominal: Soft. He exhibits no distension. There is no abdominal tenderness.   Right upper abdomen colostomy.  Surrounding skin is macerated and indurated.  There is no tenderness.   Neurological: He is alert and oriented to person, place, and time. GCS score is 15. GCS eye subscore is 4. GCS verbal subscore is 5. GCS motor subscore is 6.   Skin: Skin is dry.  No pallor.             ED Course   Procedures  Labs Reviewed - No data to display       Imaging Results    None          Medical Decision Making:     Skin around colostomy cleaned and new bag placed by nursing using patient's own supplies.                             Clinical Impression:            ICD-10-CM ICD-9-CM   1. Encounter for attention to colostomy  Z43.3 V55.3     Disposition:   Disposition: Discharged  Condition: Stable                          Anirudh Suarez III, MD  09/25/20 030

## 2020-09-28 ENCOUNTER — HOSPITAL ENCOUNTER (OUTPATIENT)
Dept: WOUND CARE | Facility: HOSPITAL | Age: 45
Discharge: HOME | End: 2020-09-28
Attending: SURGERY
Payer: MEDICARE

## 2020-09-28 DIAGNOSIS — L89.324 STAGE IV PRESSURE ULCER OF LEFT BUTTOCK: ICD-10-CM

## 2020-09-28 DIAGNOSIS — E11.9 TYPE 2 DIABETES MELLITUS WITHOUT COMPLICATION, WITHOUT LONG-TERM CURRENT USE OF INSULIN: ICD-10-CM

## 2020-09-28 DIAGNOSIS — M86.68 CHRONIC OSTEOMYELITIS OF SYMPHYSIS PUBIS: ICD-10-CM

## 2020-09-28 DIAGNOSIS — I10 ESSENTIAL HYPERTENSION: ICD-10-CM

## 2020-09-28 DIAGNOSIS — M86.60 CHRONIC OSTEOMYELITIS: Primary | ICD-10-CM

## 2020-09-28 PROCEDURE — G0277 HBOT, FULL BODY CHAMBER, 30M: HCPCS | Mod: CCAT

## 2020-09-28 NOTE — PROGRESS NOTES
09/28/20 1358   Hyperbaric Pre-Inspection   Mattress cleaned prior to treatment Yes   2 Patient Identifiers Verified Yes   For Inpatients: Verify correct ID band/correct chart Yes   Consent Obtained Yes   Cotton Gown Yes   Patient voided Yes   Drainage Bags Emptied Not applicable   Patient Pregnant Not applicable   Glasses, Jewelry, Makeup, etc. Removed Yes   Hard contacts removed Yes   Dentures, Hearing Aid, Prosthetic Devices Removed Yes   Touch hair to check for hair spray Yes   Cold/Flu Symptoms No   Diabetic Patient Eaten Yes   Medications Given No   For Inpatients: Medication sheet sent with patient No   Fears and apprehensions verbalized No   Ground Wire Secured Yes   HBO Treatment Course Details   Treatment Course Number 1   Total Treatments Ordered 40   Ordering Provider Dr. Tom   Indications Chronic refractory osteomyelitis   HBO Treatment Start Date 08/10/20   HBO Treatment Details   Treatment Number 23   Inpatient Visit No   Total Treatment Length Calc (minutes) 107   Chamber Type Monoplace   Chamber # 4   HBO Dive Log # [id number]   Treatment Protocol 2.0 PETER x 120 minutes w/100% oxygen and no air break   Treatment Details   Dive Rate Down 1.5 psi/minute   Dive Rate Up 2.0 psi/minute   Compress Begins 1.0 PETER   Clock Time 1010   Tx Pressure Reached 2.0 PETER   Clock Time 1020   Decompress Begins 2.0 PETER   Clock Time 1150   Decompress Ends 1.0 PETER   Clock Time 1157   Pre HBO Vital Signs   /63   Pulse 67   Resp 16   Temp 97 °F (36.1 °C)   Blood Glucose 134   Glucose Meter # HBO   Pain Level 0   Post HBO Vital Signs   /78   Pulse 86   Resp 16   Temp 97 °F (36.1 °C)   Blood Glucose 134   Glucose Meter # HBO   Pain Level 0   Ear Evaluation   Left Teed Scale Pre Grade 0   Left Teed Scale Post Grade 0   Right Teed Scale Pre Grade 0   Right Teed Scale Post Grade 0   Physician Supervision  I provided direct supervision and was immediately available to furnish assistance and direction throughout the  performance of the procedure.    Patient tolerated treatment well.  Continue present treatment plan.      Emergency Response Team  A trained emergency response team and emergency services were available throughout procedure.   DX CODES: M86.68, L89.324

## 2020-09-29 ENCOUNTER — TELEPHONE (OUTPATIENT)
Dept: UROLOGY | Facility: CLINIC | Age: 45
End: 2020-09-29

## 2020-09-29 ENCOUNTER — HOSPITAL ENCOUNTER (OUTPATIENT)
Dept: WOUND CARE | Facility: HOSPITAL | Age: 45
Discharge: HOME | End: 2020-09-29
Attending: SURGERY
Payer: MEDICARE

## 2020-09-29 PROCEDURE — 99211 OFF/OP EST MAY X REQ PHY/QHP: CPT

## 2020-09-29 NOTE — PROGRESS NOTES
Patient presented to [facility] with Glucose of 103, provided a small meal and waited 20 minutes.    Glucose came to 107, provided patient with a yogurt. Waited another 15 minutes.   Glucose went to 93 and rhe patient asked me to call for his riode and he wanted to go home.     We instructed patient's caregiver that he needs have meal prior to his HBO treatment.

## 2020-09-29 NOTE — TELEPHONE ENCOUNTER
----- Message from Yoli Cassidy sent at 9/29/2020  3:00 PM CDT -----  Contact: Gita @ [facility]-[phone]  Type:  Needs Medical Advice    Who Called:  Gita @ [facility]  Reason for Call: regarding if the pt is going to have Super Puvic Catheter changed at his next appt on 10/9  Would the patient rather a call back or a response via [facility]?  Call back  Best Call Back Number: [phone]

## 2020-09-29 NOTE — TELEPHONE ENCOUNTER
Spoke with home health nurse and she was asking if pt has to come in the office to come have SPT changed. I advised pt have an appt on 10/9/2020 at 1:45pm and she can disgard the order for SPT change once a month by home adriana.

## 2020-10-01 ENCOUNTER — HOSPITAL ENCOUNTER (OUTPATIENT)
Dept: WOUND CARE | Facility: HOSPITAL | Age: 45
Discharge: HOME | End: 2020-10-01
Attending: SURGERY
Payer: MEDICARE

## 2020-10-01 ENCOUNTER — DOCUMENT SCAN (OUTPATIENT)
Dept: HOME HEALTH SERVICES | Facility: HOSPITAL | Age: 45
End: 2020-10-01
Payer: MEDICARE

## 2020-10-01 DIAGNOSIS — L89.324 STAGE IV PRESSURE ULCER OF LEFT BUTTOCK: ICD-10-CM

## 2020-10-01 DIAGNOSIS — L89.324 STAGE IV PRESSURE ULCER OF LEFT BUTTOCK: Primary | ICD-10-CM

## 2020-10-01 DIAGNOSIS — L97.324 NON-PRESSURE CHRONIC ULCER OF LEFT ANKLE WITH NECROSIS OF BONE: ICD-10-CM

## 2020-10-01 DIAGNOSIS — M86.68 CHRONIC OSTEOMYELITIS OF SYMPHYSIS PUBIS: ICD-10-CM

## 2020-10-01 PROCEDURE — 97605 NEG PRS WND THER DME<=50SQCM: CPT

## 2020-10-01 PROCEDURE — G0277 HBOT, FULL BODY CHAMBER, 30M: HCPCS | Mod: CCAT

## 2020-10-01 PROCEDURE — 10140 I&D HMTMA SEROMA/FLUID COLLJ: CPT | Performed by: SURGERY

## 2020-10-01 NOTE — PROGRESS NOTES
Subjective:       Patient ID: Hermann Aguilar is a 45 y.o. male.    Chief Complaint: Pressure Ulcer    07/22/2020- new pt to clinic for Dr. Tom. Pt with history with paraplegia since 2016, dm and htn. Pt lives alone but states that his sister lives 5 minutes away and he has a caregiver that comes daily from 10am- 5pm. Pt presents with right ischial wound that is recurrent for 1.5 years and left buttocks wound. Pt was recently hospitalized for UTI. 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 Recent wound cultures negative for any growth. Pt is receiving IV meropenem and vancomycin via medicine ball pump. Wound care as follows: wound vac to right ischial and  Santyl/ xeroform/ foam border to left buttocks. Pt receiving home health via [facility]. Orders routed. Pt educated about HBO treatment, ekg and labs drawn, awaiting insurance authorization. Pt to f/u with Dr. Tom in 2 weeks 08/05/2020.   08/05/2020- f/u with Dr. Tom. Lab results reviewed. Pt to start HBO 08/10/2020. Pt currently receiving home health. If pt starts HBO, home health will be dc'd and wound care visits will be Mondays & Thursdays. Wound vac placed, seal intact. F/u with Dr. Tom in 2 weeks 08/19/2020.  8/10/2020: Nurse visit for dressing change. Patient started HBOT today. Will DC home health, dressing to be changed in clinic on Mondays, and Thursdays. Orders sent to home health. Continuned with [employer] wound vac at 125mmhg continuous: Applied cavilon, benzoin and vac drape to yahaira wound, black foam x 1 to undermining at 11:00 and wound bed, secured with vac drape, tacked to right hip off bony prominence. Patient tolerated well. Nurse visits 8/13/, 8/17/2020 fu with Dr. Tom 8/20/2020.  08/13/2020- nurse visit for dressing change following HBOT. Wound vac changed, seal intact, canister changed, suction at 125mmHg. Pt tolerated. Next dressing change 08/17/2020 and f/u with   Negro 08/20/2020.  08/17/2020- nurse visit for dressing change following HBOT. Wound vac changed, seal intact, suction at 125mmHg. Pt tolerated. Next dressing change 08/24/2020 and f/u with Dr. Tom 08/20/2020.  08/31/2020- nurse visit for dressing change following HBOT. Wound vac changed, seal intact, suction at 125mmHg. Pt tolerated. Cont POC. F/u with Dr. Tom 09/03/2020.  09/03/2020- f/u with dr. Tom following HBOT. Wound vac changed, seal intact, suction at 125mmHg and canister changed. Consent for colostomy signed with Dr. Tom for 09/11/2020. Home health to change dressing as needed. Pt for nurse visit 09/10/2020, f/u with Dr. Tom 09/17/2020.  09/10/2020- nurse visit following HBOT. Wound vac changed, seal intact, suction at 125mmHg and canister changed.Pt for nurse visit 09/14/2020, f/u with Dr. Tom 09/17/2020.  9/21/20: Nurse visit. Wound vac using black foam @ 125mm/hg continuous. No complaints voiced. No apparent problems noted. F/U 9/24/20.   09/24/2020- f/u with Dr. Tom. New wound noted to left ankle, dress with hydrofera ready and foam border. Dr. Tom assessed colostomy site, sutures and red rubber catheter removed and bag replaced by Dr. Tom. Dr. Tom spoke to home health and they stated that they will be coming out tomorrow to change out colostomy bag. Pt to f/u with Dr. Tom in 2 weeks 10/08/2020, nurse visits Mondays and Fridays until then.  10/1/2020: Nurse visit for dressing change. Patient upset phone not working.  called to assess wound around ostomy.  gave orders to apply saline moist hydorfera classic ostomy ring around stoma before applying ostomy bag. Colostomy to be changed every other day and Prn.  Wound vac to be changed prn per home health.   called patient's  Misael at [phone] to discuss patient's peristomal wound care and wound vac.   Patient refused wound care to left buttock wound and right  "ischial wound today. Patient stated "  I called home health out yesterday to change them because the vac was beeping. I don't want them changed today. You can just change my foot dressing and call my ride." Fu 10/8/2020 with      Review of Systems   Constitutional: Negative.    HENT: Negative.    Eyes: Negative.    Respiratory: Negative.    Cardiovascular: Negative.    Gastrointestinal: Negative.    Genitourinary: Negative.    Musculoskeletal: Negative.    Neurological: Negative.    Psychiatric/Behavioral: Negative.        Objective:      Physical Exam  Constitutional:       Appearance: He is well-developed.   HENT:      Head: Normocephalic.   Eyes:      Conjunctiva/sclera: Conjunctivae normal.      Pupils: Pupils are equal, round, and reactive to light.   Neck:      Musculoskeletal: Normal range of motion and neck supple.   Cardiovascular:      Rate and Rhythm: Normal rate and regular rhythm.      Heart sounds: Normal heart sounds.   Pulmonary:      Effort: Pulmonary effort is normal.      Breath sounds: Normal breath sounds.   Abdominal:      General: Bowel sounds are normal.      Palpations: Abdomen is soft.   Musculoskeletal: Normal range of motion.   Skin:     General: Skin is warm and dry.   Neurological:      Mental Status: He is alert and oriented to person, place, and time.      Deep Tendon Reflexes: Reflexes are normal and symmetric.         Assessment:       No diagnosis found.       Negative Pressure Wound Therapy  07/14/20 1730 Right (Active)   07/14/20 1730   Side: Right   Orientation:    Location: Ischial tuberosity   Additional Comments:    Location:    SDO Location:    NPWT Type Vacuum Therapy 10/01/20 1500   Therapy Setting NPWT Continuous therapy 10/01/20 1500   Pressure Setting NPWT 125 mmHg 10/01/20 1500   Therapy Interventions NPWT other (see comments) 10/01/20 1500   General Output (mL) 50 10/01/20 1500            Altered Skin Integrity 05/02/20 2300 Right Ischial tuberosity #1 Full " thickness tissue loss with exposed bone, tendon, or muscle. Often includes undermining and tunneling. May extend into muscle and/or supporting structures. (Active)   05/02/20 2300   Altered Skin Integrity Present on Admission: yes   Side: Right   Orientation:    Location: Ischial tuberosity   Wound Number (optional): #1   Is this injury device related?: No   Primary Wound Type:    Description of Altered Skin Integrity: Full thickness tissue loss with exposed bone, tendon, or muscle. Often includes undermining and tunneling. May extend into muscle and/or supporting structures.   Removal Indication and Assessment:    Wound Outcome: Healed   (Retired) Wound Length (cm):    (Retired) Wound Width (cm):    (Retired) Depth (cm):    Wound Description (Comments):    Removal Indications:    Description of Altered Skin Integrity Full thickness tissue loss with exposed bone, tendon, or muscle. Often includes undermining and tunneling. May extend into muscle and/or supporting structures. 10/01/20 1500   Dressing Appearance Intact;Moist drainage;other (see comments) 10/01/20 1500            Altered Skin Integrity 07/22/20 1400 Left Buttocks #2 Full thickness tissue loss. Subcutaneous fat may be visible but bone, tendon or muscle are not exposed (Active)   07/22/20 1400   Altered Skin Integrity Present on Admission: yes   Side: Left   Orientation:    Location: Buttocks   Wound Number (optional): #2   Is this injury device related?:    Primary Wound Type:    Description of Altered Skin Integrity: Full thickness tissue loss. Subcutaneous fat may be visible but bone, tendon or muscle are not exposed   Removal Indication and Assessment:    Wound Outcome:    (Retired) Wound Length (cm):    (Retired) Wound Width (cm):    (Retired) Depth (cm):    Wound Description (Comments):    Removal Indications:    Description of Altered Skin Integrity Full thickness tissue loss. Subcutaneous fat may be visible but bone, tendon or muscle are not  exposed 10/01/20 1500   Dressing Appearance Intact;other (see comments) 10/01/20 1500            Altered Skin Integrity 09/24/20 1330 Left lateral Malleolus Partial thickness tissue loss. Shallow open ulcer with a red or pink wound bed, without slough. Intact or Open/Ruptured Serum-filled blister. (Active)   09/24/20 1330   Altered Skin Integrity Present on Admission: yes   Side: Left   Orientation: lateral   Location: Malleolus   Wound Number (optional):    Is this injury device related?:    Primary Wound Type:    Description of Altered Skin Integrity: Partial thickness tissue loss. Shallow open ulcer with a red or pink wound bed, without slough. Intact or Open/Ruptured Serum-filled blister.   Removal Indication and Assessment:    Wound Outcome:    (Retired) Wound Length (cm):    (Retired) Wound Width (cm):    (Retired) Depth (cm):    Wound Description (Comments):    Removal Indications:    Description of Altered Skin Integrity Partial thickness tissue loss. Shallow open ulcer with a red or pink wound bed, without slough. Intact or Open/Ruptured Serum-filled blister. 10/01/20 1500   Dressing Appearance Intact;Moist drainage 10/01/20 1500   Drainage Amount Small 10/01/20 1500   Drainage Characteristics/Odor Serosanguineous 10/01/20 1500   Appearance Pink;Red;Moist 10/01/20 1500   Tissue loss description Partial thickness 10/01/20 1500   Red (%), Wound Tissue Color 100 % 10/01/20 1500   Periwound Area Intact;Dry 10/01/20 1500   Wound Edges Irregular 10/01/20 1500   Wound Length (cm) 0.8 cm 10/01/20 1500   Wound Width (cm) 1.2 cm 10/01/20 1500   Wound Depth (cm) 0.1 cm 10/01/20 1500   Wound Volume (cm^3) 0.1 cm^3 10/01/20 1500   Wound Surface Area (cm^2) 0.96 cm^2 10/01/20 1500   Care Cleansed with:;Sterile normal saline 10/01/20 1500   Dressing Hydrofiber;Island/border 10/01/20 1500   Periwound Care Skin barrier film applied 10/01/20 1500   Dressing Change Due 10/05/20 10/01/20 1500   Right Ischial Wound   Cleanse  wound with: Normal saline   Lidocaine: prn   Silver nitrate: prn   Breanna wound: Cavilon, Benzoin, Vac drape,   Primary dressing: Black foam to wound bed and tunneling @11 ,secure with vac drape,  wound vac continuous suction 125mmHg, track to right hip off bony prominence.   Offloading: Low air loss mattress at home   Frequency: Mondays and Thursdays in clinic     Left Buttocks and Left Ankle Wound   Cleanse wound with: Normal saline   Lidocaine: prn   Silver nitrate: prn   Periwound care: Cavilon   Primary dressing: Hydrofera Ready   Secondary dressing: Aquacel 4x4 foam border   Offloading: Low air loss mattress   Frequency: Mondays and Thursdays in clinic     Follow-up: in 2 weeks 10/08/2020 with Dr. Tom   [facility] to perform wound vac dressing changes PRN. Change colostomy bag Every Other Day and PRN.  Apply Brain ring around stoma, Cover wound around stoma with Hydrofera classic saline moist  ostomy ring, before applying colostomy bag.        Plan:                10/8/2020

## 2020-10-01 NOTE — PROGRESS NOTES
10/01/20 1507   Hyperbaric Pre-Inspection   Mattress cleaned prior to treatment Yes   2 Patient Identifiers Verified Yes   For Inpatients: Verify correct ID band/correct chart No   Consent Obtained Yes   Cotton Gown Yes   Patient voided No   Drainage Bags Emptied Yes   Drainage tubes secured Yes   Patient Pregnant Not applicable   Glasses, Jewelry, Makeup, etc. Removed Yes   Hard contacts removed No   Dentures, Hearing Aid, Prosthetic Devices Removed Yes   Touch hair to check for hair spray Yes   Cold/Flu Symptoms No   Diabetic Patient Eaten Yes   Fears and apprehensions verbalized Yes   Ground Wire Secured Yes   HBO Treatment Course Details   Treatment Course Number 1   Total Treatments Ordered 40   Ordering Provider Negro   Indications Chronic refractory osteomyelitis   HBO Treatment Start Date 08/10/20   HBO Treatment Details   Treatment Number 24   Total Treatment Length Calc (minutes) 107   Chamber Type Monoplace   Chamber # 4   HBO Dive Log # [id number]   Treatment Protocol 2.0 PETER x 90 minutes w/100% oxygen and no air break   Treatment Details   Dive Rate Down 1.5 psi/minute   Dive Rate Up 2.0 psi/minute   Compress Begins 1 PETER   Clock Time 1055   Tx Pressure Reached 2 PETER   Clock Time 1105   Decompress Begins 2 PETER   Clock Time 1235   Decompress Ends 1 PETER   Clock Time 1242   Pre HBO Vital Signs   BP (!) 141/75   Pulse 95   Resp 16   Temp 98 °F (36.7 °C)   Blood Glucose 179   Glucose Meter # HBO   Pain Level 0   Post HBO Vital Signs   /75   Pulse 67   Resp 16   Temp 98 °F (36.7 °C)   Blood Glucose 126   Glucose Meter # HBO   Pain Level 0   Ear Evaluation   Left Teed Scale Pre Grade 0   Left Teed Scale Post Grade 0   Right Teed Scale Pre Grade 0   Right Teed Scale Post Grade 0   Physician Supervision  I provided direct supervision and was immediately available to furnish assistance and direction throughout the performance of the procedure.    Emergency Response Team  A trained emergency response team and  emergency services were available throughout procedure.  Patient tolerated treatment well.  Continue present treatment plan.  Patient reports no complaints, tolerated treatment well.   DX:  M86.68, L89.325

## 2020-10-02 ENCOUNTER — HOSPITAL ENCOUNTER (OUTPATIENT)
Dept: WOUND CARE | Facility: HOSPITAL | Age: 45
Discharge: HOME | End: 2020-10-02
Attending: SURGERY
Payer: MEDICARE

## 2020-10-02 DIAGNOSIS — M86.68 CHRONIC OSTEOMYELITIS OF SYMPHYSIS PUBIS: ICD-10-CM

## 2020-10-02 DIAGNOSIS — L97.324 NON-PRESSURE CHRONIC ULCER OF LEFT ANKLE WITH NECROSIS OF BONE: ICD-10-CM

## 2020-10-02 DIAGNOSIS — M86.60 CHRONIC OSTEOMYELITIS: Primary | ICD-10-CM

## 2020-10-02 PROCEDURE — G0277 HBOT, FULL BODY CHAMBER, 30M: HCPCS | Mod: CCAT

## 2020-10-02 NOTE — PROGRESS NOTES
10/02/20 1329   Hyperbaric Pre-Inspection   Mattress cleaned prior to treatment Yes   2 Patient Identifiers Verified Yes   For Inpatients: Verify correct ID band/correct chart Yes   Consent Obtained Yes   Cotton Gown Yes   Patient voided Yes   Drainage Bags Emptied Not applicable   Patient Pregnant Not applicable   Glasses, Jewelry, Makeup, etc. Removed Yes   Hard contacts removed Yes   Dentures, Hearing Aid, Prosthetic Devices Removed Yes   Touch hair to check for hair spray Yes   Cold/Flu Symptoms No   Diabetic Patient Eaten Yes   Medications Given No   For Inpatients: Medication sheet sent with patient No   Fears and apprehensions verbalized No   Ground Wire Secured Yes   HBO Treatment Course Details   Treatment Course Number 1   Total Treatments Ordered 40   Ordering Provider Dr. Bryant   Indications Chronic refractory osteomyelitis   HBO Treatment Start Date 08/10/20   HBO Treatment Details   Treatment Number 25   Inpatient Visit No   Total Treatment Length Calc (minutes) 107   Chamber Type Monoplace   Chamber # 4   HBO Dive Log # [id number]   Treatment Protocol 2.0 PETER x 120 minutes w/100% oxygen and no air break   Treatment Details   Dive Rate Down 1.5 psi/minute   Dive Rate Up 2.0 psi/minute   Compress Begins 1.0 PETER   Clock Time 1010   Tx Pressure Reached 2.0 PETER   Clock Time 1020   Decompress Begins 2.0 PETER   Clock Time 1150   Decompress Ends 1.0 PETER   Clock Time 1157   Pre HBO Vital Signs   /76   Pulse 100   Resp 16   Temp 97.3 °F (36.3 °C)   Blood Glucose 160   Glucose Meter # HBO   Pain Level 0   Post HBO Vital Signs   /84   Pulse 88   Resp 16   Temp 97.3 °F (36.3 °C)   Blood Glucose 131   Glucose Meter # HBO   Pain Level 0   Ear Evaluation   Left Teed Scale Pre Grade 0   Left Teed Scale Post Grade 0   Right Teed Scale Pre Grade 0   Right Teed Scale Post Grade 0   Physician Supervision  I provided direct supervision and was immediately available to furnish assistance and direction throughout  the performance of the procedure.    Patient tolerated treatment well.  Continue present treatment plan.        Emergency Response Team  A trained emergency response team and emergency services were available throughout procedure.   DX CODES: M86.68, L89.324

## 2020-10-06 NOTE — PROGRESS NOTES
Subjective:       Patient ID: Hermann Aguilar is a 45 y.o. male.    Chief Complaint: Pressure Ulcer    HPI  Review of Systems    Objective:      Physical Exam    Assessment:       No diagnosis found.       Negative Pressure Wound Therapy  07/14/20 1730 Right (Active)   07/14/20 1730   Side: Right   Orientation:    Location: Ischial tuberosity   Additional Comments:    Location:    SDO Location:    NPWT Type Vacuum Therapy 10/01/20 1500   Therapy Setting NPWT Continuous therapy 10/01/20 1500   Pressure Setting NPWT 125 mmHg 10/01/20 1500   Therapy Interventions NPWT other (see comments) 10/01/20 1500   General Output (mL) 50 10/01/20 1500            Altered Skin Integrity 05/02/20 2300 Right Ischial tuberosity #1 Full thickness tissue loss with exposed bone, tendon, or muscle. Often includes undermining and tunneling. May extend into muscle and/or supporting structures. (Active)   05/02/20 2300   Altered Skin Integrity Present on Admission: yes   Side: Right   Orientation:    Location: Ischial tuberosity   Wound Number (optional): #1   Is this injury device related?: No   Primary Wound Type:    Description of Altered Skin Integrity: Full thickness tissue loss with exposed bone, tendon, or muscle. Often includes undermining and tunneling. May extend into muscle and/or supporting structures.   Removal Indication and Assessment:    Wound Outcome: Healed   (Retired) Wound Length (cm):    (Retired) Wound Width (cm):    (Retired) Depth (cm):    Wound Description (Comments):    Removal Indications:    Description of Altered Skin Integrity Full thickness tissue loss with exposed bone, tendon, or muscle. Often includes undermining and tunneling. May extend into muscle and/or supporting structures. 10/01/20 1500   Dressing Appearance Intact;Moist drainage;other (see comments) 10/01/20 1500            Altered Skin Integrity 07/22/20 1400 Left Buttocks #2 Full thickness tissue loss. Subcutaneous fat may be visible but bone,  tendon or muscle are not exposed (Active)   07/22/20 1400   Altered Skin Integrity Present on Admission: yes   Side: Left   Orientation:    Location: Buttocks   Wound Number (optional): #2   Is this injury device related?:    Primary Wound Type:    Description of Altered Skin Integrity: Full thickness tissue loss. Subcutaneous fat may be visible but bone, tendon or muscle are not exposed   Removal Indication and Assessment:    Wound Outcome:    (Retired) Wound Length (cm):    (Retired) Wound Width (cm):    (Retired) Depth (cm):    Wound Description (Comments):    Removal Indications:    Description of Altered Skin Integrity Full thickness tissue loss. Subcutaneous fat may be visible but bone, tendon or muscle are not exposed 10/01/20 1500   Dressing Appearance Intact;other (see comments) 10/01/20 1500            Altered Skin Integrity 09/24/20 1330 Left lateral Malleolus Partial thickness tissue loss. Shallow open ulcer with a red or pink wound bed, without slough. Intact or Open/Ruptured Serum-filled blister. (Active)   09/24/20 1330   Altered Skin Integrity Present on Admission: yes   Side: Left   Orientation: lateral   Location: Malleolus   Wound Number (optional):    Is this injury device related?:    Primary Wound Type:    Description of Altered Skin Integrity: Partial thickness tissue loss. Shallow open ulcer with a red or pink wound bed, without slough. Intact or Open/Ruptured Serum-filled blister.   Removal Indication and Assessment:    Wound Outcome:    (Retired) Wound Length (cm):    (Retired) Wound Width (cm):    (Retired) Depth (cm):    Wound Description (Comments):    Removal Indications:    Description of Altered Skin Integrity Partial thickness tissue loss. Shallow open ulcer with a red or pink wound bed, without slough. Intact or Open/Ruptured Serum-filled blister. 10/01/20 1500   Dressing Appearance Intact;Moist drainage 10/01/20 1500   Drainage Amount Small 10/01/20 1500   Drainage  Characteristics/Odor Serosanguineous 10/01/20 1500   Appearance Pink;Red;Moist 10/01/20 1500   Tissue loss description Partial thickness 10/01/20 1500   Red (%), Wound Tissue Color 100 % 10/01/20 1500   Periwound Area Intact;Dry 10/01/20 1500   Wound Edges Irregular 10/01/20 1500   Wound Length (cm) 0.8 cm 10/01/20 1500   Wound Width (cm) 1.2 cm 10/01/20 1500   Wound Depth (cm) 0.1 cm 10/01/20 1500   Wound Volume (cm^3) 0.1 cm^3 10/01/20 1500   Wound Surface Area (cm^2) 0.96 cm^2 10/01/20 1500   Care Cleansed with:;Sterile normal saline 10/01/20 1500   Dressing Hydrofiber;Island/border 10/01/20 1500   Periwound Care Skin barrier film applied 10/01/20 1500   Dressing Change Due 10/05/20 10/01/20 1500       Right Ischial Wound   Cleanse wound with: Normal saline   Lidocaine: prn   Silver nitrate: prn   Breanna wound: Cavilon, Benzoin, Vac drape,   Primary dressing: Black foam to wound bed and tunneling @11 ,secure with vac drape,  wound vac continuous suction 125mmHg, track to right hip off bony prominence.   Offloading: Low air loss mattress at home   Frequency: Mondays and Thursdays in clinic, prn per home health     Left Buttocks and Left Ankle Wound   Cleanse wound with: Normal saline   Lidocaine: prn   Silver nitrate: prn   Periwound care: Cavilon   Primary dressing: Hydrofera Ready   Secondary dressing: Aquacel 4x4 foam border   Offloading: Low air loss mattress   Frequency: Mondays and Thursdays in clinic, prn per home health    Follow-up: in 2 weeks 10/08/2020 with Dr. Tom   Home Health: [facility] to perform wound care and wound vac dressing changes PRN. Change colostomy bag Every Other Day and PRN.  Apply Brain ring around stoma, Cover wound around stoma with Hydrofera classic saline moist  ostomy ring, before applying colostomy bag.    Plan:

## 2020-10-08 ENCOUNTER — HOSPITAL ENCOUNTER (OUTPATIENT)
Dept: WOUND CARE | Facility: HOSPITAL | Age: 45
Discharge: HOME | End: 2020-10-08
Attending: SURGERY
Payer: MEDICARE

## 2020-10-08 VITALS
BODY MASS INDEX: 28 KG/M2 | DIASTOLIC BLOOD PRESSURE: 84 MMHG | HEART RATE: 81 BPM | WEIGHT: 200 LBS | TEMPERATURE: 98 F | SYSTOLIC BLOOD PRESSURE: 139 MMHG | HEIGHT: 71 IN

## 2020-10-08 DIAGNOSIS — L89.314 PRESSURE INJURY OF RIGHT ISCHIUM, STAGE 4: Primary | ICD-10-CM

## 2020-10-08 PROCEDURE — 97605 NEG PRS WND THER DME<=50SQCM: CPT

## 2020-10-09 ENCOUNTER — OFFICE VISIT (OUTPATIENT)
Dept: UROLOGY | Facility: CLINIC | Age: 45
End: 2020-10-09
Payer: MEDICARE

## 2020-10-09 DIAGNOSIS — N31.9 NEUROGENIC BLADDER: Primary | ICD-10-CM

## 2020-10-09 DIAGNOSIS — Z93.59 CHRONIC SUPRAPUBIC CATHETER: ICD-10-CM

## 2020-10-09 PROCEDURE — 51705 CHANGE OF BLADDER TUBE: CPT | Mod: S$GLB,,, | Performed by: NURSE PRACTITIONER

## 2020-10-09 PROCEDURE — 99499 NO LOS: ICD-10-PCS | Mod: S$GLB,,, | Performed by: NURSE PRACTITIONER

## 2020-10-09 PROCEDURE — 51705 PR CHANGE OF BLADDER TUBE,SIMPLE: ICD-10-PCS | Mod: S$GLB,,, | Performed by: NURSE PRACTITIONER

## 2020-10-09 PROCEDURE — 99499 UNLISTED E&M SERVICE: CPT | Mod: S$GLB,,, | Performed by: NURSE PRACTITIONER

## 2020-10-09 NOTE — PROGRESS NOTES
Subjective:       Patient ID: Hermann Aguilar is a 45 y.o. male.    Chief Complaint: Wound Care    07/22/2020- new pt to clinic for Dr. Tom. Pt with history with paraplegia since 2016, dm and htn. Pt lives alone but states that his sister lives 5 minutes away and he has a caregiver that comes daily from 10am- 5pm. Pt presents with right ischial wound that is recurrent for 1.5 years and left buttocks wound. Pt was recently hospitalized for UTI. 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 Recent wound cultures negative for any growth. Pt is receiving IV meropenem and vancomycin via medicine ball pump. Wound care as follows: wound vac to right ischial and  Santyl/ xeroform/ foam border to left buttocks. Pt receiving home health via [facility]. Orders routed. Pt educated about HBO treatment, ekg and labs drawn, awaiting insurance authorization. Pt to f/u with Dr. Tom in 2 weeks 08/05/2020.   08/05/2020- f/u with Dr. Tom. Lab results reviewed. Pt to start HBO 08/10/2020. Pt currently receiving home health. If pt starts HBO, home health will be dc'd and wound care visits will be Mondays & Thursdays. Wound vac placed, seal intact. F/u with Dr. Tom in 2 weeks 08/19/2020.  8/10/2020: Nurse visit for dressing change. Patient started HBOT today. Will DC home health, dressing to be changed in clinic on Mondays, and Thursdays. Orders sent to home health. Continuned with [employer] wound vac at 125mmhg continuous: Applied cavilon, benzoin and vac drape to yahaira wound, black foam x 1 to undermining at 11:00 and wound bed, secured with vac drape, tacked to right hip off bony prominence. Patient tolerated well. Nurse visits 8/13/, 8/17/2020 fu with Dr. Tom 8/20/2020.  08/13/2020- nurse visit for dressing change following HBOT. Wound vac changed, seal intact, canister changed, suction at 125mmHg. Pt tolerated. Next dressing change 08/17/2020 and f/u with Dr. Tom  08/20/2020.  08/17/2020- nurse visit for dressing change following HBOT. Wound vac changed, seal intact, suction at 125mmHg. Pt tolerated. Next dressing change 08/24/2020 and f/u with Dr. Tom 08/20/2020.  08/31/2020- nurse visit for dressing change following HBOT. Wound vac changed, seal intact, suction at 125mmHg. Pt tolerated. Cont POC. F/u with Dr. Tom 09/03/2020.  09/03/2020- f/u with dr. Tom following HBOT. Wound vac changed, seal intact, suction at 125mmHg and canister changed. Consent for colostomy signed with Dr. Tom for 09/11/2020. Home health to change dressing as needed. Pt for nurse visit 09/10/2020, f/u with Dr. Tom 09/17/2020.  09/10/2020- nurse visit following HBOT. Wound vac changed, seal intact, suction at 125mmHg and canister changed.Pt for nurse visit 09/14/2020, f/u with Dr. Tom 09/17/2020.  9/21/20: Nurse visit. Wound vac using black foam @ 125mm/hg continuous. No complaints voiced. No apparent problems noted. F/U 9/24/20.   09/24/2020- f/u with Dr. Tom. New wound noted to left ankle, dress with hydrofera ready and foam border. Dr. Tom assessed colostomy site, sutures and red rubber catheter removed and bag replaced by Dr. Tom. Dr. Tom spoke to home health and they stated that they will be coming out tomorrow to change out colostomy bag. Pt to f/u with Dr. Tom in 2 weeks 10/08/2020, nurse visits Mondays and Fridays until then.  10/1/2020: Nurse visit for dressing change. Patient upset phone not working.  called to assess wound around ostomy.  gave orders to apply saline moist hydorfera classic ostomy ring around stoma before applying ostomy bag. Colostomy to be changed every other day and Prn.  Wound vac to be changed prn per home health.   called patient's  Misael at [phone] to discuss patient's peristomal wound care and wound vac.   Patient refused wound care to left buttock wound and right ischial wound  "today. Patient stated "  I called home health out yesterday to change them because the vac was beeping. I don't want them changed today. You can just change my foot dressing and call my ride." Fu 10/8/2020 with    10/09/2020- called Dr. Tom to come assess pt but he was unavailable at the time and stated to make the patient a nurse visit. Wound vac applied, tracked to right flank area, seal intact. Pt to f/u with Dr. Tom in 2 weeks 10/22/2020. Orders sent to [facility].    Review of Systems    Objective:      Physical Exam    Assessment:       1. Pressure injury of right ischium, stage 4           Negative Pressure Wound Therapy  07/14/20 1730 Right (Active)   07/14/20 1730   Side: Right   Orientation:    Location: Ischial tuberosity   Additional Comments:    Location:    SDO Location:    NPWT Type Vacuum Therapy 10/08/20 1300   Therapy Setting NPWT Continuous therapy 10/08/20 1300   Pressure Setting NPWT 125 mmHg 10/08/20 1300   Therapy Interventions NPWT Canister changed;Dressing changed;Seal intact;Trac pad replaced 10/08/20 1300   Sponges Inserted NPWT Black;1 10/08/20 1300   Sponges Removed NPWT Black;1 10/08/20 1300   General Output (mL) 150 10/08/20 1300            Altered Skin Integrity 05/02/20 2300 Right Ischial tuberosity #1 Full thickness tissue loss with exposed bone, tendon, or muscle. Often includes undermining and tunneling. May extend into muscle and/or supporting structures. (Active)   05/02/20 2300   Altered Skin Integrity Present on Admission: yes   Side: Right   Orientation:    Location: Ischial tuberosity   Wound Number (optional): #1   Is this injury device related?: No   Primary Wound Type:    Description of Altered Skin Integrity: Full thickness tissue loss with exposed bone, tendon, or muscle. Often includes undermining and tunneling. May extend into muscle and/or supporting structures.   Removal Indication and Assessment:    Wound Outcome: Healed   (Retired) Wound Length " (cm):    (Retired) Wound Width (cm):    (Retired) Depth (cm):    Wound Description (Comments):    Removal Indications:    Wound Image   10/08/20 1300   Description of Altered Skin Integrity Full thickness tissue loss with exposed bone, tendon, or muscle. Often includes undermining and tunneling. May extend into muscle and/or supporting structures. 10/08/20 1300   Dressing Appearance Intact 10/08/20 1300   Drainage Amount Large 10/08/20 1300   Drainage Characteristics/Odor Serosanguineous 10/08/20 1300   Appearance Pink;Red 10/08/20 1300   Tissue loss description Full thickness 10/08/20 1300   Red (%), Wound Tissue Color 100 % 10/08/20 1300   Periwound Area Moist 10/08/20 1300   Wound Edges Open 10/08/20 1300   Wound Length (cm) 5 cm 10/08/20 1300   Wound Width (cm) 3.9 cm 10/08/20 1300   Wound Depth (cm) 3.8 cm 10/08/20 1300   Wound Volume (cm^3) 74.1 cm^3 10/08/20 1300   Wound Surface Area (cm^2) 19.5 cm^2 10/08/20 1300   Tunneling (depth (cm)/location) 3.5cm @ 11cm 10/08/20 1300   Care Cleansed with:;Sterile normal saline 10/08/20 1300   Dressing Foam 10/08/20 1300   Periwound Care Skin barrier film applied 10/08/20 1300   Dressing Change Due 10/12/20 10/08/20 1300            Altered Skin Integrity 07/22/20 1400 Left Buttocks #2 Full thickness tissue loss. Subcutaneous fat may be visible but bone, tendon or muscle are not exposed (Active)   07/22/20 1400   Altered Skin Integrity Present on Admission: yes   Side: Left   Orientation:    Location: Buttocks   Wound Number (optional): #2   Is this injury device related?:    Primary Wound Type:    Description of Altered Skin Integrity: Full thickness tissue loss. Subcutaneous fat may be visible but bone, tendon or muscle are not exposed   Removal Indication and Assessment:    Wound Outcome:    (Retired) Wound Length (cm):    (Retired) Wound Width (cm):    (Retired) Depth (cm):    Wound Description (Comments):    Removal Indications:    Wound Image   10/08/20 1300    Description of Altered Skin Integrity Full thickness tissue loss. Subcutaneous fat may be visible but bone, tendon or muscle are not exposed 10/08/20 1300   Dressing Appearance Intact;Moist drainage 10/08/20 1300   Drainage Amount Small 10/08/20 1300   Drainage Characteristics/Odor Serosanguineous 10/08/20 1300   Appearance Pink;Red;Moist 10/08/20 1300   Tissue loss description Full thickness 10/08/20 1300   Red (%), Wound Tissue Color 100 % 10/08/20 1300   Periwound Area Intact;Dry 10/08/20 1300   Wound Edges Irregular 10/08/20 1300   Wound Length (cm) 1.4 cm 10/08/20 1300   Wound Width (cm) 1.5 cm 10/08/20 1300   Wound Depth (cm) 0.1 cm 10/08/20 1300   Wound Volume (cm^3) 0.21 cm^3 10/08/20 1300   Wound Surface Area (cm^2) 2.1 cm^2 10/08/20 1300   Care Cleansed with:;Sterile normal saline 10/08/20 1300   Dressing Hydrofiber;Island/border 10/08/20 1300   Periwound Care Skin barrier film applied 10/08/20 1300   Dressing Change Due 10/12/20 10/08/20 1300            Altered Skin Integrity 09/24/20 1330 Left lateral Malleolus Partial thickness tissue loss. Shallow open ulcer with a red or pink wound bed, without slough. Intact or Open/Ruptured Serum-filled blister. (Active)   09/24/20 1330   Altered Skin Integrity Present on Admission: yes   Side: Left   Orientation: lateral   Location: Malleolus   Wound Number (optional):    Is this injury device related?:    Primary Wound Type:    Description of Altered Skin Integrity: Partial thickness tissue loss. Shallow open ulcer with a red or pink wound bed, without slough. Intact or Open/Ruptured Serum-filled blister.   Removal Indication and Assessment:    Wound Outcome:    (Retired) Wound Length (cm):    (Retired) Wound Width (cm):    (Retired) Depth (cm):    Wound Description (Comments):    Removal Indications:    Wound Image   10/08/20 1300   Description of Altered Skin Integrity Partial thickness tissue loss. Shallow open ulcer with a red or pink wound bed, without  slough. Intact or Open/Ruptured Serum-filled blister. 10/08/20 1300   Dressing Appearance Intact;Moist drainage 10/08/20 1300   Drainage Amount Small 10/08/20 1300   Drainage Characteristics/Odor Serosanguineous 10/08/20 1300   Appearance Pink;Red;Moist 10/08/20 1300   Tissue loss description Partial thickness 10/08/20 1300   Red (%), Wound Tissue Color 100 % 10/08/20 1300   Periwound Area Intact;Dry 10/08/20 1300   Wound Edges Irregular 10/08/20 1300   Wound Length (cm) 0.7 cm 10/08/20 1300   Wound Width (cm) 1 cm 10/08/20 1300   Wound Depth (cm) 0.1 cm 10/08/20 1300   Wound Volume (cm^3) 0.07 cm^3 10/08/20 1300   Wound Surface Area (cm^2) 0.7 cm^2 10/08/20 1300   Care Cleansed with:;Sterile normal saline 10/08/20 1300   Dressing Hydrofiber;Island/border 10/08/20 1300   Periwound Care Skin barrier film applied 10/08/20 1300   Dressing Change Due 10/12/20 10/08/20 1300       Right Ischial Wound   Cleanse wound with: Normal saline   Lidocaine: prn   Silver nitrate: prn   Breanna wound: Cavilon, Benzoin, Vac drape,   Primary dressing: Black foam to wound bed and tunneling @11 ,secure with vac drape,  wound vac continuous suction 125mmHg, track to right hip off bony prominence.   Offloading: Low air loss mattress at home   Frequency: Mondays and Thursdays and prn per home health, unless in clinic. Next f/u with Dr. Tom 10/22/2020     Left Buttocks and Left Ankle Wound   Cleanse wound with: Normal saline   Lidocaine: prn   Silver nitrate: prn   Periwound care: Cavilon   Primary dressing: Hydrofera Ready   Secondary dressing: Aquacel 4x4 foam border   Offloading: Low air loss mattress   Frequency: Mondays and Thursdays and prn per home health, unless in clinic. Next f/u with Dr. Tom 10/22/2020     Follow-up: in 2 weeks 10/22/2020 with Dr. Tom   Home Health: [facility] to perform wound care and wound vac dressing changes Mondays and Thursdays and PRN. Change colostomy bag Every Other Day and PRN.   Apply Brain ring around stoma, Cover wound around stoma with Hydrofera classic saline moist  ostomy ring, before applying colostomy bag.    Plan:                Follow up in about 2 weeks (around 10/22/2020).
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
## 2020-10-22 ENCOUNTER — HOSPITAL ENCOUNTER (OUTPATIENT)
Dept: WOUND CARE | Facility: HOSPITAL | Age: 45
Discharge: HOME | End: 2020-10-22
Attending: SURGERY
Payer: MEDICARE

## 2020-10-22 VITALS
SYSTOLIC BLOOD PRESSURE: 127 MMHG | HEIGHT: 71 IN | TEMPERATURE: 98 F | HEART RATE: 83 BPM | WEIGHT: 200 LBS | DIASTOLIC BLOOD PRESSURE: 66 MMHG | BODY MASS INDEX: 28 KG/M2

## 2020-10-22 DIAGNOSIS — E11.9 TYPE 2 DIABETES MELLITUS WITHOUT COMPLICATION, WITHOUT LONG-TERM CURRENT USE OF INSULIN: ICD-10-CM

## 2020-10-22 DIAGNOSIS — Z93.3 S/P COLOSTOMY: ICD-10-CM

## 2020-10-22 DIAGNOSIS — Z89.511 HX OF RIGHT BKA: Chronic | ICD-10-CM

## 2020-10-22 DIAGNOSIS — I10 ESSENTIAL HYPERTENSION: Chronic | ICD-10-CM

## 2020-10-22 DIAGNOSIS — Z43.3 COLOSTOMY, EVALUATE: ICD-10-CM

## 2020-10-22 DIAGNOSIS — L89.313 PRESSURE INJURY OF RIGHT BUTTOCK, STAGE 3: Primary | ICD-10-CM

## 2020-10-22 DIAGNOSIS — L89.324 STAGE IV PRESSURE ULCER OF LEFT BUTTOCK: ICD-10-CM

## 2020-10-22 DIAGNOSIS — M86.9 OSTEOMYELITIS, UNSPECIFIED SITE, UNSPECIFIED TYPE: ICD-10-CM

## 2020-10-22 DIAGNOSIS — L97.512 RIGHT FOOT ULCER, WITH FAT LAYER EXPOSED: ICD-10-CM

## 2020-10-22 PROCEDURE — 99214 OFFICE O/P EST MOD 30 MIN: CPT

## 2020-10-22 NOTE — PROGRESS NOTES
Subjective:       Patient ID: Hermann Aguilar is a 45 y.o. male.    Chief Complaint: Wound Care    07/22/2020- new pt to clinic for Dr. Tom. Pt with history with paraplegia since 2016, dm and htn. Pt lives alone but states that his sister lives 5 minutes away and he has a caregiver that comes daily from 10am- 5pm. Pt presents with right ischial wound that is recurrent for 1.5 years and left buttocks wound. Pt was recently hospitalized for UTI. 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 Recent wound cultures negative for any growth. Pt is receiving IV meropenem and vancomycin via medicine ball pump. Wound care as follows: wound vac to right ischial and  Santyl/ xeroform/ foam border to left buttocks. Pt receiving home health via [facility]. Orders routed. Pt educated about HBO treatment, ekg and labs drawn, awaiting insurance authorization. Pt to f/u with Dr. Tom in 2 weeks 08/05/2020.   08/05/2020- f/u with Dr. Tom. Lab results reviewed. Pt to start HBO 08/10/2020. Pt currently receiving home health. If pt starts HBO, home health will be dc'd and wound care visits will be Mondays & Thursdays. Wound vac placed, seal intact. F/u with Dr. Tom in 2 weeks 08/19/2020.  8/10/2020: Nurse visit for dressing change. Patient started HBOT today. Will DC home health, dressing to be changed in clinic on Mondays, and Thursdays. Orders sent to home health. Continuned with [employer] wound vac at 125mmhg continuous: Applied cavilon, benzoin and vac drape to yahaira wound, black foam x 1 to undermining at 11:00 and wound bed, secured with vac drape, tacked to right hip off bony prominence. Patient tolerated well. Nurse visits 8/13/, 8/17/2020 fu with Dr. Tom 8/20/2020.  08/13/2020- nurse visit for dressing change following HBOT. Wound vac changed, seal intact, canister changed, suction at 125mmHg. Pt tolerated. Next dressing change 08/17/2020 and f/u with Dr. Tom  08/20/2020.  08/17/2020- nurse visit for dressing change following HBOT. Wound vac changed, seal intact, suction at 125mmHg. Pt tolerated. Next dressing change 08/24/2020 and f/u with Dr. Tom 08/20/2020.  08/31/2020- nurse visit for dressing change following HBOT. Wound vac changed, seal intact, suction at 125mmHg. Pt tolerated. Cont POC. F/u with Dr. Tom 09/03/2020.  09/03/2020- f/u with dr. Tom following HBOT. Wound vac changed, seal intact, suction at 125mmHg and canister changed. Consent for colostomy signed with Dr. Tom for 09/11/2020. Home health to change dressing as needed. Pt for nurse visit 09/10/2020, f/u with Dr. Tom 09/17/2020.  09/10/2020- nurse visit following HBOT. Wound vac changed, seal intact, suction at 125mmHg and canister changed.Pt for nurse visit 09/14/2020, f/u with Dr. Tom 09/17/2020.  9/21/20: Nurse visit. Wound vac using black foam @ 125mm/hg continuous. No complaints voiced. No apparent problems noted. F/U 9/24/20.   09/24/2020- f/u with Dr. Tom. New wound noted to left ankle, dress with hydrofera ready and foam border. Dr. Tom assessed colostomy site, sutures and red rubber catheter removed and bag replaced by Dr. Tom. Dr. Tom spoke to home health and they stated that they will be coming out tomorrow to change out colostomy bag. Pt to f/u with Dr. Tom in 2 weeks 10/08/2020, nurse visits Mondays and Fridays until then.  10/1/2020: Nurse visit for dressing change. Patient upset phone not working.  called to assess wound around ostomy.  gave orders to apply saline moist hydorfera classic ostomy ring around stoma before applying ostomy bag. Colostomy to be changed every other day and Prn.  Wound vac to be changed prn per home health.   called patient's  Misael at [phone] to discuss patient's peristomal wound care and wound vac.   Patient refused wound care to left buttock wound and right ischial wound  "today. Patient stated "  I called [facility] out yesterday to change them because the vac was beeping. I don't want them changed today. You can just change my foot dressing and call my ride." Fu 10/8/2020 with    10/09/2020- called Dr. Tom to come assess pt but he was unavailable at the time and stated to make the patient a nurse visit. Wound vac applied, tracked to right flank area, seal intact. Pt to f/u with Dr. Tom in 2 weeks 10/22/2020. Orders sent to [facility].  10/22/20: Patient at clinic for doctor visit assessment. He did not bring any supplies to change wound vac dressing. He states that he did not want to come for visit. Dr Tom assessed patient's wounds. All are improving. Patient agreed to have a wet to dry dressing today and HH to reapply wound vac on Sat. He agreed to F/U in 2 weeks and bring wound vac supplies to his appt. Updated orders sent to [facility] and spoke to  nurse about orders. F/U 11/5/20.    Review of Systems   Constitutional: Negative.    HENT: Negative.    Eyes: Negative.    Respiratory: Negative.    Cardiovascular: Negative.    Gastrointestinal: Negative.    Genitourinary: Negative.    Musculoskeletal: Negative.    Neurological: Negative.    Psychiatric/Behavioral: Negative.        Objective:      Physical Exam  Constitutional:       Appearance: He is well-developed.   HENT:      Head: Normocephalic.   Eyes:      Conjunctiva/sclera: Conjunctivae normal.      Pupils: Pupils are equal, round, and reactive to light.   Neck:      Musculoskeletal: Normal range of motion and neck supple.   Cardiovascular:      Rate and Rhythm: Normal rate and regular rhythm.      Heart sounds: Normal heart sounds.   Pulmonary:      Effort: Pulmonary effort is normal.      Breath sounds: Normal breath sounds.   Abdominal:      General: Bowel sounds are normal.      Palpations: Abdomen is soft.   Musculoskeletal: Normal range of motion.   Skin:     General: Skin is warm and dry. "   Neurological:      Mental Status: He is alert and oriented to person, place, and time.      Deep Tendon Reflexes: Reflexes are normal and symmetric.         Assessment:       1. Pressure injury of right buttock, stage 3    2. Type 2 diabetes mellitus without complication, without long-term current use of insulin    3. 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 Right foot ulcer, with fat layer exposed    5. S/P colostomy    6. Osteomyelitis, unspecified site, unspecified type           Altered Skin Integrity 05/02/20 2300 Right Ischial tuberosity #1 Full thickness tissue loss with exposed bone, tendon, or muscle. Often includes undermining and tunneling. May extend into muscle and/or supporting structures. (Active)   05/02/20 2300   Altered Skin Integrity Present on Admission: yes   Side: Right   Orientation:    Location: Ischial tuberosity   Wound Number (optional): #1   Is this injury device related?: No   Primary Wound Type:    Description of Altered Skin Integrity: Full thickness tissue loss with exposed bone, tendon, or muscle. Often includes undermining and tunneling. May extend into muscle and/or supporting structures.   Removal Indication and Assessment:    Wound Outcome: Healed   (Retired) Wound Length (cm):    (Retired) Wound Width (cm):    (Retired) Depth (cm):    Wound Description (Comments):    Removal Indications:    Wound Image   10/22/20 1000   Description of Altered Skin Integrity Full thickness tissue loss. Subcutaneous fat may be visible but bone, tendon or muscle are not exposed 10/22/20 1000   Dressing Appearance Moist drainage 10/22/20 1000   Drainage Amount Large 10/22/20 1000   Drainage Characteristics/Odor Serosanguineous 10/22/20 1000   Appearance Red;Pink 10/22/20 1000   Tissue loss description Full thickness 10/22/20 1000   Red (%), Wound Tissue Color 100 % 10/22/20 1000   Periwound Area Moist 10/22/20 1000   Wound Edges Open 10/22/20 1000   Wound Length (cm) 4.8 cm 10/22/20 1000    Wound Width (cm) 3.9 cm 10/22/20 1000   Wound Depth (cm) 4.5 cm 10/22/20 1000   Wound Volume (cm^3) 84.24 cm^3 10/22/20 1000   Wound Surface Area (cm^2) 18.72 cm^2 10/22/20 1000   Tunneling (depth (cm)/location) 3.7CM @ 11 10/22/20 1000   Care Cleansed with:;Sterile normal saline 10/22/20 1000   Dressing Applied;Calcium alginate;Gauze;Abd pad 10/22/20 1000   Periwound Care Skin barrier film applied 10/22/20 1000   Dressing Change Due 10/24/20 10/22/20 1000            Altered Skin Integrity 07/22/20 1400 Left Buttocks #2 Full thickness tissue loss. Subcutaneous fat may be visible but bone, tendon or muscle are not exposed (Active)   07/22/20 1400   Altered Skin Integrity Present on Admission: yes   Side: Left   Orientation:    Location: Buttocks   Wound Number (optional): #2   Is this injury device related?:    Primary Wound Type:    Description of Altered Skin Integrity: Full thickness tissue loss. Subcutaneous fat may be visible but bone, tendon or muscle are not exposed   Removal Indication and Assessment:    Wound Outcome:    (Retired) Wound Length (cm):    (Retired) Wound Width (cm):    (Retired) Depth (cm):    Wound Description (Comments):    Removal Indications:    Wound Image   10/22/20 1000   Description of Altered Skin Integrity Full thickness tissue loss. Subcutaneous fat may be visible but bone, tendon or muscle are not exposed 10/22/20 1000   Dressing Appearance Intact;Moist drainage 10/22/20 1000   Drainage Amount Small 10/22/20 1000   Drainage Characteristics/Odor Serosanguineous 10/22/20 1000   Appearance Red 10/22/20 1000   Tissue loss description Full thickness 10/22/20 1000   Red (%), Wound Tissue Color 100 % 10/22/20 1000   Periwound Area Intact;Dry 10/22/20 1000   Wound Edges Defined 10/22/20 1000   Wound Length (cm) 0.8 cm 10/22/20 1000   Wound Width (cm) 1.5 cm 10/22/20 1000   Wound Depth (cm) 0.1 cm 10/22/20 1000   Wound Volume (cm^3) 0.12 cm^3 10/22/20 1000   Wound Surface Area (cm^2) 1.2  cm^2 10/22/20 1000   Care Cleansed with:;Sterile normal saline 10/22/20 1000   Dressing Applied;Other (see comments);Island/border 10/22/20 1000   Periwound Care Skin barrier film applied 10/22/20 1000   Dressing Change Due 10/26/20 10/22/20 1000            Altered Skin Integrity 09/24/20 1330 Left lateral Malleolus Partial thickness tissue loss. Shallow open ulcer with a red or pink wound bed, without slough. Intact or Open/Ruptured Serum-filled blister. (Active)   09/24/20 1330   Altered Skin Integrity Present on Admission: yes   Side: Left   Orientation: lateral   Location: Malleolus   Wound Number (optional):    Is this injury device related?:    Primary Wound Type:    Description of Altered Skin Integrity: Partial thickness tissue loss. Shallow open ulcer with a red or pink wound bed, without slough. Intact or Open/Ruptured Serum-filled blister.   Removal Indication and Assessment:    Wound Outcome:    (Retired) Wound Length (cm):    (Retired) Wound Width (cm):    (Retired) Depth (cm):    Wound Description (Comments):    Removal Indications:    Description of Altered Skin Integrity Partial thickness tissue loss. Shallow open ulcer with a red or pink wound bed, without slough. Intact or Open/Ruptured Serum-filled blister. 10/22/20 1000   Dressing Appearance Intact;Dry;Moist drainage 10/22/20 1000   Drainage Amount Small 10/22/20 1000   Drainage Characteristics/Odor Serosanguineous 10/22/20 1000   Appearance Red 10/22/20 1000   Tissue loss description Partial thickness 10/22/20 1000   Red (%), Wound Tissue Color 100 % 10/22/20 1000   Periwound Area Intact;Dry 10/22/20 1000   Wound Edges Defined 10/22/20 1000   Wound Length (cm) 0.4 cm 10/22/20 1000   Wound Width (cm) 0.6 cm 10/22/20 1000   Wound Depth (cm) 0.1 cm 10/22/20 1000   Wound Volume (cm^3) 0.02 cm^3 10/22/20 1000   Wound Surface Area (cm^2) 0.24 cm^2 10/22/20 1000   Care Cleansed with:;Sterile normal saline 10/22/20 1000   Dressing Island/border;Other  (see comments) 10/22/20 1000   Periwound Care Skin barrier film applied 10/22/20 1000   Dressing Change Due 10/26/20 10/22/20 1000     Right Ischial Wound   Cleanse wound with: Normal saline   Lidocaine: prn   Silver nitrate: prn   Breanna wound: Cavilon, Benzoin, Vac drape,   Primary dressing: Black foam to wound bed and tunneling @11 ,secure with vac drape,  wound vac continuous suction 125mmHg, track to right hip off bony prominence.   Offloading: Low air loss mattress at home   Frequency: Mondays and Thursdays and prn per [facility], unless in clinic. Next f/u with Dr. Tom 11/5/20     Left Buttocks and Left Ankle Wound   Cleanse wound with: Normal saline   Lidocaine: prn   Silver nitrate: prn   Periwound care: Cavilon   Primary dressing: Hydrofera Ready   Secondary dressing: Aquacel 4x4 foam border   Offloading: Low air loss mattress   Frequency: Mondays and Thursdays and prn per home health, unless in clinic. Next f/u with Dr. Tom 11/5/20     Follow-up: in 2 weeks 11/5//2020 with Dr. Tom     Home Health: [facility] to perform wound care and wound vac dressing changes Mondays, Thursdays,  and PRN. Change colostomy bag Mondays, Thursdays, Saturdays and PRN.  Apply Brain ring around stoma, Cover wound around stoma with Hydrofera classic saline moist  ostomy ring, before applying colostomy bag.     [facility] to reapply wound vac on Saturday 11/24/20. [facility] to order vac supplies as needed.     Wet to dry gauze to right ischial wound today in clinic.    Patient at clinic for doctor visit assessment. He did not bring any supplies to change wound vac dressing. He states that he did not want to come for visit. Dr Tom assessed patient's wounds. All are improving. Patient agreed to have a wet to dry dressing today and  to reapply wound vac on Sat. He agreed to F/U in 2 weeks and bring wound vac supplies to his appt. Updated orders sent to home health and spoke to  nurse about  orders. F/U 11/5/20.          Additional Information            Plan:                Follow up in about 4 days (around 10/26/2020).

## 2020-10-22 NOTE — PROGRESS NOTES
Subjective:       Patient ID: Hermann Aguilar is a 45 y.o. male.    Chief Complaint: Wound Care    HPI  Review of Systems   Constitutional: Negative.    HENT: Negative.    Eyes: Negative.    Respiratory: Negative.    Cardiovascular: Negative.    Gastrointestinal: Negative.    Genitourinary: Negative.    Musculoskeletal: Negative.    Neurological: Negative.    Psychiatric/Behavioral: Negative.          Objective:      Physical Exam  Constitutional:       Appearance: He is well-developed.   HENT:      Head: Normocephalic.   Eyes:      Conjunctiva/sclera: Conjunctivae normal.      Pupils: Pupils are equal, round, and reactive to light.   Neck:      Musculoskeletal: Normal range of motion and neck supple.   Cardiovascular:      Rate and Rhythm: Normal rate and regular rhythm.      Heart sounds: Normal heart sounds.   Pulmonary:      Effort: Pulmonary effort is normal.      Breath sounds: Normal breath sounds.   Abdominal:      General: Bowel sounds are normal.      Palpations: Abdomen is soft.   Musculoskeletal: Normal range of motion.   Skin:     General: Skin is warm and dry.   Neurological:      Mental Status: He is alert and oriented to person, place, and time.      Deep Tendon Reflexes: Reflexes are normal and symmetric.         Assessment:       1. Pressure injury of right buttock, stage 3    2. Type 2 diabetes mellitus without complication, without long-term current use of insulin    3. 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 Right foot ulcer, with fat layer exposed    5. S/P colostomy    6. Osteomyelitis, unspecified site, unspecified type        Plan:     continue present treatment.

## 2020-11-05 ENCOUNTER — OFFICE VISIT (OUTPATIENT)
Dept: UROLOGY | Facility: CLINIC | Age: 45
End: 2020-11-05
Payer: MEDICARE

## 2020-11-05 VITALS — SYSTOLIC BLOOD PRESSURE: 125 MMHG | DIASTOLIC BLOOD PRESSURE: 83 MMHG | HEART RATE: 70 BPM

## 2020-11-05 DIAGNOSIS — N31.9 NEUROGENIC BLADDER: Primary | ICD-10-CM

## 2020-11-05 DIAGNOSIS — N39.0 URINARY TRACT INFECTION WITHOUT HEMATURIA, SITE UNSPECIFIED: ICD-10-CM

## 2020-11-05 PROCEDURE — 99999 PR PBB SHADOW E&M-EST. PATIENT-LVL V: ICD-10-PCS | Mod: PBBFAC,,, | Performed by: NURSE PRACTITIONER

## 2020-11-05 PROCEDURE — 3079F DIAST BP 80-89 MM HG: CPT | Mod: CPTII,S$GLB,, | Performed by: NURSE PRACTITIONER

## 2020-11-05 PROCEDURE — 87086 URINE CULTURE/COLONY COUNT: CPT

## 2020-11-05 PROCEDURE — 99499 UNLISTED E&M SERVICE: CPT | Mod: S$GLB,,, | Performed by: NURSE PRACTITIONER

## 2020-11-05 PROCEDURE — 3079F PR MOST RECENT DIASTOLIC BLOOD PRESSURE 80-89 MM HG: ICD-10-PCS | Mod: CPTII,S$GLB,, | Performed by: NURSE PRACTITIONER

## 2020-11-05 PROCEDURE — 3074F PR MOST RECENT SYSTOLIC BLOOD PRESSURE < 130 MM HG: ICD-10-PCS | Mod: CPTII,S$GLB,, | Performed by: NURSE PRACTITIONER

## 2020-11-05 PROCEDURE — 51705 CHANGE OF BLADDER TUBE: CPT | Mod: S$GLB,,, | Performed by: NURSE PRACTITIONER

## 2020-11-05 PROCEDURE — 99499 NO LOS: ICD-10-PCS | Mod: S$GLB,,, | Performed by: NURSE PRACTITIONER

## 2020-11-05 PROCEDURE — 3074F SYST BP LT 130 MM HG: CPT | Mod: CPTII,S$GLB,, | Performed by: NURSE PRACTITIONER

## 2020-11-05 PROCEDURE — 87077 CULTURE AEROBIC IDENTIFY: CPT

## 2020-11-05 PROCEDURE — 51705 PR CHANGE OF BLADDER TUBE,SIMPLE: ICD-10-PCS | Mod: S$GLB,,, | Performed by: NURSE PRACTITIONER

## 2020-11-05 PROCEDURE — 87088 URINE BACTERIA CULTURE: CPT

## 2020-11-05 PROCEDURE — 87186 SC STD MICRODIL/AGAR DIL: CPT

## 2020-11-05 PROCEDURE — 99999 PR PBB SHADOW E&M-EST. PATIENT-LVL V: CPT | Mod: PBBFAC,,, | Performed by: NURSE PRACTITIONER

## 2020-11-05 RX ORDER — AMPICILLIN 500 MG/1
500 CAPSULE ORAL 2 TIMES DAILY
Qty: 20 CAPSULE | Refills: 0 | Status: ON HOLD | OUTPATIENT
Start: 2020-11-05 | End: 2020-11-13 | Stop reason: HOSPADM
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
## 2020-11-05 NOTE — PLAN OF CARE
Wound care performed by ALFONSO Diaz, Wound Care Nurse.   [Time Spent: ___ minutes] : I have spent [unfilled] minutes of time on the encounter. [>50% of the face to face encounter time was spent on counseling and/or coordination of care for ___] : Greater than 50% of the face to face encounter time was spent on counseling and/or coordination of care for [unfilled]

## 2020-11-09 LAB — BACTERIA UR CULT: ABNORMAL

## 2020-11-10 ENCOUNTER — TELEPHONE (OUTPATIENT)
Dept: UROLOGY | Facility: CLINIC | Age: 45
End: 2020-11-10

## 2020-11-10 NOTE — TELEPHONE ENCOUNTER
Patient with MDR UTI. Discussed with ID, no PO options. Called and discussed with patient. Advised patient go to the ER for IV antibiotics. Patient verbalized understanding.

## 2020-11-11 ENCOUNTER — HOSPITAL ENCOUNTER (OUTPATIENT)
Dept: WOUND CARE | Facility: HOSPITAL | Age: 45
Discharge: HOME | End: 2020-11-11
Attending: SURGERY
Payer: MEDICARE

## 2020-11-11 ENCOUNTER — HOSPITAL ENCOUNTER (INPATIENT)
Facility: HOSPITAL | Age: 45
LOS: 2 days | Discharge: HOME HEALTH | DRG: 698 | End: 2020-11-13
Attending: EMERGENCY MEDICINE | Admitting: INTERNAL MEDICINE
Payer: MEDICARE

## 2020-11-11 VITALS
TEMPERATURE: 98 F | BODY MASS INDEX: 28 KG/M2 | DIASTOLIC BLOOD PRESSURE: 70 MMHG | WEIGHT: 200 LBS | HEART RATE: 82 BPM | HEIGHT: 71 IN | SYSTOLIC BLOOD PRESSURE: 107 MMHG

## 2020-11-11 DIAGNOSIS — L97.921 CHRONIC ULCER OF LEFT LEG, LIMITED TO BREAKDOWN OF SKIN: ICD-10-CM

## 2020-11-11 DIAGNOSIS — L89.324 STAGE IV PRESSURE ULCER OF LEFT BUTTOCK: ICD-10-CM

## 2020-11-11 DIAGNOSIS — N31.9 NEUROGENIC BLADDER: ICD-10-CM

## 2020-11-11 DIAGNOSIS — G82.20 PARAPLEGIA: ICD-10-CM

## 2020-11-11 DIAGNOSIS — Z93.3 S/P COLOSTOMY: ICD-10-CM

## 2020-11-11 DIAGNOSIS — N39.0 COMPLICATED UTI (URINARY TRACT INFECTION): Primary | ICD-10-CM

## 2020-11-11 DIAGNOSIS — L89.314 PRESSURE INJURY OF RIGHT ISCHIUM, STAGE 4: Primary | ICD-10-CM

## 2020-11-11 DIAGNOSIS — L97.324 NON-PRESSURE CHRONIC ULCER OF LEFT ANKLE WITH NECROSIS OF BONE: ICD-10-CM

## 2020-11-11 DIAGNOSIS — M86.9 OSTEOMYELITIS, UNSPECIFIED SITE, UNSPECIFIED TYPE: ICD-10-CM

## 2020-11-11 DIAGNOSIS — E55.9 VITAMIN D DEFICIENCY: Chronic | ICD-10-CM

## 2020-11-11 DIAGNOSIS — Z93.59 CHRONIC SUPRAPUBIC CATHETER: ICD-10-CM

## 2020-11-11 LAB
ALBUMIN SERPL BCP-MCNC: 3.6 G/DL (ref 3.5–5.2)
ALP SERPL-CCNC: 150 U/L (ref 55–135)
ALT SERPL W/O P-5'-P-CCNC: 13 U/L (ref 10–44)
ANION GAP SERPL CALC-SCNC: 10 MMOL/L (ref 8–16)
AST SERPL-CCNC: 14 U/L (ref 10–40)
BILIRUB SERPL-MCNC: 0.2 MG/DL (ref 0.1–1)
BUN SERPL-MCNC: 12 MG/DL (ref 6–20)
CALCIUM SERPL-MCNC: 9.7 MG/DL (ref 8.7–10.5)
CHLORIDE SERPL-SCNC: 108 MMOL/L (ref 95–110)
CO2 SERPL-SCNC: 23 MMOL/L (ref 23–29)
CREAT SERPL-MCNC: 0.8 MG/DL (ref 0.5–1.4)
ERYTHROCYTE [DISTWIDTH] IN BLOOD BY AUTOMATED COUNT: 19.9 % (ref 11.5–14.5)
EST. GFR  (AFRICAN AMERICAN): >60 ML/MIN/1.73 M^2
EST. GFR  (NON AFRICAN AMERICAN): >60 ML/MIN/1.73 M^2
GLUCOSE SERPL-MCNC: 139 MG/DL (ref 70–110)
HCT VFR BLD AUTO: 35.6 % (ref 40–54)
HGB BLD-MCNC: 11.3 G/DL (ref 14–18)
MCH RBC QN AUTO: 24.9 PG (ref 27–31)
MCHC RBC AUTO-ENTMCNC: 31.7 G/DL (ref 32–36)
MCV RBC AUTO: 78 FL (ref 82–98)
PLATELET # BLD AUTO: 451 K/UL (ref 150–350)
PMV BLD AUTO: 10.3 FL (ref 9.2–12.9)
POCT GLUCOSE: 175 MG/DL (ref 70–110)
POTASSIUM SERPL-SCNC: 3.9 MMOL/L (ref 3.5–5.1)
PROT SERPL-MCNC: 8.9 G/DL (ref 6–8.4)
RBC # BLD AUTO: 4.54 M/UL (ref 4.6–6.2)
SARS-COV-2 RDRP RESP QL NAA+PROBE: NEGATIVE
SODIUM SERPL-SCNC: 141 MMOL/L (ref 136–145)
WBC # BLD AUTO: 8.57 K/UL (ref 3.9–12.7)

## 2020-11-11 PROCEDURE — 11000001 HC ACUTE MED/SURG PRIVATE ROOM

## 2020-11-11 PROCEDURE — 63600175 PHARM REV CODE 636 W HCPCS: Performed by: EMERGENCY MEDICINE

## 2020-11-11 PROCEDURE — 85027 COMPLETE CBC AUTOMATED: CPT

## 2020-11-11 PROCEDURE — 80053 COMPREHEN METABOLIC PANEL: CPT

## 2020-11-11 PROCEDURE — 96366 THER/PROPH/DIAG IV INF ADDON: CPT

## 2020-11-11 PROCEDURE — 93005 ELECTROCARDIOGRAM TRACING: CPT

## 2020-11-11 PROCEDURE — U0002 COVID-19 LAB TEST NON-CDC: HCPCS

## 2020-11-11 PROCEDURE — 63600175 PHARM REV CODE 636 W HCPCS: Performed by: STUDENT IN AN ORGANIZED HEALTH CARE EDUCATION/TRAINING PROGRAM

## 2020-11-11 PROCEDURE — 25000003 PHARM REV CODE 250: Performed by: STUDENT IN AN ORGANIZED HEALTH CARE EDUCATION/TRAINING PROGRAM

## 2020-11-11 PROCEDURE — 25000003 PHARM REV CODE 250: Performed by: EMERGENCY MEDICINE

## 2020-11-11 PROCEDURE — C1751 CATH, INF, PER/CENT/MIDLINE: HCPCS

## 2020-11-11 PROCEDURE — 99285 EMERGENCY DEPT VISIT HI MDM: CPT | Mod: 25

## 2020-11-11 PROCEDURE — 96365 THER/PROPH/DIAG IV INF INIT: CPT

## 2020-11-11 PROCEDURE — 97605 NEG PRS WND THER DME<=50SQCM: CPT

## 2020-11-11 PROCEDURE — 36569 INSJ PICC 5 YR+ W/O IMAGING: CPT

## 2020-11-11 PROCEDURE — A4216 STERILE WATER/SALINE, 10 ML: HCPCS | Performed by: STUDENT IN AN ORGANIZED HEALTH CARE EDUCATION/TRAINING PROGRAM

## 2020-11-11 RX ORDER — CYCLOBENZAPRINE HCL 10 MG
10 TABLET ORAL 3 TIMES DAILY PRN
Status: DISCONTINUED | OUTPATIENT
Start: 2020-11-11 | End: 2020-11-13 | Stop reason: HOSPADM

## 2020-11-11 RX ORDER — HYDROCODONE BITARTRATE AND ACETAMINOPHEN 5; 325 MG/1; MG/1
1 TABLET ORAL EVERY 6 HOURS PRN
Status: DISCONTINUED | OUTPATIENT
Start: 2020-11-11 | End: 2020-11-11

## 2020-11-11 RX ORDER — FERROUS SULFATE 325(65) MG
325 TABLET, DELAYED RELEASE (ENTERIC COATED) ORAL DAILY
Status: DISCONTINUED | OUTPATIENT
Start: 2020-11-11 | End: 2020-11-13 | Stop reason: HOSPADM

## 2020-11-11 RX ORDER — METOPROLOL TARTRATE 25 MG/1
25 TABLET, FILM COATED ORAL 2 TIMES DAILY
Status: DISCONTINUED | OUTPATIENT
Start: 2020-11-11 | End: 2020-11-13 | Stop reason: HOSPADM

## 2020-11-11 RX ORDER — BACLOFEN 10 MG/1
10 TABLET ORAL 2 TIMES DAILY
Status: DISCONTINUED | OUTPATIENT
Start: 2020-11-11 | End: 2020-11-13 | Stop reason: HOSPADM

## 2020-11-11 RX ORDER — INSULIN ASPART 100 [IU]/ML
0-5 INJECTION, SOLUTION INTRAVENOUS; SUBCUTANEOUS
Status: DISCONTINUED | OUTPATIENT
Start: 2020-11-11 | End: 2020-11-13 | Stop reason: HOSPADM

## 2020-11-11 RX ORDER — DOCUSATE SODIUM 100 MG/1
100 CAPSULE, LIQUID FILLED ORAL 2 TIMES DAILY
Status: DISCONTINUED | OUTPATIENT
Start: 2020-11-11 | End: 2020-11-13 | Stop reason: HOSPADM

## 2020-11-11 RX ORDER — IBUPROFEN 200 MG
24 TABLET ORAL
Status: DISCONTINUED | OUTPATIENT
Start: 2020-11-11 | End: 2020-11-13 | Stop reason: HOSPADM

## 2020-11-11 RX ORDER — MUPIROCIN 20 MG/G
OINTMENT TOPICAL 2 TIMES DAILY
Status: DISCONTINUED | OUTPATIENT
Start: 2020-11-11 | End: 2020-11-13 | Stop reason: HOSPADM

## 2020-11-11 RX ORDER — SODIUM CHLORIDE 0.9 % (FLUSH) 0.9 %
10 SYRINGE (ML) INJECTION EVERY 6 HOURS
Status: DISCONTINUED | OUTPATIENT
Start: 2020-11-11 | End: 2020-11-13 | Stop reason: HOSPADM

## 2020-11-11 RX ORDER — VENLAFAXINE HYDROCHLORIDE 75 MG/1
150 CAPSULE, EXTENDED RELEASE ORAL DAILY
Status: DISCONTINUED | OUTPATIENT
Start: 2020-11-11 | End: 2020-11-13 | Stop reason: HOSPADM

## 2020-11-11 RX ORDER — IBUPROFEN 200 MG
16 TABLET ORAL
Status: DISCONTINUED | OUTPATIENT
Start: 2020-11-11 | End: 2020-11-13 | Stop reason: HOSPADM

## 2020-11-11 RX ORDER — TALC
3 POWDER (GRAM) TOPICAL NIGHTLY
Status: DISCONTINUED | OUTPATIENT
Start: 2020-11-11 | End: 2020-11-13 | Stop reason: HOSPADM

## 2020-11-11 RX ORDER — GLUCAGON 1 MG
1 KIT INJECTION
Status: DISCONTINUED | OUTPATIENT
Start: 2020-11-11 | End: 2020-11-13 | Stop reason: HOSPADM

## 2020-11-11 RX ORDER — GEMFIBROZIL 600 MG/1
600 TABLET, FILM COATED ORAL
Status: DISCONTINUED | OUTPATIENT
Start: 2020-11-11 | End: 2020-11-13 | Stop reason: HOSPADM

## 2020-11-11 RX ORDER — GABAPENTIN 300 MG/1
300 CAPSULE ORAL 2 TIMES DAILY
Status: DISCONTINUED | OUTPATIENT
Start: 2020-11-11 | End: 2020-11-13 | Stop reason: HOSPADM

## 2020-11-11 RX ORDER — OXYCODONE AND ACETAMINOPHEN 7.5; 325 MG/1; MG/1
1 TABLET ORAL EVERY 4 HOURS PRN
Status: DISCONTINUED | OUTPATIENT
Start: 2020-11-11 | End: 2020-11-13 | Stop reason: HOSPADM

## 2020-11-11 RX ORDER — PANTOPRAZOLE SODIUM 40 MG/1
40 TABLET, DELAYED RELEASE ORAL DAILY
Status: DISCONTINUED | OUTPATIENT
Start: 2020-11-11 | End: 2020-11-13 | Stop reason: HOSPADM

## 2020-11-11 RX ORDER — SODIUM CHLORIDE 0.9 % (FLUSH) 0.9 %
10 SYRINGE (ML) INJECTION
Status: DISCONTINUED | OUTPATIENT
Start: 2020-11-11 | End: 2020-11-13 | Stop reason: HOSPADM

## 2020-11-11 RX ADMIN — OXYCODONE AND ACETAMINOPHEN 1 TABLET: 7.5; 325 TABLET ORAL at 09:11

## 2020-11-11 RX ADMIN — PIPERACILLIN AND TAZOBACTAM 4.5 G: 4; .5 INJECTION, POWDER, LYOPHILIZED, FOR SOLUTION INTRAVENOUS; PARENTERAL at 01:11

## 2020-11-11 RX ADMIN — APIXABAN 5 MG: 2.5 TABLET, FILM COATED ORAL at 09:11

## 2020-11-11 RX ADMIN — FERROUS SULFATE TAB EC 325 MG (65 MG FE EQUIVALENT) 325 MG: 325 (65 FE) TABLET DELAYED RESPONSE at 09:11

## 2020-11-11 RX ADMIN — PANTOPRAZOLE SODIUM 40 MG: 40 TABLET, DELAYED RELEASE ORAL at 09:11

## 2020-11-11 RX ADMIN — Medication 3 MG: at 09:11

## 2020-11-11 RX ADMIN — METOPROLOL TARTRATE 25 MG: 25 TABLET, FILM COATED ORAL at 09:11

## 2020-11-11 RX ADMIN — Medication 10 ML: at 10:11

## 2020-11-11 RX ADMIN — CYCLOBENZAPRINE 10 MG: 10 TABLET, FILM COATED ORAL at 09:11

## 2020-11-11 RX ADMIN — VENLAFAXINE HYDROCHLORIDE 150 MG: 75 CAPSULE, EXTENDED RELEASE ORAL at 09:11

## 2020-11-11 RX ADMIN — GEMFIBROZIL 600 MG: 600 TABLET ORAL at 06:11

## 2020-11-11 RX ADMIN — GABAPENTIN 300 MG: 300 CAPSULE ORAL at 09:11

## 2020-11-11 RX ADMIN — PIPERACILLIN AND TAZOBACTAM 4.5 G: 4; .5 INJECTION, POWDER, LYOPHILIZED, FOR SOLUTION INTRAVENOUS; PARENTERAL at 10:11

## 2020-11-11 RX ADMIN — BACLOFEN 10 MG: 10 TABLET ORAL at 09:11

## 2020-11-11 RX ADMIN — HYDROCODONE BITARTRATE AND ACETAMINOPHEN 1 TABLET: 5; 325 TABLET ORAL at 06:11

## 2020-11-11 NOTE — ED TRIAGE NOTES
Wound care states he has multi-resistant UTI with need for IV antibiotics. States will need admission for IV antibiotics.

## 2020-11-11 NOTE — PROGRESS NOTES
Pharmacist Renal Dose Adjustment Note    Hermann Aguilar is a 45 y.o. male being treated with the medication zosyn    Patient Data:    Vital Signs (Most Recent):  Temp: 97.9 °F (36.6 °C) (11/11/20 1033)  Pulse: 70 (11/11/20 1200)  Resp: 18 (11/11/20 1033)  BP: (!) 141/93 (11/11/20 1516)  SpO2: 98 % (11/11/20 1200)   Vital Signs (72h Range):  Temp:  [97.5 °F (36.4 °C)-97.9 °F (36.6 °C)]   Pulse:  [70-82]   Resp:  [18]   BP: (107-141)/(62-93)   SpO2:  [98 %-100 %]      Recent Labs   Lab 11/11/20  1222   CREATININE 0.8     Creatinine clearance cannot be calculated (Unknown ideal weight.)    Medication zosyn dose: 4.5 gm frequency q6h will be changed to medication zosyn dose 4.5 gm frequency q8h    Pharmacist's Name: Kamila Hanson  Pharmacist's Extension: [phone]
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
## 2020-11-11 NOTE — PROGRESS NOTES
Subjective:       Patient ID: Hermann Aguilar is a 45 y.o. male.    Chief Complaint: Diabetic Foot Ulcer and Pressure Ulcer    07/22/2020- new pt to clinic for Dr. Tom. Pt with history with paraplegia since 2016, dm and htn. Pt lives alone but states that his sister lives 5 minutes away and he has a caregiver that comes daily from 10am- 5pm. Pt presents with right ischial wound that is recurrent for 1.5 years and left buttocks wound. Pt was recently hospitalized for UTI. 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 Recent wound cultures negative for any growth. Pt is receiving IV meropenem and vancomycin via medicine ball pump. Wound care as follows: wound vac to right ischial and  Santyl/ xeroform/ foam border to left buttocks. Pt receiving home health via [facility]/ [facility]. Orders routed. Pt educated about HBO treatment, ekg and labs drawn, awaiting insurance authorization. Pt to f/u with Dr. Tom in 2 weeks 08/05/2020.   08/05/2020- f/u with Dr. Tom. Lab results reviewed. Pt to start HBO 08/10/2020. Pt currently receiving home health. If pt starts HBO, home health will be dc'd and wound care visits will be Mondays & Thursdays. Wound vac placed, seal intact. F/u with Dr. Tom in 2 weeks 08/19/2020.  8/10/2020: Nurse visit for dressing change. Patient started HBOT today. Will DC home health, dressing to be changed in clinic on Mondays, and Thursdays. Orders sent to home health. Continuned with [employer] wound vac at 125mmhg continuous: Applied cavilon, benzoin and vac drape to yahaira wound, black foam x 1 to undermining at 11:00 and wound bed, secured with vac drape, tacked to right hip off bony prominence. Patient tolerated well. Nurse visits 8/13/, 8/17/2020 fu with Dr. Tom 8/20/2020.  08/13/2020- nurse visit for dressing change following HBOT. Wound vac changed, seal intact, canister changed, suction at 125mmHg. Pt tolerated. Next dressing change  08/17/2020 and f/u with Dr. oTm 08/20/2020.  08/17/2020- nurse visit for dressing change following HBOT. Wound vac changed, seal intact, suction at 125mmHg. Pt tolerated. Next dressing change 08/24/2020 and f/u with Dr. Tom 08/20/2020.  08/31/2020- nurse visit for dressing change following HBOT. Wound vac changed, seal intact, suction at 125mmHg. Pt tolerated. Cont POC. F/u with Dr. Tom 09/03/2020.  09/03/2020- f/u with dr. Tom following HBOT. Wound vac changed, seal intact, suction at 125mmHg and canister changed. Consent for colostomy signed with Dr. Tom for 09/11/2020. Home health to change dressing as needed. Pt for nurse visit 09/10/2020, f/u with Dr. Tom 09/17/2020.  09/10/2020- nurse visit following HBOT. Wound vac changed, seal intact, suction at 125mmHg and canister changed.Pt for nurse visit 09/14/2020, f/u with Dr. Tom 09/17/2020.  9/21/20: Nurse visit. Wound vac using black foam @ 125mm/hg continuous. No complaints voiced. No apparent problems noted. F/U 9/24/20.   09/24/2020- f/u with Dr. Tom. New wound noted to left ankle, dress with hydrofera ready and foam border. Dr. Tom assessed colostomy site, sutures and red rubber catheter removed and bag replaced by Dr. Tom. Dr. Tom spoke to home health and they stated that they will be coming out tomorrow to change out colostomy bag. Pt to f/u with Dr. Tom in 2 weeks 10/08/2020, nurse visits Mondays and Fridays until then.  10/1/2020: Nurse visit for dressing change. Patient upset phone not working.  called to assess wound around ostomy.  gave orders to apply saline moist hydorfera classic ostomy ring around stoma before applying ostomy bag. Colostomy to be changed every other day and Prn.  Wound vac to be changed prn per home health.   called patient's  Misael at [phone] to discuss patient's peristomal wound care and wound vac.   Patient refused wound care to left  "buttock wound and right ischial wound today. Patient stated "  I called home health out yesterday to change them because the vac was beeping. I don't want them changed today. You can just change my foot dressing and call my ride." Fu 10/8/2020 with    10/09/2020- called Dr. Tom to come assess pt but he was unavailable at the time and stated to make the patient a nurse visit. Wound vac applied, tracked to right flank area, seal intact. Pt to f/u with Dr. Tom in 2 weeks 10/22/2020. Orders sent to [facility].  10/22/20: Patient at clinic for doctor visit assessment. He did not bring any supplies to change wound vac dressing. He states that he did not want to come for visit. Dr Tom assessed patient's wounds. All are improving. Patient agreed to have a wet to dry dressing today and HH to reapply wound vac on Sat. He agreed to F/U in 2 weeks and bring wound vac supplies to his appt. Updated orders sent to [facility] and spoke to  nurse about orders. F/U 11/5/20.  11/11/2020: Fu with , wounds improving. Patient did not bring ostomy supplies and refused assessment of yahaira wound around ostomy.  Per  patient will be worked up again for hyperbarics. Patient verbalized understanding.  Patient reports he seen his urologist   on 11/5/2020 who changed out his catheter and took a urine culture due to him having symptoms of UTI.  Patient states " they called me yesterday to go to ER to be admitted for IV ABX. I told them I'm going after my appointment here today." Continued with current wound care as ordered to right ischial wound .Wound vac using black foam @ 125mm/hg continuous. Patient to follow up on 11/25/2020 with .    Review of Systems   Constitutional: Negative.    HENT: Negative.    Eyes: Negative.    Respiratory: Negative.    Cardiovascular: Negative.    Gastrointestinal: Negative.    Genitourinary: Negative.    Musculoskeletal: Negative.    Neurological: " Negative.    Psychiatric/Behavioral: Negative.        Objective:      Physical Exam  Constitutional:       Appearance: He is well-developed.   HENT:      Head: Normocephalic.   Eyes:      Conjunctiva/sclera: Conjunctivae normal.      Pupils: Pupils are equal, round, and reactive to light.   Neck:      Musculoskeletal: Normal range of motion and neck supple.   Cardiovascular:      Rate and Rhythm: Normal rate and regular rhythm.      Heart sounds: Normal heart sounds.   Pulmonary:      Effort: Pulmonary effort is normal.      Breath sounds: Normal breath sounds.   Abdominal:      General: Bowel sounds are normal.      Palpations: Abdomen is soft.   Musculoskeletal: Normal range of motion.   Skin:     General: Skin is warm and dry.   Neurological:      Mental Status: He is alert and oriented to person, place, and time.      Deep Tendon Reflexes: Reflexes are normal and symmetric.         Assessment:       No diagnosis found.       Negative Pressure Wound Therapy  07/14/20 1730 Right (Active)   07/14/20 1730   Side: Right   Orientation:    Location: Ischial tuberosity   Additional Comments:    Location:    SDO Location:    NPWT Type Vacuum Therapy 11/11/20 1000   Therapy Setting NPWT Continuous therapy 11/11/20 1000   Pressure Setting NPWT 125 mmHg 11/11/20 1000   Therapy Interventions NPWT Canister changed;Dressing changed;Seal intact 11/11/20 1000   Sponges Inserted NPWT Black;2 11/11/20 1000   Sponges Removed NPWT Black;2 11/11/20 1000   General Output (mL) 250 11/11/20 1000            Altered Skin Integrity 05/02/20 2300 Right Ischial tuberosity #1 Full thickness tissue loss with exposed bone, tendon, or muscle. Often includes undermining and tunneling. May extend into muscle and/or supporting structures. (Active)   05/02/20 2300   Altered Skin Integrity Present on Admission: yes   Side: Right   Orientation:    Location: Ischial tuberosity   Wound Number (optional): #1   Is this injury device related?: No   Primary  Wound Type:    Description of Altered Skin Integrity: Full thickness tissue loss with exposed bone, tendon, or muscle. Often includes undermining and tunneling. May extend into muscle and/or supporting structures.   Removal Indication and Assessment:    Wound Outcome: Healed   (Retired) Wound Length (cm):    (Retired) Wound Width (cm):    (Retired) Depth (cm):    Wound Description (Comments):    Removal Indications:    Wound Image    11/11/20 1000   Description of Altered Skin Integrity Full thickness tissue loss. Subcutaneous fat may be visible but bone, tendon or muscle are not exposed 11/11/20 1000   Dressing Appearance Moist drainage 11/11/20 1000   Drainage Amount Large 11/11/20 1000   Drainage Characteristics/Odor Serosanguineous 11/11/20 1000   Appearance Red;Moist 11/11/20 1000   Tissue loss description Full thickness 11/11/20 1000   Red (%), Wound Tissue Color 100 % 11/11/20 1000   Periwound Area Intact;Dry 11/11/20 1000   Wound Edges Irregular 11/11/20 1000   Wound Length (cm) 4 cm 11/11/20 1000   Wound Width (cm) 3.5 cm 11/11/20 1000   Wound Depth (cm) 3.1 cm 11/11/20 1000   Wound Volume (cm^3) 43.4 cm^3 11/11/20 1000   Wound Surface Area (cm^2) 14 cm^2 11/11/20 1000   Tunneling (depth (cm)/location) 4 cm at 2 o'clcok 11/11/20 1000   Care Cleansed with:;Sterile normal saline 11/11/20 1000   Dressing Applied;Foam;Transparent film 11/11/20 1000   Periwound Care Skin barrier film applied 11/11/20 1000   Dressing Change Due 11/13/20 11/11/20 1000            Altered Skin Integrity 07/22/20 1400 Left Buttocks #2 Full thickness tissue loss. Subcutaneous fat may be visible but bone, tendon or muscle are not exposed (Active)   07/22/20 1400   Altered Skin Integrity Present on Admission: yes   Side: Left   Orientation:    Location: Buttocks   Wound Number (optional): #2   Is this injury device related?:    Primary Wound Type:    Description of Altered Skin Integrity: Full thickness tissue loss. Subcutaneous fat may  be visible but bone, tendon or muscle are not exposed   Removal Indication and Assessment:    Wound Outcome:    (Retired) Wound Length (cm):    (Retired) Wound Width (cm):    (Retired) Depth (cm):    Wound Description (Comments):    Removal Indications:    Wound Image   11/11/20 1000   Description of Altered Skin Integrity Partial thickness tissue loss. Shallow open ulcer with a red or pink wound bed, without slough. Intact or Open/Ruptured Serum-filled blister. 11/11/20 1000   Dressing Appearance Moist drainage;Intact 11/11/20 1000   Drainage Amount Small 11/11/20 1000   Drainage Characteristics/Odor Serosanguineous 11/11/20 1000   Appearance Red;Moist 11/11/20 1000   Tissue loss description Partial thickness 11/11/20 1000   Red (%), Wound Tissue Color 100 % 11/11/20 1000   Periwound Area Intact;Dry 11/11/20 1000   Wound Edges Irregular 11/11/20 1000   Wound Length (cm) 1.1 cm 11/11/20 1000   Wound Width (cm) 1.8 cm 11/11/20 1000   Wound Depth (cm) 0.1 cm 11/11/20 1000   Wound Volume (cm^3) 0.2 cm^3 11/11/20 1000   Wound Surface Area (cm^2) 1.98 cm^2 11/11/20 1000   Dressing Applied;Hydrofiber;Island/border 11/11/20 1000   Periwound Care Skin barrier film applied 11/11/20 1000   Dressing Change Due 11/13/20 11/11/20 1000            Altered Skin Integrity 09/24/20 1330 Left lateral Malleolus Partial thickness tissue loss. Shallow open ulcer with a red or pink wound bed, without slough. Intact or Open/Ruptured Serum-filled blister. (Active)   09/24/20 1330   Altered Skin Integrity Present on Admission: yes   Side: Left   Orientation: lateral   Location: Malleolus   Wound Number (optional):    Is this injury device related?:    Primary Wound Type:    Description of Altered Skin Integrity: Partial thickness tissue loss. Shallow open ulcer with a red or pink wound bed, without slough. Intact or Open/Ruptured Serum-filled blister.   Removal Indication and Assessment:    Wound Outcome:    (Retired) Wound Length (cm):     (Retired) Wound Width (cm):    (Retired) Depth (cm):    Wound Description (Comments):    Removal Indications:    Wound Image   11/11/20 1000   Description of Altered Skin Integrity Partial thickness tissue loss. Shallow open ulcer with a red or pink wound bed, without slough. Intact or Open/Ruptured Serum-filled blister. 11/11/20 1000   Dressing Appearance Intact;Moist drainage 11/11/20 1000   Drainage Amount Small 11/11/20 1000   Drainage Characteristics/Odor Serosanguineous 11/11/20 1000   Appearance Red;Moist;Epithelialization 11/11/20 1000   Tissue loss description Partial thickness 11/11/20 1000   Red (%), Wound Tissue Color 100 % 11/11/20 1000   Periwound Area Intact;Dry 11/11/20 1000   Wound Edges Irregular 11/11/20 1000   Wound Length (cm) 1.5 cm 11/11/20 1000   Wound Width (cm) 1.1 cm 11/11/20 1000   Wound Depth (cm) 0.1 cm 11/11/20 1000   Wound Volume (cm^3) 0.16 cm^3 11/11/20 1000   Wound Surface Area (cm^2) 1.65 cm^2 11/11/20 1000   Care Cleansed with:;Sterile normal saline 11/11/20 1000   Dressing Applied;Hydrogel;Island/border 11/11/20 1000   Periwound Care Skin barrier film applied 11/11/20 1000   Dressing Change Due 11/13/20 11/11/20 1000       Right Ischial Wound   Cleanse wound with: Normal saline   Lidocaine: prn   Silver nitrate: prn   Breanna wound: Cavilon, Benzoin, Vac drape,   Primary dressing: Black foam to wound bed and tunneling @11 ,secure with vac drape,  wound vac continuous suction 125mmHg, track to right hip off bony prominence.   Offloading: Low air loss mattress at home   Frequency: Mondays and Thursdays and prn per home health, unless in clinic. Next f/u with Dr. Tom 11/5/20     Left Buttocks and Left Ankle Wound   Cleanse wound with: Normal saline   Lidocaine: prn   Silver nitrate: prn   Periwound care: Cavilon   Primary dressing: Hydrofera Ready   Secondary dressing: Aquacel 4x4 foam border   Offloading: Low air loss mattress   Frequency: Mondays and Thursdays and prn per home  health, unless in clinic. Next f/u with Dr. Tom 11/5/20     Follow-up: in 2 weeks 11/5//2020 with Dr. Tom     Home Health: [facility] to perform wound care and wound vac dressing changes Mondays and Thursdays and PRN. Change colostomy bag Mondays, Thursdays, Saturdays and PRN.  Apply Brain ring around stoma, Cover wound around stoma with Hydrofera classic saline moist  ostomy ring, before applying colostomy bag.     Home Health to reapply wound vac on Saturday 11/24/20. [facility] to order vac supplies as needed.     Wet to dry gauze to right ischial wound today in clinic.    Plan:                11/25/2020 with Dr. Tom
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
## 2020-11-11 NOTE — PROCEDURES
Hermann Aguilar is a 45 y.o. male patient.    Temp: 97.9 °F (36.6 °C) (11/11/20 1033)  Pulse: 74 (11/11/20 1100)  Resp: 18 (11/11/20 1033)  BP: 108/65 (11/11/20 1100)  SpO2: 100 % (11/11/20 1100)    PICC  Date/Time: 11/11/2020 2:30 PM  Performed by: Hector Salcido RN  Consent Done: Yes  Time out: Immediately prior to procedure a time out was called to verify the correct patient, procedure, equipment, support staff and site/side marked as required  Indications: med administration and vascular access  Anesthesia: local infiltration  Local anesthetic: lidocaine 1% without epinephrine  Anesthetic Total (mL): 5  Preparation: skin prepped with Betadine  Skin prep agent dried: skin prep agent completely dried prior to procedure  Sterile barriers: all five maximum sterile barriers used - cap, mask, sterile gown, sterile gloves, and large sterile sheet  Hand hygiene: hand hygiene performed prior to central venous catheter insertion  Location details: left basilic  Catheter type: double lumen  Catheter size: 5 Fr  Catheter Length: 46cm    Ultrasound guidance: yes  Vessel Caliber: medium, compressibility normal  Needle advanced into vessel with real time Ultrasound guidance.  Guidewire confirmed in vessel.  Sterile sheath used.  Number of attempts: 2 (attempted right arm picc, kept coiling in arm)  Post-procedure: blood return through all ports, chlorhexidine patch and sterile dressing applied            Hector Salcido  11/11/2020
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
## 2020-11-12 ENCOUNTER — CLINICAL SUPPORT (OUTPATIENT)
Dept: SMOKING CESSATION | Facility: CLINIC | Age: 45
End: 2020-11-12
Payer: COMMERCIAL

## 2020-11-12 DIAGNOSIS — F17.210 CIGARETTE SMOKER: Primary | ICD-10-CM

## 2020-11-12 LAB
ALBUMIN SERPL BCP-MCNC: 3.2 G/DL (ref 3.5–5.2)
ALP SERPL-CCNC: 132 U/L (ref 55–135)
ALT SERPL W/O P-5'-P-CCNC: 12 U/L (ref 10–44)
AMM URATE CRY UR QL COMP ASSIST: 25
ANION GAP SERPL CALC-SCNC: 6 MMOL/L (ref 8–16)
AST SERPL-CCNC: 14 U/L (ref 10–40)
BACTERIA #/AREA URNS AUTO: NORMAL /HPF
BASOPHILS # BLD AUTO: 0.06 K/UL (ref 0–0.2)
BASOPHILS NFR BLD: 0.8 % (ref 0–1.9)
BILIRUB SERPL-MCNC: 0.2 MG/DL (ref 0.1–1)
BILIRUB UR QL STRIP: NEGATIVE
BUN SERPL-MCNC: 10 MG/DL (ref 6–20)
CALCIUM SERPL-MCNC: 9.1 MG/DL (ref 8.7–10.5)
CHLORIDE SERPL-SCNC: 107 MMOL/L (ref 95–110)
CLARITY UR REFRACT.AUTO: CLEAR
CO2 SERPL-SCNC: 26 MMOL/L (ref 23–29)
COLOR UR AUTO: YELLOW
CREAT SERPL-MCNC: 0.8 MG/DL (ref 0.5–1.4)
DIFFERENTIAL METHOD: ABNORMAL
EOSINOPHIL # BLD AUTO: 0.7 K/UL (ref 0–0.5)
EOSINOPHIL NFR BLD: 9.6 % (ref 0–8)
ERYTHROCYTE [DISTWIDTH] IN BLOOD BY AUTOMATED COUNT: 19.3 % (ref 11.5–14.5)
EST. GFR  (AFRICAN AMERICAN): >60 ML/MIN/1.73 M^2
EST. GFR  (NON AFRICAN AMERICAN): >60 ML/MIN/1.73 M^2
GLUCOSE SERPL-MCNC: 106 MG/DL (ref 70–110)
GLUCOSE UR QL STRIP: NEGATIVE
HCT VFR BLD AUTO: 32.1 % (ref 40–54)
HGB BLD-MCNC: 10.3 G/DL (ref 14–18)
HGB UR QL STRIP: ABNORMAL
IMM GRANULOCYTES # BLD AUTO: 0.02 K/UL (ref 0–0.04)
IMM GRANULOCYTES NFR BLD AUTO: 0.3 % (ref 0–0.5)
KETONES UR QL STRIP: NEGATIVE
LEUKOCYTE ESTERASE UR QL STRIP: ABNORMAL
LYMPHOCYTES # BLD AUTO: 1.6 K/UL (ref 1–4.8)
LYMPHOCYTES NFR BLD: 21.4 % (ref 18–48)
MAGNESIUM SERPL-MCNC: 1.9 MG/DL (ref 1.6–2.6)
MCH RBC QN AUTO: 25.1 PG (ref 27–31)
MCHC RBC AUTO-ENTMCNC: 32.1 G/DL (ref 32–36)
MCV RBC AUTO: 78 FL (ref 82–98)
MICROSCOPIC COMMENT: NORMAL
MONOCYTES # BLD AUTO: 0.5 K/UL (ref 0.3–1)
MONOCYTES NFR BLD: 7.1 % (ref 4–15)
NEUTROPHILS # BLD AUTO: 4.4 K/UL (ref 1.8–7.7)
NEUTROPHILS NFR BLD: 60.8 % (ref 38–73)
NITRITE UR QL STRIP: POSITIVE
NRBC BLD-RTO: 0 /100 WBC
PH UR STRIP: >=9 [PH] (ref 5–8)
PHOSPHATE SERPL-MCNC: 3.1 MG/DL (ref 2.7–4.5)
PLATELET # BLD AUTO: 396 K/UL (ref 150–350)
PMV BLD AUTO: 10.2 FL (ref 9.2–12.9)
POCT GLUCOSE: 123 MG/DL (ref 70–110)
POCT GLUCOSE: 134 MG/DL (ref 70–110)
POCT GLUCOSE: 76 MG/DL (ref 70–110)
POCT GLUCOSE: 87 MG/DL (ref 70–110)
POTASSIUM SERPL-SCNC: 4 MMOL/L (ref 3.5–5.1)
PROT SERPL-MCNC: 8 G/DL (ref 6–8.4)
PROT UR QL STRIP: ABNORMAL
RBC # BLD AUTO: 4.11 M/UL (ref 4.6–6.2)
RBC #/AREA URNS AUTO: 0 /HPF (ref 0–4)
SODIUM SERPL-SCNC: 139 MMOL/L (ref 136–145)
SP GR UR STRIP: 1.01 (ref 1–1.03)
TRI-PHOS CRY UR QL COMP ASSIST: 25
URN SPEC COLLECT METH UR: ABNORMAL
UROBILINOGEN UR STRIP-ACNC: NEGATIVE EU/DL
WBC # BLD AUTO: 7.28 K/UL (ref 3.9–12.7)
WBC #/AREA URNS AUTO: 2 /HPF (ref 0–5)

## 2020-11-12 PROCEDURE — 25000003 PHARM REV CODE 250: Performed by: STUDENT IN AN ORGANIZED HEALTH CARE EDUCATION/TRAINING PROGRAM

## 2020-11-12 PROCEDURE — 97802 MEDICAL NUTRITION INDIV IN: CPT

## 2020-11-12 PROCEDURE — 63600175 PHARM REV CODE 636 W HCPCS: Performed by: STUDENT IN AN ORGANIZED HEALTH CARE EDUCATION/TRAINING PROGRAM

## 2020-11-12 PROCEDURE — A4216 STERILE WATER/SALINE, 10 ML: HCPCS | Performed by: STUDENT IN AN ORGANIZED HEALTH CARE EDUCATION/TRAINING PROGRAM

## 2020-11-12 PROCEDURE — 11000001 HC ACUTE MED/SURG PRIVATE ROOM

## 2020-11-12 PROCEDURE — 81000 URINALYSIS NONAUTO W/SCOPE: CPT

## 2020-11-12 PROCEDURE — 83735 ASSAY OF MAGNESIUM: CPT

## 2020-11-12 PROCEDURE — 84100 ASSAY OF PHOSPHORUS: CPT

## 2020-11-12 PROCEDURE — 25000003 PHARM REV CODE 250: Performed by: EMERGENCY MEDICINE

## 2020-11-12 PROCEDURE — 80053 COMPREHEN METABOLIC PANEL: CPT

## 2020-11-12 PROCEDURE — 85025 COMPLETE CBC W/AUTO DIFF WBC: CPT

## 2020-11-12 PROCEDURE — 99407 PR TOBACCO USE CESSATION INTENSIVE >10 MINUTES: ICD-10-PCS | Mod: S$GLB,,,

## 2020-11-12 PROCEDURE — 99407 BEHAV CHNG SMOKING > 10 MIN: CPT | Mod: S$GLB,,,

## 2020-11-12 RX ADMIN — GABAPENTIN 300 MG: 300 CAPSULE ORAL at 09:11

## 2020-11-12 RX ADMIN — METOPROLOL TARTRATE 25 MG: 25 TABLET, FILM COATED ORAL at 09:11

## 2020-11-12 RX ADMIN — MUPIROCIN: 20 OINTMENT TOPICAL at 09:11

## 2020-11-12 RX ADMIN — OXYCODONE AND ACETAMINOPHEN 1 TABLET: 7.5; 325 TABLET ORAL at 09:11

## 2020-11-12 RX ADMIN — OXYCODONE AND ACETAMINOPHEN 1 TABLET: 7.5; 325 TABLET ORAL at 10:11

## 2020-11-12 RX ADMIN — Medication 3 MG: at 09:11

## 2020-11-12 RX ADMIN — PIPERACILLIN AND TAZOBACTAM 4.5 G: 4; .5 INJECTION, POWDER, LYOPHILIZED, FOR SOLUTION INTRAVENOUS; PARENTERAL at 09:11

## 2020-11-12 RX ADMIN — Medication 10 ML: at 02:11

## 2020-11-12 RX ADMIN — BACLOFEN 10 MG: 10 TABLET ORAL at 09:11

## 2020-11-12 RX ADMIN — OXYCODONE AND ACETAMINOPHEN 1 TABLET: 7.5; 325 TABLET ORAL at 02:11

## 2020-11-12 RX ADMIN — Medication 10 ML: at 11:11

## 2020-11-12 RX ADMIN — Medication 10 ML: at 05:11

## 2020-11-12 RX ADMIN — GEMFIBROZIL 600 MG: 600 TABLET ORAL at 04:11

## 2020-11-12 RX ADMIN — PIPERACILLIN AND TAZOBACTAM 4.5 G: 4; .5 INJECTION, POWDER, LYOPHILIZED, FOR SOLUTION INTRAVENOUS; PARENTERAL at 02:11

## 2020-11-12 RX ADMIN — OXYCODONE AND ACETAMINOPHEN 1 TABLET: 7.5; 325 TABLET ORAL at 06:11

## 2020-11-12 RX ADMIN — GEMFIBROZIL 600 MG: 600 TABLET ORAL at 06:11

## 2020-11-12 RX ADMIN — FERROUS SULFATE TAB EC 325 MG (65 MG FE EQUIVALENT) 325 MG: 325 (65 FE) TABLET DELAYED RESPONSE at 09:11

## 2020-11-12 RX ADMIN — Medication 10 ML: at 06:11

## 2020-11-12 RX ADMIN — VENLAFAXINE HYDROCHLORIDE 150 MG: 75 CAPSULE, EXTENDED RELEASE ORAL at 09:11

## 2020-11-12 RX ADMIN — PANTOPRAZOLE SODIUM 40 MG: 40 TABLET, DELAYED RELEASE ORAL at 09:11

## 2020-11-12 RX ADMIN — PIPERACILLIN AND TAZOBACTAM 4.5 G: 4; .5 INJECTION, POWDER, LYOPHILIZED, FOR SOLUTION INTRAVENOUS; PARENTERAL at 06:11

## 2020-11-12 RX ADMIN — APIXABAN 5 MG: 2.5 TABLET, FILM COATED ORAL at 09:11

## 2020-11-12 RX ADMIN — Medication 10 ML: at 12:11

## 2020-11-12 NOTE — NURSING
Report received for ALFONSO Davis. Pt arrived to unit via stretcher. 2 person assist for transfer. VSS. NAD. Safety and contact precautions initiated. Will continue to monitor.

## 2020-11-12 NOTE — ED NOTES
Pt requested a container, gloves, and wipes to empty his colostomy bag.  Performed independently.  Repositoned in bed for comfort.
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
## 2020-11-12 NOTE — PROGRESS NOTES
Patient seen for smoking cessation education. Previously enrolled in smoking cessation trust. Patient states he's not interested in smoking cessation education at this time. Handout provided.

## 2020-11-12 NOTE — ASSESSMENT & PLAN NOTE
Contributing Nutrition Diagnosis  Increased nutrient needs, protein/energy    Related to (etiology):   Wound healing     Signs and Symptoms (as evidenced by):   Pt with Olman Score: 14, with multiple wounds and pressure injuries.    Interventions:  Collaboration with other providers  Modified Beverage-Nelson BID     Nutrition Diagnosis Status:   New

## 2020-11-12 NOTE — PROGRESS NOTES
Future Appointments   Date Time Provider Department Center   11/25/2020 10:00 AM Jesus Tom MD [facility] WOUND [facility]   12/2/2020 10:45 AM Alia Edwards NP [facility] UROLOGY [facility]

## 2020-11-12 NOTE — PROGRESS NOTES
Wound consult to clean and dress wound per Dr. Tom's orders. Negro at bedside for assessment, wound care orders given. See assessment and treatment below. This nurse to apply wound vac 11/13/20.       11/12/20 1600        Negative Pressure Wound Therapy  07/14/20 1730 Right   Placement Date/Time: 07/14/20 1730   Side: Right  Location: Ischial tuberosity   NPWT Type   (wound vac removed at 1600 per Negro, reapply 11/13)        Wound 11/11/20 0000 Skin Tear Rectum   Date First Assessed/Time First Assessed: 11/11/20 0000   Pre-existing: Yes  Primary Wound Type: Skin Tear  Location: (c) Rectum   Wound WDL ex   Dressing Appearance Open to air   Drainage Amount None   Drainage Characteristics/Odor No odor   Appearance Red   Tissue loss description Partial thickness   Red (%), Wound Tissue Color 100 %   Periwound Area Intact   Wound Edges Open   Wound Length (cm) 1.5 cm   Wound Width (cm) 1 cm   Wound Depth (cm) 0.2 cm   Wound Volume (cm^3) 0.3 cm^3   Wound Surface Area (cm^2) 1.5 cm^2   Care Cleansed with:;Sterile normal saline;Applied:;Skin Barrier   Periwound Care Cleansed with pH balanced cleanser;Dry periwound area maintained   Off Loading Other (see comments)  (Pillows, overlay ordered)   Dressing Change Due 11/13/20        Altered Skin Integrity 05/02/20 2300 Right Ischial tuberosity #1 Full thickness tissue loss with exposed bone, tendon, or muscle. Often includes undermining and tunneling. May extend into muscle and/or supporting structures.   Date First Assessed/Time First Assessed: 05/02/20 2300   Altered Skin Integrity Present on Admission: yes  Side: Right  Location: Ischial tuberosity  Wound Number (optional): #1  Is this injury device related?: No  Description of Altered Skin Integrit...   Description of Altered Skin Integrity Full thickness tissue loss with exposed bone, tendon, or muscle. Often includes undermining and tunneling. May extend into muscle and/or supporting structures.   Dressing  Appearance other (see comments)  (wound vac taken off)   Appearance Red   Tissue loss description Full thickness   Periwound Area Intact;Scar tissue   Wound Edges Rolled/closed   Wound Length (cm) 4 cm   Wound Width (cm) 5.3 cm   Wound Depth (cm) 2.5 cm   Wound Volume (cm^3) 53 cm^3   Wound Surface Area (cm^2) 21.2 cm^2   Care Cleansed with:;Antimicrobial agent   Dressing Applied;Foam;Island/border   Packing other (see comments)   Packing Inserted    (1 piece kerlix soaked with Dakin)   Periwound Care Cleansed with pH balanced cleanser;Dry periwound area maintained   Off Loading Other (see comments)  (pillows, overlay ordered)   Dressing Change Due 11/13/20        Altered Skin Integrity 07/22/20 1400 Left Buttocks #2 Full thickness tissue loss. Subcutaneous fat may be visible but bone, tendon or muscle are not exposed   Date First Assessed/Time First Assessed: 07/22/20 1400   Altered Skin Integrity Present on Admission: yes  Side: Left  Location: Buttocks  Wound Number (optional): #2  Description of Altered Skin Integrity: Full thickness tissue loss. Subcutaneous fat...   Description of Altered Skin Integrity Full thickness tissue loss. Subcutaneous fat may be visible but bone, tendon or muscle are not exposed  (healing stage 3, now no slough. can not down grade the wound)   Dressing Appearance Intact;Dry;Moist drainage   Drainage Amount Scant   Drainage Characteristics/Odor Serosanguineous   Appearance Red   Tissue loss description Full thickness   Red (%), Wound Tissue Color 100 %   Periwound Area Pink;Scar tissue   Wound Edges Open   Wound Length (cm) 1.5 cm   Wound Width (cm) 1.2 cm   Wound Depth (cm) 0.2 cm   Wound Volume (cm^3) 0.36 cm^3   Wound Surface Area (cm^2) 1.8 cm^2   Care Cleansed with:;Antimicrobial agent   Dressing Applied;Foam;Island/border   Periwound Care Cleansed with pH balanced cleanser;Dry periwound area maintained   Dressing Change Due 11/13/20

## 2020-11-12 NOTE — ED NOTES
Pt requested medication for pain, after scanning the ordered medication then the patient refused the medication.  Admit MD notified that patient states that only Percocet 7.5 works for his pain.

## 2020-11-12 NOTE — CONSULTS
"  [facility]  Adult Nutrition  Consult Note    SUMMARY     Recommendations    Recommendation:   1. Add Nelson BID to promote wound healing.  2. Encourage intake of all meals.   3. RD to follow up.    Goals: 1. Pt intake >/= 75% EEN/EPN by RD follow up  Nutrition Goal Status: new  Communication of RD Recs: (POC)    Reason for Assessment    Reason For Assessment: consult(wound)  Diagnosis: infection/sepsis  Relevant Medical History:  anemia, constipation, DM, HTN, pain, vit D deficiency, Xerosis, amputation  Interdisciplinary Rounds: did not attend  General Information Comments: Pt was admitted due to complicated UTI. He states he has had a good appetite lately, but has been experiencing diarrhea. He agreed to take Nelson BID for wound healing and said he has taken it in the past. NFPE completed today 11/12: shows no visible signs of malnutrition at this time. There are no significant weight changes at this time.   Nutrition Discharge Planning: Diabetic Diet-2000 kcal    Nutrition Risk Screen    Nutrition Risk Screen: no indicators present    Nutrition/Diet History    Food Preferences: no cultural or spiritual preferences identified   Spiritual, Cultural Beliefs, Islam Practices, Values that Affect Care: no  Food Allergies: NKFA  Factors Affecting Nutritional Intake: diarrhea    Anthropometrics    Temp: 97.7 °F (36.5 °C)  Height Method: Stated  Height: 5' 11" (180.3 cm)  Height (inches): 71 in  Weight Method: Bed Scale  Weight: 91 kg (200 lb 9.9 oz)  Weight (lb): 200.62 lb  Ideal Body Weight (IBW), Male: 172 lb  BMI (Calculated): 28  BMI Grade: 25 - 29.9 - overweight       Lab/Procedures/Meds    Pertinent Labs Reviewed: reviewed  Pertinent Labs Comments: anion gap 6 , albumin 3.2  Pertinent Medications Reviewed: reviewed  Pertinent Medications Comments: NaCl, pantoprazole, ferrous sulfate, docusate sodium, baclofen, apixaban    Estimated/Assessed Needs    Weight Used For Calorie Calculations: 91 " kg (200 lb 9.9 oz)  Energy Calorie Requirements (kcal): 2192  Energy Need Method: [place]-St Jeor(*1.2)  Protein Requirements: 109-127(1.2-1.4)  Weight Used For Protein Calculations: 91 kg (200 lb 9.9 oz)     Estimated Fluid Requirement Method: RDA Method(or PER MD)  RDA Method (mL): 2192  CHO Requirement: 274g      Nutrition Prescription Ordered    Current Diet Order: Diabetic Diet    Evaluation of Received Nutrient/Fluid Intake    I/O: -1520  Energy Calories Required: meeting needs  Protein Required: not meeting needs  Fluid Required: meeting needs  Comments: LBM(11/12/20)  % Intake of Estimated Energy Needs: 50 - 75 %  % Meal Intake: 75%    Nutrition Risk    Level of Risk/Frequency of Follow-up: (1 x/week)     Assessment and Plan    Chronic osteomyelitis  Contributing Nutrition Diagnosis  Increased nutrient needs, protein/energy    Related to (etiology):   Wound healing     Signs and Symptoms (as evidenced by):   Pt with Olman Score: 14, with multiple wounds and pressure injuries.    Interventions:  Collaboration with other providers  Modified Beverage-Nelson BID     Nutrition Diagnosis Status:   New           Monitor and Evaluation    Food and Nutrient Intake: food and beverage intake  Food and Nutrient Adminstration: diet order  Knowledge/Beliefs/Attitudes: food and nutrition knowledge/skill  Anthropometric Measurements: body mass index, weight  Biochemical Data, Medical Tests and Procedures: glucose/endocrine profile  Nutrition-Focused Physical Findings: overall appearance     Malnutrition Assessment  Subcutaneous Fat Loss (Final Summary): well nourished  Muscle Loss Evaluation (Final Summary): well nourished         Nutrition Follow-Up    RD Follow-up?: Yes     ARMINDA Arcos    I certify that I, Gita Dietz RD, LDN,  directed the dietetic intern in service delivery and guided them using my skilled judgment. As the cosigning dietitian, I have reviewed the dietetic interns documentation and am  responsible for the treatment, assessment, and plan.

## 2020-11-12 NOTE — PLAN OF CARE
Problem: Adult Inpatient Plan of Care  Goal: Plan of Care Review  Outcome: Ongoing, Progressing     VIRTUAL NURSE:  Cued into patient's room.  Permission received per patient to turn camera to view patient.  Introduced as VN for night shift that will be working with floor nurse and nursing assistant.  Educated patient on VN's role in patient care and  VIP model.  Plan of care reviewed with patient.  Education per flowsheet.   Informed patient that staff will round on them every 2 hours but to use call light for any other needs they may have; informed of fall risk and fall precautions.  Patient verbalized understanding.  Call light within reach; bed siderails up x3.  Opportunity given for questions and questions answered.  Admission assessment questions answered with multiple prompting; uncooperative.  Flat affect.  Patient denies complaints or any needs at this time. Instructed to call for assistance.  Will cont to monitor and intervene as needed.    Labs, notes, orders, and careplan reviewed.

## 2020-11-12 NOTE — PLAN OF CARE
Recommendations     Recommendation:   1. Add Nelson BID to promote wound healing.  2. Encourage intake of all meals.   3. RD to follow up.     Goals: 1. Pt intake >/= 75% EEN/EPN by RD follow up  Nutrition Goal Status: new  Communication of RD Recs: (POC)    ARMINDA Arcos    I certify that I, Gita Diezt, RD, LDN,  directed the dietetic intern in service delivery and guided them using my skilled judgment. As the cosigning dietitian, I have reviewed the dietetic interns documentation and am responsible for the treatment, assessment, and plan.
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
## 2020-11-12 NOTE — PLAN OF CARE
[place] met with pt   confirmed address - lives alone     closest family contact:   sister Marguerite Aguilar   [phone]   pt has assistance at home per [facility]   pt with colostomy - supplies per home health     current with [facility]   - [place] will send pt info per [facility]   current with [facility]  - NP at home program --- Ms. Talamantes informed that pt is here - service re-ordered.           Future Appointments   Date Time Provider Department Center   11/25/2020 10:00 AM Jesus Tom MD [facility] WOUND [facility]   12/2/2020 10:45 AM Alia Edwards NP [facility] UROLOGY [facility]     pt seen in wound care per Dr. Tom  will need transportation to home per [facility]          11/12/20 1247   Discharge Assessment   Assessment Type Discharge Planning Assessment   Confirmed/corrected address and phone number on facesheet? Yes   Assessment information obtained from? Patient;Medical Record   Expected Length of Stay (days) 3   Communicated expected length of stay with patient/caregiver yes   Prior to hospitilization cognitive status: Alert/Oriented   Prior to hospitalization functional status: Needs Assistance;Wheelchair Bound   Current cognitive status: Alert/Oriented   Current Functional Status: Needs Assistance;Wheelchair Bound   Lives With alone   Able to Return to Prior Arrangements yes   Is patient able to care for self after discharge? Yes   Who are your caregiver(s) and their phone number(s)? sister Marguerite Aguilar   [phone]   Patient's perception of discharge disposition home health;home or selfcare   Readmission Within the Last 30 Days no previous admission in last 30 days   Patient currently being followed by outpatient case management? No   Patient currently receives any other outside agency services? Yes  ([facility])   Equipment Currently Used at Home hospital bed;wheelchair   Do you have any problems affording any of your prescribed  medications? No   Is the patient taking medications as prescribed? yes   Does the patient have transportation home? Yes   Transportation Anticipated other (see comments)  (needs stretcher transport to home per [facility])   Does the patient receive services at the [facility]? No   Discharge Plan A Home;Home Health  (Current with [facility])   DME Needed Upon Discharge    (tbd)   Patient/Family in Agreement with Plan yes

## 2020-11-13 ENCOUNTER — TELEPHONE (OUTPATIENT)
Dept: UROLOGY | Facility: CLINIC | Age: 45
End: 2020-11-13

## 2020-11-13 VITALS
DIASTOLIC BLOOD PRESSURE: 72 MMHG | TEMPERATURE: 98 F | HEART RATE: 92 BPM | OXYGEN SATURATION: 100 % | WEIGHT: 200.63 LBS | HEIGHT: 71 IN | BODY MASS INDEX: 28.09 KG/M2 | RESPIRATION RATE: 18 BRPM | SYSTOLIC BLOOD PRESSURE: 111 MMHG

## 2020-11-13 LAB
ALBUMIN SERPL BCP-MCNC: 3.2 G/DL (ref 3.5–5.2)
ALP SERPL-CCNC: 135 U/L (ref 55–135)
ALT SERPL W/O P-5'-P-CCNC: 11 U/L (ref 10–44)
ANION GAP SERPL CALC-SCNC: 9 MMOL/L (ref 8–16)
AST SERPL-CCNC: 16 U/L (ref 10–40)
BASOPHILS # BLD AUTO: 0.05 K/UL (ref 0–0.2)
BASOPHILS NFR BLD: 0.9 % (ref 0–1.9)
BILIRUB SERPL-MCNC: 0.3 MG/DL (ref 0.1–1)
BUN SERPL-MCNC: 9 MG/DL (ref 6–20)
CALCIUM SERPL-MCNC: 10 MG/DL (ref 8.7–10.5)
CHLORIDE SERPL-SCNC: 105 MMOL/L (ref 95–110)
CO2 SERPL-SCNC: 23 MMOL/L (ref 23–29)
CREAT SERPL-MCNC: 0.8 MG/DL (ref 0.5–1.4)
CREAT UR-MCNC: 61.1 MG/DL (ref 23–375)
DIFFERENTIAL METHOD: ABNORMAL
EOSINOPHIL # BLD AUTO: 1 K/UL (ref 0–0.5)
EOSINOPHIL NFR BLD: 17.2 % (ref 0–8)
ERYTHROCYTE [DISTWIDTH] IN BLOOD BY AUTOMATED COUNT: 18.9 % (ref 11.5–14.5)
EST. GFR  (AFRICAN AMERICAN): >60 ML/MIN/1.73 M^2
EST. GFR  (NON AFRICAN AMERICAN): >60 ML/MIN/1.73 M^2
GLUCOSE SERPL-MCNC: 109 MG/DL (ref 70–110)
HCT VFR BLD AUTO: 33.5 % (ref 40–54)
HGB BLD-MCNC: 10.5 G/DL (ref 14–18)
IMM GRANULOCYTES # BLD AUTO: 0.01 K/UL (ref 0–0.04)
IMM GRANULOCYTES NFR BLD AUTO: 0.2 % (ref 0–0.5)
LYMPHOCYTES # BLD AUTO: 1.8 K/UL (ref 1–4.8)
LYMPHOCYTES NFR BLD: 30.7 % (ref 18–48)
MAGNESIUM SERPL-MCNC: 2 MG/DL (ref 1.6–2.6)
MCH RBC QN AUTO: 24.8 PG (ref 27–31)
MCHC RBC AUTO-ENTMCNC: 31.3 G/DL (ref 32–36)
MCV RBC AUTO: 79 FL (ref 82–98)
MONOCYTES # BLD AUTO: 0.5 K/UL (ref 0.3–1)
MONOCYTES NFR BLD: 8.4 % (ref 4–15)
NEUTROPHILS # BLD AUTO: 2.5 K/UL (ref 1.8–7.7)
NEUTROPHILS NFR BLD: 42.6 % (ref 38–73)
NRBC BLD-RTO: 0 /100 WBC
PHOSPHATE SERPL-MCNC: 3.5 MG/DL (ref 2.7–4.5)
PLATELET # BLD AUTO: 374 K/UL (ref 150–350)
PLATELET BLD QL SMEAR: ABNORMAL
PMV BLD AUTO: 10 FL (ref 9.2–12.9)
POCT GLUCOSE: 115 MG/DL (ref 70–110)
POCT GLUCOSE: 119 MG/DL (ref 70–110)
POCT GLUCOSE: 120 MG/DL (ref 70–110)
POTASSIUM SERPL-SCNC: 4.4 MMOL/L (ref 3.5–5.1)
PROT SERPL-MCNC: 8 G/DL (ref 6–8.4)
PROT UR-MCNC: 18 MG/DL (ref 0–15)
PROT/CREAT UR: 0.29 MG/G{CREAT} (ref 0–0.2)
RBC # BLD AUTO: 4.24 M/UL (ref 4.6–6.2)
SODIUM SERPL-SCNC: 137 MMOL/L (ref 136–145)
WBC # BLD AUTO: 5.83 K/UL (ref 3.9–12.7)

## 2020-11-13 PROCEDURE — 80053 COMPREHEN METABOLIC PANEL: CPT

## 2020-11-13 PROCEDURE — 25000003 PHARM REV CODE 250: Performed by: STUDENT IN AN ORGANIZED HEALTH CARE EDUCATION/TRAINING PROGRAM

## 2020-11-13 PROCEDURE — 85025 COMPLETE CBC W/AUTO DIFF WBC: CPT

## 2020-11-13 PROCEDURE — A4216 STERILE WATER/SALINE, 10 ML: HCPCS | Performed by: STUDENT IN AN ORGANIZED HEALTH CARE EDUCATION/TRAINING PROGRAM

## 2020-11-13 PROCEDURE — 83735 ASSAY OF MAGNESIUM: CPT

## 2020-11-13 PROCEDURE — 63600175 PHARM REV CODE 636 W HCPCS: Performed by: STUDENT IN AN ORGANIZED HEALTH CARE EDUCATION/TRAINING PROGRAM

## 2020-11-13 PROCEDURE — 84100 ASSAY OF PHOSPHORUS: CPT

## 2020-11-13 PROCEDURE — 82570 ASSAY OF URINE CREATININE: CPT

## 2020-11-13 RX ADMIN — GABAPENTIN 300 MG: 300 CAPSULE ORAL at 08:11

## 2020-11-13 RX ADMIN — APIXABAN 5 MG: 2.5 TABLET, FILM COATED ORAL at 08:11

## 2020-11-13 RX ADMIN — METOPROLOL TARTRATE 25 MG: 25 TABLET, FILM COATED ORAL at 08:11

## 2020-11-13 RX ADMIN — OXYCODONE AND ACETAMINOPHEN 1 TABLET: 7.5; 325 TABLET ORAL at 05:11

## 2020-11-13 RX ADMIN — METOPROLOL TARTRATE 25 MG: 25 TABLET, FILM COATED ORAL at 09:11

## 2020-11-13 RX ADMIN — Medication 3 MG: at 08:11

## 2020-11-13 RX ADMIN — MUPIROCIN: 20 OINTMENT TOPICAL at 09:11

## 2020-11-13 RX ADMIN — VENLAFAXINE HYDROCHLORIDE 150 MG: 75 CAPSULE, EXTENDED RELEASE ORAL at 09:11

## 2020-11-13 RX ADMIN — APIXABAN 5 MG: 2.5 TABLET, FILM COATED ORAL at 09:11

## 2020-11-13 RX ADMIN — GABAPENTIN 300 MG: 300 CAPSULE ORAL at 09:11

## 2020-11-13 RX ADMIN — PIPERACILLIN AND TAZOBACTAM 4.5 G: 4; .5 INJECTION, POWDER, LYOPHILIZED, FOR SOLUTION INTRAVENOUS; PARENTERAL at 05:11

## 2020-11-13 RX ADMIN — BACLOFEN 10 MG: 10 TABLET ORAL at 09:11

## 2020-11-13 RX ADMIN — OXYCODONE AND ACETAMINOPHEN 1 TABLET: 7.5; 325 TABLET ORAL at 09:11

## 2020-11-13 RX ADMIN — GEMFIBROZIL 600 MG: 600 TABLET ORAL at 05:11

## 2020-11-13 RX ADMIN — BACLOFEN 10 MG: 10 TABLET ORAL at 08:11

## 2020-11-13 RX ADMIN — Medication 10 ML: at 05:11

## 2020-11-13 RX ADMIN — DOCUSATE SODIUM 100 MG: 100 CAPSULE, LIQUID FILLED ORAL at 08:11

## 2020-11-13 RX ADMIN — MUPIROCIN: 20 OINTMENT TOPICAL at 08:11

## 2020-11-13 RX ADMIN — FERROUS SULFATE TAB EC 325 MG (65 MG FE EQUIVALENT) 325 MG: 325 (65 FE) TABLET DELAYED RESPONSE at 09:11

## 2020-11-13 RX ADMIN — GEMFIBROZIL 600 MG: 600 TABLET ORAL at 04:11

## 2020-11-13 RX ADMIN — PANTOPRAZOLE SODIUM 40 MG: 40 TABLET, DELAYED RELEASE ORAL at 09:11

## 2020-11-13 RX ADMIN — OXYCODONE AND ACETAMINOPHEN 1 TABLET: 7.5; 325 TABLET ORAL at 04:11

## 2020-11-13 NOTE — PLAN OF CARE
Problem: Adult Inpatient Plan of Care  Goal: Chart and care plan reviewed and updated  Outcome: Ongoing, Progressing

## 2020-11-13 NOTE — PROGRESS NOTES
Wound care nurse follow up visit. Cleaned and dressed wounds as directed. Applied wound vac to right ischial tuberosity Stage 4 wound, tracked to right medial thigh, set at 125 mmHg, good seal obtained.     11/13/20 1405        Colostomy 09/11/20 1232 Loop RUQ   Placement Date/Time: 09/11/20 1232   Present Prior to Hospital Arrival?: No  Inserted by: MD  Colostomy Type: Loop  Location: RUQ   Output (mL) 125 mL        Negative Pressure Wound Therapy  11/13/20 1350 Right   Placement Date/Time: 11/13/20 1350   Side: Right  Location: Ischial tuberosity   NPWT Type Vacuum Therapy   Pressure Setting NPWT 125 mmHg   Sponges Inserted NPWT 1   Sponges Removed NPWT 2

## 2020-11-13 NOTE — CONSULTS
Today`s Date: 11/13/2020     Admit Date: 11/11/2020    Admitting Physician: Star Meraz MD    Patient`s Name: Hermann Aguilar , 45 y.o. male    Reason for consultation  Wound care patient known  To me from the wound center.  Patient Active Problem List:     Iron deficiency anemia     Chronic suprapubic catheter     Essential hypertension     Vitamin D deficiency     Paraplegia     History of DVT of lower extremity     Hx of right BKA     Non-healing wound of lower extremity, initial encounter     Chronic post-traumatic stress disorder     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     PAD (peripheral artery disease)     Right foot ulcer, with fat layer exposed     Constipation     Diabetic foot ulcer with osteomyelitis     Chronic ulcer of left lower extremity     Major depressive disorder     Phantom limb syndrome     Cigarette smoker     Hematuria     Neurogenic bladder     Stage IV pressure ulcer of left buttock     Complicated UTI (urinary tract infection)     Pressure injury of buttock, stage 3     Bacteremia due to Gram-negative bacteria     Pressure injury of right ischium, stage 4     Newly diagnosed infection due to multidrug resistant organism     Infected decubitus ulcer, stage IV with osteomyelitis of right ischium     Sepsis without acute organ dysfunction     Decubitus ulcer, infected, stage III     Chronic osteomyelitis     Chronic osteomyelitis of symphysis pubis     Chronic osteomyelitis of hindfoot, right     Non-pressure chronic ulcer of left ankle with necrosis of bone     Osteomyelitis     Colostomy, evaluate     Tachycardia     Pulmonary embolism     Type 2 diabetes mellitus without complication, without long-term current use of insulin     S/P colostomy      Past Medical History:  No date: 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  No date: Acute postoperative respiratory failure  No date: Anemia, iron deficiency  No date: Chronic posttraumatic stress disorder  No date: Constipation  No date: Diabetes  mellitus  No date: Gastric ulcer  No date: Hypertension  No date: 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 Yadiel Haley  No date: CHEST TUBE INSERTION; Right  9/11/2020: COLOSTOMY; N/A      Comment:  Procedure: CREATION, COLOSTOMY;  Surgeon: Jesus Tom MD;  Location: [facility] OR;  Service: General;                 Laterality: N/A;  8/30/2018: CYSTOSCOPY; N/A      Comment:  Procedure: CYSTOSCOPY, clot evacuation, suprapubic tube                exchange;  Surgeon: Ruchi Navarrete MD;  Location: [facility]                OR;  Service: Urology;  Laterality: N/A;  5/5/2020: DEBRIDEMENT OF SACRAL WOUND; N/A      Comment:  Procedure: DEBRIDEMENT, WOUND, SACRUM;  Surgeon:                Jesus Tom MD;  Location: [facility] OR;  Service:                General;  Laterality: N/A;  12/15/2016: GASTROSTOMY TUBE PLACEMENT  12/15/2016: ORIF HUMERUS FRACTURE; Left  8/30/2018: REMOVAL OF BLOOD CLOT      Comment:  Procedure: REMOVAL, BLOOD CLOT;  Surgeon: Ruchi Navarrete MD;  Location: [facility] OR;  Service: Urology;;  No date: TRACHEOSTOMY TUBE PLACEMENT    Prior to Admission medications :  Medication acetaminophen (TYLENOL) 325 MG tablet, Sig Take 2 tablets (650 mg total) by mouth every 4 (four) hours as needed., Start Date 9/17/20, End Date , Taking? , Authorizing Provider Jesus Tom MD    Medication alcohol swabs (ALCOHOL PADS) PadM, Sig Apply 1 each topically once daily., Start Date 5/15/19, End Date , Taking? , Authorizing Provider Ramu Breen,  MD    Medication ammonium lactate 12 % Crea, Sig MIHAELA EXT AA ON SKIN BID, Start Date 4/11/19, End Date , Taking? , Authorizing Provider Historical Provider    Medication ampicillin (PRINCIPEN) 500 MG capsule, Sig Take 1 capsule (500 mg total) by mouth 2 (two) times a day. for 10 days, Start Date 11/5/20, End Date 11/15/20, Taking? , Authorizing Provider Alia Edwards, NP    Medication apixaban (ELIQUIS) 5 mg Tab, Sig Take 2 tablets (10 mg total) by mouth 2 (two) times daily., Start Date 9/17/20, End Date , Taking? , Authorizing Provider Jesus Tom MD    Medication baclofen (LIORESAL) 10 MG tablet, Sig , Start Date 1/20/20, End Date , Taking? , Authorizing Provider Historical Provider    Medication cadexomer iodine (IODOSORB) 0.9 % gel, Sig Apply topically daily as needed for Wound Care., Start Date , End Date , Taking? , Authorizing Provider Historical Provider    Medication collagenase (SANTYL) ointment, Sig Apply topically once daily., Start Date 7/20/20, End Date , Taking? , Authorizing Provider Marlene Vargas, NP    Medication cyclobenzaprine (FLEXERIL) 10 MG tablet, Sig Take 1 tablet (10 mg total) by mouth 3 (three) times daily as needed., Start Date 4/6/19, End Date , Taking? , Authorizing Provider Ramu Breen MD    Medication dextran 70-hypromellose (TEARS) ophthalmic solution, Sig Apply 1 drop to eye., Start Date , End Date , Taking? , Authorizing Provider Historical Provider    Medication docusate sodium (COLACE) 100 MG capsule, Sig Take 1 capsule (100 mg total) by mouth 2 (two) times daily., Start Date 2/14/19, End Date , Taking? , Authorizing Provider Ramu Breen MD    Medication drainage bag Misc, Sig 1 Units by Misc.(Non-Drug; Combo Route) route every 30 days., Start Date 5/15/19, End Date , Taking? , Authorizing Provider Ramu Breen MD    Medication famotidine (PEPCID) 20 MG tablet, Sig Take 1 tablet (20 mg total) by mouth 2 (two) times daily., Start Date 2/14/19, End Date  9/17/20, Taking? , Authorizing Provider Ramu Breen MD    Medication ferrous sulfate (IRON, FERROUS SULFATE,) 325 mg (65 mg iron) Tab tablet, Sig Take 1 tablet (325 mg total) by mouth once daily., Start Date 7/20/20, End Date 1/16/21, Taking? , Authorizing Provider Marlene Vargas NP    Medication gabapentin (NEURONTIN) 300 MG capsule, Sig Take 1 capsule (300 mg total) by mouth 2 (two) times daily., Start Date 4/6/19, End Date , Taking? , Authorizing Provider Ramu Breen MD    Medication gemfibrozil (LOPID) 600 MG tablet, Sig Take 1 tablet (600 mg total) by mouth 2 (two) times daily before meals., Start Date 4/6/19, End Date 11/5/20, Taking? , Authorizing Provider Ramu Breen MD    Medication HYDROcodone-acetaminophen (NORCO) 5-325 mg per tablet, Sig Take 1 tablet by mouth every 6 (six) hours as needed for Pain., Start Date 9/11/20, End Date , Taking? , Authorizing Provider Jesus Tom MD    Medication ibuprofen (ADVIL,MOTRIN) 800 MG tablet, Sig , Start Date 8/3/20, End Date , Taking? , Authorizing Provider Historical Provider    Medication ketoconazole (NIZORAL) 2 % shampoo, Sig Apply topically twice a week., Start Date 2/14/19, End Date , Taking? , Authorizing Provider Ramu Breen MD    Medication ketorolac (TORADOL) 10 mg tablet, Sig TK 1 T PO TID, Start Date 7/2/20, End Date , Taking? , Authorizing Provider Historical Provider    Medication LORazepam (ATIVAN) 1 MG tablet, Sig Take 0.5 tablets (0.5 mg total) by mouth every evening., Start Date 9/17/20, End Date 10/17/20, Taking? , Authorizing Provider Anay Chung MD    Medication melatonin 10 mg Cap, Sig Take 1 tablet by mouth every evening., Start Date 2/14/19, End Date , Taking? , Authorizing Provider Ramu Breen MD    Medication meloxicam (MOBIC) 7.5 MG tablet, Sig Take 1 tablet (7.5 mg total) by mouth 2 (two) times daily as needed for Pain., Start Date 4/6/19, End Date , Taking? , Authorizing Provider Ramu Breen,  "MD    Medication metFORMIN (GLUCOPHAGE) 500 MG tablet, Sig Take 1 tablet (500 mg total) by mouth 2 (two) times daily with meals., Start Date 4/6/19, End Date 9/17/20, Taking? , Authorizing Provider Ramu Breen MD    Medication metoprolol tartrate (LOPRESSOR) 25 MG tablet, Sig , Start Date 1/20/20, End Date , Taking? , Authorizing Provider Historical Provider    Medication oxyCODONE-acetaminophen (PERCOCET) 7.5-325 mg per tablet, Sig TAKE 1 TABLET PO DAILY AS NEEDED FOR PAIN, Start Date 7/27/20, End Date , Taking? , Authorizing Provider Historical Provider    Medication pantoprazole (PROTONIX) 40 MG tablet, Sig Take 1 tablet (40 mg total) by mouth once daily., Start Date 7/20/20, End Date 7/20/21, Taking? , Authorizing Provider Marlene Vargas NP    Medication pen needle, diabetic (COMFORT EZ PEN NEEDLES) 29 gauge x 1/2" Ndle, Sig 1 Units by Misc.(Non-Drug; Combo Route) route 3 (three) times daily., Start Date 5/15/19, End Date , Taking? , Authorizing Provider Ramu Breen MD    Medication SANTYL ointment, Sig APPLY TO CLEANSED AFFECTED ARE TOPICALLY ONCE DAILY, Start Date 1/13/20, End Date , Taking? , Authorizing Provider Historical Provider    Medication TRUE METRIX GLUCOSE METER Misc, Sig AS DIRECTED, Start Date 4/26/19, End Date , Taking? , Authorizing Provider Historical Provider    Medication TRUE METRIX GLUCOSE TEST STRIP Strp, Sig USE AS DIRECTED TID, Start Date 6/6/19, End Date , Taking? , Authorizing Provider Ramu rBeen MD    Medication TRUEPLUS LANCETS 30 gauge Misc, Sig USE AS DIRECTED TID, Start Date 4/26/19, End Date , Taking? , Authorizing Provider Historical Provider    Medication venlafaxine (EFFEXOR-XR) 150 MG Cp24, Sig Take 150 mg by mouth once daily. , Start Date 1/20/20, End Date , Taking? , Authorizing Provider Historical Provider    Medication wheelchair Korina, Sig 1 Units/oz/day by Misc.(Non-Drug; Combo Route) route once daily., Start Date 7/11/19, End Date , Taking? , " Authorizing Provider Ramu Breen MD      No current facility-administered medications on file prior to encounter.   Current Outpatient Medications on File Prior to Encounter:  acetaminophen (TYLENOL) 325 MG tablet, Take 2 tablets (650 mg total) by mouth every 4 (four) hours as needed., Disp: 20 tablet, Rfl: 0  alcohol swabs (ALCOHOL PADS) PadM, Apply 1 each topically once daily., Disp: 400 each, Rfl: 11  ammonium lactate 12 % Crea, MIHAELA EXT AA ON SKIN BID, Disp: , Rfl: 3  ampicillin (PRINCIPEN) 500 MG capsule, Take 1 capsule (500 mg total) by mouth 2 (two) times a day. for 10 days, Disp: 20 capsule, Rfl: 0  apixaban (ELIQUIS) 5 mg Tab, Take 2 tablets (10 mg total) by mouth 2 (two) times daily., Disp: 20 tablet, Rfl: 1  baclofen (LIORESAL) 10 MG tablet, , Disp: , Rfl:   cadexomer iodine (IODOSORB) 0.9 % gel, Apply topically daily as needed for Wound Care., Disp: , Rfl:   collagenase (SANTYL) ointment, Apply topically once daily., Disp: 30 g, Rfl: 3  cyclobenzaprine (FLEXERIL) 10 MG tablet, Take 1 tablet (10 mg total) by mouth 3 (three) times daily as needed., Disp: 90 tablet, Rfl: 3  dextran 70-hypromellose (TEARS) ophthalmic solution, Apply 1 drop to eye., Disp: , Rfl:   docusate sodium (COLACE) 100 MG capsule, Take 1 capsule (100 mg total) by mouth 2 (two) times daily., Disp: 180 capsule, Rfl: 3  drainage bag Misc, 1 Units by Misc.(Non-Drug; Combo Route) route every 30 days., Disp: 3 each, Rfl: 3  famotidine (PEPCID) 20 MG tablet, Take 1 tablet (20 mg total) by mouth 2 (two) times daily., Disp: 180 tablet, Rfl: 3  ferrous sulfate (IRON, FERROUS SULFATE,) 325 mg (65 mg iron) Tab tablet, Take 1 tablet (325 mg total) by mouth once daily., Disp: 30 tablet, Rfl: 5  gabapentin (NEURONTIN) 300 MG capsule, Take 1 capsule (300 mg total) by mouth 2 (two) times daily., Disp: 180 capsule, Rfl: 3  gemfibrozil (LOPID) 600 MG tablet, Take 1 tablet (600 mg total) by mouth 2 (two) times daily before meals., Disp: 180 tablet,  "Rfl: 1  HYDROcodone-acetaminophen (NORCO) 5-325 mg per tablet, Take 1 tablet by mouth every 6 (six) hours as needed for Pain., Disp: 24 tablet, Rfl: 0  ibuprofen (ADVIL,MOTRIN) 800 MG tablet, , Disp: , Rfl:   ketoconazole (NIZORAL) 2 % shampoo, Apply topically twice a week., Disp: 120 mL, Rfl: 11  ketorolac (TORADOL) 10 mg tablet, TK 1 T PO TID, Disp: , Rfl:   LORazepam (ATIVAN) 1 MG tablet, Take 0.5 tablets (0.5 mg total) by mouth every evening., Disp: 10 tablet, Rfl: 0  melatonin 10 mg Cap, Take 1 tablet by mouth every evening., Disp: 90 capsule, Rfl: 3  meloxicam (MOBIC) 7.5 MG tablet, Take 1 tablet (7.5 mg total) by mouth 2 (two) times daily as needed for Pain., Disp: 180 tablet, Rfl: 1  metFORMIN (GLUCOPHAGE) 500 MG tablet, Take 1 tablet (500 mg total) by mouth 2 (two) times daily with meals., Disp: 180 tablet, Rfl: 3  metoprolol tartrate (LOPRESSOR) 25 MG tablet, , Disp: , Rfl:   oxyCODONE-acetaminophen (PERCOCET) 7.5-325 mg per tablet, TAKE 1 TABLET PO DAILY AS NEEDED FOR PAIN, Disp: , Rfl:   pantoprazole (PROTONIX) 40 MG tablet, Take 1 tablet (40 mg total) by mouth once daily., Disp: 30 tablet, Rfl: 11  pen needle, diabetic (COMFORT EZ PEN NEEDLES) 29 gauge x 1/2" Ndle, 1 Units by Misc.(Non-Drug; Combo Route) route 3 (three) times daily., Disp: 400 each, Rfl: 11  SANTYL ointment, APPLY TO CLEANSED AFFECTED ARE TOPICALLY ONCE DAILY, Disp: , Rfl:   TRUE METRIX GLUCOSE METER Misc, AS DIRECTED, Disp: , Rfl: 0  TRUE METRIX GLUCOSE TEST STRIP Strp, USE AS DIRECTED TID, Disp: 200 strip, Rfl: 3  TRUEPLUS LANCETS 30 gauge Misc, USE AS DIRECTED TID, Disp: , Rfl: 3  venlafaxine (EFFEXOR-XR) 150 MG Cp24, Take 150 mg by mouth once daily. , Disp: , Rfl:   wheelchair Korina, 1 Units/oz/day by Misc.(Non-Drug; Combo Route) route once daily., Disp: 1 each, Rfl: 0         Review of patient's allergies indicates:  No Known Allergies    Social History:   reports that he has been smoking cigarettes. He started smoking about 33 " years ago. He has a 16.50 pack-year smoking history. He has never used smokeless tobacco. He reports that he does not drink alcohol or use drugs.     History reviewed.  No pertinent family history.      PHYSICAL EXAMINATION  Temp:  [97.4 °F (36.3 °C)-97.7 °F (36.5 °C)] 97.5 °F (36.4 °C)  Pulse:  [62-76] 72  Resp:  [16-20] 17  BP: (105-130)/(62-78) 115/62    General Condition:   alert x 3     Head & Neck  Anemia: None  Jaundice: None  Neck vein: Not distended  Carotid Bruits: none  Lymph nodes: none palpable  Thyroid: normal    Chest: normal    Heart: normal    Rt. Breast: not examined  Lt. Breast: not examined  Axillary lymph nodes: none    Abdomen: Soft,  None tender with no palpable mass or organ  Hernia: none    Rectal: Defered    Extremities: normal    Vascular: normal    Specific focus Examination      Imp: non healing right ischial decubitus ulcer with osteomyelitis, paraplegic, sepsis, uti    Plan: continue wound vac and iv antibiotics

## 2020-11-13 NOTE — NURSING
1704 Pt reporting watery diarrhea from colostomy. Pt self-care with colostomy. MD notified of above.  MD states will be up shortly to assess pt.     1725 Per Dr. Brooks, pt ok to be discharge. AVS given to pt. FRENCH Jordan reviewed discharge instructions with pt.

## 2020-11-13 NOTE — PLAN OF CARE
Pt AAOx4, complains of moderate generalized pain but denies n/v/d and SOB. Diabetic diet tolerated well. Colostomy and suprapubic catheter intact and draining appropriately. Dressings to wounds CDI. Contact isolation precautions continued. Blood glucose checks continued. Safety maintained, bed alarm on - will cont to monitor.
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
## 2020-11-13 NOTE — TELEPHONE ENCOUNTER
----- Message from Shahla Templeton sent at 11/13/2020  1:38 PM CST -----  Type:  Sooner Apoointment Request    Caller is requesting a sooner appointment.    Caller will not accept being placed on the waitlist and is requesting a message be sent to doctor.  Name of Caller: Case  Management   When is the first available appointment? 12/02  Would the patient rather a call back or a response via [facility]?  Yes   Best Call Back Number: [phone]  Additional Information:   pt wants to be notified if something is available sooner

## 2020-11-13 NOTE — PLAN OF CARE
TN met with pt prior to d/c   for d/c to home today   home health has been renewed - confirmed with office -- final orders sent per [facility].      stretcher transport arranged --- per pt's request 5pm  today.   TN confirmed with Ms. Vinita Arcos per Teams - pt re-enrolled in [facility] program.             Future Appointments   Date Time Provider Department Center   11/25/2020 10:00 AM Jesus Tom MD [facility] W[facility]   12/2/2020 10:45 AM Alia Edwards NP [facility] UROLOGY [facility]        11/13/20 1331   Final Note   Assessment Type Final Discharge Note   Anticipated Discharge Disposition Home-Health  ([facility] / [facility])   What phone number can be called within the next 1-3 days to see how you are doing after discharge? [phone]   Hospital Follow Up  Appt(s) scheduled? Yes   Discharge plans and expectations educations in teach back method with documentation complete? Yes   [facility] Referral Info   Post Acute Recommendation Other   Referral Type home care   Facility Name [facility]

## 2020-11-14 NOTE — NURSING
[facility] transport people arrived. Night time medicines given. Handed over all his belongings. Patient left the [facility] in a stretcher.

## 2020-11-15 NOTE — PLAN OF CARE
Patient has received discharge instructions. Prescriptions received. Instructions reviewed with pt using teachback method. All questions answered to pt satisfaction. IV access removed per floor nurse. Transport arrangements made per case management  Expecting transport shortlyl

## 2020-11-16 ENCOUNTER — PATIENT OUTREACH (OUTPATIENT)
Dept: ADMINISTRATIVE | Facility: CLINIC | Age: 45
End: 2020-11-16

## 2020-11-16 NOTE — PATIENT INSTRUCTIONS
Urinary Tract Infections in Men    Urinary tract infections (UTIs) are most often caused by bacteria that invade the urinary tract. The bacteria may come from outside the body. Or they may travel from the skin outside of rectum into the urethra. Pain in or around the urinary tract is a common symptom for most UTIs. But the only way to know for sure if you have a UTI is to have a urinalysis and urine culture.   Types of UTIs  · Cystitis: This is a bladder infection and is often linked to a blockage from an enlarged prostate. You may have an urgent or frequent need to urinate, and bloody urine. Treatment includes antibiotics and medicine to relax or shrink the prostate. Sometimes, surgery is needed.  · Urethritis: This is an infection of the urethra. You may have a discharge from the urethra or burning when you urinate. You may also have pain in the urethra or penis. It is treated with antibiotics.  · Prostatitis: This is an inflammation or infection of the prostate. You may have an urgent or frequent need to urinate, fever, or burning when you urinate. Or you may have a tender prostate, or a vague feeling of pressure. Prostatitis is treated with a range of medicines, depending on the cause.  · Pyelonephritis: This is a kidney infection. If not treated, it can be serious and damage your kidneys. In severe cases you may be hospitalized. You may have a fever and upper back pain.  Treating a UTI  · Medicine: Most UTIs are treated with antibiotics. These kill the bacteria. The length of time you need to take them depends on the type of infection. Take antibiotics exactly as directed until all of the medicine is gone. If you don't, the infection may not go away and may become harder to treat. For certain types of UTIs, you may be given other medicine to help treat your symptoms.  · Lifestyle changes: The lifestyle changes below will help get rid of your current infection. They may also help prevent future UTIs.  ¨ Drink  plenty of fluids such as water, juice, or other caffeine-free drinks. This helps flush bacteria out of your system.  ¨ Empty your bladder when you feel the urge to urinate and before going to sleep. Urine that stays in your bladder promotes infection.  ¨ Use condoms during sex. These help prevent UTIs caused by sexually transmitted bacteria.  ¨ Keep follow-up appointments with your healthcare provider. He or she can may do tests to make sure the infection has cleared. If needed, more treatment can be started.  · Other treatment: Most UTIs respond to medicine. But sometimes a procedure or surgery is needed. This can treat an enlarged prostate, or remove a kidney stone or other blockage. Surgery may also treat problems caused by scarring or long-term infections.  Date Last Reviewed: 1/1/2017  © 3446-2266 [employer]. [address] [place] [address], [place] [address]. All rights reserved. This information is not intended as a substitute for professional medical care. Always follow your healthcare professional's instructions.

## 2020-11-20 ENCOUNTER — CARE AT HOME (OUTPATIENT)
Dept: HOME HEALTH SERVICES | Facility: CLINIC | Age: 45
End: 2020-11-20
Payer: MEDICARE

## 2020-11-20 VITALS
HEART RATE: 72 BPM | TEMPERATURE: 98 F | RESPIRATION RATE: 18 BRPM | DIASTOLIC BLOOD PRESSURE: 62 MMHG | SYSTOLIC BLOOD PRESSURE: 124 MMHG

## 2020-11-20 DIAGNOSIS — L89.324 STAGE IV PRESSURE ULCER OF LEFT BUTTOCK: ICD-10-CM

## 2020-11-20 DIAGNOSIS — N39.0 COMPLICATED UTI (URINARY TRACT INFECTION): ICD-10-CM

## 2020-11-20 DIAGNOSIS — G82.20 PARAPLEGIA: ICD-10-CM

## 2020-11-20 DIAGNOSIS — Z93.59 CHRONIC SUPRAPUBIC CATHETER: Primary | ICD-10-CM

## 2020-11-20 DIAGNOSIS — Z72.0 TOBACCO ABUSE: ICD-10-CM

## 2020-11-20 PROCEDURE — 99350 HOME/RES VST EST HIGH MDM 60: CPT | Mod: 25,S$GLB,, | Performed by: NURSE PRACTITIONER

## 2020-11-20 PROCEDURE — 99350 PR HOME VISIT,ESTAB PATIENT,LEVEL IV: ICD-10-PCS | Mod: 25,S$GLB,, | Performed by: NURSE PRACTITIONER

## 2020-11-20 PROCEDURE — 99406 PR TOBACCO USE CESSATION INTERMEDIATE 3-10 MINUTES: ICD-10-PCS | Mod: S$GLB,,, | Performed by: NURSE PRACTITIONER

## 2020-11-20 PROCEDURE — 99406 BEHAV CHNG SMOKING 3-10 MIN: CPT | Mod: S$GLB,,, | Performed by: NURSE PRACTITIONER

## 2020-11-20 RX ORDER — METHENAMINE HIPPURATE 1000 MG/1
1 TABLET ORAL DAILY
Qty: 30 TABLET | Refills: 5 | Status: ON HOLD | OUTPATIENT
Start: 2020-11-20 | End: 2021-03-18 | Stop reason: HOSPADM

## 2020-11-20 RX ORDER — SULFAMETHOXAZOLE AND TRIMETHOPRIM 400; 80 MG/1; MG/1
1 TABLET ORAL 2 TIMES DAILY
Qty: 14 TABLET | Refills: 0 | Status: SHIPPED | OUTPATIENT
Start: 2020-11-20 | End: 2020-12-24

## 2020-11-23 ENCOUNTER — OUTPATIENT CASE MANAGEMENT (OUTPATIENT)
Dept: ADMINISTRATIVE | Facility: OTHER | Age: 45
End: 2020-11-23

## 2020-11-23 NOTE — LETTER
November 30, 2020    Hermann Aguilar  [address] [place] [address]             [facility] [address] [place] [address] Dear Mr. Aguilar,    I work with [facility] Outpatient Case Management Department. We received a referral to call you to discuss your medical history.These services are free of charge and are offered to [facility] patients who have recently been discharged from any of our facilities or who have complex medical conditions that may require the skill of a nurse to assist with management.             I am a Registered Nurse who specializes in connecting patients with available medical and financial resources as well as addressing any educational needs that may be indicated.      I attempted to reach you by telephone, but I was unsuccessful. Please call our department so that we can go over some questions with you regarding your health.    The Outpatient Case Management Department can be reached at [phone] from 8:00AM to 4:30 PM on Monday thru Friday. [facility] also has a program where a nurse is available 24/7 to answer questions or provide medical advice, their number is [phone].    Thanks,      Sheyla Connors, RN Case Manager  Outpatient Complex Case Management/Disease Management   [phone]

## 2020-11-27 NOTE — TELEPHONE ENCOUNTER
----- Message from Delmis Godfrey sent at 5/6/2019  3:00 PM CDT -----  Patient's caregiver, Reshma, called.   No. [phone]   Patient needs an appointment to have his catheter removed.    Please call.     patient representative

## 2020-11-30 NOTE — PROGRESS NOTES
11/23/20-1st attempt to complete initial assessment for Outpatient Care Management. Left message requesting a return call. Will attempt to contact patient at a later date.

## 2020-11-30 NOTE — PROGRESS NOTES
11/30/20-2nd attempt to complete initial assessment for Outpatient Care Management. Left message requesting a return call. Letter has been mailed to patient with [facility] contact information. Will attempt to contact patient at a later date.

## 2020-12-02 ENCOUNTER — OFFICE VISIT (OUTPATIENT)
Dept: UROLOGY | Facility: CLINIC | Age: 45
End: 2020-12-02
Payer: MEDICARE

## 2020-12-02 VITALS
SYSTOLIC BLOOD PRESSURE: 124 MMHG | WEIGHT: 200 LBS | BODY MASS INDEX: 28 KG/M2 | HEART RATE: 80 BPM | HEIGHT: 71 IN | DIASTOLIC BLOOD PRESSURE: 78 MMHG

## 2020-12-02 DIAGNOSIS — N31.9 NEUROGENIC BLADDER: Primary | ICD-10-CM

## 2020-12-02 DIAGNOSIS — Z93.59 CHRONIC SUPRAPUBIC CATHETER: ICD-10-CM

## 2020-12-02 PROCEDURE — 3008F BODY MASS INDEX DOCD: CPT | Mod: CPTII,S$GLB,, | Performed by: NURSE PRACTITIONER

## 2020-12-02 PROCEDURE — 99499 NO LOS: ICD-10-PCS | Mod: S$GLB,,, | Performed by: NURSE PRACTITIONER

## 2020-12-02 PROCEDURE — 51705 CHANGE OF BLADDER TUBE: CPT | Mod: S$GLB,,, | Performed by: NURSE PRACTITIONER

## 2020-12-02 PROCEDURE — 99999 PR PBB SHADOW E&M-EST. PATIENT-LVL V: ICD-10-PCS | Mod: PBBFAC,,, | Performed by: NURSE PRACTITIONER

## 2020-12-02 PROCEDURE — 3008F PR BODY MASS INDEX (BMI) DOCUMENTED: ICD-10-PCS | Mod: CPTII,S$GLB,, | Performed by: NURSE PRACTITIONER

## 2020-12-02 PROCEDURE — 1126F AMNT PAIN NOTED NONE PRSNT: CPT | Mod: S$GLB,,, | Performed by: NURSE PRACTITIONER

## 2020-12-02 PROCEDURE — 51705 PR CHANGE OF BLADDER TUBE,SIMPLE: ICD-10-PCS | Mod: S$GLB,,, | Performed by: NURSE PRACTITIONER

## 2020-12-02 PROCEDURE — 1126F PR PAIN SEVERITY QUANTIFIED, NO PAIN PRESENT: ICD-10-PCS | Mod: S$GLB,,, | Performed by: NURSE PRACTITIONER

## 2020-12-02 PROCEDURE — 99499 UNLISTED E&M SERVICE: CPT | Mod: S$GLB,,, | Performed by: NURSE PRACTITIONER

## 2020-12-02 PROCEDURE — 99999 PR PBB SHADOW E&M-EST. PATIENT-LVL V: CPT | Mod: PBBFAC,,, | Performed by: NURSE PRACTITIONER
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
## 2020-12-02 NOTE — PROGRESS NOTES
12/2/20-Attempted to contact patient to complete initial assessment for [facility]. Unable to reach patient. Letter has been sent to patient with [facility] contact information should any needs arise in the future. Closing case at this time.

## 2020-12-14 NOTE — PROGRESS NOTES
Rochelle, home health nurse with [facility], called to report that wound vac is not staying on patient more than 1 day. She also reports that patient does not have a reliable caregiver to turn patient or reinforce dressing on vac when it begins to leak. Home health nurse to hold vac and pack wound with hydrofera ready and cover with border dressing 3 times a week. Will reassess medical necessity for vac when patient comes to wound care clinic on 12/23/23 to follow up with Dr. Tom

## 2020-12-14 NOTE — ASSESSMENT & PLAN NOTE
Urinary tract infection associated with indwelling urethral catheter and septicemia due to Klebsiella pneumoniae  Chronic indwelling sage catheter  Leukocytosis  Transitioned to ertapenem for easier administration. Repeat blood cultures are negative thus far. Renal ultrasound did not show any hydronephrosis. Will get PICC today. Will need to continue the antibiotics through 2/28/17.    O-Z Flap Text: The defect edges were debeveled with a #15 scalpel blade.  Given the location of the defect, shape of the defect and the proximity to free margins an O-Z flap was deemed most appropriate.  Using a sterile surgical marker, an appropriate transposition flap was drawn incorporating the defect and placing the expected incisions within the relaxed skin tension lines where possible. The area thus outlined was incised deep to adipose tissue with a #15 scalpel blade.  The skin margins were undermined to an appropriate distance in all directions utilizing iris scissors.

## 2020-12-16 ENCOUNTER — DOCUMENT SCAN (OUTPATIENT)
Dept: HOME HEALTH SERVICES | Facility: HOSPITAL | Age: 45
End: 2020-12-16
Payer: MEDICARE

## 2020-12-22 NOTE — ASSESSMENT & PLAN NOTE
Stable   - unclear if still on mirtazapine; patient is having family check pill bottles at home and will communicate home meds, still has not done this yet   Bexarotene Pregnancy And Lactation Text: This medication is Pregnancy Category X and should not be given to women who are pregnant or may become pregnant. This medication should not be used if you are breast feeding.

## 2020-12-23 ENCOUNTER — HOSPITAL ENCOUNTER (OUTPATIENT)
Dept: WOUND CARE | Facility: HOSPITAL | Age: 45
Discharge: HOME | End: 2020-12-23
Attending: SURGERY
Payer: MEDICARE

## 2020-12-23 ENCOUNTER — CARE AT HOME (OUTPATIENT)
Dept: HOME HEALTH SERVICES | Facility: CLINIC | Age: 45
End: 2020-12-23
Payer: MEDICARE

## 2020-12-23 VITALS
BODY MASS INDEX: 28 KG/M2 | SYSTOLIC BLOOD PRESSURE: 130 MMHG | DIASTOLIC BLOOD PRESSURE: 79 MMHG | HEIGHT: 71 IN | WEIGHT: 200 LBS | TEMPERATURE: 96 F | HEART RATE: 76 BPM

## 2020-12-23 VITALS
SYSTOLIC BLOOD PRESSURE: 110 MMHG | HEART RATE: 70 BPM | RESPIRATION RATE: 18 BRPM | OXYGEN SATURATION: 97 % | TEMPERATURE: 98 F | DIASTOLIC BLOOD PRESSURE: 60 MMHG

## 2020-12-23 DIAGNOSIS — Z93.3 S/P COLOSTOMY: ICD-10-CM

## 2020-12-23 DIAGNOSIS — Z93.59 CHRONIC SUPRAPUBIC CATHETER: ICD-10-CM

## 2020-12-23 DIAGNOSIS — L89.314 PRESSURE INJURY OF RIGHT ISCHIUM, STAGE 4: Primary | ICD-10-CM

## 2020-12-23 DIAGNOSIS — D50.9 IRON DEFICIENCY ANEMIA, UNSPECIFIED IRON DEFICIENCY ANEMIA TYPE: Chronic | ICD-10-CM

## 2020-12-23 DIAGNOSIS — I10 ESSENTIAL HYPERTENSION: Chronic | ICD-10-CM

## 2020-12-23 DIAGNOSIS — G82.20 PARAPLEGIA: ICD-10-CM

## 2020-12-23 DIAGNOSIS — L97.921 CHRONIC ULCER OF LEFT LEG, LIMITED TO BREAKDOWN OF SKIN: ICD-10-CM

## 2020-12-23 DIAGNOSIS — Z89.511 HX OF RIGHT BKA: Chronic | ICD-10-CM

## 2020-12-23 DIAGNOSIS — L89.324 STAGE IV PRESSURE ULCER OF LEFT BUTTOCK: Primary | ICD-10-CM

## 2020-12-23 PROCEDURE — 3078F DIAST BP <80 MM HG: CPT | Mod: CPTII,S$GLB,, | Performed by: NURSE PRACTITIONER

## 2020-12-23 PROCEDURE — 99214 OFFICE O/P EST MOD 30 MIN: CPT

## 2020-12-23 PROCEDURE — 3078F PR MOST RECENT DIASTOLIC BLOOD PRESSURE < 80 MM HG: ICD-10-PCS | Mod: CPTII,S$GLB,, | Performed by: NURSE PRACTITIONER

## 2020-12-23 PROCEDURE — 3074F PR MOST RECENT SYSTOLIC BLOOD PRESSURE < 130 MM HG: ICD-10-PCS | Mod: CPTII,S$GLB,, | Performed by: NURSE PRACTITIONER

## 2020-12-23 PROCEDURE — 99350 PR HOME VISIT,ESTAB PATIENT,LEVEL IV: ICD-10-PCS | Mod: S$GLB,,, | Performed by: NURSE PRACTITIONER

## 2020-12-23 PROCEDURE — 99350 HOME/RES VST EST HIGH MDM 60: CPT | Mod: S$GLB,,, | Performed by: NURSE PRACTITIONER

## 2020-12-23 PROCEDURE — 3074F SYST BP LT 130 MM HG: CPT | Mod: CPTII,S$GLB,, | Performed by: NURSE PRACTITIONER

## 2020-12-23 RX ORDER — BACLOFEN 10 MG/1
10 TABLET ORAL 2 TIMES DAILY
Qty: 60 TABLET | Refills: 2 | Status: ON HOLD | OUTPATIENT
Start: 2020-12-23 | End: 2021-04-23 | Stop reason: HOSPADM

## 2020-12-23 NOTE — PROGRESS NOTES
Subjective:       Patient ID: Hermann Aguilar is a 45 y.o. male.    Chief Complaint: Pressure Ulcer    07/22/2020- new pt to clinic for Dr. Tom. Pt with history with paraplegia since 2016, dm and htn. Pt lives alone but states that his sister lives 5 minutes away and he has a caregiver that comes daily from 10am- 5pm. Pt presents with right ischial wound that is recurrent for 1.5 years and left buttocks wound. Pt was recently hospitalized for UTI. 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 Recent wound cultures negative for any growth. Pt is receiving IV meropenem and vancomycin via medicine ball pump. Wound care as follows: wound vac to right ischial and  Santyl/ xeroform/ foam border to left buttocks. Pt receiving home health via [facility]. Orders routed. Pt educated about HBO treatment, ekg and labs drawn, awaiting insurance authorization. Pt to f/u with Dr. Tom in 2 weeks 08/05/2020.   08/05/2020- f/u with Dr. Tom. Lab results reviewed. Pt to start HBO 08/10/2020. Pt currently receiving home health. If pt starts HBO, home health will be dc'd and wound care visits will be Mondays & Thursdays. Wound vac placed, seal intact. F/u with Dr. Tom in 2 weeks 08/19/2020.  8/10/2020: Nurse visit for dressing change. Patient started HBOT today. Will DC home health, dressing to be changed in clinic on Mondays, and Thursdays. Orders sent to home health. Continuned with [employer] wound vac at 125mmhg continuous: Applied cavilon, benzoin and vac drape to yahaira wound, black foam x 1 to undermining at 11:00 and wound bed, secured with vac drape, tacked to right hip off bony prominence. Patient tolerated well. Nurse visits 8/13/, 8/17/2020 fu with Dr. Tom 8/20/2020.  08/13/2020- nurse visit for dressing change following HBOT. Wound vac changed, seal intact, canister changed, suction at 125mmHg. Pt tolerated. Next dressing change 08/17/2020 and f/u with   Negro 08/20/2020.  08/17/2020- nurse visit for dressing change following HBOT. Wound vac changed, seal intact, suction at 125mmHg. Pt tolerated. Next dressing change 08/24/2020 and f/u with Dr. Tom 08/20/2020.  08/31/2020- nurse visit for dressing change following HBOT. Wound vac changed, seal intact, suction at 125mmHg. Pt tolerated. Cont POC. F/u with Dr. Tom 09/03/2020.  09/03/2020- f/u with dr. Tom following HBOT. Wound vac changed, seal intact, suction at 125mmHg and canister changed. Consent for colostomy signed with Dr. Tom for 09/11/2020. Home health to change dressing as needed. Pt for nurse visit 09/10/2020, f/u with Dr. Tom 09/17/2020.  09/10/2020- nurse visit following HBOT. Wound vac changed, seal intact, suction at 125mmHg and canister changed.Pt for nurse visit 09/14/2020, f/u with Dr. Tom 09/17/2020.  9/21/20: Nurse visit. Wound vac using black foam @ 125mm/hg continuous. No complaints voiced. No apparent problems noted. F/U 9/24/20.   09/24/2020- f/u with Dr. Tom. New wound noted to left ankle, dress with hydrofera ready and foam border. Dr. Tom assessed colostomy site, sutures and red rubber catheter removed and bag replaced by Dr. Tom. Dr. Tom spoke to home health and they stated that they will be coming out tomorrow to change out colostomy bag. Pt to f/u with Dr. Tom in 2 weeks 10/08/2020, nurse visits Mondays and Fridays until then.  10/1/2020: Nurse visit for dressing change. Patient upset phone not working.  called to assess wound around ostomy.  gave orders to apply saline moist hydorfera classic ostomy ring around stoma before applying ostomy bag. Colostomy to be changed every other day and Prn.  Wound vac to be changed prn per home health.   called patient's  Misael at [phone] to discuss patient's peristomal wound care and wound vac.   Patient refused wound care to left buttock wound and right  "ischial wound today. Patient stated "  I called home [facility] out yesterday to change them because the vac was beeping. I don't want them changed today. You can just change my foot dressing and call my ride." Fu 10/8/2020 with    10/09/2020- called Dr. Tom to come assess pt but he was unavailable at the time and stated to make the patient a nurse visit. Wound vac applied, tracked to right flank area, seal intact. Pt to f/u with Dr. Tom in 2 weeks 10/22/2020. Orders sent to [facility].  10/22/20: Patient at clinic for doctor visit assessment. He did not bring any supplies to change wound vac dressing. He states that he did not want to come for visit. Dr Tom assessed patient's wounds. All are improving. Patient agreed to have a wet to dry dressing today and HH to reapply wound vac on Sat. He agreed to F/U in 2 weeks and bring wound vac supplies to his appt. Updated orders sent to [facility] and spoke to  nurse about orders. F/U 11/5/20.  11/11/2020: Fu with , wounds improving. Patient did not bring ostomy supplies and refused assessment of yahaira wound around ostomy.  Per  patient will be worked up again for hyperbarics. Patient verbalized understanding.  Patient reports he seen his urologist   on 11/5/2020 who changed out his catheter and took a urine culture due to him having symptoms of UTI.  Patient states " they called me yesterday to go to ER to be admitted for IV ABX. I told them I'm going after my appointment here today." Continued with current wound care as ordered to right ischial wound .Wound vac using black foam @ 125mm/hg continuous. Patient to follow up on 11/25/2020 with .    12/23/2020: Readmit to wound care clinic for right ischial pressure injury stage 4. Wounds clean with out signs of infection. Patient states his home health has not been applying his wound vac " because they can't do it right."   assessed wounds, discontinued " wound vac. Patient instructed to call [phone] number on vac to return it, verbalized understanding. Orders to apply Dakin's  wet to dry to right ischial wound. Continue with Hydrofera ready to left buttock and left ankle wound. Patient to follow up in clinic 1 week 12/30/2020.  [facility] updated with orders.     Review of Systems   Constitutional: Negative.    HENT: Negative.    Eyes: Negative.    Respiratory: Negative.    Cardiovascular: Negative.    Gastrointestinal: Negative.    Genitourinary: Negative.    Musculoskeletal: Negative.    Neurological: Negative.    Psychiatric/Behavioral: Negative.        Objective:      Physical Exam  Constitutional:       Appearance: He is well-developed.   HENT:      Head: Normocephalic.   Eyes:      Conjunctiva/sclera: Conjunctivae normal.      Pupils: Pupils are equal, round, and reactive to light.   Neck:      Musculoskeletal: Normal range of motion and neck supple.   Cardiovascular:      Rate and Rhythm: Normal rate and regular rhythm.      Heart sounds: Normal heart sounds.   Pulmonary:      Effort: Pulmonary effort is normal.      Breath sounds: Normal breath sounds.   Abdominal:      General: Bowel sounds are normal.      Palpations: Abdomen is soft.   Musculoskeletal: Normal range of motion.   Skin:     General: Skin is warm and dry.   Neurological:      Mental Status: He is alert and oriented to person, place, and time.      Deep Tendon Reflexes: Reflexes are normal and symmetric.         Assessment:       1. Pressure injury of right ischium, stage 4    2. Chronic ulcer of left leg, limited to breakdown of skin    3. Paraplegia           Altered Skin Integrity 05/02/20 2300 Right Ischial tuberosity #1 Full thickness tissue loss with exposed bone, tendon, or muscle. Often includes undermining and tunneling. May extend into muscle and/or supporting structures. (Active)   05/02/20 2300   Altered Skin Integrity Present on Admission: yes   Side: Right   Orientation:     Location: Ischial tuberosity   Wound Number (optional): #1   Is this injury device related?: No   Primary Wound Type:    Description of Altered Skin Integrity: Full thickness tissue loss with exposed bone, tendon, or muscle. Often includes undermining and tunneling. May extend into muscle and/or supporting structures.   Removal Indication and Assessment:    Wound Outcome: Healed   (Retired) Wound Length (cm):    (Retired) Wound Width (cm):    (Retired) Depth (cm):    Wound Description (Comments):    Removal Indications:    Wound Image   12/23/20 1000   Description of Altered Skin Integrity Full thickness tissue loss. Subcutaneous fat may be visible but bone, tendon or muscle are not exposed 12/23/20 1000   Dressing Appearance Intact;Moist drainage 12/23/20 1000   Drainage Amount Moderate 12/23/20 1000   Drainage Characteristics/Odor Serosanguineous 12/23/20 1000   Appearance Red;Moist 12/23/20 1000   Tissue loss description Full thickness 12/23/20 1000   Red (%), Wound Tissue Color 100 % 12/23/20 1000   Periwound Area Dry;Intact 12/23/20 1000   Wound Edges Irregular 12/23/20 1000   Wound Length (cm) 3.9 cm 12/23/20 1000   Wound Width (cm) 3.4 cm 12/23/20 1000   Wound Depth (cm) 3 cm 12/23/20 1000   Wound Volume (cm^3) 39.78 cm^3 12/23/20 1000   Wound Surface Area (cm^2) 13.26 cm^2 12/23/20 1000   Care Cleansed with:;Sterile normal saline 12/23/20 1000   Dressing Applied;Gauze, wet to dry;Abd pad;Other (see comments) 12/23/20 1000   Periwound Care Skin barrier film applied 12/23/20 1000   Dressing Change Due 12/25/20 12/23/20 1000            Altered Skin Integrity 07/22/20 1400 Left Buttocks #2 Full thickness tissue loss. Subcutaneous fat may be visible but bone, tendon or muscle are not exposed (Active)   07/22/20 1400   Altered Skin Integrity Present on Admission: yes   Side: Left   Orientation:    Location: Buttocks   Wound Number (optional): #2   Is this injury device related?:    Primary Wound Type:     Description of Altered Skin Integrity: Full thickness tissue loss. Subcutaneous fat may be visible but bone, tendon or muscle are not exposed   Removal Indication and Assessment:    Wound Outcome:    (Retired) Wound Length (cm):    (Retired) Wound Width (cm):    (Retired) Depth (cm):    Wound Description (Comments):    Removal Indications:    Wound Image   12/23/20 1000   Description of Altered Skin Integrity Full thickness tissue loss. Subcutaneous fat may be visible but bone, tendon or muscle are not exposed 12/23/20 1000   Dressing Appearance Dry;Moist drainage 12/23/20 1000   Drainage Amount Small 12/23/20 1000   Drainage Characteristics/Odor Serosanguineous 12/23/20 1000   Appearance Moist;Red 12/23/20 1000   Tissue loss description Full thickness 12/23/20 1000   Red (%), Wound Tissue Color 100 % 12/23/20 1000   Periwound Area Dry;Intact;[place] 12/23/20 1000   Wound Edges Irregular 12/23/20 1000   Wound Length (cm) 0.6 cm 12/23/20 1000   Wound Width (cm) 0.9 cm 12/23/20 1000   Wound Depth (cm) 0.1 cm 12/23/20 1000   Wound Volume (cm^3) 0.05 cm^3 12/23/20 1000   Wound Surface Area (cm^2) 0.54 cm^2 12/23/20 1000   Care Cleansed with:;Sterile normal saline 12/23/20 1000   Dressing Applied;Hydrofiber;[place]/border 12/23/20 1000   Periwound Care Skin barrier film applied 12/23/20 1000   Off Loading Other (see comments) 12/23/20 1000   Dressing Change Due 12/25/20 12/23/20 1000            Altered Skin Integrity 09/24/20 1330 Left lateral Malleolus Partial thickness tissue loss. Shallow open ulcer with a red or pink wound bed, without slough. Intact or Open/Ruptured Serum-filled blister. (Active)   09/24/20 1330   Altered Skin Integrity Present on Admission: yes   Side: Left   Orientation: lateral   Location: Malleolus   Wound Number (optional):    Is this injury device related?:    Primary Wound Type:    Description of Altered Skin Integrity: Partial thickness tissue loss. Shallow open ulcer with a red or pink wound  bed, without slough. Intact or Open/Ruptured Serum-filled blister.   Removal Indication and Assessment:    Wound Outcome:    (Retired) Wound Length (cm):    (Retired) Wound Width (cm):    (Retired) Depth (cm):    Wound Description (Comments):    Removal Indications:    Wound Image   12/23/20 1000   Description of Altered Skin Integrity Full thickness tissue loss. Subcutaneous fat may be visible but bone, tendon or muscle are not exposed 12/23/20 1000   Dressing Appearance Intact;Moist drainage 12/23/20 1000   Drainage Amount Small 12/23/20 1000   Drainage Characteristics/Odor Serosanguineous 12/23/20 1000   Appearance Red;Moist 12/23/20 1000   Tissue loss description Full thickness 12/23/20 1000   Red (%), Wound Tissue Color 100 % 12/23/20 1000   Periwound Area Intact;Dry 12/23/20 1000   Wound Edges Defined 12/23/20 1000   Wound Length (cm) 0.5 cm 12/23/20 1000   Wound Width (cm) 0.6 cm 12/23/20 1000   Wound Depth (cm) 0.1 cm 12/23/20 1000   Wound Volume (cm^3) 0.03 cm^3 12/23/20 1000   Wound Surface Area (cm^2) 0.3 cm^2 12/23/20 1000   Care Cleansed with:;Sterile normal saline 12/23/20 1000   Dressing Applied;Hydrofiber;Foam;Cast padding 12/23/20 1000   Periwound Care Moisturizer applied 12/23/20 1000   Dressing Change Due 12/25/20 12/23/20 1000    Right Ischial Wound   Cleanse wound with: Normal saline   Lidocaine: prn   Silver nitrate: prn   Breanna wound: Cavilon  Primary dressing: Dakin's wet to dry dressing, large abd pad, secure with mefix tape  Offloading: Low air loss mattress at home     Left Buttocks and Left Ankle Wound   Cleanse wound with: Normal saline   Lidocaine: prn   Silver nitrate: prn   Periwound care: Cavilon   Primary dressing: Hydrofera Ready   Secondary dressing: Aquacel 4x4 foam border   Offloading: Low air loss mattress     Frequency:Every Other Day and PRN unless in clinic.     Follow-up: in 1 week 12/30/2020 with Dr. Tom     Discontinued Wound Vac 12/23/2020     Home Health: [facility] to perform wound care and wound vac dressing changes Every Other Day and PRN. Change colostomy bag Mondays, Thursdays, Saturdays and PRN.  Apply Brain ring around stoma, Cover wound around stoma with Hydrofera classic saline moist  ostomy ring, before applying colostomy bag.            Plan:                12/30/2020

## 2020-12-23 NOTE — PROGRESS NOTES
[facility]  Transition of Care Home Visit    Visit Date: 12/23/2020  Encounter Provider: Marlene Vargas NP  PCP:  Primary Doctor No    PRESENTING HISTORY      Patient ID: Hermann Aguilar is a 45 y.o. male.    Consult Requested By:  No ref. provider found  Reason for Consult:  Hospital Follow Up    Hermann is being seen at home due to physical debility that presents a taxing effort to leave the home, to mitigate high risk of hospital readmission and/or due to the limited availability of reliable or safe options for transportation to the point of access to the provider. Prior to treatment on this visit the chart was reviewed and patient consent was obtained.      Chief Complaint: Paralysis and Wound Check      History of Present Illness: Mr. Hermann Aguilar is a 45 y.o. male who is being seen today for routine follow up      _ __________________________________________________________________    Today:    HPI:  Today, pt is being seen in his home for routine follow up  He was seen by wound care this am.    He reports he has had multiple UTIs since his neurogenic bladder diagnosis. But is doing well now. Urine in  tubing is clear and light yellow. Denies fever, pain.    Reports he is following with wound care at [place] with Dr Tom for his wounds and Luis Enrique SHANE comes to assist with dressing changes. He would like to resume hyperbaric sessions as he felt his wounds were improving with that regimen. Reports his colostomy is functioning without an problems.    Pt continues to smoke, he has caregivers assisting him.    He reports he contacted his pain management provider today for assistance with refill of his opioids    Review of Systems   Constitutional: Negative for activity change, fatigue and fever.   HENT: Negative.    Eyes: Negative.    Respiratory: Negative for chest tightness.    Cardiovascular: Negative.  Negative for leg swelling.   Gastrointestinal: Negative.         Colostomy in place   Endocrine:  Negative.    Genitourinary: Positive for difficulty urinating.   Musculoskeletal: Positive for gait problem and myalgias.   Skin: Positive for wound.   Allergic/Immunologic: Negative.    Neurological: Positive for weakness.   Hematological: Negative.    Psychiatric/Behavioral: Negative.  Negative for agitation.   All other systems reviewed and are negative.      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 Yadiel Haley    CHEST TUBE INSERTION Right     COLOSTOMY N/A 9/11/2020    Procedure: CREATION, COLOSTOMY;  Surgeon: Jesus Tom MD;  Location: [facility] OR;  Service: General;  Laterality: N/A;    CYSTOSCOPY N/A 8/30/2018    Procedure: CYSTOSCOPY, clot evacuation, suprapubic tube exchange;  Surgeon: Ruchi Navarrete MD;  Location: [facility] OR;  Service: Urology;  Laterality: N/A;    DEBRIDEMENT OF SACRAL WOUND N/A 5/5/2020    Procedure: DEBRIDEMENT, WOUND, SACRUM;  Surgeon: Jesus Tom MD;  Location: [facility] OR;  Service:  General;  Laterality: N/A;    GASTROSTOMY TUBE PLACEMENT  12/15/2016    ORIF HUMERUS FRACTURE Left 12/15/2016    REMOVAL OF BLOOD CLOT  8/30/2018    Procedure: REMOVAL, BLOOD CLOT;  Surgeon: Ruchi Navarrete MD;  Location: [facility];  Service: Urology;;    TRACHEOSTOMY TUBE PLACEMENT         No family history on file.    Social History     Socioeconomic History    Marital status: Single     Spouse name: Not on file    Number of children: Not on file    Years of education: Not on file    Highest education level: Not on file   Occupational History    Not on file   Social Needs    Financial resource strain: Not on file    Food insecurity     Worry: Not on file     Inability: Not on file    Transportation needs     Medical: Not on file     Non-medical: Not on file   Tobacco Use    Smoking status: Current Some Day Smoker     Packs/day: 0.50     Years: 33.00     Pack years: 16.50     Types: Cigarettes     Start date: 1987    Smokeless tobacco: Never Used    Tobacco comment: Patient previously enrolled in smoking cessation trust. Refused education and referral to ambulatory smoking cessation clinic at this time.   Substance and Sexual Activity    Alcohol use: No     Frequency: Never     Comment: occ    Drug use: No    Sexual activity: Not Currently   Lifestyle    Physical activity     Days per week: Not on file     Minutes per session: Not on file    Stress: Not on file   Relationships    Social connections     Talks on phone: Not on file     Gets together: Not on file     Attends Gnosticist service: Not on file     Active member of club or organization: Not on file     Attends meetings of clubs or organizations: Not on file     Relationship status: Not on file   Other Topics Concern    Not on file   Social History Narrative    Not on file       MEDICATIONS & ALLERGIES:     Current Outpatient Medications on File Prior to Visit   Medication Sig Dispense Refill    acetaminophen (TYLENOL) 325 MG tablet Take  2 tablets (650 mg total) by mouth every 4 (four) hours as needed. 20 tablet 0    alcohol swabs (ALCOHOL PADS) PadM Apply 1 each topically once daily. 400 each 11    ammonium lactate 12 % Crea MIHAELA EXT AA ON SKIN BID  3    apixaban (ELIQUIS) 5 mg Tab Take 1 tablet (5 mg total) by mouth 2 (two) times daily.      cadexomer iodine (IODOSORB) 0.9 % gel Apply topically daily as needed for Wound Care.      collagenase (SANTYL) ointment Apply topically once daily. 30 g 3    cyclobenzaprine (FLEXERIL) 10 MG tablet Take 1 tablet (10 mg total) by mouth 3 (three) times daily as needed. 90 tablet 3    dextran 70-hypromellose (TEARS) ophthalmic solution Apply 1 drop to eye.      drainage bag Misc 1 Units by Misc.(Non-Drug; Combo Route) route every 30 days. 3 each 3    famotidine (PEPCID) 20 MG tablet Take 1 tablet (20 mg total) by mouth 2 (two) times daily. 180 tablet 3    ferrous sulfate (IRON, FERROUS SULFATE,) 325 mg (65 mg iron) Tab tablet Take 1 tablet (325 mg total) by mouth once daily. 30 tablet 5    gabapentin (NEURONTIN) 300 MG capsule Take 1 capsule (300 mg total) by mouth 2 (two) times daily. 180 capsule 3    gemfibrozil (LOPID) 600 MG tablet Take 1 tablet (600 mg total) by mouth 2 (two) times daily before meals. 180 tablet 1    HYDROcodone-acetaminophen (NORCO) 5-325 mg per tablet Take 1 tablet by mouth every 6 (six) hours as needed for Pain. (Patient not taking: Reported on 12/2/2020) 24 tablet 0    ibuprofen (ADVIL,MOTRIN) 800 MG tablet       ketoconazole (NIZORAL) 2 % shampoo Apply topically twice a week. 120 mL 11    ketorolac (TORADOL) 10 mg tablet TK 1 T PO TID      LORazepam (ATIVAN) 1 MG tablet Take 0.5 tablets (0.5 mg total) by mouth every evening. (Patient not taking: Reported on 11/16/2020) 10 tablet 0    melatonin 10 mg Cap Take 1 tablet by mouth every evening. 90 capsule 3    metFORMIN (GLUCOPHAGE) 500 MG tablet Take 1 tablet (500 mg total) by mouth 2 (two) times daily with meals. 180  "tablet 3    methenamine (HIPREX) 1 gram Tab Take 1 tablet (1 g total) by mouth once daily. 30 tablet 5    metoprolol tartrate (LOPRESSOR) 25 MG tablet       oxyCODONE-acetaminophen (PERCOCET) 7.5-325 mg per tablet TAKE 1 TABLET PO DAILY AS NEEDED FOR PAIN      pantoprazole (PROTONIX) 40 MG tablet Take 1 tablet (40 mg total) by mouth once daily. 30 tablet 11    pen needle, diabetic (COMFORT EZ PEN NEEDLES) 29 gauge x 1/2" Ndle 1 Units by Misc.(Non-Drug; Combo Route) route 3 (three) times daily. 400 each 11    SANTYL ointment APPLY TO CLEANSED AFFECTED ARE TOPICALLY ONCE DAILY      sulfamethoxazole-trimethoprim 400-80mg (BACTRIM,SEPTRA) 400-80 mg per tablet Take 1 tablet by mouth 2 (two) times daily. 14 tablet 0    TRUE METRIX GLUCOSE METER Misc AS DIRECTED  0    TRUE METRIX GLUCOSE TEST STRIP Strp USE AS DIRECTED  strip 3    TRUEPLUS LANCETS 30 gauge Misc USE AS DIRECTED TID  3    venlafaxine (EFFEXOR-XR) 150 MG Cp24 Take 150 mg by mouth once daily.       wheelchair Korina 1 Units/oz/day by Misc.(Non-Drug; Combo Route) route once daily. 1 each 0    [DISCONTINUED] baclofen (LIORESAL) 10 MG tablet        No current facility-administered medications on file prior to visit.         Review of patient's allergies indicates:  No Known Allergies    OBJECTIVE:     Vital Signs:  Vitals:    12/23/20 1432   BP: 110/60   Pulse: 70   Resp: 18   Temp: 97.8 °F (36.6 °C)     There is no height or weight on file to calculate BMI.     Physical Exam:  Physical Exam  Vitals signs reviewed.   Constitutional:       General: He is not in acute distress.     Appearance: He is well-developed.   HENT:      Head: Normocephalic and atraumatic.   Eyes:      Pupils: Pupils are equal, round, and reactive to light.   Neck:      Musculoskeletal: Normal range of motion and neck supple.   Cardiovascular:      Rate and Rhythm: Normal rate and regular rhythm.      Heart sounds: Normal heart sounds.   Pulmonary:      Effort: Pulmonary " effort is normal.      Breath sounds: Normal breath sounds.   Abdominal:      General: Bowel sounds are normal.      Palpations: Abdomen is soft.      Comments: Colostomy in place   Genitourinary:     Comments: Suprapubic in place  Skin:     General: Skin is warm and dry.      Comments: Dressing intact to sacral wound   Neurological:      Mental Status: He is alert and oriented to person, place, and time.      Cranial Nerves: No cranial nerve deficit.   Psychiatric:         Behavior: Behavior normal.         Thought Content: Thought content normal.         Judgment: Judgment normal.         Laboratory  Lab Results   Component Value Date    WBC 5.83 11/13/2020    HGB 10.5 (L) 11/13/2020    HCT 33.5 (L) 11/13/2020    MCV 79 (L) 11/13/2020     (H) 11/13/2020     Lab Results   Component Value Date    INR 1.1 09/14/2020    INR 1.0 05/04/2020    INR 1.0 08/24/2019     Lab Results   Component Value Date    HGBA1C 6.7 (H) 05/01/2020     No results for input(s): POCTGLUCOSE in the last 72 hours.    Diagnostic Results:      TRANSITION OF CARE:     Family and/or Caretaker present at visit?  Yes.  Diagnostic tests reviewed/disposition: No diagnosic tests pending after this hospitalization.  Disease/illness education:   Home health/community services discussion/referrals: Patient has home health established at [facility].   Establishment or re-establishment of referral orders for community resources: No other necessary community resources.   Discussion with other health care providers: No discussion with other health care providers necessary.     Transition of Care Visit:     I have reviewed and updated the history and problem list.  I have reconciled the medication list.  I have discussed the hospitalization and current medical issues, prognosis and plans with the patient/family.  I  spent more than 50% of time discussing the care with the patient/family.  Total Face-to-Face Encounter: 60 minutes.    Medications  Reconciliation:   I have reconciled the patient's home medications and discharge medications with the patient/family. I have updated all changes.  Refer to After-Visit Medication List.    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 The patient reports willingness to quit but has failed in the past. Resources offered include enrollment in a smoking cessation program and the prescription of products to taper levels of ingested nicotine (patches and gum products) with the ultimate goal of complete cessation. We set a mutually agreeable goal to continually lower nicotine use in a defined amount of time. Progress toward the goal will be assessed/revisited during the next provider visit. A list of websites was provided to the patient for further information.      Were controlled substances prescribed?  No      Scheduled Follow-up :  Future Appointments   Date Time Provider Department Center   12/29/2020 10:15 AM Alia Edwards NP [facility] UROLOGY [facility]   12/30/2020 11:00 AM Jesus Tom MD [facility] WOUND [facility]   3/26/2021  9:30 AM Marlene Vargas NP [address]       After Visit Medication List :     Medication List          Accurate as of December 23, 2020  2:33 PM. If you have any questions, ask your nurse or doctor.            CHANGE how you take these medications    baclofen 10 MG tablet  Commonly known as: LIORESAL  Take 1 tablet (10 mg total) by mouth 2  (two) times daily.  What changed:   · how much to take  · how to take this  · when to take this  Changed by: Marlene Vargas NP        CONTINUE taking these medications    acetaminophen 325 MG tablet  Commonly known as: TYLENOL  Take 2 tablets (650 mg total) by mouth every 4 (four) hours as needed.     alcohol swabs Padm  Commonly known as: ALCOHOL PADS  Apply 1 each topically once daily.     ammonium lactate 12 % Crea     apixaban 5 mg Tab  Commonly known as: ELIQUIS  Take 1 tablet (5 mg total) by mouth 2 (two) times daily.     cadexomer iodine 0.9 % gel  Commonly known as: IODOSORB     cyclobenzaprine 10 MG tablet  Commonly known as: FLEXERIL  Take 1 tablet (10 mg total) by mouth 3 (three) times daily as needed.     dextran 70-hypromellose ophthalmic solution  Commonly known as: TEARS     drainage bag Misc  1 Units by Misc.(Non-Drug; Combo Route) route every 30 days.     famotidine 20 MG tablet  Commonly known as: PEPCID  Take 1 tablet (20 mg total) by mouth 2 (two) times daily.     ferrous sulfate 325 mg (65 mg iron) Tab tablet  Commonly known as: IRON (FERROUS SULFATE)  Take 1 tablet (325 mg total) by mouth once daily.     gabapentin 300 MG capsule  Commonly known as: NEURONTIN  Take 1 capsule (300 mg total) by mouth 2 (two) times daily.     gemfibroziL 600 MG tablet  Commonly known as: LOPID  Take 1 tablet (600 mg total) by mouth 2 (two) times daily before meals.     HYDROcodone-acetaminophen 5-325 mg per tablet  Commonly known as: NORCO  Take 1 tablet by mouth every 6 (six) hours as needed for Pain.     ibuprofen 800 MG tablet  Commonly known as: ADVIL,MOTRIN     ketoconazole 2 % shampoo  Commonly known as: NIZORAL  Apply topically twice a week.     ketorolac 10 mg tablet  Commonly known as: TORADOL     LORazepam 1 MG tablet  Commonly known as: ATIVAN  Take 0.5 tablets (0.5 mg total) by mouth every evening.     melatonin 10 mg Cap  Take 1 tablet by mouth every evening.     metFORMIN 500 MG tablet  Commonly  "known as: GLUCOPHAGE  Take 1 tablet (500 mg total) by mouth 2 (two) times daily with meals.     methenamine 1 gram Tab  Commonly known as: HIPREX  Take 1 tablet (1 g total) by mouth once daily.     metoprolol tartrate 25 MG tablet  Commonly known as: LOPRESSOR     oxyCODONE-acetaminophen 7.5-325 mg per tablet  Commonly known as: PERCOCET     pantoprazole 40 MG tablet  Commonly known as: PROTONIX  Take 1 tablet (40 mg total) by mouth once daily.     pen needle, diabetic 29 gauge x 1/2" Ndle  Commonly known as: COMFORT EZ PEN NEEDLES  1 Units by Misc.(Non-Drug; Combo Route) route 3 (three) times daily.     * SantyL ointment  Generic drug: collagenase     * SantyL ointment  Generic drug: collagenase  Apply topically once daily.     sulfamethoxazole-trimethoprim 400-80mg 400-80 mg per tablet  Commonly known as: BACTRIM,SEPTRA  Take 1 tablet by mouth 2 (two) times daily.     TRUE METRIX GLUCOSE METER Misc  Generic drug: blood-glucose meter     TRUE METRIX GLUCOSE TEST STRIP Strp  Generic drug: blood sugar diagnostic  USE AS DIRECTED TID     TRUEPLUS LANCETS 30 gauge Misc  Generic drug: lancets     venlafaxine 150 MG Cp24  Commonly known as: EFFEXOR-XR     wheelchair Korina  1 Units/oz/day by Misc.(Non-Drug; Combo Route) route once daily.         * This list has 2 medication(s) that are the same as other medications prescribed for you. Read the directions carefully, and ask your doctor or other care provider to review them with you.               Where to Get Your Medications      These medications were sent to [id number] - [facility] [address] [facility] AT [facility] [address]  [address] [facility] [address]    Phone: [phone]   · baclofen 10 MG tablet       Attestation: Screening criteria to assess the level of the patient's risk for infection with COVID-19 as recommended by the CDC at the time of the above documented home visit concluded appropriateness to proceed. Universal " precautions were maintained at all times, including provider use of >60% alcohol gel hand  immediately prior to entry and upon departing the patient's home as well as cleaning of equipment used in home visit with antibacterial/germicidal disposable wipes.    Signature:  Marlene Vargas NP    Patient consent obtained prior to treatment

## 2020-12-24 ENCOUNTER — ANESTHESIA (OUTPATIENT)
Dept: EMERGENCY MEDICINE | Facility: HOSPITAL | Age: 45
DRG: 698 | End: 2020-12-24
Payer: MEDICARE

## 2020-12-24 ENCOUNTER — HOSPITAL ENCOUNTER (INPATIENT)
Facility: HOSPITAL | Age: 45
LOS: 6 days | Discharge: HOME HEALTH | DRG: 698 | End: 2020-12-30
Attending: EMERGENCY MEDICINE | Admitting: HOSPITALIST
Payer: MEDICARE

## 2020-12-24 ENCOUNTER — ANESTHESIA EVENT (OUTPATIENT)
Dept: EMERGENCY MEDICINE | Facility: HOSPITAL | Age: 45
DRG: 698 | End: 2020-12-24
Payer: MEDICARE

## 2020-12-24 DIAGNOSIS — R00.0 TACHYCARDIA: ICD-10-CM

## 2020-12-24 DIAGNOSIS — R65.20 SEPSIS WITH ACUTE ORGAN DYSFUNCTION WITHOUT SEPTIC SHOCK: ICD-10-CM

## 2020-12-24 DIAGNOSIS — A41.9 SEPSIS WITH ACUTE ORGAN DYSFUNCTION WITHOUT SEPTIC SHOCK: ICD-10-CM

## 2020-12-24 DIAGNOSIS — A41.9 SEPSIS, DUE TO UNSPECIFIED ORGANISM, UNSPECIFIED WHETHER ACUTE ORGAN DYSFUNCTION PRESENT: ICD-10-CM

## 2020-12-24 DIAGNOSIS — Z93.59 CHRONIC SUPRAPUBIC CATHETER: ICD-10-CM

## 2020-12-24 DIAGNOSIS — N39.0 URINARY TRACT INFECTION ASSOCIATED WITH CATHETERIZATION OF URINARY TRACT, UNSPECIFIED INDWELLING URINARY CATHETER TYPE, INITIAL ENCOUNTER: ICD-10-CM

## 2020-12-24 DIAGNOSIS — A41.9 SEPSIS: Primary | ICD-10-CM

## 2020-12-24 DIAGNOSIS — T83.511A URINARY TRACT INFECTION ASSOCIATED WITH CATHETERIZATION OF URINARY TRACT, UNSPECIFIED INDWELLING URINARY CATHETER TYPE, INITIAL ENCOUNTER: ICD-10-CM

## 2020-12-24 DIAGNOSIS — R33.8 ACUTE URINARY RETENTION: ICD-10-CM

## 2020-12-24 DIAGNOSIS — R78.81 GRAM-NEGATIVE BACTEREMIA: ICD-10-CM

## 2020-12-24 DIAGNOSIS — N39.0 COMPLICATED UTI (URINARY TRACT INFECTION): ICD-10-CM

## 2020-12-24 DIAGNOSIS — E11.9 TYPE 2 DIABETES MELLITUS WITHOUT COMPLICATION, WITHOUT LONG-TERM CURRENT USE OF INSULIN: ICD-10-CM

## 2020-12-24 DIAGNOSIS — L97.921 CHRONIC ULCER OF LEFT LEG, LIMITED TO BREAKDOWN OF SKIN: ICD-10-CM

## 2020-12-24 DIAGNOSIS — I10 ESSENTIAL HYPERTENSION: Chronic | ICD-10-CM

## 2020-12-24 DIAGNOSIS — E87.5 HYPERKALEMIA: ICD-10-CM

## 2020-12-24 DIAGNOSIS — G82.20 PARAPLEGIA: ICD-10-CM

## 2020-12-24 DIAGNOSIS — R78.81 BACTEREMIA: ICD-10-CM

## 2020-12-24 DIAGNOSIS — N31.9 NEUROGENIC BLADDER: Chronic | ICD-10-CM

## 2020-12-24 PROBLEM — R68.89 RIGORS: Status: ACTIVE | Noted: 2020-12-24

## 2020-12-24 LAB
ALBUMIN SERPL BCP-MCNC: 3.7 G/DL (ref 3.5–5.2)
ALP SERPL-CCNC: 169 U/L (ref 55–135)
ALT SERPL W/O P-5'-P-CCNC: 16 U/L (ref 10–44)
ANION GAP SERPL CALC-SCNC: 13 MMOL/L (ref 8–16)
ANION GAP SERPL CALC-SCNC: 15 MMOL/L (ref 8–16)
APTT BLDCRRT: 30.8 SEC (ref 21–32)
AST SERPL-CCNC: 21 U/L (ref 10–40)
BACTERIA #/AREA URNS HPF: ABNORMAL /HPF
BASOPHILS # BLD AUTO: 0.07 K/UL (ref 0–0.2)
BASOPHILS NFR BLD: 0.4 % (ref 0–1.9)
BILIRUB SERPL-MCNC: 0.3 MG/DL (ref 0.1–1)
BILIRUB UR QL STRIP: NEGATIVE
BUN SERPL-MCNC: 13 MG/DL (ref 6–20)
BUN SERPL-MCNC: 17 MG/DL (ref 6–20)
CALCIUM SERPL-MCNC: 9.1 MG/DL (ref 8.7–10.5)
CALCIUM SERPL-MCNC: 9.4 MG/DL (ref 8.7–10.5)
CHLORIDE SERPL-SCNC: 108 MMOL/L (ref 95–110)
CHLORIDE SERPL-SCNC: 108 MMOL/L (ref 95–110)
CLARITY UR: ABNORMAL
CO2 SERPL-SCNC: 16 MMOL/L (ref 23–29)
CO2 SERPL-SCNC: 17 MMOL/L (ref 23–29)
COLOR UR: YELLOW
CREAT SERPL-MCNC: 1.1 MG/DL (ref 0.5–1.4)
CREAT SERPL-MCNC: 1.2 MG/DL (ref 0.5–1.4)
CTP QC/QA: YES
DIFFERENTIAL METHOD: ABNORMAL
EOSINOPHIL # BLD AUTO: 0.3 K/UL (ref 0–0.5)
EOSINOPHIL NFR BLD: 1.5 % (ref 0–8)
ERYTHROCYTE [DISTWIDTH] IN BLOOD BY AUTOMATED COUNT: 18.9 % (ref 11.5–14.5)
EST. GFR  (AFRICAN AMERICAN): >60 ML/MIN/1.73 M^2
EST. GFR  (AFRICAN AMERICAN): >60 ML/MIN/1.73 M^2
EST. GFR  (NON AFRICAN AMERICAN): >60 ML/MIN/1.73 M^2
EST. GFR  (NON AFRICAN AMERICAN): >60 ML/MIN/1.73 M^2
ESTIMATED AVG GLUCOSE: 143 MG/DL (ref 68–131)
GLUCOSE SERPL-MCNC: 211 MG/DL (ref 70–110)
GLUCOSE SERPL-MCNC: 263 MG/DL (ref 70–110)
GLUCOSE UR QL STRIP: NEGATIVE
HBA1C MFR BLD HPLC: 6.6 % (ref 4–5.6)
HCT VFR BLD AUTO: 39.6 % (ref 40–54)
HGB BLD-MCNC: 12.9 G/DL (ref 14–18)
HGB UR QL STRIP: ABNORMAL
HYALINE CASTS #/AREA URNS LPF: 0 /LPF
IMM GRANULOCYTES # BLD AUTO: 0.14 K/UL (ref 0–0.04)
IMM GRANULOCYTES NFR BLD AUTO: 0.8 % (ref 0–0.5)
INR PPP: 1 (ref 0.8–1.2)
KETONES UR QL STRIP: NEGATIVE
LACTATE SERPL-SCNC: 2.4 MMOL/L (ref 0.5–2.2)
LACTATE SERPL-SCNC: 3.6 MMOL/L (ref 0.5–2.2)
LACTATE SERPL-SCNC: 7.4 MMOL/L (ref 0.5–2.2)
LACTATE SERPL-SCNC: 8.3 MMOL/L (ref 0.5–2.2)
LEUKOCYTE ESTERASE UR QL STRIP: ABNORMAL
LIPASE SERPL-CCNC: 41 U/L (ref 4–60)
LYMPHOCYTES # BLD AUTO: 2.1 K/UL (ref 1–4.8)
LYMPHOCYTES NFR BLD: 11.1 % (ref 18–48)
MCH RBC QN AUTO: 25.3 PG (ref 27–31)
MCHC RBC AUTO-ENTMCNC: 32.6 G/DL (ref 32–36)
MCV RBC AUTO: 78 FL (ref 82–98)
MICROSCOPIC COMMENT: ABNORMAL
MONOCYTES # BLD AUTO: 1.2 K/UL (ref 0.3–1)
MONOCYTES NFR BLD: 6.6 % (ref 4–15)
NEUTROPHILS # BLD AUTO: 14.9 K/UL (ref 1.8–7.7)
NEUTROPHILS NFR BLD: 79.6 % (ref 38–73)
NITRITE UR QL STRIP: POSITIVE
NRBC BLD-RTO: 0 /100 WBC
PH UR STRIP: >8 [PH] (ref 5–8)
PLATELET # BLD AUTO: 532 K/UL (ref 150–350)
PMV BLD AUTO: 10.4 FL (ref 9.2–12.9)
POCT GLUCOSE: 190 MG/DL (ref 70–110)
POCT GLUCOSE: 262 MG/DL (ref 70–110)
POTASSIUM SERPL-SCNC: 5.2 MMOL/L (ref 3.5–5.1)
POTASSIUM SERPL-SCNC: 5.6 MMOL/L (ref 3.5–5.1)
PROT SERPL-MCNC: 9.4 G/DL (ref 6–8.4)
PROT UR QL STRIP: ABNORMAL
PROTHROMBIN TIME: 10.2 SEC (ref 9–12.5)
RBC # BLD AUTO: 5.09 M/UL (ref 4.6–6.2)
RBC #/AREA URNS HPF: 30 /HPF (ref 0–4)
SARS-COV-2 RDRP RESP QL NAA+PROBE: NEGATIVE
SODIUM SERPL-SCNC: 137 MMOL/L (ref 136–145)
SODIUM SERPL-SCNC: 140 MMOL/L (ref 136–145)
SP GR UR STRIP: 1 (ref 1–1.03)
SQUAMOUS #/AREA URNS HPF: 1 /HPF
URN SPEC COLLECT METH UR: ABNORMAL
UROBILINOGEN UR STRIP-ACNC: NEGATIVE EU/DL
WBC # BLD AUTO: 18.62 K/UL (ref 3.9–12.7)
WBC #/AREA URNS HPF: 12 /HPF (ref 0–5)

## 2020-12-24 PROCEDURE — 96365 THER/PROPH/DIAG IV INF INIT: CPT

## 2020-12-24 PROCEDURE — 85025 COMPLETE CBC W/AUTO DIFF WBC: CPT

## 2020-12-24 PROCEDURE — 82962 GLUCOSE BLOOD TEST: CPT

## 2020-12-24 PROCEDURE — 11000001 HC ACUTE MED/SURG PRIVATE ROOM

## 2020-12-24 PROCEDURE — 63600175 PHARM REV CODE 636 W HCPCS: Performed by: NURSE PRACTITIONER

## 2020-12-24 PROCEDURE — 81000 URINALYSIS NONAUTO W/SCOPE: CPT | Mod: 91

## 2020-12-24 PROCEDURE — 93010 ELECTROCARDIOGRAM REPORT: CPT | Mod: ,,, | Performed by: INTERNAL MEDICINE

## 2020-12-24 PROCEDURE — 80048 BASIC METABOLIC PNL TOTAL CA: CPT

## 2020-12-24 PROCEDURE — 63600175 PHARM REV CODE 636 W HCPCS: Performed by: HOSPITALIST

## 2020-12-24 PROCEDURE — C9399 UNCLASSIFIED DRUGS OR BIOLOG: HCPCS | Performed by: HOSPITALIST

## 2020-12-24 PROCEDURE — 87077 CULTURE AEROBIC IDENTIFY: CPT | Mod: 59

## 2020-12-24 PROCEDURE — 83036 HEMOGLOBIN GLYCOSYLATED A1C: CPT

## 2020-12-24 PROCEDURE — 85610 PROTHROMBIN TIME: CPT

## 2020-12-24 PROCEDURE — 85730 THROMBOPLASTIN TIME PARTIAL: CPT

## 2020-12-24 PROCEDURE — 99285 EMERGENCY DEPT VISIT HI MDM: CPT | Mod: 25

## 2020-12-24 PROCEDURE — 25000003 PHARM REV CODE 250: Performed by: EMERGENCY MEDICINE

## 2020-12-24 PROCEDURE — 83605 ASSAY OF LACTIC ACID: CPT | Mod: 91

## 2020-12-24 PROCEDURE — 93005 ELECTROCARDIOGRAM TRACING: CPT

## 2020-12-24 PROCEDURE — 87186 SC STD MICRODIL/AGAR DIL: CPT | Mod: 59

## 2020-12-24 PROCEDURE — 87040 BLOOD CULTURE FOR BACTERIA: CPT | Mod: 59

## 2020-12-24 PROCEDURE — 25000003 PHARM REV CODE 250: Performed by: HOSPITALIST

## 2020-12-24 PROCEDURE — 36000 PLACE NEEDLE IN VEIN: CPT | Performed by: STUDENT IN AN ORGANIZED HEALTH CARE EDUCATION/TRAINING PROGRAM

## 2020-12-24 PROCEDURE — 83605 ASSAY OF LACTIC ACID: CPT

## 2020-12-24 PROCEDURE — 87086 URINE CULTURE/COLONY COUNT: CPT | Mod: 59

## 2020-12-24 PROCEDURE — 83690 ASSAY OF LIPASE: CPT

## 2020-12-24 PROCEDURE — 80053 COMPREHEN METABOLIC PANEL: CPT

## 2020-12-24 PROCEDURE — 87086 URINE CULTURE/COLONY COUNT: CPT

## 2020-12-24 PROCEDURE — 96375 TX/PRO/DX INJ NEW DRUG ADDON: CPT

## 2020-12-24 PROCEDURE — 87088 URINE BACTERIA CULTURE: CPT | Mod: 59

## 2020-12-24 PROCEDURE — 25000003 PHARM REV CODE 250: Performed by: NURSE PRACTITIONER

## 2020-12-24 PROCEDURE — 93010 EKG 12-LEAD: ICD-10-PCS | Mod: 76,,, | Performed by: INTERNAL MEDICINE

## 2020-12-24 PROCEDURE — 81000 URINALYSIS NONAUTO W/SCOPE: CPT

## 2020-12-24 PROCEDURE — 36415 COLL VENOUS BLD VENIPUNCTURE: CPT

## 2020-12-24 PROCEDURE — 93010 ELECTROCARDIOGRAM REPORT: CPT | Mod: 76,,, | Performed by: INTERNAL MEDICINE

## 2020-12-24 PROCEDURE — U0002 COVID-19 LAB TEST NON-CDC: HCPCS | Performed by: NURSE PRACTITIONER

## 2020-12-24 PROCEDURE — P9612 CATHETERIZE FOR URINE SPEC: HCPCS

## 2020-12-24 PROCEDURE — 63600175 PHARM REV CODE 636 W HCPCS: Performed by: EMERGENCY MEDICINE

## 2020-12-24 RX ORDER — INSULIN ASPART 100 [IU]/ML
0-5 INJECTION, SOLUTION INTRAVENOUS; SUBCUTANEOUS
Status: DISCONTINUED | OUTPATIENT
Start: 2020-12-24 | End: 2020-12-30 | Stop reason: HOSPADM

## 2020-12-24 RX ORDER — SODIUM CHLORIDE, SODIUM LACTATE, POTASSIUM CHLORIDE, CALCIUM CHLORIDE 600; 310; 30; 20 MG/100ML; MG/100ML; MG/100ML; MG/100ML
INJECTION, SOLUTION INTRAVENOUS CONTINUOUS
Status: DISCONTINUED | OUTPATIENT
Start: 2020-12-24 | End: 2020-12-25

## 2020-12-24 RX ORDER — PANTOPRAZOLE SODIUM 40 MG/1
40 TABLET, DELAYED RELEASE ORAL DAILY
Status: DISCONTINUED | OUTPATIENT
Start: 2020-12-24 | End: 2020-12-30 | Stop reason: HOSPADM

## 2020-12-24 RX ORDER — ONDANSETRON 2 MG/ML
4 INJECTION INTRAMUSCULAR; INTRAVENOUS
Status: COMPLETED | OUTPATIENT
Start: 2020-12-24 | End: 2020-12-24

## 2020-12-24 RX ORDER — ACETAMINOPHEN 500 MG
1000 TABLET ORAL EVERY 8 HOURS PRN
Status: DISCONTINUED | OUTPATIENT
Start: 2020-12-24 | End: 2020-12-30 | Stop reason: HOSPADM

## 2020-12-24 RX ORDER — VENLAFAXINE HYDROCHLORIDE 75 MG/1
150 CAPSULE, EXTENDED RELEASE ORAL DAILY
Status: DISCONTINUED | OUTPATIENT
Start: 2020-12-24 | End: 2020-12-30 | Stop reason: HOSPADM

## 2020-12-24 RX ORDER — METOPROLOL TARTRATE 1 MG/ML
5 INJECTION, SOLUTION INTRAVENOUS ONCE
Status: COMPLETED | OUTPATIENT
Start: 2020-12-24 | End: 2020-12-24

## 2020-12-24 RX ORDER — GABAPENTIN 300 MG/1
300 CAPSULE ORAL 2 TIMES DAILY
Status: DISCONTINUED | OUTPATIENT
Start: 2020-12-24 | End: 2020-12-30 | Stop reason: HOSPADM

## 2020-12-24 RX ORDER — MORPHINE SULFATE 4 MG/ML
4 INJECTION, SOLUTION INTRAMUSCULAR; INTRAVENOUS
Status: COMPLETED | OUTPATIENT
Start: 2020-12-24 | End: 2020-12-24

## 2020-12-24 RX ORDER — ACETAMINOPHEN 325 MG/1
650 TABLET ORAL EVERY 4 HOURS PRN
Status: DISCONTINUED | OUTPATIENT
Start: 2020-12-24 | End: 2020-12-30 | Stop reason: HOSPADM

## 2020-12-24 RX ORDER — TALC
6 POWDER (GRAM) TOPICAL NIGHTLY PRN
Status: DISCONTINUED | OUTPATIENT
Start: 2020-12-24 | End: 2020-12-30 | Stop reason: HOSPADM

## 2020-12-24 RX ORDER — POLYETHYLENE GLYCOL 3350 17 G/17G
17 POWDER, FOR SOLUTION ORAL DAILY
Status: DISCONTINUED | OUTPATIENT
Start: 2020-12-24 | End: 2020-12-30 | Stop reason: HOSPADM

## 2020-12-24 RX ORDER — SODIUM CHLORIDE 0.9 % (FLUSH) 0.9 %
3 SYRINGE (ML) INJECTION
Status: DISCONTINUED | OUTPATIENT
Start: 2020-12-24 | End: 2020-12-30 | Stop reason: HOSPADM

## 2020-12-24 RX ORDER — VANCOMYCIN HCL IN 5 % DEXTROSE 1G/250ML
1000 PLASTIC BAG, INJECTION (ML) INTRAVENOUS
Status: DISCONTINUED | OUTPATIENT
Start: 2020-12-24 | End: 2020-12-24

## 2020-12-24 RX ORDER — IBUPROFEN 200 MG
24 TABLET ORAL
Status: DISCONTINUED | OUTPATIENT
Start: 2020-12-24 | End: 2020-12-30 | Stop reason: HOSPADM

## 2020-12-24 RX ORDER — GLUCAGON 1 MG
1 KIT INJECTION
Status: DISCONTINUED | OUTPATIENT
Start: 2020-12-24 | End: 2020-12-30 | Stop reason: HOSPADM

## 2020-12-24 RX ORDER — IBUPROFEN 200 MG
16 TABLET ORAL
Status: DISCONTINUED | OUTPATIENT
Start: 2020-12-24 | End: 2020-12-30 | Stop reason: HOSPADM

## 2020-12-24 RX ORDER — CYCLOBENZAPRINE HCL 10 MG
10 TABLET ORAL 3 TIMES DAILY PRN
Status: DISCONTINUED | OUTPATIENT
Start: 2020-12-24 | End: 2020-12-30 | Stop reason: HOSPADM

## 2020-12-24 RX ORDER — BACLOFEN 10 MG/1
10 TABLET ORAL 2 TIMES DAILY
Status: DISCONTINUED | OUTPATIENT
Start: 2020-12-24 | End: 2020-12-30 | Stop reason: HOSPADM

## 2020-12-24 RX ORDER — FERROUS SULFATE 325(65) MG
325 TABLET, DELAYED RELEASE (ENTERIC COATED) ORAL DAILY
Status: DISCONTINUED | OUTPATIENT
Start: 2020-12-24 | End: 2020-12-30 | Stop reason: HOSPADM

## 2020-12-24 RX ORDER — AMMONIUM LACTATE 12 G/100G
LOTION TOPICAL 2 TIMES DAILY PRN
Status: DISCONTINUED | OUTPATIENT
Start: 2020-12-24 | End: 2020-12-30 | Stop reason: HOSPADM

## 2020-12-24 RX ORDER — OXYCODONE AND ACETAMINOPHEN 10; 325 MG/1; MG/1
1 TABLET ORAL EVERY 6 HOURS PRN
Status: DISCONTINUED | OUTPATIENT
Start: 2020-12-24 | End: 2020-12-30 | Stop reason: HOSPADM

## 2020-12-24 RX ADMIN — VENLAFAXINE HYDROCHLORIDE 150 MG: 75 CAPSULE, EXTENDED RELEASE ORAL at 03:12

## 2020-12-24 RX ADMIN — ONDANSETRON 4 MG: 2 INJECTION INTRAMUSCULAR; INTRAVENOUS at 12:12

## 2020-12-24 RX ADMIN — SODIUM CHLORIDE, SODIUM LACTATE, POTASSIUM CHLORIDE, AND CALCIUM CHLORIDE 500 ML: .6; .31; .03; .02 INJECTION, SOLUTION INTRAVENOUS at 02:12

## 2020-12-24 RX ADMIN — INSULIN ASPART 1 UNITS: 100 INJECTION, SOLUTION INTRAVENOUS; SUBCUTANEOUS at 09:12

## 2020-12-24 RX ADMIN — PIPERACILLIN AND TAZOBACTAM 4.5 G: 4; .5 INJECTION, POWDER, LYOPHILIZED, FOR SOLUTION INTRAVENOUS; PARENTERAL at 09:12

## 2020-12-24 RX ADMIN — APIXABAN 5 MG: 2.5 TABLET, FILM COATED ORAL at 09:12

## 2020-12-24 RX ADMIN — FERROUS SULFATE TAB EC 325 MG (65 MG FE EQUIVALENT) 325 MG: 325 (65 FE) TABLET DELAYED RESPONSE at 03:12

## 2020-12-24 RX ADMIN — POLYETHYLENE GLYCOL (3350) 17 G: 17 POWDER, FOR SOLUTION ORAL at 03:12

## 2020-12-24 RX ADMIN — SODIUM CHLORIDE 1000 ML: 0.9 INJECTION, SOLUTION INTRAVENOUS at 12:12

## 2020-12-24 RX ADMIN — ACETAMINOPHEN 1000 MG: 500 TABLET ORAL at 10:12

## 2020-12-24 RX ADMIN — OXYCODONE HYDROCHLORIDE AND ACETAMINOPHEN 1 TABLET: 10; 325 TABLET ORAL at 09:12

## 2020-12-24 RX ADMIN — BACLOFEN 10 MG: 10 TABLET ORAL at 09:12

## 2020-12-24 RX ADMIN — VANCOMYCIN HYDROCHLORIDE 2250 MG: 100 INJECTION, POWDER, LYOPHILIZED, FOR SOLUTION INTRAVENOUS at 01:12

## 2020-12-24 RX ADMIN — SODIUM CHLORIDE, SODIUM LACTATE, POTASSIUM CHLORIDE, AND CALCIUM CHLORIDE 1000 ML: .6; .31; .03; .02 INJECTION, SOLUTION INTRAVENOUS at 04:12

## 2020-12-24 RX ADMIN — PANTOPRAZOLE SODIUM 40 MG: 40 TABLET, DELAYED RELEASE ORAL at 03:12

## 2020-12-24 RX ADMIN — GABAPENTIN 300 MG: 300 CAPSULE ORAL at 09:12

## 2020-12-24 RX ADMIN — SODIUM CHLORIDE, SODIUM LACTATE, POTASSIUM CHLORIDE, AND CALCIUM CHLORIDE 1000 ML: .6; .31; .03; .02 INJECTION, SOLUTION INTRAVENOUS at 08:12

## 2020-12-24 RX ADMIN — MORPHINE SULFATE 4 MG: 4 INJECTION INTRAVENOUS at 12:12

## 2020-12-24 RX ADMIN — PIPERACILLIN AND TAZOBACTAM 4.5 G: 4; .5 INJECTION, POWDER, LYOPHILIZED, FOR SOLUTION INTRAVENOUS; PARENTERAL at 12:12

## 2020-12-24 RX ADMIN — METOROPROLOL TARTRATE 5 MG: 5 INJECTION, SOLUTION INTRAVENOUS at 08:12

## 2020-12-24 RX ADMIN — INSULIN DETEMIR 7 UNITS: 100 INJECTION, SOLUTION SUBCUTANEOUS at 09:12

## 2020-12-24 NOTE — ANESTHESIA PROCEDURE NOTES
Peripheral IV Insertion    Diagnosis: I87.9 Disorder of vein, unspecified.    Patient location during procedure: ED  Procedure start time: 12/24/2020 12:20 PM  Procedure end time: 12/24/2020 12:30 PM    Staffing  Authorizing Provider: Dylan Reich MD  Performing Provider: Leola Hanson MD    Anesthesiologist was present at the time of the procedure.    Preanesthetic Checklist  Completed: patient identifiedPeripheral IV Insertion  Skin Prep: chlorhexidine gluconate  Local Infiltration: lidocaine  Orientation: left  Location: forearm  Catheter Size: 20 G  Catheter placement by Ultrasound guidance. Heme positive aspiration all ports.  Vessel Caliber: small, patent, compressibility normal  Needle advanced into vessel with real time Ultrasound guidance.Insertion Attempts: 1  Assessment  Dressing: secured with tape and tegaderm  Patient: Tolerated well

## 2020-12-24 NOTE — ANESTHESIA PROCEDURE NOTES
Peripheral IV Insertion    Diagnosis: I87.9 Disorder of vein, unspecified.    Patient location during procedure: ED  Procedure start time: 12/24/2020 12:30 PM  Procedure end time: 12/24/2020 12:40 PM    Staffing  Authorizing Provider: Dylan Reich MD  Performing Provider: Leola Hanson MD    Anesthesiologist was present at the time of the procedure.    Preanesthetic Checklist  Completed: patient identifiedPeripheral IV Insertion  Skin Prep: chlorhexidine gluconate  Local Infiltration: lidocaine  Orientation: left  Location: antecubital  Catheter Size: 20 G  Catheter placement by Ultrasound guidance. Heme positive aspiration all ports.  Vessel Caliber: medium, patent, compressibility normal  Needle advanced into vessel with real time Ultrasound guidance.Insertion Attempts: 1  Assessment  Dressing: secured with tape and tegaderm  Patient: Tolerated well  Line flushed easily.

## 2020-12-25 PROBLEM — M25.512 CHRONIC LEFT SHOULDER PAIN: Status: ACTIVE | Noted: 2020-12-25

## 2020-12-25 PROBLEM — G89.29 CHRONIC LEFT SHOULDER PAIN: Status: ACTIVE | Noted: 2020-12-25

## 2020-12-25 PROBLEM — R68.89 RIGORS: Status: RESOLVED | Noted: 2020-12-24 | Resolved: 2020-12-25

## 2020-12-25 PROBLEM — E87.5 HYPERKALEMIA: Status: RESOLVED | Noted: 2020-12-24 | Resolved: 2020-12-25

## 2020-12-25 LAB
ANION GAP SERPL CALC-SCNC: 11 MMOL/L (ref 8–16)
BACTERIA #/AREA URNS HPF: ABNORMAL /HPF
BASOPHILS # BLD AUTO: 0.05 K/UL (ref 0–0.2)
BASOPHILS NFR BLD: 0.4 % (ref 0–1.9)
BILIRUB UR QL STRIP: NEGATIVE
BUN SERPL-MCNC: 7 MG/DL (ref 6–20)
CALCIUM SERPL-MCNC: 8.3 MG/DL (ref 8.7–10.5)
CHLORIDE SERPL-SCNC: 105 MMOL/L (ref 95–110)
CLARITY UR: ABNORMAL
CO2 SERPL-SCNC: 19 MMOL/L (ref 23–29)
COLOR UR: ABNORMAL
CREAT SERPL-MCNC: 0.8 MG/DL (ref 0.5–1.4)
DIFFERENTIAL METHOD: ABNORMAL
EOSINOPHIL # BLD AUTO: 0.2 K/UL (ref 0–0.5)
EOSINOPHIL NFR BLD: 1.2 % (ref 0–8)
ERYTHROCYTE [DISTWIDTH] IN BLOOD BY AUTOMATED COUNT: 18 % (ref 11.5–14.5)
EST. GFR  (AFRICAN AMERICAN): >60 ML/MIN/1.73 M^2
EST. GFR  (NON AFRICAN AMERICAN): >60 ML/MIN/1.73 M^2
GLUCOSE SERPL-MCNC: 235 MG/DL (ref 70–110)
GLUCOSE UR QL STRIP: NEGATIVE
HCT VFR BLD AUTO: 39.6 % (ref 40–54)
HGB BLD-MCNC: 13.3 G/DL (ref 14–18)
HGB UR QL STRIP: ABNORMAL
HYALINE CASTS #/AREA URNS LPF: 1 /LPF
IMM GRANULOCYTES # BLD AUTO: 0.15 K/UL (ref 0–0.04)
IMM GRANULOCYTES NFR BLD AUTO: 1.2 % (ref 0–0.5)
KETONES UR QL STRIP: NEGATIVE
LACTATE SERPL-SCNC: 2.9 MMOL/L (ref 0.5–2.2)
LEUKOCYTE ESTERASE UR QL STRIP: ABNORMAL
LYMPHOCYTES # BLD AUTO: 1 K/UL (ref 1–4.8)
LYMPHOCYTES NFR BLD: 8.1 % (ref 18–48)
MCH RBC QN AUTO: 25.7 PG (ref 27–31)
MCHC RBC AUTO-ENTMCNC: 33.6 G/DL (ref 32–36)
MCV RBC AUTO: 77 FL (ref 82–98)
MICROSCOPIC COMMENT: ABNORMAL
MONOCYTES # BLD AUTO: 0.7 K/UL (ref 0.3–1)
MONOCYTES NFR BLD: 5.3 % (ref 4–15)
NEUTROPHILS # BLD AUTO: 10.4 K/UL (ref 1.8–7.7)
NEUTROPHILS NFR BLD: 83.8 % (ref 38–73)
NITRITE UR QL STRIP: NEGATIVE
NRBC BLD-RTO: 0 /100 WBC
PH UR STRIP: 6 [PH] (ref 5–8)
PLATELET # BLD AUTO: 245 K/UL (ref 150–350)
PMV BLD AUTO: 10.2 FL (ref 9.2–12.9)
POCT GLUCOSE: 105 MG/DL (ref 70–110)
POCT GLUCOSE: 113 MG/DL (ref 70–110)
POCT GLUCOSE: 159 MG/DL (ref 70–110)
POCT GLUCOSE: 176 MG/DL (ref 70–110)
POTASSIUM SERPL-SCNC: 4.1 MMOL/L (ref 3.5–5.1)
PROT UR QL STRIP: ABNORMAL
RBC # BLD AUTO: 5.17 M/UL (ref 4.6–6.2)
RBC #/AREA URNS HPF: 10 /HPF (ref 0–4)
SODIUM SERPL-SCNC: 135 MMOL/L (ref 136–145)
SP GR UR STRIP: 1.01 (ref 1–1.03)
URN SPEC COLLECT METH UR: ABNORMAL
UROBILINOGEN UR STRIP-ACNC: NEGATIVE EU/DL
WBC # BLD AUTO: 12.45 K/UL (ref 3.9–12.7)
WBC #/AREA URNS HPF: 50 /HPF (ref 0–5)

## 2020-12-25 PROCEDURE — 80048 BASIC METABOLIC PNL TOTAL CA: CPT

## 2020-12-25 PROCEDURE — 85025 COMPLETE CBC W/AUTO DIFF WBC: CPT

## 2020-12-25 PROCEDURE — 63600175 PHARM REV CODE 636 W HCPCS: Performed by: HOSPITALIST

## 2020-12-25 PROCEDURE — 87186 SC STD MICRODIL/AGAR DIL: CPT | Mod: 59

## 2020-12-25 PROCEDURE — 87070 CULTURE OTHR SPECIMN AEROBIC: CPT

## 2020-12-25 PROCEDURE — 83605 ASSAY OF LACTIC ACID: CPT

## 2020-12-25 PROCEDURE — 87040 BLOOD CULTURE FOR BACTERIA: CPT | Mod: 59

## 2020-12-25 PROCEDURE — 87077 CULTURE AEROBIC IDENTIFY: CPT

## 2020-12-25 PROCEDURE — 99900035 HC TECH TIME PER 15 MIN (STAT)

## 2020-12-25 PROCEDURE — 94761 N-INVAS EAR/PLS OXIMETRY MLT: CPT

## 2020-12-25 PROCEDURE — 25000003 PHARM REV CODE 250: Performed by: NURSE PRACTITIONER

## 2020-12-25 PROCEDURE — 87075 CULTR BACTERIA EXCEPT BLOOD: CPT

## 2020-12-25 PROCEDURE — 25000003 PHARM REV CODE 250: Performed by: HOSPITALIST

## 2020-12-25 PROCEDURE — 11000001 HC ACUTE MED/SURG PRIVATE ROOM

## 2020-12-25 PROCEDURE — 63600175 PHARM REV CODE 636 W HCPCS: Performed by: NURSE PRACTITIONER

## 2020-12-25 RX ORDER — SODIUM CHLORIDE, SODIUM LACTATE, POTASSIUM CHLORIDE, CALCIUM CHLORIDE 600; 310; 30; 20 MG/100ML; MG/100ML; MG/100ML; MG/100ML
INJECTION, SOLUTION INTRAVENOUS CONTINUOUS
Status: DISCONTINUED | OUTPATIENT
Start: 2020-12-25 | End: 2020-12-29

## 2020-12-25 RX ORDER — METOPROLOL TARTRATE 25 MG/1
25 TABLET, FILM COATED ORAL 2 TIMES DAILY
Status: DISCONTINUED | OUTPATIENT
Start: 2020-12-25 | End: 2020-12-30 | Stop reason: HOSPADM

## 2020-12-25 RX ORDER — LIDOCAINE 50 MG/G
1 PATCH TOPICAL
Status: DISCONTINUED | OUTPATIENT
Start: 2020-12-25 | End: 2020-12-30 | Stop reason: HOSPADM

## 2020-12-25 RX ADMIN — APIXABAN 5 MG: 2.5 TABLET, FILM COATED ORAL at 08:12

## 2020-12-25 RX ADMIN — PIPERACILLIN AND TAZOBACTAM 4.5 G: 4; .5 INJECTION, POWDER, LYOPHILIZED, FOR SOLUTION INTRAVENOUS; PARENTERAL at 04:12

## 2020-12-25 RX ADMIN — SODIUM CHLORIDE, SODIUM LACTATE, POTASSIUM CHLORIDE, AND CALCIUM CHLORIDE: .6; .31; .03; .02 INJECTION, SOLUTION INTRAVENOUS at 08:12

## 2020-12-25 RX ADMIN — METOPROLOL TARTRATE 25 MG: 25 TABLET, FILM COATED ORAL at 08:12

## 2020-12-25 RX ADMIN — BACLOFEN 10 MG: 10 TABLET ORAL at 09:12

## 2020-12-25 RX ADMIN — PIPERACILLIN AND TAZOBACTAM 4.5 G: 4; .5 INJECTION, POWDER, LYOPHILIZED, FOR SOLUTION INTRAVENOUS; PARENTERAL at 03:12

## 2020-12-25 RX ADMIN — VENLAFAXINE HYDROCHLORIDE 150 MG: 75 CAPSULE, EXTENDED RELEASE ORAL at 09:12

## 2020-12-25 RX ADMIN — VANCOMYCIN HYDROCHLORIDE 1750 MG: 100 INJECTION, POWDER, LYOPHILIZED, FOR SOLUTION INTRAVENOUS at 01:12

## 2020-12-25 RX ADMIN — INSULIN DETEMIR 7 UNITS: 100 INJECTION, SOLUTION SUBCUTANEOUS at 08:12

## 2020-12-25 RX ADMIN — SODIUM CHLORIDE, SODIUM LACTATE, POTASSIUM CHLORIDE, AND CALCIUM CHLORIDE 1000 ML: .6; .31; .03; .02 INJECTION, SOLUTION INTRAVENOUS at 12:12

## 2020-12-25 RX ADMIN — LIDOCAINE 1 PATCH: 50 PATCH TOPICAL at 03:12

## 2020-12-25 RX ADMIN — GABAPENTIN 300 MG: 300 CAPSULE ORAL at 08:12

## 2020-12-25 RX ADMIN — BACLOFEN 10 MG: 10 TABLET ORAL at 08:12

## 2020-12-25 RX ADMIN — OXYCODONE HYDROCHLORIDE AND ACETAMINOPHEN 1 TABLET: 10; 325 TABLET ORAL at 06:12

## 2020-12-25 RX ADMIN — APIXABAN 5 MG: 2.5 TABLET, FILM COATED ORAL at 09:12

## 2020-12-25 RX ADMIN — FERROUS SULFATE TAB EC 325 MG (65 MG FE EQUIVALENT) 325 MG: 325 (65 FE) TABLET DELAYED RESPONSE at 09:12

## 2020-12-25 RX ADMIN — PIPERACILLIN AND TAZOBACTAM 4.5 G: 4; .5 INJECTION, POWDER, LYOPHILIZED, FOR SOLUTION INTRAVENOUS; PARENTERAL at 08:12

## 2020-12-25 RX ADMIN — OXYCODONE HYDROCHLORIDE AND ACETAMINOPHEN 1 TABLET: 10; 325 TABLET ORAL at 11:12

## 2020-12-25 RX ADMIN — SODIUM CHLORIDE, SODIUM LACTATE, POTASSIUM CHLORIDE, AND CALCIUM CHLORIDE: .6; .31; .03; .02 INJECTION, SOLUTION INTRAVENOUS at 01:12

## 2020-12-25 RX ADMIN — GABAPENTIN 300 MG: 300 CAPSULE ORAL at 09:12

## 2020-12-25 RX ADMIN — PANTOPRAZOLE SODIUM 40 MG: 40 TABLET, DELAYED RELEASE ORAL at 09:12

## 2020-12-26 LAB
ANION GAP SERPL CALC-SCNC: 8 MMOL/L (ref 8–16)
AORTIC ROOT ANNULUS: 2.85 CM
ASCENDING AORTA: 2.67 CM
AV INDEX (PROSTH): 0.63
AV MEAN GRADIENT: 6 MMHG
AV PEAK GRADIENT: 10 MMHG
AV VALVE AREA: 1.75 CM2
AV VELOCITY RATIO: 0.56
BASOPHILS # BLD AUTO: 0.06 K/UL (ref 0–0.2)
BASOPHILS NFR BLD: 0.8 % (ref 0–1.9)
BSA FOR ECHO PROCEDURE: 2.37 M2
BUN SERPL-MCNC: 5 MG/DL (ref 6–20)
CALCIUM SERPL-MCNC: 8.5 MG/DL (ref 8.7–10.5)
CHLORIDE SERPL-SCNC: 105 MMOL/L (ref 95–110)
CO2 SERPL-SCNC: 26 MMOL/L (ref 23–29)
CREAT SERPL-MCNC: 0.8 MG/DL (ref 0.5–1.4)
CV ECHO LV RWT: 0.55 CM
DIFFERENTIAL METHOD: ABNORMAL
DOP CALC AO PEAK VEL: 1.61 M/S
DOP CALC AO VTI: 29.48 CM
DOP CALC LVOT AREA: 2.8 CM2
DOP CALC LVOT DIAMETER: 1.88 CM
DOP CALC LVOT PEAK VEL: 0.9 M/S
DOP CALC LVOT STROKE VOLUME: 51.72 CM3
DOP CALCLVOT PEAK VEL VTI: 18.64 CM
E WAVE DECELERATION TIME: 152.09 MSEC
E/A RATIO: 1.21
E/E' RATIO: 16.91 M/S
ECHO LV POSTERIOR WALL: 1.19 CM (ref 0.6–1.1)
EOSINOPHIL # BLD AUTO: 0.6 K/UL (ref 0–0.5)
EOSINOPHIL NFR BLD: 7.2 % (ref 0–8)
ERYTHROCYTE [DISTWIDTH] IN BLOOD BY AUTOMATED COUNT: 18.6 % (ref 11.5–14.5)
EST. GFR  (AFRICAN AMERICAN): >60 ML/MIN/1.73 M^2
EST. GFR  (NON AFRICAN AMERICAN): >60 ML/MIN/1.73 M^2
FRACTIONAL SHORTENING: 45 % (ref 28–44)
GLUCOSE SERPL-MCNC: 144 MG/DL (ref 70–110)
HCT VFR BLD AUTO: 29.6 % (ref 40–54)
HGB BLD-MCNC: 9.6 G/DL (ref 14–18)
IMM GRANULOCYTES # BLD AUTO: 0.02 K/UL (ref 0–0.04)
IMM GRANULOCYTES NFR BLD AUTO: 0.3 % (ref 0–0.5)
INTERVENTRICULAR SEPTUM: 1.34 CM (ref 0.6–1.1)
LA MAJOR: 5.56 CM
LA MINOR: 4.44 CM
LA WIDTH: 2.71 CM
LEFT ATRIUM SIZE: 3.2 CM
LEFT ATRIUM VOLUME INDEX MOD: 7 ML/M2
LEFT ATRIUM VOLUME INDEX: 15.8 ML/M2
LEFT ATRIUM VOLUME MOD: 16.09 CM3
LEFT ATRIUM VOLUME: 36.39 CM3
LEFT INTERNAL DIMENSION IN SYSTOLE: 2.39 CM (ref 2.1–4)
LEFT VENTRICLE DIASTOLIC VOLUME INDEX: 36.26 ML/M2
LEFT VENTRICLE DIASTOLIC VOLUME: 83.64 ML
LEFT VENTRICLE MASS INDEX: 87 G/M2
LEFT VENTRICLE SYSTOLIC VOLUME INDEX: 8.7 ML/M2
LEFT VENTRICLE SYSTOLIC VOLUME: 20.04 ML
LEFT VENTRICULAR INTERNAL DIMENSION IN DIASTOLE: 4.31 CM (ref 3.5–6)
LEFT VENTRICULAR MASS: 200.24 G
LV LATERAL E/E' RATIO: 18.6 M/S
LV SEPTAL E/E' RATIO: 15.5 M/S
LYMPHOCYTES # BLD AUTO: 1.8 K/UL (ref 1–4.8)
LYMPHOCYTES NFR BLD: 22.9 % (ref 18–48)
MCH RBC QN AUTO: 25.7 PG (ref 27–31)
MCHC RBC AUTO-ENTMCNC: 32.4 G/DL (ref 32–36)
MCV RBC AUTO: 79 FL (ref 82–98)
MONOCYTES # BLD AUTO: 0.6 K/UL (ref 0.3–1)
MONOCYTES NFR BLD: 8.1 % (ref 4–15)
MV PEAK A VEL: 0.77 M/S
MV PEAK E VEL: 0.93 M/S
NEUTROPHILS # BLD AUTO: 4.8 K/UL (ref 1.8–7.7)
NEUTROPHILS NFR BLD: 60.7 % (ref 38–73)
NRBC BLD-RTO: 0 /100 WBC
PISA TR MAX VEL: 2.7 M/S
PLATELET # BLD AUTO: 357 K/UL (ref 150–350)
PLATELET BLD QL SMEAR: ABNORMAL
PMV BLD AUTO: 10.7 FL (ref 9.2–12.9)
POCT GLUCOSE: 102 MG/DL (ref 70–110)
POCT GLUCOSE: 119 MG/DL (ref 70–110)
POCT GLUCOSE: 123 MG/DL (ref 70–110)
POCT GLUCOSE: 148 MG/DL (ref 70–110)
POTASSIUM SERPL-SCNC: 3.6 MMOL/L (ref 3.5–5.1)
PV PEAK VELOCITY: 1.23 CM/S
RA MAJOR: 3.83 CM
RA PRESSURE: 3 MMHG
RA WIDTH: 2.82 CM
RBC # BLD AUTO: 3.74 M/UL (ref 4.6–6.2)
RIGHT VENTRICULAR END-DIASTOLIC DIMENSION: 2.66 CM
RV TISSUE DOPPLER FREE WALL SYSTOLIC VELOCITY 1 (APICAL 4 CHAMBER VIEW): 13.48 CM/S
SODIUM SERPL-SCNC: 139 MMOL/L (ref 136–145)
STJ: 2.42 CM
TDI LATERAL: 0.05 M/S
TDI SEPTAL: 0.06 M/S
TDI: 0.06 M/S
TR MAX PG: 29 MMHG
TRICUSPID ANNULAR PLANE SYSTOLIC EXCURSION: 2.35 CM
TV REST PULMONARY ARTERY PRESSURE: 32 MMHG
WBC # BLD AUTO: 7.91 K/UL (ref 3.9–12.7)

## 2020-12-26 PROCEDURE — 25000003 PHARM REV CODE 250: Performed by: NURSE PRACTITIONER

## 2020-12-26 PROCEDURE — 36415 COLL VENOUS BLD VENIPUNCTURE: CPT

## 2020-12-26 PROCEDURE — 63600175 PHARM REV CODE 636 W HCPCS: Performed by: HOSPITALIST

## 2020-12-26 PROCEDURE — C9399 UNCLASSIFIED DRUGS OR BIOLOG: HCPCS | Performed by: HOSPITALIST

## 2020-12-26 PROCEDURE — 25000003 PHARM REV CODE 250: Performed by: HOSPITALIST

## 2020-12-26 PROCEDURE — 85025 COMPLETE CBC W/AUTO DIFF WBC: CPT

## 2020-12-26 PROCEDURE — 94761 N-INVAS EAR/PLS OXIMETRY MLT: CPT

## 2020-12-26 PROCEDURE — 63600175 PHARM REV CODE 636 W HCPCS: Performed by: NURSE PRACTITIONER

## 2020-12-26 PROCEDURE — 99232 SBSQ HOSP IP/OBS MODERATE 35: CPT | Mod: GC,,, | Performed by: INTERNAL MEDICINE

## 2020-12-26 PROCEDURE — 99232 PR SUBSEQUENT HOSPITAL CARE,LEVL II: ICD-10-PCS | Mod: GC,,, | Performed by: INTERNAL MEDICINE

## 2020-12-26 PROCEDURE — 80048 BASIC METABOLIC PNL TOTAL CA: CPT

## 2020-12-26 PROCEDURE — 11000001 HC ACUTE MED/SURG PRIVATE ROOM

## 2020-12-26 RX ORDER — POTASSIUM CHLORIDE 20 MEQ/1
40 TABLET, EXTENDED RELEASE ORAL ONCE
Status: COMPLETED | OUTPATIENT
Start: 2020-12-26 | End: 2020-12-26

## 2020-12-26 RX ADMIN — PIPERACILLIN AND TAZOBACTAM 4.5 G: 4; .5 INJECTION, POWDER, LYOPHILIZED, FOR SOLUTION INTRAVENOUS; PARENTERAL at 01:12

## 2020-12-26 RX ADMIN — INSULIN DETEMIR 7 UNITS: 100 INJECTION, SOLUTION SUBCUTANEOUS at 10:12

## 2020-12-26 RX ADMIN — PIPERACILLIN AND TAZOBACTAM 4.5 G: 4; .5 INJECTION, POWDER, LYOPHILIZED, FOR SOLUTION INTRAVENOUS; PARENTERAL at 04:12

## 2020-12-26 RX ADMIN — OXYCODONE HYDROCHLORIDE AND ACETAMINOPHEN 1 TABLET: 10; 325 TABLET ORAL at 04:12

## 2020-12-26 RX ADMIN — FERROUS SULFATE TAB EC 325 MG (65 MG FE EQUIVALENT) 325 MG: 325 (65 FE) TABLET DELAYED RESPONSE at 09:12

## 2020-12-26 RX ADMIN — METOPROLOL TARTRATE 25 MG: 25 TABLET, FILM COATED ORAL at 09:12

## 2020-12-26 RX ADMIN — OXYCODONE HYDROCHLORIDE AND ACETAMINOPHEN 1 TABLET: 10; 325 TABLET ORAL at 05:12

## 2020-12-26 RX ADMIN — POTASSIUM CHLORIDE 40 MEQ: 1500 TABLET, EXTENDED RELEASE ORAL at 05:12

## 2020-12-26 RX ADMIN — BACLOFEN 10 MG: 10 TABLET ORAL at 10:12

## 2020-12-26 RX ADMIN — OXYCODONE HYDROCHLORIDE AND ACETAMINOPHEN 1 TABLET: 10; 325 TABLET ORAL at 11:12

## 2020-12-26 RX ADMIN — APIXABAN 5 MG: 2.5 TABLET, FILM COATED ORAL at 09:12

## 2020-12-26 RX ADMIN — GABAPENTIN 300 MG: 300 CAPSULE ORAL at 09:12

## 2020-12-26 RX ADMIN — PANTOPRAZOLE SODIUM 40 MG: 40 TABLET, DELAYED RELEASE ORAL at 09:12

## 2020-12-26 RX ADMIN — PIPERACILLIN AND TAZOBACTAM 4.5 G: 4; .5 INJECTION, POWDER, LYOPHILIZED, FOR SOLUTION INTRAVENOUS; PARENTERAL at 09:12

## 2020-12-26 RX ADMIN — BACLOFEN 10 MG: 10 TABLET ORAL at 09:12

## 2020-12-26 RX ADMIN — LIDOCAINE 1 PATCH: 50 PATCH TOPICAL at 01:12

## 2020-12-26 RX ADMIN — SODIUM CHLORIDE, SODIUM LACTATE, POTASSIUM CHLORIDE, AND CALCIUM CHLORIDE: .6; .31; .03; .02 INJECTION, SOLUTION INTRAVENOUS at 10:12

## 2020-12-26 RX ADMIN — SODIUM CHLORIDE, SODIUM LACTATE, POTASSIUM CHLORIDE, AND CALCIUM CHLORIDE: .6; .31; .03; .02 INJECTION, SOLUTION INTRAVENOUS at 09:12

## 2020-12-26 RX ADMIN — VENLAFAXINE HYDROCHLORIDE 150 MG: 75 CAPSULE, EXTENDED RELEASE ORAL at 09:12

## 2020-12-27 PROBLEM — Z16.12 ESBL (EXTENDED SPECTRUM BETA-LACTAMASE) PRODUCING BACTERIA INFECTION: Status: ACTIVE | Noted: 2020-12-27

## 2020-12-27 PROBLEM — A49.9 ESBL (EXTENDED SPECTRUM BETA-LACTAMASE) PRODUCING BACTERIA INFECTION: Status: ACTIVE | Noted: 2020-12-27

## 2020-12-27 LAB
ANION GAP SERPL CALC-SCNC: 9 MMOL/L (ref 8–16)
BACTERIA BLD CULT: ABNORMAL
BACTERIA UR CULT: ABNORMAL
BASOPHILS # BLD AUTO: 0.02 K/UL (ref 0–0.2)
BASOPHILS NFR BLD: 0.3 % (ref 0–1.9)
BUN SERPL-MCNC: 5 MG/DL (ref 6–20)
CALCIUM SERPL-MCNC: 8.7 MG/DL (ref 8.7–10.5)
CHLORIDE SERPL-SCNC: 107 MMOL/L (ref 95–110)
CO2 SERPL-SCNC: 25 MMOL/L (ref 23–29)
CREAT SERPL-MCNC: 0.8 MG/DL (ref 0.5–1.4)
DIFFERENTIAL METHOD: ABNORMAL
EOSINOPHIL # BLD AUTO: 0.5 K/UL (ref 0–0.5)
EOSINOPHIL NFR BLD: 8.4 % (ref 0–8)
ERYTHROCYTE [DISTWIDTH] IN BLOOD BY AUTOMATED COUNT: 18.7 % (ref 11.5–14.5)
EST. GFR  (AFRICAN AMERICAN): >60 ML/MIN/1.73 M^2
EST. GFR  (NON AFRICAN AMERICAN): >60 ML/MIN/1.73 M^2
GLUCOSE SERPL-MCNC: 104 MG/DL (ref 70–110)
HCT VFR BLD AUTO: 27.5 % (ref 40–54)
HGB BLD-MCNC: 8.9 G/DL (ref 14–18)
IMM GRANULOCYTES # BLD AUTO: 0.02 K/UL (ref 0–0.04)
IMM GRANULOCYTES NFR BLD AUTO: 0.3 % (ref 0–0.5)
LYMPHOCYTES # BLD AUTO: 1.7 K/UL (ref 1–4.8)
LYMPHOCYTES NFR BLD: 28.1 % (ref 18–48)
MCH RBC QN AUTO: 25.7 PG (ref 27–31)
MCHC RBC AUTO-ENTMCNC: 32.4 G/DL (ref 32–36)
MCV RBC AUTO: 80 FL (ref 82–98)
MONOCYTES # BLD AUTO: 0.6 K/UL (ref 0.3–1)
MONOCYTES NFR BLD: 10.1 % (ref 4–15)
NEUTROPHILS # BLD AUTO: 3.2 K/UL (ref 1.8–7.7)
NEUTROPHILS NFR BLD: 52.8 % (ref 38–73)
NRBC BLD-RTO: 0 /100 WBC
PLATELET # BLD AUTO: 341 K/UL (ref 150–350)
PMV BLD AUTO: 10.9 FL (ref 9.2–12.9)
POCT GLUCOSE: 107 MG/DL (ref 70–110)
POCT GLUCOSE: 121 MG/DL (ref 70–110)
POCT GLUCOSE: 149 MG/DL (ref 70–110)
POCT GLUCOSE: 160 MG/DL (ref 70–110)
POTASSIUM SERPL-SCNC: 4 MMOL/L (ref 3.5–5.1)
RBC # BLD AUTO: 3.46 M/UL (ref 4.6–6.2)
SODIUM SERPL-SCNC: 141 MMOL/L (ref 136–145)
WBC # BLD AUTO: 6.04 K/UL (ref 3.9–12.7)

## 2020-12-27 PROCEDURE — 63600175 PHARM REV CODE 636 W HCPCS: Performed by: NURSE PRACTITIONER

## 2020-12-27 PROCEDURE — 99232 PR SUBSEQUENT HOSPITAL CARE,LEVL II: ICD-10-PCS | Mod: ,,, | Performed by: INTERNAL MEDICINE

## 2020-12-27 PROCEDURE — 36415 COLL VENOUS BLD VENIPUNCTURE: CPT

## 2020-12-27 PROCEDURE — 11000001 HC ACUTE MED/SURG PRIVATE ROOM

## 2020-12-27 PROCEDURE — 63600175 PHARM REV CODE 636 W HCPCS: Performed by: HOSPITALIST

## 2020-12-27 PROCEDURE — 94761 N-INVAS EAR/PLS OXIMETRY MLT: CPT

## 2020-12-27 PROCEDURE — 85025 COMPLETE CBC W/AUTO DIFF WBC: CPT

## 2020-12-27 PROCEDURE — 80048 BASIC METABOLIC PNL TOTAL CA: CPT

## 2020-12-27 PROCEDURE — 25000003 PHARM REV CODE 250: Performed by: NURSE PRACTITIONER

## 2020-12-27 PROCEDURE — 25000003 PHARM REV CODE 250: Performed by: HOSPITALIST

## 2020-12-27 PROCEDURE — 99232 SBSQ HOSP IP/OBS MODERATE 35: CPT | Mod: ,,, | Performed by: INTERNAL MEDICINE

## 2020-12-27 RX ADMIN — OXYCODONE HYDROCHLORIDE AND ACETAMINOPHEN 1 TABLET: 10; 325 TABLET ORAL at 10:12

## 2020-12-27 RX ADMIN — APIXABAN 5 MG: 2.5 TABLET, FILM COATED ORAL at 10:12

## 2020-12-27 RX ADMIN — LIDOCAINE 1 PATCH: 50 PATCH TOPICAL at 01:12

## 2020-12-27 RX ADMIN — OXYCODONE HYDROCHLORIDE AND ACETAMINOPHEN 1 TABLET: 10; 325 TABLET ORAL at 06:12

## 2020-12-27 RX ADMIN — SODIUM CHLORIDE, SODIUM LACTATE, POTASSIUM CHLORIDE, AND CALCIUM CHLORIDE: .6; .31; .03; .02 INJECTION, SOLUTION INTRAVENOUS at 04:12

## 2020-12-27 RX ADMIN — METOPROLOL TARTRATE 25 MG: 25 TABLET, FILM COATED ORAL at 09:12

## 2020-12-27 RX ADMIN — PIPERACILLIN AND TAZOBACTAM 4.5 G: 4; .5 INJECTION, POWDER, LYOPHILIZED, FOR SOLUTION INTRAVENOUS; PARENTERAL at 05:12

## 2020-12-27 RX ADMIN — PIPERACILLIN AND TAZOBACTAM 4.5 G: 4; .5 INJECTION, POWDER, LYOPHILIZED, FOR SOLUTION INTRAVENOUS; PARENTERAL at 01:12

## 2020-12-27 RX ADMIN — SODIUM CHLORIDE, SODIUM LACTATE, POTASSIUM CHLORIDE, AND CALCIUM CHLORIDE: .6; .31; .03; .02 INJECTION, SOLUTION INTRAVENOUS at 06:12

## 2020-12-27 RX ADMIN — GABAPENTIN 300 MG: 300 CAPSULE ORAL at 09:12

## 2020-12-27 RX ADMIN — APIXABAN 5 MG: 2.5 TABLET, FILM COATED ORAL at 09:12

## 2020-12-27 RX ADMIN — SODIUM CHLORIDE, SODIUM LACTATE, POTASSIUM CHLORIDE, AND CALCIUM CHLORIDE: .6; .31; .03; .02 INJECTION, SOLUTION INTRAVENOUS at 12:12

## 2020-12-27 RX ADMIN — METOPROLOL TARTRATE 25 MG: 25 TABLET, FILM COATED ORAL at 10:12

## 2020-12-27 RX ADMIN — BACLOFEN 10 MG: 10 TABLET ORAL at 10:12

## 2020-12-27 RX ADMIN — FERROUS SULFATE TAB EC 325 MG (65 MG FE EQUIVALENT) 325 MG: 325 (65 FE) TABLET DELAYED RESPONSE at 10:12

## 2020-12-27 RX ADMIN — PANTOPRAZOLE SODIUM 40 MG: 40 TABLET, DELAYED RELEASE ORAL at 10:12

## 2020-12-27 RX ADMIN — INSULIN DETEMIR 7 UNITS: 100 INJECTION, SOLUTION SUBCUTANEOUS at 09:12

## 2020-12-27 RX ADMIN — BACLOFEN 10 MG: 10 TABLET ORAL at 09:12

## 2020-12-27 RX ADMIN — VENLAFAXINE HYDROCHLORIDE 150 MG: 75 CAPSULE, EXTENDED RELEASE ORAL at 10:12

## 2020-12-27 RX ADMIN — GABAPENTIN 300 MG: 300 CAPSULE ORAL at 10:12

## 2020-12-27 RX ADMIN — PIPERACILLIN AND TAZOBACTAM 4.5 G: 4; .5 INJECTION, POWDER, LYOPHILIZED, FOR SOLUTION INTRAVENOUS; PARENTERAL at 09:12

## 2020-12-28 ENCOUNTER — CLINICAL SUPPORT (OUTPATIENT)
Dept: SMOKING CESSATION | Facility: CLINIC | Age: 45
End: 2020-12-28
Payer: COMMERCIAL

## 2020-12-28 DIAGNOSIS — F17.210 CIGARETTE SMOKER: Primary | ICD-10-CM

## 2020-12-28 LAB
ANION GAP SERPL CALC-SCNC: 6 MMOL/L (ref 8–16)
BACTERIA UR CULT: ABNORMAL
BASOPHILS # BLD AUTO: 0.03 K/UL (ref 0–0.2)
BASOPHILS NFR BLD: 0.5 % (ref 0–1.9)
BUN SERPL-MCNC: 6 MG/DL (ref 6–20)
CALCIUM SERPL-MCNC: 8.8 MG/DL (ref 8.7–10.5)
CHLORIDE SERPL-SCNC: 105 MMOL/L (ref 95–110)
CO2 SERPL-SCNC: 28 MMOL/L (ref 23–29)
CREAT SERPL-MCNC: 0.8 MG/DL (ref 0.5–1.4)
DIFFERENTIAL METHOD: ABNORMAL
EOSINOPHIL # BLD AUTO: 0.5 K/UL (ref 0–0.5)
EOSINOPHIL NFR BLD: 7.5 % (ref 0–8)
ERYTHROCYTE [DISTWIDTH] IN BLOOD BY AUTOMATED COUNT: 17.9 % (ref 11.5–14.5)
EST. GFR  (AFRICAN AMERICAN): >60 ML/MIN/1.73 M^2
EST. GFR  (NON AFRICAN AMERICAN): >60 ML/MIN/1.73 M^2
GLUCOSE SERPL-MCNC: 122 MG/DL (ref 70–110)
HCT VFR BLD AUTO: 26.1 % (ref 40–54)
HGB BLD-MCNC: 8.7 G/DL (ref 14–18)
IMM GRANULOCYTES # BLD AUTO: 0.02 K/UL (ref 0–0.04)
IMM GRANULOCYTES NFR BLD AUTO: 0.3 % (ref 0–0.5)
LYMPHOCYTES # BLD AUTO: 2.2 K/UL (ref 1–4.8)
LYMPHOCYTES NFR BLD: 33.3 % (ref 18–48)
MCH RBC QN AUTO: 26 PG (ref 27–31)
MCHC RBC AUTO-ENTMCNC: 33.3 G/DL (ref 32–36)
MCV RBC AUTO: 78 FL (ref 82–98)
MONOCYTES # BLD AUTO: 0.5 K/UL (ref 0.3–1)
MONOCYTES NFR BLD: 8 % (ref 4–15)
NEUTROPHILS # BLD AUTO: 3.3 K/UL (ref 1.8–7.7)
NEUTROPHILS NFR BLD: 50.4 % (ref 38–73)
NRBC BLD-RTO: 0 /100 WBC
PLATELET # BLD AUTO: 375 K/UL (ref 150–350)
PMV BLD AUTO: 10.8 FL (ref 9.2–12.9)
POCT GLUCOSE: 124 MG/DL (ref 70–110)
POCT GLUCOSE: 135 MG/DL (ref 70–110)
POCT GLUCOSE: 158 MG/DL (ref 70–110)
POCT GLUCOSE: 190 MG/DL (ref 70–110)
POTASSIUM SERPL-SCNC: 3.7 MMOL/L (ref 3.5–5.1)
RBC # BLD AUTO: 3.35 M/UL (ref 4.6–6.2)
SODIUM SERPL-SCNC: 139 MMOL/L (ref 136–145)
WBC # BLD AUTO: 6.52 K/UL (ref 3.9–12.7)

## 2020-12-28 PROCEDURE — 63600175 PHARM REV CODE 636 W HCPCS: Performed by: HOSPITALIST

## 2020-12-28 PROCEDURE — 99232 PR SUBSEQUENT HOSPITAL CARE,LEVL II: ICD-10-PCS | Mod: GC,,, | Performed by: INTERNAL MEDICINE

## 2020-12-28 PROCEDURE — 25000003 PHARM REV CODE 250: Performed by: HOSPITALIST

## 2020-12-28 PROCEDURE — 21400001 HC TELEMETRY ROOM

## 2020-12-28 PROCEDURE — 94761 N-INVAS EAR/PLS OXIMETRY MLT: CPT

## 2020-12-28 PROCEDURE — 99407 PR TOBACCO USE CESSATION INTENSIVE >10 MINUTES: ICD-10-PCS | Mod: S$GLB,,,

## 2020-12-28 PROCEDURE — 36415 COLL VENOUS BLD VENIPUNCTURE: CPT

## 2020-12-28 PROCEDURE — 99232 PR SUBSEQUENT HOSPITAL CARE,LEVL II: ICD-10-PCS | Mod: ,,, | Performed by: HOSPITALIST

## 2020-12-28 PROCEDURE — 63600175 PHARM REV CODE 636 W HCPCS: Performed by: NURSE PRACTITIONER

## 2020-12-28 PROCEDURE — 99232 SBSQ HOSP IP/OBS MODERATE 35: CPT | Mod: ,,, | Performed by: HOSPITALIST

## 2020-12-28 PROCEDURE — 80048 BASIC METABOLIC PNL TOTAL CA: CPT

## 2020-12-28 PROCEDURE — 36569 INSJ PICC 5 YR+ W/O IMAGING: CPT

## 2020-12-28 PROCEDURE — 99232 SBSQ HOSP IP/OBS MODERATE 35: CPT | Mod: GC,,, | Performed by: INTERNAL MEDICINE

## 2020-12-28 PROCEDURE — 85025 COMPLETE CBC W/AUTO DIFF WBC: CPT

## 2020-12-28 PROCEDURE — 25000003 PHARM REV CODE 250: Performed by: NURSE PRACTITIONER

## 2020-12-28 PROCEDURE — C1751 CATH, INF, PER/CENT/MIDLINE: HCPCS

## 2020-12-28 PROCEDURE — A4216 STERILE WATER/SALINE, 10 ML: HCPCS | Performed by: HOSPITALIST

## 2020-12-28 PROCEDURE — 99407 BEHAV CHNG SMOKING > 10 MIN: CPT | Mod: S$GLB,,,

## 2020-12-28 RX ORDER — SODIUM CHLORIDE 0.9 % (FLUSH) 0.9 %
10 SYRINGE (ML) INJECTION
Status: DISCONTINUED | OUTPATIENT
Start: 2020-12-28 | End: 2020-12-30 | Stop reason: HOSPADM

## 2020-12-28 RX ORDER — SODIUM CHLORIDE 0.9 % (FLUSH) 0.9 %
10 SYRINGE (ML) INJECTION EVERY 6 HOURS
Status: DISCONTINUED | OUTPATIENT
Start: 2020-12-28 | End: 2020-12-30 | Stop reason: HOSPADM

## 2020-12-28 RX ADMIN — PIPERACILLIN AND TAZOBACTAM 4.5 G: 4; .5 INJECTION, POWDER, LYOPHILIZED, FOR SOLUTION INTRAVENOUS; PARENTERAL at 05:12

## 2020-12-28 RX ADMIN — SODIUM CHLORIDE, SODIUM LACTATE, POTASSIUM CHLORIDE, AND CALCIUM CHLORIDE: .6; .31; .03; .02 INJECTION, SOLUTION INTRAVENOUS at 01:12

## 2020-12-28 RX ADMIN — OXYCODONE HYDROCHLORIDE AND ACETAMINOPHEN 1 TABLET: 10; 325 TABLET ORAL at 06:12

## 2020-12-28 RX ADMIN — APIXABAN 5 MG: 2.5 TABLET, FILM COATED ORAL at 09:12

## 2020-12-28 RX ADMIN — OXYCODONE HYDROCHLORIDE AND ACETAMINOPHEN 1 TABLET: 10; 325 TABLET ORAL at 10:12

## 2020-12-28 RX ADMIN — INSULIN DETEMIR 7 UNITS: 100 INJECTION, SOLUTION SUBCUTANEOUS at 09:12

## 2020-12-28 RX ADMIN — BACLOFEN 10 MG: 10 TABLET ORAL at 10:12

## 2020-12-28 RX ADMIN — VENLAFAXINE HYDROCHLORIDE 150 MG: 75 CAPSULE, EXTENDED RELEASE ORAL at 10:12

## 2020-12-28 RX ADMIN — Medication 10 ML: at 05:12

## 2020-12-28 RX ADMIN — OXYCODONE HYDROCHLORIDE AND ACETAMINOPHEN 1 TABLET: 10; 325 TABLET ORAL at 01:12

## 2020-12-28 RX ADMIN — PIPERACILLIN AND TAZOBACTAM 4.5 G: 4; .5 INJECTION, POWDER, LYOPHILIZED, FOR SOLUTION INTRAVENOUS; PARENTERAL at 01:12

## 2020-12-28 RX ADMIN — METOPROLOL TARTRATE 25 MG: 25 TABLET, FILM COATED ORAL at 10:12

## 2020-12-28 RX ADMIN — FERROUS SULFATE TAB EC 325 MG (65 MG FE EQUIVALENT) 325 MG: 325 (65 FE) TABLET DELAYED RESPONSE at 10:12

## 2020-12-28 RX ADMIN — APIXABAN 5 MG: 2.5 TABLET, FILM COATED ORAL at 10:12

## 2020-12-28 RX ADMIN — BACLOFEN 10 MG: 10 TABLET ORAL at 09:12

## 2020-12-28 RX ADMIN — PIPERACILLIN AND TAZOBACTAM 4.5 G: 4; .5 INJECTION, POWDER, LYOPHILIZED, FOR SOLUTION INTRAVENOUS; PARENTERAL at 09:12

## 2020-12-28 RX ADMIN — METOPROLOL TARTRATE 25 MG: 25 TABLET, FILM COATED ORAL at 09:12

## 2020-12-28 RX ADMIN — SODIUM CHLORIDE, SODIUM LACTATE, POTASSIUM CHLORIDE, AND CALCIUM CHLORIDE: .6; .31; .03; .02 INJECTION, SOLUTION INTRAVENOUS at 09:12

## 2020-12-28 RX ADMIN — PANTOPRAZOLE SODIUM 40 MG: 40 TABLET, DELAYED RELEASE ORAL at 10:12

## 2020-12-28 RX ADMIN — GABAPENTIN 300 MG: 300 CAPSULE ORAL at 09:12

## 2020-12-28 RX ADMIN — LIDOCAINE 1 PATCH: 50 PATCH TOPICAL at 01:12

## 2020-12-28 RX ADMIN — GABAPENTIN 300 MG: 300 CAPSULE ORAL at 10:12

## 2020-12-28 NOTE — PROGRESS NOTES
"Individual Follow-Up Form    12/28/2020    Quit Date: To be determined    Clinical Status of Patient: Inpatient    Length of Service: 30 minutes    Comments: Smoking cessation education note: Pt denies nicotine withdrawal symptoms stating "I don't smoke that much any more".  He is currently enrolled in the Tobacco Trust but declines Ambulatory referral to Smoking Cessation following hospital discharge.     Diagnosis: F17.210        "

## 2020-12-29 LAB
ANION GAP SERPL CALC-SCNC: 8 MMOL/L (ref 8–16)
BASOPHILS # BLD AUTO: 0.05 K/UL (ref 0–0.2)
BASOPHILS NFR BLD: 0.7 % (ref 0–1.9)
BUN SERPL-MCNC: 5 MG/DL (ref 6–20)
CALCIUM SERPL-MCNC: 8.7 MG/DL (ref 8.7–10.5)
CHLORIDE SERPL-SCNC: 103 MMOL/L (ref 95–110)
CO2 SERPL-SCNC: 28 MMOL/L (ref 23–29)
CREAT SERPL-MCNC: 0.8 MG/DL (ref 0.5–1.4)
DIFFERENTIAL METHOD: ABNORMAL
EOSINOPHIL # BLD AUTO: 0.5 K/UL (ref 0–0.5)
EOSINOPHIL NFR BLD: 6.4 % (ref 0–8)
ERYTHROCYTE [DISTWIDTH] IN BLOOD BY AUTOMATED COUNT: 18.5 % (ref 11.5–14.5)
EST. GFR  (AFRICAN AMERICAN): >60 ML/MIN/1.73 M^2
EST. GFR  (NON AFRICAN AMERICAN): >60 ML/MIN/1.73 M^2
GLUCOSE SERPL-MCNC: 111 MG/DL (ref 70–110)
HCT VFR BLD AUTO: 27.9 % (ref 40–54)
HGB BLD-MCNC: 9 G/DL (ref 14–18)
IMM GRANULOCYTES # BLD AUTO: 0.02 K/UL (ref 0–0.04)
IMM GRANULOCYTES NFR BLD AUTO: 0.3 % (ref 0–0.5)
LYMPHOCYTES # BLD AUTO: 2.7 K/UL (ref 1–4.8)
LYMPHOCYTES NFR BLD: 35.4 % (ref 18–48)
MCH RBC QN AUTO: 25.5 PG (ref 27–31)
MCHC RBC AUTO-ENTMCNC: 32.3 G/DL (ref 32–36)
MCV RBC AUTO: 79 FL (ref 82–98)
MONOCYTES # BLD AUTO: 0.6 K/UL (ref 0.3–1)
MONOCYTES NFR BLD: 7.6 % (ref 4–15)
NEUTROPHILS # BLD AUTO: 3.8 K/UL (ref 1.8–7.7)
NEUTROPHILS NFR BLD: 49.6 % (ref 38–73)
NRBC BLD-RTO: 0 /100 WBC
PLATELET # BLD AUTO: 384 K/UL (ref 150–350)
PMV BLD AUTO: 10.6 FL (ref 9.2–12.9)
POCT GLUCOSE: 114 MG/DL (ref 70–110)
POCT GLUCOSE: 121 MG/DL (ref 70–110)
POCT GLUCOSE: 129 MG/DL (ref 70–110)
POCT GLUCOSE: 135 MG/DL (ref 70–110)
POTASSIUM SERPL-SCNC: 4 MMOL/L (ref 3.5–5.1)
RBC # BLD AUTO: 3.53 M/UL (ref 4.6–6.2)
SODIUM SERPL-SCNC: 139 MMOL/L (ref 136–145)
WBC # BLD AUTO: 7.65 K/UL (ref 3.9–12.7)

## 2020-12-29 PROCEDURE — 85025 COMPLETE CBC W/AUTO DIFF WBC: CPT

## 2020-12-29 PROCEDURE — 25000003 PHARM REV CODE 250: Performed by: INTERNAL MEDICINE

## 2020-12-29 PROCEDURE — 97803 MED NUTRITION INDIV SUBSEQ: CPT

## 2020-12-29 PROCEDURE — 25000003 PHARM REV CODE 250: Performed by: HOSPITALIST

## 2020-12-29 PROCEDURE — 63600175 PHARM REV CODE 636 W HCPCS: Performed by: HOSPITALIST

## 2020-12-29 PROCEDURE — 63600175 PHARM REV CODE 636 W HCPCS: Performed by: INTERNAL MEDICINE

## 2020-12-29 PROCEDURE — A4216 STERILE WATER/SALINE, 10 ML: HCPCS | Performed by: HOSPITALIST

## 2020-12-29 PROCEDURE — 94761 N-INVAS EAR/PLS OXIMETRY MLT: CPT

## 2020-12-29 PROCEDURE — 25000003 PHARM REV CODE 250: Performed by: NURSE PRACTITIONER

## 2020-12-29 PROCEDURE — 80048 BASIC METABOLIC PNL TOTAL CA: CPT

## 2020-12-29 PROCEDURE — 21400001 HC TELEMETRY ROOM

## 2020-12-29 RX ORDER — MUPIROCIN 20 MG/G
OINTMENT TOPICAL 2 TIMES DAILY
Status: DISCONTINUED | OUTPATIENT
Start: 2020-12-29 | End: 2020-12-30 | Stop reason: HOSPADM

## 2020-12-29 RX ADMIN — OXYCODONE HYDROCHLORIDE AND ACETAMINOPHEN 1 TABLET: 10; 325 TABLET ORAL at 08:12

## 2020-12-29 RX ADMIN — CEFTRIAXONE SODIUM 2 G: 2 INJECTION, POWDER, FOR SOLUTION INTRAMUSCULAR; INTRAVENOUS at 04:12

## 2020-12-29 RX ADMIN — METOPROLOL TARTRATE 25 MG: 25 TABLET, FILM COATED ORAL at 08:12

## 2020-12-29 RX ADMIN — PANTOPRAZOLE SODIUM 40 MG: 40 TABLET, DELAYED RELEASE ORAL at 08:12

## 2020-12-29 RX ADMIN — FERROUS SULFATE TAB EC 325 MG (65 MG FE EQUIVALENT) 325 MG: 325 (65 FE) TABLET DELAYED RESPONSE at 08:12

## 2020-12-29 RX ADMIN — MUPIROCIN: 20 OINTMENT TOPICAL at 12:12

## 2020-12-29 RX ADMIN — OXYCODONE HYDROCHLORIDE AND ACETAMINOPHEN 1 TABLET: 10; 325 TABLET ORAL at 12:12

## 2020-12-29 RX ADMIN — PIPERACILLIN AND TAZOBACTAM 4.5 G: 4; .5 INJECTION, POWDER, LYOPHILIZED, FOR SOLUTION INTRAVENOUS; PARENTERAL at 04:12

## 2020-12-29 RX ADMIN — Medication 6 MG: at 08:12

## 2020-12-29 RX ADMIN — Medication 10 ML: at 06:12

## 2020-12-29 RX ADMIN — Medication 10 ML: at 12:12

## 2020-12-29 RX ADMIN — ERTAPENEM 1 G: 1 INJECTION INTRAMUSCULAR; INTRAVENOUS at 12:12

## 2020-12-29 RX ADMIN — GABAPENTIN 300 MG: 300 CAPSULE ORAL at 08:12

## 2020-12-29 RX ADMIN — MUPIROCIN: 20 OINTMENT TOPICAL at 08:12

## 2020-12-29 RX ADMIN — VENLAFAXINE HYDROCHLORIDE 150 MG: 75 CAPSULE, EXTENDED RELEASE ORAL at 08:12

## 2020-12-29 RX ADMIN — APIXABAN 5 MG: 2.5 TABLET, FILM COATED ORAL at 08:12

## 2020-12-29 RX ADMIN — LIDOCAINE 1 PATCH: 50 PATCH TOPICAL at 04:12

## 2020-12-29 RX ADMIN — BACLOFEN 10 MG: 10 TABLET ORAL at 08:12

## 2020-12-29 RX ADMIN — INSULIN DETEMIR 7 UNITS: 100 INJECTION, SOLUTION SUBCUTANEOUS at 08:12

## 2020-12-30 VITALS
HEIGHT: 71 IN | WEIGHT: 246.94 LBS | TEMPERATURE: 99 F | RESPIRATION RATE: 17 BRPM | BODY MASS INDEX: 34.57 KG/M2 | HEART RATE: 69 BPM | DIASTOLIC BLOOD PRESSURE: 85 MMHG | OXYGEN SATURATION: 95 % | SYSTOLIC BLOOD PRESSURE: 135 MMHG

## 2020-12-30 LAB
ANION GAP SERPL CALC-SCNC: 8 MMOL/L (ref 8–16)
BACTERIA BLD CULT: NORMAL
BACTERIA BLD CULT: NORMAL
BACTERIA SPEC AEROBE CULT: ABNORMAL
BACTERIA SPEC AEROBE CULT: ABNORMAL
BACTERIA SPEC ANAEROBE CULT: NORMAL
BASOPHILS # BLD AUTO: 0.05 K/UL (ref 0–0.2)
BASOPHILS NFR BLD: 0.6 % (ref 0–1.9)
BUN SERPL-MCNC: 6 MG/DL (ref 6–20)
CALCIUM SERPL-MCNC: 8.8 MG/DL (ref 8.7–10.5)
CHLORIDE SERPL-SCNC: 103 MMOL/L (ref 95–110)
CO2 SERPL-SCNC: 28 MMOL/L (ref 23–29)
CREAT SERPL-MCNC: 0.8 MG/DL (ref 0.5–1.4)
DIFFERENTIAL METHOD: ABNORMAL
EOSINOPHIL # BLD AUTO: 0.5 K/UL (ref 0–0.5)
EOSINOPHIL NFR BLD: 6.2 % (ref 0–8)
ERYTHROCYTE [DISTWIDTH] IN BLOOD BY AUTOMATED COUNT: 18.6 % (ref 11.5–14.5)
EST. GFR  (AFRICAN AMERICAN): >60 ML/MIN/1.73 M^2
EST. GFR  (NON AFRICAN AMERICAN): >60 ML/MIN/1.73 M^2
GLUCOSE SERPL-MCNC: 99 MG/DL (ref 70–110)
HCT VFR BLD AUTO: 29.9 % (ref 40–54)
HGB BLD-MCNC: 9.8 G/DL (ref 14–18)
IMM GRANULOCYTES # BLD AUTO: 0.04 K/UL (ref 0–0.04)
IMM GRANULOCYTES NFR BLD AUTO: 0.5 % (ref 0–0.5)
LYMPHOCYTES # BLD AUTO: 2.6 K/UL (ref 1–4.8)
LYMPHOCYTES NFR BLD: 30.9 % (ref 18–48)
MCH RBC QN AUTO: 25.9 PG (ref 27–31)
MCHC RBC AUTO-ENTMCNC: 32.8 G/DL (ref 32–36)
MCV RBC AUTO: 79 FL (ref 82–98)
MONOCYTES # BLD AUTO: 0.5 K/UL (ref 0.3–1)
MONOCYTES NFR BLD: 6.3 % (ref 4–15)
NEUTROPHILS # BLD AUTO: 4.7 K/UL (ref 1.8–7.7)
NEUTROPHILS NFR BLD: 55.5 % (ref 38–73)
NRBC BLD-RTO: 0 /100 WBC
PLATELET # BLD AUTO: 397 K/UL (ref 150–350)
PMV BLD AUTO: 9.9 FL (ref 9.2–12.9)
POCT GLUCOSE: 115 MG/DL (ref 70–110)
POCT GLUCOSE: 129 MG/DL (ref 70–110)
POCT GLUCOSE: 147 MG/DL (ref 70–110)
POTASSIUM SERPL-SCNC: 3.8 MMOL/L (ref 3.5–5.1)
RBC # BLD AUTO: 3.79 M/UL (ref 4.6–6.2)
SODIUM SERPL-SCNC: 139 MMOL/L (ref 136–145)
WBC # BLD AUTO: 8.38 K/UL (ref 3.9–12.7)

## 2020-12-30 PROCEDURE — 25000003 PHARM REV CODE 250: Performed by: HOSPITALIST

## 2020-12-30 PROCEDURE — A4216 STERILE WATER/SALINE, 10 ML: HCPCS | Performed by: HOSPITALIST

## 2020-12-30 PROCEDURE — 25000003 PHARM REV CODE 250: Performed by: NURSE PRACTITIONER

## 2020-12-30 PROCEDURE — 25000003 PHARM REV CODE 250: Performed by: INTERNAL MEDICINE

## 2020-12-30 PROCEDURE — 85025 COMPLETE CBC W/AUTO DIFF WBC: CPT

## 2020-12-30 PROCEDURE — 63600175 PHARM REV CODE 636 W HCPCS: Performed by: INTERNAL MEDICINE

## 2020-12-30 PROCEDURE — 80048 BASIC METABOLIC PNL TOTAL CA: CPT

## 2020-12-30 RX ADMIN — VENLAFAXINE HYDROCHLORIDE 150 MG: 75 CAPSULE, EXTENDED RELEASE ORAL at 08:12

## 2020-12-30 RX ADMIN — OXYCODONE HYDROCHLORIDE AND ACETAMINOPHEN 1 TABLET: 10; 325 TABLET ORAL at 08:12

## 2020-12-30 RX ADMIN — FERROUS SULFATE TAB EC 325 MG (65 MG FE EQUIVALENT) 325 MG: 325 (65 FE) TABLET DELAYED RESPONSE at 08:12

## 2020-12-30 RX ADMIN — Medication 10 ML: at 06:12

## 2020-12-30 RX ADMIN — CEFTRIAXONE SODIUM 2 G: 2 INJECTION, POWDER, FOR SOLUTION INTRAMUSCULAR; INTRAVENOUS at 02:12

## 2020-12-30 RX ADMIN — MUPIROCIN: 20 OINTMENT TOPICAL at 09:12

## 2020-12-30 RX ADMIN — GABAPENTIN 300 MG: 300 CAPSULE ORAL at 08:12

## 2020-12-30 RX ADMIN — Medication 10 ML: at 12:12

## 2020-12-30 RX ADMIN — LIDOCAINE 1 PATCH: 50 PATCH TOPICAL at 02:12

## 2020-12-30 RX ADMIN — BACLOFEN 10 MG: 10 TABLET ORAL at 08:12

## 2020-12-30 RX ADMIN — ERTAPENEM 1 G: 1 INJECTION INTRAMUSCULAR; INTRAVENOUS at 10:12

## 2020-12-30 RX ADMIN — PANTOPRAZOLE SODIUM 40 MG: 40 TABLET, DELAYED RELEASE ORAL at 08:12

## 2020-12-30 RX ADMIN — APIXABAN 5 MG: 2.5 TABLET, FILM COATED ORAL at 08:12

## 2020-12-30 RX ADMIN — METOPROLOL TARTRATE 25 MG: 25 TABLET, FILM COATED ORAL at 08:12

## 2020-12-31 ENCOUNTER — PATIENT OUTREACH (OUTPATIENT)
Dept: ADMINISTRATIVE | Facility: CLINIC | Age: 45
End: 2020-12-31

## 2020-12-31 PROCEDURE — G0180 MD CERTIFICATION HHA PATIENT: HCPCS | Mod: ,,, | Performed by: INTERNAL MEDICINE

## 2020-12-31 PROCEDURE — G0180 PR HOME HEALTH MD CERTIFICATION: ICD-10-PCS | Mod: ,,, | Performed by: INTERNAL MEDICINE

## 2020-12-31 NOTE — TELEPHONE ENCOUNTER
C3 nurse attempted to contact patient. The following occurred:   C3 nurse attempted to contact Hermann Aguilar for a TCC post hospital discharge follow up call. The patient is unable to conduct the call @ this time. The patient requested a callback.    The patient has a scheduled HOS appointment with Vanessa Rosenberg MD on 1/7/2021 at  2:00 PM

## 2021-01-04 ENCOUNTER — LAB VISIT (OUTPATIENT)
Dept: LAB | Facility: HOSPITAL | Age: 46
End: 2021-01-04
Attending: INTERNAL MEDICINE
Payer: MEDICARE

## 2021-01-04 DIAGNOSIS — A41.9 SEPSIS, UNSPECIFIED ORGANISM: Primary | ICD-10-CM

## 2021-01-04 LAB
ALBUMIN SERPL BCP-MCNC: 3.7 G/DL (ref 3.5–5.2)
ALP SERPL-CCNC: 131 U/L (ref 38–126)
ALT SERPL W/O P-5'-P-CCNC: 13 U/L (ref 10–44)
ANION GAP SERPL CALC-SCNC: 8 MMOL/L (ref 8–16)
AST SERPL-CCNC: 30 U/L (ref 15–46)
BASOPHILS # BLD AUTO: 0.07 K/UL (ref 0–0.2)
BASOPHILS NFR BLD: 0.8 % (ref 0–1.9)
BILIRUB SERPL-MCNC: 0.4 MG/DL (ref 0.1–1)
CALCIUM SERPL-MCNC: 9.2 MG/DL (ref 8.7–10.5)
CHLORIDE SERPL-SCNC: 109 MMOL/L (ref 95–110)
CO2 SERPL-SCNC: 26 MMOL/L (ref 23–29)
CREAT SERPL-MCNC: 0.58 MG/DL (ref 0.5–1.4)
CRP SERPL-MCNC: 2.65 MG/DL (ref 0–1)
DIFFERENTIAL METHOD: ABNORMAL
EOSINOPHIL # BLD AUTO: 0.8 K/UL (ref 0–0.5)
EOSINOPHIL NFR BLD: 8.5 % (ref 0–8)
ERYTHROCYTE [DISTWIDTH] IN BLOOD BY AUTOMATED COUNT: 18.7 % (ref 11.5–14.5)
EST. GFR  (AFRICAN AMERICAN): >60 ML/MIN/1.73 M^2
EST. GFR  (NON AFRICAN AMERICAN): >60 ML/MIN/1.73 M^2
GLUCOSE SERPL-MCNC: 94 MG/DL (ref 70–110)
HCT VFR BLD AUTO: 34 % (ref 40–54)
HGB BLD-MCNC: 10.8 G/DL (ref 14–18)
IMM GRANULOCYTES # BLD AUTO: 0.02 K/UL (ref 0–0.04)
IMM GRANULOCYTES NFR BLD AUTO: 0.2 % (ref 0–0.5)
LYMPHOCYTES # BLD AUTO: 2.6 K/UL (ref 1–4.8)
LYMPHOCYTES NFR BLD: 29.4 % (ref 18–48)
MCH RBC QN AUTO: 25.4 PG (ref 27–31)
MCHC RBC AUTO-ENTMCNC: 31.8 G/DL (ref 32–36)
MCV RBC AUTO: 80 FL (ref 82–98)
MONOCYTES # BLD AUTO: 0.5 K/UL (ref 0.3–1)
MONOCYTES NFR BLD: 6.1 % (ref 4–15)
NEUTROPHILS # BLD AUTO: 4.9 K/UL (ref 1.8–7.7)
NEUTROPHILS NFR BLD: 55 % (ref 38–73)
NRBC BLD-RTO: 0 /100 WBC
PLATELET # BLD AUTO: 515 K/UL (ref 150–350)
PMV BLD AUTO: 11.3 FL (ref 9.2–12.9)
POTASSIUM SERPL-SCNC: 5.3 MMOL/L (ref 3.5–5.1)
PROT SERPL-MCNC: 8 G/DL (ref 6–8.4)
RBC # BLD AUTO: 4.26 M/UL (ref 4.6–6.2)
SODIUM SERPL-SCNC: 143 MMOL/L (ref 136–145)
UUN UR-MCNC: 8 MG/DL (ref 2–20)
WBC # BLD AUTO: 8.9 K/UL (ref 3.9–12.7)

## 2021-01-04 PROCEDURE — 80053 COMPREHEN METABOLIC PANEL: CPT | Mod: PO

## 2021-01-04 PROCEDURE — 86140 C-REACTIVE PROTEIN: CPT | Mod: PO

## 2021-01-04 PROCEDURE — 85025 COMPLETE CBC W/AUTO DIFF WBC: CPT | Mod: PO

## 2021-01-04 PROCEDURE — 36415 COLL VENOUS BLD VENIPUNCTURE: CPT | Mod: PO

## 2021-01-07 ENCOUNTER — OFFICE VISIT (OUTPATIENT)
Dept: INFECTIOUS DISEASES | Facility: CLINIC | Age: 46
End: 2021-01-07
Payer: MEDICARE

## 2021-01-07 VITALS — SYSTOLIC BLOOD PRESSURE: 124 MMHG | TEMPERATURE: 98 F | DIASTOLIC BLOOD PRESSURE: 76 MMHG | HEART RATE: 77 BPM

## 2021-01-07 DIAGNOSIS — Z79.899 HIGH RISK MEDICATIONS (NOT ANTICOAGULANTS) LONG-TERM USE: ICD-10-CM

## 2021-01-07 DIAGNOSIS — A49.9 ESBL (EXTENDED SPECTRUM BETA-LACTAMASE) PRODUCING BACTERIA INFECTION: ICD-10-CM

## 2021-01-07 DIAGNOSIS — Z79.2 LONG TERM CURRENT USE OF ANTIBIOTICS: ICD-10-CM

## 2021-01-07 DIAGNOSIS — R78.81 BACTEREMIA DUE TO GRAM-NEGATIVE BACTERIA: Primary | ICD-10-CM

## 2021-01-07 DIAGNOSIS — Z16.12 ESBL (EXTENDED SPECTRUM BETA-LACTAMASE) PRODUCING BACTERIA INFECTION: ICD-10-CM

## 2021-01-07 PROCEDURE — 3078F DIAST BP <80 MM HG: CPT | Mod: CPTII,S$GLB,, | Performed by: STUDENT IN AN ORGANIZED HEALTH CARE EDUCATION/TRAINING PROGRAM

## 2021-01-07 PROCEDURE — 1126F PR PAIN SEVERITY QUANTIFIED, NO PAIN PRESENT: ICD-10-PCS | Mod: S$GLB,,, | Performed by: STUDENT IN AN ORGANIZED HEALTH CARE EDUCATION/TRAINING PROGRAM

## 2021-01-07 PROCEDURE — 3078F PR MOST RECENT DIASTOLIC BLOOD PRESSURE < 80 MM HG: ICD-10-PCS | Mod: CPTII,S$GLB,, | Performed by: STUDENT IN AN ORGANIZED HEALTH CARE EDUCATION/TRAINING PROGRAM

## 2021-01-07 PROCEDURE — 99215 PR OFFICE/OUTPT VISIT, EST, LEVL V, 40-54 MIN: ICD-10-PCS | Mod: S$GLB,,, | Performed by: STUDENT IN AN ORGANIZED HEALTH CARE EDUCATION/TRAINING PROGRAM

## 2021-01-07 PROCEDURE — 1126F AMNT PAIN NOTED NONE PRSNT: CPT | Mod: S$GLB,,, | Performed by: STUDENT IN AN ORGANIZED HEALTH CARE EDUCATION/TRAINING PROGRAM

## 2021-01-07 PROCEDURE — 99999 PR PBB SHADOW E&M-EST. PATIENT-LVL V: ICD-10-PCS | Mod: PBBFAC,,, | Performed by: STUDENT IN AN ORGANIZED HEALTH CARE EDUCATION/TRAINING PROGRAM

## 2021-01-07 PROCEDURE — 3074F PR MOST RECENT SYSTOLIC BLOOD PRESSURE < 130 MM HG: ICD-10-PCS | Mod: CPTII,S$GLB,, | Performed by: STUDENT IN AN ORGANIZED HEALTH CARE EDUCATION/TRAINING PROGRAM

## 2021-01-07 PROCEDURE — 99999 PR PBB SHADOW E&M-EST. PATIENT-LVL V: CPT | Mod: PBBFAC,,, | Performed by: STUDENT IN AN ORGANIZED HEALTH CARE EDUCATION/TRAINING PROGRAM

## 2021-01-07 PROCEDURE — 99215 OFFICE O/P EST HI 40 MIN: CPT | Mod: S$GLB,,, | Performed by: STUDENT IN AN ORGANIZED HEALTH CARE EDUCATION/TRAINING PROGRAM

## 2021-01-07 PROCEDURE — 3074F SYST BP LT 130 MM HG: CPT | Mod: CPTII,S$GLB,, | Performed by: STUDENT IN AN ORGANIZED HEALTH CARE EDUCATION/TRAINING PROGRAM

## 2021-01-14 ENCOUNTER — EXTERNAL HOME HEALTH (OUTPATIENT)
Dept: HOME HEALTH SERVICES | Facility: HOSPITAL | Age: 46
End: 2021-01-14
Payer: MEDICARE

## 2021-01-20 ENCOUNTER — HOSPITAL ENCOUNTER (OUTPATIENT)
Dept: WOUND CARE | Facility: HOSPITAL | Age: 46
Discharge: HOME | End: 2021-01-20
Attending: SURGERY
Payer: MEDICARE

## 2021-01-20 VITALS
TEMPERATURE: 98 F | WEIGHT: 246 LBS | SYSTOLIC BLOOD PRESSURE: 120 MMHG | DIASTOLIC BLOOD PRESSURE: 86 MMHG | BODY MASS INDEX: 34.44 KG/M2 | HEIGHT: 71 IN | HEART RATE: 102 BPM

## 2021-01-20 DIAGNOSIS — Z93.3 S/P COLOSTOMY: ICD-10-CM

## 2021-01-20 DIAGNOSIS — L89.314 PRESSURE INJURY OF RIGHT ISCHIUM, STAGE 4: Primary | ICD-10-CM

## 2021-01-20 DIAGNOSIS — M86.9 OSTEOMYELITIS, UNSPECIFIED SITE, UNSPECIFIED TYPE: ICD-10-CM

## 2021-01-20 DIAGNOSIS — L97.921 CHRONIC ULCER OF LEFT LEG, LIMITED TO BREAKDOWN OF SKIN: ICD-10-CM

## 2021-01-20 DIAGNOSIS — I10 ESSENTIAL HYPERTENSION: Chronic | ICD-10-CM

## 2021-01-20 DIAGNOSIS — Z89.511 HX OF RIGHT BKA: Chronic | ICD-10-CM

## 2021-01-20 DIAGNOSIS — E11.9 TYPE 2 DIABETES MELLITUS WITHOUT COMPLICATION, WITHOUT LONG-TERM CURRENT USE OF INSULIN: ICD-10-CM

## 2021-01-20 DIAGNOSIS — G82.20 PARAPLEGIA: ICD-10-CM

## 2021-01-20 PROCEDURE — 99213 OFFICE O/P EST LOW 20 MIN: CPT

## 2021-01-28 ENCOUNTER — OFFICE VISIT (OUTPATIENT)
Dept: UROLOGY | Facility: CLINIC | Age: 46
End: 2021-01-28
Payer: MEDICARE

## 2021-01-28 VITALS — SYSTOLIC BLOOD PRESSURE: 128 MMHG | HEART RATE: 74 BPM | DIASTOLIC BLOOD PRESSURE: 83 MMHG

## 2021-01-28 DIAGNOSIS — Z93.59 CHRONIC SUPRAPUBIC CATHETER: ICD-10-CM

## 2021-01-28 DIAGNOSIS — N31.9 NEUROGENIC BLADDER: Primary | ICD-10-CM

## 2021-01-28 PROCEDURE — 51702 PR INSERTION OF TEMPORARY INDWELLING BLADDER CATHETER, SIMPLE: ICD-10-PCS | Mod: S$GLB,,, | Performed by: NURSE PRACTITIONER

## 2021-01-28 PROCEDURE — 99499 NO LOS: ICD-10-PCS | Mod: S$GLB,,, | Performed by: NURSE PRACTITIONER

## 2021-01-28 PROCEDURE — 51702 INSERT TEMP BLADDER CATH: CPT | Mod: S$GLB,,, | Performed by: NURSE PRACTITIONER

## 2021-01-28 PROCEDURE — 99999 PR PBB SHADOW E&M-EST. PATIENT-LVL II: ICD-10-PCS | Mod: PBBFAC,,, | Performed by: NURSE PRACTITIONER

## 2021-01-28 PROCEDURE — 99499 UNLISTED E&M SERVICE: CPT | Mod: S$GLB,,, | Performed by: NURSE PRACTITIONER

## 2021-01-28 PROCEDURE — 99999 PR PBB SHADOW E&M-EST. PATIENT-LVL II: CPT | Mod: PBBFAC,,, | Performed by: NURSE PRACTITIONER

## 2021-01-28 RX ORDER — FAMOTIDINE 20 MG/1
TABLET, FILM COATED ORAL
Status: ON HOLD | COMMUNITY
End: 2021-04-23 | Stop reason: HOSPADM

## 2021-02-03 ENCOUNTER — HOSPITAL ENCOUNTER (OUTPATIENT)
Dept: WOUND CARE | Facility: HOSPITAL | Age: 46
Discharge: HOME | End: 2021-02-03
Attending: SURGERY
Payer: MEDICARE

## 2021-02-03 VITALS
TEMPERATURE: 98 F | WEIGHT: 246 LBS | SYSTOLIC BLOOD PRESSURE: 153 MMHG | HEIGHT: 71 IN | BODY MASS INDEX: 34.44 KG/M2 | HEART RATE: 75 BPM | DIASTOLIC BLOOD PRESSURE: 84 MMHG

## 2021-02-03 DIAGNOSIS — Z89.511 HX OF RIGHT BKA: Chronic | ICD-10-CM

## 2021-02-03 DIAGNOSIS — G82.20 PARAPLEGIA: ICD-10-CM

## 2021-02-03 DIAGNOSIS — I10 ESSENTIAL HYPERTENSION: Chronic | ICD-10-CM

## 2021-02-03 DIAGNOSIS — E11.9 TYPE 2 DIABETES MELLITUS WITHOUT COMPLICATION, WITHOUT LONG-TERM CURRENT USE OF INSULIN: ICD-10-CM

## 2021-02-03 DIAGNOSIS — I73.9 PAD (PERIPHERAL ARTERY DISEASE): Chronic | ICD-10-CM

## 2021-02-03 DIAGNOSIS — Z43.3 COLOSTOMY, EVALUATE: ICD-10-CM

## 2021-02-03 DIAGNOSIS — D50.9 IRON DEFICIENCY ANEMIA, UNSPECIFIED IRON DEFICIENCY ANEMIA TYPE: Chronic | ICD-10-CM

## 2021-02-03 DIAGNOSIS — L89.314 PRESSURE INJURY OF RIGHT ISCHIUM, STAGE 4: Primary | ICD-10-CM

## 2021-02-03 PROCEDURE — 99214 OFFICE O/P EST MOD 30 MIN: CPT

## 2021-02-15 ENCOUNTER — CARE AT HOME (OUTPATIENT)
Dept: HOME HEALTH SERVICES | Facility: CLINIC | Age: 46
End: 2021-02-15
Payer: MEDICARE

## 2021-02-15 DIAGNOSIS — Z93.59 CHRONIC SUPRAPUBIC CATHETER: Chronic | ICD-10-CM

## 2021-02-15 DIAGNOSIS — G82.20 PARAPLEGIA: ICD-10-CM

## 2021-02-15 DIAGNOSIS — L89.153 DECUBITUS ULCER OF SACRAL REGION, STAGE 3: Primary | ICD-10-CM

## 2021-02-15 PROCEDURE — 99443 PR PHYSICIAN TELEPHONE EVALUATION 21-30 MIN: CPT | Mod: 95,,, | Performed by: NURSE PRACTITIONER

## 2021-02-15 PROCEDURE — 99443 PR PHYSICIAN TELEPHONE EVALUATION 21-30 MIN: ICD-10-PCS | Mod: 95,,, | Performed by: NURSE PRACTITIONER

## 2021-02-24 ENCOUNTER — HOSPITAL ENCOUNTER (OUTPATIENT)
Dept: WOUND CARE | Facility: HOSPITAL | Age: 46
Discharge: HOME | End: 2021-02-24
Attending: SURGERY
Payer: MEDICARE

## 2021-02-24 VITALS
SYSTOLIC BLOOD PRESSURE: 120 MMHG | HEART RATE: 81 BPM | BODY MASS INDEX: 34.44 KG/M2 | WEIGHT: 246 LBS | DIASTOLIC BLOOD PRESSURE: 66 MMHG | HEIGHT: 71 IN | TEMPERATURE: 98 F

## 2021-02-24 DIAGNOSIS — G82.20 PARAPLEGIA: ICD-10-CM

## 2021-02-24 DIAGNOSIS — L89.324 STAGE IV PRESSURE ULCER OF LEFT BUTTOCK: Primary | ICD-10-CM

## 2021-02-24 PROCEDURE — 87075 CULTR BACTERIA EXCEPT BLOOD: CPT

## 2021-02-24 PROCEDURE — 11042 DBRDMT SUBQ TIS 1ST 20SQCM/<: CPT

## 2021-02-24 PROCEDURE — 87076 CULTURE ANAEROBE IDENT EACH: CPT | Mod: 59

## 2021-02-24 PROCEDURE — 87077 CULTURE AEROBIC IDENTIFY: CPT | Mod: 59

## 2021-02-24 PROCEDURE — 87186 SC STD MICRODIL/AGAR DIL: CPT

## 2021-02-24 PROCEDURE — 87070 CULTURE OTHR SPECIMN AEROBIC: CPT

## 2021-02-24 PROCEDURE — 87184 SC STD DISK METHOD PER PLATE: CPT

## 2021-02-24 PROCEDURE — 27201912 HC WOUND CARE DEBRIDEMENT SUPPLIES

## 2021-02-24 RX ORDER — COLLAGENASE SANTYL 250 [ARB'U]/G
OINTMENT TOPICAL
Qty: 60 G | Refills: 2 | OUTPATIENT
Start: 2021-02-24

## 2021-02-25 ENCOUNTER — OFFICE VISIT (OUTPATIENT)
Dept: UROLOGY | Facility: CLINIC | Age: 46
End: 2021-02-25
Payer: MEDICARE

## 2021-02-25 VITALS
HEIGHT: 71 IN | SYSTOLIC BLOOD PRESSURE: 119 MMHG | BODY MASS INDEX: 34.44 KG/M2 | WEIGHT: 246 LBS | DIASTOLIC BLOOD PRESSURE: 82 MMHG | HEART RATE: 82 BPM

## 2021-02-25 DIAGNOSIS — N31.9 NEUROGENIC BLADDER: Primary | ICD-10-CM

## 2021-02-25 DIAGNOSIS — Z93.59 CHRONIC SUPRAPUBIC CATHETER: ICD-10-CM

## 2021-02-25 PROCEDURE — 51705 CHANGE OF BLADDER TUBE: CPT | Mod: S$GLB,,, | Performed by: NURSE PRACTITIONER

## 2021-02-25 PROCEDURE — 99499 NO LOS: ICD-10-PCS | Mod: S$GLB,,, | Performed by: NURSE PRACTITIONER

## 2021-02-25 PROCEDURE — 3008F PR BODY MASS INDEX (BMI) DOCUMENTED: ICD-10-PCS | Mod: CPTII,S$GLB,, | Performed by: NURSE PRACTITIONER

## 2021-02-25 PROCEDURE — 51705 PR CHANGE OF BLADDER TUBE,SIMPLE: ICD-10-PCS | Mod: S$GLB,,, | Performed by: NURSE PRACTITIONER

## 2021-02-25 PROCEDURE — 99999 PR PBB SHADOW E&M-EST. PATIENT-LVL V: ICD-10-PCS | Mod: PBBFAC,,, | Performed by: NURSE PRACTITIONER

## 2021-02-25 PROCEDURE — 1126F PR PAIN SEVERITY QUANTIFIED, NO PAIN PRESENT: ICD-10-PCS | Mod: S$GLB,,, | Performed by: NURSE PRACTITIONER

## 2021-02-25 PROCEDURE — 3008F BODY MASS INDEX DOCD: CPT | Mod: CPTII,S$GLB,, | Performed by: NURSE PRACTITIONER

## 2021-02-25 PROCEDURE — 99499 UNLISTED E&M SERVICE: CPT | Mod: S$GLB,,, | Performed by: NURSE PRACTITIONER

## 2021-02-25 PROCEDURE — 1126F AMNT PAIN NOTED NONE PRSNT: CPT | Mod: S$GLB,,, | Performed by: NURSE PRACTITIONER

## 2021-02-25 PROCEDURE — 99999 PR PBB SHADOW E&M-EST. PATIENT-LVL V: CPT | Mod: PBBFAC,,, | Performed by: NURSE PRACTITIONER

## 2021-02-28 LAB
BACTERIA SPEC AEROBE CULT: ABNORMAL
BACTERIA SPEC AEROBE CULT: ABNORMAL

## 2021-03-01 LAB
BACTERIA SPEC ANAEROBE CULT: ABNORMAL
BACTERIA SPEC ANAEROBE CULT: ABNORMAL

## 2021-03-05 ENCOUNTER — TELEPHONE (OUTPATIENT)
Dept: PRIMARY CARE CLINIC | Facility: CLINIC | Age: 46
End: 2021-03-05

## 2021-03-05 ENCOUNTER — TELEPHONE (OUTPATIENT)
Dept: INTERNAL MEDICINE | Facility: CLINIC | Age: 46
End: 2021-03-05

## 2021-03-05 ENCOUNTER — OFFICE VISIT (OUTPATIENT)
Dept: PRIMARY CARE CLINIC | Facility: CLINIC | Age: 46
End: 2021-03-05
Payer: MEDICARE

## 2021-03-05 VITALS
HEIGHT: 71 IN | HEART RATE: 85 BPM | SYSTOLIC BLOOD PRESSURE: 146 MMHG | DIASTOLIC BLOOD PRESSURE: 96 MMHG | BODY MASS INDEX: 28 KG/M2 | OXYGEN SATURATION: 98 % | WEIGHT: 200 LBS

## 2021-03-05 DIAGNOSIS — E11.9 TYPE 2 DIABETES MELLITUS WITHOUT COMPLICATION, WITHOUT LONG-TERM CURRENT USE OF INSULIN: ICD-10-CM

## 2021-03-05 DIAGNOSIS — E11.65 TYPE 2 DIABETES MELLITUS WITH HYPERGLYCEMIA, WITHOUT LONG-TERM CURRENT USE OF INSULIN: ICD-10-CM

## 2021-03-05 DIAGNOSIS — L89.153 DECUBITUS ULCER OF SACRAL REGION, STAGE 3: ICD-10-CM

## 2021-03-05 DIAGNOSIS — G82.20 PARAPLEGIA: Primary | ICD-10-CM

## 2021-03-05 DIAGNOSIS — I10 ESSENTIAL HYPERTENSION: Chronic | ICD-10-CM

## 2021-03-05 DIAGNOSIS — Z00.00 HEALTHCARE MAINTENANCE: ICD-10-CM

## 2021-03-05 DIAGNOSIS — R53.81 DEBILITY: ICD-10-CM

## 2021-03-05 DIAGNOSIS — Z89.511 HX OF RIGHT BKA: ICD-10-CM

## 2021-03-05 PROBLEM — I26.99 PULMONARY EMBOLISM: Status: RESOLVED | Noted: 2020-09-15 | Resolved: 2021-03-05

## 2021-03-05 PROCEDURE — 3046F HEMOGLOBIN A1C LEVEL >9.0%: CPT | Mod: CPTII,S$GLB,, | Performed by: INTERNAL MEDICINE

## 2021-03-05 PROCEDURE — 3008F BODY MASS INDEX DOCD: CPT | Mod: CPTII,S$GLB,, | Performed by: INTERNAL MEDICINE

## 2021-03-05 PROCEDURE — 1126F AMNT PAIN NOTED NONE PRSNT: CPT | Mod: S$GLB,,, | Performed by: INTERNAL MEDICINE

## 2021-03-05 PROCEDURE — 99215 PR OFFICE/OUTPT VISIT, EST, LEVL V, 40-54 MIN: ICD-10-PCS | Mod: S$GLB,,, | Performed by: INTERNAL MEDICINE

## 2021-03-05 PROCEDURE — 3046F PR MOST RECENT HEMOGLOBIN A1C LEVEL > 9.0%: ICD-10-PCS | Mod: CPTII,S$GLB,, | Performed by: INTERNAL MEDICINE

## 2021-03-05 PROCEDURE — 3080F PR MOST RECENT DIASTOLIC BLOOD PRESSURE >= 90 MM HG: ICD-10-PCS | Mod: CPTII,S$GLB,, | Performed by: INTERNAL MEDICINE

## 2021-03-05 PROCEDURE — 3077F PR MOST RECENT SYSTOLIC BLOOD PRESSURE >= 140 MM HG: ICD-10-PCS | Mod: CPTII,S$GLB,, | Performed by: INTERNAL MEDICINE

## 2021-03-05 PROCEDURE — 3008F PR BODY MASS INDEX (BMI) DOCUMENTED: ICD-10-PCS | Mod: CPTII,S$GLB,, | Performed by: INTERNAL MEDICINE

## 2021-03-05 PROCEDURE — 99215 OFFICE O/P EST HI 40 MIN: CPT | Mod: S$GLB,,, | Performed by: INTERNAL MEDICINE

## 2021-03-05 PROCEDURE — 3077F SYST BP >= 140 MM HG: CPT | Mod: CPTII,S$GLB,, | Performed by: INTERNAL MEDICINE

## 2021-03-05 PROCEDURE — 1126F PR PAIN SEVERITY QUANTIFIED, NO PAIN PRESENT: ICD-10-PCS | Mod: S$GLB,,, | Performed by: INTERNAL MEDICINE

## 2021-03-05 PROCEDURE — 3080F DIAST BP >= 90 MM HG: CPT | Mod: CPTII,S$GLB,, | Performed by: INTERNAL MEDICINE

## 2021-03-05 RX ORDER — HUMAN INSULIN 100 [IU]/ML
5 INJECTION, SUSPENSION SUBCUTANEOUS
Qty: 3 ML | Refills: 1 | Status: ON HOLD | OUTPATIENT
Start: 2021-03-05 | End: 2021-03-18 | Stop reason: HOSPADM

## 2021-03-08 ENCOUNTER — TELEPHONE (OUTPATIENT)
Dept: ADMINISTRATIVE | Facility: OTHER | Age: 46
End: 2021-03-08

## 2021-03-09 ENCOUNTER — EXTERNAL HOME HEALTH (OUTPATIENT)
Dept: HOME HEALTH SERVICES | Facility: HOSPITAL | Age: 46
End: 2021-03-09
Payer: MEDICARE

## 2021-03-09 ENCOUNTER — DOCUMENT SCAN (OUTPATIENT)
Dept: HOME HEALTH SERVICES | Facility: HOSPITAL | Age: 46
End: 2021-03-09
Payer: MEDICARE

## 2021-03-10 ENCOUNTER — HOSPITAL ENCOUNTER (OUTPATIENT)
Dept: WOUND CARE | Facility: HOSPITAL | Age: 46
Discharge: HOME | End: 2021-03-10
Attending: SURGERY
Payer: MEDICARE

## 2021-03-10 ENCOUNTER — HOSPITAL ENCOUNTER (INPATIENT)
Facility: HOSPITAL | Age: 46
LOS: 17 days | Discharge: NURSING, REHAB OR LONG-TERM CARE | DRG: 853 | End: 2021-03-27
Attending: EMERGENCY MEDICINE | Admitting: INTERNAL MEDICINE
Payer: MEDICARE

## 2021-03-10 VITALS
BODY MASS INDEX: 28 KG/M2 | DIASTOLIC BLOOD PRESSURE: 73 MMHG | WEIGHT: 200 LBS | HEART RATE: 95 BPM | TEMPERATURE: 99 F | SYSTOLIC BLOOD PRESSURE: 116 MMHG | HEIGHT: 71 IN

## 2021-03-10 DIAGNOSIS — I10 ESSENTIAL HYPERTENSION: Chronic | ICD-10-CM

## 2021-03-10 DIAGNOSIS — L89.314 RIGHT ISCHIAL PRESSURE SORE, STAGE 4: Primary | ICD-10-CM

## 2021-03-10 DIAGNOSIS — Z16.24 MULTIPLE DRUG RESISTANT ACINETOBACTER INFECTION: ICD-10-CM

## 2021-03-10 DIAGNOSIS — E11.65 TYPE 2 DIABETES MELLITUS WITH HYPERGLYCEMIA, WITH LONG-TERM CURRENT USE OF INSULIN: ICD-10-CM

## 2021-03-10 DIAGNOSIS — A49.8 MULTIPLE DRUG RESISTANT ACINETOBACTER INFECTION: ICD-10-CM

## 2021-03-10 DIAGNOSIS — L89.314 PRESSURE INJURY OF RIGHT ISCHIUM, STAGE 4: ICD-10-CM

## 2021-03-10 DIAGNOSIS — Z93.3 S/P COLOSTOMY: ICD-10-CM

## 2021-03-10 DIAGNOSIS — T14.8XXA INFECTED WOUND: ICD-10-CM

## 2021-03-10 DIAGNOSIS — L08.9 INFECTED WOUND: ICD-10-CM

## 2021-03-10 DIAGNOSIS — Z89.511 HX OF RIGHT BKA: ICD-10-CM

## 2021-03-10 DIAGNOSIS — T14.8XXA WOUND INFECTION: ICD-10-CM

## 2021-03-10 DIAGNOSIS — L08.9 WOUND INFECTION: ICD-10-CM

## 2021-03-10 DIAGNOSIS — D50.9 IRON DEFICIENCY ANEMIA, UNSPECIFIED IRON DEFICIENCY ANEMIA TYPE: Chronic | ICD-10-CM

## 2021-03-10 DIAGNOSIS — Z79.4 TYPE 2 DIABETES MELLITUS WITH HYPERGLYCEMIA, WITH LONG-TERM CURRENT USE OF INSULIN: ICD-10-CM

## 2021-03-10 DIAGNOSIS — M86.9 OSTEOMYELITIS, UNSPECIFIED SITE, UNSPECIFIED TYPE: ICD-10-CM

## 2021-03-10 DIAGNOSIS — M86.60 CHRONIC OSTEOMYELITIS: ICD-10-CM

## 2021-03-10 DIAGNOSIS — Z00.00 HEALTHCARE MAINTENANCE: ICD-10-CM

## 2021-03-10 DIAGNOSIS — G82.20 PARAPLEGIA: ICD-10-CM

## 2021-03-10 DIAGNOSIS — L89.324 STAGE IV PRESSURE ULCER OF LEFT BUTTOCK: Primary | ICD-10-CM

## 2021-03-10 DIAGNOSIS — S81.809A NON-HEALING WOUND OF LOWER EXTREMITY, INITIAL ENCOUNTER: ICD-10-CM

## 2021-03-10 DIAGNOSIS — R78.81 GRAM-NEGATIVE BACTEREMIA: ICD-10-CM

## 2021-03-10 DIAGNOSIS — E11.65 TYPE 2 DIABETES MELLITUS WITH HYPERGLYCEMIA, WITHOUT LONG-TERM CURRENT USE OF INSULIN: ICD-10-CM

## 2021-03-10 DIAGNOSIS — Z93.59 CHRONIC SUPRAPUBIC CATHETER: Chronic | ICD-10-CM

## 2021-03-10 DIAGNOSIS — M86.18 OTHER ACUTE OSTEOMYELITIS, OTHER SITE: ICD-10-CM

## 2021-03-10 LAB
ALBUMIN SERPL BCP-MCNC: 2.9 G/DL (ref 3.5–5.2)
ALP SERPL-CCNC: 131 U/L (ref 55–135)
ALT SERPL W/O P-5'-P-CCNC: 8 U/L (ref 10–44)
AMORPH CRY URNS QL MICRO: ABNORMAL
ANION GAP SERPL CALC-SCNC: 10 MMOL/L (ref 8–16)
AST SERPL-CCNC: 10 U/L (ref 10–40)
BACTERIA #/AREA URNS HPF: ABNORMAL /HPF
BASOPHILS # BLD AUTO: 0.04 K/UL (ref 0–0.2)
BASOPHILS NFR BLD: 0.5 % (ref 0–1.9)
BILIRUB SERPL-MCNC: 0.2 MG/DL (ref 0.1–1)
BILIRUB UR QL STRIP: NEGATIVE
BUN SERPL-MCNC: 7 MG/DL (ref 6–20)
CALCIUM SERPL-MCNC: 9.4 MG/DL (ref 8.7–10.5)
CHLORIDE SERPL-SCNC: 100 MMOL/L (ref 95–110)
CLARITY UR: ABNORMAL
CO2 SERPL-SCNC: 22 MMOL/L (ref 23–29)
COLOR UR: ABNORMAL
CREAT SERPL-MCNC: 1 MG/DL (ref 0.5–1.4)
CTP QC/QA: YES
DIFFERENTIAL METHOD: ABNORMAL
EOSINOPHIL # BLD AUTO: 0.4 K/UL (ref 0–0.5)
EOSINOPHIL NFR BLD: 5.2 % (ref 0–8)
ERYTHROCYTE [DISTWIDTH] IN BLOOD BY AUTOMATED COUNT: 16.7 % (ref 11.5–14.5)
EST. GFR  (AFRICAN AMERICAN): >60 ML/MIN/1.73 M^2
EST. GFR  (NON AFRICAN AMERICAN): >60 ML/MIN/1.73 M^2
GLUCOSE SERPL-MCNC: 311 MG/DL (ref 70–110)
GLUCOSE UR QL STRIP: ABNORMAL
HCT VFR BLD AUTO: 33 % (ref 40–54)
HGB BLD-MCNC: 10.7 G/DL (ref 14–18)
HGB UR QL STRIP: ABNORMAL
IMM GRANULOCYTES # BLD AUTO: 0.02 K/UL (ref 0–0.04)
IMM GRANULOCYTES NFR BLD AUTO: 0.3 % (ref 0–0.5)
KETONES UR QL STRIP: ABNORMAL
LACTATE SERPL-SCNC: 0.8 MMOL/L (ref 0.5–2.2)
LEUKOCYTE ESTERASE UR QL STRIP: ABNORMAL
LYMPHOCYTES # BLD AUTO: 1.9 K/UL (ref 1–4.8)
LYMPHOCYTES NFR BLD: 25.3 % (ref 18–48)
MCH RBC QN AUTO: 24.9 PG (ref 27–31)
MCHC RBC AUTO-ENTMCNC: 32.4 G/DL (ref 32–36)
MCV RBC AUTO: 77 FL (ref 82–98)
MICROSCOPIC COMMENT: ABNORMAL
MONOCYTES # BLD AUTO: 0.6 K/UL (ref 0.3–1)
MONOCYTES NFR BLD: 7.8 % (ref 4–15)
NEUTROPHILS # BLD AUTO: 4.6 K/UL (ref 1.8–7.7)
NEUTROPHILS NFR BLD: 60.9 % (ref 38–73)
NITRITE UR QL STRIP: NEGATIVE
NRBC BLD-RTO: 0 /100 WBC
PH UR STRIP: 8 [PH] (ref 5–8)
PLATELET # BLD AUTO: 454 K/UL (ref 150–350)
PMV BLD AUTO: 11 FL (ref 9.2–12.9)
POCT GLUCOSE: 290 MG/DL (ref 70–110)
POCT GLUCOSE: 399 MG/DL (ref 70–110)
POTASSIUM SERPL-SCNC: 4.5 MMOL/L (ref 3.5–5.1)
PROCALCITONIN SERPL IA-MCNC: 0.07 NG/ML
PROT SERPL-MCNC: 8.8 G/DL (ref 6–8.4)
PROT UR QL STRIP: ABNORMAL
RBC # BLD AUTO: 4.3 M/UL (ref 4.6–6.2)
RBC #/AREA URNS HPF: 5 /HPF (ref 0–4)
SARS-COV-2 RDRP RESP QL NAA+PROBE: NEGATIVE
SODIUM SERPL-SCNC: 132 MMOL/L (ref 136–145)
SP GR UR STRIP: 1.01 (ref 1–1.03)
SQUAMOUS #/AREA URNS HPF: 5 /HPF
URN SPEC COLLECT METH UR: ABNORMAL
UROBILINOGEN UR STRIP-ACNC: NEGATIVE EU/DL
WBC # BLD AUTO: 7.52 K/UL (ref 3.9–12.7)
WBC #/AREA URNS HPF: 40 /HPF (ref 0–5)
YEAST URNS QL MICRO: ABNORMAL

## 2021-03-10 PROCEDURE — 84145 PROCALCITONIN (PCT): CPT | Performed by: NURSE PRACTITIONER

## 2021-03-10 PROCEDURE — 87077 CULTURE AEROBIC IDENTIFY: CPT | Mod: 59 | Performed by: EMERGENCY MEDICINE

## 2021-03-10 PROCEDURE — 25000003 PHARM REV CODE 250: Performed by: NURSE PRACTITIONER

## 2021-03-10 PROCEDURE — 87186 SC STD MICRODIL/AGAR DIL: CPT | Mod: 59 | Performed by: EMERGENCY MEDICINE

## 2021-03-10 PROCEDURE — 87070 CULTURE OTHR SPECIMN AEROBIC: CPT | Performed by: EMERGENCY MEDICINE

## 2021-03-10 PROCEDURE — 36556 INSERT NON-TUNNEL CV CATH: CPT

## 2021-03-10 PROCEDURE — 11000001 HC ACUTE MED/SURG PRIVATE ROOM

## 2021-03-10 PROCEDURE — 83605 ASSAY OF LACTIC ACID: CPT | Performed by: EMERGENCY MEDICINE

## 2021-03-10 PROCEDURE — 25000003 PHARM REV CODE 250: Performed by: INTERNAL MEDICINE

## 2021-03-10 PROCEDURE — 81000 URINALYSIS NONAUTO W/SCOPE: CPT | Performed by: EMERGENCY MEDICINE

## 2021-03-10 PROCEDURE — 63600175 PHARM REV CODE 636 W HCPCS: Performed by: NURSE PRACTITIONER

## 2021-03-10 PROCEDURE — 87184 SC STD DISK METHOD PER PLATE: CPT | Performed by: EMERGENCY MEDICINE

## 2021-03-10 PROCEDURE — 99285 EMERGENCY DEPT VISIT HI MDM: CPT | Mod: 25,27

## 2021-03-10 PROCEDURE — 99215 OFFICE O/P EST HI 40 MIN: CPT

## 2021-03-10 PROCEDURE — 85025 COMPLETE CBC W/AUTO DIFF WBC: CPT | Performed by: EMERGENCY MEDICINE

## 2021-03-10 PROCEDURE — 25500020 PHARM REV CODE 255: Performed by: INTERNAL MEDICINE

## 2021-03-10 PROCEDURE — 63600175 PHARM REV CODE 636 W HCPCS: Performed by: INTERNAL MEDICINE

## 2021-03-10 PROCEDURE — C9399 UNCLASSIFIED DRUGS OR BIOLOG: HCPCS | Performed by: NURSE PRACTITIONER

## 2021-03-10 PROCEDURE — 82962 GLUCOSE BLOOD TEST: CPT

## 2021-03-10 PROCEDURE — U0002 COVID-19 LAB TEST NON-CDC: HCPCS | Performed by: EMERGENCY MEDICINE

## 2021-03-10 PROCEDURE — 36569 INSJ PICC 5 YR+ W/O IMAGING: CPT

## 2021-03-10 PROCEDURE — 87040 BLOOD CULTURE FOR BACTERIA: CPT | Performed by: EMERGENCY MEDICINE

## 2021-03-10 PROCEDURE — A9585 GADOBUTROL INJECTION: HCPCS | Performed by: INTERNAL MEDICINE

## 2021-03-10 PROCEDURE — A4216 STERILE WATER/SALINE, 10 ML: HCPCS | Performed by: EMERGENCY MEDICINE

## 2021-03-10 PROCEDURE — 25000003 PHARM REV CODE 250: Performed by: EMERGENCY MEDICINE

## 2021-03-10 PROCEDURE — 80053 COMPREHEN METABOLIC PANEL: CPT | Performed by: EMERGENCY MEDICINE

## 2021-03-10 RX ORDER — MEROPENEM AND SODIUM CHLORIDE 500 MG/50ML
500 INJECTION, SOLUTION INTRAVENOUS
Status: DISCONTINUED | OUTPATIENT
Start: 2021-03-10 | End: 2021-03-10 | Stop reason: DRUGHIGH

## 2021-03-10 RX ORDER — ACETAMINOPHEN 325 MG/1
650 TABLET ORAL EVERY 4 HOURS PRN
Status: DISCONTINUED | OUTPATIENT
Start: 2021-03-10 | End: 2021-03-27 | Stop reason: HOSPADM

## 2021-03-10 RX ORDER — SODIUM CHLORIDE 0.9 % (FLUSH) 0.9 %
10 SYRINGE (ML) INJECTION
Status: DISCONTINUED | OUTPATIENT
Start: 2021-03-10 | End: 2021-03-27 | Stop reason: HOSPADM

## 2021-03-10 RX ORDER — GLUCAGON 1 MG
1 KIT INJECTION
Status: DISCONTINUED | OUTPATIENT
Start: 2021-03-10 | End: 2021-03-27 | Stop reason: HOSPADM

## 2021-03-10 RX ORDER — OXYCODONE AND ACETAMINOPHEN 7.5; 325 MG/1; MG/1
1 TABLET ORAL EVERY 6 HOURS PRN
Status: DISCONTINUED | OUTPATIENT
Start: 2021-03-10 | End: 2021-03-13

## 2021-03-10 RX ORDER — MUPIROCIN 20 MG/G
OINTMENT TOPICAL 2 TIMES DAILY
Status: DISPENSED | OUTPATIENT
Start: 2021-03-10 | End: 2021-03-15

## 2021-03-10 RX ORDER — IBUPROFEN 200 MG
24 TABLET ORAL
Status: DISCONTINUED | OUTPATIENT
Start: 2021-03-10 | End: 2021-03-27 | Stop reason: HOSPADM

## 2021-03-10 RX ORDER — SODIUM CHLORIDE 0.9 % (FLUSH) 0.9 %
10 SYRINGE (ML) INJECTION EVERY 6 HOURS
Status: DISCONTINUED | OUTPATIENT
Start: 2021-03-10 | End: 2021-03-27 | Stop reason: HOSPADM

## 2021-03-10 RX ORDER — IBUPROFEN 200 MG
16 TABLET ORAL
Status: DISCONTINUED | OUTPATIENT
Start: 2021-03-10 | End: 2021-03-27 | Stop reason: HOSPADM

## 2021-03-10 RX ORDER — GABAPENTIN 300 MG/1
300 CAPSULE ORAL 2 TIMES DAILY
Status: DISCONTINUED | OUTPATIENT
Start: 2021-03-10 | End: 2021-03-27 | Stop reason: HOSPADM

## 2021-03-10 RX ORDER — GADOBUTROL 604.72 MG/ML
8 INJECTION INTRAVENOUS
Status: COMPLETED | OUTPATIENT
Start: 2021-03-10 | End: 2021-03-10

## 2021-03-10 RX ORDER — TALC
6 POWDER (GRAM) TOPICAL NIGHTLY PRN
Status: DISCONTINUED | OUTPATIENT
Start: 2021-03-10 | End: 2021-03-27 | Stop reason: HOSPADM

## 2021-03-10 RX ORDER — ONDANSETRON 2 MG/ML
4 INJECTION INTRAMUSCULAR; INTRAVENOUS EVERY 8 HOURS PRN
Status: DISCONTINUED | OUTPATIENT
Start: 2021-03-10 | End: 2021-03-27 | Stop reason: HOSPADM

## 2021-03-10 RX ORDER — BACLOFEN 5 MG/1
10 TABLET ORAL 2 TIMES DAILY
Status: DISCONTINUED | OUTPATIENT
Start: 2021-03-10 | End: 2021-03-27 | Stop reason: HOSPADM

## 2021-03-10 RX ORDER — PANTOPRAZOLE SODIUM 40 MG/1
40 TABLET, DELAYED RELEASE ORAL DAILY
Status: DISCONTINUED | OUTPATIENT
Start: 2021-03-10 | End: 2021-03-27 | Stop reason: HOSPADM

## 2021-03-10 RX ORDER — METOPROLOL TARTRATE 25 MG/1
25 TABLET, FILM COATED ORAL DAILY
Status: DISCONTINUED | OUTPATIENT
Start: 2021-03-10 | End: 2021-03-27 | Stop reason: HOSPADM

## 2021-03-10 RX ORDER — ONDANSETRON 8 MG/1
8 TABLET, ORALLY DISINTEGRATING ORAL EVERY 8 HOURS PRN
Status: DISCONTINUED | OUTPATIENT
Start: 2021-03-10 | End: 2021-03-27 | Stop reason: HOSPADM

## 2021-03-10 RX ORDER — AMOXICILLIN 250 MG
1 CAPSULE ORAL 2 TIMES DAILY
Status: DISCONTINUED | OUTPATIENT
Start: 2021-03-10 | End: 2021-03-27 | Stop reason: HOSPADM

## 2021-03-10 RX ORDER — INSULIN ASPART 100 [IU]/ML
0-5 INJECTION, SOLUTION INTRAVENOUS; SUBCUTANEOUS
Status: DISCONTINUED | OUTPATIENT
Start: 2021-03-10 | End: 2021-03-13

## 2021-03-10 RX ADMIN — INSULIN DETEMIR 10 UNITS: 100 INJECTION, SOLUTION SUBCUTANEOUS at 09:03

## 2021-03-10 RX ADMIN — MUPIROCIN: 20 OINTMENT TOPICAL at 09:03

## 2021-03-10 RX ADMIN — Medication 10 ML: at 06:03

## 2021-03-10 RX ADMIN — GABAPENTIN 300 MG: 300 CAPSULE ORAL at 09:03

## 2021-03-10 RX ADMIN — INSULIN ASPART 3 UNITS: 100 INJECTION, SOLUTION INTRAVENOUS; SUBCUTANEOUS at 09:03

## 2021-03-10 RX ADMIN — OXYCODONE AND ACETAMINOPHEN 1 TABLET: 7.5; 325 TABLET ORAL at 06:03

## 2021-03-10 RX ADMIN — MEROPENEM 1 G: 1 INJECTION, POWDER, FOR SOLUTION INTRAVENOUS at 06:03

## 2021-03-10 RX ADMIN — METOPROLOL TARTRATE 25 MG: 25 TABLET, FILM COATED ORAL at 03:03

## 2021-03-10 RX ADMIN — APIXABAN 5 MG: 5 TABLET, FILM COATED ORAL at 09:03

## 2021-03-10 RX ADMIN — PIPERACILLIN AND TAZOBACTAM 4.5 G: 4; .5 INJECTION, POWDER, LYOPHILIZED, FOR SOLUTION INTRAVENOUS; PARENTERAL at 02:03

## 2021-03-10 RX ADMIN — GADOBUTROL 8 ML: 604.72 INJECTION INTRAVENOUS at 05:03

## 2021-03-10 RX ADMIN — BACLOFEN 10 MG: 5 TABLET ORAL at 09:03

## 2021-03-10 RX ADMIN — VANCOMYCIN HYDROCHLORIDE 2250 MG: 100 INJECTION, POWDER, LYOPHILIZED, FOR SOLUTION INTRAVENOUS at 03:03

## 2021-03-10 RX ADMIN — DOCUSATE SODIUM 50 MG AND SENNOSIDES 8.6 MG 1 TABLET: 8.6; 5 TABLET, FILM COATED ORAL at 09:03

## 2021-03-10 RX ADMIN — PANTOPRAZOLE SODIUM 40 MG: 40 TABLET, DELAYED RELEASE ORAL at 03:03

## 2021-03-11 ENCOUNTER — TELEPHONE (OUTPATIENT)
Dept: UROLOGY | Facility: CLINIC | Age: 46
End: 2021-03-11

## 2021-03-11 ENCOUNTER — CLINICAL SUPPORT (OUTPATIENT)
Dept: SMOKING CESSATION | Facility: CLINIC | Age: 46
End: 2021-03-11

## 2021-03-11 DIAGNOSIS — F17.210 CIGARETTE SMOKER: Primary | ICD-10-CM

## 2021-03-11 LAB
ANION GAP SERPL CALC-SCNC: 7 MMOL/L (ref 8–16)
ANISOCYTOSIS BLD QL SMEAR: SLIGHT
BASOPHILS # BLD AUTO: ABNORMAL K/UL (ref 0–0.2)
BASOPHILS NFR BLD: 2 % (ref 0–1.9)
BUN SERPL-MCNC: 6 MG/DL (ref 6–20)
CALCIUM SERPL-MCNC: 8.7 MG/DL (ref 8.7–10.5)
CHLORIDE SERPL-SCNC: 98 MMOL/L (ref 95–110)
CO2 SERPL-SCNC: 26 MMOL/L (ref 23–29)
CREAT SERPL-MCNC: 0.8 MG/DL (ref 0.5–1.4)
DIFFERENTIAL METHOD: ABNORMAL
EOSINOPHIL # BLD AUTO: ABNORMAL K/UL (ref 0–0.5)
EOSINOPHIL NFR BLD: 5 % (ref 0–8)
ERYTHROCYTE [DISTWIDTH] IN BLOOD BY AUTOMATED COUNT: 16.2 % (ref 11.5–14.5)
EST. GFR  (AFRICAN AMERICAN): >60 ML/MIN/1.73 M^2
EST. GFR  (NON AFRICAN AMERICAN): >60 ML/MIN/1.73 M^2
GLUCOSE SERPL-MCNC: 325 MG/DL (ref 70–110)
HCT VFR BLD AUTO: 29.8 % (ref 40–54)
HGB BLD-MCNC: 9.8 G/DL (ref 14–18)
HYPOCHROMIA BLD QL SMEAR: ABNORMAL
IMM GRANULOCYTES # BLD AUTO: ABNORMAL K/UL (ref 0–0.04)
IMM GRANULOCYTES NFR BLD AUTO: ABNORMAL % (ref 0–0.5)
LYMPHOCYTES # BLD AUTO: ABNORMAL K/UL (ref 1–4.8)
LYMPHOCYTES NFR BLD: 35 % (ref 18–48)
MAGNESIUM SERPL-MCNC: 1.7 MG/DL (ref 1.6–2.6)
MCH RBC QN AUTO: 24.4 PG (ref 27–31)
MCHC RBC AUTO-ENTMCNC: 32.9 G/DL (ref 32–36)
MCV RBC AUTO: 74 FL (ref 82–98)
MONOCYTES # BLD AUTO: ABNORMAL K/UL (ref 0.3–1)
MONOCYTES NFR BLD: 8 % (ref 4–15)
NEUTROPHILS NFR BLD: 50 % (ref 38–73)
NRBC BLD-RTO: 0 /100 WBC
PLATELET # BLD AUTO: 540 K/UL (ref 150–350)
PLATELET BLD QL SMEAR: ABNORMAL
PMV BLD AUTO: 10.4 FL (ref 9.2–12.9)
POCT GLUCOSE: 317 MG/DL (ref 70–110)
POCT GLUCOSE: 344 MG/DL (ref 70–110)
POCT GLUCOSE: 362 MG/DL (ref 70–110)
POCT GLUCOSE: 375 MG/DL (ref 70–110)
POTASSIUM SERPL-SCNC: 3.8 MMOL/L (ref 3.5–5.1)
RBC # BLD AUTO: 4.02 M/UL (ref 4.6–6.2)
SODIUM SERPL-SCNC: 131 MMOL/L (ref 136–145)
TARGETS BLD QL SMEAR: ABNORMAL
WBC # BLD AUTO: 6.01 K/UL (ref 3.9–12.7)

## 2021-03-11 PROCEDURE — 99406 BEHAV CHNG SMOKING 3-10 MIN: CPT | Mod: S$GLB,,,

## 2021-03-11 PROCEDURE — 85027 COMPLETE CBC AUTOMATED: CPT | Performed by: NURSE PRACTITIONER

## 2021-03-11 PROCEDURE — 25000003 PHARM REV CODE 250: Performed by: EMERGENCY MEDICINE

## 2021-03-11 PROCEDURE — 85007 BL SMEAR W/DIFF WBC COUNT: CPT | Performed by: NURSE PRACTITIONER

## 2021-03-11 PROCEDURE — 80048 BASIC METABOLIC PNL TOTAL CA: CPT | Performed by: NURSE PRACTITIONER

## 2021-03-11 PROCEDURE — 51705 CHANGE OF BLADDER TUBE: CPT | Mod: ,,, | Performed by: NURSE PRACTITIONER

## 2021-03-11 PROCEDURE — A4216 STERILE WATER/SALINE, 10 ML: HCPCS | Performed by: EMERGENCY MEDICINE

## 2021-03-11 PROCEDURE — 51705 PR CHANGE OF BLADDER TUBE,SIMPLE: ICD-10-PCS | Mod: ,,, | Performed by: NURSE PRACTITIONER

## 2021-03-11 PROCEDURE — 99406 PT REFUSED TOBACCO CESSATION: ICD-10-PCS | Mod: S$GLB,,,

## 2021-03-11 PROCEDURE — 11000001 HC ACUTE MED/SURG PRIVATE ROOM

## 2021-03-11 PROCEDURE — 63600175 PHARM REV CODE 636 W HCPCS: Performed by: INTERNAL MEDICINE

## 2021-03-11 PROCEDURE — 83735 ASSAY OF MAGNESIUM: CPT | Performed by: NURSE PRACTITIONER

## 2021-03-11 PROCEDURE — 25000003 PHARM REV CODE 250: Performed by: NURSE PRACTITIONER

## 2021-03-11 PROCEDURE — 25000003 PHARM REV CODE 250: Performed by: INTERNAL MEDICINE

## 2021-03-11 RX ORDER — INSULIN ASPART 100 [IU]/ML
5 INJECTION, SOLUTION INTRAVENOUS; SUBCUTANEOUS
Status: DISCONTINUED | OUTPATIENT
Start: 2021-03-12 | End: 2021-03-14

## 2021-03-11 RX ADMIN — METOPROLOL TARTRATE 25 MG: 25 TABLET, FILM COATED ORAL at 08:03

## 2021-03-11 RX ADMIN — Medication 10 ML: at 01:03

## 2021-03-11 RX ADMIN — GABAPENTIN 300 MG: 300 CAPSULE ORAL at 08:03

## 2021-03-11 RX ADMIN — INSULIN ASPART 4 UNITS: 100 INJECTION, SOLUTION INTRAVENOUS; SUBCUTANEOUS at 08:03

## 2021-03-11 RX ADMIN — VANCOMYCIN HYDROCHLORIDE 1500 MG: 1.5 INJECTION, POWDER, LYOPHILIZED, FOR SOLUTION INTRAVENOUS at 03:03

## 2021-03-11 RX ADMIN — Medication 10 ML: at 05:03

## 2021-03-11 RX ADMIN — APIXABAN 5 MG: 5 TABLET, FILM COATED ORAL at 08:03

## 2021-03-11 RX ADMIN — MUPIROCIN: 20 OINTMENT TOPICAL at 08:03

## 2021-03-11 RX ADMIN — MEROPENEM 1 G: 1 INJECTION, POWDER, FOR SOLUTION INTRAVENOUS at 09:03

## 2021-03-11 RX ADMIN — VANCOMYCIN HYDROCHLORIDE 1500 MG: 1.5 INJECTION, POWDER, LYOPHILIZED, FOR SOLUTION INTRAVENOUS at 02:03

## 2021-03-11 RX ADMIN — Medication 10 ML: at 12:03

## 2021-03-11 RX ADMIN — INSULIN ASPART 5 UNITS: 100 INJECTION, SOLUTION INTRAVENOUS; SUBCUTANEOUS at 05:03

## 2021-03-11 RX ADMIN — OXYCODONE AND ACETAMINOPHEN 1 TABLET: 7.5; 325 TABLET ORAL at 08:03

## 2021-03-11 RX ADMIN — MEROPENEM 1 G: 1 INJECTION, POWDER, FOR SOLUTION INTRAVENOUS at 04:03

## 2021-03-11 RX ADMIN — OXYCODONE AND ACETAMINOPHEN 1 TABLET: 7.5; 325 TABLET ORAL at 11:03

## 2021-03-11 RX ADMIN — MUPIROCIN: 20 OINTMENT TOPICAL at 09:03

## 2021-03-11 RX ADMIN — PANTOPRAZOLE SODIUM 40 MG: 40 TABLET, DELAYED RELEASE ORAL at 08:03

## 2021-03-11 RX ADMIN — Medication 10 ML: at 11:03

## 2021-03-11 RX ADMIN — INSULIN ASPART 4 UNITS: 100 INJECTION, SOLUTION INTRAVENOUS; SUBCUTANEOUS at 01:03

## 2021-03-11 RX ADMIN — BACLOFEN 10 MG: 5 TABLET ORAL at 09:03

## 2021-03-11 RX ADMIN — BACLOFEN 10 MG: 5 TABLET ORAL at 08:03

## 2021-03-11 RX ADMIN — OXYCODONE AND ACETAMINOPHEN 1 TABLET: 7.5; 325 TABLET ORAL at 12:03

## 2021-03-11 RX ADMIN — GABAPENTIN 300 MG: 300 CAPSULE ORAL at 09:03

## 2021-03-11 RX ADMIN — MEROPENEM 1 G: 1 INJECTION, POWDER, FOR SOLUTION INTRAVENOUS at 12:03

## 2021-03-11 RX ADMIN — OXYCODONE AND ACETAMINOPHEN 1 TABLET: 7.5; 325 TABLET ORAL at 04:03

## 2021-03-11 RX ADMIN — INSULIN ASPART 3 UNITS: 100 INJECTION, SOLUTION INTRAVENOUS; SUBCUTANEOUS at 09:03

## 2021-03-12 ENCOUNTER — ANESTHESIA EVENT (OUTPATIENT)
Dept: SURGERY | Facility: HOSPITAL | Age: 46
DRG: 853 | End: 2021-03-12
Payer: MEDICARE

## 2021-03-12 ENCOUNTER — ANESTHESIA (OUTPATIENT)
Dept: SURGERY | Facility: HOSPITAL | Age: 46
DRG: 853 | End: 2021-03-12
Payer: MEDICARE

## 2021-03-12 LAB
ANION GAP SERPL CALC-SCNC: 12 MMOL/L (ref 8–16)
BASOPHILS # BLD AUTO: 0.05 K/UL (ref 0–0.2)
BASOPHILS NFR BLD: 0.8 % (ref 0–1.9)
BUN SERPL-MCNC: 18 MG/DL (ref 6–20)
CALCIUM SERPL-MCNC: 8.2 MG/DL (ref 8.7–10.5)
CHLORIDE SERPL-SCNC: 99 MMOL/L (ref 95–110)
CO2 SERPL-SCNC: 25 MMOL/L (ref 23–29)
CREAT SERPL-MCNC: 1.2 MG/DL (ref 0.5–1.4)
DIFFERENTIAL METHOD: ABNORMAL
EOSINOPHIL # BLD AUTO: 0.5 K/UL (ref 0–0.5)
EOSINOPHIL NFR BLD: 7.9 % (ref 0–8)
ERYTHROCYTE [DISTWIDTH] IN BLOOD BY AUTOMATED COUNT: 16.1 % (ref 11.5–14.5)
EST. GFR  (AFRICAN AMERICAN): >60 ML/MIN/1.73 M^2
EST. GFR  (NON AFRICAN AMERICAN): >60 ML/MIN/1.73 M^2
GLUCOSE SERPL-MCNC: 219 MG/DL (ref 70–110)
HCT VFR BLD AUTO: 30.1 % (ref 40–54)
HGB BLD-MCNC: 10.1 G/DL (ref 14–18)
IMM GRANULOCYTES # BLD AUTO: 0.02 K/UL (ref 0–0.04)
IMM GRANULOCYTES NFR BLD AUTO: 0.3 % (ref 0–0.5)
LYMPHOCYTES # BLD AUTO: 2.7 K/UL (ref 1–4.8)
LYMPHOCYTES NFR BLD: 41.4 % (ref 18–48)
MAGNESIUM SERPL-MCNC: 2.5 MG/DL (ref 1.6–2.6)
MCH RBC QN AUTO: 24.9 PG (ref 27–31)
MCHC RBC AUTO-ENTMCNC: 33.6 G/DL (ref 32–36)
MCV RBC AUTO: 74 FL (ref 82–98)
MONOCYTES # BLD AUTO: 0.7 K/UL (ref 0.3–1)
MONOCYTES NFR BLD: 10.5 % (ref 4–15)
NEUTROPHILS # BLD AUTO: 2.6 K/UL (ref 1.8–7.7)
NEUTROPHILS NFR BLD: 39.1 % (ref 38–73)
NRBC BLD-RTO: 0 /100 WBC
PLATELET # BLD AUTO: 578 K/UL (ref 150–350)
PMV BLD AUTO: 10.4 FL (ref 9.2–12.9)
POCT GLUCOSE: 268 MG/DL (ref 70–110)
POCT GLUCOSE: 302 MG/DL (ref 70–110)
POCT GLUCOSE: 306 MG/DL (ref 70–110)
POCT GLUCOSE: 361 MG/DL (ref 70–110)
POTASSIUM SERPL-SCNC: 5.2 MMOL/L (ref 3.5–5.1)
RBC # BLD AUTO: 4.05 M/UL (ref 4.6–6.2)
SODIUM SERPL-SCNC: 136 MMOL/L (ref 136–145)
VANCOMYCIN TROUGH SERPL-MCNC: 17.4 UG/ML (ref 10–22)
WBC # BLD AUTO: 6.57 K/UL (ref 3.9–12.7)

## 2021-03-12 PROCEDURE — 87102 FUNGUS ISOLATION CULTURE: CPT | Mod: 59 | Performed by: SURGERY

## 2021-03-12 PROCEDURE — A4216 STERILE WATER/SALINE, 10 ML: HCPCS | Performed by: EMERGENCY MEDICINE

## 2021-03-12 PROCEDURE — 93005 ELECTROCARDIOGRAM TRACING: CPT

## 2021-03-12 PROCEDURE — 87118 MYCOBACTERIC IDENTIFICATION: CPT | Performed by: SURGERY

## 2021-03-12 PROCEDURE — 85025 COMPLETE CBC W/AUTO DIFF WBC: CPT | Performed by: NURSE PRACTITIONER

## 2021-03-12 PROCEDURE — 87075 CULTR BACTERIA EXCEPT BLOOD: CPT | Performed by: SURGERY

## 2021-03-12 PROCEDURE — 36000707: Performed by: SURGERY

## 2021-03-12 PROCEDURE — 37000008 HC ANESTHESIA 1ST 15 MINUTES: Performed by: SURGERY

## 2021-03-12 PROCEDURE — 25000003 PHARM REV CODE 250: Performed by: NURSE ANESTHETIST, CERTIFIED REGISTERED

## 2021-03-12 PROCEDURE — 87176 TISSUE HOMOGENIZATION CULTR: CPT | Performed by: SURGERY

## 2021-03-12 PROCEDURE — 80202 ASSAY OF VANCOMYCIN: CPT | Performed by: INTERNAL MEDICINE

## 2021-03-12 PROCEDURE — 36000706: Performed by: SURGERY

## 2021-03-12 PROCEDURE — 88304 PR  SURG PATH,LEVEL III: ICD-10-PCS | Mod: 26,,, | Performed by: PATHOLOGY

## 2021-03-12 PROCEDURE — 88304 TISSUE EXAM BY PATHOLOGIST: CPT | Mod: 26,,, | Performed by: PATHOLOGY

## 2021-03-12 PROCEDURE — 87186 SC STD MICRODIL/AGAR DIL: CPT | Mod: 59 | Performed by: SURGERY

## 2021-03-12 PROCEDURE — 99231 PR SUBSEQUENT HOSPITAL CARE,LEVL I: ICD-10-PCS | Mod: ,,, | Performed by: HOSPITALIST

## 2021-03-12 PROCEDURE — 71000033 HC RECOVERY, INTIAL HOUR: Performed by: SURGERY

## 2021-03-12 PROCEDURE — 87077 CULTURE AEROBIC IDENTIFY: CPT | Performed by: SURGERY

## 2021-03-12 PROCEDURE — 25000003 PHARM REV CODE 250: Performed by: INTERNAL MEDICINE

## 2021-03-12 PROCEDURE — 63600175 PHARM REV CODE 636 W HCPCS: Performed by: INTERNAL MEDICINE

## 2021-03-12 PROCEDURE — 25000003 PHARM REV CODE 250: Performed by: EMERGENCY MEDICINE

## 2021-03-12 PROCEDURE — 87076 CULTURE ANAEROBE IDENT EACH: CPT | Mod: 59 | Performed by: SURGERY

## 2021-03-12 PROCEDURE — 88304 TISSUE EXAM BY PATHOLOGIST: CPT | Performed by: PATHOLOGY

## 2021-03-12 PROCEDURE — 63600175 PHARM REV CODE 636 W HCPCS: Performed by: NURSE ANESTHETIST, CERTIFIED REGISTERED

## 2021-03-12 PROCEDURE — 93010 ELECTROCARDIOGRAM REPORT: CPT | Mod: ,,, | Performed by: INTERNAL MEDICINE

## 2021-03-12 PROCEDURE — 93010 EKG 12-LEAD: ICD-10-PCS | Mod: ,,, | Performed by: INTERNAL MEDICINE

## 2021-03-12 PROCEDURE — 87116 MYCOBACTERIA CULTURE: CPT | Mod: 59 | Performed by: SURGERY

## 2021-03-12 PROCEDURE — 87070 CULTURE OTHR SPECIMN AEROBIC: CPT | Mod: 59 | Performed by: SURGERY

## 2021-03-12 PROCEDURE — 99231 SBSQ HOSP IP/OBS SF/LOW 25: CPT | Mod: ,,, | Performed by: HOSPITALIST

## 2021-03-12 PROCEDURE — 83735 ASSAY OF MAGNESIUM: CPT | Performed by: NURSE PRACTITIONER

## 2021-03-12 PROCEDURE — 87206 SMEAR FLUORESCENT/ACID STAI: CPT | Mod: 91 | Performed by: SURGERY

## 2021-03-12 PROCEDURE — 87205 SMEAR GRAM STAIN: CPT | Mod: 59 | Performed by: SURGERY

## 2021-03-12 PROCEDURE — 80048 BASIC METABOLIC PNL TOTAL CA: CPT | Performed by: NURSE PRACTITIONER

## 2021-03-12 PROCEDURE — 37000009 HC ANESTHESIA EA ADD 15 MINS: Performed by: SURGERY

## 2021-03-12 PROCEDURE — 25000003 PHARM REV CODE 250: Performed by: NURSE PRACTITIONER

## 2021-03-12 PROCEDURE — 11000001 HC ACUTE MED/SURG PRIVATE ROOM

## 2021-03-12 RX ORDER — PROPOFOL 10 MG/ML
VIAL (ML) INTRAVENOUS CONTINUOUS PRN
Status: DISCONTINUED | OUTPATIENT
Start: 2021-03-12 | End: 2021-03-12

## 2021-03-12 RX ORDER — PROPOFOL 10 MG/ML
VIAL (ML) INTRAVENOUS
Status: DISCONTINUED | OUTPATIENT
Start: 2021-03-12 | End: 2021-03-12

## 2021-03-12 RX ORDER — MIDAZOLAM HYDROCHLORIDE 1 MG/ML
INJECTION, SOLUTION INTRAMUSCULAR; INTRAVENOUS
Status: DISCONTINUED | OUTPATIENT
Start: 2021-03-12 | End: 2021-03-12

## 2021-03-12 RX ORDER — LIDOCAINE HCL/PF 100 MG/5ML
SYRINGE (ML) INTRAVENOUS
Status: DISCONTINUED | OUTPATIENT
Start: 2021-03-12 | End: 2021-03-12

## 2021-03-12 RX ORDER — MUPIROCIN 20 MG/G
OINTMENT TOPICAL
Status: CANCELLED | OUTPATIENT
Start: 2021-03-12

## 2021-03-12 RX ADMIN — BACLOFEN 10 MG: 5 TABLET ORAL at 09:03

## 2021-03-12 RX ADMIN — INSULIN ASPART 2 UNITS: 100 INJECTION, SOLUTION INTRAVENOUS; SUBCUTANEOUS at 09:03

## 2021-03-12 RX ADMIN — SODIUM CHLORIDE, SODIUM LACTATE, POTASSIUM CHLORIDE, AND CALCIUM CHLORIDE: .6; .31; .03; .02 INJECTION, SOLUTION INTRAVENOUS at 06:03

## 2021-03-12 RX ADMIN — MEROPENEM 1 G: 1 INJECTION, POWDER, FOR SOLUTION INTRAVENOUS at 09:03

## 2021-03-12 RX ADMIN — MIDAZOLAM 2 MG: 1 INJECTION INTRAMUSCULAR; INTRAVENOUS at 06:03

## 2021-03-12 RX ADMIN — LIDOCAINE HYDROCHLORIDE 50 MG: 20 INJECTION, SOLUTION INTRAVENOUS at 06:03

## 2021-03-12 RX ADMIN — Medication 10 ML: at 05:03

## 2021-03-12 RX ADMIN — VANCOMYCIN HYDROCHLORIDE 1500 MG: 1.5 INJECTION, POWDER, LYOPHILIZED, FOR SOLUTION INTRAVENOUS at 04:03

## 2021-03-12 RX ADMIN — OXYCODONE AND ACETAMINOPHEN 1 TABLET: 7.5; 325 TABLET ORAL at 05:03

## 2021-03-12 RX ADMIN — Medication 10 ML: at 01:03

## 2021-03-12 RX ADMIN — PROPOFOL 150 MCG/KG/MIN: 10 INJECTION, EMULSION INTRAVENOUS at 06:03

## 2021-03-12 RX ADMIN — Medication 10 ML: at 06:03

## 2021-03-12 RX ADMIN — MEROPENEM 1 G: 1 INJECTION, POWDER, FOR SOLUTION INTRAVENOUS at 05:03

## 2021-03-12 RX ADMIN — PROPOFOL 40 MG: 10 INJECTION, EMULSION INTRAVENOUS at 06:03

## 2021-03-12 RX ADMIN — MEROPENEM 1 G: 1 INJECTION, POWDER, FOR SOLUTION INTRAVENOUS at 01:03

## 2021-03-12 RX ADMIN — MUPIROCIN: 20 OINTMENT TOPICAL at 09:03

## 2021-03-12 RX ADMIN — INSULIN ASPART 3 UNITS: 100 INJECTION, SOLUTION INTRAVENOUS; SUBCUTANEOUS at 05:03

## 2021-03-12 RX ADMIN — GABAPENTIN 300 MG: 300 CAPSULE ORAL at 09:03

## 2021-03-12 RX ADMIN — INSULIN ASPART 4 UNITS: 100 INJECTION, SOLUTION INTRAVENOUS; SUBCUTANEOUS at 01:03

## 2021-03-13 PROBLEM — L08.9 INFECTED WOUND: Status: RESOLVED | Noted: 2021-03-10 | Resolved: 2021-03-13

## 2021-03-13 PROBLEM — T14.8XXA INFECTED WOUND: Status: RESOLVED | Noted: 2021-03-10 | Resolved: 2021-03-13

## 2021-03-13 LAB
BASOPHILS # BLD AUTO: 0.05 K/UL (ref 0–0.2)
BASOPHILS NFR BLD: 0.6 % (ref 0–1.9)
DIFFERENTIAL METHOD: ABNORMAL
EOSINOPHIL # BLD AUTO: 0.5 K/UL (ref 0–0.5)
EOSINOPHIL NFR BLD: 6.2 % (ref 0–8)
ERYTHROCYTE [DISTWIDTH] IN BLOOD BY AUTOMATED COUNT: 16.1 % (ref 11.5–14.5)
GRAM STN SPEC: NORMAL
HCT VFR BLD AUTO: 29.7 % (ref 40–54)
HGB BLD-MCNC: 9.7 G/DL (ref 14–18)
IMM GRANULOCYTES # BLD AUTO: 0.04 K/UL (ref 0–0.04)
IMM GRANULOCYTES NFR BLD AUTO: 0.5 % (ref 0–0.5)
LYMPHOCYTES # BLD AUTO: 2.2 K/UL (ref 1–4.8)
LYMPHOCYTES NFR BLD: 25.7 % (ref 18–48)
MAGNESIUM SERPL-MCNC: 1.6 MG/DL (ref 1.6–2.6)
MCH RBC QN AUTO: 24.4 PG (ref 27–31)
MCHC RBC AUTO-ENTMCNC: 32.7 G/DL (ref 32–36)
MCV RBC AUTO: 75 FL (ref 82–98)
MONOCYTES # BLD AUTO: 0.7 K/UL (ref 0.3–1)
MONOCYTES NFR BLD: 7.7 % (ref 4–15)
NEUTROPHILS # BLD AUTO: 5 K/UL (ref 1.8–7.7)
NEUTROPHILS NFR BLD: 59.3 % (ref 38–73)
NRBC BLD-RTO: 0 /100 WBC
PLATELET # BLD AUTO: 531 K/UL (ref 150–350)
PMV BLD AUTO: 9.8 FL (ref 9.2–12.9)
POCT GLUCOSE: 248 MG/DL (ref 70–110)
POCT GLUCOSE: 312 MG/DL (ref 70–110)
POCT GLUCOSE: 356 MG/DL (ref 70–110)
POCT GLUCOSE: 394 MG/DL (ref 70–110)
RBC # BLD AUTO: 3.97 M/UL (ref 4.6–6.2)
VANCOMYCIN TROUGH SERPL-MCNC: 17.6 UG/ML (ref 10–22)
WBC # BLD AUTO: 8.39 K/UL (ref 3.9–12.7)

## 2021-03-13 PROCEDURE — 25000003 PHARM REV CODE 250: Performed by: NURSE PRACTITIONER

## 2021-03-13 PROCEDURE — 63600175 PHARM REV CODE 636 W HCPCS: Performed by: HOSPITALIST

## 2021-03-13 PROCEDURE — 11000001 HC ACUTE MED/SURG PRIVATE ROOM

## 2021-03-13 PROCEDURE — 63600175 PHARM REV CODE 636 W HCPCS: Performed by: INTERNAL MEDICINE

## 2021-03-13 PROCEDURE — 25000003 PHARM REV CODE 250: Performed by: INTERNAL MEDICINE

## 2021-03-13 PROCEDURE — 63600175 PHARM REV CODE 636 W HCPCS: Performed by: NURSE PRACTITIONER

## 2021-03-13 PROCEDURE — 25000003 PHARM REV CODE 250: Performed by: EMERGENCY MEDICINE

## 2021-03-13 PROCEDURE — 25000003 PHARM REV CODE 250: Performed by: HOSPITALIST

## 2021-03-13 PROCEDURE — 83735 ASSAY OF MAGNESIUM: CPT | Performed by: NURSE PRACTITIONER

## 2021-03-13 PROCEDURE — 85025 COMPLETE CBC W/AUTO DIFF WBC: CPT | Performed by: NURSE PRACTITIONER

## 2021-03-13 PROCEDURE — 80202 ASSAY OF VANCOMYCIN: CPT | Performed by: INTERNAL MEDICINE

## 2021-03-13 PROCEDURE — A4216 STERILE WATER/SALINE, 10 ML: HCPCS | Performed by: EMERGENCY MEDICINE

## 2021-03-13 RX ORDER — OXYCODONE AND ACETAMINOPHEN 10; 325 MG/1; MG/1
1 TABLET ORAL EVERY 4 HOURS PRN
Status: DISCONTINUED | OUTPATIENT
Start: 2021-03-13 | End: 2021-03-20

## 2021-03-13 RX ORDER — IBUPROFEN 600 MG/1
600 TABLET ORAL 4 TIMES DAILY
Status: DISCONTINUED | OUTPATIENT
Start: 2021-03-13 | End: 2021-03-27 | Stop reason: HOSPADM

## 2021-03-13 RX ORDER — ENOXAPARIN SODIUM 100 MG/ML
40 INJECTION SUBCUTANEOUS EVERY 24 HOURS
Status: DISCONTINUED | OUTPATIENT
Start: 2021-03-13 | End: 2021-03-27 | Stop reason: HOSPADM

## 2021-03-13 RX ORDER — INSULIN ASPART 100 [IU]/ML
1-10 INJECTION, SOLUTION INTRAVENOUS; SUBCUTANEOUS
Status: DISCONTINUED | OUTPATIENT
Start: 2021-03-13 | End: 2021-03-27 | Stop reason: HOSPADM

## 2021-03-13 RX ADMIN — OXYCODONE AND ACETAMINOPHEN 1 TABLET: 7.5; 325 TABLET ORAL at 12:03

## 2021-03-13 RX ADMIN — Medication 10 ML: at 11:03

## 2021-03-13 RX ADMIN — METOPROLOL TARTRATE 25 MG: 25 TABLET, FILM COATED ORAL at 08:03

## 2021-03-13 RX ADMIN — PANTOPRAZOLE SODIUM 40 MG: 40 TABLET, DELAYED RELEASE ORAL at 09:03

## 2021-03-13 RX ADMIN — VANCOMYCIN HYDROCHLORIDE 1500 MG: 1.5 INJECTION, POWDER, LYOPHILIZED, FOR SOLUTION INTRAVENOUS at 03:03

## 2021-03-13 RX ADMIN — VANCOMYCIN HYDROCHLORIDE 1500 MG: 1.5 INJECTION, POWDER, LYOPHILIZED, FOR SOLUTION INTRAVENOUS at 05:03

## 2021-03-13 RX ADMIN — Medication 10 ML: at 12:03

## 2021-03-13 RX ADMIN — INSULIN ASPART 10 UNITS: 100 INJECTION, SOLUTION INTRAVENOUS; SUBCUTANEOUS at 05:03

## 2021-03-13 RX ADMIN — MEROPENEM 1 G: 1 INJECTION, POWDER, FOR SOLUTION INTRAVENOUS at 12:03

## 2021-03-13 RX ADMIN — INSULIN ASPART 5 UNITS: 100 INJECTION, SOLUTION INTRAVENOUS; SUBCUTANEOUS at 11:03

## 2021-03-13 RX ADMIN — Medication 10 ML: at 05:03

## 2021-03-13 RX ADMIN — INSULIN ASPART 4 UNITS: 100 INJECTION, SOLUTION INTRAVENOUS; SUBCUTANEOUS at 09:03

## 2021-03-13 RX ADMIN — DOCUSATE SODIUM 50 MG AND SENNOSIDES 8.6 MG 1 TABLET: 8.6; 5 TABLET, FILM COATED ORAL at 08:03

## 2021-03-13 RX ADMIN — INSULIN ASPART 5 UNITS: 100 INJECTION, SOLUTION INTRAVENOUS; SUBCUTANEOUS at 07:03

## 2021-03-13 RX ADMIN — Medication 10 ML: at 01:03

## 2021-03-13 RX ADMIN — INSULIN ASPART 5 UNITS: 100 INJECTION, SOLUTION INTRAVENOUS; SUBCUTANEOUS at 06:03

## 2021-03-13 RX ADMIN — OXYCODONE AND ACETAMINOPHEN 1 TABLET: 7.5; 325 TABLET ORAL at 08:03

## 2021-03-13 RX ADMIN — MEROPENEM 1 G: 1 INJECTION, POWDER, FOR SOLUTION INTRAVENOUS at 08:03

## 2021-03-13 RX ADMIN — OXYCODONE HYDROCHLORIDE AND ACETAMINOPHEN 1 TABLET: 10; 325 TABLET ORAL at 08:03

## 2021-03-13 RX ADMIN — MUPIROCIN: 20 OINTMENT TOPICAL at 09:03

## 2021-03-13 RX ADMIN — GABAPENTIN 300 MG: 300 CAPSULE ORAL at 09:03

## 2021-03-13 RX ADMIN — ENOXAPARIN SODIUM 40 MG: 40 INJECTION SUBCUTANEOUS at 05:03

## 2021-03-13 RX ADMIN — OXYCODONE HYDROCHLORIDE AND ACETAMINOPHEN 1 TABLET: 10; 325 TABLET ORAL at 01:03

## 2021-03-13 RX ADMIN — GABAPENTIN 300 MG: 300 CAPSULE ORAL at 08:03

## 2021-03-13 RX ADMIN — BACLOFEN 10 MG: 5 TABLET ORAL at 08:03

## 2021-03-13 RX ADMIN — IBUPROFEN 600 MG: 600 TABLET ORAL at 01:03

## 2021-03-13 RX ADMIN — INSULIN ASPART 5 UNITS: 100 INJECTION, SOLUTION INTRAVENOUS; SUBCUTANEOUS at 05:03

## 2021-03-13 RX ADMIN — INSULIN ASPART 5 UNITS: 100 INJECTION, SOLUTION INTRAVENOUS; SUBCUTANEOUS at 08:03

## 2021-03-13 RX ADMIN — IBUPROFEN 600 MG: 600 TABLET ORAL at 05:03

## 2021-03-13 RX ADMIN — MEROPENEM 1 G: 1 INJECTION, POWDER, FOR SOLUTION INTRAVENOUS at 05:03

## 2021-03-13 RX ADMIN — IBUPROFEN 600 MG: 600 TABLET ORAL at 09:03

## 2021-03-14 LAB
ANION GAP SERPL CALC-SCNC: 8 MMOL/L (ref 8–16)
BASOPHILS # BLD AUTO: 0.06 K/UL (ref 0–0.2)
BASOPHILS NFR BLD: 0.8 % (ref 0–1.9)
BUN SERPL-MCNC: 6 MG/DL (ref 6–20)
CALCIUM SERPL-MCNC: 8.7 MG/DL (ref 8.7–10.5)
CHLORIDE SERPL-SCNC: 100 MMOL/L (ref 95–110)
CO2 SERPL-SCNC: 23 MMOL/L (ref 23–29)
CREAT SERPL-MCNC: 0.8 MG/DL (ref 0.5–1.4)
DIFFERENTIAL METHOD: ABNORMAL
EOSINOPHIL # BLD AUTO: 0.6 K/UL (ref 0–0.5)
EOSINOPHIL NFR BLD: 7.5 % (ref 0–8)
ERYTHROCYTE [DISTWIDTH] IN BLOOD BY AUTOMATED COUNT: 16.2 % (ref 11.5–14.5)
EST. GFR  (AFRICAN AMERICAN): >60 ML/MIN/1.73 M^2
EST. GFR  (NON AFRICAN AMERICAN): >60 ML/MIN/1.73 M^2
GLUCOSE SERPL-MCNC: 357 MG/DL (ref 70–110)
HCT VFR BLD AUTO: 30.1 % (ref 40–54)
HGB BLD-MCNC: 9.8 G/DL (ref 14–18)
IMM GRANULOCYTES # BLD AUTO: 0.04 K/UL (ref 0–0.04)
IMM GRANULOCYTES NFR BLD AUTO: 0.5 % (ref 0–0.5)
LYMPHOCYTES # BLD AUTO: 2.1 K/UL (ref 1–4.8)
LYMPHOCYTES NFR BLD: 28.5 % (ref 18–48)
MAGNESIUM SERPL-MCNC: 1.6 MG/DL (ref 1.6–2.6)
MCH RBC QN AUTO: 24.4 PG (ref 27–31)
MCHC RBC AUTO-ENTMCNC: 32.6 G/DL (ref 32–36)
MCV RBC AUTO: 75 FL (ref 82–98)
MONOCYTES # BLD AUTO: 0.7 K/UL (ref 0.3–1)
MONOCYTES NFR BLD: 10 % (ref 4–15)
NEUTROPHILS # BLD AUTO: 3.8 K/UL (ref 1.8–7.7)
NEUTROPHILS NFR BLD: 52.7 % (ref 38–73)
NRBC BLD-RTO: 0 /100 WBC
PLATELET # BLD AUTO: 559 K/UL (ref 150–350)
PMV BLD AUTO: 10.6 FL (ref 9.2–12.9)
POCT GLUCOSE: 270 MG/DL (ref 70–110)
POCT GLUCOSE: 305 MG/DL (ref 70–110)
POCT GLUCOSE: 325 MG/DL (ref 70–110)
POCT GLUCOSE: 340 MG/DL (ref 70–110)
POCT GLUCOSE: 364 MG/DL (ref 70–110)
POTASSIUM SERPL-SCNC: 3.5 MMOL/L (ref 3.5–5.1)
RBC # BLD AUTO: 4.01 M/UL (ref 4.6–6.2)
SODIUM SERPL-SCNC: 131 MMOL/L (ref 136–145)
WBC # BLD AUTO: 7.3 K/UL (ref 3.9–12.7)

## 2021-03-14 PROCEDURE — 25000003 PHARM REV CODE 250: Performed by: HOSPITALIST

## 2021-03-14 PROCEDURE — 85025 COMPLETE CBC W/AUTO DIFF WBC: CPT | Performed by: SURGERY

## 2021-03-14 PROCEDURE — 25000003 PHARM REV CODE 250: Performed by: SURGERY

## 2021-03-14 PROCEDURE — 11000001 HC ACUTE MED/SURG PRIVATE ROOM

## 2021-03-14 PROCEDURE — 80048 BASIC METABOLIC PNL TOTAL CA: CPT | Performed by: HOSPITALIST

## 2021-03-14 PROCEDURE — 63600175 PHARM REV CODE 636 W HCPCS: Performed by: HOSPITALIST

## 2021-03-14 PROCEDURE — 83735 ASSAY OF MAGNESIUM: CPT | Performed by: SURGERY

## 2021-03-14 PROCEDURE — 63600175 PHARM REV CODE 636 W HCPCS: Performed by: SURGERY

## 2021-03-14 PROCEDURE — 25000003 PHARM REV CODE 250: Performed by: NURSE PRACTITIONER

## 2021-03-14 PROCEDURE — A4216 STERILE WATER/SALINE, 10 ML: HCPCS | Performed by: SURGERY

## 2021-03-14 RX ORDER — INSULIN ASPART 100 [IU]/ML
8 INJECTION, SOLUTION INTRAVENOUS; SUBCUTANEOUS
Status: DISCONTINUED | OUTPATIENT
Start: 2021-03-14 | End: 2021-03-17

## 2021-03-14 RX ADMIN — IBUPROFEN 600 MG: 600 TABLET ORAL at 09:03

## 2021-03-14 RX ADMIN — ENOXAPARIN SODIUM 40 MG: 40 INJECTION SUBCUTANEOUS at 04:03

## 2021-03-14 RX ADMIN — Medication 10 ML: at 11:03

## 2021-03-14 RX ADMIN — MEROPENEM 1 G: 1 INJECTION, POWDER, FOR SOLUTION INTRAVENOUS at 03:03

## 2021-03-14 RX ADMIN — PANTOPRAZOLE SODIUM 40 MG: 40 TABLET, DELAYED RELEASE ORAL at 09:03

## 2021-03-14 RX ADMIN — BACLOFEN 10 MG: 5 TABLET ORAL at 09:03

## 2021-03-14 RX ADMIN — MUPIROCIN: 20 OINTMENT TOPICAL at 09:03

## 2021-03-14 RX ADMIN — INSULIN ASPART 8 UNITS: 100 INJECTION, SOLUTION INTRAVENOUS; SUBCUTANEOUS at 01:03

## 2021-03-14 RX ADMIN — IBUPROFEN 600 MG: 600 TABLET ORAL at 01:03

## 2021-03-14 RX ADMIN — INSULIN ASPART 10 UNITS: 100 INJECTION, SOLUTION INTRAVENOUS; SUBCUTANEOUS at 01:03

## 2021-03-14 RX ADMIN — OXYCODONE HYDROCHLORIDE AND ACETAMINOPHEN 1 TABLET: 10; 325 TABLET ORAL at 09:03

## 2021-03-14 RX ADMIN — INSULIN ASPART 3 UNITS: 100 INJECTION, SOLUTION INTRAVENOUS; SUBCUTANEOUS at 09:03

## 2021-03-14 RX ADMIN — OXYCODONE HYDROCHLORIDE AND ACETAMINOPHEN 1 TABLET: 10; 325 TABLET ORAL at 05:03

## 2021-03-14 RX ADMIN — INSULIN ASPART 4 UNITS: 100 INJECTION, SOLUTION INTRAVENOUS; SUBCUTANEOUS at 05:03

## 2021-03-14 RX ADMIN — IBUPROFEN 600 MG: 600 TABLET ORAL at 04:03

## 2021-03-14 RX ADMIN — VANCOMYCIN HYDROCHLORIDE 1500 MG: 1.5 INJECTION, POWDER, LYOPHILIZED, FOR SOLUTION INTRAVENOUS at 02:03

## 2021-03-14 RX ADMIN — MEROPENEM 1 G: 1 INJECTION, POWDER, FOR SOLUTION INTRAVENOUS at 08:03

## 2021-03-14 RX ADMIN — Medication 10 ML: at 06:03

## 2021-03-14 RX ADMIN — INSULIN ASPART 5 UNITS: 100 INJECTION, SOLUTION INTRAVENOUS; SUBCUTANEOUS at 09:03

## 2021-03-14 RX ADMIN — VANCOMYCIN HYDROCHLORIDE 1500 MG: 1.5 INJECTION, POWDER, LYOPHILIZED, FOR SOLUTION INTRAVENOUS at 03:03

## 2021-03-14 RX ADMIN — OXYCODONE HYDROCHLORIDE AND ACETAMINOPHEN 1 TABLET: 10; 325 TABLET ORAL at 04:03

## 2021-03-14 RX ADMIN — INSULIN ASPART 8 UNITS: 100 INJECTION, SOLUTION INTRAVENOUS; SUBCUTANEOUS at 04:03

## 2021-03-14 RX ADMIN — OXYCODONE HYDROCHLORIDE AND ACETAMINOPHEN 1 TABLET: 10; 325 TABLET ORAL at 11:03

## 2021-03-14 RX ADMIN — GABAPENTIN 300 MG: 300 CAPSULE ORAL at 09:03

## 2021-03-14 RX ADMIN — METOPROLOL TARTRATE 25 MG: 25 TABLET, FILM COATED ORAL at 09:03

## 2021-03-14 RX ADMIN — MEROPENEM 1 G: 1 INJECTION, POWDER, FOR SOLUTION INTRAVENOUS at 04:03

## 2021-03-15 LAB
ANION GAP SERPL CALC-SCNC: 7 MMOL/L (ref 8–16)
BACTERIA BLD CULT: NORMAL
BACTERIA SPEC AEROBE CULT: ABNORMAL
BASOPHILS # BLD AUTO: 0.07 K/UL (ref 0–0.2)
BASOPHILS NFR BLD: 1 % (ref 0–1.9)
BUN SERPL-MCNC: 6 MG/DL (ref 6–20)
CALCIUM SERPL-MCNC: 8.5 MG/DL (ref 8.7–10.5)
CHLORIDE SERPL-SCNC: 103 MMOL/L (ref 95–110)
CO2 SERPL-SCNC: 24 MMOL/L (ref 23–29)
CREAT SERPL-MCNC: 0.8 MG/DL (ref 0.5–1.4)
DIFFERENTIAL METHOD: ABNORMAL
EOSINOPHIL # BLD AUTO: 0.8 K/UL (ref 0–0.5)
EOSINOPHIL NFR BLD: 10.8 % (ref 0–8)
ERYTHROCYTE [DISTWIDTH] IN BLOOD BY AUTOMATED COUNT: 16.6 % (ref 11.5–14.5)
EST. GFR  (AFRICAN AMERICAN): >60 ML/MIN/1.73 M^2
EST. GFR  (NON AFRICAN AMERICAN): >60 ML/MIN/1.73 M^2
GLUCOSE SERPL-MCNC: 425 MG/DL (ref 70–110)
HCT VFR BLD AUTO: 27.8 % (ref 40–54)
HGB BLD-MCNC: 9.3 G/DL (ref 14–18)
IMM GRANULOCYTES # BLD AUTO: 0.04 K/UL (ref 0–0.04)
IMM GRANULOCYTES NFR BLD AUTO: 0.6 % (ref 0–0.5)
LYMPHOCYTES # BLD AUTO: 1.9 K/UL (ref 1–4.8)
LYMPHOCYTES NFR BLD: 26.9 % (ref 18–48)
MAGNESIUM SERPL-MCNC: 1.8 MG/DL (ref 1.6–2.6)
MCH RBC QN AUTO: 25 PG (ref 27–31)
MCHC RBC AUTO-ENTMCNC: 33.5 G/DL (ref 32–36)
MCV RBC AUTO: 75 FL (ref 82–98)
MONOCYTES # BLD AUTO: 0.6 K/UL (ref 0.3–1)
MONOCYTES NFR BLD: 8.5 % (ref 4–15)
NEUTROPHILS # BLD AUTO: 3.7 K/UL (ref 1.8–7.7)
NEUTROPHILS NFR BLD: 52.2 % (ref 38–73)
NRBC BLD-RTO: 0 /100 WBC
PLATELET # BLD AUTO: 519 K/UL (ref 150–350)
PMV BLD AUTO: 10.5 FL (ref 9.2–12.9)
POCT GLUCOSE: 170 MG/DL (ref 70–110)
POCT GLUCOSE: 173 MG/DL (ref 70–110)
POCT GLUCOSE: 323 MG/DL (ref 70–110)
POCT GLUCOSE: 350 MG/DL (ref 70–110)
POCT GLUCOSE: 363 MG/DL (ref 70–110)
POTASSIUM SERPL-SCNC: 3.8 MMOL/L (ref 3.5–5.1)
RBC # BLD AUTO: 3.72 M/UL (ref 4.6–6.2)
SODIUM SERPL-SCNC: 134 MMOL/L (ref 136–145)
VANCOMYCIN TROUGH SERPL-MCNC: 21 UG/ML (ref 10–22)
WBC # BLD AUTO: 7.06 K/UL (ref 3.9–12.7)

## 2021-03-15 PROCEDURE — 80048 BASIC METABOLIC PNL TOTAL CA: CPT | Performed by: HOSPITALIST

## 2021-03-15 PROCEDURE — 25000003 PHARM REV CODE 250: Performed by: INTERNAL MEDICINE

## 2021-03-15 PROCEDURE — A4216 STERILE WATER/SALINE, 10 ML: HCPCS | Performed by: SURGERY

## 2021-03-15 PROCEDURE — 25000003 PHARM REV CODE 250: Performed by: HOSPITALIST

## 2021-03-15 PROCEDURE — 25000003 PHARM REV CODE 250: Performed by: SURGERY

## 2021-03-15 PROCEDURE — 83735 ASSAY OF MAGNESIUM: CPT | Performed by: SURGERY

## 2021-03-15 PROCEDURE — 11000001 HC ACUTE MED/SURG PRIVATE ROOM

## 2021-03-15 PROCEDURE — 80202 ASSAY OF VANCOMYCIN: CPT | Performed by: HOSPITALIST

## 2021-03-15 PROCEDURE — 63600175 PHARM REV CODE 636 W HCPCS: Performed by: HOSPITALIST

## 2021-03-15 PROCEDURE — 25000003 PHARM REV CODE 250: Performed by: NURSE PRACTITIONER

## 2021-03-15 PROCEDURE — 63600175 PHARM REV CODE 636 W HCPCS: Performed by: SURGERY

## 2021-03-15 PROCEDURE — 85025 COMPLETE CBC W/AUTO DIFF WBC: CPT | Performed by: SURGERY

## 2021-03-15 PROCEDURE — 63600175 PHARM REV CODE 636 W HCPCS: Mod: JG | Performed by: INTERNAL MEDICINE

## 2021-03-15 RX ORDER — METRONIDAZOLE 500 MG/1
500 TABLET ORAL EVERY 8 HOURS
Status: DISCONTINUED | OUTPATIENT
Start: 2021-03-15 | End: 2021-03-27 | Stop reason: HOSPADM

## 2021-03-15 RX ADMIN — PANTOPRAZOLE SODIUM 40 MG: 40 TABLET, DELAYED RELEASE ORAL at 08:03

## 2021-03-15 RX ADMIN — GABAPENTIN 300 MG: 300 CAPSULE ORAL at 09:03

## 2021-03-15 RX ADMIN — MUPIROCIN: 20 OINTMENT TOPICAL at 08:03

## 2021-03-15 RX ADMIN — INSULIN ASPART 8 UNITS: 100 INJECTION, SOLUTION INTRAVENOUS; SUBCUTANEOUS at 05:03

## 2021-03-15 RX ADMIN — GABAPENTIN 300 MG: 300 CAPSULE ORAL at 08:03

## 2021-03-15 RX ADMIN — IBUPROFEN 600 MG: 600 TABLET ORAL at 08:03

## 2021-03-15 RX ADMIN — CEFTAZIDIME, AVIBACTAM 2.5 G: 2; .5 POWDER, FOR SOLUTION INTRAVENOUS at 05:03

## 2021-03-15 RX ADMIN — ENOXAPARIN SODIUM 40 MG: 40 INJECTION SUBCUTANEOUS at 05:03

## 2021-03-15 RX ADMIN — METRONIDAZOLE 500 MG: 500 TABLET ORAL at 09:03

## 2021-03-15 RX ADMIN — IBUPROFEN 600 MG: 600 TABLET ORAL at 05:03

## 2021-03-15 RX ADMIN — INSULIN ASPART 8 UNITS: 100 INJECTION, SOLUTION INTRAVENOUS; SUBCUTANEOUS at 12:03

## 2021-03-15 RX ADMIN — INSULIN ASPART 4 UNITS: 100 INJECTION, SOLUTION INTRAVENOUS; SUBCUTANEOUS at 09:03

## 2021-03-15 RX ADMIN — INSULIN ASPART 8 UNITS: 100 INJECTION, SOLUTION INTRAVENOUS; SUBCUTANEOUS at 07:03

## 2021-03-15 RX ADMIN — OXYCODONE HYDROCHLORIDE AND ACETAMINOPHEN 1 TABLET: 10; 325 TABLET ORAL at 08:03

## 2021-03-15 RX ADMIN — IBUPROFEN 600 MG: 600 TABLET ORAL at 09:03

## 2021-03-15 RX ADMIN — INSULIN ASPART 2 UNITS: 100 INJECTION, SOLUTION INTRAVENOUS; SUBCUTANEOUS at 12:03

## 2021-03-15 RX ADMIN — Medication 10 ML: at 06:03

## 2021-03-15 RX ADMIN — BACLOFEN 10 MG: 5 TABLET ORAL at 08:03

## 2021-03-15 RX ADMIN — BACLOFEN 10 MG: 5 TABLET ORAL at 09:03

## 2021-03-15 RX ADMIN — Medication 10 ML: at 05:03

## 2021-03-15 RX ADMIN — METOPROLOL TARTRATE 25 MG: 25 TABLET, FILM COATED ORAL at 08:03

## 2021-03-15 RX ADMIN — IBUPROFEN 600 MG: 600 TABLET ORAL at 02:03

## 2021-03-15 RX ADMIN — MEROPENEM 1 G: 1 INJECTION, POWDER, FOR SOLUTION INTRAVENOUS at 01:03

## 2021-03-15 RX ADMIN — Medication 10 ML: at 12:03

## 2021-03-15 RX ADMIN — VANCOMYCIN HYDROCHLORIDE 1500 MG: 1.5 INJECTION, POWDER, LYOPHILIZED, FOR SOLUTION INTRAVENOUS at 02:03

## 2021-03-15 RX ADMIN — MEROPENEM 1 G: 1 INJECTION, POWDER, FOR SOLUTION INTRAVENOUS at 08:03

## 2021-03-15 RX ADMIN — OXYCODONE HYDROCHLORIDE AND ACETAMINOPHEN 1 TABLET: 10; 325 TABLET ORAL at 02:03

## 2021-03-15 RX ADMIN — VANCOMYCIN HYDROCHLORIDE 1500 MG: 1.5 INJECTION, POWDER, LYOPHILIZED, FOR SOLUTION INTRAVENOUS at 05:03

## 2021-03-15 RX ADMIN — INSULIN ASPART 2 UNITS: 100 INJECTION, SOLUTION INTRAVENOUS; SUBCUTANEOUS at 05:03

## 2021-03-15 RX ADMIN — OXYCODONE HYDROCHLORIDE AND ACETAMINOPHEN 1 TABLET: 10; 325 TABLET ORAL at 09:03

## 2021-03-16 LAB
ANION GAP SERPL CALC-SCNC: 10 MMOL/L (ref 8–16)
BUN SERPL-MCNC: 6 MG/DL (ref 6–20)
CALCIUM SERPL-MCNC: 8.5 MG/DL (ref 8.7–10.5)
CHLORIDE SERPL-SCNC: 105 MMOL/L (ref 95–110)
CO2 SERPL-SCNC: 25 MMOL/L (ref 23–29)
CREAT SERPL-MCNC: 0.8 MG/DL (ref 0.5–1.4)
EST. GFR  (AFRICAN AMERICAN): >60 ML/MIN/1.73 M^2
EST. GFR  (NON AFRICAN AMERICAN): >60 ML/MIN/1.73 M^2
GLUCOSE SERPL-MCNC: 246 MG/DL (ref 70–110)
POCT GLUCOSE: 158 MG/DL (ref 70–110)
POCT GLUCOSE: 212 MG/DL (ref 70–110)
POCT GLUCOSE: 254 MG/DL (ref 70–110)
POCT GLUCOSE: 321 MG/DL (ref 70–110)
POTASSIUM SERPL-SCNC: 3.6 MMOL/L (ref 3.5–5.1)
SODIUM SERPL-SCNC: 140 MMOL/L (ref 136–145)

## 2021-03-16 PROCEDURE — 63600175 PHARM REV CODE 636 W HCPCS: Performed by: SURGERY

## 2021-03-16 PROCEDURE — A4216 STERILE WATER/SALINE, 10 ML: HCPCS | Performed by: SURGERY

## 2021-03-16 PROCEDURE — 97165 OT EVAL LOW COMPLEX 30 MIN: CPT

## 2021-03-16 PROCEDURE — 25000003 PHARM REV CODE 250: Performed by: INTERNAL MEDICINE

## 2021-03-16 PROCEDURE — 25000003 PHARM REV CODE 250: Performed by: HOSPITALIST

## 2021-03-16 PROCEDURE — 97162 PT EVAL MOD COMPLEX 30 MIN: CPT | Performed by: PHYSICAL THERAPIST

## 2021-03-16 PROCEDURE — 25000003 PHARM REV CODE 250: Performed by: NURSE PRACTITIONER

## 2021-03-16 PROCEDURE — 63600175 PHARM REV CODE 636 W HCPCS: Mod: JG | Performed by: INTERNAL MEDICINE

## 2021-03-16 PROCEDURE — 63600175 PHARM REV CODE 636 W HCPCS: Performed by: HOSPITALIST

## 2021-03-16 PROCEDURE — 36415 COLL VENOUS BLD VENIPUNCTURE: CPT | Performed by: INTERNAL MEDICINE

## 2021-03-16 PROCEDURE — 80048 BASIC METABOLIC PNL TOTAL CA: CPT | Performed by: HOSPITALIST

## 2021-03-16 PROCEDURE — 11000001 HC ACUTE MED/SURG PRIVATE ROOM

## 2021-03-16 PROCEDURE — 25000003 PHARM REV CODE 250: Performed by: SURGERY

## 2021-03-16 PROCEDURE — 97530 THERAPEUTIC ACTIVITIES: CPT

## 2021-03-16 PROCEDURE — 87040 BLOOD CULTURE FOR BACTERIA: CPT | Mod: 59 | Performed by: INTERNAL MEDICINE

## 2021-03-16 RX ORDER — SERTRALINE HYDROCHLORIDE 50 MG/1
50 TABLET, FILM COATED ORAL DAILY
Status: DISCONTINUED | OUTPATIENT
Start: 2021-03-16 | End: 2021-03-27 | Stop reason: HOSPADM

## 2021-03-16 RX ADMIN — OXYCODONE HYDROCHLORIDE AND ACETAMINOPHEN 1 TABLET: 10; 325 TABLET ORAL at 02:03

## 2021-03-16 RX ADMIN — INSULIN ASPART 8 UNITS: 100 INJECTION, SOLUTION INTRAVENOUS; SUBCUTANEOUS at 11:03

## 2021-03-16 RX ADMIN — CEFTAZIDIME, AVIBACTAM 2.5 G: 2; .5 POWDER, FOR SOLUTION INTRAVENOUS at 02:03

## 2021-03-16 RX ADMIN — GABAPENTIN 300 MG: 300 CAPSULE ORAL at 08:03

## 2021-03-16 RX ADMIN — Medication 10 ML: at 12:03

## 2021-03-16 RX ADMIN — METRONIDAZOLE 500 MG: 500 TABLET ORAL at 02:03

## 2021-03-16 RX ADMIN — INSULIN ASPART 4 UNITS: 100 INJECTION, SOLUTION INTRAVENOUS; SUBCUTANEOUS at 08:03

## 2021-03-16 RX ADMIN — VANCOMYCIN HYDROCHLORIDE 1500 MG: 1.5 INJECTION, POWDER, LYOPHILIZED, FOR SOLUTION INTRAVENOUS at 05:03

## 2021-03-16 RX ADMIN — METRONIDAZOLE 500 MG: 500 TABLET ORAL at 05:03

## 2021-03-16 RX ADMIN — OXYCODONE HYDROCHLORIDE AND ACETAMINOPHEN 1 TABLET: 10; 325 TABLET ORAL at 07:03

## 2021-03-16 RX ADMIN — INSULIN ASPART 6 UNITS: 100 INJECTION, SOLUTION INTRAVENOUS; SUBCUTANEOUS at 07:03

## 2021-03-16 RX ADMIN — BACLOFEN 10 MG: 5 TABLET ORAL at 08:03

## 2021-03-16 RX ADMIN — CEFTAZIDIME, AVIBACTAM 2.5 G: 2; .5 POWDER, FOR SOLUTION INTRAVENOUS at 11:03

## 2021-03-16 RX ADMIN — INSULIN ASPART 8 UNITS: 100 INJECTION, SOLUTION INTRAVENOUS; SUBCUTANEOUS at 07:03

## 2021-03-16 RX ADMIN — IBUPROFEN 600 MG: 600 TABLET ORAL at 08:03

## 2021-03-16 RX ADMIN — METRONIDAZOLE 500 MG: 500 TABLET ORAL at 08:03

## 2021-03-16 RX ADMIN — PANTOPRAZOLE SODIUM 40 MG: 40 TABLET, DELAYED RELEASE ORAL at 08:03

## 2021-03-16 RX ADMIN — IBUPROFEN 600 MG: 600 TABLET ORAL at 02:03

## 2021-03-16 RX ADMIN — OXYCODONE HYDROCHLORIDE AND ACETAMINOPHEN 1 TABLET: 10; 325 TABLET ORAL at 08:03

## 2021-03-16 RX ADMIN — INSULIN ASPART 8 UNITS: 100 INJECTION, SOLUTION INTRAVENOUS; SUBCUTANEOUS at 06:03

## 2021-03-16 RX ADMIN — IBUPROFEN 600 MG: 600 TABLET ORAL at 06:03

## 2021-03-16 RX ADMIN — SERTRALINE HYDROCHLORIDE 50 MG: 50 TABLET ORAL at 11:03

## 2021-03-16 RX ADMIN — INSULIN ASPART 2 UNITS: 100 INJECTION, SOLUTION INTRAVENOUS; SUBCUTANEOUS at 11:03

## 2021-03-16 RX ADMIN — Medication 10 ML: at 05:03

## 2021-03-16 RX ADMIN — INSULIN ASPART 4 UNITS: 100 INJECTION, SOLUTION INTRAVENOUS; SUBCUTANEOUS at 06:03

## 2021-03-16 RX ADMIN — ENOXAPARIN SODIUM 40 MG: 40 INJECTION SUBCUTANEOUS at 06:03

## 2021-03-16 RX ADMIN — CEFTAZIDIME, AVIBACTAM 2.5 G: 2; .5 POWDER, FOR SOLUTION INTRAVENOUS at 06:03

## 2021-03-16 RX ADMIN — METOPROLOL TARTRATE 25 MG: 25 TABLET, FILM COATED ORAL at 08:03

## 2021-03-16 RX ADMIN — VANCOMYCIN HYDROCHLORIDE 1250 MG: 1.25 INJECTION, POWDER, LYOPHILIZED, FOR SOLUTION INTRAVENOUS at 08:03

## 2021-03-16 RX ADMIN — Medication 10 ML: at 06:03

## 2021-03-17 LAB
ANION GAP SERPL CALC-SCNC: 8 MMOL/L (ref 8–16)
BACTERIA BLD CULT: ABNORMAL
BACTERIA SPEC ANAEROBE CULT: ABNORMAL
BACTERIA SPEC ANAEROBE CULT: ABNORMAL
BUN SERPL-MCNC: 5 MG/DL (ref 6–20)
CALCIUM SERPL-MCNC: 8.2 MG/DL (ref 8.7–10.5)
CHLORIDE SERPL-SCNC: 107 MMOL/L (ref 95–110)
CO2 SERPL-SCNC: 24 MMOL/L (ref 23–29)
CREAT SERPL-MCNC: 0.8 MG/DL (ref 0.5–1.4)
EST. GFR  (AFRICAN AMERICAN): >60 ML/MIN/1.73 M^2
EST. GFR  (NON AFRICAN AMERICAN): >60 ML/MIN/1.73 M^2
FINAL PATHOLOGIC DIAGNOSIS: NORMAL
GLUCOSE SERPL-MCNC: 180 MG/DL (ref 70–110)
GROSS: NORMAL
Lab: NORMAL
POCT GLUCOSE: 109 MG/DL (ref 70–110)
POCT GLUCOSE: 226 MG/DL (ref 70–110)
POCT GLUCOSE: 230 MG/DL (ref 70–110)
POCT GLUCOSE: 99 MG/DL (ref 70–110)
POTASSIUM SERPL-SCNC: 3.7 MMOL/L (ref 3.5–5.1)
SODIUM SERPL-SCNC: 139 MMOL/L (ref 136–145)

## 2021-03-17 PROCEDURE — 63600175 PHARM REV CODE 636 W HCPCS: Performed by: SURGERY

## 2021-03-17 PROCEDURE — 25000003 PHARM REV CODE 250: Performed by: HOSPITALIST

## 2021-03-17 PROCEDURE — A4216 STERILE WATER/SALINE, 10 ML: HCPCS | Performed by: SURGERY

## 2021-03-17 PROCEDURE — 80048 BASIC METABOLIC PNL TOTAL CA: CPT | Performed by: HOSPITALIST

## 2021-03-17 PROCEDURE — 25000003 PHARM REV CODE 250: Performed by: INTERNAL MEDICINE

## 2021-03-17 PROCEDURE — 25000003 PHARM REV CODE 250: Performed by: SURGERY

## 2021-03-17 PROCEDURE — 11000001 HC ACUTE MED/SURG PRIVATE ROOM

## 2021-03-17 PROCEDURE — 63600175 PHARM REV CODE 636 W HCPCS: Performed by: HOSPITALIST

## 2021-03-17 PROCEDURE — 63600175 PHARM REV CODE 636 W HCPCS: Mod: JG | Performed by: INTERNAL MEDICINE

## 2021-03-17 PROCEDURE — 25000003 PHARM REV CODE 250: Performed by: NURSE PRACTITIONER

## 2021-03-17 RX ORDER — INSULIN ASPART 100 [IU]/ML
10 INJECTION, SOLUTION INTRAVENOUS; SUBCUTANEOUS
Status: DISCONTINUED | OUTPATIENT
Start: 2021-03-17 | End: 2021-03-17

## 2021-03-17 RX ORDER — INSULIN ASPART 100 [IU]/ML
8 INJECTION, SOLUTION INTRAVENOUS; SUBCUTANEOUS
Status: DISCONTINUED | OUTPATIENT
Start: 2021-03-17 | End: 2021-03-27 | Stop reason: HOSPADM

## 2021-03-17 RX ORDER — INSULIN ASPART 100 [IU]/ML
11 INJECTION, SOLUTION INTRAVENOUS; SUBCUTANEOUS
Status: DISCONTINUED | OUTPATIENT
Start: 2021-03-17 | End: 2021-03-17

## 2021-03-17 RX ADMIN — Medication 10 ML: at 05:03

## 2021-03-17 RX ADMIN — IBUPROFEN 600 MG: 600 TABLET ORAL at 05:03

## 2021-03-17 RX ADMIN — ONDANSETRON 4 MG: 2 INJECTION INTRAMUSCULAR; INTRAVENOUS at 10:03

## 2021-03-17 RX ADMIN — DOCUSATE SODIUM 50 MG AND SENNOSIDES 8.6 MG 1 TABLET: 8.6; 5 TABLET, FILM COATED ORAL at 08:03

## 2021-03-17 RX ADMIN — BACLOFEN 10 MG: 5 TABLET ORAL at 08:03

## 2021-03-17 RX ADMIN — GABAPENTIN 300 MG: 300 CAPSULE ORAL at 08:03

## 2021-03-17 RX ADMIN — IBUPROFEN 600 MG: 600 TABLET ORAL at 08:03

## 2021-03-17 RX ADMIN — INSULIN ASPART 10 UNITS: 100 INJECTION, SOLUTION INTRAVENOUS; SUBCUTANEOUS at 08:03

## 2021-03-17 RX ADMIN — CEFTAZIDIME, AVIBACTAM 2.5 G: 2; .5 POWDER, FOR SOLUTION INTRAVENOUS at 02:03

## 2021-03-17 RX ADMIN — METOPROLOL TARTRATE 25 MG: 25 TABLET, FILM COATED ORAL at 08:03

## 2021-03-17 RX ADMIN — INSULIN DETEMIR 12 UNITS: 100 INJECTION, SOLUTION SUBCUTANEOUS at 09:03

## 2021-03-17 RX ADMIN — METRONIDAZOLE 500 MG: 500 TABLET ORAL at 09:03

## 2021-03-17 RX ADMIN — PANTOPRAZOLE SODIUM 40 MG: 40 TABLET, DELAYED RELEASE ORAL at 08:03

## 2021-03-17 RX ADMIN — OXYCODONE HYDROCHLORIDE AND ACETAMINOPHEN 1 TABLET: 10; 325 TABLET ORAL at 05:03

## 2021-03-17 RX ADMIN — Medication 10 ML: at 12:03

## 2021-03-17 RX ADMIN — CEFTAZIDIME, AVIBACTAM 2.5 G: 2; .5 POWDER, FOR SOLUTION INTRAVENOUS at 10:03

## 2021-03-17 RX ADMIN — METRONIDAZOLE 500 MG: 500 TABLET ORAL at 01:03

## 2021-03-17 RX ADMIN — OXYCODONE HYDROCHLORIDE AND ACETAMINOPHEN 1 TABLET: 10; 325 TABLET ORAL at 10:03

## 2021-03-17 RX ADMIN — SERTRALINE HYDROCHLORIDE 50 MG: 50 TABLET ORAL at 08:03

## 2021-03-17 RX ADMIN — ENOXAPARIN SODIUM 40 MG: 40 INJECTION SUBCUTANEOUS at 05:03

## 2021-03-17 RX ADMIN — INSULIN DETEMIR 12 UNITS: 100 INJECTION, SOLUTION SUBCUTANEOUS at 08:03

## 2021-03-17 RX ADMIN — IBUPROFEN 600 MG: 600 TABLET ORAL at 12:03

## 2021-03-17 RX ADMIN — CEFTAZIDIME, AVIBACTAM 2.5 G: 2; .5 POWDER, FOR SOLUTION INTRAVENOUS at 05:03

## 2021-03-17 RX ADMIN — VANCOMYCIN HYDROCHLORIDE 1250 MG: 1.25 INJECTION, POWDER, LYOPHILIZED, FOR SOLUTION INTRAVENOUS at 04:03

## 2021-03-17 RX ADMIN — METRONIDAZOLE 500 MG: 500 TABLET ORAL at 05:03

## 2021-03-17 RX ADMIN — VANCOMYCIN HYDROCHLORIDE 1250 MG: 1.25 INJECTION, POWDER, LYOPHILIZED, FOR SOLUTION INTRAVENOUS at 05:03

## 2021-03-17 RX ADMIN — INSULIN ASPART 2 UNITS: 100 INJECTION, SOLUTION INTRAVENOUS; SUBCUTANEOUS at 09:03

## 2021-03-17 RX ADMIN — INSULIN ASPART 4 UNITS: 100 INJECTION, SOLUTION INTRAVENOUS; SUBCUTANEOUS at 08:03

## 2021-03-18 ENCOUNTER — TELEPHONE (OUTPATIENT)
Dept: PRIMARY CARE CLINIC | Facility: CLINIC | Age: 46
End: 2021-03-18

## 2021-03-18 LAB
ANION GAP SERPL CALC-SCNC: 9 MMOL/L (ref 8–16)
BUN SERPL-MCNC: 5 MG/DL (ref 6–20)
CALCIUM SERPL-MCNC: 8.2 MG/DL (ref 8.7–10.5)
CHLORIDE SERPL-SCNC: 108 MMOL/L (ref 95–110)
CO2 SERPL-SCNC: 24 MMOL/L (ref 23–29)
CREAT SERPL-MCNC: 0.8 MG/DL (ref 0.5–1.4)
EST. GFR  (AFRICAN AMERICAN): >60 ML/MIN/1.73 M^2
EST. GFR  (NON AFRICAN AMERICAN): >60 ML/MIN/1.73 M^2
GLUCOSE SERPL-MCNC: 121 MG/DL (ref 70–110)
POCT GLUCOSE: 113 MG/DL (ref 70–110)
POCT GLUCOSE: 147 MG/DL (ref 70–110)
POCT GLUCOSE: 161 MG/DL (ref 70–110)
POCT GLUCOSE: 211 MG/DL (ref 70–110)
POTASSIUM SERPL-SCNC: 3.6 MMOL/L (ref 3.5–5.1)
SODIUM SERPL-SCNC: 141 MMOL/L (ref 136–145)
VANCOMYCIN TROUGH SERPL-MCNC: 30 UG/ML (ref 10–22)

## 2021-03-18 PROCEDURE — A4216 STERILE WATER/SALINE, 10 ML: HCPCS | Performed by: SURGERY

## 2021-03-18 PROCEDURE — 63600175 PHARM REV CODE 636 W HCPCS: Performed by: HOSPITALIST

## 2021-03-18 PROCEDURE — 80048 BASIC METABOLIC PNL TOTAL CA: CPT | Performed by: HOSPITALIST

## 2021-03-18 PROCEDURE — 25000003 PHARM REV CODE 250: Performed by: INTERNAL MEDICINE

## 2021-03-18 PROCEDURE — 11000001 HC ACUTE MED/SURG PRIVATE ROOM

## 2021-03-18 PROCEDURE — 63600175 PHARM REV CODE 636 W HCPCS: Mod: JG | Performed by: INTERNAL MEDICINE

## 2021-03-18 PROCEDURE — 25000003 PHARM REV CODE 250: Performed by: NURSE PRACTITIONER

## 2021-03-18 PROCEDURE — 25000003 PHARM REV CODE 250: Performed by: HOSPITALIST

## 2021-03-18 PROCEDURE — 80202 ASSAY OF VANCOMYCIN: CPT | Performed by: HOSPITALIST

## 2021-03-18 PROCEDURE — 25000003 PHARM REV CODE 250: Performed by: SURGERY

## 2021-03-18 RX ORDER — OXYCODONE AND ACETAMINOPHEN 10; 325 MG/1; MG/1
1 TABLET ORAL EVERY 4 HOURS PRN
Refills: 0 | Status: ON HOLD
Start: 2021-03-18 | End: 2021-04-23 | Stop reason: HOSPADM

## 2021-03-18 RX ORDER — METRONIDAZOLE 500 MG/1
500 TABLET ORAL EVERY 8 HOURS
Status: ON HOLD
Start: 2021-03-18 | End: 2021-04-23 | Stop reason: HOSPADM

## 2021-03-18 RX ORDER — METOPROLOL TARTRATE 25 MG/1
25 TABLET, FILM COATED ORAL DAILY
Qty: 30 TABLET | Refills: 11 | Status: ON HOLD | OUTPATIENT
Start: 2021-03-18 | End: 2021-04-23 | Stop reason: HOSPADM

## 2021-03-18 RX ORDER — ONDANSETRON 8 MG/1
8 TABLET, ORALLY DISINTEGRATING ORAL EVERY 8 HOURS PRN
Start: 2021-03-18

## 2021-03-18 RX ORDER — INSULIN ASPART 100 [IU]/ML
8 INJECTION, SOLUTION INTRAVENOUS; SUBCUTANEOUS 3 TIMES DAILY
Refills: 0 | Status: ON HOLD
Start: 2021-03-18 | End: 2021-04-23 | Stop reason: HOSPADM

## 2021-03-18 RX ORDER — SERTRALINE HYDROCHLORIDE 50 MG/1
50 TABLET, FILM COATED ORAL DAILY
Qty: 30 TABLET | Refills: 11
Start: 2021-03-18 | End: 2022-03-18

## 2021-03-18 RX ORDER — POTASSIUM CHLORIDE 20 MEQ/1
40 TABLET, EXTENDED RELEASE ORAL ONCE
Status: COMPLETED | OUTPATIENT
Start: 2021-03-18 | End: 2021-03-18

## 2021-03-18 RX ADMIN — METOPROLOL TARTRATE 25 MG: 25 TABLET, FILM COATED ORAL at 08:03

## 2021-03-18 RX ADMIN — GABAPENTIN 300 MG: 300 CAPSULE ORAL at 08:03

## 2021-03-18 RX ADMIN — Medication 10 ML: at 12:03

## 2021-03-18 RX ADMIN — IBUPROFEN 600 MG: 600 TABLET ORAL at 08:03

## 2021-03-18 RX ADMIN — SERTRALINE HYDROCHLORIDE 50 MG: 50 TABLET ORAL at 08:03

## 2021-03-18 RX ADMIN — OXYCODONE HYDROCHLORIDE AND ACETAMINOPHEN 1 TABLET: 10; 325 TABLET ORAL at 09:03

## 2021-03-18 RX ADMIN — METRONIDAZOLE 500 MG: 500 TABLET ORAL at 09:03

## 2021-03-18 RX ADMIN — INSULIN DETEMIR 12 UNITS: 100 INJECTION, SOLUTION SUBCUTANEOUS at 08:03

## 2021-03-18 RX ADMIN — METRONIDAZOLE 500 MG: 500 TABLET ORAL at 02:03

## 2021-03-18 RX ADMIN — Medication 10 ML: at 06:03

## 2021-03-18 RX ADMIN — INSULIN ASPART 4 UNITS: 100 INJECTION, SOLUTION INTRAVENOUS; SUBCUTANEOUS at 12:03

## 2021-03-18 RX ADMIN — BACLOFEN 10 MG: 5 TABLET ORAL at 08:03

## 2021-03-18 RX ADMIN — METRONIDAZOLE 500 MG: 500 TABLET ORAL at 05:03

## 2021-03-18 RX ADMIN — ENOXAPARIN SODIUM 40 MG: 40 INJECTION SUBCUTANEOUS at 05:03

## 2021-03-18 RX ADMIN — INSULIN ASPART 2 UNITS: 100 INJECTION, SOLUTION INTRAVENOUS; SUBCUTANEOUS at 05:03

## 2021-03-18 RX ADMIN — INSULIN ASPART 8 UNITS: 100 INJECTION, SOLUTION INTRAVENOUS; SUBCUTANEOUS at 12:03

## 2021-03-18 RX ADMIN — CEFTAZIDIME, AVIBACTAM 2.5 G: 2; .5 POWDER, FOR SOLUTION INTRAVENOUS at 05:03

## 2021-03-18 RX ADMIN — OXYCODONE HYDROCHLORIDE AND ACETAMINOPHEN 1 TABLET: 10; 325 TABLET ORAL at 05:03

## 2021-03-18 RX ADMIN — POTASSIUM CHLORIDE 40 MEQ: 1500 TABLET, EXTENDED RELEASE ORAL at 08:03

## 2021-03-18 RX ADMIN — PANTOPRAZOLE SODIUM 40 MG: 40 TABLET, DELAYED RELEASE ORAL at 08:03

## 2021-03-18 RX ADMIN — CEFTAZIDIME, AVIBACTAM 2.5 G: 2; .5 POWDER, FOR SOLUTION INTRAVENOUS at 02:03

## 2021-03-18 RX ADMIN — ONDANSETRON 8 MG: 8 TABLET, ORALLY DISINTEGRATING ORAL at 05:03

## 2021-03-18 RX ADMIN — DOCUSATE SODIUM 50 MG AND SENNOSIDES 8.6 MG 1 TABLET: 8.6; 5 TABLET, FILM COATED ORAL at 08:03

## 2021-03-18 RX ADMIN — INSULIN ASPART 8 UNITS: 100 INJECTION, SOLUTION INTRAVENOUS; SUBCUTANEOUS at 05:03

## 2021-03-18 RX ADMIN — IBUPROFEN 600 MG: 600 TABLET ORAL at 12:03

## 2021-03-18 RX ADMIN — Medication 10 ML: at 05:03

## 2021-03-18 RX ADMIN — IBUPROFEN 600 MG: 600 TABLET ORAL at 05:03

## 2021-03-18 RX ADMIN — ONDANSETRON 8 MG: 8 TABLET, ORALLY DISINTEGRATING ORAL at 08:03

## 2021-03-18 RX ADMIN — CEFTAZIDIME, AVIBACTAM 2.5 G: 2; .5 POWDER, FOR SOLUTION INTRAVENOUS at 09:03

## 2021-03-19 LAB
ANION GAP SERPL CALC-SCNC: 8 MMOL/L (ref 8–16)
BUN SERPL-MCNC: 5 MG/DL (ref 6–20)
CALCIUM SERPL-MCNC: 8.5 MG/DL (ref 8.7–10.5)
CHLORIDE SERPL-SCNC: 108 MMOL/L (ref 95–110)
CO2 SERPL-SCNC: 23 MMOL/L (ref 23–29)
CREAT SERPL-MCNC: 0.8 MG/DL (ref 0.5–1.4)
EST. GFR  (AFRICAN AMERICAN): >60 ML/MIN/1.73 M^2
EST. GFR  (NON AFRICAN AMERICAN): >60 ML/MIN/1.73 M^2
GLUCOSE SERPL-MCNC: 145 MG/DL (ref 70–110)
POCT GLUCOSE: 132 MG/DL (ref 70–110)
POCT GLUCOSE: 140 MG/DL (ref 70–110)
POCT GLUCOSE: 157 MG/DL (ref 70–110)
POCT GLUCOSE: 230 MG/DL (ref 70–110)
POTASSIUM SERPL-SCNC: 4.1 MMOL/L (ref 3.5–5.1)
SODIUM SERPL-SCNC: 139 MMOL/L (ref 136–145)
VANCOMYCIN SERPL-MCNC: 10 UG/ML

## 2021-03-19 PROCEDURE — 25000003 PHARM REV CODE 250: Performed by: SURGERY

## 2021-03-19 PROCEDURE — A4216 STERILE WATER/SALINE, 10 ML: HCPCS | Performed by: SURGERY

## 2021-03-19 PROCEDURE — 25000003 PHARM REV CODE 250: Performed by: INTERNAL MEDICINE

## 2021-03-19 PROCEDURE — 63600175 PHARM REV CODE 636 W HCPCS: Mod: JG | Performed by: INTERNAL MEDICINE

## 2021-03-19 PROCEDURE — 25000003 PHARM REV CODE 250: Performed by: NURSE PRACTITIONER

## 2021-03-19 PROCEDURE — 25000003 PHARM REV CODE 250: Performed by: HOSPITALIST

## 2021-03-19 PROCEDURE — 11000001 HC ACUTE MED/SURG PRIVATE ROOM

## 2021-03-19 PROCEDURE — 80048 BASIC METABOLIC PNL TOTAL CA: CPT | Performed by: HOSPITALIST

## 2021-03-19 PROCEDURE — 80202 ASSAY OF VANCOMYCIN: CPT | Performed by: HOSPITALIST

## 2021-03-19 PROCEDURE — 63600175 PHARM REV CODE 636 W HCPCS: Performed by: HOSPITALIST

## 2021-03-19 RX ORDER — VANCOMYCIN HCL IN 5 % DEXTROSE 1G/250ML
1000 PLASTIC BAG, INJECTION (ML) INTRAVENOUS
Status: DISCONTINUED | OUTPATIENT
Start: 2021-03-19 | End: 2021-03-27 | Stop reason: HOSPADM

## 2021-03-19 RX ORDER — POLYETHYLENE GLYCOL 3350 17 G/17G
17 POWDER, FOR SOLUTION ORAL ONCE
Status: COMPLETED | OUTPATIENT
Start: 2021-03-19 | End: 2021-03-19

## 2021-03-19 RX ADMIN — IBUPROFEN 600 MG: 600 TABLET ORAL at 02:03

## 2021-03-19 RX ADMIN — CEFTAZIDIME, AVIBACTAM 2.5 G: 2; .5 POWDER, FOR SOLUTION INTRAVENOUS at 07:03

## 2021-03-19 RX ADMIN — METOPROLOL TARTRATE 25 MG: 25 TABLET, FILM COATED ORAL at 10:03

## 2021-03-19 RX ADMIN — INSULIN ASPART 8 UNITS: 100 INJECTION, SOLUTION INTRAVENOUS; SUBCUTANEOUS at 02:03

## 2021-03-19 RX ADMIN — CEFTAZIDIME, AVIBACTAM 2.5 G: 2; .5 POWDER, FOR SOLUTION INTRAVENOUS at 10:03

## 2021-03-19 RX ADMIN — GABAPENTIN 300 MG: 300 CAPSULE ORAL at 10:03

## 2021-03-19 RX ADMIN — METRONIDAZOLE 500 MG: 500 TABLET ORAL at 02:03

## 2021-03-19 RX ADMIN — BACLOFEN 10 MG: 5 TABLET ORAL at 10:03

## 2021-03-19 RX ADMIN — Medication 10 ML: at 03:03

## 2021-03-19 RX ADMIN — INSULIN ASPART 4 UNITS: 100 INJECTION, SOLUTION INTRAVENOUS; SUBCUTANEOUS at 07:03

## 2021-03-19 RX ADMIN — Medication 10 ML: at 12:03

## 2021-03-19 RX ADMIN — ENOXAPARIN SODIUM 40 MG: 40 INJECTION SUBCUTANEOUS at 07:03

## 2021-03-19 RX ADMIN — Medication 10 ML: at 02:03

## 2021-03-19 RX ADMIN — VANCOMYCIN HYDROCHLORIDE 1000 MG: 1 INJECTION, POWDER, LYOPHILIZED, FOR SOLUTION INTRAVENOUS at 07:03

## 2021-03-19 RX ADMIN — INSULIN ASPART 2 UNITS: 100 INJECTION, SOLUTION INTRAVENOUS; SUBCUTANEOUS at 06:03

## 2021-03-19 RX ADMIN — OXYCODONE HYDROCHLORIDE AND ACETAMINOPHEN 1 TABLET: 10; 325 TABLET ORAL at 10:03

## 2021-03-19 RX ADMIN — METRONIDAZOLE 500 MG: 500 TABLET ORAL at 05:03

## 2021-03-19 RX ADMIN — CEFTAZIDIME, AVIBACTAM 2.5 G: 2; .5 POWDER, FOR SOLUTION INTRAVENOUS at 02:03

## 2021-03-19 RX ADMIN — VANCOMYCIN HYDROCHLORIDE 1000 MG: 1 INJECTION, POWDER, LYOPHILIZED, FOR SOLUTION INTRAVENOUS at 05:03

## 2021-03-19 RX ADMIN — INSULIN DETEMIR 12 UNITS: 100 INJECTION, SOLUTION SUBCUTANEOUS at 10:03

## 2021-03-19 RX ADMIN — DOCUSATE SODIUM 50 MG AND SENNOSIDES 8.6 MG 1 TABLET: 8.6; 5 TABLET, FILM COATED ORAL at 10:03

## 2021-03-19 RX ADMIN — METRONIDAZOLE 500 MG: 500 TABLET ORAL at 10:03

## 2021-03-19 RX ADMIN — INSULIN ASPART 8 UNITS: 100 INJECTION, SOLUTION INTRAVENOUS; SUBCUTANEOUS at 10:03

## 2021-03-19 RX ADMIN — Medication 10 ML: at 07:03

## 2021-03-19 RX ADMIN — POLYETHYLENE GLYCOL (3350) 17 G: 17 POWDER, FOR SOLUTION ORAL at 02:03

## 2021-03-19 RX ADMIN — OXYCODONE HYDROCHLORIDE AND ACETAMINOPHEN 1 TABLET: 10; 325 TABLET ORAL at 03:03

## 2021-03-19 RX ADMIN — IBUPROFEN 600 MG: 600 TABLET ORAL at 10:03

## 2021-03-19 RX ADMIN — INSULIN ASPART 8 UNITS: 100 INJECTION, SOLUTION INTRAVENOUS; SUBCUTANEOUS at 07:03

## 2021-03-19 RX ADMIN — Medication 10 ML: at 05:03

## 2021-03-19 RX ADMIN — IBUPROFEN 600 MG: 600 TABLET ORAL at 07:03

## 2021-03-19 RX ADMIN — PANTOPRAZOLE SODIUM 40 MG: 40 TABLET, DELAYED RELEASE ORAL at 10:03

## 2021-03-19 RX ADMIN — SERTRALINE HYDROCHLORIDE 50 MG: 50 TABLET ORAL at 10:03

## 2021-03-20 LAB
ANION GAP SERPL CALC-SCNC: 9 MMOL/L (ref 8–16)
BUN SERPL-MCNC: 4 MG/DL (ref 6–20)
CALCIUM SERPL-MCNC: 8.6 MG/DL (ref 8.7–10.5)
CHLORIDE SERPL-SCNC: 107 MMOL/L (ref 95–110)
CO2 SERPL-SCNC: 22 MMOL/L (ref 23–29)
CREAT SERPL-MCNC: 0.8 MG/DL (ref 0.5–1.4)
EST. GFR  (AFRICAN AMERICAN): >60 ML/MIN/1.73 M^2
EST. GFR  (NON AFRICAN AMERICAN): >60 ML/MIN/1.73 M^2
GLUCOSE SERPL-MCNC: 128 MG/DL (ref 70–110)
POCT GLUCOSE: 115 MG/DL (ref 70–110)
POCT GLUCOSE: 145 MG/DL (ref 70–110)
POCT GLUCOSE: 159 MG/DL (ref 70–110)
POCT GLUCOSE: 169 MG/DL (ref 70–110)
POTASSIUM SERPL-SCNC: 3.7 MMOL/L (ref 3.5–5.1)
SODIUM SERPL-SCNC: 138 MMOL/L (ref 136–145)
VANCOMYCIN TROUGH SERPL-MCNC: 18.2 UG/ML (ref 10–22)

## 2021-03-20 PROCEDURE — 63600175 PHARM REV CODE 636 W HCPCS: Mod: JG | Performed by: INTERNAL MEDICINE

## 2021-03-20 PROCEDURE — 80048 BASIC METABOLIC PNL TOTAL CA: CPT | Performed by: HOSPITALIST

## 2021-03-20 PROCEDURE — 25000003 PHARM REV CODE 250: Performed by: SURGERY

## 2021-03-20 PROCEDURE — 25000003 PHARM REV CODE 250: Performed by: INTERNAL MEDICINE

## 2021-03-20 PROCEDURE — 25000003 PHARM REV CODE 250: Performed by: HOSPITALIST

## 2021-03-20 PROCEDURE — 11000001 HC ACUTE MED/SURG PRIVATE ROOM

## 2021-03-20 PROCEDURE — 63600175 PHARM REV CODE 636 W HCPCS: Performed by: HOSPITALIST

## 2021-03-20 PROCEDURE — 63600175 PHARM REV CODE 636 W HCPCS: Performed by: SURGERY

## 2021-03-20 PROCEDURE — 80202 ASSAY OF VANCOMYCIN: CPT | Performed by: HOSPITALIST

## 2021-03-20 PROCEDURE — 25000003 PHARM REV CODE 250: Performed by: NURSE PRACTITIONER

## 2021-03-20 PROCEDURE — A4216 STERILE WATER/SALINE, 10 ML: HCPCS | Performed by: SURGERY

## 2021-03-20 RX ORDER — OXYCODONE AND ACETAMINOPHEN 7.5; 325 MG/1; MG/1
1 TABLET ORAL EVERY 4 HOURS PRN
Status: DISCONTINUED | OUTPATIENT
Start: 2021-03-20 | End: 2021-03-22

## 2021-03-20 RX ADMIN — OXYCODONE HYDROCHLORIDE AND ACETAMINOPHEN 1 TABLET: 10; 325 TABLET ORAL at 06:03

## 2021-03-20 RX ADMIN — Medication 10 ML: at 07:03

## 2021-03-20 RX ADMIN — INSULIN DETEMIR 12 UNITS: 100 INJECTION, SOLUTION SUBCUTANEOUS at 09:03

## 2021-03-20 RX ADMIN — ENOXAPARIN SODIUM 40 MG: 40 INJECTION SUBCUTANEOUS at 05:03

## 2021-03-20 RX ADMIN — IBUPROFEN 600 MG: 600 TABLET ORAL at 03:03

## 2021-03-20 RX ADMIN — METOPROLOL TARTRATE 25 MG: 25 TABLET, FILM COATED ORAL at 10:03

## 2021-03-20 RX ADMIN — VANCOMYCIN HYDROCHLORIDE 1000 MG: 1 INJECTION, POWDER, LYOPHILIZED, FOR SOLUTION INTRAVENOUS at 06:03

## 2021-03-20 RX ADMIN — CEFTAZIDIME, AVIBACTAM 2.5 G: 2; .5 POWDER, FOR SOLUTION INTRAVENOUS at 10:03

## 2021-03-20 RX ADMIN — GABAPENTIN 300 MG: 300 CAPSULE ORAL at 10:03

## 2021-03-20 RX ADMIN — OXYCODONE AND ACETAMINOPHEN 1 TABLET: 7.5; 325 TABLET ORAL at 03:03

## 2021-03-20 RX ADMIN — CEFTAZIDIME, AVIBACTAM 2.5 G: 2; .5 POWDER, FOR SOLUTION INTRAVENOUS at 05:03

## 2021-03-20 RX ADMIN — METRONIDAZOLE 500 MG: 500 TABLET ORAL at 09:03

## 2021-03-20 RX ADMIN — INSULIN ASPART 8 UNITS: 100 INJECTION, SOLUTION INTRAVENOUS; SUBCUTANEOUS at 07:03

## 2021-03-20 RX ADMIN — SERTRALINE HYDROCHLORIDE 50 MG: 50 TABLET ORAL at 10:03

## 2021-03-20 RX ADMIN — BACLOFEN 10 MG: 5 TABLET ORAL at 10:03

## 2021-03-20 RX ADMIN — PANTOPRAZOLE SODIUM 40 MG: 40 TABLET, DELAYED RELEASE ORAL at 10:03

## 2021-03-20 RX ADMIN — Medication 10 ML: at 12:03

## 2021-03-20 RX ADMIN — Medication 10 ML: at 10:03

## 2021-03-20 RX ADMIN — VANCOMYCIN HYDROCHLORIDE 1000 MG: 1 INJECTION, POWDER, LYOPHILIZED, FOR SOLUTION INTRAVENOUS at 07:03

## 2021-03-20 RX ADMIN — METRONIDAZOLE 500 MG: 500 TABLET ORAL at 06:03

## 2021-03-20 RX ADMIN — CEFTAZIDIME, AVIBACTAM 2.5 G: 2; .5 POWDER, FOR SOLUTION INTRAVENOUS at 02:03

## 2021-03-20 RX ADMIN — METRONIDAZOLE 500 MG: 500 TABLET ORAL at 03:03

## 2021-03-20 RX ADMIN — OXYCODONE AND ACETAMINOPHEN 1 TABLET: 7.5; 325 TABLET ORAL at 09:03

## 2021-03-20 RX ADMIN — IBUPROFEN 600 MG: 600 TABLET ORAL at 10:03

## 2021-03-20 RX ADMIN — BACLOFEN 10 MG: 5 TABLET ORAL at 09:03

## 2021-03-20 RX ADMIN — ONDANSETRON 4 MG: 2 INJECTION INTRAMUSCULAR; INTRAVENOUS at 09:03

## 2021-03-20 RX ADMIN — Medication 10 ML: at 03:03

## 2021-03-20 RX ADMIN — GABAPENTIN 300 MG: 300 CAPSULE ORAL at 09:03

## 2021-03-21 LAB
BACTERIA BLD CULT: NORMAL
BACTERIA BLD CULT: NORMAL
POCT GLUCOSE: 120 MG/DL (ref 70–110)
POCT GLUCOSE: 141 MG/DL (ref 70–110)
POCT GLUCOSE: 142 MG/DL (ref 70–110)
POCT GLUCOSE: 179 MG/DL (ref 70–110)

## 2021-03-21 PROCEDURE — 63600175 PHARM REV CODE 636 W HCPCS: Performed by: HOSPITALIST

## 2021-03-21 PROCEDURE — 11000001 HC ACUTE MED/SURG PRIVATE ROOM

## 2021-03-21 PROCEDURE — 25000003 PHARM REV CODE 250: Performed by: SURGERY

## 2021-03-21 PROCEDURE — 25000003 PHARM REV CODE 250: Performed by: NURSE PRACTITIONER

## 2021-03-21 PROCEDURE — 63600175 PHARM REV CODE 636 W HCPCS: Mod: JG | Performed by: INTERNAL MEDICINE

## 2021-03-21 PROCEDURE — 25000003 PHARM REV CODE 250: Performed by: HOSPITALIST

## 2021-03-21 PROCEDURE — A4216 STERILE WATER/SALINE, 10 ML: HCPCS | Performed by: SURGERY

## 2021-03-21 PROCEDURE — 25000003 PHARM REV CODE 250: Performed by: INTERNAL MEDICINE

## 2021-03-21 RX ADMIN — SERTRALINE HYDROCHLORIDE 50 MG: 50 TABLET ORAL at 08:03

## 2021-03-21 RX ADMIN — Medication 10 ML: at 06:03

## 2021-03-21 RX ADMIN — VANCOMYCIN HYDROCHLORIDE 1000 MG: 1 INJECTION, POWDER, LYOPHILIZED, FOR SOLUTION INTRAVENOUS at 06:03

## 2021-03-21 RX ADMIN — METOPROLOL TARTRATE 25 MG: 25 TABLET, FILM COATED ORAL at 08:03

## 2021-03-21 RX ADMIN — OXYCODONE AND ACETAMINOPHEN 1 TABLET: 7.5; 325 TABLET ORAL at 10:03

## 2021-03-21 RX ADMIN — VANCOMYCIN HYDROCHLORIDE 1000 MG: 1 INJECTION, POWDER, LYOPHILIZED, FOR SOLUTION INTRAVENOUS at 05:03

## 2021-03-21 RX ADMIN — Medication 10 ML: at 05:03

## 2021-03-21 RX ADMIN — GABAPENTIN 300 MG: 300 CAPSULE ORAL at 10:03

## 2021-03-21 RX ADMIN — METRONIDAZOLE 500 MG: 500 TABLET ORAL at 10:03

## 2021-03-21 RX ADMIN — METRONIDAZOLE 500 MG: 500 TABLET ORAL at 02:03

## 2021-03-21 RX ADMIN — CEFTAZIDIME, AVIBACTAM 2.5 G: 2; .5 POWDER, FOR SOLUTION INTRAVENOUS at 05:03

## 2021-03-21 RX ADMIN — INSULIN ASPART 8 UNITS: 100 INJECTION, SOLUTION INTRAVENOUS; SUBCUTANEOUS at 05:03

## 2021-03-21 RX ADMIN — OXYCODONE AND ACETAMINOPHEN 1 TABLET: 7.5; 325 TABLET ORAL at 02:03

## 2021-03-21 RX ADMIN — IBUPROFEN 600 MG: 600 TABLET ORAL at 10:03

## 2021-03-21 RX ADMIN — INSULIN DETEMIR 12 UNITS: 100 INJECTION, SOLUTION SUBCUTANEOUS at 10:03

## 2021-03-21 RX ADMIN — OXYCODONE AND ACETAMINOPHEN 1 TABLET: 7.5; 325 TABLET ORAL at 08:03

## 2021-03-21 RX ADMIN — PANTOPRAZOLE SODIUM 40 MG: 40 TABLET, DELAYED RELEASE ORAL at 08:03

## 2021-03-21 RX ADMIN — BACLOFEN 10 MG: 5 TABLET ORAL at 08:03

## 2021-03-21 RX ADMIN — Medication 10 ML: at 12:03

## 2021-03-21 RX ADMIN — BACLOFEN 10 MG: 5 TABLET ORAL at 10:03

## 2021-03-21 RX ADMIN — ENOXAPARIN SODIUM 40 MG: 40 INJECTION SUBCUTANEOUS at 05:03

## 2021-03-21 RX ADMIN — CEFTAZIDIME, AVIBACTAM 2.5 G: 2; .5 POWDER, FOR SOLUTION INTRAVENOUS at 10:03

## 2021-03-21 RX ADMIN — CEFTAZIDIME, AVIBACTAM 2.5 G: 2; .5 POWDER, FOR SOLUTION INTRAVENOUS at 02:03

## 2021-03-21 RX ADMIN — METRONIDAZOLE 500 MG: 500 TABLET ORAL at 06:03

## 2021-03-21 RX ADMIN — GABAPENTIN 300 MG: 300 CAPSULE ORAL at 08:03

## 2021-03-21 RX ADMIN — INSULIN DETEMIR 12 UNITS: 100 INJECTION, SOLUTION SUBCUTANEOUS at 08:03

## 2021-03-21 RX ADMIN — INSULIN ASPART 8 UNITS: 100 INJECTION, SOLUTION INTRAVENOUS; SUBCUTANEOUS at 08:03

## 2021-03-21 RX ADMIN — Medication 10 ML: at 02:03

## 2021-03-22 LAB
ANION GAP SERPL CALC-SCNC: 6 MMOL/L (ref 8–16)
BASOPHILS # BLD AUTO: 0.08 K/UL (ref 0–0.2)
BASOPHILS NFR BLD: 0.9 % (ref 0–1.9)
BUN SERPL-MCNC: 6 MG/DL (ref 6–20)
CALCIUM SERPL-MCNC: 9 MG/DL (ref 8.7–10.5)
CHLORIDE SERPL-SCNC: 107 MMOL/L (ref 95–110)
CO2 SERPL-SCNC: 26 MMOL/L (ref 23–29)
CREAT SERPL-MCNC: 0.8 MG/DL (ref 0.5–1.4)
DIFFERENTIAL METHOD: ABNORMAL
EOSINOPHIL # BLD AUTO: 0.8 K/UL (ref 0–0.5)
EOSINOPHIL NFR BLD: 8.4 % (ref 0–8)
ERYTHROCYTE [DISTWIDTH] IN BLOOD BY AUTOMATED COUNT: 18.2 % (ref 11.5–14.5)
EST. GFR  (AFRICAN AMERICAN): >60 ML/MIN/1.73 M^2
EST. GFR  (NON AFRICAN AMERICAN): >60 ML/MIN/1.73 M^2
GLUCOSE SERPL-MCNC: 134 MG/DL (ref 70–110)
HCT VFR BLD AUTO: 31.7 % (ref 40–54)
HGB BLD-MCNC: 10.3 G/DL (ref 14–18)
IMM GRANULOCYTES # BLD AUTO: 0.06 K/UL (ref 0–0.04)
IMM GRANULOCYTES NFR BLD AUTO: 0.7 % (ref 0–0.5)
LYMPHOCYTES # BLD AUTO: 2.5 K/UL (ref 1–4.8)
LYMPHOCYTES NFR BLD: 27 % (ref 18–48)
MCH RBC QN AUTO: 24.7 PG (ref 27–31)
MCHC RBC AUTO-ENTMCNC: 32.5 G/DL (ref 32–36)
MCV RBC AUTO: 76 FL (ref 82–98)
MONOCYTES # BLD AUTO: 0.8 K/UL (ref 0.3–1)
MONOCYTES NFR BLD: 9.1 % (ref 4–15)
NEUTROPHILS # BLD AUTO: 5 K/UL (ref 1.8–7.7)
NEUTROPHILS NFR BLD: 53.9 % (ref 38–73)
NRBC BLD-RTO: 0 /100 WBC
PLATELET # BLD AUTO: 609 K/UL (ref 150–350)
PMV BLD AUTO: 10.1 FL (ref 9.2–12.9)
POCT GLUCOSE: 124 MG/DL (ref 70–110)
POCT GLUCOSE: 130 MG/DL (ref 70–110)
POCT GLUCOSE: 152 MG/DL (ref 70–110)
POCT GLUCOSE: 156 MG/DL (ref 70–110)
POCT GLUCOSE: 203 MG/DL (ref 70–110)
POTASSIUM SERPL-SCNC: 3.7 MMOL/L (ref 3.5–5.1)
RBC # BLD AUTO: 4.17 M/UL (ref 4.6–6.2)
SODIUM SERPL-SCNC: 139 MMOL/L (ref 136–145)
VANCOMYCIN TROUGH SERPL-MCNC: 18.2 UG/ML (ref 10–22)
WBC # BLD AUTO: 9.2 K/UL (ref 3.9–12.7)

## 2021-03-22 PROCEDURE — 80048 BASIC METABOLIC PNL TOTAL CA: CPT | Performed by: HOSPITALIST

## 2021-03-22 PROCEDURE — 11000001 HC ACUTE MED/SURG PRIVATE ROOM

## 2021-03-22 PROCEDURE — 25000003 PHARM REV CODE 250: Performed by: INTERNAL MEDICINE

## 2021-03-22 PROCEDURE — A4216 STERILE WATER/SALINE, 10 ML: HCPCS | Performed by: SURGERY

## 2021-03-22 PROCEDURE — 63600175 PHARM REV CODE 636 W HCPCS: Mod: JG | Performed by: INTERNAL MEDICINE

## 2021-03-22 PROCEDURE — 25000003 PHARM REV CODE 250: Performed by: NURSE PRACTITIONER

## 2021-03-22 PROCEDURE — 25000003 PHARM REV CODE 250: Performed by: HOSPITALIST

## 2021-03-22 PROCEDURE — 80202 ASSAY OF VANCOMYCIN: CPT | Performed by: HOSPITALIST

## 2021-03-22 PROCEDURE — 63600175 PHARM REV CODE 636 W HCPCS: Performed by: HOSPITALIST

## 2021-03-22 PROCEDURE — 25000003 PHARM REV CODE 250: Performed by: SURGERY

## 2021-03-22 PROCEDURE — 85025 COMPLETE CBC W/AUTO DIFF WBC: CPT | Performed by: HOSPITALIST

## 2021-03-22 RX ORDER — OXYCODONE AND ACETAMINOPHEN 10; 325 MG/1; MG/1
1 TABLET ORAL EVERY 4 HOURS PRN
Status: DISCONTINUED | OUTPATIENT
Start: 2021-03-22 | End: 2021-03-27 | Stop reason: HOSPADM

## 2021-03-22 RX ORDER — LIDOCAINE 50 MG/G
1 PATCH TOPICAL
Status: DISCONTINUED | OUTPATIENT
Start: 2021-03-22 | End: 2021-03-25

## 2021-03-22 RX ORDER — LIDOCAINE 50 MG/G
1 PATCH TOPICAL DAILY
Refills: 0 | Status: ON HOLD
Start: 2021-03-22 | End: 2021-04-23 | Stop reason: HOSPADM

## 2021-03-22 RX ADMIN — INSULIN ASPART 4 UNITS: 100 INJECTION, SOLUTION INTRAVENOUS; SUBCUTANEOUS at 08:03

## 2021-03-22 RX ADMIN — INSULIN ASPART 8 UNITS: 100 INJECTION, SOLUTION INTRAVENOUS; SUBCUTANEOUS at 12:03

## 2021-03-22 RX ADMIN — OXYCODONE AND ACETAMINOPHEN 1 TABLET: 7.5; 325 TABLET ORAL at 05:03

## 2021-03-22 RX ADMIN — OXYCODONE HYDROCHLORIDE AND ACETAMINOPHEN 1 TABLET: 10; 325 TABLET ORAL at 10:03

## 2021-03-22 RX ADMIN — ENOXAPARIN SODIUM 40 MG: 40 INJECTION SUBCUTANEOUS at 05:03

## 2021-03-22 RX ADMIN — LIDOCAINE 1 PATCH: 50 PATCH TOPICAL at 09:03

## 2021-03-22 RX ADMIN — CEFTAZIDIME, AVIBACTAM 2.5 G: 2; .5 POWDER, FOR SOLUTION INTRAVENOUS at 10:03

## 2021-03-22 RX ADMIN — Medication 10 ML: at 01:03

## 2021-03-22 RX ADMIN — INSULIN DETEMIR 12 UNITS: 100 INJECTION, SOLUTION SUBCUTANEOUS at 09:03

## 2021-03-22 RX ADMIN — COLLAGENASE SANTYL: 250 OINTMENT TOPICAL at 05:03

## 2021-03-22 RX ADMIN — INSULIN ASPART 8 UNITS: 100 INJECTION, SOLUTION INTRAVENOUS; SUBCUTANEOUS at 08:03

## 2021-03-22 RX ADMIN — BACLOFEN 10 MG: 5 TABLET ORAL at 09:03

## 2021-03-22 RX ADMIN — METRONIDAZOLE 500 MG: 500 TABLET ORAL at 01:03

## 2021-03-22 RX ADMIN — Medication 10 ML: at 06:03

## 2021-03-22 RX ADMIN — METOPROLOL TARTRATE 25 MG: 25 TABLET, FILM COATED ORAL at 09:03

## 2021-03-22 RX ADMIN — IBUPROFEN 600 MG: 600 TABLET ORAL at 01:03

## 2021-03-22 RX ADMIN — GABAPENTIN 300 MG: 300 CAPSULE ORAL at 09:03

## 2021-03-22 RX ADMIN — METRONIDAZOLE 500 MG: 500 TABLET ORAL at 05:03

## 2021-03-22 RX ADMIN — IBUPROFEN 600 MG: 600 TABLET ORAL at 09:03

## 2021-03-22 RX ADMIN — IBUPROFEN 600 MG: 600 TABLET ORAL at 05:03

## 2021-03-22 RX ADMIN — METRONIDAZOLE 500 MG: 500 TABLET ORAL at 09:03

## 2021-03-22 RX ADMIN — VANCOMYCIN HYDROCHLORIDE 1000 MG: 1 INJECTION, POWDER, LYOPHILIZED, FOR SOLUTION INTRAVENOUS at 05:03

## 2021-03-22 RX ADMIN — Medication 10 ML: at 12:03

## 2021-03-22 RX ADMIN — OXYCODONE HYDROCHLORIDE AND ACETAMINOPHEN 1 TABLET: 10; 325 TABLET ORAL at 09:03

## 2021-03-22 RX ADMIN — INSULIN ASPART 2 UNITS: 100 INJECTION, SOLUTION INTRAVENOUS; SUBCUTANEOUS at 05:03

## 2021-03-22 RX ADMIN — SERTRALINE HYDROCHLORIDE 50 MG: 50 TABLET ORAL at 09:03

## 2021-03-22 RX ADMIN — INSULIN ASPART 8 UNITS: 100 INJECTION, SOLUTION INTRAVENOUS; SUBCUTANEOUS at 05:03

## 2021-03-22 RX ADMIN — INSULIN ASPART 2 UNITS: 100 INJECTION, SOLUTION INTRAVENOUS; SUBCUTANEOUS at 12:03

## 2021-03-22 RX ADMIN — CEFTAZIDIME, AVIBACTAM 2.5 G: 2; .5 POWDER, FOR SOLUTION INTRAVENOUS at 01:03

## 2021-03-22 RX ADMIN — OXYCODONE AND ACETAMINOPHEN 1 TABLET: 7.5; 325 TABLET ORAL at 01:03

## 2021-03-22 RX ADMIN — OXYCODONE HYDROCHLORIDE AND ACETAMINOPHEN 1 TABLET: 10; 325 TABLET ORAL at 03:03

## 2021-03-22 RX ADMIN — PANTOPRAZOLE SODIUM 40 MG: 40 TABLET, DELAYED RELEASE ORAL at 09:03

## 2021-03-22 RX ADMIN — CEFTAZIDIME, AVIBACTAM 2.5 G: 2; .5 POWDER, FOR SOLUTION INTRAVENOUS at 05:03

## 2021-03-23 LAB
POCT GLUCOSE: 120 MG/DL (ref 70–110)
POCT GLUCOSE: 123 MG/DL (ref 70–110)
POCT GLUCOSE: 149 MG/DL (ref 70–110)
POCT GLUCOSE: 192 MG/DL (ref 70–110)

## 2021-03-23 PROCEDURE — 11000001 HC ACUTE MED/SURG PRIVATE ROOM

## 2021-03-23 PROCEDURE — 25000003 PHARM REV CODE 250: Performed by: HOSPITALIST

## 2021-03-23 PROCEDURE — 63600175 PHARM REV CODE 636 W HCPCS: Mod: JG | Performed by: INTERNAL MEDICINE

## 2021-03-23 PROCEDURE — 63600175 PHARM REV CODE 636 W HCPCS: Performed by: HOSPITALIST

## 2021-03-23 PROCEDURE — A4216 STERILE WATER/SALINE, 10 ML: HCPCS | Performed by: SURGERY

## 2021-03-23 PROCEDURE — 25000003 PHARM REV CODE 250: Performed by: INTERNAL MEDICINE

## 2021-03-23 PROCEDURE — 25000003 PHARM REV CODE 250: Performed by: SURGERY

## 2021-03-23 PROCEDURE — 25000003 PHARM REV CODE 250: Performed by: NURSE PRACTITIONER

## 2021-03-23 RX ADMIN — BACLOFEN 10 MG: 5 TABLET ORAL at 09:03

## 2021-03-23 RX ADMIN — GABAPENTIN 300 MG: 300 CAPSULE ORAL at 09:03

## 2021-03-23 RX ADMIN — VANCOMYCIN HYDROCHLORIDE 1000 MG: 1 INJECTION, POWDER, LYOPHILIZED, FOR SOLUTION INTRAVENOUS at 06:03

## 2021-03-23 RX ADMIN — OXYCODONE HYDROCHLORIDE AND ACETAMINOPHEN 1 TABLET: 10; 325 TABLET ORAL at 06:03

## 2021-03-23 RX ADMIN — OXYCODONE HYDROCHLORIDE AND ACETAMINOPHEN 1 TABLET: 10; 325 TABLET ORAL at 10:03

## 2021-03-23 RX ADMIN — OXYCODONE HYDROCHLORIDE AND ACETAMINOPHEN 1 TABLET: 10; 325 TABLET ORAL at 02:03

## 2021-03-23 RX ADMIN — METRONIDAZOLE 500 MG: 500 TABLET ORAL at 06:03

## 2021-03-23 RX ADMIN — Medication 10 ML: at 01:03

## 2021-03-23 RX ADMIN — IBUPROFEN 600 MG: 600 TABLET ORAL at 09:03

## 2021-03-23 RX ADMIN — CEFTAZIDIME, AVIBACTAM 2.5 G: 2; .5 POWDER, FOR SOLUTION INTRAVENOUS at 06:03

## 2021-03-23 RX ADMIN — Medication 10 ML: at 12:03

## 2021-03-23 RX ADMIN — SERTRALINE HYDROCHLORIDE 50 MG: 50 TABLET ORAL at 09:03

## 2021-03-23 RX ADMIN — CEFTAZIDIME, AVIBACTAM 2.5 G: 2; .5 POWDER, FOR SOLUTION INTRAVENOUS at 11:03

## 2021-03-23 RX ADMIN — Medication 10 ML: at 06:03

## 2021-03-23 RX ADMIN — METRONIDAZOLE 500 MG: 500 TABLET ORAL at 02:03

## 2021-03-23 RX ADMIN — CEFTAZIDIME, AVIBACTAM 2.5 G: 2; .5 POWDER, FOR SOLUTION INTRAVENOUS at 01:03

## 2021-03-23 RX ADMIN — INSULIN ASPART 8 UNITS: 100 INJECTION, SOLUTION INTRAVENOUS; SUBCUTANEOUS at 11:03

## 2021-03-23 RX ADMIN — ENOXAPARIN SODIUM 40 MG: 40 INJECTION SUBCUTANEOUS at 06:03

## 2021-03-23 RX ADMIN — METRONIDAZOLE 500 MG: 500 TABLET ORAL at 09:03

## 2021-03-23 RX ADMIN — LIDOCAINE 1 PATCH: 50 PATCH TOPICAL at 12:03

## 2021-03-23 RX ADMIN — OXYCODONE HYDROCHLORIDE AND ACETAMINOPHEN 1 TABLET: 10; 325 TABLET ORAL at 09:03

## 2021-03-23 RX ADMIN — PANTOPRAZOLE SODIUM 40 MG: 40 TABLET, DELAYED RELEASE ORAL at 09:03

## 2021-03-23 RX ADMIN — INSULIN DETEMIR 12 UNITS: 100 INJECTION, SOLUTION SUBCUTANEOUS at 09:03

## 2021-03-23 RX ADMIN — INSULIN ASPART 2 UNITS: 100 INJECTION, SOLUTION INTRAVENOUS; SUBCUTANEOUS at 11:03

## 2021-03-23 RX ADMIN — METOPROLOL TARTRATE 25 MG: 25 TABLET, FILM COATED ORAL at 09:03

## 2021-03-24 LAB
ANION GAP SERPL CALC-SCNC: 10 MMOL/L (ref 8–16)
BASOPHILS # BLD AUTO: 0.05 K/UL (ref 0–0.2)
BASOPHILS NFR BLD: 0.6 % (ref 0–1.9)
BUN SERPL-MCNC: 6 MG/DL (ref 6–20)
CALCIUM SERPL-MCNC: 8.5 MG/DL (ref 8.7–10.5)
CHLORIDE SERPL-SCNC: 104 MMOL/L (ref 95–110)
CO2 SERPL-SCNC: 25 MMOL/L (ref 23–29)
CREAT SERPL-MCNC: 0.8 MG/DL (ref 0.5–1.4)
DIFFERENTIAL METHOD: ABNORMAL
EOSINOPHIL # BLD AUTO: 0.8 K/UL (ref 0–0.5)
EOSINOPHIL NFR BLD: 10 % (ref 0–8)
ERYTHROCYTE [DISTWIDTH] IN BLOOD BY AUTOMATED COUNT: 18.6 % (ref 11.5–14.5)
EST. GFR  (AFRICAN AMERICAN): >60 ML/MIN/1.73 M^2
EST. GFR  (NON AFRICAN AMERICAN): >60 ML/MIN/1.73 M^2
GLUCOSE SERPL-MCNC: 146 MG/DL (ref 70–110)
HCT VFR BLD AUTO: 27.2 % (ref 40–54)
HGB BLD-MCNC: 9 G/DL (ref 14–18)
IMM GRANULOCYTES # BLD AUTO: 0.03 K/UL (ref 0–0.04)
IMM GRANULOCYTES NFR BLD AUTO: 0.4 % (ref 0–0.5)
LYMPHOCYTES # BLD AUTO: 2.5 K/UL (ref 1–4.8)
LYMPHOCYTES NFR BLD: 31 % (ref 18–48)
MCH RBC QN AUTO: 24.9 PG (ref 27–31)
MCHC RBC AUTO-ENTMCNC: 33.1 G/DL (ref 32–36)
MCV RBC AUTO: 75 FL (ref 82–98)
MONOCYTES # BLD AUTO: 0.8 K/UL (ref 0.3–1)
MONOCYTES NFR BLD: 9.8 % (ref 4–15)
NEUTROPHILS # BLD AUTO: 3.9 K/UL (ref 1.8–7.7)
NEUTROPHILS NFR BLD: 48.2 % (ref 38–73)
NRBC BLD-RTO: 0 /100 WBC
PLATELET # BLD AUTO: 499 K/UL (ref 150–350)
PMV BLD AUTO: 9.7 FL (ref 9.2–12.9)
POCT GLUCOSE: 143 MG/DL (ref 70–110)
POCT GLUCOSE: 156 MG/DL (ref 70–110)
POCT GLUCOSE: 162 MG/DL (ref 70–110)
POCT GLUCOSE: 172 MG/DL (ref 70–110)
POTASSIUM SERPL-SCNC: 3.7 MMOL/L (ref 3.5–5.1)
RBC # BLD AUTO: 3.61 M/UL (ref 4.6–6.2)
SODIUM SERPL-SCNC: 139 MMOL/L (ref 136–145)
VANCOMYCIN TROUGH SERPL-MCNC: 15.9 UG/ML (ref 10–22)
WBC # BLD AUTO: 8.17 K/UL (ref 3.9–12.7)

## 2021-03-24 PROCEDURE — 25000003 PHARM REV CODE 250: Performed by: HOSPITALIST

## 2021-03-24 PROCEDURE — 25000003 PHARM REV CODE 250: Performed by: INTERNAL MEDICINE

## 2021-03-24 PROCEDURE — 63600175 PHARM REV CODE 636 W HCPCS: Mod: JG | Performed by: INTERNAL MEDICINE

## 2021-03-24 PROCEDURE — 63600175 PHARM REV CODE 636 W HCPCS: Performed by: HOSPITALIST

## 2021-03-24 PROCEDURE — A4216 STERILE WATER/SALINE, 10 ML: HCPCS | Performed by: SURGERY

## 2021-03-24 PROCEDURE — 25000003 PHARM REV CODE 250: Performed by: SURGERY

## 2021-03-24 PROCEDURE — 11000001 HC ACUTE MED/SURG PRIVATE ROOM

## 2021-03-24 PROCEDURE — 85025 COMPLETE CBC W/AUTO DIFF WBC: CPT | Performed by: HOSPITALIST

## 2021-03-24 PROCEDURE — 25000003 PHARM REV CODE 250: Performed by: NURSE PRACTITIONER

## 2021-03-24 PROCEDURE — 80202 ASSAY OF VANCOMYCIN: CPT | Performed by: HOSPITALIST

## 2021-03-24 PROCEDURE — 80048 BASIC METABOLIC PNL TOTAL CA: CPT | Performed by: HOSPITALIST

## 2021-03-24 RX ADMIN — METRONIDAZOLE 500 MG: 500 TABLET ORAL at 01:03

## 2021-03-24 RX ADMIN — CEFTAZIDIME, AVIBACTAM 2.5 G: 2; .5 POWDER, FOR SOLUTION INTRAVENOUS at 09:03

## 2021-03-24 RX ADMIN — Medication 10 ML: at 05:03

## 2021-03-24 RX ADMIN — METRONIDAZOLE 500 MG: 500 TABLET ORAL at 09:03

## 2021-03-24 RX ADMIN — IBUPROFEN 600 MG: 600 TABLET ORAL at 09:03

## 2021-03-24 RX ADMIN — INSULIN ASPART 8 UNITS: 100 INJECTION, SOLUTION INTRAVENOUS; SUBCUTANEOUS at 08:03

## 2021-03-24 RX ADMIN — SERTRALINE HYDROCHLORIDE 50 MG: 50 TABLET ORAL at 08:03

## 2021-03-24 RX ADMIN — METOPROLOL TARTRATE 25 MG: 25 TABLET, FILM COATED ORAL at 08:03

## 2021-03-24 RX ADMIN — MELATONIN TAB 3 MG 6 MG: 3 TAB at 09:03

## 2021-03-24 RX ADMIN — INSULIN ASPART 8 UNITS: 100 INJECTION, SOLUTION INTRAVENOUS; SUBCUTANEOUS at 04:03

## 2021-03-24 RX ADMIN — BACLOFEN 10 MG: 5 TABLET ORAL at 09:03

## 2021-03-24 RX ADMIN — VANCOMYCIN HYDROCHLORIDE 1000 MG: 1 INJECTION, POWDER, LYOPHILIZED, FOR SOLUTION INTRAVENOUS at 06:03

## 2021-03-24 RX ADMIN — OXYCODONE HYDROCHLORIDE AND ACETAMINOPHEN 1 TABLET: 10; 325 TABLET ORAL at 01:03

## 2021-03-24 RX ADMIN — METRONIDAZOLE 500 MG: 500 TABLET ORAL at 05:03

## 2021-03-24 RX ADMIN — VANCOMYCIN HYDROCHLORIDE 1000 MG: 1 INJECTION, POWDER, LYOPHILIZED, FOR SOLUTION INTRAVENOUS at 05:03

## 2021-03-24 RX ADMIN — ENOXAPARIN SODIUM 40 MG: 40 INJECTION SUBCUTANEOUS at 04:03

## 2021-03-24 RX ADMIN — BACLOFEN 10 MG: 5 TABLET ORAL at 08:03

## 2021-03-24 RX ADMIN — IBUPROFEN 600 MG: 600 TABLET ORAL at 01:03

## 2021-03-24 RX ADMIN — Medication 10 ML: at 02:03

## 2021-03-24 RX ADMIN — GABAPENTIN 300 MG: 300 CAPSULE ORAL at 09:03

## 2021-03-24 RX ADMIN — Medication 10 ML: at 11:03

## 2021-03-24 RX ADMIN — OXYCODONE HYDROCHLORIDE AND ACETAMINOPHEN 1 TABLET: 10; 325 TABLET ORAL at 09:03

## 2021-03-24 RX ADMIN — OXYCODONE HYDROCHLORIDE AND ACETAMINOPHEN 1 TABLET: 10; 325 TABLET ORAL at 05:03

## 2021-03-24 RX ADMIN — GABAPENTIN 300 MG: 300 CAPSULE ORAL at 08:03

## 2021-03-24 RX ADMIN — IBUPROFEN 600 MG: 600 TABLET ORAL at 08:03

## 2021-03-24 RX ADMIN — PANTOPRAZOLE SODIUM 40 MG: 40 TABLET, DELAYED RELEASE ORAL at 08:03

## 2021-03-24 RX ADMIN — INSULIN ASPART 8 UNITS: 100 INJECTION, SOLUTION INTRAVENOUS; SUBCUTANEOUS at 12:03

## 2021-03-24 RX ADMIN — CEFTAZIDIME, AVIBACTAM 2.5 G: 2; .5 POWDER, FOR SOLUTION INTRAVENOUS at 02:03

## 2021-03-24 RX ADMIN — CEFTAZIDIME, AVIBACTAM 2.5 G: 2; .5 POWDER, FOR SOLUTION INTRAVENOUS at 07:03

## 2021-03-24 RX ADMIN — LIDOCAINE 1 PATCH: 50 PATCH TOPICAL at 08:03

## 2021-03-24 RX ADMIN — INSULIN DETEMIR 12 UNITS: 100 INJECTION, SOLUTION SUBCUTANEOUS at 08:03

## 2021-03-25 LAB
POCT GLUCOSE: 132 MG/DL (ref 70–110)
POCT GLUCOSE: 136 MG/DL (ref 70–110)
POCT GLUCOSE: 141 MG/DL (ref 70–110)
POCT GLUCOSE: 147 MG/DL (ref 70–110)

## 2021-03-25 PROCEDURE — 63600175 PHARM REV CODE 636 W HCPCS: Mod: JG | Performed by: INTERNAL MEDICINE

## 2021-03-25 PROCEDURE — 25000003 PHARM REV CODE 250: Performed by: INTERNAL MEDICINE

## 2021-03-25 PROCEDURE — 25000003 PHARM REV CODE 250: Performed by: HOSPITALIST

## 2021-03-25 PROCEDURE — 25000003 PHARM REV CODE 250: Performed by: SURGERY

## 2021-03-25 PROCEDURE — A4216 STERILE WATER/SALINE, 10 ML: HCPCS | Performed by: SURGERY

## 2021-03-25 PROCEDURE — 11000001 HC ACUTE MED/SURG PRIVATE ROOM

## 2021-03-25 PROCEDURE — 63600175 PHARM REV CODE 636 W HCPCS: Performed by: HOSPITALIST

## 2021-03-25 PROCEDURE — 25000003 PHARM REV CODE 250: Performed by: NURSE PRACTITIONER

## 2021-03-25 RX ORDER — LIDOCAINE 50 MG/G
2 PATCH TOPICAL
Status: DISCONTINUED | OUTPATIENT
Start: 2021-03-25 | End: 2021-03-27 | Stop reason: HOSPADM

## 2021-03-25 RX ADMIN — IBUPROFEN 600 MG: 600 TABLET ORAL at 08:03

## 2021-03-25 RX ADMIN — INSULIN ASPART 8 UNITS: 100 INJECTION, SOLUTION INTRAVENOUS; SUBCUTANEOUS at 05:03

## 2021-03-25 RX ADMIN — OXYCODONE HYDROCHLORIDE AND ACETAMINOPHEN 1 TABLET: 10; 325 TABLET ORAL at 01:03

## 2021-03-25 RX ADMIN — METRONIDAZOLE 500 MG: 500 TABLET ORAL at 10:03

## 2021-03-25 RX ADMIN — VANCOMYCIN HYDROCHLORIDE 1000 MG: 1 INJECTION, POWDER, LYOPHILIZED, FOR SOLUTION INTRAVENOUS at 05:03

## 2021-03-25 RX ADMIN — GABAPENTIN 300 MG: 300 CAPSULE ORAL at 09:03

## 2021-03-25 RX ADMIN — Medication 10 ML: at 05:03

## 2021-03-25 RX ADMIN — CEFTAZIDIME, AVIBACTAM 2.5 G: 2; .5 POWDER, FOR SOLUTION INTRAVENOUS at 10:03

## 2021-03-25 RX ADMIN — METRONIDAZOLE 500 MG: 500 TABLET ORAL at 05:03

## 2021-03-25 RX ADMIN — BACLOFEN 10 MG: 5 TABLET ORAL at 08:03

## 2021-03-25 RX ADMIN — LIDOCAINE 2 PATCH: 50 PATCH TOPICAL at 09:03

## 2021-03-25 RX ADMIN — INSULIN ASPART 8 UNITS: 100 INJECTION, SOLUTION INTRAVENOUS; SUBCUTANEOUS at 11:03

## 2021-03-25 RX ADMIN — PANTOPRAZOLE SODIUM 40 MG: 40 TABLET, DELAYED RELEASE ORAL at 09:03

## 2021-03-25 RX ADMIN — OXYCODONE HYDROCHLORIDE AND ACETAMINOPHEN 1 TABLET: 10; 325 TABLET ORAL at 10:03

## 2021-03-25 RX ADMIN — IBUPROFEN 600 MG: 600 TABLET ORAL at 05:03

## 2021-03-25 RX ADMIN — INSULIN ASPART 8 UNITS: 100 INJECTION, SOLUTION INTRAVENOUS; SUBCUTANEOUS at 08:03

## 2021-03-25 RX ADMIN — INSULIN DETEMIR 12 UNITS: 100 INJECTION, SOLUTION SUBCUTANEOUS at 08:03

## 2021-03-25 RX ADMIN — Medication 10 ML: at 01:03

## 2021-03-25 RX ADMIN — BACLOFEN 10 MG: 5 TABLET ORAL at 09:03

## 2021-03-25 RX ADMIN — GABAPENTIN 300 MG: 300 CAPSULE ORAL at 08:03

## 2021-03-25 RX ADMIN — LIDOCAINE 1 PATCH: 50 PATCH TOPICAL at 08:03

## 2021-03-25 RX ADMIN — IBUPROFEN 600 MG: 600 TABLET ORAL at 01:03

## 2021-03-25 RX ADMIN — SERTRALINE HYDROCHLORIDE 50 MG: 50 TABLET ORAL at 09:03

## 2021-03-25 RX ADMIN — IBUPROFEN 600 MG: 600 TABLET ORAL at 09:03

## 2021-03-25 RX ADMIN — MELATONIN TAB 3 MG 6 MG: 3 TAB at 10:03

## 2021-03-25 RX ADMIN — OXYCODONE HYDROCHLORIDE AND ACETAMINOPHEN 1 TABLET: 10; 325 TABLET ORAL at 05:03

## 2021-03-25 RX ADMIN — CEFTAZIDIME, AVIBACTAM 2.5 G: 2; .5 POWDER, FOR SOLUTION INTRAVENOUS at 12:03

## 2021-03-25 RX ADMIN — METRONIDAZOLE 500 MG: 500 TABLET ORAL at 01:03

## 2021-03-25 RX ADMIN — Medication 10 ML: at 06:03

## 2021-03-25 RX ADMIN — ENOXAPARIN SODIUM 40 MG: 40 INJECTION SUBCUTANEOUS at 05:03

## 2021-03-25 RX ADMIN — Medication 10 ML: at 12:03

## 2021-03-25 RX ADMIN — METOPROLOL TARTRATE 25 MG: 25 TABLET, FILM COATED ORAL at 09:03

## 2021-03-25 RX ADMIN — CEFTAZIDIME, AVIBACTAM 2.5 G: 2; .5 POWDER, FOR SOLUTION INTRAVENOUS at 03:03

## 2021-03-25 RX ADMIN — DOCUSATE SODIUM 50 MG AND SENNOSIDES 8.6 MG 1 TABLET: 8.6; 5 TABLET, FILM COATED ORAL at 09:03

## 2021-03-26 LAB
ANION GAP SERPL CALC-SCNC: 10 MMOL/L (ref 8–16)
BASOPHILS # BLD AUTO: 0.08 K/UL (ref 0–0.2)
BASOPHILS NFR BLD: 1.2 % (ref 0–1.9)
BUN SERPL-MCNC: 8 MG/DL (ref 6–20)
CALCIUM SERPL-MCNC: 8.6 MG/DL (ref 8.7–10.5)
CHLORIDE SERPL-SCNC: 107 MMOL/L (ref 95–110)
CO2 SERPL-SCNC: 23 MMOL/L (ref 23–29)
CREAT SERPL-MCNC: 0.8 MG/DL (ref 0.5–1.4)
DIFFERENTIAL METHOD: ABNORMAL
EOSINOPHIL # BLD AUTO: 0.8 K/UL (ref 0–0.5)
EOSINOPHIL NFR BLD: 12 % (ref 0–8)
ERYTHROCYTE [DISTWIDTH] IN BLOOD BY AUTOMATED COUNT: 19.2 % (ref 11.5–14.5)
EST. GFR  (AFRICAN AMERICAN): >60 ML/MIN/1.73 M^2
EST. GFR  (NON AFRICAN AMERICAN): >60 ML/MIN/1.73 M^2
GLUCOSE SERPL-MCNC: 157 MG/DL (ref 70–110)
HCT VFR BLD AUTO: 29.6 % (ref 40–54)
HGB BLD-MCNC: 9.7 G/DL (ref 14–18)
IMM GRANULOCYTES # BLD AUTO: 0.02 K/UL (ref 0–0.04)
IMM GRANULOCYTES NFR BLD AUTO: 0.3 % (ref 0–0.5)
LYMPHOCYTES # BLD AUTO: 2.2 K/UL (ref 1–4.8)
LYMPHOCYTES NFR BLD: 33.4 % (ref 18–48)
MCH RBC QN AUTO: 25.1 PG (ref 27–31)
MCHC RBC AUTO-ENTMCNC: 32.8 G/DL (ref 32–36)
MCV RBC AUTO: 77 FL (ref 82–98)
MONOCYTES # BLD AUTO: 0.6 K/UL (ref 0.3–1)
MONOCYTES NFR BLD: 9.6 % (ref 4–15)
NEUTROPHILS # BLD AUTO: 2.9 K/UL (ref 1.8–7.7)
NEUTROPHILS NFR BLD: 43.5 % (ref 38–73)
NRBC BLD-RTO: 0 /100 WBC
PLATELET # BLD AUTO: 521 K/UL (ref 150–350)
PMV BLD AUTO: 10.2 FL (ref 9.2–12.9)
POCT GLUCOSE: 123 MG/DL (ref 70–110)
POCT GLUCOSE: 128 MG/DL (ref 70–110)
POCT GLUCOSE: 160 MG/DL (ref 70–110)
POCT GLUCOSE: 86 MG/DL (ref 70–110)
POTASSIUM SERPL-SCNC: 4.6 MMOL/L (ref 3.5–5.1)
RBC # BLD AUTO: 3.87 M/UL (ref 4.6–6.2)
SODIUM SERPL-SCNC: 140 MMOL/L (ref 136–145)
WBC # BLD AUTO: 6.56 K/UL (ref 3.9–12.7)

## 2021-03-26 PROCEDURE — 25000003 PHARM REV CODE 250: Performed by: HOSPITALIST

## 2021-03-26 PROCEDURE — 25000003 PHARM REV CODE 250: Performed by: SURGERY

## 2021-03-26 PROCEDURE — 25000003 PHARM REV CODE 250: Performed by: NURSE PRACTITIONER

## 2021-03-26 PROCEDURE — 11000001 HC ACUTE MED/SURG PRIVATE ROOM

## 2021-03-26 PROCEDURE — 80048 BASIC METABOLIC PNL TOTAL CA: CPT | Performed by: FAMILY MEDICINE

## 2021-03-26 PROCEDURE — 25000003 PHARM REV CODE 250: Performed by: INTERNAL MEDICINE

## 2021-03-26 PROCEDURE — 85025 COMPLETE CBC W/AUTO DIFF WBC: CPT | Performed by: FAMILY MEDICINE

## 2021-03-26 PROCEDURE — C9399 UNCLASSIFIED DRUGS OR BIOLOG: HCPCS | Performed by: HOSPITALIST

## 2021-03-26 PROCEDURE — 63600175 PHARM REV CODE 636 W HCPCS: Performed by: HOSPITALIST

## 2021-03-26 PROCEDURE — A4216 STERILE WATER/SALINE, 10 ML: HCPCS | Performed by: SURGERY

## 2021-03-26 PROCEDURE — 36415 COLL VENOUS BLD VENIPUNCTURE: CPT | Performed by: FAMILY MEDICINE

## 2021-03-26 PROCEDURE — 63600175 PHARM REV CODE 636 W HCPCS: Mod: JG | Performed by: INTERNAL MEDICINE

## 2021-03-26 RX ADMIN — IBUPROFEN 600 MG: 600 TABLET ORAL at 08:03

## 2021-03-26 RX ADMIN — INSULIN DETEMIR 12 UNITS: 100 INJECTION, SOLUTION SUBCUTANEOUS at 08:03

## 2021-03-26 RX ADMIN — METRONIDAZOLE 500 MG: 500 TABLET ORAL at 08:03

## 2021-03-26 RX ADMIN — METRONIDAZOLE 500 MG: 500 TABLET ORAL at 05:03

## 2021-03-26 RX ADMIN — CEFTAZIDIME, AVIBACTAM 2.5 G: 2; .5 POWDER, FOR SOLUTION INTRAVENOUS at 03:03

## 2021-03-26 RX ADMIN — SERTRALINE HYDROCHLORIDE 50 MG: 50 TABLET ORAL at 09:03

## 2021-03-26 RX ADMIN — OXYCODONE HYDROCHLORIDE AND ACETAMINOPHEN 1 TABLET: 10; 325 TABLET ORAL at 10:03

## 2021-03-26 RX ADMIN — IBUPROFEN 600 MG: 600 TABLET ORAL at 01:03

## 2021-03-26 RX ADMIN — ENOXAPARIN SODIUM 40 MG: 40 INJECTION SUBCUTANEOUS at 05:03

## 2021-03-26 RX ADMIN — GABAPENTIN 300 MG: 300 CAPSULE ORAL at 08:03

## 2021-03-26 RX ADMIN — OXYCODONE HYDROCHLORIDE AND ACETAMINOPHEN 1 TABLET: 10; 325 TABLET ORAL at 06:03

## 2021-03-26 RX ADMIN — METRONIDAZOLE 500 MG: 500 TABLET ORAL at 03:03

## 2021-03-26 RX ADMIN — Medication 10 ML: at 08:03

## 2021-03-26 RX ADMIN — Medication 10 ML: at 12:03

## 2021-03-26 RX ADMIN — LIDOCAINE 2 PATCH: 50 PATCH TOPICAL at 09:03

## 2021-03-26 RX ADMIN — METOPROLOL TARTRATE 25 MG: 25 TABLET, FILM COATED ORAL at 09:03

## 2021-03-26 RX ADMIN — OXYCODONE HYDROCHLORIDE AND ACETAMINOPHEN 1 TABLET: 10; 325 TABLET ORAL at 05:03

## 2021-03-26 RX ADMIN — PANTOPRAZOLE SODIUM 40 MG: 40 TABLET, DELAYED RELEASE ORAL at 09:03

## 2021-03-26 RX ADMIN — VANCOMYCIN HYDROCHLORIDE 1000 MG: 1 INJECTION, POWDER, LYOPHILIZED, FOR SOLUTION INTRAVENOUS at 05:03

## 2021-03-26 RX ADMIN — IBUPROFEN 600 MG: 600 TABLET ORAL at 05:03

## 2021-03-26 RX ADMIN — BACLOFEN 10 MG: 5 TABLET ORAL at 08:03

## 2021-03-26 RX ADMIN — DOCUSATE SODIUM 50 MG AND SENNOSIDES 8.6 MG 1 TABLET: 8.6; 5 TABLET, FILM COATED ORAL at 09:03

## 2021-03-26 RX ADMIN — OXYCODONE HYDROCHLORIDE AND ACETAMINOPHEN 1 TABLET: 10; 325 TABLET ORAL at 11:03

## 2021-03-26 RX ADMIN — BACLOFEN 10 MG: 5 TABLET ORAL at 09:03

## 2021-03-26 RX ADMIN — CEFTAZIDIME, AVIBACTAM 2.5 G: 2; .5 POWDER, FOR SOLUTION INTRAVENOUS at 11:03

## 2021-03-26 RX ADMIN — Medication 10 ML: at 05:03

## 2021-03-26 RX ADMIN — INSULIN ASPART 8 UNITS: 100 INJECTION, SOLUTION INTRAVENOUS; SUBCUTANEOUS at 08:03

## 2021-03-26 RX ADMIN — CEFTAZIDIME, AVIBACTAM 2.5 G: 2; .5 POWDER, FOR SOLUTION INTRAVENOUS at 08:03

## 2021-03-26 RX ADMIN — VANCOMYCIN HYDROCHLORIDE 1000 MG: 1 INJECTION, POWDER, LYOPHILIZED, FOR SOLUTION INTRAVENOUS at 06:03

## 2021-03-26 RX ADMIN — GABAPENTIN 300 MG: 300 CAPSULE ORAL at 09:03

## 2021-03-26 RX ADMIN — IBUPROFEN 600 MG: 600 TABLET ORAL at 09:03

## 2021-03-27 VITALS
WEIGHT: 190.06 LBS | HEIGHT: 71 IN | OXYGEN SATURATION: 97 % | DIASTOLIC BLOOD PRESSURE: 58 MMHG | BODY MASS INDEX: 26.61 KG/M2 | RESPIRATION RATE: 16 BRPM | HEART RATE: 69 BPM | SYSTOLIC BLOOD PRESSURE: 97 MMHG | TEMPERATURE: 97 F

## 2021-03-27 PROBLEM — T14.8XXA INFECTED WOUND: Status: ACTIVE | Noted: 2021-03-27

## 2021-03-27 PROBLEM — M86.051 ACUTE HEMATOGENOUS OSTEOMYELITIS OF RIGHT FEMUR: Status: ACTIVE | Noted: 2020-09-11

## 2021-03-27 PROBLEM — L08.9 INFECTED WOUND: Status: ACTIVE | Noted: 2021-03-27

## 2021-03-27 LAB
POCT GLUCOSE: 116 MG/DL (ref 70–110)
POCT GLUCOSE: 154 MG/DL (ref 70–110)

## 2021-03-27 PROCEDURE — 25000003 PHARM REV CODE 250: Performed by: INTERNAL MEDICINE

## 2021-03-27 PROCEDURE — 25000003 PHARM REV CODE 250: Performed by: SURGERY

## 2021-03-27 PROCEDURE — 25000003 PHARM REV CODE 250: Performed by: NURSE PRACTITIONER

## 2021-03-27 PROCEDURE — 63600175 PHARM REV CODE 636 W HCPCS: Mod: JG | Performed by: INTERNAL MEDICINE

## 2021-03-27 PROCEDURE — 25000003 PHARM REV CODE 250: Performed by: HOSPITALIST

## 2021-03-27 PROCEDURE — A4216 STERILE WATER/SALINE, 10 ML: HCPCS | Performed by: SURGERY

## 2021-03-27 PROCEDURE — 63600175 PHARM REV CODE 636 W HCPCS: Performed by: HOSPITALIST

## 2021-03-27 RX ORDER — METRONIDAZOLE 500 MG/1
500 TABLET ORAL EVERY 8 HOURS
Status: CANCELLED | OUTPATIENT
Start: 2021-03-27

## 2021-03-27 RX ORDER — SODIUM CHLORIDE 0.9 % (FLUSH) 0.9 %
10 SYRINGE (ML) INJECTION
Status: CANCELLED | OUTPATIENT
Start: 2021-03-27

## 2021-03-27 RX ORDER — ONDANSETRON 2 MG/ML
4 INJECTION INTRAMUSCULAR; INTRAVENOUS EVERY 8 HOURS PRN
Status: CANCELLED | OUTPATIENT
Start: 2021-03-27

## 2021-03-27 RX ORDER — PANTOPRAZOLE SODIUM 40 MG/1
40 TABLET, DELAYED RELEASE ORAL DAILY
Status: CANCELLED | OUTPATIENT
Start: 2021-03-27

## 2021-03-27 RX ORDER — IBUPROFEN 200 MG
24 TABLET ORAL
Status: CANCELLED | OUTPATIENT
Start: 2021-03-27

## 2021-03-27 RX ORDER — SERTRALINE HYDROCHLORIDE 50 MG/1
50 TABLET, FILM COATED ORAL DAILY
Status: CANCELLED | OUTPATIENT
Start: 2021-03-27

## 2021-03-27 RX ORDER — OXYCODONE AND ACETAMINOPHEN 10; 325 MG/1; MG/1
1 TABLET ORAL EVERY 4 HOURS PRN
Status: CANCELLED | OUTPATIENT
Start: 2021-03-27

## 2021-03-27 RX ORDER — GLUCAGON 1 MG
1 KIT INJECTION
Status: CANCELLED | OUTPATIENT
Start: 2021-03-27

## 2021-03-27 RX ORDER — GABAPENTIN 300 MG/1
300 CAPSULE ORAL 2 TIMES DAILY
Status: CANCELLED | OUTPATIENT
Start: 2021-03-27

## 2021-03-27 RX ORDER — VANCOMYCIN HCL IN 5 % DEXTROSE 1G/250ML
1000 PLASTIC BAG, INJECTION (ML) INTRAVENOUS
Status: CANCELLED | OUTPATIENT
Start: 2021-03-27

## 2021-03-27 RX ORDER — BACLOFEN 5 MG/1
10 TABLET ORAL 2 TIMES DAILY
Status: CANCELLED | OUTPATIENT
Start: 2021-03-27

## 2021-03-27 RX ORDER — IBUPROFEN 200 MG
16 TABLET ORAL
Status: CANCELLED | OUTPATIENT
Start: 2021-03-27

## 2021-03-27 RX ORDER — IBUPROFEN 600 MG/1
600 TABLET ORAL 4 TIMES DAILY
Status: CANCELLED | OUTPATIENT
Start: 2021-03-27

## 2021-03-27 RX ORDER — TALC
6 POWDER (GRAM) TOPICAL NIGHTLY PRN
Status: CANCELLED | OUTPATIENT
Start: 2021-03-27

## 2021-03-27 RX ORDER — LIDOCAINE 50 MG/G
2 PATCH TOPICAL
Status: CANCELLED | OUTPATIENT
Start: 2021-03-27

## 2021-03-27 RX ORDER — AMOXICILLIN 250 MG
1 CAPSULE ORAL 2 TIMES DAILY
Status: CANCELLED | OUTPATIENT
Start: 2021-03-27

## 2021-03-27 RX ORDER — SODIUM CHLORIDE 0.9 % (FLUSH) 0.9 %
10 SYRINGE (ML) INJECTION EVERY 6 HOURS
Status: CANCELLED | OUTPATIENT
Start: 2021-03-27

## 2021-03-27 RX ORDER — METOPROLOL TARTRATE 25 MG/1
25 TABLET, FILM COATED ORAL DAILY
Status: CANCELLED | OUTPATIENT
Start: 2021-03-27

## 2021-03-27 RX ORDER — ENOXAPARIN SODIUM 100 MG/ML
40 INJECTION SUBCUTANEOUS EVERY 24 HOURS
Status: CANCELLED | OUTPATIENT
Start: 2021-03-27

## 2021-03-27 RX ORDER — INSULIN ASPART 100 [IU]/ML
8 INJECTION, SOLUTION INTRAVENOUS; SUBCUTANEOUS
Status: CANCELLED | OUTPATIENT
Start: 2021-03-27

## 2021-03-27 RX ORDER — INSULIN ASPART 100 [IU]/ML
1-10 INJECTION, SOLUTION INTRAVENOUS; SUBCUTANEOUS
Status: CANCELLED | OUTPATIENT
Start: 2021-03-27

## 2021-03-27 RX ORDER — ACETAMINOPHEN 325 MG/1
650 TABLET ORAL EVERY 4 HOURS PRN
Status: CANCELLED | OUTPATIENT
Start: 2021-03-27

## 2021-03-27 RX ORDER — ONDANSETRON 8 MG/1
8 TABLET, ORALLY DISINTEGRATING ORAL EVERY 8 HOURS PRN
Status: CANCELLED | OUTPATIENT
Start: 2021-03-27

## 2021-03-27 RX ADMIN — CEFTAZIDIME, AVIBACTAM 2.5 G: 2; .5 POWDER, FOR SOLUTION INTRAVENOUS at 04:03

## 2021-03-27 RX ADMIN — BACLOFEN 10 MG: 5 TABLET ORAL at 08:03

## 2021-03-27 RX ADMIN — VANCOMYCIN HYDROCHLORIDE 1000 MG: 1 INJECTION, POWDER, LYOPHILIZED, FOR SOLUTION INTRAVENOUS at 06:03

## 2021-03-27 RX ADMIN — OXYCODONE HYDROCHLORIDE AND ACETAMINOPHEN 1 TABLET: 10; 325 TABLET ORAL at 04:03

## 2021-03-27 RX ADMIN — OXYCODONE HYDROCHLORIDE AND ACETAMINOPHEN 1 TABLET: 10; 325 TABLET ORAL at 08:03

## 2021-03-27 RX ADMIN — IBUPROFEN 600 MG: 600 TABLET ORAL at 08:03

## 2021-03-27 RX ADMIN — LIDOCAINE 2 PATCH: 50 PATCH TOPICAL at 08:03

## 2021-03-27 RX ADMIN — SERTRALINE HYDROCHLORIDE 50 MG: 50 TABLET ORAL at 08:03

## 2021-03-27 RX ADMIN — METRONIDAZOLE 500 MG: 500 TABLET ORAL at 06:03

## 2021-03-27 RX ADMIN — METOPROLOL TARTRATE 25 MG: 25 TABLET, FILM COATED ORAL at 08:03

## 2021-03-27 RX ADMIN — PANTOPRAZOLE SODIUM 40 MG: 40 TABLET, DELAYED RELEASE ORAL at 08:03

## 2021-03-27 RX ADMIN — Medication 10 ML: at 12:03

## 2021-03-27 RX ADMIN — CEFTAZIDIME, AVIBACTAM 2.5 G: 2; .5 POWDER, FOR SOLUTION INTRAVENOUS at 11:03

## 2021-03-27 RX ADMIN — GABAPENTIN 300 MG: 300 CAPSULE ORAL at 08:03

## 2021-03-27 RX ADMIN — Medication 10 ML: at 06:03

## 2021-03-29 ENCOUNTER — TELEPHONE (OUTPATIENT)
Dept: INFECTIOUS DISEASES | Facility: CLINIC | Age: 46
End: 2021-03-29

## 2021-04-08 ENCOUNTER — PATIENT OUTREACH (OUTPATIENT)
Dept: ADMINISTRATIVE | Facility: OTHER | Age: 46
End: 2021-04-08

## 2021-04-08 DIAGNOSIS — E11.65 TYPE 2 DIABETES MELLITUS WITH HYPERGLYCEMIA, WITH LONG-TERM CURRENT USE OF INSULIN: Primary | ICD-10-CM

## 2021-04-08 DIAGNOSIS — Z79.4 TYPE 2 DIABETES MELLITUS WITH HYPERGLYCEMIA, WITH LONG-TERM CURRENT USE OF INSULIN: Primary | ICD-10-CM

## 2021-04-13 ENCOUNTER — TELEPHONE (OUTPATIENT)
Dept: UROLOGY | Facility: CLINIC | Age: 46
End: 2021-04-13

## 2021-04-13 LAB
FUNGUS SPEC CULT: NORMAL
FUNGUS SPEC CULT: NORMAL

## 2021-04-23 PROBLEM — A41.9 SEPSIS WITHOUT ACUTE ORGAN DYSFUNCTION: Status: RESOLVED | Noted: 2020-05-02 | Resolved: 2021-04-23

## 2021-04-23 PROBLEM — Z16.24 NEWLY DIAGNOSED INFECTION DUE TO MULTIDRUG RESISTANT ORGANISM: Status: RESOLVED | Noted: 2019-09-18 | Resolved: 2021-04-23

## 2021-04-23 PROBLEM — A41.9 SEPSIS WITH ACUTE ORGAN DYSFUNCTION WITHOUT SEPTIC SHOCK: Status: RESOLVED | Noted: 2020-12-24 | Resolved: 2021-04-23

## 2021-04-23 PROBLEM — T14.8XXA INFECTED WOUND: Status: RESOLVED | Noted: 2021-03-27 | Resolved: 2021-04-23

## 2021-04-23 PROBLEM — R78.81 BACTEREMIA DUE TO GRAM-NEGATIVE BACTERIA: Status: RESOLVED | Noted: 2019-08-25 | Resolved: 2021-04-23

## 2021-04-23 PROBLEM — N39.0 COMPLICATED UTI (URINARY TRACT INFECTION): Status: RESOLVED | Noted: 2019-08-24 | Resolved: 2021-04-23

## 2021-04-23 PROBLEM — L08.9 INFECTED WOUND: Status: RESOLVED | Noted: 2021-03-27 | Resolved: 2021-04-23

## 2021-04-23 PROBLEM — A49.9 ESBL (EXTENDED SPECTRUM BETA-LACTAMASE) PRODUCING BACTERIA INFECTION: Status: RESOLVED | Noted: 2020-12-27 | Resolved: 2021-04-23

## 2021-04-23 PROBLEM — L08.9 INFECTED DECUBITUS ULCER, STAGE IV: Status: RESOLVED | Noted: 2020-05-01 | Resolved: 2021-04-23

## 2021-04-23 PROBLEM — Z51.5 PALLIATIVE CARE ENCOUNTER: Status: ACTIVE | Noted: 2021-04-23

## 2021-04-23 PROBLEM — M86.051 ACUTE HEMATOGENOUS OSTEOMYELITIS OF RIGHT FEMUR: Status: RESOLVED | Noted: 2020-09-11 | Resolved: 2021-04-23

## 2021-04-23 PROBLEM — L89.94 INFECTED DECUBITUS ULCER, STAGE IV: Status: RESOLVED | Noted: 2020-05-01 | Resolved: 2021-04-23

## 2021-04-23 PROBLEM — Z16.12 ESBL (EXTENDED SPECTRUM BETA-LACTAMASE) PRODUCING BACTERIA INFECTION: Status: RESOLVED | Noted: 2020-12-27 | Resolved: 2021-04-23

## 2021-04-23 PROBLEM — R31.9 HEMATURIA: Status: RESOLVED | Noted: 2018-08-30 | Resolved: 2021-04-23

## 2021-04-23 PROBLEM — R65.20 SEPSIS WITH ACUTE ORGAN DYSFUNCTION WITHOUT SEPTIC SHOCK: Status: RESOLVED | Noted: 2020-12-24 | Resolved: 2021-04-23

## 2021-04-24 PROCEDURE — G0180 MD CERTIFICATION HHA PATIENT: HCPCS | Mod: ,,, | Performed by: HOSPITALIST

## 2021-04-24 PROCEDURE — G0180 PR HOME HEALTH MD CERTIFICATION: ICD-10-PCS | Mod: ,,, | Performed by: HOSPITALIST

## 2021-04-27 ENCOUNTER — TELEPHONE (OUTPATIENT)
Dept: FAMILY MEDICINE | Facility: CLINIC | Age: 46
End: 2021-04-27

## 2021-04-27 DIAGNOSIS — M86.60 CHRONIC OSTEOMYELITIS: ICD-10-CM

## 2021-04-27 DIAGNOSIS — Z93.59 CHRONIC SUPRAPUBIC CATHETER: ICD-10-CM

## 2021-04-27 DIAGNOSIS — A49.8 MULTIPLE DRUG RESISTANT ACINETOBACTER INFECTION: ICD-10-CM

## 2021-04-27 DIAGNOSIS — Z93.3 S/P COLOSTOMY: ICD-10-CM

## 2021-04-27 DIAGNOSIS — Z79.4 TYPE 2 DIABETES MELLITUS WITH OTHER CIRCULATORY COMPLICATION, WITH LONG-TERM CURRENT USE OF INSULIN: ICD-10-CM

## 2021-04-27 DIAGNOSIS — L89.93 DECUBITUS ULCER, INFECTED, STAGE III: ICD-10-CM

## 2021-04-27 DIAGNOSIS — Z16.24 MULTIPLE DRUG RESISTANT ACINETOBACTER INFECTION: ICD-10-CM

## 2021-04-27 DIAGNOSIS — L08.9 DECUBITUS ULCER, INFECTED, STAGE III: ICD-10-CM

## 2021-04-27 DIAGNOSIS — Z89.511 HX OF RIGHT BKA: ICD-10-CM

## 2021-04-27 DIAGNOSIS — T83.510D URINARY TRACT INFECTION ASSOCIATED WITH CYSTOSTOMY CATHETER, SUBSEQUENT ENCOUNTER: ICD-10-CM

## 2021-04-27 DIAGNOSIS — G82.20 PARAPLEGIA AT T9 LEVEL: Primary | ICD-10-CM

## 2021-04-27 DIAGNOSIS — E11.59 TYPE 2 DIABETES MELLITUS WITH OTHER CIRCULATORY COMPLICATION, WITH LONG-TERM CURRENT USE OF INSULIN: ICD-10-CM

## 2021-04-27 DIAGNOSIS — N39.0 URINARY TRACT INFECTION ASSOCIATED WITH CYSTOSTOMY CATHETER, SUBSEQUENT ENCOUNTER: ICD-10-CM

## 2021-05-05 ENCOUNTER — HOSPITAL ENCOUNTER (OUTPATIENT)
Dept: WOUND CARE | Facility: HOSPITAL | Age: 46
Discharge: HOME | End: 2021-05-05
Attending: SURGERY
Payer: MEDICARE

## 2021-05-05 VITALS
WEIGHT: 206 LBS | HEART RATE: 90 BPM | TEMPERATURE: 98 F | HEIGHT: 68 IN | DIASTOLIC BLOOD PRESSURE: 72 MMHG | BODY MASS INDEX: 31.22 KG/M2 | SYSTOLIC BLOOD PRESSURE: 110 MMHG

## 2021-05-05 DIAGNOSIS — G82.20 PARAPLEGIA: ICD-10-CM

## 2021-05-05 DIAGNOSIS — E11.69 DIABETIC FOOT ULCER WITH OSTEOMYELITIS: ICD-10-CM

## 2021-05-05 DIAGNOSIS — L08.9 DECUBITUS ULCER, INFECTED, STAGE III: ICD-10-CM

## 2021-05-05 DIAGNOSIS — Z93.59 CHRONIC SUPRAPUBIC CATHETER: Chronic | ICD-10-CM

## 2021-05-05 DIAGNOSIS — M86.9 DIABETIC FOOT ULCER WITH OSTEOMYELITIS: ICD-10-CM

## 2021-05-05 DIAGNOSIS — I10 ESSENTIAL HYPERTENSION: Chronic | ICD-10-CM

## 2021-05-05 DIAGNOSIS — E11.621 DIABETIC FOOT ULCER WITH OSTEOMYELITIS: ICD-10-CM

## 2021-05-05 DIAGNOSIS — L89.324 STAGE IV PRESSURE ULCER OF LEFT BUTTOCK: Primary | ICD-10-CM

## 2021-05-05 DIAGNOSIS — L89.93 DECUBITUS ULCER, INFECTED, STAGE III: ICD-10-CM

## 2021-05-05 DIAGNOSIS — L89.314 PRESSURE INJURY OF RIGHT ISCHIUM, STAGE 4: ICD-10-CM

## 2021-05-05 DIAGNOSIS — L97.509 DIABETIC FOOT ULCER WITH OSTEOMYELITIS: ICD-10-CM

## 2021-05-05 PROCEDURE — 99214 OFFICE O/P EST MOD 30 MIN: CPT | Mod: 25

## 2021-05-05 PROCEDURE — 87186 SC STD MICRODIL/AGAR DIL: CPT | Performed by: SURGERY

## 2021-05-05 PROCEDURE — 87184 SC STD DISK METHOD PER PLATE: CPT | Performed by: SURGERY

## 2021-05-05 PROCEDURE — 87076 CULTURE ANAEROBE IDENT EACH: CPT | Mod: 59 | Performed by: SURGERY

## 2021-05-05 PROCEDURE — 11042 DBRDMT SUBQ TIS 1ST 20SQCM/<: CPT

## 2021-05-05 PROCEDURE — 87070 CULTURE OTHR SPECIMN AEROBIC: CPT | Performed by: SURGERY

## 2021-05-05 PROCEDURE — 27201912 HC WOUND CARE DEBRIDEMENT SUPPLIES

## 2021-05-05 PROCEDURE — 87075 CULTR BACTERIA EXCEPT BLOOD: CPT | Performed by: SURGERY

## 2021-05-05 PROCEDURE — 87077 CULTURE AEROBIC IDENTIFY: CPT | Performed by: SURGERY

## 2021-05-05 PROCEDURE — 11043 DBRDMT MUSC&/FSCA 1ST 20/<: CPT

## 2021-05-05 PROCEDURE — 97605 NEG PRS WND THER DME<=50SQCM: CPT

## 2021-05-05 RX ORDER — AMOXICILLIN AND CLAVULANATE POTASSIUM 250; 125 MG/1; MG/1
1 TABLET, FILM COATED ORAL
COMMUNITY
End: 2021-05-13 | Stop reason: SDUPTHER

## 2021-05-08 LAB — BACTERIA SPEC AEROBE CULT: ABNORMAL

## 2021-05-10 LAB
BACTERIA SPEC ANAEROBE CULT: ABNORMAL
BACTERIA SPEC ANAEROBE CULT: ABNORMAL

## 2021-05-13 ENCOUNTER — HOSPITAL ENCOUNTER (OUTPATIENT)
Dept: WOUND CARE | Facility: HOSPITAL | Age: 46
Discharge: HOME | End: 2021-05-13
Attending: SURGERY
Payer: MEDICARE

## 2021-05-13 ENCOUNTER — TELEPHONE (OUTPATIENT)
Dept: UROLOGY | Facility: CLINIC | Age: 46
End: 2021-05-13

## 2021-05-13 VITALS
HEART RATE: 91 BPM | TEMPERATURE: 98 F | RESPIRATION RATE: 20 BRPM | DIASTOLIC BLOOD PRESSURE: 57 MMHG | SYSTOLIC BLOOD PRESSURE: 110 MMHG

## 2021-05-13 DIAGNOSIS — Z93.59 CHRONIC SUPRAPUBIC CATHETER: Chronic | ICD-10-CM

## 2021-05-13 DIAGNOSIS — I10 ESSENTIAL HYPERTENSION: Chronic | ICD-10-CM

## 2021-05-13 DIAGNOSIS — L89.314 PRESSURE INJURY OF RIGHT ISCHIUM, STAGE 4: ICD-10-CM

## 2021-05-13 DIAGNOSIS — L89.323 PRESSURE INJURY OF LEFT BUTTOCK, STAGE 3: ICD-10-CM

## 2021-05-13 DIAGNOSIS — Z89.511 HX OF RIGHT BKA: Chronic | ICD-10-CM

## 2021-05-13 DIAGNOSIS — G82.20 PARAPLEGIA: Primary | ICD-10-CM

## 2021-05-13 DIAGNOSIS — Z43.3 COLOSTOMY, EVALUATE: ICD-10-CM

## 2021-05-13 DIAGNOSIS — F17.210 CIGARETTE SMOKER: Chronic | ICD-10-CM

## 2021-05-13 DIAGNOSIS — L89.93 DECUBITUS ULCER, INFECTED, STAGE III: ICD-10-CM

## 2021-05-13 DIAGNOSIS — L08.9 DECUBITUS ULCER, INFECTED, STAGE III: ICD-10-CM

## 2021-05-13 DIAGNOSIS — L89.324 STAGE IV PRESSURE ULCER OF LEFT BUTTOCK: ICD-10-CM

## 2021-05-13 PROCEDURE — 99214 OFFICE O/P EST MOD 30 MIN: CPT

## 2021-05-13 RX ORDER — AMOXICILLIN AND CLAVULANATE POTASSIUM 250; 125 MG/1; MG/1
1 TABLET, FILM COATED ORAL EVERY 12 HOURS
Qty: 60 TABLET | Refills: 1 | Status: ON HOLD | OUTPATIENT
Start: 2021-05-13 | End: 2021-08-13 | Stop reason: HOSPADM

## 2021-05-13 RX ORDER — CLOTRIMAZOLE AND BETAMETHASONE DIPROPIONATE 10; .64 MG/G; MG/G
CREAM TOPICAL 2 TIMES DAILY
Qty: 45 G | Refills: 1 | Status: ON HOLD | OUTPATIENT
Start: 2021-05-13 | End: 2021-08-13 | Stop reason: HOSPADM

## 2021-05-13 RX ORDER — CIPROFLOXACIN 500 MG/1
500 TABLET ORAL EVERY 12 HOURS
Qty: 60 TABLET | Refills: 1 | Status: ON HOLD | OUTPATIENT
Start: 2021-05-13 | End: 2021-08-13 | Stop reason: HOSPADM

## 2021-05-15 LAB
ACID FAST MOD KINY STN SPEC: NORMAL
ACID FAST MOD KINY STN SPEC: NORMAL
MYCOBACTERIUM SPEC QL CULT: NORMAL
MYCOBACTERIUM SPEC QL CULT: NORMAL

## 2021-05-18 ENCOUNTER — HOSPITAL ENCOUNTER (OUTPATIENT)
Dept: WOUND CARE | Facility: HOSPITAL | Age: 46
Discharge: HOME | End: 2021-05-18
Attending: SURGERY
Payer: MEDICARE

## 2021-05-18 ENCOUNTER — OFFICE VISIT (OUTPATIENT)
Dept: UROLOGY | Facility: CLINIC | Age: 46
End: 2021-05-18
Payer: MEDICARE

## 2021-05-18 VITALS — WEIGHT: 206 LBS | BODY MASS INDEX: 31.22 KG/M2 | HEIGHT: 68 IN

## 2021-05-18 DIAGNOSIS — G82.20 PARAPLEGIA: ICD-10-CM

## 2021-05-18 DIAGNOSIS — N31.9 NEUROGENIC BLADDER: Primary | ICD-10-CM

## 2021-05-18 DIAGNOSIS — L89.314 PRESSURE INJURY OF RIGHT ISCHIUM, STAGE 4: Primary | ICD-10-CM

## 2021-05-18 DIAGNOSIS — L89.93 DECUBITUS ULCER, INFECTED, STAGE III: ICD-10-CM

## 2021-05-18 DIAGNOSIS — L89.323 PRESSURE INJURY OF LEFT BUTTOCK, STAGE 3: ICD-10-CM

## 2021-05-18 DIAGNOSIS — F17.210 CIGARETTE SMOKER: ICD-10-CM

## 2021-05-18 DIAGNOSIS — Z93.59 CHRONIC SUPRAPUBIC CATHETER: ICD-10-CM

## 2021-05-18 DIAGNOSIS — L08.9 DECUBITUS ULCER, INFECTED, STAGE III: ICD-10-CM

## 2021-05-18 PROCEDURE — 51705 PR CHANGE OF BLADDER TUBE,SIMPLE: ICD-10-PCS | Mod: S$GLB,,, | Performed by: NURSE PRACTITIONER

## 2021-05-18 PROCEDURE — 1126F AMNT PAIN NOTED NONE PRSNT: CPT | Mod: S$GLB,,, | Performed by: NURSE PRACTITIONER

## 2021-05-18 PROCEDURE — 3008F BODY MASS INDEX DOCD: CPT | Mod: CPTII,S$GLB,, | Performed by: NURSE PRACTITIONER

## 2021-05-18 PROCEDURE — 99999 PR PBB SHADOW E&M-EST. PATIENT-LVL II: CPT | Mod: PBBFAC,,, | Performed by: NURSE PRACTITIONER

## 2021-05-18 PROCEDURE — 51705 CHANGE OF BLADDER TUBE: CPT | Mod: S$GLB,,, | Performed by: NURSE PRACTITIONER

## 2021-05-18 PROCEDURE — 3008F PR BODY MASS INDEX (BMI) DOCUMENTED: ICD-10-PCS | Mod: CPTII,S$GLB,, | Performed by: NURSE PRACTITIONER

## 2021-05-18 PROCEDURE — 99214 OFFICE O/P EST MOD 30 MIN: CPT

## 2021-05-18 PROCEDURE — 1126F PR PAIN SEVERITY QUANTIFIED, NO PAIN PRESENT: ICD-10-PCS | Mod: S$GLB,,, | Performed by: NURSE PRACTITIONER

## 2021-05-18 PROCEDURE — 99999 PR PBB SHADOW E&M-EST. PATIENT-LVL II: ICD-10-PCS | Mod: PBBFAC,,, | Performed by: NURSE PRACTITIONER

## 2021-05-18 PROCEDURE — 99499 NO LOS: ICD-10-PCS | Mod: S$GLB,,, | Performed by: NURSE PRACTITIONER

## 2021-05-18 PROCEDURE — 99499 UNLISTED E&M SERVICE: CPT | Mod: S$GLB,,, | Performed by: NURSE PRACTITIONER

## 2021-05-20 ENCOUNTER — EXTERNAL HOME HEALTH (OUTPATIENT)
Dept: HOME HEALTH SERVICES | Facility: HOSPITAL | Age: 46
End: 2021-05-20
Payer: MEDICARE

## 2021-05-21 ENCOUNTER — HOSPITAL ENCOUNTER (OUTPATIENT)
Dept: WOUND CARE | Facility: HOSPITAL | Age: 46
Discharge: HOME | End: 2021-05-21
Attending: SURGERY
Payer: MEDICARE

## 2021-05-21 DIAGNOSIS — G82.20 PARAPLEGIA: ICD-10-CM

## 2021-05-21 DIAGNOSIS — L89.314 PRESSURE INJURY OF RIGHT ISCHIUM, STAGE 4: Primary | ICD-10-CM

## 2021-05-21 DIAGNOSIS — L89.323 PRESSURE INJURY OF LEFT BUTTOCK, STAGE 3: ICD-10-CM

## 2021-05-21 PROCEDURE — 99214 OFFICE O/P EST MOD 30 MIN: CPT

## 2021-05-24 ENCOUNTER — HOSPITAL ENCOUNTER (OUTPATIENT)
Dept: WOUND CARE | Facility: HOSPITAL | Age: 46
Discharge: HOME | End: 2021-05-24
Attending: SURGERY
Payer: MEDICARE

## 2021-05-24 DIAGNOSIS — Z89.511 HX OF RIGHT BKA: ICD-10-CM

## 2021-05-24 DIAGNOSIS — G82.20 PARAPLEGIA: ICD-10-CM

## 2021-05-24 DIAGNOSIS — L89.323 PRESSURE INJURY OF LEFT BUTTOCK, STAGE 3: ICD-10-CM

## 2021-05-24 DIAGNOSIS — L89.314 PRESSURE INJURY OF RIGHT ISCHIUM, STAGE 4: Primary | ICD-10-CM

## 2021-05-24 DIAGNOSIS — Z93.59 CHRONIC SUPRAPUBIC CATHETER: ICD-10-CM

## 2021-05-24 PROCEDURE — 99213 OFFICE O/P EST LOW 20 MIN: CPT

## 2021-05-27 ENCOUNTER — HOSPITAL ENCOUNTER (OUTPATIENT)
Dept: WOUND CARE | Facility: HOSPITAL | Age: 46
Discharge: HOME | End: 2021-05-27
Attending: SURGERY
Payer: MEDICARE

## 2021-05-27 DIAGNOSIS — L89.313 PRESSURE INJURY OF RIGHT BUTTOCK, STAGE 3: ICD-10-CM

## 2021-05-27 DIAGNOSIS — Z93.59 CHRONIC SUPRAPUBIC CATHETER: Chronic | ICD-10-CM

## 2021-05-27 DIAGNOSIS — Z79.4 TYPE 2 DIABETES MELLITUS WITH HYPERGLYCEMIA, WITH LONG-TERM CURRENT USE OF INSULIN: ICD-10-CM

## 2021-05-27 DIAGNOSIS — E11.65 TYPE 2 DIABETES MELLITUS WITH HYPERGLYCEMIA, WITH LONG-TERM CURRENT USE OF INSULIN: ICD-10-CM

## 2021-05-27 DIAGNOSIS — L89.324 STAGE IV PRESSURE ULCER OF LEFT BUTTOCK: Primary | ICD-10-CM

## 2021-05-27 DIAGNOSIS — Z93.3 S/P COLOSTOMY: ICD-10-CM

## 2021-05-27 DIAGNOSIS — L89.314 PRESSURE INJURY OF RIGHT ISCHIUM, STAGE 4: ICD-10-CM

## 2021-05-27 DIAGNOSIS — I10 ESSENTIAL HYPERTENSION: Chronic | ICD-10-CM

## 2021-05-27 PROCEDURE — 99212 OFFICE O/P EST SF 10 MIN: CPT

## 2021-05-31 ENCOUNTER — HOSPITAL ENCOUNTER (OUTPATIENT)
Dept: WOUND CARE | Facility: HOSPITAL | Age: 46
Discharge: HOME | End: 2021-05-31
Attending: SURGERY
Payer: MEDICARE

## 2021-05-31 DIAGNOSIS — E11.9 DIABETES MELLITUS WITHOUT COMPLICATION: ICD-10-CM

## 2021-05-31 PROCEDURE — 99213 OFFICE O/P EST LOW 20 MIN: CPT

## 2021-06-03 ENCOUNTER — HOSPITAL ENCOUNTER (OUTPATIENT)
Dept: WOUND CARE | Facility: HOSPITAL | Age: 46
Discharge: HOME | End: 2021-06-03
Attending: SURGERY
Payer: MEDICARE

## 2021-06-03 VITALS
TEMPERATURE: 98 F | WEIGHT: 206 LBS | HEIGHT: 68 IN | DIASTOLIC BLOOD PRESSURE: 72 MMHG | HEART RATE: 77 BPM | BODY MASS INDEX: 31.22 KG/M2 | SYSTOLIC BLOOD PRESSURE: 121 MMHG

## 2021-06-03 DIAGNOSIS — E11.65 TYPE 2 DIABETES MELLITUS WITH HYPERGLYCEMIA, WITH LONG-TERM CURRENT USE OF INSULIN: ICD-10-CM

## 2021-06-03 DIAGNOSIS — Z93.3 S/P COLOSTOMY: ICD-10-CM

## 2021-06-03 DIAGNOSIS — L89.324 STAGE IV PRESSURE ULCER OF LEFT BUTTOCK: Primary | ICD-10-CM

## 2021-06-03 DIAGNOSIS — L89.314 PRESSURE INJURY OF RIGHT ISCHIUM, STAGE 4: ICD-10-CM

## 2021-06-03 DIAGNOSIS — Z79.4 TYPE 2 DIABETES MELLITUS WITH HYPERGLYCEMIA, WITH LONG-TERM CURRENT USE OF INSULIN: ICD-10-CM

## 2021-06-03 DIAGNOSIS — M86.60 CHRONIC OSTEOMYELITIS: ICD-10-CM

## 2021-06-03 DIAGNOSIS — S81.809A NON-HEALING WOUND OF LOWER EXTREMITY, INITIAL ENCOUNTER: ICD-10-CM

## 2021-06-03 PROCEDURE — 11042 DBRDMT SUBQ TIS 1ST 20SQCM/<: CPT

## 2021-06-03 PROCEDURE — 11045 DBRDMT SUBQ TISS EACH ADDL: CPT

## 2021-06-03 PROCEDURE — 27201912 HC WOUND CARE DEBRIDEMENT SUPPLIES

## 2021-06-07 ENCOUNTER — HOSPITAL ENCOUNTER (OUTPATIENT)
Dept: WOUND CARE | Facility: HOSPITAL | Age: 46
Discharge: HOME | End: 2021-06-07
Attending: SURGERY
Payer: MEDICARE

## 2021-06-07 DIAGNOSIS — E11.65 TYPE 2 DIABETES MELLITUS WITH HYPERGLYCEMIA, WITH LONG-TERM CURRENT USE OF INSULIN: ICD-10-CM

## 2021-06-07 DIAGNOSIS — Z79.4 TYPE 2 DIABETES MELLITUS WITH HYPERGLYCEMIA, WITH LONG-TERM CURRENT USE OF INSULIN: ICD-10-CM

## 2021-06-07 DIAGNOSIS — L89.314 PRESSURE INJURY OF RIGHT ISCHIUM, STAGE 4: ICD-10-CM

## 2021-06-07 DIAGNOSIS — L89.324 STAGE IV PRESSURE ULCER OF LEFT BUTTOCK: Primary | ICD-10-CM

## 2021-06-07 DIAGNOSIS — G82.20 PARAPLEGIA: ICD-10-CM

## 2021-06-07 PROBLEM — Z00.00 HEALTHCARE MAINTENANCE: Status: RESOLVED | Noted: 2021-03-05 | Resolved: 2021-06-07

## 2021-06-07 PROCEDURE — 99214 OFFICE O/P EST MOD 30 MIN: CPT

## 2021-06-11 ENCOUNTER — HOSPITAL ENCOUNTER (OUTPATIENT)
Dept: WOUND CARE | Facility: HOSPITAL | Age: 46
Discharge: HOME | End: 2021-06-11
Attending: SURGERY
Payer: MEDICARE

## 2021-06-11 DIAGNOSIS — L89.324 STAGE IV PRESSURE ULCER OF LEFT BUTTOCK: Primary | ICD-10-CM

## 2021-06-11 DIAGNOSIS — L89.314 PRESSURE INJURY OF RIGHT ISCHIUM, STAGE 4: ICD-10-CM

## 2021-06-11 PROCEDURE — 99214 OFFICE O/P EST MOD 30 MIN: CPT

## 2021-06-14 ENCOUNTER — HOSPITAL ENCOUNTER (OUTPATIENT)
Dept: WOUND CARE | Facility: HOSPITAL | Age: 46
Discharge: HOME | End: 2021-06-14
Attending: SURGERY
Payer: MEDICARE

## 2021-06-14 DIAGNOSIS — S81.809A NON-HEALING WOUND OF LOWER EXTREMITY, INITIAL ENCOUNTER: ICD-10-CM

## 2021-06-14 DIAGNOSIS — L89.324 STAGE IV PRESSURE ULCER OF LEFT BUTTOCK: Primary | ICD-10-CM

## 2021-06-14 DIAGNOSIS — E11.65 TYPE 2 DIABETES MELLITUS WITH HYPERGLYCEMIA, WITH LONG-TERM CURRENT USE OF INSULIN: ICD-10-CM

## 2021-06-14 DIAGNOSIS — L89.314 PRESSURE INJURY OF RIGHT ISCHIUM, STAGE 4: ICD-10-CM

## 2021-06-14 DIAGNOSIS — G82.20 PARAPLEGIA: ICD-10-CM

## 2021-06-14 DIAGNOSIS — Z79.4 TYPE 2 DIABETES MELLITUS WITH HYPERGLYCEMIA, WITH LONG-TERM CURRENT USE OF INSULIN: ICD-10-CM

## 2021-06-14 PROCEDURE — 99204 OFFICE O/P NEW MOD 45 MIN: CPT

## 2021-06-21 ENCOUNTER — OFFICE VISIT (OUTPATIENT)
Dept: UROLOGY | Facility: CLINIC | Age: 46
End: 2021-06-21
Payer: MEDICARE

## 2021-06-21 ENCOUNTER — HOSPITAL ENCOUNTER (OUTPATIENT)
Dept: WOUND CARE | Facility: HOSPITAL | Age: 46
Discharge: HOME | End: 2021-06-21
Attending: SURGERY
Payer: MEDICARE

## 2021-06-21 VITALS — DIASTOLIC BLOOD PRESSURE: 81 MMHG | HEART RATE: 82 BPM | SYSTOLIC BLOOD PRESSURE: 122 MMHG

## 2021-06-21 DIAGNOSIS — L89.314 PRESSURE INJURY OF RIGHT ISCHIUM, STAGE 4: ICD-10-CM

## 2021-06-21 DIAGNOSIS — L89.324 STAGE IV PRESSURE ULCER OF LEFT BUTTOCK: Primary | ICD-10-CM

## 2021-06-21 DIAGNOSIS — N31.9 NEUROGENIC BLADDER: Primary | ICD-10-CM

## 2021-06-21 DIAGNOSIS — G82.20 PARAPLEGIA: ICD-10-CM

## 2021-06-21 DIAGNOSIS — L97.324 NON-PRESSURE CHRONIC ULCER OF LEFT ANKLE WITH NECROSIS OF BONE: ICD-10-CM

## 2021-06-21 DIAGNOSIS — Z93.59 CHRONIC SUPRAPUBIC CATHETER: ICD-10-CM

## 2021-06-21 PROCEDURE — 99499 NO LOS: ICD-10-PCS | Mod: S$GLB,,, | Performed by: NURSE PRACTITIONER

## 2021-06-21 PROCEDURE — 99499 UNLISTED E&M SERVICE: CPT | Mod: S$GLB,,, | Performed by: NURSE PRACTITIONER

## 2021-06-21 PROCEDURE — 99214 OFFICE O/P EST MOD 30 MIN: CPT

## 2021-06-21 PROCEDURE — 99999 PR PBB SHADOW E&M-EST. PATIENT-LVL III: CPT | Mod: PBBFAC,,, | Performed by: NURSE PRACTITIONER

## 2021-06-21 PROCEDURE — 51705 PR CHANGE OF BLADDER TUBE,SIMPLE: ICD-10-PCS | Mod: S$GLB,,, | Performed by: NURSE PRACTITIONER

## 2021-06-21 PROCEDURE — 99999 PR PBB SHADOW E&M-EST. PATIENT-LVL III: ICD-10-PCS | Mod: PBBFAC,,, | Performed by: NURSE PRACTITIONER

## 2021-06-21 PROCEDURE — 51705 CHANGE OF BLADDER TUBE: CPT | Mod: S$GLB,,, | Performed by: NURSE PRACTITIONER

## 2021-06-21 RX ORDER — OXYCODONE AND ACETAMINOPHEN 10; 325 MG/1; MG/1
TABLET ORAL
Status: ON HOLD | COMMUNITY
Start: 2021-06-18 | End: 2021-08-13 | Stop reason: HOSPADM

## 2021-06-21 RX ORDER — IBUPROFEN 600 MG/1
TABLET ORAL
COMMUNITY
Start: 2021-05-26

## 2021-06-21 RX ORDER — BACLOFEN 10 MG/1
TABLET ORAL
Status: ON HOLD | COMMUNITY
Start: 2021-06-08 | End: 2021-08-13 | Stop reason: HOSPADM

## 2021-06-21 RX ORDER — GLIMEPIRIDE 4 MG/1
4 TABLET ORAL DAILY
COMMUNITY
Start: 2021-03-09

## 2021-06-21 RX ORDER — PEN NEEDLE, DIABETIC 31 GX5/16"
NEEDLE, DISPOSABLE MISCELLANEOUS
COMMUNITY
Start: 2021-03-02

## 2021-06-24 ENCOUNTER — HOSPITAL ENCOUNTER (OUTPATIENT)
Dept: WOUND CARE | Facility: HOSPITAL | Age: 46
Discharge: HOME | End: 2021-06-24
Attending: SURGERY
Payer: MEDICARE

## 2021-06-24 VITALS
HEIGHT: 68 IN | SYSTOLIC BLOOD PRESSURE: 159 MMHG | BODY MASS INDEX: 31.22 KG/M2 | TEMPERATURE: 98 F | HEART RATE: 76 BPM | DIASTOLIC BLOOD PRESSURE: 85 MMHG | WEIGHT: 206 LBS

## 2021-06-24 DIAGNOSIS — Z79.4 TYPE 2 DIABETES MELLITUS WITH HYPERGLYCEMIA, WITH LONG-TERM CURRENT USE OF INSULIN: ICD-10-CM

## 2021-06-24 DIAGNOSIS — G82.20 PARAPLEGIA: ICD-10-CM

## 2021-06-24 DIAGNOSIS — M86.60 CHRONIC OSTEOMYELITIS: ICD-10-CM

## 2021-06-24 DIAGNOSIS — L97.921 CHRONIC ULCER OF LEFT LEG, LIMITED TO BREAKDOWN OF SKIN: ICD-10-CM

## 2021-06-24 DIAGNOSIS — E11.65 TYPE 2 DIABETES MELLITUS WITH HYPERGLYCEMIA, WITH LONG-TERM CURRENT USE OF INSULIN: ICD-10-CM

## 2021-06-24 DIAGNOSIS — L89.324 STAGE IV PRESSURE ULCER OF LEFT BUTTOCK: Primary | ICD-10-CM

## 2021-06-24 DIAGNOSIS — L97.324 NON-PRESSURE CHRONIC ULCER OF LEFT ANKLE WITH NECROSIS OF BONE: ICD-10-CM

## 2021-06-24 DIAGNOSIS — L89.314 PRESSURE INJURY OF RIGHT ISCHIUM, STAGE 4: ICD-10-CM

## 2021-06-24 PROCEDURE — 87077 CULTURE AEROBIC IDENTIFY: CPT | Mod: 59 | Performed by: SURGERY

## 2021-06-24 PROCEDURE — 99215 OFFICE O/P EST HI 40 MIN: CPT

## 2021-06-24 PROCEDURE — 87070 CULTURE OTHR SPECIMN AEROBIC: CPT | Performed by: SURGERY

## 2021-06-24 PROCEDURE — 87075 CULTR BACTERIA EXCEPT BLOOD: CPT | Performed by: SURGERY

## 2021-06-24 PROCEDURE — 87186 SC STD MICRODIL/AGAR DIL: CPT | Performed by: SURGERY

## 2021-06-28 ENCOUNTER — HOSPITAL ENCOUNTER (OUTPATIENT)
Dept: WOUND CARE | Facility: HOSPITAL | Age: 46
Discharge: HOME | End: 2021-06-28
Attending: SURGERY
Payer: MEDICARE

## 2021-06-28 DIAGNOSIS — L89.324 STAGE IV PRESSURE ULCER OF LEFT BUTTOCK: Primary | ICD-10-CM

## 2021-06-28 PROCEDURE — 99214 OFFICE O/P EST MOD 30 MIN: CPT

## 2021-06-29 LAB
BACTERIA SPEC AEROBE CULT: ABNORMAL
BACTERIA SPEC AEROBE CULT: ABNORMAL
BACTERIA SPEC ANAEROBE CULT: NORMAL

## 2021-07-01 ENCOUNTER — HOSPITAL ENCOUNTER (OUTPATIENT)
Dept: WOUND CARE | Facility: HOSPITAL | Age: 46
Discharge: HOME | End: 2021-07-01
Attending: SURGERY
Payer: MEDICARE

## 2021-07-01 VITALS
BODY MASS INDEX: 31.22 KG/M2 | WEIGHT: 206 LBS | DIASTOLIC BLOOD PRESSURE: 82 MMHG | TEMPERATURE: 98 F | SYSTOLIC BLOOD PRESSURE: 144 MMHG | HEART RATE: 72 BPM | HEIGHT: 68 IN

## 2021-07-01 DIAGNOSIS — G82.20 PARAPLEGIA: ICD-10-CM

## 2021-07-01 DIAGNOSIS — L89.314 PRESSURE INJURY OF RIGHT ISCHIUM, STAGE 4: ICD-10-CM

## 2021-07-01 DIAGNOSIS — L89.324 STAGE IV PRESSURE ULCER OF LEFT BUTTOCK: Primary | ICD-10-CM

## 2021-07-01 DIAGNOSIS — Z89.511 HX OF RIGHT BKA: Chronic | ICD-10-CM

## 2021-07-01 DIAGNOSIS — Z79.4 TYPE 2 DIABETES MELLITUS WITH HYPERGLYCEMIA, WITH LONG-TERM CURRENT USE OF INSULIN: ICD-10-CM

## 2021-07-01 DIAGNOSIS — M86.60 CHRONIC OSTEOMYELITIS: ICD-10-CM

## 2021-07-01 DIAGNOSIS — Z43.3 COLOSTOMY, EVALUATE: ICD-10-CM

## 2021-07-01 DIAGNOSIS — E11.65 TYPE 2 DIABETES MELLITUS WITH HYPERGLYCEMIA, WITH LONG-TERM CURRENT USE OF INSULIN: ICD-10-CM

## 2021-07-01 DIAGNOSIS — K59.01 SLOW TRANSIT CONSTIPATION: ICD-10-CM

## 2021-07-01 DIAGNOSIS — L97.324 NON-PRESSURE CHRONIC ULCER OF LEFT ANKLE WITH NECROSIS OF BONE: ICD-10-CM

## 2021-07-01 PROCEDURE — 99214 OFFICE O/P EST MOD 30 MIN: CPT

## 2021-07-08 ENCOUNTER — HOSPITAL ENCOUNTER (OUTPATIENT)
Dept: WOUND CARE | Facility: HOSPITAL | Age: 46
Discharge: HOME | End: 2021-07-08
Attending: SURGERY
Payer: MEDICARE

## 2021-07-08 VITALS
HEART RATE: 92 BPM | DIASTOLIC BLOOD PRESSURE: 65 MMHG | BODY MASS INDEX: 31.22 KG/M2 | WEIGHT: 206 LBS | TEMPERATURE: 98 F | HEIGHT: 68 IN | SYSTOLIC BLOOD PRESSURE: 114 MMHG

## 2021-07-08 DIAGNOSIS — L89.324 STAGE IV PRESSURE ULCER OF LEFT BUTTOCK: Primary | ICD-10-CM

## 2021-07-08 DIAGNOSIS — M86.60 CHRONIC OSTEOMYELITIS: ICD-10-CM

## 2021-07-08 DIAGNOSIS — G82.20 PARAPLEGIA: ICD-10-CM

## 2021-07-08 DIAGNOSIS — L89.314 PRESSURE INJURY OF RIGHT ISCHIUM, STAGE 4: ICD-10-CM

## 2021-07-08 PROCEDURE — 17250 CHEM CAUT OF GRANLTJ TISSUE: CPT

## 2021-07-12 ENCOUNTER — HOSPITAL ENCOUNTER (OUTPATIENT)
Dept: WOUND CARE | Facility: HOSPITAL | Age: 46
Discharge: HOME | End: 2021-07-12
Attending: SURGERY
Payer: MEDICARE

## 2021-07-12 DIAGNOSIS — G82.20 PARAPLEGIA: ICD-10-CM

## 2021-07-12 DIAGNOSIS — Z89.511 HX OF RIGHT BKA: ICD-10-CM

## 2021-07-12 DIAGNOSIS — L89.324 STAGE IV PRESSURE ULCER OF LEFT BUTTOCK: ICD-10-CM

## 2021-07-12 DIAGNOSIS — L89.314 PRESSURE INJURY OF RIGHT ISCHIUM, STAGE 4: Primary | ICD-10-CM

## 2021-07-12 PROCEDURE — 99214 OFFICE O/P EST MOD 30 MIN: CPT | Mod: 25,27

## 2021-07-19 ENCOUNTER — HOSPITAL ENCOUNTER (OUTPATIENT)
Dept: WOUND CARE | Facility: HOSPITAL | Age: 46
Discharge: HOME | End: 2021-07-19
Attending: SURGERY
Payer: MEDICARE

## 2021-07-19 ENCOUNTER — OFFICE VISIT (OUTPATIENT)
Dept: UROLOGY | Facility: CLINIC | Age: 46
End: 2021-07-19
Payer: MEDICARE

## 2021-07-19 VITALS — DIASTOLIC BLOOD PRESSURE: 72 MMHG | HEART RATE: 86 BPM | SYSTOLIC BLOOD PRESSURE: 113 MMHG

## 2021-07-19 DIAGNOSIS — Z79.4 TYPE 2 DIABETES MELLITUS WITH HYPERGLYCEMIA, WITH LONG-TERM CURRENT USE OF INSULIN: ICD-10-CM

## 2021-07-19 DIAGNOSIS — E11.65 TYPE 2 DIABETES MELLITUS WITH HYPERGLYCEMIA, WITH LONG-TERM CURRENT USE OF INSULIN: ICD-10-CM

## 2021-07-19 DIAGNOSIS — G82.20 PARAPLEGIA: ICD-10-CM

## 2021-07-19 DIAGNOSIS — L89.324 STAGE IV PRESSURE ULCER OF LEFT BUTTOCK: ICD-10-CM

## 2021-07-19 DIAGNOSIS — L89.314 PRESSURE INJURY OF RIGHT ISCHIUM, STAGE 4: Primary | ICD-10-CM

## 2021-07-19 DIAGNOSIS — N31.9 NEUROGENIC BLADDER: Primary | ICD-10-CM

## 2021-07-19 DIAGNOSIS — Z93.3 COLOSTOMY IN PLACE: ICD-10-CM

## 2021-07-19 DIAGNOSIS — L97.324 NON-PRESSURE CHRONIC ULCER OF LEFT ANKLE WITH NECROSIS OF BONE: ICD-10-CM

## 2021-07-19 PROCEDURE — 99999 PR PBB SHADOW E&M-EST. PATIENT-LVL III: ICD-10-PCS | Mod: PBBFAC,,, | Performed by: NURSE PRACTITIONER

## 2021-07-19 PROCEDURE — 99999 PR PBB SHADOW E&M-EST. PATIENT-LVL III: CPT | Mod: PBBFAC,,, | Performed by: NURSE PRACTITIONER

## 2021-07-19 PROCEDURE — 51705 CHANGE OF BLADDER TUBE: CPT | Mod: S$GLB,,, | Performed by: NURSE PRACTITIONER

## 2021-07-19 PROCEDURE — 99499 UNLISTED E&M SERVICE: CPT | Mod: S$GLB,,, | Performed by: NURSE PRACTITIONER

## 2021-07-19 PROCEDURE — 99499 NO LOS: ICD-10-PCS | Mod: S$GLB,,, | Performed by: NURSE PRACTITIONER

## 2021-07-19 PROCEDURE — 51705 PR CHANGE OF BLADDER TUBE,SIMPLE: ICD-10-PCS | Mod: S$GLB,,, | Performed by: NURSE PRACTITIONER

## 2021-07-19 PROCEDURE — 99214 OFFICE O/P EST MOD 30 MIN: CPT

## 2021-07-26 ENCOUNTER — HOSPITAL ENCOUNTER (OUTPATIENT)
Dept: WOUND CARE | Facility: HOSPITAL | Age: 46
Discharge: HOME | End: 2021-07-26
Attending: SURGERY
Payer: MEDICARE

## 2021-07-26 DIAGNOSIS — L97.324 NON-PRESSURE CHRONIC ULCER OF LEFT ANKLE WITH NECROSIS OF BONE: ICD-10-CM

## 2021-07-26 DIAGNOSIS — Z79.4 TYPE 2 DIABETES MELLITUS WITH HYPERGLYCEMIA, WITH LONG-TERM CURRENT USE OF INSULIN: ICD-10-CM

## 2021-07-26 DIAGNOSIS — E11.65 TYPE 2 DIABETES MELLITUS WITH HYPERGLYCEMIA, WITH LONG-TERM CURRENT USE OF INSULIN: ICD-10-CM

## 2021-07-26 DIAGNOSIS — G82.20 PARAPLEGIA: ICD-10-CM

## 2021-07-26 DIAGNOSIS — L89.324 STAGE IV PRESSURE ULCER OF LEFT BUTTOCK: ICD-10-CM

## 2021-07-26 DIAGNOSIS — Z89.511 HX OF RIGHT BKA: ICD-10-CM

## 2021-07-26 DIAGNOSIS — L89.314 PRESSURE INJURY OF RIGHT ISCHIUM, STAGE 4: Primary | ICD-10-CM

## 2021-07-26 PROCEDURE — 99215 OFFICE O/P EST HI 40 MIN: CPT

## 2021-07-29 ENCOUNTER — HOSPITAL ENCOUNTER (OUTPATIENT)
Dept: WOUND CARE | Facility: HOSPITAL | Age: 46
Discharge: HOME | End: 2021-07-29
Attending: SURGERY
Payer: MEDICARE

## 2021-07-29 VITALS — HEART RATE: 85 BPM | SYSTOLIC BLOOD PRESSURE: 125 MMHG | DIASTOLIC BLOOD PRESSURE: 75 MMHG

## 2021-07-29 DIAGNOSIS — Z89.511 HX OF RIGHT BKA: ICD-10-CM

## 2021-07-29 DIAGNOSIS — G82.20 PARAPLEGIA: ICD-10-CM

## 2021-07-29 DIAGNOSIS — L89.314 PRESSURE INJURY OF RIGHT ISCHIUM, STAGE 4: Primary | ICD-10-CM

## 2021-07-29 DIAGNOSIS — L89.324 STAGE IV PRESSURE ULCER OF LEFT BUTTOCK: ICD-10-CM

## 2021-07-29 PROCEDURE — 99215 OFFICE O/P EST HI 40 MIN: CPT

## 2021-08-02 ENCOUNTER — TELEPHONE (OUTPATIENT)
Dept: WOUND CARE | Facility: HOSPITAL | Age: 46
End: 2021-08-02

## 2021-08-03 ENCOUNTER — HOSPITAL ENCOUNTER (INPATIENT)
Facility: HOSPITAL | Age: 46
LOS: 14 days | Discharge: NURSING, REHAB OR LONG-TERM CARE | DRG: 580 | End: 2021-08-17
Attending: EMERGENCY MEDICINE | Admitting: HOSPITALIST
Payer: MEDICARE

## 2021-08-03 DIAGNOSIS — I10 ESSENTIAL HYPERTENSION: Chronic | ICD-10-CM

## 2021-08-03 DIAGNOSIS — Z93.3 S/P COLOSTOMY: ICD-10-CM

## 2021-08-03 DIAGNOSIS — L08.9 PRESSURE INJURY, STAGE 4, WITH INFECTION: ICD-10-CM

## 2021-08-03 DIAGNOSIS — F33.2 SEVERE EPISODE OF RECURRENT MAJOR DEPRESSIVE DISORDER, WITHOUT PSYCHOTIC FEATURES: ICD-10-CM

## 2021-08-03 DIAGNOSIS — L89.304 DECUBITUS ULCER OF ISCHIAL AREA, STAGE 4, UNSPECIFIED LATERALITY: Primary | ICD-10-CM

## 2021-08-03 DIAGNOSIS — L89.324 STAGE IV PRESSURE ULCER OF LEFT BUTTOCK: ICD-10-CM

## 2021-08-03 DIAGNOSIS — Z16.12 ESBL (EXTENDED SPECTRUM BETA-LACTAMASE) PRODUCING BACTERIA INFECTION: ICD-10-CM

## 2021-08-03 DIAGNOSIS — G82.20 PARAPLEGIA: ICD-10-CM

## 2021-08-03 DIAGNOSIS — S24.103A: ICD-10-CM

## 2021-08-03 DIAGNOSIS — R07.9 CHEST PAIN: ICD-10-CM

## 2021-08-03 DIAGNOSIS — L97.324 NON-PRESSURE CHRONIC ULCER OF LEFT ANKLE WITH NECROSIS OF BONE: ICD-10-CM

## 2021-08-03 DIAGNOSIS — Z16.13 CARBAPENEM-RESISTANT ACINETOBACTER BAUMANNII INFECTION: ICD-10-CM

## 2021-08-03 DIAGNOSIS — L89.324 PRESSURE INJURY OF LEFT ISCHIUM, STAGE 4: ICD-10-CM

## 2021-08-03 DIAGNOSIS — A49.8 CARBAPENEM-RESISTANT ACINETOBACTER BAUMANNII INFECTION: ICD-10-CM

## 2021-08-03 DIAGNOSIS — L89.94 PRESSURE INJURY, STAGE 4, WITH INFECTION: ICD-10-CM

## 2021-08-03 DIAGNOSIS — A49.9 ESBL (EXTENDED SPECTRUM BETA-LACTAMASE) PRODUCING BACTERIA INFECTION: ICD-10-CM

## 2021-08-03 PROBLEM — K21.9 GASTROESOPHAGEAL REFLUX DISEASE: Status: ACTIVE | Noted: 2017-07-19

## 2021-08-03 LAB
ALBUMIN SERPL BCP-MCNC: 2.5 G/DL (ref 3.5–5.2)
ALP SERPL-CCNC: 107 U/L (ref 55–135)
ALT SERPL W/O P-5'-P-CCNC: 9 U/L (ref 10–44)
ANION GAP SERPL CALC-SCNC: 10 MMOL/L (ref 8–16)
AST SERPL-CCNC: 9 U/L (ref 10–40)
BACTERIA #/AREA URNS HPF: ABNORMAL /HPF
BASOPHILS # BLD AUTO: 0.06 K/UL (ref 0–0.2)
BASOPHILS NFR BLD: 0.6 % (ref 0–1.9)
BILIRUB SERPL-MCNC: 0.2 MG/DL (ref 0.1–1)
BILIRUB UR QL STRIP: NEGATIVE
BUN SERPL-MCNC: 6 MG/DL (ref 6–20)
CALCIUM SERPL-MCNC: 9 MG/DL (ref 8.7–10.5)
CHLORIDE SERPL-SCNC: 105 MMOL/L (ref 95–110)
CLARITY UR: CLEAR
CO2 SERPL-SCNC: 24 MMOL/L (ref 23–29)
COLOR UR: YELLOW
CREAT SERPL-MCNC: 0.9 MG/DL (ref 0.5–1.4)
CRP SERPL-MCNC: 151.3 MG/L (ref 0–8.2)
CTP QC/QA: YES
DIFFERENTIAL METHOD: ABNORMAL
EOSINOPHIL # BLD AUTO: 0.5 K/UL (ref 0–0.5)
EOSINOPHIL NFR BLD: 4.4 % (ref 0–8)
ERYTHROCYTE [DISTWIDTH] IN BLOOD BY AUTOMATED COUNT: 18.4 % (ref 11.5–14.5)
ERYTHROCYTE [SEDIMENTATION RATE] IN BLOOD BY WESTERGREN METHOD: 120 MM/HR (ref 0–10)
EST. GFR  (AFRICAN AMERICAN): >60 ML/MIN/1.73 M^2
EST. GFR  (NON AFRICAN AMERICAN): >60 ML/MIN/1.73 M^2
ESTIMATED AVG GLUCOSE: 126 MG/DL (ref 68–131)
GLUCOSE SERPL-MCNC: 134 MG/DL (ref 70–110)
GLUCOSE UR QL STRIP: NEGATIVE
HBA1C MFR BLD: 6 % (ref 4–5.6)
HCT VFR BLD AUTO: 26.9 % (ref 40–54)
HGB BLD-MCNC: 8.6 G/DL (ref 14–18)
HGB UR QL STRIP: NEGATIVE
IMM GRANULOCYTES # BLD AUTO: 0.05 K/UL (ref 0–0.04)
IMM GRANULOCYTES NFR BLD AUTO: 0.5 % (ref 0–0.5)
IRON SERPL-MCNC: <10 UG/DL (ref 45–160)
KETONES UR QL STRIP: NEGATIVE
LEUKOCYTE ESTERASE UR QL STRIP: ABNORMAL
LYMPHOCYTES # BLD AUTO: 2.2 K/UL (ref 1–4.8)
LYMPHOCYTES NFR BLD: 20.6 % (ref 18–48)
MCH RBC QN AUTO: 22 PG (ref 27–31)
MCHC RBC AUTO-ENTMCNC: 32 G/DL (ref 32–36)
MCV RBC AUTO: 69 FL (ref 82–98)
MICROSCOPIC COMMENT: ABNORMAL
MONOCYTES # BLD AUTO: 0.7 K/UL (ref 0.3–1)
MONOCYTES NFR BLD: 6.8 % (ref 4–15)
NEUTROPHILS # BLD AUTO: 7.3 K/UL (ref 1.8–7.7)
NEUTROPHILS NFR BLD: 67.1 % (ref 38–73)
NITRITE UR QL STRIP: NEGATIVE
NRBC BLD-RTO: 0 /100 WBC
PH UR STRIP: 6 [PH] (ref 5–8)
PLATELET # BLD AUTO: 813 K/UL (ref 150–450)
PMV BLD AUTO: 9.2 FL (ref 9.2–12.9)
POTASSIUM SERPL-SCNC: 3.4 MMOL/L (ref 3.5–5.1)
PROT SERPL-MCNC: 8.2 G/DL (ref 6–8.4)
PROT UR QL STRIP: ABNORMAL
RBC # BLD AUTO: 3.91 M/UL (ref 4.6–6.2)
RBC #/AREA URNS HPF: 3 /HPF (ref 0–4)
SARS-COV-2 RDRP RESP QL NAA+PROBE: NEGATIVE
SATURATED IRON: ABNORMAL % (ref 20–50)
SODIUM SERPL-SCNC: 139 MMOL/L (ref 136–145)
SP GR UR STRIP: 1.01 (ref 1–1.03)
SQUAMOUS #/AREA URNS HPF: 0 /HPF
TOTAL IRON BINDING CAPACITY: 225 UG/DL (ref 250–450)
TRANSFERRIN SERPL-MCNC: 152 MG/DL (ref 200–375)
TROPONIN I SERPL DL<=0.01 NG/ML-MCNC: <0.006 NG/ML (ref 0–0.03)
TROPONIN I SERPL DL<=0.01 NG/ML-MCNC: <0.006 NG/ML (ref 0–0.03)
URN SPEC COLLECT METH UR: ABNORMAL
UROBILINOGEN UR STRIP-ACNC: NEGATIVE EU/DL
WBC # BLD AUTO: 10.84 K/UL (ref 3.9–12.7)
WBC #/AREA URNS HPF: 6 /HPF (ref 0–5)
YEAST URNS QL MICRO: ABNORMAL

## 2021-08-03 PROCEDURE — 25000003 PHARM REV CODE 250: Performed by: ORTHOPAEDIC SURGERY

## 2021-08-03 PROCEDURE — 84484 ASSAY OF TROPONIN QUANT: CPT | Performed by: ORTHOPAEDIC SURGERY

## 2021-08-03 PROCEDURE — 63600175 PHARM REV CODE 636 W HCPCS: Performed by: ORTHOPAEDIC SURGERY

## 2021-08-03 PROCEDURE — 83036 HEMOGLOBIN GLYCOSYLATED A1C: CPT | Performed by: ORTHOPAEDIC SURGERY

## 2021-08-03 PROCEDURE — U0002 COVID-19 LAB TEST NON-CDC: HCPCS | Performed by: ORTHOPAEDIC SURGERY

## 2021-08-03 PROCEDURE — 25000003 PHARM REV CODE 250: Performed by: HOSPITALIST

## 2021-08-03 PROCEDURE — 36569 INSJ PICC 5 YR+ W/O IMAGING: CPT

## 2021-08-03 PROCEDURE — 81000 URINALYSIS NONAUTO W/SCOPE: CPT | Performed by: ORTHOPAEDIC SURGERY

## 2021-08-03 PROCEDURE — C1751 CATH, INF, PER/CENT/MIDLINE: HCPCS

## 2021-08-03 PROCEDURE — 83540 ASSAY OF IRON: CPT | Performed by: ORTHOPAEDIC SURGERY

## 2021-08-03 PROCEDURE — 85025 COMPLETE CBC W/AUTO DIFF WBC: CPT | Performed by: NURSE PRACTITIONER

## 2021-08-03 PROCEDURE — 85652 RBC SED RATE AUTOMATED: CPT | Performed by: NURSE PRACTITIONER

## 2021-08-03 PROCEDURE — 80053 COMPREHEN METABOLIC PANEL: CPT | Performed by: NURSE PRACTITIONER

## 2021-08-03 PROCEDURE — 87040 BLOOD CULTURE FOR BACTERIA: CPT | Performed by: NURSE PRACTITIONER

## 2021-08-03 PROCEDURE — 99285 EMERGENCY DEPT VISIT HI MDM: CPT | Mod: 25

## 2021-08-03 PROCEDURE — 86140 C-REACTIVE PROTEIN: CPT | Performed by: NURSE PRACTITIONER

## 2021-08-03 PROCEDURE — 87070 CULTURE OTHR SPECIMN AEROBIC: CPT | Performed by: ORTHOPAEDIC SURGERY

## 2021-08-03 PROCEDURE — 11000001 HC ACUTE MED/SURG PRIVATE ROOM

## 2021-08-03 RX ORDER — VANCOMYCIN HCL IN 5 % DEXTROSE 1G/250ML
1000 PLASTIC BAG, INJECTION (ML) INTRAVENOUS
Status: DISCONTINUED | OUTPATIENT
Start: 2021-08-03 | End: 2021-08-03

## 2021-08-03 RX ORDER — GLUCAGON 1 MG
1 KIT INJECTION
Status: DISCONTINUED | OUTPATIENT
Start: 2021-08-03 | End: 2021-08-17 | Stop reason: HOSPADM

## 2021-08-03 RX ORDER — METOPROLOL TARTRATE 25 MG/1
25 TABLET, FILM COATED ORAL DAILY
Status: DISCONTINUED | OUTPATIENT
Start: 2021-08-03 | End: 2021-08-17 | Stop reason: HOSPADM

## 2021-08-03 RX ORDER — MAG HYDROX/ALUMINUM HYD/SIMETH 200-200-20
30 SUSPENSION, ORAL (FINAL DOSE FORM) ORAL
Status: DISCONTINUED | OUTPATIENT
Start: 2021-08-03 | End: 2021-08-17 | Stop reason: HOSPADM

## 2021-08-03 RX ORDER — SODIUM CHLORIDE 0.9 % (FLUSH) 0.9 %
10 SYRINGE (ML) INJECTION
Status: DISCONTINUED | OUTPATIENT
Start: 2021-08-03 | End: 2021-08-17 | Stop reason: HOSPADM

## 2021-08-03 RX ORDER — ONDANSETRON 8 MG/1
8 TABLET, ORALLY DISINTEGRATING ORAL EVERY 8 HOURS PRN
Status: DISCONTINUED | OUTPATIENT
Start: 2021-08-03 | End: 2021-08-17 | Stop reason: HOSPADM

## 2021-08-03 RX ORDER — ONDANSETRON 2 MG/ML
4 INJECTION INTRAMUSCULAR; INTRAVENOUS EVERY 8 HOURS PRN
Status: DISCONTINUED | OUTPATIENT
Start: 2021-08-03 | End: 2021-08-17 | Stop reason: HOSPADM

## 2021-08-03 RX ORDER — IBUPROFEN 200 MG
16 TABLET ORAL
Status: DISCONTINUED | OUTPATIENT
Start: 2021-08-03 | End: 2021-08-17 | Stop reason: HOSPADM

## 2021-08-03 RX ORDER — GABAPENTIN 300 MG/1
600 CAPSULE ORAL 3 TIMES DAILY
Status: DISCONTINUED | OUTPATIENT
Start: 2021-08-03 | End: 2021-08-17 | Stop reason: HOSPADM

## 2021-08-03 RX ORDER — IBUPROFEN 200 MG
24 TABLET ORAL
Status: DISCONTINUED | OUTPATIENT
Start: 2021-08-03 | End: 2021-08-17 | Stop reason: HOSPADM

## 2021-08-03 RX ORDER — AMOXICILLIN 250 MG
1 CAPSULE ORAL 2 TIMES DAILY PRN
Status: DISCONTINUED | OUTPATIENT
Start: 2021-08-03 | End: 2021-08-17 | Stop reason: HOSPADM

## 2021-08-03 RX ORDER — AMOXICILLIN 250 MG
1 CAPSULE ORAL 2 TIMES DAILY
Status: DISCONTINUED | OUTPATIENT
Start: 2021-08-03 | End: 2021-08-03

## 2021-08-03 RX ORDER — MUPIROCIN 20 MG/G
OINTMENT TOPICAL 2 TIMES DAILY
Status: DISPENSED | OUTPATIENT
Start: 2021-08-03 | End: 2021-08-08

## 2021-08-03 RX ORDER — TALC
9 POWDER (GRAM) TOPICAL NIGHTLY PRN
Status: DISCONTINUED | OUTPATIENT
Start: 2021-08-03 | End: 2021-08-17 | Stop reason: HOSPADM

## 2021-08-03 RX ORDER — SUCRALFATE 1 G/10ML
1 SUSPENSION ORAL EVERY 6 HOURS
Status: DISCONTINUED | OUTPATIENT
Start: 2021-08-03 | End: 2021-08-15

## 2021-08-03 RX ORDER — OXYCODONE HYDROCHLORIDE 5 MG/1
10 TABLET ORAL EVERY 6 HOURS PRN
Status: DISCONTINUED | OUTPATIENT
Start: 2021-08-03 | End: 2021-08-08

## 2021-08-03 RX ORDER — PANTOPRAZOLE SODIUM 40 MG/1
40 TABLET, DELAYED RELEASE ORAL DAILY
Status: DISCONTINUED | OUTPATIENT
Start: 2021-08-03 | End: 2021-08-17 | Stop reason: HOSPADM

## 2021-08-03 RX ORDER — SODIUM CHLORIDE 0.9 % (FLUSH) 0.9 %
10 SYRINGE (ML) INJECTION EVERY 6 HOURS
Status: DISCONTINUED | OUTPATIENT
Start: 2021-08-03 | End: 2021-08-17 | Stop reason: HOSPADM

## 2021-08-03 RX ORDER — INSULIN ASPART 100 [IU]/ML
1-10 INJECTION, SOLUTION INTRAVENOUS; SUBCUTANEOUS
Status: DISCONTINUED | OUTPATIENT
Start: 2021-08-03 | End: 2021-08-17 | Stop reason: HOSPADM

## 2021-08-03 RX ORDER — BACLOFEN 10 MG/1
10 TABLET ORAL 2 TIMES DAILY
Status: DISCONTINUED | OUTPATIENT
Start: 2021-08-03 | End: 2021-08-17 | Stop reason: HOSPADM

## 2021-08-03 RX ADMIN — VANCOMYCIN HYDROCHLORIDE 2250 MG: 10 INJECTION, POWDER, LYOPHILIZED, FOR SOLUTION INTRAVENOUS at 04:08

## 2021-08-03 RX ADMIN — OXYCODONE HYDROCHLORIDE 10 MG: 5 TABLET ORAL at 08:08

## 2021-08-03 RX ADMIN — BACLOFEN 10 MG: 10 TABLET ORAL at 08:08

## 2021-08-03 RX ADMIN — ALUMINUM HYDROXIDE, MAGNESIUM HYDROXIDE, AND SIMETHICONE 30 ML: 200; 200; 20 SUSPENSION ORAL at 02:08

## 2021-08-03 RX ADMIN — SUCRALFATE 1 G: 1 SUSPENSION ORAL at 02:08

## 2021-08-03 RX ADMIN — OXYCODONE HYDROCHLORIDE 10 MG: 5 TABLET ORAL at 02:08

## 2021-08-03 RX ADMIN — GABAPENTIN 600 MG: 300 CAPSULE ORAL at 08:08

## 2021-08-03 RX ADMIN — METOPROLOL TARTRATE 25 MG: 25 TABLET, FILM COATED ORAL at 02:08

## 2021-08-03 RX ADMIN — MUPIROCIN: 20 OINTMENT TOPICAL at 08:08

## 2021-08-03 RX ADMIN — GABAPENTIN 600 MG: 300 CAPSULE ORAL at 02:08

## 2021-08-03 RX ADMIN — PANTOPRAZOLE SODIUM 40 MG: 40 TABLET, DELAYED RELEASE ORAL at 02:08

## 2021-08-04 ENCOUNTER — PATIENT OUTREACH (OUTPATIENT)
Dept: ADMINISTRATIVE | Facility: OTHER | Age: 46
End: 2021-08-04

## 2021-08-04 ENCOUNTER — CLINICAL SUPPORT (OUTPATIENT)
Dept: SMOKING CESSATION | Facility: CLINIC | Age: 46
End: 2021-08-04
Payer: COMMERCIAL

## 2021-08-04 DIAGNOSIS — F17.210 CIGARETTE SMOKER: Primary | ICD-10-CM

## 2021-08-04 PROBLEM — E88.09 HYPOALBUMINEMIA: Status: ACTIVE | Noted: 2021-08-04

## 2021-08-04 LAB
ANION GAP SERPL CALC-SCNC: 10 MMOL/L (ref 8–16)
BASOPHILS # BLD AUTO: 0.05 K/UL (ref 0–0.2)
BASOPHILS NFR BLD: 0.5 % (ref 0–1.9)
BUN SERPL-MCNC: 4 MG/DL (ref 6–20)
CALCIUM SERPL-MCNC: 8.9 MG/DL (ref 8.7–10.5)
CHLORIDE SERPL-SCNC: 102 MMOL/L (ref 95–110)
CO2 SERPL-SCNC: 23 MMOL/L (ref 23–29)
CREAT SERPL-MCNC: 0.8 MG/DL (ref 0.5–1.4)
DIFFERENTIAL METHOD: ABNORMAL
EOSINOPHIL # BLD AUTO: 0.6 K/UL (ref 0–0.5)
EOSINOPHIL NFR BLD: 6.3 % (ref 0–8)
ERYTHROCYTE [DISTWIDTH] IN BLOOD BY AUTOMATED COUNT: 18.3 % (ref 11.5–14.5)
EST. GFR  (AFRICAN AMERICAN): >60 ML/MIN/1.73 M^2
EST. GFR  (NON AFRICAN AMERICAN): >60 ML/MIN/1.73 M^2
GLUCOSE SERPL-MCNC: 129 MG/DL (ref 70–110)
HCT VFR BLD AUTO: 26.6 % (ref 40–54)
HGB BLD-MCNC: 8.4 G/DL (ref 14–18)
IMM GRANULOCYTES # BLD AUTO: 0.05 K/UL (ref 0–0.04)
IMM GRANULOCYTES NFR BLD AUTO: 0.5 % (ref 0–0.5)
LYMPHOCYTES # BLD AUTO: 2.5 K/UL (ref 1–4.8)
LYMPHOCYTES NFR BLD: 25.1 % (ref 18–48)
MCH RBC QN AUTO: 21.6 PG (ref 27–31)
MCHC RBC AUTO-ENTMCNC: 31.6 G/DL (ref 32–36)
MCV RBC AUTO: 68 FL (ref 82–98)
MONOCYTES # BLD AUTO: 0.8 K/UL (ref 0.3–1)
MONOCYTES NFR BLD: 7.5 % (ref 4–15)
NEUTROPHILS # BLD AUTO: 6.1 K/UL (ref 1.8–7.7)
NEUTROPHILS NFR BLD: 60.1 % (ref 38–73)
NRBC BLD-RTO: 0 /100 WBC
PLATELET # BLD AUTO: 711 K/UL (ref 150–450)
PMV BLD AUTO: 8.9 FL (ref 9.2–12.9)
POCT GLUCOSE: 138 MG/DL (ref 70–110)
POCT GLUCOSE: 181 MG/DL (ref 70–110)
POCT GLUCOSE: 195 MG/DL (ref 70–110)
POCT GLUCOSE: 197 MG/DL (ref 70–110)
POTASSIUM SERPL-SCNC: 3 MMOL/L (ref 3.5–5.1)
RBC # BLD AUTO: 3.89 M/UL (ref 4.6–6.2)
SODIUM SERPL-SCNC: 135 MMOL/L (ref 136–145)
WBC # BLD AUTO: 10.07 K/UL (ref 3.9–12.7)

## 2021-08-04 PROCEDURE — 63600175 PHARM REV CODE 636 W HCPCS: Performed by: ORTHOPAEDIC SURGERY

## 2021-08-04 PROCEDURE — 80048 BASIC METABOLIC PNL TOTAL CA: CPT | Performed by: HOSPITALIST

## 2021-08-04 PROCEDURE — 25000003 PHARM REV CODE 250: Performed by: ORTHOPAEDIC SURGERY

## 2021-08-04 PROCEDURE — 11000001 HC ACUTE MED/SURG PRIVATE ROOM

## 2021-08-04 PROCEDURE — 85025 COMPLETE CBC W/AUTO DIFF WBC: CPT | Performed by: HOSPITALIST

## 2021-08-04 PROCEDURE — 99407 PR TOBACCO USE CESSATION INTENSIVE >10 MINUTES: ICD-10-PCS | Mod: S$GLB,,,

## 2021-08-04 PROCEDURE — 99407 BEHAV CHNG SMOKING > 10 MIN: CPT | Mod: S$GLB,,,

## 2021-08-04 PROCEDURE — 25000003 PHARM REV CODE 250: Performed by: HOSPITALIST

## 2021-08-04 PROCEDURE — 25000003 PHARM REV CODE 250: Performed by: SURGERY

## 2021-08-04 PROCEDURE — A4216 STERILE WATER/SALINE, 10 ML: HCPCS | Performed by: HOSPITALIST

## 2021-08-04 RX ORDER — POTASSIUM CHLORIDE 20 MEQ/1
20 TABLET, EXTENDED RELEASE ORAL 2 TIMES DAILY
Status: DISCONTINUED | OUTPATIENT
Start: 2021-08-04 | End: 2021-08-07

## 2021-08-04 RX ORDER — FERROUS SULFATE 325(65) MG
325 TABLET, DELAYED RELEASE (ENTERIC COATED) ORAL DAILY
Status: DISCONTINUED | OUTPATIENT
Start: 2021-08-04 | End: 2021-08-17 | Stop reason: HOSPADM

## 2021-08-04 RX ORDER — MUPIROCIN 20 MG/G
OINTMENT TOPICAL
Status: CANCELLED | OUTPATIENT
Start: 2021-08-04

## 2021-08-04 RX ADMIN — MUPIROCIN: 20 OINTMENT TOPICAL at 08:08

## 2021-08-04 RX ADMIN — FERROUS SULFATE TAB EC 325 MG (65 MG FE EQUIVALENT) 325 MG: 325 (65 FE) TABLET DELAYED RESPONSE at 08:08

## 2021-08-04 RX ADMIN — GABAPENTIN 600 MG: 300 CAPSULE ORAL at 08:08

## 2021-08-04 RX ADMIN — OXYCODONE HYDROCHLORIDE 10 MG: 5 TABLET ORAL at 02:08

## 2021-08-04 RX ADMIN — OXYCODONE HYDROCHLORIDE 10 MG: 5 TABLET ORAL at 08:08

## 2021-08-04 RX ADMIN — BACLOFEN 10 MG: 10 TABLET ORAL at 08:08

## 2021-08-04 RX ADMIN — COLLAGENASE SANTYL: 250 OINTMENT TOPICAL at 11:08

## 2021-08-04 RX ADMIN — POTASSIUM CHLORIDE 20 MEQ: 1500 TABLET, EXTENDED RELEASE ORAL at 08:08

## 2021-08-04 RX ADMIN — Medication 10 ML: at 11:08

## 2021-08-04 RX ADMIN — INSULIN ASPART 2 UNITS: 100 INJECTION, SOLUTION INTRAVENOUS; SUBCUTANEOUS at 06:08

## 2021-08-04 RX ADMIN — POTASSIUM CHLORIDE 20 MEQ: 1500 TABLET, EXTENDED RELEASE ORAL at 11:08

## 2021-08-04 RX ADMIN — METOPROLOL TARTRATE 25 MG: 25 TABLET, FILM COATED ORAL at 08:08

## 2021-08-04 RX ADMIN — PANTOPRAZOLE SODIUM 40 MG: 40 TABLET, DELAYED RELEASE ORAL at 08:08

## 2021-08-04 RX ADMIN — Medication 10 ML: at 05:08

## 2021-08-04 RX ADMIN — GABAPENTIN 600 MG: 300 CAPSULE ORAL at 02:08

## 2021-08-04 RX ADMIN — INSULIN ASPART 2 UNITS: 100 INJECTION, SOLUTION INTRAVENOUS; SUBCUTANEOUS at 12:08

## 2021-08-05 ENCOUNTER — ANESTHESIA EVENT (OUTPATIENT)
Dept: SURGERY | Facility: HOSPITAL | Age: 46
DRG: 580 | End: 2021-08-05
Payer: MEDICARE

## 2021-08-05 LAB
ANION GAP SERPL CALC-SCNC: 6 MMOL/L (ref 8–16)
BASOPHILS # BLD AUTO: 0.05 K/UL (ref 0–0.2)
BASOPHILS NFR BLD: 0.5 % (ref 0–1.9)
BUN SERPL-MCNC: 3 MG/DL (ref 6–20)
CALCIUM SERPL-MCNC: 9 MG/DL (ref 8.7–10.5)
CHLORIDE SERPL-SCNC: 106 MMOL/L (ref 95–110)
CO2 SERPL-SCNC: 26 MMOL/L (ref 23–29)
CREAT SERPL-MCNC: 0.8 MG/DL (ref 0.5–1.4)
DIFFERENTIAL METHOD: ABNORMAL
EOSINOPHIL # BLD AUTO: 0.6 K/UL (ref 0–0.5)
EOSINOPHIL NFR BLD: 5.9 % (ref 0–8)
ERYTHROCYTE [DISTWIDTH] IN BLOOD BY AUTOMATED COUNT: 18.2 % (ref 11.5–14.5)
EST. GFR  (AFRICAN AMERICAN): >60 ML/MIN/1.73 M^2
EST. GFR  (NON AFRICAN AMERICAN): >60 ML/MIN/1.73 M^2
GLUCOSE SERPL-MCNC: 113 MG/DL (ref 70–110)
HCT VFR BLD AUTO: 26.4 % (ref 40–54)
HGB BLD-MCNC: 8.3 G/DL (ref 14–18)
IMM GRANULOCYTES # BLD AUTO: 0.06 K/UL (ref 0–0.04)
IMM GRANULOCYTES NFR BLD AUTO: 0.6 % (ref 0–0.5)
LYMPHOCYTES # BLD AUTO: 2.8 K/UL (ref 1–4.8)
LYMPHOCYTES NFR BLD: 28.3 % (ref 18–48)
MCH RBC QN AUTO: 21.4 PG (ref 27–31)
MCHC RBC AUTO-ENTMCNC: 31.4 G/DL (ref 32–36)
MCV RBC AUTO: 68 FL (ref 82–98)
MONOCYTES # BLD AUTO: 0.8 K/UL (ref 0.3–1)
MONOCYTES NFR BLD: 7.8 % (ref 4–15)
NEUTROPHILS # BLD AUTO: 5.7 K/UL (ref 1.8–7.7)
NEUTROPHILS NFR BLD: 56.9 % (ref 38–73)
NRBC BLD-RTO: 0 /100 WBC
PLATELET # BLD AUTO: 705 K/UL (ref 150–450)
PMV BLD AUTO: 9.4 FL (ref 9.2–12.9)
POCT GLUCOSE: 122 MG/DL (ref 70–110)
POCT GLUCOSE: 144 MG/DL (ref 70–110)
POCT GLUCOSE: 202 MG/DL (ref 70–110)
POCT GLUCOSE: 220 MG/DL (ref 70–110)
POTASSIUM SERPL-SCNC: 3.5 MMOL/L (ref 3.5–5.1)
RBC # BLD AUTO: 3.87 M/UL (ref 4.6–6.2)
SODIUM SERPL-SCNC: 138 MMOL/L (ref 136–145)
WBC # BLD AUTO: 10 K/UL (ref 3.9–12.7)

## 2021-08-05 PROCEDURE — C9399 UNCLASSIFIED DRUGS OR BIOLOG: HCPCS | Performed by: ORTHOPAEDIC SURGERY

## 2021-08-05 PROCEDURE — 25000003 PHARM REV CODE 250: Performed by: HOSPITALIST

## 2021-08-05 PROCEDURE — 63600175 PHARM REV CODE 636 W HCPCS: Performed by: STUDENT IN AN ORGANIZED HEALTH CARE EDUCATION/TRAINING PROGRAM

## 2021-08-05 PROCEDURE — 85025 COMPLETE CBC W/AUTO DIFF WBC: CPT | Performed by: HOSPITALIST

## 2021-08-05 PROCEDURE — 25000003 PHARM REV CODE 250: Performed by: STUDENT IN AN ORGANIZED HEALTH CARE EDUCATION/TRAINING PROGRAM

## 2021-08-05 PROCEDURE — 25000003 PHARM REV CODE 250: Performed by: ORTHOPAEDIC SURGERY

## 2021-08-05 PROCEDURE — 63600175 PHARM REV CODE 636 W HCPCS: Performed by: ORTHOPAEDIC SURGERY

## 2021-08-05 PROCEDURE — 80048 BASIC METABOLIC PNL TOTAL CA: CPT | Performed by: HOSPITALIST

## 2021-08-05 PROCEDURE — 25000003 PHARM REV CODE 250: Performed by: SURGERY

## 2021-08-05 PROCEDURE — A4216 STERILE WATER/SALINE, 10 ML: HCPCS | Performed by: HOSPITALIST

## 2021-08-05 PROCEDURE — 11000001 HC ACUTE MED/SURG PRIVATE ROOM

## 2021-08-05 PROCEDURE — 63600175 PHARM REV CODE 636 W HCPCS: Performed by: HOSPITALIST

## 2021-08-05 RX ADMIN — Medication 10 ML: at 06:08

## 2021-08-05 RX ADMIN — BACLOFEN 10 MG: 10 TABLET ORAL at 09:08

## 2021-08-05 RX ADMIN — POTASSIUM CHLORIDE 20 MEQ: 1500 TABLET, EXTENDED RELEASE ORAL at 09:08

## 2021-08-05 RX ADMIN — OXYCODONE HYDROCHLORIDE 10 MG: 5 TABLET ORAL at 03:08

## 2021-08-05 RX ADMIN — MEROPENEM 1 G: 1 INJECTION, POWDER, FOR SOLUTION INTRAVENOUS at 06:08

## 2021-08-05 RX ADMIN — INSULIN ASPART 4 UNITS: 100 INJECTION, SOLUTION INTRAVENOUS; SUBCUTANEOUS at 12:08

## 2021-08-05 RX ADMIN — OXYCODONE HYDROCHLORIDE 10 MG: 5 TABLET ORAL at 09:08

## 2021-08-05 RX ADMIN — MUPIROCIN: 20 OINTMENT TOPICAL at 09:08

## 2021-08-05 RX ADMIN — INSULIN DETEMIR 10 UNITS: 100 INJECTION, SOLUTION SUBCUTANEOUS at 09:08

## 2021-08-05 RX ADMIN — METOPROLOL TARTRATE 25 MG: 25 TABLET, FILM COATED ORAL at 09:08

## 2021-08-05 RX ADMIN — COLLAGENASE SANTYL: 250 OINTMENT TOPICAL at 09:08

## 2021-08-05 RX ADMIN — GABAPENTIN 600 MG: 300 CAPSULE ORAL at 09:08

## 2021-08-05 RX ADMIN — GABAPENTIN 600 MG: 300 CAPSULE ORAL at 03:08

## 2021-08-05 RX ADMIN — VANCOMYCIN HYDROCHLORIDE 2250 MG: 500 INJECTION, POWDER, LYOPHILIZED, FOR SOLUTION INTRAVENOUS at 09:08

## 2021-08-05 RX ADMIN — FERROUS SULFATE TAB EC 325 MG (65 MG FE EQUIVALENT) 325 MG: 325 (65 FE) TABLET DELAYED RESPONSE at 09:08

## 2021-08-05 RX ADMIN — OXYCODONE HYDROCHLORIDE 10 MG: 5 TABLET ORAL at 02:08

## 2021-08-05 RX ADMIN — Medication 10 ML: at 12:08

## 2021-08-05 RX ADMIN — PANTOPRAZOLE SODIUM 40 MG: 40 TABLET, DELAYED RELEASE ORAL at 09:08

## 2021-08-06 ENCOUNTER — ANESTHESIA (OUTPATIENT)
Dept: SURGERY | Facility: HOSPITAL | Age: 46
DRG: 580 | End: 2021-08-06
Payer: MEDICARE

## 2021-08-06 LAB
ANION GAP SERPL CALC-SCNC: 9 MMOL/L (ref 8–16)
BASOPHILS # BLD AUTO: 0.02 K/UL (ref 0–0.2)
BASOPHILS NFR BLD: 0.2 % (ref 0–1.9)
BUN SERPL-MCNC: 4 MG/DL (ref 6–20)
CALCIUM SERPL-MCNC: 9.2 MG/DL (ref 8.7–10.5)
CHLORIDE SERPL-SCNC: 104 MMOL/L (ref 95–110)
CO2 SERPL-SCNC: 23 MMOL/L (ref 23–29)
CREAT SERPL-MCNC: 0.8 MG/DL (ref 0.5–1.4)
DIFFERENTIAL METHOD: ABNORMAL
EOSINOPHIL # BLD AUTO: 0.5 K/UL (ref 0–0.5)
EOSINOPHIL NFR BLD: 5.2 % (ref 0–8)
ERYTHROCYTE [DISTWIDTH] IN BLOOD BY AUTOMATED COUNT: 18.4 % (ref 11.5–14.5)
EST. GFR  (AFRICAN AMERICAN): >60 ML/MIN/1.73 M^2
EST. GFR  (NON AFRICAN AMERICAN): >60 ML/MIN/1.73 M^2
GLUCOSE SERPL-MCNC: 132 MG/DL (ref 70–110)
HCT VFR BLD AUTO: 27.9 % (ref 40–54)
HGB BLD-MCNC: 8.7 G/DL (ref 14–18)
IMM GRANULOCYTES # BLD AUTO: 0.06 K/UL (ref 0–0.04)
IMM GRANULOCYTES NFR BLD AUTO: 0.6 % (ref 0–0.5)
LYMPHOCYTES # BLD AUTO: 1.6 K/UL (ref 1–4.8)
LYMPHOCYTES NFR BLD: 17 % (ref 18–48)
MCH RBC QN AUTO: 21.4 PG (ref 27–31)
MCHC RBC AUTO-ENTMCNC: 31.2 G/DL (ref 32–36)
MCV RBC AUTO: 69 FL (ref 82–98)
MONOCYTES # BLD AUTO: 0.6 K/UL (ref 0.3–1)
MONOCYTES NFR BLD: 6.1 % (ref 4–15)
NEUTROPHILS # BLD AUTO: 6.6 K/UL (ref 1.8–7.7)
NEUTROPHILS NFR BLD: 70.9 % (ref 38–73)
NRBC BLD-RTO: 0 /100 WBC
PLATELET # BLD AUTO: 704 K/UL (ref 150–450)
PMV BLD AUTO: 9.5 FL (ref 9.2–12.9)
POCT GLUCOSE: 129 MG/DL (ref 70–110)
POCT GLUCOSE: 137 MG/DL (ref 70–110)
POCT GLUCOSE: 145 MG/DL (ref 70–110)
POTASSIUM SERPL-SCNC: 3.7 MMOL/L (ref 3.5–5.1)
RBC # BLD AUTO: 4.06 M/UL (ref 4.6–6.2)
SODIUM SERPL-SCNC: 136 MMOL/L (ref 136–145)
WBC # BLD AUTO: 9.35 K/UL (ref 3.9–12.7)

## 2021-08-06 PROCEDURE — 87186 SC STD MICRODIL/AGAR DIL: CPT | Performed by: SURGERY

## 2021-08-06 PROCEDURE — 63600175 PHARM REV CODE 636 W HCPCS: Performed by: HOSPITALIST

## 2021-08-06 PROCEDURE — 63600175 PHARM REV CODE 636 W HCPCS: Performed by: NURSE ANESTHETIST, CERTIFIED REGISTERED

## 2021-08-06 PROCEDURE — 27000207 HC ISOLATION

## 2021-08-06 PROCEDURE — 25000003 PHARM REV CODE 250: Performed by: ORTHOPAEDIC SURGERY

## 2021-08-06 PROCEDURE — 87186 SC STD MICRODIL/AGAR DIL: CPT | Mod: 59 | Performed by: HOSPITALIST

## 2021-08-06 PROCEDURE — 88305 TISSUE EXAM BY PATHOLOGIST: ICD-10-PCS | Mod: 26,,, | Performed by: PATHOLOGY

## 2021-08-06 PROCEDURE — 87176 TISSUE HOMOGENIZATION CULTR: CPT | Performed by: SURGERY

## 2021-08-06 PROCEDURE — 88307 TISSUE EXAM BY PATHOLOGIST: CPT | Performed by: PATHOLOGY

## 2021-08-06 PROCEDURE — 87206 SMEAR FLUORESCENT/ACID STAI: CPT | Performed by: SURGERY

## 2021-08-06 PROCEDURE — 36000707: Performed by: SURGERY

## 2021-08-06 PROCEDURE — 36000706: Performed by: SURGERY

## 2021-08-06 PROCEDURE — 11000001 HC ACUTE MED/SURG PRIVATE ROOM

## 2021-08-06 PROCEDURE — 87186 SC STD MICRODIL/AGAR DIL: CPT | Performed by: HOSPITALIST

## 2021-08-06 PROCEDURE — 87015 SPECIMEN INFECT AGNT CONCNTJ: CPT | Performed by: SURGERY

## 2021-08-06 PROCEDURE — A4216 STERILE WATER/SALINE, 10 ML: HCPCS | Performed by: SURGERY

## 2021-08-06 PROCEDURE — 88311 PR  DECALCIFY TISSUE: ICD-10-PCS | Mod: 26,,, | Performed by: PATHOLOGY

## 2021-08-06 PROCEDURE — 87070 CULTURE OTHR SPECIMN AEROBIC: CPT | Performed by: HOSPITALIST

## 2021-08-06 PROCEDURE — 88311 DECALCIFY TISSUE: CPT | Performed by: PATHOLOGY

## 2021-08-06 PROCEDURE — 87070 CULTURE OTHR SPECIMN AEROBIC: CPT | Mod: 59 | Performed by: SURGERY

## 2021-08-06 PROCEDURE — 88311 DECALCIFY TISSUE: CPT | Mod: 26,,, | Performed by: PATHOLOGY

## 2021-08-06 PROCEDURE — 25000003 PHARM REV CODE 250: Performed by: HOSPITALIST

## 2021-08-06 PROCEDURE — 88307 PR  SURG PATH,LEVEL V: ICD-10-PCS | Mod: 26,,, | Performed by: PATHOLOGY

## 2021-08-06 PROCEDURE — 87102 FUNGUS ISOLATION CULTURE: CPT | Mod: 59 | Performed by: SURGERY

## 2021-08-06 PROCEDURE — 88307 TISSUE EXAM BY PATHOLOGIST: CPT | Mod: 26,,, | Performed by: PATHOLOGY

## 2021-08-06 PROCEDURE — 85025 COMPLETE CBC W/AUTO DIFF WBC: CPT | Performed by: HOSPITALIST

## 2021-08-06 PROCEDURE — 87205 SMEAR GRAM STAIN: CPT | Performed by: SURGERY

## 2021-08-06 PROCEDURE — 71000033 HC RECOVERY, INTIAL HOUR: Performed by: SURGERY

## 2021-08-06 PROCEDURE — 87077 CULTURE AEROBIC IDENTIFY: CPT | Mod: 59 | Performed by: HOSPITALIST

## 2021-08-06 PROCEDURE — 87176 TISSUE HOMOGENIZATION CULTR: CPT | Mod: 91 | Performed by: HOSPITALIST

## 2021-08-06 PROCEDURE — 87075 CULTR BACTERIA EXCEPT BLOOD: CPT | Mod: 59 | Performed by: SURGERY

## 2021-08-06 PROCEDURE — 88305 TISSUE EXAM BY PATHOLOGIST: CPT | Mod: 26,,, | Performed by: PATHOLOGY

## 2021-08-06 PROCEDURE — A4216 STERILE WATER/SALINE, 10 ML: HCPCS | Performed by: HOSPITALIST

## 2021-08-06 PROCEDURE — 63600175 PHARM REV CODE 636 W HCPCS: Performed by: SURGERY

## 2021-08-06 PROCEDURE — 88305 TISSUE EXAM BY PATHOLOGIST: CPT | Performed by: PATHOLOGY

## 2021-08-06 PROCEDURE — 87077 CULTURE AEROBIC IDENTIFY: CPT | Performed by: SURGERY

## 2021-08-06 PROCEDURE — 25000003 PHARM REV CODE 250: Performed by: SURGERY

## 2021-08-06 PROCEDURE — 80048 BASIC METABOLIC PNL TOTAL CA: CPT | Performed by: HOSPITALIST

## 2021-08-06 PROCEDURE — 37000008 HC ANESTHESIA 1ST 15 MINUTES: Performed by: SURGERY

## 2021-08-06 PROCEDURE — 87116 MYCOBACTERIA CULTURE: CPT | Mod: 59 | Performed by: SURGERY

## 2021-08-06 PROCEDURE — 37000009 HC ANESTHESIA EA ADD 15 MINS: Performed by: SURGERY

## 2021-08-06 PROCEDURE — 63600175 PHARM REV CODE 636 W HCPCS: Performed by: STUDENT IN AN ORGANIZED HEALTH CARE EDUCATION/TRAINING PROGRAM

## 2021-08-06 PROCEDURE — 25000003 PHARM REV CODE 250: Performed by: STUDENT IN AN ORGANIZED HEALTH CARE EDUCATION/TRAINING PROGRAM

## 2021-08-06 PROCEDURE — 87076 CULTURE ANAEROBE IDENT EACH: CPT | Performed by: SURGERY

## 2021-08-06 PROCEDURE — 94760 N-INVAS EAR/PLS OXIMETRY 1: CPT

## 2021-08-06 RX ORDER — PROPOFOL 10 MG/ML
VIAL (ML) INTRAVENOUS
Status: DISCONTINUED | OUTPATIENT
Start: 2021-08-06 | End: 2021-08-06

## 2021-08-06 RX ORDER — ONDANSETRON 2 MG/ML
4 INJECTION INTRAMUSCULAR; INTRAVENOUS DAILY PRN
Status: DISCONTINUED | OUTPATIENT
Start: 2021-08-06 | End: 2021-08-17 | Stop reason: HOSPADM

## 2021-08-06 RX ORDER — PROPOFOL 10 MG/ML
INJECTION, EMULSION INTRAVENOUS CONTINUOUS PRN
Status: DISCONTINUED | OUTPATIENT
Start: 2021-08-06 | End: 2021-08-06

## 2021-08-06 RX ORDER — MIDAZOLAM HYDROCHLORIDE 1 MG/ML
INJECTION INTRAMUSCULAR; INTRAVENOUS
Status: DISCONTINUED | OUTPATIENT
Start: 2021-08-06 | End: 2021-08-06

## 2021-08-06 RX ORDER — PHENYLEPHRINE HYDROCHLORIDE 10 MG/ML
INJECTION INTRAVENOUS
Status: DISCONTINUED | OUTPATIENT
Start: 2021-08-06 | End: 2021-08-06

## 2021-08-06 RX ORDER — FENTANYL CITRATE 50 UG/ML
INJECTION, SOLUTION INTRAMUSCULAR; INTRAVENOUS
Status: DISCONTINUED | OUTPATIENT
Start: 2021-08-06 | End: 2021-08-06

## 2021-08-06 RX ADMIN — PROPOFOL 150 MCG/KG/MIN: 10 INJECTION, EMULSION INTRAVENOUS at 03:08

## 2021-08-06 RX ADMIN — POTASSIUM CHLORIDE 20 MEQ: 1500 TABLET, EXTENDED RELEASE ORAL at 10:08

## 2021-08-06 RX ADMIN — Medication 10 ML: at 12:08

## 2021-08-06 RX ADMIN — GABAPENTIN 600 MG: 300 CAPSULE ORAL at 10:08

## 2021-08-06 RX ADMIN — BACLOFEN 10 MG: 10 TABLET ORAL at 08:08

## 2021-08-06 RX ADMIN — GABAPENTIN 600 MG: 300 CAPSULE ORAL at 08:08

## 2021-08-06 RX ADMIN — MEROPENEM 1 G: 1 INJECTION, POWDER, FOR SOLUTION INTRAVENOUS at 10:08

## 2021-08-06 RX ADMIN — VANCOMYCIN HYDROCHLORIDE 1750 MG: 10 INJECTION, POWDER, LYOPHILIZED, FOR SOLUTION INTRAVENOUS at 05:08

## 2021-08-06 RX ADMIN — Medication 10 ML: at 11:08

## 2021-08-06 RX ADMIN — FENTANYL CITRATE 25 MCG: 50 INJECTION, SOLUTION INTRAMUSCULAR; INTRAVENOUS at 03:08

## 2021-08-06 RX ADMIN — BACLOFEN 10 MG: 10 TABLET ORAL at 10:08

## 2021-08-06 RX ADMIN — PHENYLEPHRINE HYDROCHLORIDE 100 MCG: 10 INJECTION INTRAVENOUS at 03:08

## 2021-08-06 RX ADMIN — FERROUS SULFATE TAB EC 325 MG (65 MG FE EQUIVALENT) 325 MG: 325 (65 FE) TABLET DELAYED RESPONSE at 10:08

## 2021-08-06 RX ADMIN — POTASSIUM CHLORIDE 20 MEQ: 1500 TABLET, EXTENDED RELEASE ORAL at 08:08

## 2021-08-06 RX ADMIN — PROPOFOL 50 MG: 10 INJECTION, EMULSION INTRAVENOUS at 03:08

## 2021-08-06 RX ADMIN — PHENYLEPHRINE HYDROCHLORIDE 200 MCG: 10 INJECTION INTRAVENOUS at 04:08

## 2021-08-06 RX ADMIN — OXYCODONE HYDROCHLORIDE 10 MG: 5 TABLET ORAL at 05:08

## 2021-08-06 RX ADMIN — METOPROLOL TARTRATE 25 MG: 25 TABLET, FILM COATED ORAL at 10:08

## 2021-08-06 RX ADMIN — PANTOPRAZOLE SODIUM 40 MG: 40 TABLET, DELAYED RELEASE ORAL at 10:08

## 2021-08-06 RX ADMIN — MEROPENEM 1 G: 1 INJECTION, POWDER, FOR SOLUTION INTRAVENOUS at 01:08

## 2021-08-06 RX ADMIN — VANCOMYCIN HYDROCHLORIDE 1750 MG: 10 INJECTION, POWDER, LYOPHILIZED, FOR SOLUTION INTRAVENOUS at 04:08

## 2021-08-06 RX ADMIN — INSULIN DETEMIR 10 UNITS: 100 INJECTION, SOLUTION SUBCUTANEOUS at 08:08

## 2021-08-06 RX ADMIN — MIDAZOLAM HYDROCHLORIDE 2 MG: 1 INJECTION, SOLUTION INTRAMUSCULAR; INTRAVENOUS at 03:08

## 2021-08-06 RX ADMIN — OXYCODONE HYDROCHLORIDE 10 MG: 5 TABLET ORAL at 04:08

## 2021-08-06 RX ADMIN — OXYCODONE HYDROCHLORIDE 10 MG: 5 TABLET ORAL at 11:08

## 2021-08-06 RX ADMIN — MEROPENEM 1 G: 1 INJECTION, POWDER, FOR SOLUTION INTRAVENOUS at 05:08

## 2021-08-07 PROBLEM — E87.6 HYPOKALEMIA: Status: RESOLVED | Noted: 2017-02-16 | Resolved: 2021-08-07

## 2021-08-07 LAB
ANION GAP SERPL CALC-SCNC: 8 MMOL/L (ref 8–16)
BASOPHILS # BLD AUTO: 0.03 K/UL (ref 0–0.2)
BASOPHILS NFR BLD: 0.4 % (ref 0–1.9)
BUN SERPL-MCNC: 4 MG/DL (ref 6–20)
CALCIUM SERPL-MCNC: 8.8 MG/DL (ref 8.7–10.5)
CHLORIDE SERPL-SCNC: 107 MMOL/L (ref 95–110)
CO2 SERPL-SCNC: 23 MMOL/L (ref 23–29)
CREAT SERPL-MCNC: 0.8 MG/DL (ref 0.5–1.4)
DIFFERENTIAL METHOD: ABNORMAL
EOSINOPHIL # BLD AUTO: 0.6 K/UL (ref 0–0.5)
EOSINOPHIL NFR BLD: 7 % (ref 0–8)
ERYTHROCYTE [DISTWIDTH] IN BLOOD BY AUTOMATED COUNT: 18.1 % (ref 11.5–14.5)
EST. GFR  (AFRICAN AMERICAN): >60 ML/MIN/1.73 M^2
EST. GFR  (NON AFRICAN AMERICAN): >60 ML/MIN/1.73 M^2
GLUCOSE SERPL-MCNC: 118 MG/DL (ref 70–110)
GRAM STN SPEC: NORMAL
HCT VFR BLD AUTO: 24.4 % (ref 40–54)
HGB BLD-MCNC: 7.6 G/DL (ref 14–18)
IMM GRANULOCYTES # BLD AUTO: 0.04 K/UL (ref 0–0.04)
IMM GRANULOCYTES NFR BLD AUTO: 0.5 % (ref 0–0.5)
LYMPHOCYTES # BLD AUTO: 1.8 K/UL (ref 1–4.8)
LYMPHOCYTES NFR BLD: 22.6 % (ref 18–48)
MCH RBC QN AUTO: 21.6 PG (ref 27–31)
MCHC RBC AUTO-ENTMCNC: 31.1 G/DL (ref 32–36)
MCV RBC AUTO: 69 FL (ref 82–98)
MONOCYTES # BLD AUTO: 0.8 K/UL (ref 0.3–1)
MONOCYTES NFR BLD: 10.5 % (ref 4–15)
NEUTROPHILS # BLD AUTO: 4.7 K/UL (ref 1.8–7.7)
NEUTROPHILS NFR BLD: 59 % (ref 38–73)
NRBC BLD-RTO: 0 /100 WBC
PLATELET # BLD AUTO: 467 K/UL (ref 150–450)
PMV BLD AUTO: 9.9 FL (ref 9.2–12.9)
POCT GLUCOSE: 180 MG/DL (ref 70–110)
POCT GLUCOSE: 189 MG/DL (ref 70–110)
POCT GLUCOSE: 242 MG/DL (ref 70–110)
POTASSIUM SERPL-SCNC: 4.4 MMOL/L (ref 3.5–5.1)
RBC # BLD AUTO: 3.52 M/UL (ref 4.6–6.2)
SODIUM SERPL-SCNC: 138 MMOL/L (ref 136–145)
VANCOMYCIN SERPL-MCNC: 11.8 UG/ML
VANCOMYCIN TROUGH SERPL-MCNC: 28.3 UG/ML (ref 10–22)
WBC # BLD AUTO: 8 K/UL (ref 3.9–12.7)

## 2021-08-07 PROCEDURE — 25000003 PHARM REV CODE 250: Performed by: SURGERY

## 2021-08-07 PROCEDURE — 63600175 PHARM REV CODE 636 W HCPCS: Performed by: SURGERY

## 2021-08-07 PROCEDURE — 80048 BASIC METABOLIC PNL TOTAL CA: CPT | Performed by: SURGERY

## 2021-08-07 PROCEDURE — 80202 ASSAY OF VANCOMYCIN: CPT | Performed by: SURGERY

## 2021-08-07 PROCEDURE — 80202 ASSAY OF VANCOMYCIN: CPT | Mod: 91 | Performed by: HOSPITALIST

## 2021-08-07 PROCEDURE — 63600175 PHARM REV CODE 636 W HCPCS: Performed by: HOSPITALIST

## 2021-08-07 PROCEDURE — 11000001 HC ACUTE MED/SURG PRIVATE ROOM

## 2021-08-07 PROCEDURE — A4216 STERILE WATER/SALINE, 10 ML: HCPCS | Performed by: SURGERY

## 2021-08-07 PROCEDURE — 25000003 PHARM REV CODE 250: Performed by: HOSPITALIST

## 2021-08-07 PROCEDURE — 85025 COMPLETE CBC W/AUTO DIFF WBC: CPT | Performed by: SURGERY

## 2021-08-07 RX ORDER — POTASSIUM CHLORIDE 20 MEQ/1
20 TABLET, EXTENDED RELEASE ORAL DAILY
Status: DISCONTINUED | OUTPATIENT
Start: 2021-08-08 | End: 2021-08-17 | Stop reason: HOSPADM

## 2021-08-07 RX ADMIN — BACLOFEN 10 MG: 10 TABLET ORAL at 09:08

## 2021-08-07 RX ADMIN — GABAPENTIN 600 MG: 300 CAPSULE ORAL at 09:08

## 2021-08-07 RX ADMIN — Medication 10 ML: at 09:08

## 2021-08-07 RX ADMIN — OXYCODONE HYDROCHLORIDE 10 MG: 5 TABLET ORAL at 09:08

## 2021-08-07 RX ADMIN — INSULIN ASPART 1 UNITS: 100 INJECTION, SOLUTION INTRAVENOUS; SUBCUTANEOUS at 09:08

## 2021-08-07 RX ADMIN — MEROPENEM 1 G: 1 INJECTION, POWDER, FOR SOLUTION INTRAVENOUS at 04:08

## 2021-08-07 RX ADMIN — OXYCODONE HYDROCHLORIDE 10 MG: 5 TABLET ORAL at 10:08

## 2021-08-07 RX ADMIN — GABAPENTIN 600 MG: 300 CAPSULE ORAL at 03:08

## 2021-08-07 RX ADMIN — INSULIN DETEMIR 10 UNITS: 100 INJECTION, SOLUTION SUBCUTANEOUS at 09:08

## 2021-08-07 RX ADMIN — VANCOMYCIN HYDROCHLORIDE 1500 MG: 1.5 INJECTION, POWDER, LYOPHILIZED, FOR SOLUTION INTRAVENOUS at 09:08

## 2021-08-07 RX ADMIN — MELATONIN 9 MG: 3 TAB ORAL at 09:08

## 2021-08-07 RX ADMIN — MEROPENEM 1 G: 1 INJECTION, POWDER, FOR SOLUTION INTRAVENOUS at 01:08

## 2021-08-07 RX ADMIN — MEROPENEM 1 G: 1 INJECTION, POWDER, FOR SOLUTION INTRAVENOUS at 11:08

## 2021-08-07 RX ADMIN — OXYCODONE HYDROCHLORIDE 10 MG: 5 TABLET ORAL at 05:08

## 2021-08-07 RX ADMIN — POTASSIUM CHLORIDE 20 MEQ: 1500 TABLET, EXTENDED RELEASE ORAL at 09:08

## 2021-08-07 RX ADMIN — PANTOPRAZOLE SODIUM 40 MG: 40 TABLET, DELAYED RELEASE ORAL at 09:08

## 2021-08-07 RX ADMIN — FERROUS SULFATE TAB EC 325 MG (65 MG FE EQUIVALENT) 325 MG: 325 (65 FE) TABLET DELAYED RESPONSE at 09:08

## 2021-08-07 RX ADMIN — Medication 10 ML: at 05:08

## 2021-08-07 RX ADMIN — Medication 10 ML: at 12:08

## 2021-08-08 LAB
ANION GAP SERPL CALC-SCNC: 7 MMOL/L (ref 8–16)
BASOPHILS # BLD AUTO: 0.05 K/UL (ref 0–0.2)
BASOPHILS NFR BLD: 0.6 % (ref 0–1.9)
BUN SERPL-MCNC: 6 MG/DL (ref 6–20)
CALCIUM SERPL-MCNC: 9.1 MG/DL (ref 8.7–10.5)
CHLORIDE SERPL-SCNC: 107 MMOL/L (ref 95–110)
CO2 SERPL-SCNC: 26 MMOL/L (ref 23–29)
CREAT SERPL-MCNC: 0.8 MG/DL (ref 0.5–1.4)
DIFFERENTIAL METHOD: ABNORMAL
EOSINOPHIL # BLD AUTO: 0.7 K/UL (ref 0–0.5)
EOSINOPHIL NFR BLD: 8.9 % (ref 0–8)
ERYTHROCYTE [DISTWIDTH] IN BLOOD BY AUTOMATED COUNT: 18.6 % (ref 11.5–14.5)
EST. GFR  (AFRICAN AMERICAN): >60 ML/MIN/1.73 M^2
EST. GFR  (NON AFRICAN AMERICAN): >60 ML/MIN/1.73 M^2
GLUCOSE SERPL-MCNC: 105 MG/DL (ref 70–110)
HCT VFR BLD AUTO: 24.8 % (ref 40–54)
HGB BLD-MCNC: 7.7 G/DL (ref 14–18)
IMM GRANULOCYTES # BLD AUTO: 0.03 K/UL (ref 0–0.04)
IMM GRANULOCYTES NFR BLD AUTO: 0.4 % (ref 0–0.5)
LYMPHOCYTES # BLD AUTO: 2.1 K/UL (ref 1–4.8)
LYMPHOCYTES NFR BLD: 27.3 % (ref 18–48)
MCH RBC QN AUTO: 21.4 PG (ref 27–31)
MCHC RBC AUTO-ENTMCNC: 31 G/DL (ref 32–36)
MCV RBC AUTO: 69 FL (ref 82–98)
MONOCYTES # BLD AUTO: 0.9 K/UL (ref 0.3–1)
MONOCYTES NFR BLD: 11.8 % (ref 4–15)
NEUTROPHILS # BLD AUTO: 3.9 K/UL (ref 1.8–7.7)
NEUTROPHILS NFR BLD: 51 % (ref 38–73)
NRBC BLD-RTO: 0 /100 WBC
PLATELET # BLD AUTO: 579 K/UL (ref 150–450)
PMV BLD AUTO: 9.1 FL (ref 9.2–12.9)
POCT GLUCOSE: 102 MG/DL (ref 70–110)
POCT GLUCOSE: 102 MG/DL (ref 70–110)
POCT GLUCOSE: 127 MG/DL (ref 70–110)
POCT GLUCOSE: 273 MG/DL (ref 70–110)
POTASSIUM SERPL-SCNC: 4.1 MMOL/L (ref 3.5–5.1)
RBC # BLD AUTO: 3.6 M/UL (ref 4.6–6.2)
SODIUM SERPL-SCNC: 140 MMOL/L (ref 136–145)
VANCOMYCIN SERPL-MCNC: 24 UG/ML
WBC # BLD AUTO: 7.73 K/UL (ref 3.9–12.7)

## 2021-08-08 PROCEDURE — 63600175 PHARM REV CODE 636 W HCPCS: Performed by: HOSPITALIST

## 2021-08-08 PROCEDURE — 80048 BASIC METABOLIC PNL TOTAL CA: CPT | Performed by: SURGERY

## 2021-08-08 PROCEDURE — 25000003 PHARM REV CODE 250: Performed by: HOSPITALIST

## 2021-08-08 PROCEDURE — 25000003 PHARM REV CODE 250: Performed by: SURGERY

## 2021-08-08 PROCEDURE — 63600175 PHARM REV CODE 636 W HCPCS: Performed by: SURGERY

## 2021-08-08 PROCEDURE — 80202 ASSAY OF VANCOMYCIN: CPT | Performed by: HOSPITALIST

## 2021-08-08 PROCEDURE — 27000207 HC ISOLATION

## 2021-08-08 PROCEDURE — 11000001 HC ACUTE MED/SURG PRIVATE ROOM

## 2021-08-08 PROCEDURE — A4216 STERILE WATER/SALINE, 10 ML: HCPCS | Performed by: SURGERY

## 2021-08-08 PROCEDURE — 85025 COMPLETE CBC W/AUTO DIFF WBC: CPT | Performed by: SURGERY

## 2021-08-08 RX ORDER — LIDOCAINE 50 MG/G
1 PATCH TOPICAL
Status: DISCONTINUED | OUTPATIENT
Start: 2021-08-08 | End: 2021-08-17 | Stop reason: HOSPADM

## 2021-08-08 RX ORDER — OXYCODONE AND ACETAMINOPHEN 10; 325 MG/1; MG/1
1 TABLET ORAL EVERY 4 HOURS PRN
Status: DISCONTINUED | OUTPATIENT
Start: 2021-08-08 | End: 2021-08-17 | Stop reason: HOSPADM

## 2021-08-08 RX ORDER — INSULIN ASPART 100 [IU]/ML
2 INJECTION, SOLUTION INTRAVENOUS; SUBCUTANEOUS
Status: DISCONTINUED | OUTPATIENT
Start: 2021-08-08 | End: 2021-08-17 | Stop reason: HOSPADM

## 2021-08-08 RX ADMIN — FERROUS SULFATE TAB EC 325 MG (65 MG FE EQUIVALENT) 325 MG: 325 (65 FE) TABLET DELAYED RESPONSE at 08:08

## 2021-08-08 RX ADMIN — Medication 10 ML: at 01:08

## 2021-08-08 RX ADMIN — METOPROLOL TARTRATE 25 MG: 25 TABLET, FILM COATED ORAL at 08:08

## 2021-08-08 RX ADMIN — GABAPENTIN 600 MG: 300 CAPSULE ORAL at 03:08

## 2021-08-08 RX ADMIN — BACLOFEN 10 MG: 10 TABLET ORAL at 08:08

## 2021-08-08 RX ADMIN — Medication 10 ML: at 06:08

## 2021-08-08 RX ADMIN — GABAPENTIN 600 MG: 300 CAPSULE ORAL at 09:08

## 2021-08-08 RX ADMIN — PANTOPRAZOLE SODIUM 40 MG: 40 TABLET, DELAYED RELEASE ORAL at 08:08

## 2021-08-08 RX ADMIN — COLLAGENASE SANTYL: 250 OINTMENT TOPICAL at 08:08

## 2021-08-08 RX ADMIN — OXYCODONE AND ACETAMINOPHEN 1 TABLET: 10; 325 TABLET ORAL at 01:08

## 2021-08-08 RX ADMIN — Medication 10 ML: at 05:08

## 2021-08-08 RX ADMIN — LIDOCAINE 1 PATCH: 50 PATCH TOPICAL at 01:08

## 2021-08-08 RX ADMIN — MEROPENEM 1 G: 1 INJECTION, POWDER, FOR SOLUTION INTRAVENOUS at 01:08

## 2021-08-08 RX ADMIN — OXYCODONE AND ACETAMINOPHEN 1 TABLET: 10; 325 TABLET ORAL at 09:08

## 2021-08-08 RX ADMIN — MEROPENEM 1 G: 1 INJECTION, POWDER, FOR SOLUTION INTRAVENOUS at 05:08

## 2021-08-08 RX ADMIN — GABAPENTIN 600 MG: 300 CAPSULE ORAL at 08:08

## 2021-08-08 RX ADMIN — INSULIN DETEMIR 10 UNITS: 100 INJECTION, SOLUTION SUBCUTANEOUS at 09:08

## 2021-08-08 RX ADMIN — VANCOMYCIN HYDROCHLORIDE 1500 MG: 1.5 INJECTION, POWDER, LYOPHILIZED, FOR SOLUTION INTRAVENOUS at 09:08

## 2021-08-08 RX ADMIN — MEROPENEM 1 G: 1 INJECTION, POWDER, FOR SOLUTION INTRAVENOUS at 08:08

## 2021-08-08 RX ADMIN — BACLOFEN 10 MG: 10 TABLET ORAL at 09:08

## 2021-08-08 RX ADMIN — POTASSIUM CHLORIDE 20 MEQ: 1500 TABLET, EXTENDED RELEASE ORAL at 09:08

## 2021-08-08 RX ADMIN — OXYCODONE HYDROCHLORIDE 10 MG: 5 TABLET ORAL at 08:08

## 2021-08-08 RX ADMIN — MELATONIN 9 MG: 3 TAB ORAL at 09:08

## 2021-08-08 RX ADMIN — INSULIN ASPART 6 UNITS: 100 INJECTION, SOLUTION INTRAVENOUS; SUBCUTANEOUS at 11:08

## 2021-08-08 RX ADMIN — INSULIN ASPART 2 UNITS: 100 INJECTION, SOLUTION INTRAVENOUS; SUBCUTANEOUS at 05:08

## 2021-08-09 ENCOUNTER — PATIENT OUTREACH (OUTPATIENT)
Dept: ADMINISTRATIVE | Facility: OTHER | Age: 46
End: 2021-08-09

## 2021-08-09 LAB
ANION GAP SERPL CALC-SCNC: 7 MMOL/L (ref 8–16)
BACTERIA BLD CULT: NORMAL
BASOPHILS # BLD AUTO: 0.04 K/UL (ref 0–0.2)
BASOPHILS NFR BLD: 0.5 % (ref 0–1.9)
BUN SERPL-MCNC: 4 MG/DL (ref 6–20)
CALCIUM SERPL-MCNC: 9 MG/DL (ref 8.7–10.5)
CHLORIDE SERPL-SCNC: 107 MMOL/L (ref 95–110)
CO2 SERPL-SCNC: 26 MMOL/L (ref 23–29)
CREAT SERPL-MCNC: 0.7 MG/DL (ref 0.5–1.4)
DIFFERENTIAL METHOD: ABNORMAL
EOSINOPHIL # BLD AUTO: 0.7 K/UL (ref 0–0.5)
EOSINOPHIL NFR BLD: 10 % (ref 0–8)
ERYTHROCYTE [DISTWIDTH] IN BLOOD BY AUTOMATED COUNT: 18.6 % (ref 11.5–14.5)
EST. GFR  (AFRICAN AMERICAN): >60 ML/MIN/1.73 M^2
EST. GFR  (NON AFRICAN AMERICAN): >60 ML/MIN/1.73 M^2
GLUCOSE SERPL-MCNC: 88 MG/DL (ref 70–110)
HCT VFR BLD AUTO: 25.7 % (ref 40–54)
HGB BLD-MCNC: 7.9 G/DL (ref 14–18)
IMM GRANULOCYTES # BLD AUTO: 0.03 K/UL (ref 0–0.04)
IMM GRANULOCYTES NFR BLD AUTO: 0.4 % (ref 0–0.5)
LYMPHOCYTES # BLD AUTO: 2.5 K/UL (ref 1–4.8)
LYMPHOCYTES NFR BLD: 33.6 % (ref 18–48)
MCH RBC QN AUTO: 21.2 PG (ref 27–31)
MCHC RBC AUTO-ENTMCNC: 30.7 G/DL (ref 32–36)
MCV RBC AUTO: 69 FL (ref 82–98)
MONOCYTES # BLD AUTO: 0.6 K/UL (ref 0.3–1)
MONOCYTES NFR BLD: 8.6 % (ref 4–15)
NEUTROPHILS # BLD AUTO: 3.5 K/UL (ref 1.8–7.7)
NEUTROPHILS NFR BLD: 46.9 % (ref 38–73)
NRBC BLD-RTO: 0 /100 WBC
PLATELET # BLD AUTO: 605 K/UL (ref 150–450)
PMV BLD AUTO: 9.5 FL (ref 9.2–12.9)
POCT GLUCOSE: 106 MG/DL (ref 70–110)
POCT GLUCOSE: 114 MG/DL (ref 70–110)
POCT GLUCOSE: 93 MG/DL (ref 70–110)
POCT GLUCOSE: 96 MG/DL (ref 70–110)
POTASSIUM SERPL-SCNC: 4.1 MMOL/L (ref 3.5–5.1)
RBC # BLD AUTO: 3.72 M/UL (ref 4.6–6.2)
SODIUM SERPL-SCNC: 140 MMOL/L (ref 136–145)
VANCOMYCIN TROUGH SERPL-MCNC: 26.1 UG/ML (ref 10–22)
WBC # BLD AUTO: 7.41 K/UL (ref 3.9–12.7)

## 2021-08-09 PROCEDURE — 27000207 HC ISOLATION

## 2021-08-09 PROCEDURE — 25000003 PHARM REV CODE 250: Performed by: SURGERY

## 2021-08-09 PROCEDURE — 63600175 PHARM REV CODE 636 W HCPCS: Performed by: SURGERY

## 2021-08-09 PROCEDURE — 94760 N-INVAS EAR/PLS OXIMETRY 1: CPT

## 2021-08-09 PROCEDURE — 85025 COMPLETE CBC W/AUTO DIFF WBC: CPT | Performed by: SURGERY

## 2021-08-09 PROCEDURE — 80202 ASSAY OF VANCOMYCIN: CPT | Performed by: HOSPITALIST

## 2021-08-09 PROCEDURE — A4216 STERILE WATER/SALINE, 10 ML: HCPCS | Performed by: SURGERY

## 2021-08-09 PROCEDURE — 80048 BASIC METABOLIC PNL TOTAL CA: CPT | Performed by: SURGERY

## 2021-08-09 PROCEDURE — 63600175 PHARM REV CODE 636 W HCPCS: Performed by: HOSPITALIST

## 2021-08-09 PROCEDURE — 11000001 HC ACUTE MED/SURG PRIVATE ROOM

## 2021-08-09 PROCEDURE — 94761 N-INVAS EAR/PLS OXIMETRY MLT: CPT

## 2021-08-09 PROCEDURE — 25000003 PHARM REV CODE 250: Performed by: HOSPITALIST

## 2021-08-09 RX ORDER — VANCOMYCIN HCL IN 5 % DEXTROSE 1G/250ML
1000 PLASTIC BAG, INJECTION (ML) INTRAVENOUS
Status: DISCONTINUED | OUTPATIENT
Start: 2021-08-09 | End: 2021-08-11

## 2021-08-09 RX ADMIN — MEROPENEM 1 G: 1 INJECTION, POWDER, FOR SOLUTION INTRAVENOUS at 09:08

## 2021-08-09 RX ADMIN — Medication 10 ML: at 12:08

## 2021-08-09 RX ADMIN — ALUMINUM HYDROXIDE, MAGNESIUM HYDROXIDE, AND SIMETHICONE 30 ML: 200; 200; 20 SUSPENSION ORAL at 09:08

## 2021-08-09 RX ADMIN — OXYCODONE AND ACETAMINOPHEN 1 TABLET: 10; 325 TABLET ORAL at 05:08

## 2021-08-09 RX ADMIN — VANCOMYCIN HYDROCHLORIDE 1000 MG: 1 INJECTION, POWDER, LYOPHILIZED, FOR SOLUTION INTRAVENOUS at 04:08

## 2021-08-09 RX ADMIN — BACLOFEN 10 MG: 10 TABLET ORAL at 09:08

## 2021-08-09 RX ADMIN — OXYCODONE AND ACETAMINOPHEN 1 TABLET: 10; 325 TABLET ORAL at 02:08

## 2021-08-09 RX ADMIN — MEROPENEM 1 G: 1 INJECTION, POWDER, FOR SOLUTION INTRAVENOUS at 12:08

## 2021-08-09 RX ADMIN — GABAPENTIN 600 MG: 300 CAPSULE ORAL at 09:08

## 2021-08-09 RX ADMIN — OXYCODONE AND ACETAMINOPHEN 1 TABLET: 10; 325 TABLET ORAL at 09:08

## 2021-08-09 RX ADMIN — PANTOPRAZOLE SODIUM 40 MG: 40 TABLET, DELAYED RELEASE ORAL at 09:08

## 2021-08-09 RX ADMIN — MELATONIN 9 MG: 3 TAB ORAL at 09:08

## 2021-08-09 RX ADMIN — GABAPENTIN 600 MG: 300 CAPSULE ORAL at 02:08

## 2021-08-09 RX ADMIN — MEROPENEM 1 G: 1 INJECTION, POWDER, FOR SOLUTION INTRAVENOUS at 06:08

## 2021-08-09 RX ADMIN — POTASSIUM CHLORIDE 20 MEQ: 1500 TABLET, EXTENDED RELEASE ORAL at 09:08

## 2021-08-09 RX ADMIN — METOPROLOL TARTRATE 25 MG: 25 TABLET, FILM COATED ORAL at 09:08

## 2021-08-09 RX ADMIN — Medication 10 ML: at 05:08

## 2021-08-09 RX ADMIN — COLLAGENASE SANTYL: 250 OINTMENT TOPICAL at 10:08

## 2021-08-09 RX ADMIN — LIDOCAINE 1 PATCH: 50 PATCH TOPICAL at 01:08

## 2021-08-09 RX ADMIN — FERROUS SULFATE TAB EC 325 MG (65 MG FE EQUIVALENT) 325 MG: 325 (65 FE) TABLET DELAYED RESPONSE at 09:08

## 2021-08-09 RX ADMIN — VANCOMYCIN HYDROCHLORIDE 1500 MG: 1.5 INJECTION, POWDER, LYOPHILIZED, FOR SOLUTION INTRAVENOUS at 10:08

## 2021-08-10 LAB
ANION GAP SERPL CALC-SCNC: 10 MMOL/L (ref 8–16)
BASOPHILS # BLD AUTO: 0.07 K/UL (ref 0–0.2)
BASOPHILS NFR BLD: 0.9 % (ref 0–1.9)
BUN SERPL-MCNC: 5 MG/DL (ref 6–20)
CALCIUM SERPL-MCNC: 9.7 MG/DL (ref 8.7–10.5)
CHLORIDE SERPL-SCNC: 106 MMOL/L (ref 95–110)
CO2 SERPL-SCNC: 24 MMOL/L (ref 23–29)
CREAT SERPL-MCNC: 0.7 MG/DL (ref 0.5–1.4)
DIFFERENTIAL METHOD: ABNORMAL
EOSINOPHIL # BLD AUTO: 0.9 K/UL (ref 0–0.5)
EOSINOPHIL NFR BLD: 11.1 % (ref 0–8)
ERYTHROCYTE [DISTWIDTH] IN BLOOD BY AUTOMATED COUNT: 18.6 % (ref 11.5–14.5)
EST. GFR  (AFRICAN AMERICAN): >60 ML/MIN/1.73 M^2
EST. GFR  (NON AFRICAN AMERICAN): >60 ML/MIN/1.73 M^2
GLUCOSE SERPL-MCNC: 96 MG/DL (ref 70–110)
HCT VFR BLD AUTO: 27.2 % (ref 40–54)
HGB BLD-MCNC: 8.6 G/DL (ref 14–18)
IMM GRANULOCYTES # BLD AUTO: 0.03 K/UL (ref 0–0.04)
IMM GRANULOCYTES NFR BLD AUTO: 0.4 % (ref 0–0.5)
LYMPHOCYTES # BLD AUTO: 2.7 K/UL (ref 1–4.8)
LYMPHOCYTES NFR BLD: 33.3 % (ref 18–48)
MCH RBC QN AUTO: 22.1 PG (ref 27–31)
MCHC RBC AUTO-ENTMCNC: 31.6 G/DL (ref 32–36)
MCV RBC AUTO: 70 FL (ref 82–98)
MONOCYTES # BLD AUTO: 0.6 K/UL (ref 0.3–1)
MONOCYTES NFR BLD: 6.9 % (ref 4–15)
NEUTROPHILS # BLD AUTO: 3.8 K/UL (ref 1.8–7.7)
NEUTROPHILS NFR BLD: 47.4 % (ref 38–73)
NRBC BLD-RTO: 0 /100 WBC
PLATELET # BLD AUTO: 714 K/UL (ref 150–450)
PMV BLD AUTO: 9.5 FL (ref 9.2–12.9)
POCT GLUCOSE: 114 MG/DL (ref 70–110)
POCT GLUCOSE: 119 MG/DL (ref 70–110)
POCT GLUCOSE: 120 MG/DL (ref 70–110)
POCT GLUCOSE: 189 MG/DL (ref 70–110)
POTASSIUM SERPL-SCNC: 4.2 MMOL/L (ref 3.5–5.1)
RBC # BLD AUTO: 3.89 M/UL (ref 4.6–6.2)
SODIUM SERPL-SCNC: 140 MMOL/L (ref 136–145)
VANCOMYCIN TROUGH SERPL-MCNC: 20.8 UG/ML (ref 10–22)
WBC # BLD AUTO: 8.1 K/UL (ref 3.9–12.7)

## 2021-08-10 PROCEDURE — 25000003 PHARM REV CODE 250: Performed by: SURGERY

## 2021-08-10 PROCEDURE — 25000003 PHARM REV CODE 250: Performed by: ORTHOPAEDIC SURGERY

## 2021-08-10 PROCEDURE — A4216 STERILE WATER/SALINE, 10 ML: HCPCS | Performed by: SURGERY

## 2021-08-10 PROCEDURE — 63600175 PHARM REV CODE 636 W HCPCS: Performed by: HOSPITALIST

## 2021-08-10 PROCEDURE — 25000003 PHARM REV CODE 250: Performed by: HOSPITALIST

## 2021-08-10 PROCEDURE — 27000207 HC ISOLATION

## 2021-08-10 PROCEDURE — 80202 ASSAY OF VANCOMYCIN: CPT | Performed by: INTERNAL MEDICINE

## 2021-08-10 PROCEDURE — 94760 N-INVAS EAR/PLS OXIMETRY 1: CPT

## 2021-08-10 PROCEDURE — 63600175 PHARM REV CODE 636 W HCPCS: Performed by: INTERNAL MEDICINE

## 2021-08-10 PROCEDURE — 11000001 HC ACUTE MED/SURG PRIVATE ROOM

## 2021-08-10 PROCEDURE — 85025 COMPLETE CBC W/AUTO DIFF WBC: CPT | Performed by: SURGERY

## 2021-08-10 PROCEDURE — 25000003 PHARM REV CODE 250: Performed by: INTERNAL MEDICINE

## 2021-08-10 PROCEDURE — 80048 BASIC METABOLIC PNL TOTAL CA: CPT | Performed by: SURGERY

## 2021-08-10 PROCEDURE — 63600175 PHARM REV CODE 636 W HCPCS: Performed by: SURGERY

## 2021-08-10 RX ORDER — HYDROXYZINE PAMOATE 25 MG/1
50 CAPSULE ORAL ONCE
Status: COMPLETED | OUTPATIENT
Start: 2021-08-10 | End: 2021-08-10

## 2021-08-10 RX ORDER — METRONIDAZOLE 500 MG/1
500 TABLET ORAL EVERY 8 HOURS
Status: DISCONTINUED | OUTPATIENT
Start: 2021-08-10 | End: 2021-08-15

## 2021-08-10 RX ORDER — CEFEPIME HYDROCHLORIDE 1 G/50ML
2 INJECTION, SOLUTION INTRAVENOUS
Status: DISCONTINUED | OUTPATIENT
Start: 2021-08-10 | End: 2021-08-17 | Stop reason: HOSPADM

## 2021-08-10 RX ADMIN — VANCOMYCIN HYDROCHLORIDE 1000 MG: 1 INJECTION, POWDER, LYOPHILIZED, FOR SOLUTION INTRAVENOUS at 06:08

## 2021-08-10 RX ADMIN — GABAPENTIN 600 MG: 300 CAPSULE ORAL at 08:08

## 2021-08-10 RX ADMIN — OXYCODONE AND ACETAMINOPHEN 1 TABLET: 10; 325 TABLET ORAL at 08:08

## 2021-08-10 RX ADMIN — BACLOFEN 10 MG: 10 TABLET ORAL at 08:08

## 2021-08-10 RX ADMIN — OXYCODONE AND ACETAMINOPHEN 1 TABLET: 10; 325 TABLET ORAL at 04:08

## 2021-08-10 RX ADMIN — ALUMINUM HYDROXIDE, MAGNESIUM HYDROXIDE, AND SIMETHICONE 30 ML: 200; 200; 20 SUSPENSION ORAL at 12:08

## 2021-08-10 RX ADMIN — CEFEPIME 2 G: 2 INJECTION, POWDER, FOR SOLUTION INTRAVENOUS at 08:08

## 2021-08-10 RX ADMIN — LIDOCAINE 1 PATCH: 50 PATCH TOPICAL at 12:08

## 2021-08-10 RX ADMIN — MEROPENEM 1 G: 1 INJECTION, POWDER, FOR SOLUTION INTRAVENOUS at 05:08

## 2021-08-10 RX ADMIN — FERROUS SULFATE TAB EC 325 MG (65 MG FE EQUIVALENT) 325 MG: 325 (65 FE) TABLET DELAYED RESPONSE at 09:08

## 2021-08-10 RX ADMIN — INSULIN DETEMIR 10 UNITS: 100 INJECTION, SOLUTION SUBCUTANEOUS at 08:08

## 2021-08-10 RX ADMIN — GABAPENTIN 600 MG: 300 CAPSULE ORAL at 02:08

## 2021-08-10 RX ADMIN — POTASSIUM CHLORIDE 20 MEQ: 1500 TABLET, EXTENDED RELEASE ORAL at 09:08

## 2021-08-10 RX ADMIN — METRONIDAZOLE 500 MG: 500 TABLET ORAL at 09:08

## 2021-08-10 RX ADMIN — MELATONIN 9 MG: 3 TAB ORAL at 08:08

## 2021-08-10 RX ADMIN — Medication 10 ML: at 05:08

## 2021-08-10 RX ADMIN — COLLAGENASE SANTYL: 250 OINTMENT TOPICAL at 09:08

## 2021-08-10 RX ADMIN — OXYCODONE AND ACETAMINOPHEN 1 TABLET: 10; 325 TABLET ORAL at 09:08

## 2021-08-10 RX ADMIN — INSULIN ASPART 1 UNITS: 100 INJECTION, SOLUTION INTRAVENOUS; SUBCUTANEOUS at 08:08

## 2021-08-10 RX ADMIN — HYDROXYZINE PAMOATE 50 MG: 25 CAPSULE ORAL at 08:08

## 2021-08-10 RX ADMIN — GABAPENTIN 600 MG: 300 CAPSULE ORAL at 09:08

## 2021-08-10 RX ADMIN — Medication 10 ML: at 12:08

## 2021-08-10 RX ADMIN — BACLOFEN 10 MG: 10 TABLET ORAL at 09:08

## 2021-08-10 RX ADMIN — VANCOMYCIN HYDROCHLORIDE 1000 MG: 1 INJECTION, POWDER, LYOPHILIZED, FOR SOLUTION INTRAVENOUS at 05:08

## 2021-08-10 RX ADMIN — METOPROLOL TARTRATE 25 MG: 25 TABLET, FILM COATED ORAL at 09:08

## 2021-08-10 RX ADMIN — PANTOPRAZOLE SODIUM 40 MG: 40 TABLET, DELAYED RELEASE ORAL at 09:08

## 2021-08-10 RX ADMIN — MEROPENEM 1 G: 1 INJECTION, POWDER, FOR SOLUTION INTRAVENOUS at 12:08

## 2021-08-11 LAB
ANION GAP SERPL CALC-SCNC: 8 MMOL/L (ref 8–16)
BACTERIA SPEC AEROBE CULT: ABNORMAL
BASOPHILS # BLD AUTO: 0.07 K/UL (ref 0–0.2)
BASOPHILS NFR BLD: 0.8 % (ref 0–1.9)
BUN SERPL-MCNC: 7 MG/DL (ref 6–20)
CALCIUM SERPL-MCNC: 9.3 MG/DL (ref 8.7–10.5)
CHLORIDE SERPL-SCNC: 107 MMOL/L (ref 95–110)
CO2 SERPL-SCNC: 23 MMOL/L (ref 23–29)
CREAT SERPL-MCNC: 0.8 MG/DL (ref 0.5–1.4)
DIFFERENTIAL METHOD: ABNORMAL
EOSINOPHIL # BLD AUTO: 0.8 K/UL (ref 0–0.5)
EOSINOPHIL NFR BLD: 9.2 % (ref 0–8)
ERYTHROCYTE [DISTWIDTH] IN BLOOD BY AUTOMATED COUNT: 19.4 % (ref 11.5–14.5)
EST. GFR  (AFRICAN AMERICAN): >60 ML/MIN/1.73 M^2
EST. GFR  (NON AFRICAN AMERICAN): >60 ML/MIN/1.73 M^2
FINAL PATHOLOGIC DIAGNOSIS: NORMAL
GLUCOSE SERPL-MCNC: 108 MG/DL (ref 70–110)
GROSS: NORMAL
HCT VFR BLD AUTO: 26.2 % (ref 40–54)
HGB BLD-MCNC: 8.2 G/DL (ref 14–18)
IMM GRANULOCYTES # BLD AUTO: 0.03 K/UL (ref 0–0.04)
IMM GRANULOCYTES NFR BLD AUTO: 0.3 % (ref 0–0.5)
LYMPHOCYTES # BLD AUTO: 2.3 K/UL (ref 1–4.8)
LYMPHOCYTES NFR BLD: 25.9 % (ref 18–48)
Lab: NORMAL
MCH RBC QN AUTO: 21.6 PG (ref 27–31)
MCHC RBC AUTO-ENTMCNC: 31.3 G/DL (ref 32–36)
MCV RBC AUTO: 69 FL (ref 82–98)
MONOCYTES # BLD AUTO: 0.7 K/UL (ref 0.3–1)
MONOCYTES NFR BLD: 8.2 % (ref 4–15)
NEUTROPHILS # BLD AUTO: 4.9 K/UL (ref 1.8–7.7)
NEUTROPHILS NFR BLD: 55.6 % (ref 38–73)
NRBC BLD-RTO: 0 /100 WBC
PLATELET # BLD AUTO: 664 K/UL (ref 150–450)
PMV BLD AUTO: 9.2 FL (ref 9.2–12.9)
POCT GLUCOSE: 130 MG/DL (ref 70–110)
POCT GLUCOSE: 136 MG/DL (ref 70–110)
POCT GLUCOSE: 163 MG/DL (ref 70–110)
POCT GLUCOSE: 169 MG/DL (ref 70–110)
POTASSIUM SERPL-SCNC: 4.2 MMOL/L (ref 3.5–5.1)
RBC # BLD AUTO: 3.79 M/UL (ref 4.6–6.2)
SODIUM SERPL-SCNC: 138 MMOL/L (ref 136–145)
VANCOMYCIN TROUGH SERPL-MCNC: 19 UG/ML (ref 10–22)
WBC # BLD AUTO: 8.78 K/UL (ref 3.9–12.7)

## 2021-08-11 PROCEDURE — 25000003 PHARM REV CODE 250: Performed by: HOSPITALIST

## 2021-08-11 PROCEDURE — 85025 COMPLETE CBC W/AUTO DIFF WBC: CPT | Performed by: SURGERY

## 2021-08-11 PROCEDURE — 25000003 PHARM REV CODE 250: Performed by: INTERNAL MEDICINE

## 2021-08-11 PROCEDURE — 25000003 PHARM REV CODE 250: Performed by: SURGERY

## 2021-08-11 PROCEDURE — A4216 STERILE WATER/SALINE, 10 ML: HCPCS | Performed by: SURGERY

## 2021-08-11 PROCEDURE — 63600175 PHARM REV CODE 636 W HCPCS: Performed by: HOSPITALIST

## 2021-08-11 PROCEDURE — 27000207 HC ISOLATION

## 2021-08-11 PROCEDURE — 11000001 HC ACUTE MED/SURG PRIVATE ROOM

## 2021-08-11 PROCEDURE — 80048 BASIC METABOLIC PNL TOTAL CA: CPT | Performed by: SURGERY

## 2021-08-11 PROCEDURE — 63600175 PHARM REV CODE 636 W HCPCS: Performed by: INTERNAL MEDICINE

## 2021-08-11 PROCEDURE — 94761 N-INVAS EAR/PLS OXIMETRY MLT: CPT

## 2021-08-11 PROCEDURE — 25000003 PHARM REV CODE 250: Performed by: NURSE PRACTITIONER

## 2021-08-11 PROCEDURE — 80202 ASSAY OF VANCOMYCIN: CPT | Performed by: INTERNAL MEDICINE

## 2021-08-11 RX ORDER — HYDROXYZINE PAMOATE 25 MG/1
25 CAPSULE ORAL EVERY 8 HOURS PRN
Status: DISCONTINUED | OUTPATIENT
Start: 2021-08-11 | End: 2021-08-17 | Stop reason: HOSPADM

## 2021-08-11 RX ORDER — SULFAMETHOXAZOLE AND TRIMETHOPRIM 800; 160 MG/1; MG/1
2 TABLET ORAL 2 TIMES DAILY
Status: DISCONTINUED | OUTPATIENT
Start: 2021-08-11 | End: 2021-08-17 | Stop reason: HOSPADM

## 2021-08-11 RX ADMIN — OXYCODONE AND ACETAMINOPHEN 1 TABLET: 10; 325 TABLET ORAL at 05:08

## 2021-08-11 RX ADMIN — INSULIN ASPART 2 UNITS: 100 INJECTION, SOLUTION INTRAVENOUS; SUBCUTANEOUS at 09:08

## 2021-08-11 RX ADMIN — METOPROLOL TARTRATE 25 MG: 25 TABLET, FILM COATED ORAL at 09:08

## 2021-08-11 RX ADMIN — VANCOMYCIN HYDROCHLORIDE 1000 MG: 1 INJECTION, POWDER, LYOPHILIZED, FOR SOLUTION INTRAVENOUS at 06:08

## 2021-08-11 RX ADMIN — MELATONIN 9 MG: 3 TAB ORAL at 09:08

## 2021-08-11 RX ADMIN — CEFEPIME 2 G: 2 INJECTION, POWDER, FOR SOLUTION INTRAVENOUS at 09:08

## 2021-08-11 RX ADMIN — BACLOFEN 10 MG: 10 TABLET ORAL at 09:08

## 2021-08-11 RX ADMIN — INSULIN ASPART 2 UNITS: 100 INJECTION, SOLUTION INTRAVENOUS; SUBCUTANEOUS at 12:08

## 2021-08-11 RX ADMIN — Medication 10 ML: at 12:08

## 2021-08-11 RX ADMIN — LIDOCAINE 1 PATCH: 50 PATCH TOPICAL at 12:08

## 2021-08-11 RX ADMIN — HYDROXYZINE PAMOATE 25 MG: 25 CAPSULE ORAL at 09:08

## 2021-08-11 RX ADMIN — CEFEPIME 2 G: 2 INJECTION, POWDER, FOR SOLUTION INTRAVENOUS at 04:08

## 2021-08-11 RX ADMIN — METRONIDAZOLE 500 MG: 500 TABLET ORAL at 06:08

## 2021-08-11 RX ADMIN — GABAPENTIN 600 MG: 300 CAPSULE ORAL at 09:08

## 2021-08-11 RX ADMIN — SULFAMETHOXAZOLE AND TRIMETHOPRIM 2 TABLET: 800; 160 TABLET ORAL at 09:08

## 2021-08-11 RX ADMIN — INSULIN DETEMIR 10 UNITS: 100 INJECTION, SOLUTION SUBCUTANEOUS at 09:08

## 2021-08-11 RX ADMIN — POTASSIUM CHLORIDE 20 MEQ: 1500 TABLET, EXTENDED RELEASE ORAL at 09:08

## 2021-08-11 RX ADMIN — METRONIDAZOLE 500 MG: 500 TABLET ORAL at 03:08

## 2021-08-11 RX ADMIN — OXYCODONE AND ACETAMINOPHEN 1 TABLET: 10; 325 TABLET ORAL at 09:08

## 2021-08-11 RX ADMIN — OXYCODONE AND ACETAMINOPHEN 1 TABLET: 10; 325 TABLET ORAL at 06:08

## 2021-08-11 RX ADMIN — FERROUS SULFATE TAB EC 325 MG (65 MG FE EQUIVALENT) 325 MG: 325 (65 FE) TABLET DELAYED RESPONSE at 09:08

## 2021-08-11 RX ADMIN — OXYCODONE AND ACETAMINOPHEN 1 TABLET: 10; 325 TABLET ORAL at 01:08

## 2021-08-11 RX ADMIN — COLLAGENASE SANTYL: 250 OINTMENT TOPICAL at 09:08

## 2021-08-11 RX ADMIN — GABAPENTIN 600 MG: 300 CAPSULE ORAL at 03:08

## 2021-08-11 RX ADMIN — METRONIDAZOLE 500 MG: 500 TABLET ORAL at 09:08

## 2021-08-11 RX ADMIN — OXYCODONE AND ACETAMINOPHEN 1 TABLET: 10; 325 TABLET ORAL at 12:08

## 2021-08-11 RX ADMIN — INSULIN ASPART 2 UNITS: 100 INJECTION, SOLUTION INTRAVENOUS; SUBCUTANEOUS at 05:08

## 2021-08-11 RX ADMIN — HYDROXYZINE PAMOATE 25 MG: 25 CAPSULE ORAL at 05:08

## 2021-08-11 RX ADMIN — Medication 10 ML: at 05:08

## 2021-08-11 RX ADMIN — CEFEPIME 2 G: 2 INJECTION, POWDER, FOR SOLUTION INTRAVENOUS at 12:08

## 2021-08-11 RX ADMIN — PANTOPRAZOLE SODIUM 40 MG: 40 TABLET, DELAYED RELEASE ORAL at 09:08

## 2021-08-11 RX ADMIN — Medication 10 ML: at 06:08

## 2021-08-12 LAB
ANION GAP SERPL CALC-SCNC: 8 MMOL/L (ref 8–16)
BACTERIA SPEC ANAEROBE CULT: NORMAL
BACTERIA SPEC ANAEROBE CULT: NORMAL
BASOPHILS # BLD AUTO: 0.05 K/UL (ref 0–0.2)
BASOPHILS NFR BLD: 0.6 % (ref 0–1.9)
BUN SERPL-MCNC: 10 MG/DL (ref 6–20)
CALCIUM SERPL-MCNC: 8.5 MG/DL (ref 8.7–10.5)
CHLORIDE SERPL-SCNC: 109 MMOL/L (ref 95–110)
CO2 SERPL-SCNC: 21 MMOL/L (ref 23–29)
CREAT SERPL-MCNC: 0.8 MG/DL (ref 0.5–1.4)
DIFFERENTIAL METHOD: ABNORMAL
EOSINOPHIL # BLD AUTO: 0.8 K/UL (ref 0–0.5)
EOSINOPHIL NFR BLD: 10 % (ref 0–8)
ERYTHROCYTE [DISTWIDTH] IN BLOOD BY AUTOMATED COUNT: 19.5 % (ref 11.5–14.5)
EST. GFR  (AFRICAN AMERICAN): >60 ML/MIN/1.73 M^2
EST. GFR  (NON AFRICAN AMERICAN): >60 ML/MIN/1.73 M^2
GLUCOSE SERPL-MCNC: 173 MG/DL (ref 70–110)
HCT VFR BLD AUTO: 24 % (ref 40–54)
HGB BLD-MCNC: 7.5 G/DL (ref 14–18)
IMM GRANULOCYTES # BLD AUTO: 0.04 K/UL (ref 0–0.04)
IMM GRANULOCYTES NFR BLD AUTO: 0.5 % (ref 0–0.5)
LYMPHOCYTES # BLD AUTO: 2.6 K/UL (ref 1–4.8)
LYMPHOCYTES NFR BLD: 31.4 % (ref 18–48)
MCH RBC QN AUTO: 21.9 PG (ref 27–31)
MCHC RBC AUTO-ENTMCNC: 31.3 G/DL (ref 32–36)
MCV RBC AUTO: 70 FL (ref 82–98)
MONOCYTES # BLD AUTO: 0.6 K/UL (ref 0.3–1)
MONOCYTES NFR BLD: 6.8 % (ref 4–15)
NEUTROPHILS # BLD AUTO: 4.2 K/UL (ref 1.8–7.7)
NEUTROPHILS NFR BLD: 50.7 % (ref 38–73)
NRBC BLD-RTO: 0 /100 WBC
PLATELET # BLD AUTO: 321 K/UL (ref 150–450)
PMV BLD AUTO: 9.8 FL (ref 9.2–12.9)
POCT GLUCOSE: 100 MG/DL (ref 70–110)
POCT GLUCOSE: 122 MG/DL (ref 70–110)
POCT GLUCOSE: 123 MG/DL (ref 70–110)
POCT GLUCOSE: 83 MG/DL (ref 70–110)
POTASSIUM SERPL-SCNC: 3.5 MMOL/L (ref 3.5–5.1)
RBC # BLD AUTO: 3.42 M/UL (ref 4.6–6.2)
SODIUM SERPL-SCNC: 138 MMOL/L (ref 136–145)
WBC # BLD AUTO: 8.26 K/UL (ref 3.9–12.7)

## 2021-08-12 PROCEDURE — 25000003 PHARM REV CODE 250: Performed by: SURGERY

## 2021-08-12 PROCEDURE — 63600175 PHARM REV CODE 636 W HCPCS: Performed by: ORTHOPAEDIC SURGERY

## 2021-08-12 PROCEDURE — A4216 STERILE WATER/SALINE, 10 ML: HCPCS | Performed by: SURGERY

## 2021-08-12 PROCEDURE — 80048 BASIC METABOLIC PNL TOTAL CA: CPT | Performed by: SURGERY

## 2021-08-12 PROCEDURE — 25000003 PHARM REV CODE 250: Performed by: NURSE PRACTITIONER

## 2021-08-12 PROCEDURE — 25000003 PHARM REV CODE 250: Performed by: INTERNAL MEDICINE

## 2021-08-12 PROCEDURE — 63600175 PHARM REV CODE 636 W HCPCS: Mod: JG | Performed by: NURSE PRACTITIONER

## 2021-08-12 PROCEDURE — 94760 N-INVAS EAR/PLS OXIMETRY 1: CPT

## 2021-08-12 PROCEDURE — 85025 COMPLETE CBC W/AUTO DIFF WBC: CPT | Performed by: SURGERY

## 2021-08-12 PROCEDURE — 36415 COLL VENOUS BLD VENIPUNCTURE: CPT | Performed by: HOSPITALIST

## 2021-08-12 PROCEDURE — 27000207 HC ISOLATION

## 2021-08-12 PROCEDURE — 25000003 PHARM REV CODE 250: Performed by: HOSPITALIST

## 2021-08-12 PROCEDURE — 63600175 PHARM REV CODE 636 W HCPCS: Performed by: INTERNAL MEDICINE

## 2021-08-12 PROCEDURE — 11000001 HC ACUTE MED/SURG PRIVATE ROOM

## 2021-08-12 RX ORDER — ENOXAPARIN SODIUM 100 MG/ML
40 INJECTION SUBCUTANEOUS EVERY 24 HOURS
Status: DISCONTINUED | OUTPATIENT
Start: 2021-08-12 | End: 2021-08-17 | Stop reason: HOSPADM

## 2021-08-12 RX ADMIN — METRONIDAZOLE 500 MG: 500 TABLET ORAL at 01:08

## 2021-08-12 RX ADMIN — OXYCODONE AND ACETAMINOPHEN 1 TABLET: 10; 325 TABLET ORAL at 04:08

## 2021-08-12 RX ADMIN — Medication 10 ML: at 12:08

## 2021-08-12 RX ADMIN — METOPROLOL TARTRATE 25 MG: 25 TABLET, FILM COATED ORAL at 08:08

## 2021-08-12 RX ADMIN — BACLOFEN 10 MG: 10 TABLET ORAL at 09:08

## 2021-08-12 RX ADMIN — Medication 10 ML: at 05:08

## 2021-08-12 RX ADMIN — OXYCODONE AND ACETAMINOPHEN 1 TABLET: 10; 325 TABLET ORAL at 08:08

## 2021-08-12 RX ADMIN — SULFAMETHOXAZOLE AND TRIMETHOPRIM 2 TABLET: 800; 160 TABLET ORAL at 08:08

## 2021-08-12 RX ADMIN — CEFEPIME 2 G: 2 INJECTION, POWDER, FOR SOLUTION INTRAVENOUS at 04:08

## 2021-08-12 RX ADMIN — OXYCODONE AND ACETAMINOPHEN 1 TABLET: 10; 325 TABLET ORAL at 09:08

## 2021-08-12 RX ADMIN — LIDOCAINE 1 PATCH: 50 PATCH TOPICAL at 12:08

## 2021-08-12 RX ADMIN — OXYCODONE AND ACETAMINOPHEN 1 TABLET: 10; 325 TABLET ORAL at 05:08

## 2021-08-12 RX ADMIN — PANTOPRAZOLE SODIUM 40 MG: 40 TABLET, DELAYED RELEASE ORAL at 08:08

## 2021-08-12 RX ADMIN — METRONIDAZOLE 500 MG: 500 TABLET ORAL at 05:08

## 2021-08-12 RX ADMIN — POTASSIUM CHLORIDE 20 MEQ: 1500 TABLET, EXTENDED RELEASE ORAL at 08:08

## 2021-08-12 RX ADMIN — COLLAGENASE SANTYL: 250 OINTMENT TOPICAL at 08:08

## 2021-08-12 RX ADMIN — GABAPENTIN 600 MG: 300 CAPSULE ORAL at 09:08

## 2021-08-12 RX ADMIN — HYDROXYZINE PAMOATE 25 MG: 25 CAPSULE ORAL at 09:08

## 2021-08-12 RX ADMIN — GABAPENTIN 600 MG: 300 CAPSULE ORAL at 08:08

## 2021-08-12 RX ADMIN — CEFEPIME 2 G: 2 INJECTION, POWDER, FOR SOLUTION INTRAVENOUS at 09:08

## 2021-08-12 RX ADMIN — SULFAMETHOXAZOLE AND TRIMETHOPRIM 2 TABLET: 800; 160 TABLET ORAL at 09:08

## 2021-08-12 RX ADMIN — FERROUS SULFATE TAB EC 325 MG (65 MG FE EQUIVALENT) 325 MG: 325 (65 FE) TABLET DELAYED RESPONSE at 08:08

## 2021-08-12 RX ADMIN — ALTEPLASE 2 MG: 2.2 INJECTION, POWDER, LYOPHILIZED, FOR SOLUTION INTRAVENOUS at 05:08

## 2021-08-12 RX ADMIN — MELATONIN 9 MG: 3 TAB ORAL at 09:08

## 2021-08-12 RX ADMIN — METRONIDAZOLE 500 MG: 500 TABLET ORAL at 09:08

## 2021-08-12 RX ADMIN — HYDROXYZINE PAMOATE 25 MG: 25 CAPSULE ORAL at 12:08

## 2021-08-12 RX ADMIN — Medication 10 ML: at 06:08

## 2021-08-12 RX ADMIN — GABAPENTIN 600 MG: 300 CAPSULE ORAL at 03:08

## 2021-08-12 RX ADMIN — BACLOFEN 10 MG: 10 TABLET ORAL at 08:08

## 2021-08-12 RX ADMIN — CEFEPIME 2 G: 2 INJECTION, POWDER, FOR SOLUTION INTRAVENOUS at 01:08

## 2021-08-12 RX ADMIN — ENOXAPARIN SODIUM 40 MG: 40 INJECTION SUBCUTANEOUS at 08:08

## 2021-08-13 LAB
ANION GAP SERPL CALC-SCNC: 7 MMOL/L (ref 8–16)
BASOPHILS # BLD AUTO: 0.07 K/UL (ref 0–0.2)
BASOPHILS NFR BLD: 0.8 % (ref 0–1.9)
BUN SERPL-MCNC: 9 MG/DL (ref 6–20)
CALCIUM SERPL-MCNC: 9 MG/DL (ref 8.7–10.5)
CHLORIDE SERPL-SCNC: 110 MMOL/L (ref 95–110)
CO2 SERPL-SCNC: 21 MMOL/L (ref 23–29)
CREAT SERPL-MCNC: 0.8 MG/DL (ref 0.5–1.4)
DIFFERENTIAL METHOD: ABNORMAL
EOSINOPHIL # BLD AUTO: 1 K/UL (ref 0–0.5)
EOSINOPHIL NFR BLD: 11.4 % (ref 0–8)
ERYTHROCYTE [DISTWIDTH] IN BLOOD BY AUTOMATED COUNT: 19.8 % (ref 11.5–14.5)
EST. GFR  (AFRICAN AMERICAN): >60 ML/MIN/1.73 M^2
EST. GFR  (NON AFRICAN AMERICAN): >60 ML/MIN/1.73 M^2
GLUCOSE SERPL-MCNC: 75 MG/DL (ref 70–110)
HCT VFR BLD AUTO: 25.4 % (ref 40–54)
HGB BLD-MCNC: 7.9 G/DL (ref 14–18)
IMM GRANULOCYTES # BLD AUTO: 0.04 K/UL (ref 0–0.04)
IMM GRANULOCYTES NFR BLD AUTO: 0.5 % (ref 0–0.5)
LYMPHOCYTES # BLD AUTO: 2.6 K/UL (ref 1–4.8)
LYMPHOCYTES NFR BLD: 30.2 % (ref 18–48)
MCH RBC QN AUTO: 21.6 PG (ref 27–31)
MCHC RBC AUTO-ENTMCNC: 31.1 G/DL (ref 32–36)
MCV RBC AUTO: 70 FL (ref 82–98)
MONOCYTES # BLD AUTO: 0.7 K/UL (ref 0.3–1)
MONOCYTES NFR BLD: 7.9 % (ref 4–15)
NEUTROPHILS # BLD AUTO: 4.2 K/UL (ref 1.8–7.7)
NEUTROPHILS NFR BLD: 49.2 % (ref 38–73)
NRBC BLD-RTO: 0 /100 WBC
PLATELET # BLD AUTO: 663 K/UL (ref 150–450)
PMV BLD AUTO: 9.8 FL (ref 9.2–12.9)
POCT GLUCOSE: 100 MG/DL (ref 70–110)
POCT GLUCOSE: 111 MG/DL (ref 70–110)
POCT GLUCOSE: 201 MG/DL (ref 70–110)
POCT GLUCOSE: 211 MG/DL (ref 70–110)
POTASSIUM SERPL-SCNC: 4.2 MMOL/L (ref 3.5–5.1)
RBC # BLD AUTO: 3.65 M/UL (ref 4.6–6.2)
SODIUM SERPL-SCNC: 138 MMOL/L (ref 136–145)
WBC # BLD AUTO: 8.58 K/UL (ref 3.9–12.7)

## 2021-08-13 PROCEDURE — 11000001 HC ACUTE MED/SURG PRIVATE ROOM

## 2021-08-13 PROCEDURE — 25000003 PHARM REV CODE 250: Performed by: NURSE PRACTITIONER

## 2021-08-13 PROCEDURE — 27000207 HC ISOLATION

## 2021-08-13 PROCEDURE — 80048 BASIC METABOLIC PNL TOTAL CA: CPT | Performed by: SURGERY

## 2021-08-13 PROCEDURE — 63600175 PHARM REV CODE 636 W HCPCS: Performed by: ORTHOPAEDIC SURGERY

## 2021-08-13 PROCEDURE — 25000003 PHARM REV CODE 250: Performed by: INTERNAL MEDICINE

## 2021-08-13 PROCEDURE — 85025 COMPLETE CBC W/AUTO DIFF WBC: CPT | Performed by: SURGERY

## 2021-08-13 PROCEDURE — 63600175 PHARM REV CODE 636 W HCPCS: Performed by: INTERNAL MEDICINE

## 2021-08-13 PROCEDURE — 25000003 PHARM REV CODE 250: Performed by: SURGERY

## 2021-08-13 PROCEDURE — 94761 N-INVAS EAR/PLS OXIMETRY MLT: CPT

## 2021-08-13 PROCEDURE — 25000003 PHARM REV CODE 250: Performed by: HOSPITALIST

## 2021-08-13 PROCEDURE — A4216 STERILE WATER/SALINE, 10 ML: HCPCS | Performed by: SURGERY

## 2021-08-13 RX ORDER — BACLOFEN 10 MG/1
10 TABLET ORAL 2 TIMES DAILY
Qty: 60 TABLET | Refills: 11 | Status: SHIPPED | OUTPATIENT
Start: 2021-08-13 | End: 2022-08-13

## 2021-08-13 RX ORDER — SULFAMETHOXAZOLE AND TRIMETHOPRIM 800; 160 MG/1; MG/1
2 TABLET ORAL 2 TIMES DAILY
Qty: 160 TABLET | Refills: 0 | Status: SHIPPED | OUTPATIENT
Start: 2021-08-13 | End: 2021-09-22

## 2021-08-13 RX ORDER — AMOXICILLIN 250 MG
1 CAPSULE ORAL 2 TIMES DAILY PRN
Qty: 30 TABLET | Refills: 0 | Status: SHIPPED | OUTPATIENT
Start: 2021-08-13 | End: 2021-09-12

## 2021-08-13 RX ORDER — SODIUM CHLORIDE 0.9 % (FLUSH) 0.9 %
10 SYRINGE (ML) INJECTION EVERY 6 HOURS
Qty: 1680 ML | Refills: 0 | Status: SHIPPED | OUTPATIENT
Start: 2021-08-13 | End: 2021-09-24

## 2021-08-13 RX ORDER — CEFEPIME HYDROCHLORIDE 1 G/50ML
2 INJECTION, SOLUTION INTRAVENOUS EVERY 8 HOURS
Start: 2021-08-13 | End: 2021-09-22

## 2021-08-13 RX ORDER — HYDROXYZINE PAMOATE 25 MG/1
25 CAPSULE ORAL EVERY 8 HOURS PRN
Qty: 42 CAPSULE | Refills: 0 | Status: SHIPPED | OUTPATIENT
Start: 2021-08-13 | End: 2021-09-24

## 2021-08-13 RX ORDER — FERROUS SULFATE 325(65) MG
325 TABLET, DELAYED RELEASE (ENTERIC COATED) ORAL DAILY
Qty: 60 TABLET | Refills: 0 | Status: SHIPPED | OUTPATIENT
Start: 2021-08-14 | End: 2021-10-13

## 2021-08-13 RX ADMIN — Medication 10 ML: at 12:08

## 2021-08-13 RX ADMIN — COLLAGENASE SANTYL: 250 OINTMENT TOPICAL at 09:08

## 2021-08-13 RX ADMIN — LIDOCAINE 1 PATCH: 50 PATCH TOPICAL at 12:08

## 2021-08-13 RX ADMIN — GABAPENTIN 600 MG: 300 CAPSULE ORAL at 08:08

## 2021-08-13 RX ADMIN — POTASSIUM CHLORIDE 20 MEQ: 1500 TABLET, EXTENDED RELEASE ORAL at 08:08

## 2021-08-13 RX ADMIN — BACLOFEN 10 MG: 10 TABLET ORAL at 08:08

## 2021-08-13 RX ADMIN — Medication 10 ML: at 05:08

## 2021-08-13 RX ADMIN — METRONIDAZOLE 500 MG: 500 TABLET ORAL at 02:08

## 2021-08-13 RX ADMIN — CEFEPIME 2 G: 2 INJECTION, POWDER, FOR SOLUTION INTRAVENOUS at 05:08

## 2021-08-13 RX ADMIN — OXYCODONE AND ACETAMINOPHEN 1 TABLET: 10; 325 TABLET ORAL at 08:08

## 2021-08-13 RX ADMIN — METOPROLOL TARTRATE 25 MG: 25 TABLET, FILM COATED ORAL at 08:08

## 2021-08-13 RX ADMIN — OXYCODONE AND ACETAMINOPHEN 1 TABLET: 10; 325 TABLET ORAL at 03:08

## 2021-08-13 RX ADMIN — CEFEPIME 2 G: 2 INJECTION, POWDER, FOR SOLUTION INTRAVENOUS at 08:08

## 2021-08-13 RX ADMIN — INSULIN ASPART 2 UNITS: 100 INJECTION, SOLUTION INTRAVENOUS; SUBCUTANEOUS at 12:08

## 2021-08-13 RX ADMIN — SULFAMETHOXAZOLE AND TRIMETHOPRIM 2 TABLET: 800; 160 TABLET ORAL at 08:08

## 2021-08-13 RX ADMIN — INSULIN ASPART 2 UNITS: 100 INJECTION, SOLUTION INTRAVENOUS; SUBCUTANEOUS at 08:08

## 2021-08-13 RX ADMIN — METRONIDAZOLE 500 MG: 500 TABLET ORAL at 05:08

## 2021-08-13 RX ADMIN — PANTOPRAZOLE SODIUM 40 MG: 40 TABLET, DELAYED RELEASE ORAL at 08:08

## 2021-08-13 RX ADMIN — GABAPENTIN 600 MG: 300 CAPSULE ORAL at 02:08

## 2021-08-13 RX ADMIN — CEFEPIME 2 G: 2 INJECTION, POWDER, FOR SOLUTION INTRAVENOUS at 02:08

## 2021-08-13 RX ADMIN — INSULIN DETEMIR 10 UNITS: 100 INJECTION, SOLUTION SUBCUTANEOUS at 08:08

## 2021-08-13 RX ADMIN — METRONIDAZOLE 500 MG: 500 TABLET ORAL at 08:08

## 2021-08-13 RX ADMIN — ENOXAPARIN SODIUM 40 MG: 40 INJECTION SUBCUTANEOUS at 05:08

## 2021-08-13 RX ADMIN — OXYCODONE AND ACETAMINOPHEN 1 TABLET: 10; 325 TABLET ORAL at 10:08

## 2021-08-13 RX ADMIN — HYDROXYZINE PAMOATE 25 MG: 25 CAPSULE ORAL at 08:08

## 2021-08-13 RX ADMIN — FERROUS SULFATE TAB EC 325 MG (65 MG FE EQUIVALENT) 325 MG: 325 (65 FE) TABLET DELAYED RESPONSE at 08:08

## 2021-08-13 RX ADMIN — OXYCODONE AND ACETAMINOPHEN 1 TABLET: 10; 325 TABLET ORAL at 05:08

## 2021-08-14 LAB
ANION GAP SERPL CALC-SCNC: 7 MMOL/L (ref 8–16)
BASOPHILS # BLD AUTO: 0.09 K/UL (ref 0–0.2)
BASOPHILS NFR BLD: 0.9 % (ref 0–1.9)
BUN SERPL-MCNC: 7 MG/DL (ref 6–20)
CALCIUM SERPL-MCNC: 9.4 MG/DL (ref 8.7–10.5)
CHLORIDE SERPL-SCNC: 110 MMOL/L (ref 95–110)
CO2 SERPL-SCNC: 20 MMOL/L (ref 23–29)
CREAT SERPL-MCNC: 0.9 MG/DL (ref 0.5–1.4)
DIFFERENTIAL METHOD: ABNORMAL
EOSINOPHIL # BLD AUTO: 1.1 K/UL (ref 0–0.5)
EOSINOPHIL NFR BLD: 11.3 % (ref 0–8)
ERYTHROCYTE [DISTWIDTH] IN BLOOD BY AUTOMATED COUNT: 20 % (ref 11.5–14.5)
EST. GFR  (AFRICAN AMERICAN): >60 ML/MIN/1.73 M^2
EST. GFR  (NON AFRICAN AMERICAN): >60 ML/MIN/1.73 M^2
GLUCOSE SERPL-MCNC: 88 MG/DL (ref 70–110)
HCT VFR BLD AUTO: 26.2 % (ref 40–54)
HGB BLD-MCNC: 8.2 G/DL (ref 14–18)
IMM GRANULOCYTES # BLD AUTO: 0.05 K/UL (ref 0–0.04)
IMM GRANULOCYTES NFR BLD AUTO: 0.5 % (ref 0–0.5)
LYMPHOCYTES # BLD AUTO: 2.9 K/UL (ref 1–4.8)
LYMPHOCYTES NFR BLD: 29.5 % (ref 18–48)
MCH RBC QN AUTO: 21.6 PG (ref 27–31)
MCHC RBC AUTO-ENTMCNC: 31.3 G/DL (ref 32–36)
MCV RBC AUTO: 69 FL (ref 82–98)
MONOCYTES # BLD AUTO: 0.8 K/UL (ref 0.3–1)
MONOCYTES NFR BLD: 7.8 % (ref 4–15)
NEUTROPHILS # BLD AUTO: 4.9 K/UL (ref 1.8–7.7)
NEUTROPHILS NFR BLD: 50 % (ref 38–73)
NRBC BLD-RTO: 0 /100 WBC
PLATELET # BLD AUTO: 688 K/UL (ref 150–450)
PMV BLD AUTO: 9.6 FL (ref 9.2–12.9)
POCT GLUCOSE: 123 MG/DL (ref 70–110)
POCT GLUCOSE: 132 MG/DL (ref 70–110)
POCT GLUCOSE: 188 MG/DL (ref 70–110)
POCT GLUCOSE: 92 MG/DL (ref 70–110)
POCT GLUCOSE: 98 MG/DL (ref 70–110)
POTASSIUM SERPL-SCNC: 4.4 MMOL/L (ref 3.5–5.1)
RBC # BLD AUTO: 3.79 M/UL (ref 4.6–6.2)
SODIUM SERPL-SCNC: 137 MMOL/L (ref 136–145)
WBC # BLD AUTO: 9.73 K/UL (ref 3.9–12.7)

## 2021-08-14 PROCEDURE — 94761 N-INVAS EAR/PLS OXIMETRY MLT: CPT

## 2021-08-14 PROCEDURE — 25000003 PHARM REV CODE 250: Performed by: SURGERY

## 2021-08-14 PROCEDURE — A4216 STERILE WATER/SALINE, 10 ML: HCPCS | Performed by: SURGERY

## 2021-08-14 PROCEDURE — 63600175 PHARM REV CODE 636 W HCPCS: Performed by: ORTHOPAEDIC SURGERY

## 2021-08-14 PROCEDURE — 25000003 PHARM REV CODE 250: Performed by: INTERNAL MEDICINE

## 2021-08-14 PROCEDURE — 25000003 PHARM REV CODE 250: Performed by: NURSE PRACTITIONER

## 2021-08-14 PROCEDURE — 63600175 PHARM REV CODE 636 W HCPCS: Performed by: INTERNAL MEDICINE

## 2021-08-14 PROCEDURE — 11000001 HC ACUTE MED/SURG PRIVATE ROOM

## 2021-08-14 PROCEDURE — 25000003 PHARM REV CODE 250: Performed by: HOSPITALIST

## 2021-08-14 PROCEDURE — 80048 BASIC METABOLIC PNL TOTAL CA: CPT | Performed by: SURGERY

## 2021-08-14 PROCEDURE — 85025 COMPLETE CBC W/AUTO DIFF WBC: CPT | Performed by: SURGERY

## 2021-08-14 PROCEDURE — 27000207 HC ISOLATION

## 2021-08-14 RX ADMIN — PANTOPRAZOLE SODIUM 40 MG: 40 TABLET, DELAYED RELEASE ORAL at 09:08

## 2021-08-14 RX ADMIN — Medication 10 ML: at 12:08

## 2021-08-14 RX ADMIN — OXYCODONE AND ACETAMINOPHEN 1 TABLET: 10; 325 TABLET ORAL at 11:08

## 2021-08-14 RX ADMIN — ENOXAPARIN SODIUM 40 MG: 40 INJECTION SUBCUTANEOUS at 04:08

## 2021-08-14 RX ADMIN — OXYCODONE AND ACETAMINOPHEN 1 TABLET: 10; 325 TABLET ORAL at 05:08

## 2021-08-14 RX ADMIN — Medication 10 ML: at 06:08

## 2021-08-14 RX ADMIN — POTASSIUM CHLORIDE 20 MEQ: 1500 TABLET, EXTENDED RELEASE ORAL at 09:08

## 2021-08-14 RX ADMIN — CEFEPIME 2 G: 2 INJECTION, POWDER, FOR SOLUTION INTRAVENOUS at 05:08

## 2021-08-14 RX ADMIN — BACLOFEN 10 MG: 10 TABLET ORAL at 09:08

## 2021-08-14 RX ADMIN — OXYCODONE AND ACETAMINOPHEN 1 TABLET: 10; 325 TABLET ORAL at 03:08

## 2021-08-14 RX ADMIN — SULFAMETHOXAZOLE AND TRIMETHOPRIM 2 TABLET: 800; 160 TABLET ORAL at 09:08

## 2021-08-14 RX ADMIN — FERROUS SULFATE TAB EC 325 MG (65 MG FE EQUIVALENT) 325 MG: 325 (65 FE) TABLET DELAYED RESPONSE at 09:08

## 2021-08-14 RX ADMIN — GABAPENTIN 600 MG: 300 CAPSULE ORAL at 09:08

## 2021-08-14 RX ADMIN — HYDROXYZINE PAMOATE 25 MG: 25 CAPSULE ORAL at 07:08

## 2021-08-14 RX ADMIN — GABAPENTIN 600 MG: 300 CAPSULE ORAL at 02:08

## 2021-08-14 RX ADMIN — OXYCODONE AND ACETAMINOPHEN 1 TABLET: 10; 325 TABLET ORAL at 07:08

## 2021-08-14 RX ADMIN — INSULIN ASPART 1 UNITS: 100 INJECTION, SOLUTION INTRAVENOUS; SUBCUTANEOUS at 04:08

## 2021-08-14 RX ADMIN — OXYCODONE AND ACETAMINOPHEN 1 TABLET: 10; 325 TABLET ORAL at 09:08

## 2021-08-14 RX ADMIN — METRONIDAZOLE 500 MG: 500 TABLET ORAL at 09:08

## 2021-08-14 RX ADMIN — LIDOCAINE 1 PATCH: 50 PATCH TOPICAL at 02:08

## 2021-08-14 RX ADMIN — METRONIDAZOLE 500 MG: 500 TABLET ORAL at 02:08

## 2021-08-14 RX ADMIN — METOPROLOL TARTRATE 25 MG: 25 TABLET, FILM COATED ORAL at 09:08

## 2021-08-14 RX ADMIN — INSULIN DETEMIR 10 UNITS: 100 INJECTION, SOLUTION SUBCUTANEOUS at 09:08

## 2021-08-14 RX ADMIN — CEFEPIME 2 G: 2 INJECTION, POWDER, FOR SOLUTION INTRAVENOUS at 02:08

## 2021-08-14 RX ADMIN — CEFEPIME 2 G: 2 INJECTION, POWDER, FOR SOLUTION INTRAVENOUS at 09:08

## 2021-08-14 RX ADMIN — METRONIDAZOLE 500 MG: 500 TABLET ORAL at 05:08

## 2021-08-15 LAB
POCT GLUCOSE: 116 MG/DL (ref 70–110)
POCT GLUCOSE: 122 MG/DL (ref 70–110)
POCT GLUCOSE: 179 MG/DL (ref 70–110)
POCT GLUCOSE: 95 MG/DL (ref 70–110)

## 2021-08-15 PROCEDURE — 63600175 PHARM REV CODE 636 W HCPCS: Performed by: ORTHOPAEDIC SURGERY

## 2021-08-15 PROCEDURE — 25000003 PHARM REV CODE 250: Performed by: HOSPITALIST

## 2021-08-15 PROCEDURE — 11000001 HC ACUTE MED/SURG PRIVATE ROOM

## 2021-08-15 PROCEDURE — 25000003 PHARM REV CODE 250: Performed by: INTERNAL MEDICINE

## 2021-08-15 PROCEDURE — 27000207 HC ISOLATION

## 2021-08-15 PROCEDURE — A4216 STERILE WATER/SALINE, 10 ML: HCPCS | Performed by: SURGERY

## 2021-08-15 PROCEDURE — 25000003 PHARM REV CODE 250: Performed by: NURSE PRACTITIONER

## 2021-08-15 PROCEDURE — 63600175 PHARM REV CODE 636 W HCPCS: Performed by: INTERNAL MEDICINE

## 2021-08-15 PROCEDURE — 94761 N-INVAS EAR/PLS OXIMETRY MLT: CPT

## 2021-08-15 PROCEDURE — 25000003 PHARM REV CODE 250: Performed by: SURGERY

## 2021-08-15 RX ORDER — L. ACIDOPHILUS/L.BULGARICUS 100MM CELL
1 GRANULES IN PACKET (EA) ORAL 2 TIMES DAILY
Status: DISCONTINUED | OUTPATIENT
Start: 2021-08-15 | End: 2021-08-17 | Stop reason: HOSPADM

## 2021-08-15 RX ORDER — L. ACIDOPHILUS/L.BULGARICUS 100MM CELL
1 GRANULES IN PACKET (EA) ORAL 2 TIMES DAILY
Start: 2021-08-15

## 2021-08-15 RX ADMIN — HYDROXYZINE PAMOATE 25 MG: 25 CAPSULE ORAL at 07:08

## 2021-08-15 RX ADMIN — ENOXAPARIN SODIUM 40 MG: 40 INJECTION SUBCUTANEOUS at 05:08

## 2021-08-15 RX ADMIN — GABAPENTIN 600 MG: 300 CAPSULE ORAL at 09:08

## 2021-08-15 RX ADMIN — CEFEPIME 2 G: 2 INJECTION, POWDER, FOR SOLUTION INTRAVENOUS at 01:08

## 2021-08-15 RX ADMIN — LIDOCAINE 1 PATCH: 50 PATCH TOPICAL at 01:08

## 2021-08-15 RX ADMIN — PANTOPRAZOLE SODIUM 40 MG: 40 TABLET, DELAYED RELEASE ORAL at 08:08

## 2021-08-15 RX ADMIN — FERROUS SULFATE TAB EC 325 MG (65 MG FE EQUIVALENT) 325 MG: 325 (65 FE) TABLET DELAYED RESPONSE at 08:08

## 2021-08-15 RX ADMIN — POTASSIUM CHLORIDE 20 MEQ: 1500 TABLET, EXTENDED RELEASE ORAL at 08:08

## 2021-08-15 RX ADMIN — CEFEPIME 2 G: 2 INJECTION, POWDER, FOR SOLUTION INTRAVENOUS at 05:08

## 2021-08-15 RX ADMIN — GABAPENTIN 600 MG: 300 CAPSULE ORAL at 08:08

## 2021-08-15 RX ADMIN — GABAPENTIN 600 MG: 300 CAPSULE ORAL at 03:08

## 2021-08-15 RX ADMIN — INSULIN ASPART 2 UNITS: 100 INJECTION, SOLUTION INTRAVENOUS; SUBCUTANEOUS at 05:08

## 2021-08-15 RX ADMIN — BACLOFEN 10 MG: 10 TABLET ORAL at 08:08

## 2021-08-15 RX ADMIN — METRONIDAZOLE 500 MG: 500 TABLET ORAL at 01:08

## 2021-08-15 RX ADMIN — SULFAMETHOXAZOLE AND TRIMETHOPRIM 2 TABLET: 800; 160 TABLET ORAL at 08:08

## 2021-08-15 RX ADMIN — Medication 10 ML: at 11:08

## 2021-08-15 RX ADMIN — CEFEPIME 2 G: 2 INJECTION, POWDER, FOR SOLUTION INTRAVENOUS at 09:08

## 2021-08-15 RX ADMIN — BACLOFEN 10 MG: 10 TABLET ORAL at 09:08

## 2021-08-15 RX ADMIN — HYDROXYZINE PAMOATE 25 MG: 25 CAPSULE ORAL at 11:08

## 2021-08-15 RX ADMIN — SULFAMETHOXAZOLE AND TRIMETHOPRIM 2 TABLET: 800; 160 TABLET ORAL at 09:08

## 2021-08-15 RX ADMIN — OXYCODONE AND ACETAMINOPHEN 1 TABLET: 10; 325 TABLET ORAL at 11:08

## 2021-08-15 RX ADMIN — OXYCODONE AND ACETAMINOPHEN 1 TABLET: 10; 325 TABLET ORAL at 03:08

## 2021-08-15 RX ADMIN — OXYCODONE AND ACETAMINOPHEN 1 TABLET: 10; 325 TABLET ORAL at 05:08

## 2021-08-15 RX ADMIN — Medication 10 ML: at 05:08

## 2021-08-15 RX ADMIN — METRONIDAZOLE 500 MG: 500 TABLET ORAL at 05:08

## 2021-08-15 RX ADMIN — METOPROLOL TARTRATE 25 MG: 25 TABLET, FILM COATED ORAL at 08:08

## 2021-08-15 RX ADMIN — OXYCODONE AND ACETAMINOPHEN 1 TABLET: 10; 325 TABLET ORAL at 07:08

## 2021-08-16 ENCOUNTER — PATIENT OUTREACH (OUTPATIENT)
Dept: ADMINISTRATIVE | Facility: OTHER | Age: 46
End: 2021-08-16

## 2021-08-16 LAB
ANION GAP SERPL CALC-SCNC: 7 MMOL/L (ref 8–16)
BASOPHILS # BLD AUTO: 0.06 K/UL (ref 0–0.2)
BASOPHILS NFR BLD: 0.7 % (ref 0–1.9)
BUN SERPL-MCNC: 8 MG/DL (ref 6–20)
CALCIUM SERPL-MCNC: 9.5 MG/DL (ref 8.7–10.5)
CHLORIDE SERPL-SCNC: 110 MMOL/L (ref 95–110)
CO2 SERPL-SCNC: 17 MMOL/L (ref 23–29)
CREAT SERPL-MCNC: 1 MG/DL (ref 0.5–1.4)
DIFFERENTIAL METHOD: ABNORMAL
EOSINOPHIL # BLD AUTO: 1.1 K/UL (ref 0–0.5)
EOSINOPHIL NFR BLD: 12.2 % (ref 0–8)
ERYTHROCYTE [DISTWIDTH] IN BLOOD BY AUTOMATED COUNT: 20.7 % (ref 11.5–14.5)
EST. GFR  (AFRICAN AMERICAN): >60 ML/MIN/1.73 M^2
EST. GFR  (NON AFRICAN AMERICAN): >60 ML/MIN/1.73 M^2
GLUCOSE SERPL-MCNC: 134 MG/DL (ref 70–110)
HCT VFR BLD AUTO: 27.4 % (ref 40–54)
HGB BLD-MCNC: 8.5 G/DL (ref 14–18)
IMM GRANULOCYTES # BLD AUTO: 0.04 K/UL (ref 0–0.04)
IMM GRANULOCYTES NFR BLD AUTO: 0.5 % (ref 0–0.5)
LYMPHOCYTES # BLD AUTO: 2.2 K/UL (ref 1–4.8)
LYMPHOCYTES NFR BLD: 25.3 % (ref 18–48)
MCH RBC QN AUTO: 21.5 PG (ref 27–31)
MCHC RBC AUTO-ENTMCNC: 31 G/DL (ref 32–36)
MCV RBC AUTO: 69 FL (ref 82–98)
MONOCYTES # BLD AUTO: 0.6 K/UL (ref 0.3–1)
MONOCYTES NFR BLD: 7.3 % (ref 4–15)
NEUTROPHILS # BLD AUTO: 4.7 K/UL (ref 1.8–7.7)
NEUTROPHILS NFR BLD: 54 % (ref 38–73)
NRBC BLD-RTO: 0 /100 WBC
PLATELET # BLD AUTO: 669 K/UL (ref 150–450)
PMV BLD AUTO: 9.4 FL (ref 9.2–12.9)
POCT GLUCOSE: 131 MG/DL (ref 70–110)
POCT GLUCOSE: 141 MG/DL (ref 70–110)
POCT GLUCOSE: 182 MG/DL (ref 70–110)
POTASSIUM SERPL-SCNC: 4.5 MMOL/L (ref 3.5–5.1)
RBC # BLD AUTO: 3.95 M/UL (ref 4.6–6.2)
SODIUM SERPL-SCNC: 134 MMOL/L (ref 136–145)
WBC # BLD AUTO: 8.62 K/UL (ref 3.9–12.7)

## 2021-08-16 PROCEDURE — 27000207 HC ISOLATION

## 2021-08-16 PROCEDURE — 63600175 PHARM REV CODE 636 W HCPCS: Performed by: INTERNAL MEDICINE

## 2021-08-16 PROCEDURE — 94761 N-INVAS EAR/PLS OXIMETRY MLT: CPT

## 2021-08-16 PROCEDURE — 25000003 PHARM REV CODE 250: Performed by: SURGERY

## 2021-08-16 PROCEDURE — 85025 COMPLETE CBC W/AUTO DIFF WBC: CPT | Performed by: INTERNAL MEDICINE

## 2021-08-16 PROCEDURE — A4216 STERILE WATER/SALINE, 10 ML: HCPCS | Performed by: SURGERY

## 2021-08-16 PROCEDURE — 25000003 PHARM REV CODE 250: Performed by: HOSPITALIST

## 2021-08-16 PROCEDURE — 11000001 HC ACUTE MED/SURG PRIVATE ROOM

## 2021-08-16 PROCEDURE — 63600175 PHARM REV CODE 636 W HCPCS: Performed by: ORTHOPAEDIC SURGERY

## 2021-08-16 PROCEDURE — 80048 BASIC METABOLIC PNL TOTAL CA: CPT | Performed by: INTERNAL MEDICINE

## 2021-08-16 PROCEDURE — 25000003 PHARM REV CODE 250: Performed by: INTERNAL MEDICINE

## 2021-08-16 RX ADMIN — INSULIN ASPART 2 UNITS: 100 INJECTION, SOLUTION INTRAVENOUS; SUBCUTANEOUS at 12:08

## 2021-08-16 RX ADMIN — Medication 10 ML: at 12:08

## 2021-08-16 RX ADMIN — INSULIN ASPART 2 UNITS: 100 INJECTION, SOLUTION INTRAVENOUS; SUBCUTANEOUS at 08:08

## 2021-08-16 RX ADMIN — LIDOCAINE 1 PATCH: 50 PATCH TOPICAL at 12:08

## 2021-08-16 RX ADMIN — SULFAMETHOXAZOLE AND TRIMETHOPRIM 2 TABLET: 800; 160 TABLET ORAL at 08:08

## 2021-08-16 RX ADMIN — OXYCODONE AND ACETAMINOPHEN 1 TABLET: 10; 325 TABLET ORAL at 08:08

## 2021-08-16 RX ADMIN — COLLAGENASE SANTYL: 250 OINTMENT TOPICAL at 08:08

## 2021-08-16 RX ADMIN — ENOXAPARIN SODIUM 40 MG: 40 INJECTION SUBCUTANEOUS at 04:08

## 2021-08-16 RX ADMIN — OXYCODONE AND ACETAMINOPHEN 1 TABLET: 10; 325 TABLET ORAL at 04:08

## 2021-08-16 RX ADMIN — POTASSIUM CHLORIDE 20 MEQ: 1500 TABLET, EXTENDED RELEASE ORAL at 08:08

## 2021-08-16 RX ADMIN — Medication 10 ML: at 05:08

## 2021-08-16 RX ADMIN — METOPROLOL TARTRATE 25 MG: 25 TABLET, FILM COATED ORAL at 08:08

## 2021-08-16 RX ADMIN — CEFEPIME 2 G: 2 INJECTION, POWDER, FOR SOLUTION INTRAVENOUS at 04:08

## 2021-08-16 RX ADMIN — BACLOFEN 10 MG: 10 TABLET ORAL at 08:08

## 2021-08-16 RX ADMIN — GABAPENTIN 600 MG: 300 CAPSULE ORAL at 08:08

## 2021-08-16 RX ADMIN — CEFEPIME 2 G: 2 INJECTION, POWDER, FOR SOLUTION INTRAVENOUS at 12:08

## 2021-08-16 RX ADMIN — INSULIN ASPART 4 UNITS: 100 INJECTION, SOLUTION INTRAVENOUS; SUBCUTANEOUS at 04:08

## 2021-08-16 RX ADMIN — FERROUS SULFATE TAB EC 325 MG (65 MG FE EQUIVALENT) 325 MG: 325 (65 FE) TABLET DELAYED RESPONSE at 08:08

## 2021-08-16 RX ADMIN — OXYCODONE AND ACETAMINOPHEN 1 TABLET: 10; 325 TABLET ORAL at 12:08

## 2021-08-16 RX ADMIN — PANTOPRAZOLE SODIUM 40 MG: 40 TABLET, DELAYED RELEASE ORAL at 08:08

## 2021-08-17 ENCOUNTER — PATIENT OUTREACH (OUTPATIENT)
Dept: ADMINISTRATIVE | Facility: OTHER | Age: 46
End: 2021-08-17

## 2021-08-17 VITALS
BODY MASS INDEX: 29.38 KG/M2 | DIASTOLIC BLOOD PRESSURE: 76 MMHG | WEIGHT: 209.88 LBS | TEMPERATURE: 98 F | OXYGEN SATURATION: 99 % | RESPIRATION RATE: 18 BRPM | HEART RATE: 84 BPM | HEIGHT: 71 IN | SYSTOLIC BLOOD PRESSURE: 123 MMHG

## 2021-08-17 PROCEDURE — 25000003 PHARM REV CODE 250: Performed by: HOSPITALIST

## 2021-08-17 RX ADMIN — OXYCODONE AND ACETAMINOPHEN 1 TABLET: 10; 325 TABLET ORAL at 12:08

## 2021-08-18 LAB — ARUP MISCELLANEOUS TEST 1: NORMAL

## 2021-09-05 LAB
FUNGUS SPEC CULT: NORMAL
FUNGUS SPEC CULT: NORMAL

## 2021-09-09 NOTE — PLAN OF CARE
Patient on room air with documented SATS and in no apparent distress. Will continue to monitor.    [FreeTextEntry1] : 74 year old female with PMHx of newly diagnosed A-fib, HTN, PAD, DM presents today for hospital follow up. Patient had recent admission for bilateral LE embolectomy followed up with vascular Dr. eHnry.\par \par # Atrial Fibrillation\par - on metoprolol and coumadin\par - last INR 2.2 (9/02)\par - F/u cardiology, scheduled December 28\par \par # PAD s/p embolectomy\par - Recent [facility] duplex revealed bilateral patent popliteal arteries\par - F/u w/ Dr. Henry\par \par # DM\par - Last A1c 6.5%\par - on metformin\par - diet and exercise\par \par # HTN\par - BP controlled\par - c/w lisinopril\par \par #Right Hilar Mass:\par - CT chest with IV contrast. \par - f/u with pulmonology\par \par # Long term anticoagulation use: \par - Pt denies any bleeding or bruising. \par \par #DLD\par - c/w atorvastatin\par \par HCM\par - Repeat mammogram\par - Previous pap smears normal\par - Last colonoscopy 2015 at [facility], Pt reports normal results\par - RTC 3 months

## 2021-12-15 NOTE — CODE/ RAPID DOCUMENTATION
12:27  MET called per Dr. Tom.  Dr Tom found pt with resp distress and cyanotic.  Nursing and resp staff in room.  Oxygen has been initiated per nrm and oral pharyngeal suctioning done .  12:32  Awake   Dyspnea  resp rate elevated.  Pt indicating unable to bring up mucus.    Dr Tom asked him if any food or drink was swallowed the wrong way.  Pt denied  Dr Tom asked had he vomited.  Pt nodded yes he had earlier .       12:42  Xray and abg has been completed  Pt has been placed on cardiac monitor   No ectopy seen  Pt resp effort improving   12:54 Deep resp nasal pharangeal suctioning done per resp dept   Placed on nasal cannula upon completion at 3 liters     Met ended .  
The patient is a 18y Male complaining of eye pain/injury.

## 2021-12-15 NOTE — ED NOTES
Attempted to assist Physician with replacement of suprapubic catheter but do not have the correct size at this site.   
Report given to Kamran at [facility]. Ready to assume care at this time.   
6 Herve Su
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
## 2022-09-10 NOTE — ASSESSMENT & PLAN NOTE
- presented with fever 100.4, pulse 124, RR 28, and with WBC 15.6 with normal BP and mentation  - LA 2.3->0.8  - fluid resuscitated with 1L LR with improvement in vital sign abnormalities  - initiated on BSAbx with IV vancomycin and zosyn, will continue for now  - likely source is infected sacral ulcer, although also with UTIs in the past which have been sensitive to zosyn  - UA following catheter exchange relatively unremarkable (1+ leuks, 6 WBC), CXR nonacute, COVID negative; BCx and UCx pending   - MRI of sacrum to r/o osteo pending  - have consulted General Surgery for debridement of decub ulcer, appreciate recs   Just want to clarify pharmacy asked for refill request for gabapentin. He should be on gabapentin or Lyrica but not both.

## 2023-01-01 NOTE — ASSESSMENT & PLAN NOTE
Pediatric Well Child 1 Month of Age    Chief Complaint   Patient presents with    Well Child       SUBJECTIVE:  Gavin Kwan is a 4 week old male who presents for a 1 Month well visit.  Patient presents with Mother.     Concerns:   Tongue tie - mom seeking ENT referral for evaluation    Diet:   Formula/breastmilk - BM + formula  Amount/Interval - q2h 3-5 oz per feed    [place]:   Wet diapers - normal  Stools - daily  Constipation - no concerns     Sleep:   Where - bassinet  How much - up to 4 hour stretches, wakes to feed    : no    Tummy time: daily       Interval History:  Family Support: Yes  Sleeps between the feeding? Wakes to feed? yes  Vaccines Reactions: None    Developmental Assessment:   Personal/social - regards face, recognizes parents voice? yes  Gross Motor functions on stomach - clears face to side, chin up in prone position? yes    Cognitive:  - Has started to smile and make more expressions yes  Communicative:  - Recognizes parents voices yes  - Has different cries for different needs yes  Physical Development:  - Does tummy time yes  - Able to lift head when on tummy yes    RECENT HEALTH EVENTS:  Illnesses: []  YES    [x]  NO    []  N/A  Hospitalizations: []  YES    [x]  NO    []  N/A  Injuries or Accidents: []  YES    [x]  NO    []  N/A    Dixon  Depression Score: Score = 7  see screening tab      OBJECTIVE:  Vitals:     Vitals:    23 1347   Pulse: 144   Temp: 98.2 °F (36.8 °C)                  No current outpatient medications on file.     No current facility-administered medications for this visit.      History reviewed. No pertinent past medical history.   ALLERGIES:  No Known Allergies     Past Results:  Please enter a base name.     PAST HISTORIES:  Allergies, Medications, Medical HX, Surgical HX, Family HX reviewed and updated.  IMMUNIZATION STATUS: Up to date as of today's visit   IMMUNIZATION REACTIONS: Denied by caregiver    REVIEW OF SYSTEMS:    Review of  Well controlled. SBP . Monitor BP. Continue home metoprolol 12.5 mg BID.     Systems   Constitutional:  Negative for activity change, appetite change and fever.   HENT:  Negative for congestion and rhinorrhea.    Eyes:  Negative for discharge and redness.   Respiratory:  Negative for apnea, cough and wheezing.    Cardiovascular:  Negative for cyanosis.   Gastrointestinal:  Negative for abdominal distention, blood in stool, constipation, diarrhea and vomiting.   Genitourinary:  Negative for decreased urine volume.   Musculoskeletal:  Negative for joint swelling.   Skin:  Negative for rash.   Allergic/Immunologic: Negative for food allergies.   Neurological:  Negative for seizures.   Hematological:  Negative for adenopathy.      As Per HPI.           PHYSICAL EXAM:   Physical Exam  Vitals and nursing note reviewed.   Constitutional:       General: He is active. He is not in acute distress.     Appearance: Normal appearance. He is well-developed.   HENT:      Head: Normocephalic and atraumatic. Anterior fontanelle is flat.      Right Ear: Tympanic membrane and external ear normal. Tympanic membrane is not bulging.      Left Ear: Tympanic membrane and external ear normal. Tympanic membrane is not bulging.      Nose: Nose normal. No congestion or rhinorrhea.      Mouth/Throat:      Mouth: Mucous membranes are moist.      Pharynx: Oropharynx is clear. No oropharyngeal exudate.      Comments: +amkyloglossia     Neck: Normal range of motion and neck supple.   Eyes:      General: Red reflex is present bilaterally.         Right eye: No discharge.         Left eye: No discharge.      Extraocular Movements: Extraocular movements intact.      Conjunctiva/sclera: Conjunctivae normal.      Pupils: Pupils are equal, round, and reactive to light.   Cardiovascular:      Rate and Rhythm: Normal rate and regular rhythm.      Pulses: Normal pulses.      Heart sounds: Normal heart sounds. No murmur heard.     No friction rub. No gallop.   Pulmonary:      Effort: Pulmonary effort is normal. No respiratory  distress or retractions.      Breath sounds: Normal breath sounds. No wheezing, rhonchi or rales.   Abdominal:      General: Abdomen is flat. Bowel sounds are normal. There is no distension.      Palpations: Abdomen is soft.      Tenderness: There is no abdominal tenderness.   Genitourinary:     Penis: Normal and circumcised.       Testes: Normal.   Musculoskeletal:         General: Normal range of motion.      Right hip: Negative right Ortolani and negative right Marin.      Left hip: Negative left Ortolani and negative left Marin.   Lymphadenopathy:      Cervical: No cervical adenopathy.   Skin:     General: Skin is warm and dry.      Capillary Refill: Capillary refill takes less than 2 seconds.      Coloration: Skin is not jaundiced.      Findings: No rash.   Neurological:      Mental Status: He is alert.      Motor: No abnormal muscle tone.      Primitive Reflexes: Suck normal. Symmetric [place].           ASSESSMENT/PLAN:  4 week old male well infant exam  Diagnoses and all orders for this visit:  Encounter for routine child health examination without abnormal findings  Ankyloglossia  -     SERVICE TO PEDIATRIC ENT [place]      #Health maintenance  - All parental concerns and questions discussed.  - Anticipatory guidance provided.  -  Screen Result: Normal         Follow up:Return in about 1 month (around 2024) for 2 month checkup.    Elijah Lares MD    Note to patient: The 21st Century Cures Act makes medical notes like these available to patients in the interest of transparency. However, be advised this is a medical document. It is intended as peer to peer communication. It is written in medical language and may contain abbreviations or verbiage that are unfamiliar. It may appear blunt or direct. Medical documents are intended to carry relevant information, facts as evident, and the clinical opinion of the practitioner.

## 2023-01-19 NOTE — CONSULTS
My message today to KELLEY Nelson :  \"The pt had a blood draw and urin cultures.    Liver, kidneys, minerals, sugar and blood cells are all fine.    I found out that she has a small UTI.  I will send the single dose antibiotic prescription (that she has no allergy to) to her Pharmacy today.    Please, let Gabriella ( her Daughter) know about the labs.  Her phone is : [phone].\"    Prescription is Fosfomycin 3 g pack single dose.   [facility]  Adult Nutrition  Consult Note    SUMMARY     Recommendations    Recommendation/Intervention:   1. Continue Renal diet & encourage good intake at meals  2. Monitor need for supplements.    Goals:   Pt will consume at least 50% intake at meals  Nutrition Goal Status: new  Communication of RD Recs: reviewed with RN (Tameka)    Continuum of Care Plan  Referral to Outpatient Services:  (pt to d/c on Renal diet)    Reason for Assessment  Reason for Assessment: nurse/nurse practitioner consult  Diagnosis: infection/sepsis  Relevent Medical History: paraplegia, HTN, gastric ulcer, Vit D deficiency, R BKA, trach, PEG      General Information Comments: Pt on Renal diet. Pt reports intake/appetite improving. Pt with N/V x 2 days prior. Pt with increased energy needs related to wound healing as evidenced by surgival incision.    Nutrition Prescription Ordered  Current Diet Order: Renal     Nutrition Risk Screen  Nutrition Risk Screen: reduced oral intake over the last month (no appetite for 2 days)    Nutrition/Diet History  Patient Reported Diet/Restrictions/Preferences: general  Food Preferences: no Religion or cultural food prefs identified     Labs/Tests/Procedures/Meds  Pertinent Labs Reviewed: reviewed  Pertinent Labs Comments: K 3.3L, BUN 33H, Crea 1.8H, Glu 121H, Ca 8.4L, Phos 4.8H, Mg 1.4L  Pertinent Medications Reviewed: reviewed  Pertinent Medications Comments: Vit C, aspirin, ferrous sulfate    Physical Findings  Overall Physical Appearance: amputee  Tubes:  (none)  Oral/Mouth Cavity: WDL  Skin:  (Olman 11-R knee incision)    Anthropometrics  Height (inches): 70 in  Weight (kg): 86.2 kg  Ideal Body Weight (IBW), Male: 166 lb  % Ideal Body Weight, Male (lb): 114.48 lb  BMI (kg/m2): 27.27  Total Amputation %: 5.9  Amputee BMI (kg/m2): 29.06 kg/m2    Estimated/Assessed Needs  Weight Used For Calorie Calculations: 72.6 kg (160 lb) (IBW)   Height (cm): 177.8 cm  Energy Need Method:   (3653-5304 (30-35 kcal/kg IBW))   RMR ([place]-St. Jeor Equation): 1785.51  Weight Used For Protein Calculations: 72.6 kg (160 lb) (IBW)  Protein Requirements: 72-87g (1.0-1.2 g/kg)    Monitor and Evaluation  Food and Nutrient Intake: food and beverage intake  Food and Nutrient Adminstration: diet order  Physical Activity and Function: nutrition-related ADLs and IADLs  Anthropometric Measurements: weight  Biochemical Data, Medical Tests and Procedures: electrolyte and renal panel  Nutrition-Focused Physical Findings: overall appearance    Nutrition Risk  Level of Risk: moderate    Nutrition Follow-Up  RD Follow-up?: Yes    Assessment and Plan  No new Assessment & Plan notes have been filed under this hospital service since the last note was generated.  Service: Nutrition

## 2023-11-06 NOTE — ASSESSMENT & PLAN NOTE
Continue rivaroxaban.     09-Apr-2023 20:43 10-Apr-2023 00:31 Medication teaching and assessment/Observation and assessment/Teaching and training

## 2024-07-11 NOTE — DISCHARGE INSTRUCTIONS
Sepsis (English) View Edit Remove   Sepsis, Understanding (English) View Edit Remove   Urinary Tract Infections (UTIs), Understanding (English) View Edit Remove   Urinary Tract Infections in Men (English) View Edit Remove   Sulfamethoxazole; Trimethoprim, SMX-TMP tablets (English) View Edit Remove   Suprapubic Catheter: Caring for, Discharge Instructions (English) View Edit Remove   Diabetes, Diet (English) View Edit Remove        Thank you for letting us care for you in your recent visit to our urgent care center. We would love to hear about your experience with us. Was this the first time you have visited our location?    We’re always looking for ways to make our patients’ experiences even better. Do you have any recommendations on ways we may improve?     I appreciate you taking the time to respond. Please be on the lookout for a survey about your recent visit from [employer] via text or email. We would greatly appreciate if you could fill that out and turn it back in. We want your voice to be heard and we value your feedback.   Thank you for choosing [facility] for your healthcare needs.

## 2024-10-03 NOTE — ASSESSMENT & PLAN NOTE
Hospitalist Discharge Summary        Patient: Jessica Hamer  YOB: 1954  MRN: [id number]   Acct: [id number]    Primary Care Physician: Mau Valdez MD    Admit date  10/1/2024    Discharge date:  10/3/2024 4:55 PM    Chief Complaint on presentation :-    Small Bowel Obstruction resolving     Discharge Assessment and Plan:-     1. Small Bowel Obstruction resolving     - Recurrent episodes, likely secondary to adhesions from previous surgical interventions (hysterectomy and cholecystectomy). Notable history of SBO with last episode approximately 6 years ago.  Jessica is doing better. She denies nausea or vomiting, has passed flatus and had a bowel movement. She is tolerating a diet. She denies pain.     2. Hypertension     - DC on home medications     - Reassess blood pressure management upon resumption of oral intake outpatient.      3. Type 2 Diabetes Mellitus      - Resume home insulin regimen once patient is cleared for oral intake.     4. Hypothyroidism:      - Resume levothyroxine home dose     5. Depression with Anxiety:      - Resume bupropion     - Lorazepam 0.5 mg twice daily as needed for anxiety and sleep management.     6. Obesity (Class III):      - Current BMI of 45.8. Recommend continued encouragement of healthy lifestyle modifications.     7. History of Breast Cancer     - Right triple-negative breast cancer, status post therapeutic chemoradiation.    Initial H and P and Hospital course:-    70 y.o.female who presents with onset of abdominal pain on Friday. She is known to our service as Dr Martinez did  right breast lumpectomy with sentinel lymph node biopsy in September 2023.   She is admitted for SBO. She has a history of SBO, last one in 2013 that was treated conservatively with gut rest.  She had a BM on Sunday and she thought she was getting better.  She put herself on clear liquids.  Yesterday the pain continued to get worse and she came to ER for evaluation. She did  Urinary tract infection associated with indwelling urethral catheter  Chronic indwelling sage catheter  Leukocytosis  Gram-negative bacteremia  Continue cefepime and follow up culture results. Renal ultrasound to evaluate for kidney stones.     are provided. All CT scans at this facility use dose modulation, iterative reconstruction, and/or weight based dosing when appropriate to reduce the radiation dose to as low as reasonably achievable. CONTRAST: 80  cc of Isovue-370  intravenously FINDINGS: Right Lower lobe interstitial changes are noted. A left mastectomy has been performed. Surgical clips are seen in the right axilla. The gallbladder is surgically absent. Marked atrophy of the pancreas. There is excretion of contrast within the collecting system. The kidneys are otherwise unremarkable. A small amount of fluid is seen in the pelvis. There are mildly dilated small bowel loops diffusely in the abdomen with decompressed ileal loops reflecting small bowel obstruction. The appendix is unremarkable. Colonic diverticulosis. Mild hepatomegaly. Otherwise, the biliary tree, adrenal glands, spleen, and kidneys are unremarkable. No bowel obstruction or acute inflammatory bowel process. The appendix is unremarkable. The abdominal aorta is not aneurysmal. Aortoiliac calcifications. No significantly enlarged lymph nodes are seen. The bladder is grossly unremarkable. The uterus is surgically absent. Bones: Degenerative changes of the thoracolumbar spine. Degenerative changes of the SI joints and both hips. The bones are demineralized.     1. Dilated small bowel loops diffusely in the abdomen with decompressed distal ileal loops. Findings can relate to small bowel obstruction. **This report has been created using voice recognition software.  It may contain minor errors which are inherent in voice recognition technology.** Electronically signed by Dr. Lana Lee       Follow-up scheduled after discharge :-    in 1 week with Mau Valdez MD  Follow up with Pulmonology    Consultations during this hospital stay:-  [] NONE [] Cardiology  [] Nephrology  [] Hemo onco   [] GI   [] ID  [] Endocrine  [] Pulm    [] Neuro    [] Psych   [] Urology  [] ENT   [x] G
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
## 2025-02-08 PROBLEM — D50.9 MICROCYTIC ANEMIA: Status: ACTIVE | Noted: 2025-02-08

## 2025-02-08 PROBLEM — S36.119A LIVER INJURY: Status: ACTIVE | Noted: 2025-02-08

## 2025-02-08 PROBLEM — E87.20 METABOLIC ACIDOSIS: Status: ACTIVE | Noted: 2025-02-08

## 2025-02-08 PROBLEM — E87.20 METABOLIC ACIDOSIS: Status: RESOLVED | Noted: 2025-02-08 | Resolved: 2025-02-08

## 2025-02-08 PROBLEM — E11.9 DIABETES MELLITUS: Status: ACTIVE | Noted: 2025-02-08

## 2025-02-08 PROBLEM — F32.A ANXIETY AND DEPRESSION: Status: ACTIVE | Noted: 2025-02-08

## 2025-02-08 PROBLEM — N17.9 AKI (ACUTE KIDNEY INJURY): Status: ACTIVE | Noted: 2025-02-08

## 2025-02-08 PROBLEM — K85.90 ACUTE PANCREATITIS: Status: ACTIVE | Noted: 2025-02-08

## 2025-02-08 PROBLEM — F41.9 ANXIETY AND DEPRESSION: Status: ACTIVE | Noted: 2025-02-08

## 2025-02-08 PROBLEM — E83.52 HYPERCALCEMIA: Status: ACTIVE | Noted: 2025-02-08

## 2025-02-08 PROBLEM — K92.2 GI BLEED: Status: ACTIVE | Noted: 2025-02-08

## 2025-02-08 PROBLEM — G93.40 ACUTE ENCEPHALOPATHY: Status: ACTIVE | Noted: 2025-02-08

## 2025-02-08 PROBLEM — S31.000A WOUND OF SACRAL REGION: Status: ACTIVE | Noted: 2025-02-08

## 2025-02-09 ENCOUNTER — TELEPHONE (OUTPATIENT)
Dept: FAMILY MEDICINE | Facility: CLINIC | Age: 50
End: 2025-02-09
Payer: MEDICARE

## 2025-02-10 PROBLEM — N39.0 UTI (URINARY TRACT INFECTION): Status: ACTIVE | Noted: 2025-02-10

## 2025-02-11 PROBLEM — E83.52 HYPERCALCEMIA: Status: RESOLVED | Noted: 2025-02-08 | Resolved: 2025-02-11

## 2025-02-11 PROBLEM — D50.9 MICROCYTIC ANEMIA: Status: RESOLVED | Noted: 2025-02-08 | Resolved: 2025-02-11

## 2025-02-11 PROBLEM — F29 PSYCHOSIS: Status: ACTIVE | Noted: 2025-02-11
